# Patient Record
Sex: MALE | Race: WHITE
[De-identification: names, ages, dates, MRNs, and addresses within clinical notes are randomized per-mention and may not be internally consistent; named-entity substitution may affect disease eponyms.]

---

## 2018-06-21 ENCOUNTER — HOSPITAL ENCOUNTER (EMERGENCY)
Dept: HOSPITAL 8 - ED | Age: 53
Discharge: HOME | End: 2018-06-21
Payer: COMMERCIAL

## 2018-06-21 VITALS — HEIGHT: 70 IN | BODY MASS INDEX: 31.47 KG/M2 | WEIGHT: 219.8 LBS

## 2018-06-21 VITALS — DIASTOLIC BLOOD PRESSURE: 97 MMHG | SYSTOLIC BLOOD PRESSURE: 160 MMHG
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
## 2018-06-21 DIAGNOSIS — S52.352A: Primary | ICD-10-CM
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
## 2018-06-21 PROCEDURE — 29125 APPL SHORT ARM SPLINT STATIC: CPT

## 2018-06-21 PROCEDURE — 99284 EMERGENCY DEPT VISIT MOD MDM: CPT

## 2018-09-29 ENCOUNTER — HOSPITAL ENCOUNTER (EMERGENCY)
Dept: HOSPITAL 8 - ED | Age: 53
Discharge: HOME | End: 2018-09-29
Payer: MEDICAID

## 2018-09-29 VITALS — HEIGHT: 70 IN | WEIGHT: 225.31 LBS | BODY MASS INDEX: 32.26 KG/M2

## 2018-09-29 DIAGNOSIS — F17.200: ICD-10-CM

## 2018-09-29 DIAGNOSIS — S82.874A: Primary | ICD-10-CM
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
## 2018-09-29 PROCEDURE — 99284 EMERGENCY DEPT VISIT MOD MDM: CPT

## 2018-09-29 PROCEDURE — 29515 APPLICATION SHORT LEG SPLINT: CPT

## 2018-10-31 ENCOUNTER — OFFICE VISIT (OUTPATIENT)
Dept: MEDICAL GROUP | Facility: MEDICAL CENTER | Age: 53
End: 2018-10-31
Attending: NURSE PRACTITIONER
Payer: MEDICAID

## 2018-10-31 VITALS
TEMPERATURE: 97.9 F | OXYGEN SATURATION: 96 % | DIASTOLIC BLOOD PRESSURE: 80 MMHG | WEIGHT: 225 LBS | HEART RATE: 100 BPM | SYSTOLIC BLOOD PRESSURE: 120 MMHG | HEIGHT: 71 IN | RESPIRATION RATE: 20 BRPM | BODY MASS INDEX: 31.5 KG/M2

## 2018-10-31 DIAGNOSIS — Z12.11 SCREEN FOR COLON CANCER: ICD-10-CM

## 2018-10-31 DIAGNOSIS — E66.9 OBESITY (BMI 30-39.9): ICD-10-CM

## 2018-10-31 DIAGNOSIS — I10 ESSENTIAL HYPERTENSION: ICD-10-CM

## 2018-10-31 DIAGNOSIS — Z13.21 ENCOUNTER FOR VITAMIN DEFICIENCY SCREENING: ICD-10-CM

## 2018-10-31 DIAGNOSIS — Z13.220 SCREENING FOR CHOLESTEROL LEVEL: ICD-10-CM

## 2018-10-31 DIAGNOSIS — Z13.1 SCREENING FOR DIABETES MELLITUS: ICD-10-CM

## 2018-10-31 DIAGNOSIS — Z13.29 SCREENING FOR THYROID DISORDER: ICD-10-CM

## 2018-10-31 DIAGNOSIS — S92.901D CLOSED FRACTURE OF RIGHT FOOT WITH ROUTINE HEALING: ICD-10-CM

## 2018-10-31 DIAGNOSIS — Z12.5 SCREENING FOR PROSTATE CANCER: ICD-10-CM

## 2018-10-31 DIAGNOSIS — Z13.0 SCREENING FOR IRON DEFICIENCY ANEMIA: ICD-10-CM

## 2018-10-31 DIAGNOSIS — L30.8 OTHER ECZEMA: ICD-10-CM

## 2018-10-31 PROBLEM — L30.9 ECZEMA: Status: ACTIVE | Noted: 2018-10-31

## 2018-10-31 PROCEDURE — 99203 OFFICE O/P NEW LOW 30 MIN: CPT | Performed by: NURSE PRACTITIONER

## 2018-10-31 RX ORDER — TRIAMCINOLONE ACETONIDE 1 MG/G
CREAM TOPICAL
Qty: 1 TUBE | Refills: 2 | Status: SHIPPED | OUTPATIENT
Start: 2018-10-31 | End: 2021-11-04

## 2018-10-31 RX ORDER — HYDROCHLOROTHIAZIDE 12.5 MG/1
12.5 TABLET ORAL DAILY
Qty: 30 TAB | Refills: 2 | Status: SHIPPED | OUTPATIENT
Start: 2018-10-31 | End: 2018-12-12

## 2018-10-31 RX ORDER — NAPROXEN 500 MG/1
TABLET ORAL
COMMUNITY
Start: 2018-10-01 | End: 2018-10-31

## 2018-10-31 ASSESSMENT — PATIENT HEALTH QUESTIONNAIRE - PHQ9: CLINICAL INTERPRETATION OF PHQ2 SCORE: 0

## 2018-10-31 NOTE — PROGRESS NOTES
"Chief Complaint:   Chief Complaint   Patient presents with   • New Patient   • Hypertension   • Other     eczema        HPI:  Tyree is here today to establish as a new patient.     Nev  Report:   No Entries    His PMH includes  Alcohol Abuse/Withdrawal/DT\"s  Alcohol INduced Pancreatitis  Alcohol Hepatitis  Thrombocytopenia  Macrocytic Anemia  Eczema  HTN ?    Review of Records:  2/21/15--> 3/5/39789 Hospital Admit for Alcohol W/D w Delerium--> DT's  Tx w CIWA protocol, Librium RX, Omeprazole.    Closed fracture of right foot with routine healing  REports seeing DR Douglas for right foot fx  Is wearing boot and has f/u regularly.    Essential hypertension  Hx of HTN and states when at [facility] has been up \"a little\"  138/91 and today 130/80.     Eczema  Reports Eczema for years  Typically on arms and sometimes legs  States \"steroid Cream\" always works after a few days.  Asking for this medication.        Patient Active Problem List    Diagnosis Date Noted   • Obesity (BMI 30-39.9) 10/31/2018   • Closed fracture of right foot with routine healing 10/31/2018   • Essential hypertension 10/31/2018   • Eczema 10/31/2018   • Hyponatremia 02/28/2015   • Hypokalemia 02/28/2015   • Anemia 02/28/2015   • Thrombocytopenia (HCC) 02/28/2015   • Alcoholic hepatitis 02/28/2015   • Metabolic acidosis 02/28/2015   • ARF (acute renal failure) (HCC) 02/28/2015   • Alcohol-induced pancreatitis 02/28/2015   • Alcohol withdrawal delirium ([facility]) 02/27/2015       Allergies:Patient has no known allergies.    Current Outpatient Prescriptions   Medication Sig Dispense Refill   • hydroCHLOROthiazide (HYDRODIURIL) 12.5 MG tablet Take 1 Tab by mouth every day. 30 Tab 2   • triamcinolone acetonide (KENALOG) 0.1 % Cream Apply 1/4 inch ribbon to affected eczema rash areas twice a day as needed 1 Tube 2     No current facility-administered medications for this visit.        Social History   Substance Use Topics   • Smoking status: Current Every Day " "Smoker     Packs/day: 0.25     Years: 25.00     Types: Cigarettes   • Smokeless tobacco: Never Used   • Alcohol use Yes      Comment: one pint daily        Past Medical History:   Diagnosis Date   • ASTHMA    • Hypertension        Family History   Problem Relation Age of Onset   • Heart Disease Father    • Stroke Father        ROS:  Review of Systems   See HPI Above    Exam:  Blood pressure 120/80, pulse 100, temperature 36.6 °C (97.9 °F), temperature source Temporal, resp. rate 20, height 1.803 m (5' 11\"), weight 102.1 kg (225 lb), SpO2 96 %. Body mass index is 31.38 kg/m².    General:  Well nourished, over-weight, well developed male in NAD  HENT:Head is grossly normal. PERRL.  Neck: Supple. Trachea is midline.  Pulmonary: Clear to ausculation .  Normal effort. No rales, ronchi, or wheezing.   Cardiovascular: Regular rate and rhythm.  Abdomen-Abdomen is soft, No tenderness.  Upper extremities- Strong = . Good ROM. Scant scattered rash to each arm  Lower extremities- neg for edema, redness, tenderness. Right foot and ankle in walking boot. Walks slowly w steady gait and good balance.  Neuro- A & O x 4. Speech clear and appropriate and slow.    Current medications, allergies, and problem list reviewed with patient and updated in [facility] today.    Assessment/Plan:  1. Obesity (BMI 30-39.9)  Patient identified as having weight management issue.  Appropriate orders and counseling given.   2. Screen for colon cancer  OCCULT BLOOD FECES IMMUNOASSAY (FIT)   3. Closed fracture of right foot with routine healing  Continue wearing walking boot, F/u w [facility]  -DR Douglas as planned   4. Essential hypertension  hydroCHLOROthiazide (HYDRODIURIL) 12.5 MG tablet    COMP METABOLIC PANEL   5. Other eczema  triamcinolone acetonide (KENALOG) 0.1 % Cream   6. Screening for iron deficiency anemia  CBC WITH DIFFERENTIAL   7. Screening for thyroid disorder  TSH   8. Screening for diabetes mellitus  HEMOGLOBIN A1C   9. Encounter for vitamin " deficiency screening  VITAMIN B12    VITAMIN D,25 HYDROXY   10. Screening for cholesterol level  LIPID PROFILE   11. Screening for prostate cancer  PROSTATE SPECIFIC AG SCREENING       Return in about 4 weeks (around 11/28/2018) for lab results.

## 2018-10-31 NOTE — ASSESSMENT & PLAN NOTE
"Hx of HTN and states when at [facility] has been up \"a little\"  138/91 and today 130/80.   "

## 2018-10-31 NOTE — ASSESSMENT & PLAN NOTE
"Reports Eczema for years  Typically on arms and sometimes legs  States \"steroid Cream\" always works after a few days.  Asking for this medication.    "

## 2018-12-05 PROBLEM — E03.8 OTHER SPECIFIED HYPOTHYROIDISM: Status: ACTIVE | Noted: 2018-12-05

## 2018-12-05 PROBLEM — E55.9 VITAMIN D DEFICIENCY: Status: ACTIVE | Noted: 2018-12-05

## 2018-12-12 ENCOUNTER — OFFICE VISIT (OUTPATIENT)
Dept: MEDICAL GROUP | Facility: MEDICAL CENTER | Age: 53
End: 2018-12-12
Attending: NURSE PRACTITIONER
Payer: MEDICAID

## 2018-12-12 ENCOUNTER — TELEPHONE (OUTPATIENT)
Dept: MEDICAL GROUP | Facility: MEDICAL CENTER | Age: 53
End: 2018-12-12

## 2018-12-12 VITALS
TEMPERATURE: 97.9 F | WEIGHT: 228 LBS | DIASTOLIC BLOOD PRESSURE: 84 MMHG | HEART RATE: 95 BPM | BODY MASS INDEX: 31.92 KG/M2 | RESPIRATION RATE: 16 BRPM | OXYGEN SATURATION: 93 % | SYSTOLIC BLOOD PRESSURE: 134 MMHG | HEIGHT: 71 IN

## 2018-12-12 DIAGNOSIS — E55.9 VITAMIN D DEFICIENCY: ICD-10-CM

## 2018-12-12 DIAGNOSIS — N52.8 OTHER MALE ERECTILE DYSFUNCTION: ICD-10-CM

## 2018-12-12 DIAGNOSIS — R68.82 LIBIDO, DECREASED: ICD-10-CM

## 2018-12-12 DIAGNOSIS — I10 ESSENTIAL HYPERTENSION: ICD-10-CM

## 2018-12-12 DIAGNOSIS — E03.8 OTHER SPECIFIED HYPOTHYROIDISM: ICD-10-CM

## 2018-12-12 DIAGNOSIS — H93.13 RINGING IN EARS, BILATERAL: ICD-10-CM

## 2018-12-12 DIAGNOSIS — H61.23 BILATERAL IMPACTED CERUMEN: ICD-10-CM

## 2018-12-12 PROCEDURE — 99213 OFFICE O/P EST LOW 20 MIN: CPT | Performed by: NURSE PRACTITIONER

## 2018-12-12 PROCEDURE — 99214 OFFICE O/P EST MOD 30 MIN: CPT | Performed by: NURSE PRACTITIONER

## 2018-12-12 RX ORDER — ERGOCALCIFEROL 1.25 MG/1
50000 CAPSULE ORAL
Qty: 12 CAP | Refills: 2 | Status: SHIPPED | OUTPATIENT
Start: 2018-12-12 | End: 2021-11-04

## 2018-12-12 RX ORDER — MECLIZINE HCL 12.5 MG/1
12.5 TABLET ORAL 2 TIMES DAILY PRN
Qty: 60 TAB | Refills: 1 | Status: SHIPPED | OUTPATIENT
Start: 2018-12-12 | End: 2021-05-02

## 2018-12-12 RX ORDER — LEVOTHYROXINE SODIUM 0.03 MG/1
25 TABLET ORAL
Qty: 30 TAB | Refills: 2 | Status: SHIPPED | OUTPATIENT
Start: 2018-12-12 | End: 2021-11-04

## 2018-12-12 RX ORDER — HYDROCHLOROTHIAZIDE 25 MG/1
25 TABLET ORAL DAILY
Qty: 30 TAB | Refills: 2 | Status: SHIPPED | OUTPATIENT
Start: 2018-12-12 | End: 2021-11-04

## 2018-12-12 NOTE — ASSESSMENT & PLAN NOTE
Ringing in ear, more so in left when stops drinking alcohol.  Goes back and forth drinking as the ringing decrease.  Gets some nausea.     Both ears have packed w wax.  Recommend ear irrigation after softening.

## 2018-12-12 NOTE — PROGRESS NOTES
"Chief Complaint:   Chief Complaint   Patient presents with   • Results     labs       HPI:  Tyree is here today for a follow-up on Results. New concern r/t libido    Nev  Report:   No Entries     His PMH includes  Alcohol Abuse/Withdrawal/DT\"s  Alcohol INduced Pancreatitis  Alcohol Hepatitis  Thrombocytopenia  Macrocytic Anemia  Eczema  HTN   Vitamin D Deficiency  Hypothyroid( new 12/2018)  Reduced Libido, Erectile Dysfunction     Review of Records:    Labs normal except LDL slight elev= 110, Vit D= 13 (low), TSH= 8.19    10/31/18 Clinic New Patient appt, HTN, Eczema, Alcohol Abuse hx.  00> Fit test ordered, labs ordered, RX HctZ and Kenalog cream.    2/21/15--> 3/5/11115 Hospital Admit for Alcohol W/D w Delerium--> DT's  Tx w CIWA protocol, Librium RX, Omeprazole.    Other specified hypothyroidism  Reviewed labs and discussed new dx of Hypothyroid and discussed common symptoms.  He reports no history of hypothyroid.  We discussed the nature of hypothyroid and need to start him on a supplemental hormone.  Follow-up TSH blood test in 5 weeks.  He denies any symptoms of fatigue or dry skin.  He does have excess mid abdominal adipose.    Vitamin D deficiency  Vitamin D level reviewed.  Discussed why optimal level of Vitamin D is important to health  Recommended Supplemental Vitamin D    Essential hypertension  BPslightly elevated.  Continues to take HctZ.  States would like to increase to 25 mg/day  Denies neuro deficits.    Ringing in ears, bilateral  Ringing in ear, more so in left when stops drinking alcohol.  Goes back and forth drinking as the ringing decrease.  Gets some nausea.     Both ears have packed w wax.  Recommend ear irrigation after softening.    Libido, decreased  Libido decreased past few months  Mild erectile dysfunction  Wants some assistance w this issue.      Patient Active Problem List    Diagnosis Date Noted   • Ringing in ears, bilateral 12/12/2018   • Libido, decreased 12/12/2018   • " Vitamin D deficiency 12/05/2018   • Other specified hypothyroidism 12/05/2018   • Obesity (BMI 30-39.9) 10/31/2018   • Closed fracture of right foot with routine healing 10/31/2018   • Essential hypertension 10/31/2018   • Eczema 10/31/2018   • Hyponatremia 02/28/2015   • Hypokalemia 02/28/2015   • Anemia 02/28/2015   • Thrombocytopenia ([facility]) 02/28/2015   • Alcoholic hepatitis 02/28/2015   • Metabolic acidosis 02/28/2015   • ARF (acute renal failure) ([facility]) 02/28/2015   • Alcohol-induced pancreatitis 02/28/2015   • Alcohol withdrawal delirium ([facility]) 02/27/2015       Allergies:Patient has no known allergies.    Medicines as of today:  Current Outpatient Prescriptions   Medication Sig Dispense Refill   • levothyroxine (SYNTHROID) 25 MCG Tab Take 1 Tab by mouth Every morning on an empty stomach. 30 Tab 2   • vitamin D, Ergocalciferol, (DRISDOL) 80698 units Cap capsule Take 1 Cap by mouth every 7 days. 12 Cap 2   • meclizine (ANTIVERT) 12.5 MG Tab Take 1 Tab by mouth 2 times a day as needed for Nausea/Vomiting (if feeling ill). 60 Tab 1   • carbamide peroxide (DEBROX) 6.5 % Solution Place 5 Drops in ear 2 times a day. Both ears 1 Bottle 0   • hydroCHLOROthiazide (HYDRODIURIL) 25 MG Tab Take 1 Tab by mouth every day. 30 Tab 2   • triamcinolone acetonide (KENALOG) 0.1 % Cream Apply 1/4 inch ribbon to affected eczema rash areas twice a day as needed 1 Tube 2     No current facility-administered medications for this visit.        Social History   Substance Use Topics   • Smoking status: Current Every Day Smoker     Packs/day: 0.25     Years: 25.00     Types: Cigarettes   • Smokeless tobacco: Never Used   • Alcohol use Yes      Comment: one pint daily        Past Medical History:   Diagnosis Date   • ASTHMA    • Hypertension        Family History   Problem Relation Age of Onset   • Heart Disease Father    • Stroke Father        ROS:  Review of Systems   See HPI Above    Exam:  Blood pressure 134/84, pulse 95, temperature 36.6  "°C (97.9 °F), temperature source Temporal, resp. rate 16, height 1.803 m (5' 10.98\"), weight 103.4 kg (228 lb), SpO2 93 %. Body mass index is 31.81 kg/m².    General:  Well nourished, over-weight,well developed male in NAD  HENT:Head is grossly normal. PERRL. bilat wax impactions, dark brown wax to ear canals  Neck: Supple. Trachea is midline.  Pulmonary: Clear to ausculation .  Normal effort. No rales, ronchi, or wheezing.   Cardiovascular: Regular rate and rhythm.  Abdomen-Abdomen is soft, No tenderness.  Upper extremities- Strong = . Good ROM  Lower extremities- neg for edema, redness, tenderness.  Neuro- A & O x 4. Speech clear and appropriate.    Current medications, allergies, and problem list reviewed with patient and updated in [facility] today.    Assessment/Plan:  1. Other specified hypothyroidism  levothyroxine (SYNTHROID) 25 MCG Tab-START    TSH   2. Vitamin D deficiency  vitamin D, Ergocalciferol, (DRISDOL) 41488 units Cap capsule-START   3. Essential hypertension  hydroCHLOROthiazide (HYDRODIURIL) 25 MG Tab-increase In dose   4. Ringing in ears, bilateral  meclizine (ANTIVERT) 12.5 MG Tab prn-START    carbamide peroxide (DEBROX) 6.5 % Solution   5. Bilateral impacted cerumen  carbamide peroxide (DEBROX) 6.5 % Solution  F/u for Med Asst Visit for bilat ear irrigation/lavage I 1 week   6. Libido, decreased  REFERRAL TO UROLOGY    TESTOSTERONE F&T MALE ADULT   7. Other male erectile dysfunction  REFERRAL TO UROLOGY    TESTOSTERONE F&T MALE ADULT       Return in about 6 weeks (around 1/23/2019) for lab results.  "

## 2018-12-13 NOTE — TELEPHONE ENCOUNTER
1. Caller Name: Tyree Whiteside                                           Call Back Number: [phone] (home)         Patient approves a detailed voicemail message: yes    Spoke with pt to schedule the ear lavage. Pt was unable to get carbamide Peroxide drops as his ins will not cover them. Can we change the Rx or is there one pt can buy OTC to use. Please advise.

## 2018-12-13 NOTE — TELEPHONE ENCOUNTER
No other option to prescribe.  Let him know he should ask pharmacist for   Generic wax softening drops and buy those  To use.  Delbert

## 2019-01-22 ENCOUNTER — TELEPHONE (OUTPATIENT)
Dept: MEDICAL GROUP | Facility: MEDICAL CENTER | Age: 54
End: 2019-01-22

## 2019-01-22 NOTE — TELEPHONE ENCOUNTER
Spoke with patient about no show to appointment today 1/22/19.  Explained that this was his first no show and the no show policy.

## 2019-01-31 ENCOUNTER — TELEPHONE (OUTPATIENT)
Dept: MEDICAL GROUP | Facility: MEDICAL CENTER | Age: 54
End: 2019-01-31

## 2019-01-31 NOTE — TELEPHONE ENCOUNTER
Spoke with patient about no show to appointment today 1/31/19.  Explained that this was his 2nd no show and the no show policy.

## 2019-10-31 ENCOUNTER — IMMUNIZATION (OUTPATIENT)
Dept: SOCIAL WORK | Facility: CLINIC | Age: 54
End: 2019-10-31
Payer: COMMERCIAL

## 2019-10-31 DIAGNOSIS — Z23 NEED FOR VACCINATION: ICD-10-CM

## 2019-10-31 PROCEDURE — 90471 IMMUNIZATION ADMIN: CPT | Performed by: REGISTERED NURSE

## 2019-10-31 PROCEDURE — 90686 IIV4 VACC NO PRSV 0.5 ML IM: CPT | Performed by: REGISTERED NURSE

## 2020-04-23 ENCOUNTER — APPOINTMENT (OUTPATIENT)
Dept: RADIOLOGY | Facility: MEDICAL CENTER | Age: 55
End: 2020-04-23
Attending: EMERGENCY MEDICINE
Payer: COMMERCIAL

## 2020-04-23 ENCOUNTER — HOSPITAL ENCOUNTER (EMERGENCY)
Facility: MEDICAL CENTER | Age: 55
End: 2020-04-23
Attending: EMERGENCY MEDICINE
Payer: COMMERCIAL

## 2020-04-23 VITALS
RESPIRATION RATE: 17 BRPM | WEIGHT: 191.8 LBS | HEIGHT: 70 IN | BODY MASS INDEX: 27.46 KG/M2 | HEART RATE: 81 BPM | SYSTOLIC BLOOD PRESSURE: 145 MMHG | TEMPERATURE: 97.2 F | OXYGEN SATURATION: 96 % | DIASTOLIC BLOOD PRESSURE: 81 MMHG

## 2020-04-23 DIAGNOSIS — S69.92XA INJURY OF FINGER OF LEFT HAND, INITIAL ENCOUNTER: ICD-10-CM

## 2020-04-23 PROCEDURE — 73140 X-RAY EXAM OF FINGER(S): CPT | Mod: LT

## 2020-04-23 PROCEDURE — 700111 HCHG RX REV CODE 636 W/ 250 OVERRIDE (IP): Performed by: EMERGENCY MEDICINE

## 2020-04-23 PROCEDURE — 700101 HCHG RX REV CODE 250: Performed by: EMERGENCY MEDICINE

## 2020-04-23 PROCEDURE — 90715 TDAP VACCINE 7 YRS/> IM: CPT | Performed by: EMERGENCY MEDICINE

## 2020-04-23 PROCEDURE — 90471 IMMUNIZATION ADMIN: CPT

## 2020-04-23 PROCEDURE — 99284 EMERGENCY DEPT VISIT MOD MDM: CPT

## 2020-04-23 RX ORDER — BACITRACIN ZINC AND POLYMYXIN B SULFATE 500; 1000 [USP'U]/G; [USP'U]/G
OINTMENT TOPICAL ONCE
Status: COMPLETED | OUTPATIENT
Start: 2020-04-23 | End: 2020-04-23

## 2020-04-23 RX ADMIN — Medication: at 21:37

## 2020-04-23 RX ADMIN — CLOSTRIDIUM TETANI TOXOID ANTIGEN (FORMALDEHYDE INACTIVATED), CORYNEBACTERIUM DIPHTHERIAE TOXOID ANTIGEN (FORMALDEHYDE INACTIVATED), BORDETELLA PERTUSSIS TOXOID ANTIGEN (GLUTARALDEHYDE INACTIVATED), BORDETELLA PERTUSSIS FILAMENTOUS HEMAGGLUTININ ANTIGEN (FORMALDEHYDE INACTIVATED), BORDETELLA PERTUSSIS PERTACTIN ANTIGEN, AND BORDETELLA PERTUSSIS FIMBRIAE 2/3 ANTIGEN 0.5 ML: 5; 2; 2.5; 5; 3; 5 INJECTION, SUSPENSION INTRAMUSCULAR at 21:56

## 2020-04-24 NOTE — ED PROVIDER NOTES
ED Provider Note    Scribed for Fernandez Hill M.D. by Sp Thompson. 4/23/2020,  9:05 PM.    CHIEF COMPLAINT  Chief Complaint   Patient presents with   • Digit Pain     Left ring finger injury from , laceration at site, occured 4/23/20 1300       HPI  Tyree Whiteside is a 54 y.o. male who presents to the Emergency Department for an acute, mild laceration to the tip of his left 4th finger onset 8 hours ago. Patient states that at around 1 PM today he injured his left 4th finger on a . He eventually came to the ED at the behest of his wife.  He denies any significant pain.  Bleeding was controlled prior to arrival.  He is not diabetic or immunosuppressed.  He denies any recent respiratory illness or other systemic symptoms.  There are no known alleviating or exacerbating factors. Denies any associated numbness/tingling or fevers. His tetanus is not UTD.     REVIEW OF SYSTEMS  See HPI for further details. All other systems are negative.     PAST MEDICAL HISTORY   has a past medical history of ASTHMA and Hypertension.    SOCIAL HISTORY  Social History     Tobacco Use   • Smoking status: Current Every Day Smoker     Packs/day: 0.25     Years: 25.00     Pack years: 6.25     Types: Cigarettes   • Smokeless tobacco: Never Used   Substance and Sexual Activity   • Alcohol use: Yes     Comment: one pint daily    • Drug use: No   • Sexual activity: Yes     Social History     Substance and Sexual Activity   Drug Use No       SURGICAL HISTORY  patient denies any surgical history    CURRENT MEDICATIONS  Home Medications     Reviewed by Parish Clark R.N. (Registered Nurse) on 04/23/20 at 2046  Med List Status: Partial   Medication Last Dose Status   carbamide peroxide (DEBROX) 6.5 % Solution  Active   hydroCHLOROthiazide (HYDRODIURIL) 25 MG Tab  Active   levothyroxine (SYNTHROID) 25 MCG Tab  Active   meclizine (ANTIVERT) 12.5 MG Tab  Active   triamcinolone acetonide (KENALOG) 0.1 % Cream   "Active   vitamin D, Ergocalciferol, (DRISDOL) 05794 units Cap capsule  Active                ALLERGIES  No Known Allergies    PHYSICAL EXAM  VITAL SIGNS: /85   Pulse 87   Temp 36.1 °C (97 °F) (Temporal)   Resp 16   Ht 1.778 m (5' 10\")   Wt 87 kg (191 lb 12.8 oz)   SpO2 96%   BMI 27.52 kg/m²   Pulse ox interpretation: I interpret this pulse ox as normal.  Constitutional: Alert in no apparent distress.  HENT: No signs of trauma, Bilateral external ears normal, Nose normal.   Eyes: Pupils are equal and reactive, Conjunctiva normal, Non-icteric.   Neck: Normal range of motion, Supple, No stridor.    Cardiovascular: Normal peripheral perfusion  Thorax & Lungs: Unlabored respirations, equal chest expansion, no accessory muscle use  Abdomen: Non-distended  Skin: Left 4th digit: Lateral quarter of the affected nail is missing, there is soft tissue maceration of the underlying tissue and a small portion of the lateral nail fold, remaining nail is perfectly adhered to the nail bed, no obvious FB or active bleeding. Proximal nail bed appearing intact.   Back: Normal alignment and ROM  Extremities: No gross deformity  Musculoskeletal: Good range of motion in all major joints.   Neurologic: Alert, Normal motor function, No focal deficits noted.   Psychiatric: Affect normal, Judgment normal, Mood normal.    DIAGNOSTIC STUDIES / PROCEDURES    RADIOLOGY  DX-FINGER(S) 2+ LEFT   Final Result      No radiographic evidence of acute traumatic bony injury.        The radiologist's interpretation of all radiological studies have been reviewed by me.    COURSE & MEDICAL DECISION MAKING  Nursing notes, VS, PMSFHx reviewed in chart.     PPE Note: I verified that the patient was wearing a mask and I was wearing appropriate PPE every time I entered the room. The patient's mask was on the patient at all times during my encounter except for a brief view of the oropharynx.     Scribe remained outside the patient's room and did not " have any contact with the patient for the duration of patient encounter.     9:05 PM Patient seen and examined at bedside. X-ray ordered to rule out tuft fracture vs FB. Patient's tetanus was updated and he was treated with Polysporin topical ointment and an appropriate wound dressing.    10:15 PM Patient was reevaluated at bedside. Discussed radiology results with the patient and informed them they are reassuring.  He understands that this wound will heal slowly by secondary intention.  We discussed return precautions for signs of infection, and they were cleared for discharge at this time. Patient was understanding and agreeable to discharge.     The patient will return for new or worsening symptoms and is stable at the time of discharge.    The patient is referred to a primary physician for blood pressure management, diabetic screening, and for all other preventative health concerns.    DISPOSITION:  Patient will be discharged home in stable condition.    FOLLOW UP:  NAKUL Oropeza  [address]  [facility] [address]  [facility] [address]  [phone]    Schedule an appointment as soon as possible for a visit       [facility], Emergency Dept  [facility] [facility] [address]  [phone]    If symptoms worsen      OUTPATIENT MEDICATIONS:  Discharge Medication List as of 4/23/2020 10:12 PM          FINAL IMPRESSION  1. Injury of finger of left hand, initial encounter         Sp ALBARRAN (Scribe), am scribing for, and in the presence of, Fernandez Hill M.D..    Electronically signed by: Sp Thompson (Carmelibkirti), 4/23/2020    Fernandez ALBARRAN M.D. personally performed the services described in this documentation, as scribed by Sp Thompson in my presence, and it is both accurate and complete. C.    The note accurately reflects work and decisions made by me.  Fernandez Hill M.D.  4/23/2020  10:41 PM

## 2020-04-24 NOTE — ED NOTES
Discharge teaching and paperwork provided regarding wound care of his finger with extra supplies provided and all questions/concerns answered. VSS, assessment stable and PIV removed. Given information regarding abx ointment. Patient discharged to the care of self and ambulated out of the ED.

## 2020-04-24 NOTE — DISCHARGE INSTRUCTIONS
Keep your wound clean, washing with simple soap and water once daily.  Change the bandage once daily after washing, using antibiotic ointment, Vaseline gauze, and then dry gauze, the way we showed you here.  This will gradually heal over a few weeks.  Minimize the use of your left hand as much as you can.    Schedule a follow-up with your primary care doctor.  Return here for any signs of infection such as increasing redness, swelling, drainage, pain, or fevers and chills.  These kinds of injuries do not usually get infected, but you should watch for signs and symptoms.

## 2020-04-24 NOTE — ED TRIAGE NOTES
Chief Complaint   Patient presents with   • Digit Pain     Left ring finger injury from , laceration at site, occured 4/23/20 1300

## 2021-05-02 ENCOUNTER — OFFICE VISIT (OUTPATIENT)
Dept: URGENT CARE | Facility: CLINIC | Age: 56
End: 2021-05-02
Payer: MEDICAID

## 2021-05-02 VITALS
TEMPERATURE: 97.5 F | SYSTOLIC BLOOD PRESSURE: 128 MMHG | BODY MASS INDEX: 32.01 KG/M2 | WEIGHT: 223.6 LBS | OXYGEN SATURATION: 96 % | RESPIRATION RATE: 14 BRPM | HEART RATE: 93 BPM | DIASTOLIC BLOOD PRESSURE: 68 MMHG | HEIGHT: 70 IN

## 2021-05-02 DIAGNOSIS — T78.3XXA ANGIOEDEMA, INITIAL ENCOUNTER: Primary | ICD-10-CM

## 2021-05-02 DIAGNOSIS — L42 PITYRIASIS ROSEA: ICD-10-CM

## 2021-05-02 PROCEDURE — 99214 OFFICE O/P EST MOD 30 MIN: CPT | Performed by: PHYSICIAN ASSISTANT

## 2021-05-02 RX ORDER — PREDNISONE 20 MG/1
TABLET ORAL
Qty: 5 TABLET | Refills: 0 | Status: SHIPPED | OUTPATIENT
Start: 2021-05-02 | End: 2021-08-17

## 2021-05-02 RX ORDER — AMLODIPINE BESYLATE 10 MG/1
TABLET ORAL
COMMUNITY
Start: 2021-04-18 | End: 2022-06-14 | Stop reason: SDUPTHER

## 2021-05-02 RX ORDER — LISINOPRIL 10 MG/1
TABLET ORAL
COMMUNITY
Start: 2021-04-18 | End: 2021-11-04

## 2021-05-02 NOTE — PROGRESS NOTES
Subjective:   Tyree Whiteside is a 55 y.o. male who presents for Rash (x 3 weeks all over body.  No new detergents, soaps, detergents, etc. Hx of Excema.) and Facial Swelling (x 2 days on cheeks and lips. )        Rash  This is a new problem. Episode onset: 3 weeks  The rash is diffuse. The rash is characterized by redness. Treatments tried: Triamcinolone for eczema  The treatment provided no relief. His past medical history is significant for eczema.     The patient has also been experiencing facial swelling around lips and bilateral cheeks.  This has been present for 2 days.  He states he had a similar episode years ago that resolved spontaneously.  He did start a new blood pressure medication lisinopril 2 months ago.  No new supplements.  No new products or soaps.    He does have a follow-up scheduled with PCP on Wednesday with Sheeba Workman     Review of Systems   Skin: Positive for rash.       PMH:  has a past medical history of ASTHMA and Hypertension.  MEDS:   Current Outpatient Medications:   •  predniSONE (DELTASONE) 20 MG Tab, Take 20 mg PO daily for 5 days, Disp: 5 tablet, Rfl: 0  •  levothyroxine (SYNTHROID) 25 MCG Tab, Take 1 Tab by mouth Every morning on an empty stomach., Disp: 30 Tab, Rfl: 2  •  vitamin D, Ergocalciferol, (DRISDOL) 38235 units Cap capsule, Take 1 Cap by mouth every 7 days., Disp: 12 Cap, Rfl: 2  •  hydroCHLOROthiazide (HYDRODIURIL) 25 MG Tab, Take 1 Tab by mouth every day., Disp: 30 Tab, Rfl: 2  •  triamcinolone acetonide (KENALOG) 0.1 % Cream, Apply 1/4 inch ribbon to affected eczema rash areas twice a day as needed, Disp: 1 Tube, Rfl: 2  •  amLODIPine (NORVASC) 10 MG Tab, , Disp: , Rfl:   •  lisinopril (PRINIVIL) 10 MG Tab, , Disp: , Rfl:   ALLERGIES: No Known Allergies  SURGHX: History reviewed. No pertinent surgical history.  SOCHX:  reports that he has been smoking cigarettes. He has a 6.25 pack-year smoking history. He has never used smokeless tobacco. He reports current  "alcohol use. He reports that he does not use drugs.  Family History   Problem Relation Age of Onset   • Heart Disease Father    • Stroke Father         Objective:   /68   Pulse 93   Temp 36.4 °C (97.5 °F) (Temporal)   Resp 14   Ht 1.778 m (5' 10\")   Wt 101 kg (223 lb 9.6 oz)   SpO2 96%   BMI 32.08 kg/m²     Physical Exam  Vitals reviewed.   Constitutional:       General: He is not in acute distress.     Appearance: Normal appearance. He is well-developed. He is not ill-appearing.   HENT:      Head: Normocephalic and atraumatic.        Right Ear: External ear normal.      Left Ear: External ear normal.      Nose: Nose normal.      Mouth/Throat:      Mouth: Mucous membranes are moist.   Eyes:      Conjunctiva/sclera: Conjunctivae normal.      Pupils: Pupils are equal, round, and reactive to light.   Neck:      Trachea: No tracheal deviation.   Cardiovascular:      Rate and Rhythm: Normal rate and regular rhythm.   Pulmonary:      Effort: Pulmonary effort is normal. No respiratory distress.      Breath sounds: Normal breath sounds. No stridor. No wheezing.   Musculoskeletal:      Cervical back: Normal range of motion and neck supple.   Skin:     General: Skin is warm and dry.      Capillary Refill: Capillary refill takes less than 2 seconds.          Neurological:      General: No focal deficit present.      Mental Status: He is alert and oriented to person, place, and time.   Psychiatric:         Mood and Affect: Mood normal.         Behavior: Behavior normal.           Assessment/Plan:     1. Angioedema, initial encounter  predniSONE (DELTASONE) 20 MG Tab   2. Pityriasis rosea       Supportive care reviewed.  We discussed angioedema may be related to recently starting lisinopril.  He will discontinue medications until follow-up with PCP on Wednesday.  Continue to monitor home BP.  He will be treated with prednisone.  Red flags and strict emergency room precautions discussed.    Rash consistent with " pityriasis rosea.  DDx-nummular eczema, drug eruption.  The rash is nontender, not itchy.  He can monitor area, pityriasis rosea self-limited in nature.    Follow-up as scheduled with PCP on Wednesday. If symptoms worsen or persist patient can return to clinic for reevaluation. Side effects of medication discussed. Patient confirmed understanding of information.    Please note that this dictation was created using voice recognition software. I have made every reasonable attempt to correct obvious errors, but I expect that there are errors of grammar and possibly content that I did not discover before finalizing the note.

## 2021-08-17 ENCOUNTER — OFFICE VISIT (OUTPATIENT)
Dept: URGENT CARE | Facility: CLINIC | Age: 56
End: 2021-08-17
Payer: MEDICAID

## 2021-08-17 VITALS
DIASTOLIC BLOOD PRESSURE: 76 MMHG | HEART RATE: 113 BPM | TEMPERATURE: 97.9 F | SYSTOLIC BLOOD PRESSURE: 132 MMHG | RESPIRATION RATE: 16 BRPM | BODY MASS INDEX: 31.92 KG/M2 | OXYGEN SATURATION: 93 % | WEIGHT: 223 LBS | HEIGHT: 70 IN

## 2021-08-17 DIAGNOSIS — R21 RASH: ICD-10-CM

## 2021-08-17 PROCEDURE — 99214 OFFICE O/P EST MOD 30 MIN: CPT | Performed by: PHYSICIAN ASSISTANT

## 2021-08-17 RX ORDER — DOXYCYCLINE HYCLATE 100 MG
100 TABLET ORAL 2 TIMES DAILY
Qty: 14 TABLET | Refills: 0 | Status: SHIPPED | OUTPATIENT
Start: 2021-08-17 | End: 2021-11-04

## 2021-08-17 RX ORDER — TRIAMCINOLONE ACETONIDE 40 MG/ML
40 INJECTION, SUSPENSION INTRA-ARTICULAR; INTRAMUSCULAR ONCE
Status: COMPLETED | OUTPATIENT
Start: 2021-08-17 | End: 2021-08-17

## 2021-08-17 RX ORDER — TRIAMCINOLONE ACETONIDE 1 MG/G
1 OINTMENT TOPICAL 2 TIMES DAILY
Qty: 453.6 G | Refills: 1 | Status: SHIPPED | OUTPATIENT
Start: 2021-08-17 | End: 2021-11-04

## 2021-08-17 RX ADMIN — TRIAMCINOLONE ACETONIDE 40 MG: 40 INJECTION, SUSPENSION INTRA-ARTICULAR; INTRAMUSCULAR at 18:26

## 2021-08-19 ASSESSMENT — ENCOUNTER SYMPTOMS
GASTROINTESTINAL NEGATIVE: 1
COUGH: 0
RHINORRHEA: 0
ANOREXIA: 0
FEVER: 0
DIARRHEA: 0
FATIGUE: 0
CARDIOVASCULAR NEGATIVE: 1
SHORTNESS OF BREATH: 0
RESPIRATORY NEGATIVE: 1
SORE THROAT: 0

## 2021-08-19 NOTE — PROGRESS NOTES
Subjective     Tyree Whiteside is a 55 y.o. male who presents with Eczema (x 1 month all over body.  )            Diffuse, severe, worsening atopic dermatitis.  Extremely pruritic.  Several excoriated patches.  Using topical medication with only limited relief.    Rash  This is a new problem. The current episode started more than 1 month ago. The problem has been gradually worsening since onset. The rash is diffuse. The rash is characterized by swelling, redness, peeling, itchiness and dryness. He was exposed to nothing. Pertinent negatives include no anorexia, congestion, cough, diarrhea, facial edema, fatigue, fever, joint pain, rhinorrhea, shortness of breath or sore throat. Past treatments include topical steroids. The treatment provided mild relief. His past medical history is significant for eczema.       PMH:  has a past medical history of ASTHMA and Hypertension.  MEDS:   Current Outpatient Medications:   •  doxycycline (VIBRAMYCIN) 100 MG Tab, Take 1 Tablet by mouth 2 times a day., Disp: 14 Tablet, Rfl: 0  •  triamcinolone acetonide (KENALOG) 0.1 % Ointment, Apply 1 Application topically 2 times a day., Disp: 453.6 g, Rfl: 1  •  amLODIPine (NORVASC) 10 MG Tab, , Disp: , Rfl:   •  levothyroxine (SYNTHROID) 25 MCG Tab, Take 1 Tab by mouth Every morning on an empty stomach., Disp: 30 Tab, Rfl: 2  •  hydroCHLOROthiazide (HYDRODIURIL) 25 MG Tab, Take 1 Tab by mouth every day., Disp: 30 Tab, Rfl: 2  •  triamcinolone acetonide (KENALOG) 0.1 % Cream, Apply 1/4 inch ribbon to affected eczema rash areas twice a day as needed, Disp: 1 Tube, Rfl: 2  •  lisinopril (PRINIVIL) 10 MG Tab, , Disp: , Rfl:   •  vitamin D, Ergocalciferol, (DRISDOL) 46320 units Cap capsule, Take 1 Cap by mouth every 7 days. (Patient not taking: Reported on 8/17/2021), Disp: 12 Cap, Rfl: 2  ALLERGIES: No Known Allergies  SURGHX: History reviewed. No pertinent surgical history.  SOCHX:  reports that he has been smoking cigarettes. He has a  "6.25 pack-year smoking history. He has never used smokeless tobacco. He reports current alcohol use. He reports that he does not use drugs.  FH: family history includes Heart Disease in his father; Stroke in his father.    Review of Systems   Constitutional: Negative for fatigue and fever.   HENT: Negative for congestion, rhinorrhea and sore throat.    Respiratory: Negative.  Negative for cough and shortness of breath.    Cardiovascular: Negative.    Gastrointestinal: Negative.  Negative for anorexia and diarrhea.   Musculoskeletal: Negative for joint pain.   Skin: Positive for itching and rash.          Medications, Allergies, and current problem list reviewed today in Epic      Objective     /76   Pulse (!) 113   Temp 36.6 °C (97.9 °F) (Temporal)   Resp 16   Ht 1.778 m (5' 10\")   Wt 101 kg (223 lb)   SpO2 93%   BMI 32.00 kg/m²      Physical Exam  Vitals and nursing note reviewed.   Constitutional:       General: He is not in acute distress.     Appearance: He is well-developed. He is not diaphoretic.   HENT:      Head: Normocephalic and atraumatic.   Eyes:      Conjunctiva/sclera: Conjunctivae normal.   Cardiovascular:      Rate and Rhythm: Normal rate and regular rhythm.      Heart sounds: Normal heart sounds. No murmur heard.     Pulmonary:      Effort: Pulmonary effort is normal. No respiratory distress.      Breath sounds: Normal breath sounds. No wheezing.   Musculoskeletal:      Cervical back: Normal range of motion and neck supple.   Skin:     General: Skin is warm and dry.      Findings: Rash present.      Comments: Diffuse patches of erythematous inflamed atopic dermatitis.  Scaly and pruritic.  Several excoriated lesions.  Some secondary impetiginization.   Neurological:      Mental Status: He is alert and oriented to person, place, and time.   Psychiatric:         Behavior: Behavior normal.         Thought Content: Thought content normal.         Judgment: Judgment normal.             "       Assessment & Plan        1. Rash  triamcinolone acetonide (KENALOG-40) injection 40 mg    doxycycline (VIBRAMYCIN) 100 MG Tab    REFERRAL TO DERMATOLOGY    triamcinolone acetonide (KENALOG) 0.1 % Ointment     Diffuse, severe atopic dermatitis.  Lifetime problem but significantly worse recently secondary to poor personal care and life stresses.  Using topical with only limited relief.  Exam shows diffuse surface area coverage of inflamed pruritic atopic dermatitis.  Some secondary impetiginization noted.  Vital signs are normal.  Had long discussion with patient about treatment plan.  Kenalog injection in clinic, no adverse reaction  Cool shower or bleach bath  CeraVe moisturizer  Topical triamcinolone ointment  Doxycycline for secondary impetiginization and concern for infection due to excoriated regions.  Lifestyle changes, triggers and at home measures discussed, handout given  Urgent referral to dermatology    Return to clinic or go to ED if symptoms worsen or persist. Indications for ED discussed at length. Patient/Parent/Guardian voices understanding. Follow-up with your primary care provider in 3-5 days. Red flag symptoms discussed. All side effects of medication discussed including allergic response, GI upset, tendon injury, rash, sedation etc.    Please note that this dictation was created using voice recognition software. I have made every reasonable attempt to correct obvious errors, but I expect that there are errors of grammar and possibly content that I did not discover before finalizing the note.

## 2021-11-04 ENCOUNTER — OFFICE VISIT (OUTPATIENT)
Dept: URGENT CARE | Facility: CLINIC | Age: 56
End: 2021-11-04
Payer: MEDICAID

## 2021-11-04 VITALS
HEIGHT: 70 IN | TEMPERATURE: 97.8 F | WEIGHT: 210 LBS | DIASTOLIC BLOOD PRESSURE: 70 MMHG | RESPIRATION RATE: 20 BRPM | SYSTOLIC BLOOD PRESSURE: 122 MMHG | OXYGEN SATURATION: 97 % | BODY MASS INDEX: 30.06 KG/M2 | HEART RATE: 118 BPM

## 2021-11-04 DIAGNOSIS — L30.9 DERMATITIS: ICD-10-CM

## 2021-11-04 DIAGNOSIS — R21 RASH: ICD-10-CM

## 2021-11-04 PROCEDURE — 99214 OFFICE O/P EST MOD 30 MIN: CPT | Performed by: NURSE PRACTITIONER

## 2021-11-04 RX ORDER — PREDNISONE 20 MG/1
TABLET ORAL
Qty: 30 TABLET | Refills: 0 | Status: SHIPPED | OUTPATIENT
Start: 2021-11-04 | End: 2021-11-19

## 2021-11-04 ASSESSMENT — ENCOUNTER SYMPTOMS
EYE PAIN: 0
CHILLS: 0
COUGH: 0
SHORTNESS OF BREATH: 0
FEVER: 0
NAUSEA: 0
SORE THROAT: 0
DIZZINESS: 0
MYALGIAS: 0
DIARRHEA: 0
VOMITING: 0

## 2021-11-05 NOTE — PROGRESS NOTES
"Subjective:   Tyree Whiteside is a 56 y.o. male who presents for Eczema (severe eczema)      Rash  This is a chronic problem. The current episode started more than 1 year ago. The problem has been gradually worsening since onset. The rash is diffuse. The rash is characterized by redness, itchiness and peeling. He was exposed to nothing. Pertinent negatives include no congestion, cough, diarrhea, eye pain, fever, shortness of breath, sore throat or vomiting. Past treatments include moisturizer. The treatment provided no relief. His past medical history is significant for eczema.       Review of Systems   Constitutional: Negative for chills and fever.   HENT: Negative for congestion and sore throat.    Eyes: Negative for pain.   Respiratory: Negative for cough and shortness of breath.    Cardiovascular: Negative for chest pain.   Gastrointestinal: Negative for diarrhea, nausea and vomiting.   Genitourinary: Negative for hematuria.   Musculoskeletal: Negative for myalgias.   Skin: Positive for itching and rash.   Neurological: Negative for dizziness.       Medications:    • amLODIPine Tabs  • predniSONE Tabs    Allergies: Patient has no known allergies.    Problem List: Tyree Whiteside does not have any pertinent problems on file.    Surgical History:  No past surgical history on file.    Past Social Hx: Tyree Whiteside  reports that he has been smoking cigarettes. He has a 6.25 pack-year smoking history. He has never used smokeless tobacco. He reports current alcohol use. He reports that he does not use drugs.     Past Family Hx:  Tyree Whiteside family history includes Heart Disease in his father; Stroke in his father.     Problem list, medications, and allergies reviewed by myself today in Epic.     Objective:     /70 (BP Location: Left arm, Patient Position: Sitting, BP Cuff Size: Large adult long)   Pulse (!) 118   Temp 36.6 °C (97.8 °F) (Temporal)   Resp 20   Ht 1.778 m (5' 10\")   " Wt 95.3 kg (210 lb)   SpO2 97%   BMI 30.13 kg/m²     Physical Exam  Vitals and nursing note reviewed.   Constitutional:       General: He is not in acute distress.     Appearance: He is well-developed.   HENT:      Head: Normocephalic and atraumatic.      Right Ear: External ear normal.      Left Ear: External ear normal.      Nose: Nose normal.      Mouth/Throat:      Mouth: Mucous membranes are moist.   Eyes:      Conjunctiva/sclera: Conjunctivae normal.   Cardiovascular:      Rate and Rhythm: Normal rate.   Pulmonary:      Effort: Pulmonary effort is normal. No respiratory distress.      Breath sounds: Normal breath sounds.   Abdominal:      General: There is no distension.   Musculoskeletal:         General: Normal range of motion.   Skin:     General: Skin is warm and dry.      Findings: Rash present. Rash is macular and papular. Rash is not crusting, purpuric, pustular, scaling, urticarial or vesicular.          Neurological:      General: No focal deficit present.      Mental Status: He is alert and oriented to person, place, and time. Mental status is at baseline.      Gait: Gait (gait at baseline) normal.   Psychiatric:         Judgment: Judgment normal.         Assessment/Plan:     Diagnosis and associated orders:     1. Dermatitis  predniSONE (DELTASONE) 20 MG Tab    Referral to Dermatology   2. Rash        Comments/MDM:   No signs of secondary factious etiology, will treat with taper of steroids.  Will have patient follow-up with dermatology  • Differential diagnosis, natural history, supportive care, and indications for immediate follow-up discussed.          Please note that this dictation was created using voice recognition software. I have made a reasonable attempt to correct obvious errors, but I expect that there are errors of grammar and possibly content that I did not discover before finalizing the note.    This note was electronically signed by Ji RODAS.

## 2021-11-30 ENCOUNTER — APPOINTMENT (OUTPATIENT)
Dept: URGENT CARE | Facility: CLINIC | Age: 56
End: 2021-11-30
Payer: MEDICAID

## 2021-11-30 ENCOUNTER — OFFICE VISIT (OUTPATIENT)
Dept: URGENT CARE | Facility: CLINIC | Age: 56
End: 2021-11-30
Payer: MEDICAID

## 2021-11-30 VITALS
SYSTOLIC BLOOD PRESSURE: 160 MMHG | WEIGHT: 220 LBS | RESPIRATION RATE: 16 BRPM | HEIGHT: 70 IN | BODY MASS INDEX: 31.5 KG/M2 | HEART RATE: 62 BPM | DIASTOLIC BLOOD PRESSURE: 90 MMHG | TEMPERATURE: 97.5 F | OXYGEN SATURATION: 95 %

## 2021-11-30 DIAGNOSIS — I10 PRIMARY HYPERTENSION: ICD-10-CM

## 2021-11-30 DIAGNOSIS — L30.9 DERMATITIS: ICD-10-CM

## 2021-11-30 PROCEDURE — 99214 OFFICE O/P EST MOD 30 MIN: CPT | Performed by: STUDENT IN AN ORGANIZED HEALTH CARE EDUCATION/TRAINING PROGRAM

## 2021-11-30 RX ORDER — FLUOCINONIDE CREAM (EMULSIFIED BASE) 0.5 MG/G
1 CREAM TOPICAL 2 TIMES DAILY
Qty: 30 G | Refills: 0 | Status: SHIPPED | OUTPATIENT
Start: 2021-11-30 | End: 2021-12-14

## 2021-11-30 RX ORDER — CETIRIZINE HYDROCHLORIDE 10 MG/1
10 TABLET ORAL DAILY
Qty: 90 TABLET | Refills: 3 | Status: SHIPPED
Start: 2021-11-30 | End: 2022-06-14

## 2021-11-30 RX ORDER — TRIAMCINOLONE ACETONIDE 1 MG/G
1 CREAM TOPICAL 2 TIMES DAILY
Qty: 454 G | Refills: 0 | Status: SHIPPED | OUTPATIENT
Start: 2021-11-30 | End: 2022-03-19 | Stop reason: SDUPTHER

## 2021-11-30 NOTE — PROGRESS NOTES
Subjective:   CHIEF COMPLAINT  Chief Complaint   Patient presents with   • Eczema     bilateral arms, neck        HPI  Tyree Whiteside is a 56 y.o. male with a history of severe eczema presents with chief complaint of an acute flare.  Symptoms are described as a pruritic rash diffusely throughout his body but most notably on the flexor surfaces of bilateral upper and lower extremities.  He has been seen in urgent care for this multiple times but most recently earlier this month.  At that time he was started on prednisone and a referral was sent to establish with dermatology.  Patient says he is scheduled for dermatology appointment in February.  The oral steroids helped but symptoms have been gradually returning.      REVIEW OF SYSTEMS  General: no fever or chills  GI: no nausea or vomiting  See HPI for further details.    PAST MEDICAL HISTORY  Patient Active Problem List    Diagnosis Date Noted   • Ringing in ears, bilateral 12/12/2018   • Libido, decreased 12/12/2018   • Vitamin D deficiency 12/05/2018   • Other specified hypothyroidism 12/05/2018   • Obesity (BMI 30-39.9) 10/31/2018   • Closed fracture of right foot with routine healing 10/31/2018   • Essential hypertension 10/31/2018   • Eczema 10/31/2018   • Hyponatremia 02/28/2015   • Hypokalemia 02/28/2015   • Anemia 02/28/2015   • Thrombocytopenia (HCC) 02/28/2015   • Alcoholic hepatitis 02/28/2015   • Metabolic acidosis 02/28/2015   • ARF (acute renal failure) (HCC) 02/28/2015   • Alcohol-induced pancreatitis 02/28/2015   • Alcohol withdrawal delirium ([facility]) 02/27/2015       SURGICAL HISTORY  patient denies any surgical history    ALLERGIES  No Known Allergies    CURRENT MEDICATIONS  Home Medications     Reviewed by Rohit Quintanilla'ella (Medical Assistant) on 11/30/21 at 1340  Med List Status: <None>   Medication Last Dose Status   amLODIPine (NORVASC) 10 MG Tab Taking Active                SOCIAL HISTORY  Social History     Tobacco Use   • Smoking  "status: Current Every Day Smoker     Packs/day: 0.25     Years: 25.00     Pack years: 6.25     Types: Cigarettes   • Smokeless tobacco: Never Used   Vaping Use   • Vaping Use: Never used   Substance and Sexual Activity   • Alcohol use: Yes     Comment: one pint daily    • Drug use: No   • Sexual activity: Yes       FAMILY HISTORY  Family History   Problem Relation Age of Onset   • Heart Disease Father    • Stroke Father           Objective:   PHYSICAL EXAM  VITAL SIGNS: /90   Pulse 62   Temp 36.4 °C (97.5 °F)   Resp 16   Ht 1.778 m (5' 10\")   Wt 99.8 kg (220 lb)   SpO2 95%   BMI 31.57 kg/m²     Gen: no acute distress, normal voice  Skin: Dry, scaly skin most notable on bilateral flexor surfaces of upper and lower extremities with lichenification of these regions.  Scattered linear abrasions of bilateral upper extremities with a very dry, scaly skin.  No excoriations.  No erythema or edema.  No evidence of superimposed subcutaneous infection.    Head: Atraumatic, normocephalic  Psych: normal affect, normal judgement, alert, awake  :       Assessment/Plan:     1. Dermatitis     2. Primary hypertension     1) patient with history of rather severe and uncontrolled atopic dermatitis.  Patient has had multiple rounds of oral prednisone so would like to avoid additional systemic steroids at this time.  Further I suspect the oral steroids are contributing to noncompliance with topical medications which is really what is needed for long-term prevention and maintenance.  -Ordered Rx for Lidex-E twice daily for 14 days to flexor surfaces only  -Ordered Rx for tub of Kenalog 0.1% cream.  Patient was instructed to mix with daily skin emollient and apply liberally to his body twice daily indefinitely.  -Ordered Rx for Zyrtec  -Avoid scented soaps, laundry detergent and dryer sheets  -Follow-up with dermatology for February appointment as scheduled.  Follow-up in urgent care as needed    2) BP elevated today at " 160/90.  Likely reactive secondary to above.  Follow-up with primary care for reevaluation continue management    Differential diagnosis, natural history, supportive care, and indications for immediate follow-up discussed. All questions answered. Patient agrees with the plan of care.    Follow-up as needed if symptoms worsen or fail to improve to PCP, Urgent care or Emergency Room.    Please note that this dictation was created using voice recognition software. I have made a reasonable attempt to correct obvious errors, but I expect that there are errors of grammar and possibly content that I did not discover before finalizing the note.

## 2022-02-11 ENCOUNTER — OFFICE VISIT (OUTPATIENT)
Dept: URGENT CARE | Facility: CLINIC | Age: 57
End: 2022-02-11
Payer: COMMERCIAL

## 2022-02-11 VITALS
SYSTOLIC BLOOD PRESSURE: 140 MMHG | HEIGHT: 70 IN | BODY MASS INDEX: 30.67 KG/M2 | RESPIRATION RATE: 16 BRPM | TEMPERATURE: 97.4 F | WEIGHT: 214.2 LBS | OXYGEN SATURATION: 97 % | HEART RATE: 105 BPM | DIASTOLIC BLOOD PRESSURE: 100 MMHG

## 2022-02-11 DIAGNOSIS — M79.89 LEFT LEG SWELLING: ICD-10-CM

## 2022-02-11 DIAGNOSIS — L30.9 DERMATITIS: ICD-10-CM

## 2022-02-11 DIAGNOSIS — L08.9 WOUND INFECTION: ICD-10-CM

## 2022-02-11 DIAGNOSIS — T14.8XXA WOUND INFECTION: ICD-10-CM

## 2022-02-11 PROCEDURE — 99214 OFFICE O/P EST MOD 30 MIN: CPT | Performed by: FAMILY MEDICINE

## 2022-02-11 RX ORDER — PREDNISONE 20 MG/1
40 TABLET ORAL DAILY
Qty: 10 TABLET | Refills: 0 | Status: SHIPPED | OUTPATIENT
Start: 2022-02-11 | End: 2022-02-16

## 2022-02-11 RX ORDER — AMOXICILLIN 875 MG/1
875 TABLET, COATED ORAL 2 TIMES DAILY
Qty: 10 TABLET | Refills: 0 | Status: SHIPPED | OUTPATIENT
Start: 2022-02-11 | End: 2022-02-16

## 2022-02-11 RX ORDER — SULFAMETHOXAZOLE AND TRIMETHOPRIM 800; 160 MG/1; MG/1
1 TABLET ORAL EVERY 12 HOURS
Qty: 10 TABLET | Refills: 0 | Status: SHIPPED | OUTPATIENT
Start: 2022-02-11 | End: 2022-02-16

## 2022-02-13 ENCOUNTER — HOSPITAL ENCOUNTER (OUTPATIENT)
Dept: RADIOLOGY | Facility: MEDICAL CENTER | Age: 57
End: 2022-02-13
Attending: FAMILY MEDICINE

## 2022-02-18 ASSESSMENT — ENCOUNTER SYMPTOMS
FOCAL WEAKNESS: 0
SENSORY CHANGE: 0

## 2022-02-18 NOTE — PROGRESS NOTES
"Subjective     Tyree Whiteside is a 56 y.o. male who presents with Other (Flare up on (L) ankle, calf is swelling, drainage/ puss, x2 weeks )            PMH recurrent left leg cellulitis.  2 weeks of worsening leg swelling skin redness and drainage from open wound where he scratches.  No red streaking.  No fever.  No PMH DVT.  No chest pain or shortness of breath.  No other aggravating or alleviating factors.      Review of Systems   Musculoskeletal: Negative for joint pain.   Skin: Negative for itching and rash.   Neurological: Negative for sensory change and focal weakness.              Objective     /100 (BP Location: Left arm, Patient Position: Sitting, BP Cuff Size: Adult long)   Pulse (!) 105   Temp 36.3 °C (97.4 °F) (Temporal)   Resp 16   Ht 1.778 m (5' 10\")   Wt 97.2 kg (214 lb 3.2 oz)   SpO2 97%   BMI 30.73 kg/m²      Physical Exam  Constitutional:       Appearance: Normal appearance.   Cardiovascular:      Rate and Rhythm: Normal rate and regular rhythm.      Heart sounds: Normal heart sounds.      Comments: No jvd  Pulmonary:      Effort: Pulmonary effort is normal.      Breath sounds: Normal breath sounds.   Musculoskeletal:      Comments: LLE: markedly swollen and edematous.  Open wound anterior aspect with yellow crusting and scant purulent drainage.  No fluctuance.  No lymphangitis or proximal lymphadenopathy.   Skin:     General: Skin is warm and dry.      Findings: No rash.   Neurological:      Mental Status: He is alert.                             Assessment & Plan        1. Dermatitis    - predniSONE (DELTASONE) 20 MG Tab; Take 2 Tablets by mouth every day for 5 days.  Dispense: 10 Tablet; Refill: 0    2. Wound infection    - amoxicillin (AMOXIL) 875 MG tablet; Take 1 Tablet by mouth 2 times a day for 5 days.  Dispense: 10 Tablet; Refill: 0  - sulfamethoxazole-trimethoprim (BACTRIM DS) 800-160 MG tablet; Take 1 Tablet by mouth every 12 hours for 5 days.  Dispense: 10 Tablet; " Refill: 0    3. Left leg swelling    - US-EXTREMITY VENOUS LOWER UNILAT LEFT; Future     Differential diagnosis, natural history, supportive care, and indications for immediate follow-up discussed at length.     R/o dvt, f/u US

## 2022-02-19 ENCOUNTER — APPOINTMENT (OUTPATIENT)
Dept: RADIOLOGY | Facility: MEDICAL CENTER | Age: 57
End: 2022-02-19
Attending: FAMILY MEDICINE

## 2022-03-19 ENCOUNTER — OFFICE VISIT (OUTPATIENT)
Dept: URGENT CARE | Facility: CLINIC | Age: 57
End: 2022-03-19
Payer: COMMERCIAL

## 2022-03-19 VITALS
DIASTOLIC BLOOD PRESSURE: 90 MMHG | RESPIRATION RATE: 16 BRPM | HEIGHT: 70 IN | SYSTOLIC BLOOD PRESSURE: 126 MMHG | OXYGEN SATURATION: 98 % | TEMPERATURE: 97.2 F | HEART RATE: 98 BPM | WEIGHT: 200.2 LBS | BODY MASS INDEX: 28.66 KG/M2

## 2022-03-19 DIAGNOSIS — L30.9 DERMATITIS: ICD-10-CM

## 2022-03-19 DIAGNOSIS — L30.9 ECZEMA, UNSPECIFIED TYPE: ICD-10-CM

## 2022-03-19 PROCEDURE — 99213 OFFICE O/P EST LOW 20 MIN: CPT | Performed by: PHYSICIAN ASSISTANT

## 2022-03-19 RX ORDER — TRIAMCINOLONE ACETONIDE 1 MG/G
1 CREAM TOPICAL 2 TIMES DAILY
Qty: 454 G | Refills: 0 | Status: SHIPPED | OUTPATIENT
Start: 2022-03-19 | End: 2022-06-14 | Stop reason: SDUPTHER

## 2022-03-19 RX ORDER — TRIAMCINOLONE ACETONIDE 1 MG/G
1 CREAM TOPICAL 2 TIMES DAILY
Qty: 454 G | Refills: 0 | Status: SHIPPED
Start: 2022-03-19 | End: 2022-03-19

## 2022-03-19 RX ORDER — PREDNISONE 10 MG/1
40 TABLET ORAL DAILY
Qty: 20 TABLET | Refills: 0 | Status: SHIPPED | OUTPATIENT
Start: 2022-03-19 | End: 2022-03-24

## 2022-03-19 RX ORDER — PREDNISONE 10 MG/1
40 TABLET ORAL DAILY
Qty: 20 TABLET | Refills: 0 | Status: SHIPPED
Start: 2022-03-19 | End: 2022-03-19

## 2022-03-19 ASSESSMENT — ENCOUNTER SYMPTOMS
NAUSEA: 0
COUGH: 0
SHORTNESS OF BREATH: 0
SORE THROAT: 0
MYALGIAS: 0
VOMITING: 0
EYE PAIN: 0
FEVER: 0
HEADACHES: 0
CONSTIPATION: 0
ABDOMINAL PAIN: 0
CHILLS: 0
DIARRHEA: 0

## 2022-03-20 NOTE — PROGRESS NOTES
"Subjective:   Tyree Whiteside is a 56 y.o. male who presents for Eczema (All over the body ( flare up x 2 weeks ))      56-year-old male is requesting refill of prednisone and his triamcinolone cream due to his eczema that is been causing problems for the last 2 weeks.  He has not noticed any signs or symptoms that he is concerning for about related to infection.      Review of Systems   Constitutional: Negative for chills and fever.   HENT: Negative for congestion, ear pain and sore throat.    Eyes: Negative for pain.   Respiratory: Negative for cough and shortness of breath.    Cardiovascular: Negative for chest pain.   Gastrointestinal: Negative for abdominal pain, constipation, diarrhea, nausea and vomiting.   Genitourinary: Negative for dysuria.   Musculoskeletal: Negative for myalgias.   Skin: Positive for itching and rash.   Neurological: Negative for headaches.       Medications, Allergies, and current problem list reviewed today in Epic.     Objective:     /90   Pulse 98   Temp 36.2 °C (97.2 °F) (Temporal)   Resp 16   Ht 1.778 m (5' 10\")   Wt 90.8 kg (200 lb 3.2 oz)   SpO2 98%     Physical Exam  Vitals reviewed.   Constitutional:       Appearance: Normal appearance.   HENT:      Head: Normocephalic and atraumatic.      Right Ear: External ear normal.      Left Ear: External ear normal.      Nose: Nose normal.      Mouth/Throat:      Mouth: Mucous membranes are moist.   Eyes:      Conjunctiva/sclera: Conjunctivae normal.   Cardiovascular:      Rate and Rhythm: Normal rate.   Pulmonary:      Effort: Pulmonary effort is normal.   Skin:     General: Skin is warm and dry.      Capillary Refill: Capillary refill takes less than 2 seconds.      Findings: Rash (Dry eczematoid reaction over all visible extremities and skin not involving oropharynx) present.   Neurological:      Mental Status: He is alert and oriented to person, place, and time.         Assessment/Plan:     Diagnosis and associated " orders:     1. Dermatitis  triamcinolone acetonide (KENALOG) 0.1 % Cream   2. Eczema, unspecified type  predniSONE (DELTASONE) 10 MG Tab      Comments/MDM:     • Recommend humidifier, emollient, follow-up with primary.  No sign of secondary bacterial infection.         Differential diagnosis, natural history, supportive care, and indications for immediate follow-up discussed.    Advised the patient to follow-up with the primary care physician for recheck, reevaluation, and consideration of further management.    Please note that this dictation was created using voice recognition software. I have made a reasonable attempt to correct obvious errors, but I expect that there are errors of grammar and possibly content that I did not discover before finalizing the note.    This note was electronically signed by Frank Tilley PA-C

## 2022-04-30 ENCOUNTER — OFFICE VISIT (OUTPATIENT)
Dept: URGENT CARE | Facility: CLINIC | Age: 57
End: 2022-04-30
Payer: COMMERCIAL

## 2022-04-30 VITALS
SYSTOLIC BLOOD PRESSURE: 108 MMHG | WEIGHT: 206.2 LBS | OXYGEN SATURATION: 95 % | HEIGHT: 70 IN | BODY MASS INDEX: 29.52 KG/M2 | DIASTOLIC BLOOD PRESSURE: 72 MMHG | HEART RATE: 104 BPM | TEMPERATURE: 97.1 F | RESPIRATION RATE: 18 BRPM

## 2022-04-30 DIAGNOSIS — L30.9 DERMATITIS: ICD-10-CM

## 2022-04-30 DIAGNOSIS — L03.116 CELLULITIS OF BOTH LOWER EXTREMITIES: ICD-10-CM

## 2022-04-30 DIAGNOSIS — L03.115 CELLULITIS OF BOTH LOWER EXTREMITIES: ICD-10-CM

## 2022-04-30 PROCEDURE — 99214 OFFICE O/P EST MOD 30 MIN: CPT | Performed by: PHYSICIAN ASSISTANT

## 2022-04-30 RX ORDER — DOXYCYCLINE HYCLATE 100 MG
100 TABLET ORAL 2 TIMES DAILY
Qty: 14 TABLET | Refills: 0 | Status: SHIPPED
Start: 2022-04-30 | End: 2022-04-30

## 2022-04-30 RX ORDER — DOXYCYCLINE HYCLATE 100 MG
100 TABLET ORAL 2 TIMES DAILY
Qty: 14 TABLET | Refills: 0 | Status: SHIPPED | OUTPATIENT
Start: 2022-04-30 | End: 2022-05-07

## 2022-04-30 RX ORDER — TRIAMCINOLONE ACETONIDE 1 MG/G
CREAM TOPICAL
Qty: 80 G | Refills: 0 | Status: SHIPPED | OUTPATIENT
Start: 2022-04-30 | End: 2022-06-14

## 2022-04-30 RX ORDER — PREDNISONE 20 MG/1
40 TABLET ORAL DAILY
Qty: 10 TABLET | Refills: 0 | Status: SHIPPED
Start: 2022-04-30 | End: 2022-04-30

## 2022-04-30 RX ORDER — PREDNISONE 20 MG/1
40 TABLET ORAL DAILY
Qty: 10 TABLET | Refills: 0 | Status: SHIPPED | OUTPATIENT
Start: 2022-04-30 | End: 2022-05-05

## 2022-04-30 RX ORDER — TRIAMCINOLONE ACETONIDE 1 MG/G
CREAM TOPICAL
Qty: 80 G | Refills: 0 | Status: SHIPPED
Start: 2022-04-30 | End: 2022-04-30

## 2022-04-30 NOTE — PROGRESS NOTES
Subjective:   Tyree Whiteside is a 56 y.o. male who presents for Eczema (Pt has eczema on arms and leg x more the a month)      HPI  Patient is a 56-year-old male here in the clinic accompanied by his daughter with complaints of eczema to all 4 extremities.  He has a chronic history of eczema reoccurring.  He was last seen over a month ago and given triamcinolone cream, however he was not able to pick this up because of the price.  He states the prednisone helps.  Symptoms worsened over the last several weeks.  The rash is itchy and dry to his arms.  Now, he has very irritated and oozing superficial wounds from itching to his lower extremities.  There is tenderness, itching, redness, swelling.  Denies any fevers or chills.  He states he does not feel sick.  He is here requesting more medication.  Denies a history of known diabetes.  He has not seen his primary care physician in a long time.  He states he does not have a PCP.  He had a dermatology appointment established at one point, however his insurance changed and he did not go to the appointment.        Medications:    • amLODIPine Tabs  • cetirizine Tabs  • triamcinolone acetonide Crea    Allergies: Patient has no known allergies.    Problem List: Tyree Whiteside does not have any pertinent problems on file.    Surgical History:  No past surgical history on file.    Past Social Hx: Tyree Whiteside  reports that he has been smoking cigarettes. He has a 6.25 pack-year smoking history. He has never used smokeless tobacco. He reports current alcohol use. He reports that he does not use drugs.     Past Family Hx:  Tyree Whiteside family history includes Heart Disease in his father; Stroke in his father.     Problem list, medications, and allergies reviewed by myself today in Epic.     Objective:     /72 (BP Location: Left arm, Patient Position: Sitting, BP Cuff Size: Large adult)   Pulse (!) 104   Temp 36.2 °C (97.1 °F) (Temporal)   Resp  "18   Ht 1.778 m (5' 10\")   Wt 93.5 kg (206 lb 3.2 oz)   SpO2 95%   BMI 29.59 kg/m²     Physical Exam  Vitals reviewed.   Constitutional:       General: He is not in acute distress.     Appearance: Normal appearance. He is not ill-appearing or toxic-appearing.   Eyes:      Conjunctiva/sclera: Conjunctivae normal.      Pupils: Pupils are equal, round, and reactive to light.   Cardiovascular:      Rate and Rhythm: Normal rate.   Pulmonary:      Effort: Pulmonary effort is normal.   Musculoskeletal:      Cervical back: Neck supple.   Skin:     Comments: Dry eczematous rash to upper extremities.  No erythema, edema, excoriations.  Significant eczematous rash with severe excoriations to lower legs bilaterally.  There is significant erythema and edema throughout.  Mild oozing serosanguineous drainage.  Distal pulses 2+.  There is warmth.    Neurological:      General: No focal deficit present.      Mental Status: He is alert and oriented to person, place, and time.   Psychiatric:         Mood and Affect: Mood normal.         Behavior: Behavior normal.         Diagnosis and associated orders:     1. Dermatitis  - predniSONE (DELTASONE) 20 MG Tab; Take 2 Tablets by mouth every day for 5 days.  Dispense: 10 Tablet; Refill: 0  - triamcinolone acetonide (KENALOG) 0.1 % Cream; Apply to affected area(s) twice daily for no longer than 14 days  Dispense: 80 g; Refill: 0  - doxycycline (VIBRAMYCIN) 100 MG Tab; Take 1 Tablet by mouth 2 times a day for 7 days.  Dispense: 14 Tablet; Refill: 0  - Referral to Dermatology  - Referral to establish with [facility] PCP    2. Cellulitis of both lower extremities       Comments/MDM:     • Patient with moderate dermatitis that is dry to the upper extremities without signs of cellulitis  • Patient with severe dermatitis with excoriations and secondary cellulitis to lower extremities.  • The lower extremities were thoroughly irrigated with normal saline, dried, Xeroform applied to areas of " excoriation, Kerlix, Coban.  • Start course of prednisone.  Take in the morning.  Highly recommend the triamcinolone cream twice daily.  Start doxycycline for 7 days.  • Highly encouraged he use a hydration emollient cream such as Cetaphil especially after showers.  Start Zyrtec.  • Referral placed to dermatology urgently  • Referral placed to establish care with PCP urgently.       I personally reviewed prior external notes and test results pertinent to today's visit. Supportive care, natural history, differential diagnoses, and indications for immediate follow-up discussed. Patient and daughter expresses unders red flags discussed.  Nding and agrees to plan. Patient denies any other questions or concerns.     Follow-up with the primary care physician for recheck, reevaluation, and consideration of further management.      Please note that this dictation was created using voice recognition software. I have made a reasonable attempt to correct obvious errors, but I expect that there are errors of grammar and possibly content that I did not discover before finalizing the note.    This note was electronically signed by Ron Christensen PA-C

## 2022-05-31 ENCOUNTER — OFFICE VISIT (OUTPATIENT)
Dept: URGENT CARE | Facility: CLINIC | Age: 57
End: 2022-05-31
Payer: COMMERCIAL

## 2022-05-31 VITALS
TEMPERATURE: 97.8 F | HEART RATE: 112 BPM | WEIGHT: 206 LBS | HEIGHT: 70 IN | RESPIRATION RATE: 22 BRPM | DIASTOLIC BLOOD PRESSURE: 90 MMHG | OXYGEN SATURATION: 94 % | BODY MASS INDEX: 29.49 KG/M2 | SYSTOLIC BLOOD PRESSURE: 140 MMHG

## 2022-05-31 DIAGNOSIS — J40 BRONCHITIS: ICD-10-CM

## 2022-05-31 DIAGNOSIS — L30.9 ECZEMA, UNSPECIFIED TYPE: ICD-10-CM

## 2022-05-31 PROCEDURE — 99214 OFFICE O/P EST MOD 30 MIN: CPT | Performed by: STUDENT IN AN ORGANIZED HEALTH CARE EDUCATION/TRAINING PROGRAM

## 2022-05-31 RX ORDER — PREDNISONE 20 MG/1
TABLET ORAL
Qty: 14 TABLET | Refills: 0 | Status: SHIPPED | OUTPATIENT
Start: 2022-05-31 | End: 2022-06-14

## 2022-05-31 RX ORDER — INHALER, ASSIST DEVICES
1 SPACER (EA) MISCELLANEOUS ONCE
Qty: 1 EACH | Refills: 0 | Status: SHIPPED | OUTPATIENT
Start: 2022-05-31 | End: 2022-05-31

## 2022-05-31 RX ORDER — ALBUTEROL SULFATE 90 UG/1
2 AEROSOL, METERED RESPIRATORY (INHALATION) EVERY 6 HOURS PRN
Qty: 8.5 G | Refills: 0 | Status: SHIPPED | OUTPATIENT
Start: 2022-05-31 | End: 2022-06-14 | Stop reason: SDUPTHER

## 2022-05-31 NOTE — LETTER
May 31, 2022       Patient: Tyree Whiteside   YOB: 1965   Date of Visit: 5/31/2022         To Whom It May Concern:    Tyree Whiteside was seen in urgent care on 5/31/2022 for his doctor's appointment.    If you have any questions or concerns, please don't hesitate to call [phone]          Sincerely,          Nils Alvarez D.O.  Electronically Signed

## 2022-05-31 NOTE — PROGRESS NOTES
Subjective:   CHIEF COMPLAINT  Chief Complaint   Patient presents with   • Eczema     Started x1week ago, over the weekend it spread from just legs to all over body       HPI  Tyree Whiteside is a 56 y.o. male who presents with a chief complaint of an eczema flare.  He has been seen in urgent care for the similar issue multiple times over the last year and a half.  Most recently was 1 month ago and treated with topical steroid cream, antibiotics and p.o. steroids.  A referral was sent to establish with dermatology however patient can only be seen in [place] so never established.  Now he reports the symptoms returned last week and have worsened over the last few days.  Says he is having rather pruritic rash.  He is using topical skin emollient, topical steroid cream which has not helped.  Says he does not use the skin emollient daily.    REVIEW OF SYSTEMS  General: no fever or chills  GI: no nausea or vomiting  See HPI for further details.    PAST MEDICAL HISTORY  Patient Active Problem List    Diagnosis Date Noted   • Ringing in ears, bilateral 12/12/2018   • Libido, decreased 12/12/2018   • Vitamin D deficiency 12/05/2018   • Other specified hypothyroidism 12/05/2018   • Obesity (BMI 30-39.9) 10/31/2018   • Closed fracture of right foot with routine healing 10/31/2018   • Essential hypertension 10/31/2018   • Eczema 10/31/2018   • Hyponatremia 02/28/2015   • Hypokalemia 02/28/2015   • Anemia 02/28/2015   • Thrombocytopenia ([facility]) 02/28/2015   • Alcoholic hepatitis 02/28/2015   • Metabolic acidosis 02/28/2015   • ARF (acute renal failure) ([facility]) 02/28/2015   • Alcohol-induced pancreatitis 02/28/2015   • Alcohol withdrawal delirium ([facility]) 02/27/2015       SURGICAL HISTORY  patient denies any surgical history    ALLERGIES  No Known Allergies    CURRENT MEDICATIONS  Home Medications     Reviewed by Nils Alvarez D.O. (Physician) on 05/31/22 at 1236  Med List Status: <None>   Medication Last Dose Status  "  albuterol 108 (90 Base) MCG/ACT Aero Soln inhalation aerosol  Active   amLODIPine (NORVASC) 10 MG Tab Not Taking Active   cetirizine (ZYRTEC) 10 MG Tab Not Taking Active   predniSONE (DELTASONE) 20 MG Tab  Active   Spacer/Aero-Holding Chambers (AEROCHAMBER PLUS-FLOW SIGNAL) Misc  Active   triamcinolone acetonide (KENALOG) 0.1 % Cream Not Taking Active   triamcinolone acetonide (KENALOG) 0.1 % Cream Not Taking Active                SOCIAL HISTORY  Social History     Tobacco Use   • Smoking status: Current Every Day Smoker     Packs/day: 0.25     Years: 25.00     Pack years: 6.25     Types: Cigarettes   • Smokeless tobacco: Never Used   Vaping Use   • Vaping Use: Never used   Substance and Sexual Activity   • Alcohol use: Yes     Comment: one pint daily    • Drug use: No   • Sexual activity: Yes       FAMILY HISTORY  Family History   Problem Relation Age of Onset   • Heart Disease Father    • Stroke Father           Objective:   PHYSICAL EXAM  VITAL SIGNS: BP (!) 140/90   Pulse (!) 112   Temp 36.6 °C (97.8 °F) (Temporal)   Resp (!) 22   Ht 1.778 m (5' 10\")   Wt 93.4 kg (206 lb)   SpO2 94%   BMI 29.56 kg/m²     Gen: no acute distress, normal voice  Skin: Severely dry and scaly skin on bilateral upper extremities and forehead.  Scattered excoriations with minimal surrounding erythema and edema throughout anterior and posterior torso.  No draining purulent lesions.  No fluctuant lesions.  No subcutaneous edema/erythema.    Lungs: Rhonchi in all lung fields.  Scattered wheezing bilateral lower lobes with symmetric expansion.  No crackles.   CV: RRR w/o murmurs or clicks  Psych: normal affect, normal judgement, alert, awake    Assessment/Plan:     1. Eczema, unspecified type  predniSONE (DELTASONE) 20 MG Tab    Referral back to [facility]CP   2. Bronchitis  Referral back to [facility] PCP    Spacer/Aero-Holding Chambers (AEROCHAMBER PLUS-FLOW SIGNAL) Misc    albuterol 108 (90 Base) MCG/ACT Aero Soln inhalation aerosol "   1) chronic issue with an acute exacerbation.  No evidence of superimposed subcutaneous infection.  - Ordered Rx for prednisone 40 mg p.o. daily x10 days  - Continue topical triamcinolone cream mixed with a daily skin emollient; use triamcinolone cream twice daily.  Use skin emollient frequently and liberally throughout the day  - Ordered referral to follow-up with primary care for reevaluation continue management    2) incidental finding on examination.  Patient smokes approximately 0.5 packs/day.  - Encouraged tobacco cessation.  Recommended contacting [phone] quit now for review free resources  - Ordered Rx for albuterol with spacer  - Return to urgent care any new/worsening symptoms or further questions or concerns.  Patient understood everything discussed.  All questions were answered.    Differential diagnosis, natural history, supportive care, and indications for immediate follow-up discussed. All questions answered. Patient agrees with the plan of care.    Follow-up as needed if symptoms worsen or fail to improve to PCP, Urgent care or Emergency Room.    Please note that this dictation was created using voice recognition software. I have made a reasonable attempt to correct obvious errors, but I expect that there are errors of grammar and possibly content that I did not discover before finalizing the note.

## 2022-06-02 ENCOUNTER — TELEPHONE (OUTPATIENT)
Dept: SCHEDULING | Facility: IMAGING CENTER | Age: 57
End: 2022-06-02

## 2022-06-14 ENCOUNTER — OFFICE VISIT (OUTPATIENT)
Dept: MEDICAL GROUP | Facility: MEDICAL CENTER | Age: 57
End: 2022-06-14
Attending: FAMILY MEDICINE
Payer: COMMERCIAL

## 2022-06-14 VITALS
TEMPERATURE: 97.7 F | WEIGHT: 206.9 LBS | DIASTOLIC BLOOD PRESSURE: 102 MMHG | HEART RATE: 96 BPM | SYSTOLIC BLOOD PRESSURE: 148 MMHG | BODY MASS INDEX: 29.62 KG/M2 | HEIGHT: 70 IN | OXYGEN SATURATION: 98 % | RESPIRATION RATE: 16 BRPM

## 2022-06-14 DIAGNOSIS — F10.11 HISTORY OF ALCOHOL ABUSE: ICD-10-CM

## 2022-06-14 DIAGNOSIS — Z00.00 HEALTHCARE MAINTENANCE: ICD-10-CM

## 2022-06-14 DIAGNOSIS — I48.91 ATRIAL FIBRILLATION, UNSPECIFIED TYPE (HCC): ICD-10-CM

## 2022-06-14 DIAGNOSIS — L30.9 DERMATITIS: ICD-10-CM

## 2022-06-14 DIAGNOSIS — L30.8 OTHER ECZEMA: ICD-10-CM

## 2022-06-14 DIAGNOSIS — L01.00 IMPETIGO: ICD-10-CM

## 2022-06-14 DIAGNOSIS — I10 ESSENTIAL HYPERTENSION: ICD-10-CM

## 2022-06-14 DIAGNOSIS — J40 BRONCHITIS: ICD-10-CM

## 2022-06-14 PROBLEM — E66.3 OVERWEIGHT (BMI 25.0-29.9): Status: ACTIVE | Noted: 2018-10-31

## 2022-06-14 PROBLEM — S92.901D CLOSED FRACTURE OF RIGHT FOOT WITH ROUTINE HEALING: Status: RESOLVED | Noted: 2018-10-31 | Resolved: 2022-06-14

## 2022-06-14 PROCEDURE — 99204 OFFICE O/P NEW MOD 45 MIN: CPT | Mod: 25 | Performed by: FAMILY MEDICINE

## 2022-06-14 PROCEDURE — 93000 ELECTROCARDIOGRAM COMPLETE: CPT | Performed by: FAMILY MEDICINE

## 2022-06-14 PROCEDURE — 99214 OFFICE O/P EST MOD 30 MIN: CPT | Performed by: FAMILY MEDICINE

## 2022-06-14 RX ORDER — METOPROLOL SUCCINATE 25 MG/1
25 TABLET, EXTENDED RELEASE ORAL DAILY
Qty: 30 TABLET | Refills: 2 | Status: SHIPPED | OUTPATIENT
Start: 2022-06-14 | End: 2022-09-23

## 2022-06-14 RX ORDER — TRIAMCINOLONE ACETONIDE 1 MG/G
1 CREAM TOPICAL 2 TIMES DAILY
Qty: 454 G | Refills: 0 | Status: SHIPPED | OUTPATIENT
Start: 2022-06-14 | End: 2022-12-01 | Stop reason: SDUPTHER

## 2022-06-14 RX ORDER — ALBUTEROL SULFATE 90 UG/1
2 AEROSOL, METERED RESPIRATORY (INHALATION) EVERY 6 HOURS PRN
Qty: 18 G | Refills: 0 | Status: SHIPPED | OUTPATIENT
Start: 2022-06-14 | End: 2022-12-07

## 2022-06-14 RX ORDER — METOPROLOL SUCCINATE 25 MG/1
25 TABLET, EXTENDED RELEASE ORAL DAILY
Qty: 30 TABLET | Refills: 2 | Status: SHIPPED | OUTPATIENT
Start: 2022-06-14 | End: 2022-06-14

## 2022-06-14 RX ORDER — INHALER,ASSIST DEVICE,MED MASK
SPACER (EA) MISCELLANEOUS
COMMUNITY
Start: 2022-05-31 | End: 2022-10-18

## 2022-06-14 RX ORDER — AMLODIPINE BESYLATE 10 MG/1
10 TABLET ORAL DAILY
Qty: 90 TABLET | Refills: 0 | Status: ON HOLD | OUTPATIENT
Start: 2022-06-14 | End: 2022-11-26

## 2022-06-14 NOTE — PROGRESS NOTES
Subjective:     CC:    Chief Complaint   Patient presents with   • Hypertension   • Eczema       HISTORY OF THE PRESENT ILLNESS: Patient is a 56 y.o. male. This pleasant patient is here today to establish care and discuss the following issues.   His/her prior PCP was [facility] Work[facility]    Specialists -   - Derm - has been unable to find a specialist in [place] and unable to go to [facility]    Essential hypertension  Pt stopped taking his antihypertensives about 1 month ago - he was only taking amlodipine 1 month ago because he ran out. He also notes that he was on a bp med (maybe lisinopril) as of 6 months ago and it was removed.       Eczema  This is an ongoing issue for his whole life.   He used to use a skin cream that would take care of flare ups here and there, but the last few years the problem has spread to his whole body.   He has had to go to urgent care for oral steroids and topical steroids on a monthly basis - he would get oral steroids x 5 days and topical kenalog, which would resolve the flare for a couple of months, but recently it has been lasting not as long.   Home routine - he has an emollient, which he uses on his feet and calves, but not on his arms. On his arms he uses an oatmeal based moisturizer. He does this maybe a couple times a day. It doesn't itch currently, but it can. He uses Caress soap.   The dryness/rash is on the arms and legs, chest, back.           Healthcare Maintenance  Colon cancer screening - never   Lung Cancer screening - never    Allergies: Patient has no known allergies.    Current Outpatient Medications Ordered in Epic   Medication Sig Dispense Refill   • amLODIPine (NORVASC) 10 MG Tab Take 1 Tablet by mouth every day. 90 Tablet 0   • Misc. Devices Misc Blood pressure cuff and machine 1 Each 0   • albuterol 108 (90 Base) MCG/ACT Aero Soln inhalation aerosol Inhale 2 Puffs every 6 hours as needed for Shortness of Breath (cough). 18 g 0   • mupirocin (BACTROBAN) 2 % Ointment Apply 1  "Application topically 2 times a day for 5 days. Apply to the crusty areas on your calves for 5 days  Indications: Impetigo 30 g 0   • triamcinolone acetonide (KENALOG) 0.1 % Cream Apply 1 Application topically 2 times a day. Mix with lotion and apply to entire body excluding face and genitalia. 454 g 0   • metoprolol SR (TOPROL XL) 25 MG TABLET SR 24 HR Take 1 Tablet by mouth every day. Indications: Atrial Fibrillation 30 Tablet 2   • Spacer/Aero-Holding Chambers (OPTICHAMBER MARK-MD MASK) Misc 1 EACH ONE TIME FOR 1 DOSE. (Patient not taking: Reported on 6/14/2022)       No current [facility]-ordered facility-administered medications on file.       Past Medical History:   Diagnosis Date   • ASTHMA    • Hypertension        No past surgical history on file.    Social History     Tobacco Use   • Smoking status: Current Every Day Smoker     Packs/day: 0.33     Years: 25.00     Pack years: 8.25     Types: Cigarettes   • Smokeless tobacco: Never Used   Vaping Use   • Vaping Use: Never used   Substance Use Topics   • Alcohol use: Yes     Alcohol/week: 8.4 oz     Types: 14 Cans of beer per week     Comment: one pint daily    • Drug use: Not Currently     Types: Marijuana, Methamphetamines       Social History     Social History Narrative   • Not on file       Family History   Problem Relation Age of Onset   • Heart Disease Father    • Stroke Father    • Hypertension Brother    • Cancer Brother         has 12 sibs, one brother had an unknown type of ca           Objective:     Exam: BP (!) 148/102 (BP Location: Left arm, Patient Position: Sitting, BP Cuff Size: Adult)   Pulse 96   Temp 36.5 °C (97.7 °F) (Temporal)   Resp 16   Ht 1.778 m (5' 10\")   Wt 93.8 kg (206 lb 14.4 oz)   SpO2 98%  Body mass index is 29.69 kg/m².    General: Normal appearing. No distress.  Neck: Supple. Thyroid is not enlarged.  Pulmonary: Clear to ausculation.  Normal effort. Bilateral scattered ronchi and wheezes  Cardiovascular: irregularly " irregular rhythm.   Abdomen: Soft, nontender, nondistended. Normal bowel sounds.  Skin: On the chest and abdomen, diffuse macular papular, erythematous rash with excoriations and scabs. On b/l arms and even scalp, diffuse dryness/xerosis. B/l LE, especially on the posterior calves, excoriations, skin thickening with crust   Psych: Normal mood and affect. Alert and oriented x3. Judgment and insight is normal.      Labs:   EKG done today - normal rate, rhythm is atrial fibrillation.     Assessment & Plan:   56 y.o. male with the following -    1. Essential hypertension  - amLODIPine (NORVASC) 10 MG Tab; Take 1 Tablet by mouth every day.  Dispense: 90 Tablet; Refill: 0  - Misc. Devices Misc; Blood pressure cuff and machine  Dispense: 1 Each; Refill: 0  Restarting patient's amlodipine at 10 mg.  We will see if we can get patient a blood pressure cuff that he can monitor at home.  Otherwise we will see him back in 2 weeks to check blood pressure at that time     2. Bronchitis  - albuterol 108 (90 Base) MCG/ACT Aero Soln inhalation aerosol; Inhale 2 Puffs every 6 hours as needed for Shortness of Breath (cough).  Dispense: 18 g; Refill: 0  - DX-CHEST-2 VIEWS; Future  patient has diffuse rhonchi bilaterally with wheezing.  We do not have appropriate time to discuss this, I will send him home with an albuterol inhaler, he will keep track of how many times he uses it.  May need PFTs, this may be a chronic issue, likely related to smoking history, may need to be on a maintenance inhaler.    3. Other eczema  - triamcinolone acetonide (KENALOG) 0.1 % Cream; Apply 1 Application topically 2 times a day. Mix with lotion and apply to entire body excluding face and genitalia.  Dispense: 454 g; Refill: 0  Dermatitis that is responsive to steroids.   I'm not sure eczema is an appropriate diagnosis as his rash is so diffuse and not typical. I advised him that we should perform a skin biopsy, which we will complete at the next visit. He  has some overlying impetigo on the calves, which we will treat with mupirocin.   Otherwise, I have instructed the patient to apply triamcinolone mixed with lotion all over the body, over which he will apply an emollient at least twice a day.  He has a dermatology referral to [facility], and I did encourage him that might be worthwhile to take a few days to go down and see them at least once and ask for follow-up to be done virtually.    4. Impetigo  - mupirocin (BACTROBAN) 2 % Ointment; Apply 1 Application topically 2 times a day for 5 days. Apply to the crusty areas on your calves for 5 days  Indications: Impetigo  Dispense: 30 g; Refill: 0    5. Atrial fibrillation, unspecified type (HCC)  - REFERRAL TO CARDIOLOGY  - EC-ECHOCARDIOGRAM COMPLETE W/O CONT; Future  - metoprolol SR (TOPROL XL) 25 MG TABLET SR 24 HR; Take 1 Tablet by mouth every day. Indications: Atrial Fibrillation  Dispense: 30 Tablet; Refill: 2  Newly diagnosed atrial fibrillation today.  This was found incidentally during physical exam and confirmed by EKG.  Echo, Toprol 25 mg, referral to cardiology are ordered.    6. History of alcohol abuse  Patient reported 2 beers daily, we will discuss further at next visit.  May need right upper quadrant ultrasound.  He has had history of admissions for withdrawal    7. Healthcare maintenance  - CBC WITHOUT DIFFERENTIAL; Future  - Comp Metabolic Panel; Future  - Prothrombin Time; Future  - TSH WITH REFLEX TO FT4; Future  - Lipid Profile; Future  - HEMOGLOBIN A1C; Future      Return in about 2 weeks (around 6/28/2022) for skin biopsy.     My total time spent caring for the patient on the day of the encounter was 40 minutes.   This does not include time spent on separately billable procedures/tests.      Please note that this dictation was created using voice recognition software. I have made every reasonable attempt to correct obvious errors, but I expect that there are errors of grammar and possibly content  that I did not discover before finalizing the note.

## 2022-06-14 NOTE — ASSESSMENT & PLAN NOTE
This is an ongoing issue for his whole life.   He used to use a skin cream that would take care of flare ups here and there, but the last few years the problem has spread to his whole body.   He has had to go to urgent care for oral steroids and topical steroids on a monthly basis - he would get oral steroids x 5 days and topical kenalog, which would resolve the flare for a couple of months, but recently it has been lasting not as long.   Home routine - he has an emollient, which he uses on his feet and calves, but not on his arms. On his arms he uses an oatmeal based moisturizer. He does this maybe a couple times a day. It doesn't itch currently, but it can. He uses Caress soap.   The dryness/rash is on the arms and legs, chest, back.

## 2022-06-14 NOTE — ASSESSMENT & PLAN NOTE
Pt stopped taking his antihypertensives about 1 month ago - he was only taking amlodipine 1 month ago because he ran out. He also notes that he was on a bp med (maybe lisinopril) as of 6 months ago and it was removed.

## 2022-06-19 ENCOUNTER — APPOINTMENT (OUTPATIENT)
Dept: RADIOLOGY | Facility: MEDICAL CENTER | Age: 57
End: 2022-06-19
Attending: EMERGENCY MEDICINE
Payer: COMMERCIAL

## 2022-06-19 ENCOUNTER — HOSPITAL ENCOUNTER (EMERGENCY)
Facility: MEDICAL CENTER | Age: 57
End: 2022-06-19
Attending: EMERGENCY MEDICINE
Payer: COMMERCIAL

## 2022-06-19 VITALS
TEMPERATURE: 97 F | SYSTOLIC BLOOD PRESSURE: 142 MMHG | DIASTOLIC BLOOD PRESSURE: 77 MMHG | RESPIRATION RATE: 16 BRPM | WEIGHT: 202.38 LBS | HEIGHT: 70 IN | OXYGEN SATURATION: 92 % | BODY MASS INDEX: 28.97 KG/M2 | HEART RATE: 101 BPM

## 2022-06-19 DIAGNOSIS — E87.1 HYPONATREMIA: ICD-10-CM

## 2022-06-19 DIAGNOSIS — S22.41XA CLOSED FRACTURE OF MULTIPLE RIBS OF RIGHT SIDE, INITIAL ENCOUNTER: ICD-10-CM

## 2022-06-19 LAB
ALBUMIN SERPL BCP-MCNC: 4 G/DL (ref 3.2–4.9)
ALBUMIN/GLOB SERPL: 1.1 G/DL
ALP SERPL-CCNC: 106 U/L (ref 30–99)
ALT SERPL-CCNC: 19 U/L (ref 2–50)
ANION GAP SERPL CALC-SCNC: 12 MMOL/L (ref 7–16)
AST SERPL-CCNC: 20 U/L (ref 12–45)
BASOPHILS # BLD AUTO: 1 % (ref 0–1.8)
BASOPHILS # BLD: 0.1 K/UL (ref 0–0.12)
BILIRUB SERPL-MCNC: 0.9 MG/DL (ref 0.1–1.5)
BUN SERPL-MCNC: 10 MG/DL (ref 8–22)
CALCIUM SERPL-MCNC: 9.1 MG/DL (ref 8.5–10.5)
CHLORIDE SERPL-SCNC: 97 MMOL/L (ref 96–112)
CO2 SERPL-SCNC: 20 MMOL/L (ref 20–33)
CREAT SERPL-MCNC: 0.67 MG/DL (ref 0.5–1.4)
D DIMER PPP IA.FEU-MCNC: 2.64 UG/ML (FEU) (ref 0–0.5)
EKG IMPRESSION: NORMAL
EOSINOPHIL # BLD AUTO: 0.99 K/UL (ref 0–0.51)
EOSINOPHIL NFR BLD: 10.4 % (ref 0–6.9)
ERYTHROCYTE [DISTWIDTH] IN BLOOD BY AUTOMATED COUNT: 51 FL (ref 35.9–50)
GFR SERPLBLD CREATININE-BSD FMLA CKD-EPI: 110 ML/MIN/1.73 M 2
GLOBULIN SER CALC-MCNC: 3.7 G/DL (ref 1.9–3.5)
GLUCOSE SERPL-MCNC: 89 MG/DL (ref 65–99)
HCT VFR BLD AUTO: 42.4 % (ref 42–52)
HGB BLD-MCNC: 14.1 G/DL (ref 14–18)
IMM GRANULOCYTES # BLD AUTO: 0.04 K/UL (ref 0–0.11)
IMM GRANULOCYTES NFR BLD AUTO: 0.4 % (ref 0–0.9)
LYMPHOCYTES # BLD AUTO: 1.2 K/UL (ref 1–4.8)
LYMPHOCYTES NFR BLD: 12.6 % (ref 22–41)
MCH RBC QN AUTO: 32.8 PG (ref 27–33)
MCHC RBC AUTO-ENTMCNC: 33.3 G/DL (ref 33.7–35.3)
MCV RBC AUTO: 98.6 FL (ref 81.4–97.8)
MONOCYTES # BLD AUTO: 1.33 K/UL (ref 0–0.85)
MONOCYTES NFR BLD AUTO: 13.9 % (ref 0–13.4)
NEUTROPHILS # BLD AUTO: 5.88 K/UL (ref 1.82–7.42)
NEUTROPHILS NFR BLD: 61.7 % (ref 44–72)
NRBC # BLD AUTO: 0 K/UL
NRBC BLD-RTO: 0 /100 WBC
PLATELET # BLD AUTO: 276 K/UL (ref 164–446)
PMV BLD AUTO: 9.4 FL (ref 9–12.9)
POTASSIUM SERPL-SCNC: 4.5 MMOL/L (ref 3.6–5.5)
PROT SERPL-MCNC: 7.7 G/DL (ref 6–8.2)
RBC # BLD AUTO: 4.3 M/UL (ref 4.7–6.1)
SODIUM SERPL-SCNC: 129 MMOL/L (ref 135–145)
TROPONIN T SERPL-MCNC: 13 NG/L (ref 6–19)
WBC # BLD AUTO: 9.5 K/UL (ref 4.8–10.8)

## 2022-06-19 PROCEDURE — 99285 EMERGENCY DEPT VISIT HI MDM: CPT

## 2022-06-19 PROCEDURE — 85025 COMPLETE CBC W/AUTO DIFF WBC: CPT

## 2022-06-19 PROCEDURE — 36415 COLL VENOUS BLD VENIPUNCTURE: CPT

## 2022-06-19 PROCEDURE — 80053 COMPREHEN METABOLIC PANEL: CPT

## 2022-06-19 PROCEDURE — 96375 TX/PRO/DX INJ NEW DRUG ADDON: CPT | Mod: XU

## 2022-06-19 PROCEDURE — 71045 X-RAY EXAM CHEST 1 VIEW: CPT

## 2022-06-19 PROCEDURE — 71275 CT ANGIOGRAPHY CHEST: CPT

## 2022-06-19 PROCEDURE — 700117 HCHG RX CONTRAST REV CODE 255: Performed by: EMERGENCY MEDICINE

## 2022-06-19 PROCEDURE — 85379 FIBRIN DEGRADATION QUANT: CPT

## 2022-06-19 PROCEDURE — 84484 ASSAY OF TROPONIN QUANT: CPT

## 2022-06-19 PROCEDURE — 93005 ELECTROCARDIOGRAM TRACING: CPT

## 2022-06-19 PROCEDURE — 700111 HCHG RX REV CODE 636 W/ 250 OVERRIDE (IP): Performed by: EMERGENCY MEDICINE

## 2022-06-19 PROCEDURE — 93005 ELECTROCARDIOGRAM TRACING: CPT | Performed by: EMERGENCY MEDICINE

## 2022-06-19 PROCEDURE — 96374 THER/PROPH/DIAG INJ IV PUSH: CPT | Mod: XU

## 2022-06-19 RX ORDER — HYDROCODONE BITARTRATE AND ACETAMINOPHEN 5; 325 MG/1; MG/1
1-2 TABLET ORAL EVERY 6 HOURS PRN
Qty: 20 TABLET | Refills: 0 | Status: SHIPPED | OUTPATIENT
Start: 2022-06-19 | End: 2022-06-24

## 2022-06-19 RX ORDER — ONDANSETRON 2 MG/ML
4 INJECTION INTRAMUSCULAR; INTRAVENOUS ONCE
Status: COMPLETED | OUTPATIENT
Start: 2022-06-19 | End: 2022-06-19

## 2022-06-19 RX ORDER — MORPHINE SULFATE 4 MG/ML
4 INJECTION INTRAVENOUS ONCE
Status: COMPLETED | OUTPATIENT
Start: 2022-06-19 | End: 2022-06-19

## 2022-06-19 RX ADMIN — ONDANSETRON HYDROCHLORIDE 4 MG: 2 SOLUTION INTRAMUSCULAR; INTRAVENOUS at 19:32

## 2022-06-19 RX ADMIN — MORPHINE SULFATE 4 MG: 4 INJECTION INTRAVENOUS at 19:32

## 2022-06-19 RX ADMIN — IOHEXOL 75 ML: 350 INJECTION, SOLUTION INTRAVENOUS at 22:30

## 2022-06-19 ASSESSMENT — PAIN DESCRIPTION - PAIN TYPE: TYPE: ACUTE PAIN

## 2022-06-19 NOTE — ED TRIAGE NOTES
"Chief Complaint   Patient presents with   • Chest Pain     Right sided, started Friday. Reports that pain started lower on his ribs and is moving toward his axilla.      Pt reports reproducible right chest wall pain. Worse with palpation. Denies N/V/D.   Pt back for EKG.     BP (!) 150/97   Pulse (!) 103   Temp 35.8 °C (96.5 °F) (Temporal)   Resp 18   Ht 1.778 m (5' 10\")   Wt 91.8 kg (202 lb 6.1 oz)   SpO2 96%   Pt informed of wait times. Educated on triage process.  Asked to return to triage RN for any new or worsening of symptoms. Thanked for patience.        "

## 2022-06-20 NOTE — ED NOTES
"Pt discharged to lobby with steady gait, AOx4. IV discontinued and gauze placed, pt in possession of belongings. Pt provided discharge education and information pertaining to medications and follow up appointments. Pt received copy of discharge instructions and verbalized understanding. BP (!) 142/77   Pulse (!) 101   Temp 36.1 °C (97 °F) (Temporal)   Resp 16   Ht 1.778 m (5' 10\")   Wt 91.8 kg (202 lb 6.1 oz)   SpO2 92%   BMI 29.04 kg/m²   "
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
## 2022-06-20 NOTE — ED NOTES
Pt IV tape was coming off. IV still intact and tagaderm replaced. Pt resting comfortably NAD with equal chest rise/fall.

## 2022-06-20 NOTE — ED NOTES
Report from CALLUM Brown. Pt resting in [facility]. Call light in reach.  Xray at bedside

## 2022-06-24 ENCOUNTER — HOSPITAL ENCOUNTER (OUTPATIENT)
Dept: LAB | Facility: MEDICAL CENTER | Age: 57
End: 2022-06-24
Attending: FAMILY MEDICINE
Payer: COMMERCIAL

## 2022-06-24 DIAGNOSIS — Z00.00 HEALTHCARE MAINTENANCE: ICD-10-CM

## 2022-06-24 LAB
ALBUMIN SERPL BCP-MCNC: 3.7 G/DL (ref 3.2–4.9)
ALBUMIN/GLOB SERPL: 1.2 G/DL
ALP SERPL-CCNC: 82 U/L (ref 30–99)
ALT SERPL-CCNC: 12 U/L (ref 2–50)
ANION GAP SERPL CALC-SCNC: 13 MMOL/L (ref 7–16)
AST SERPL-CCNC: 18 U/L (ref 12–45)
BILIRUB SERPL-MCNC: 0.4 MG/DL (ref 0.1–1.5)
BUN SERPL-MCNC: 4 MG/DL (ref 8–22)
CALCIUM SERPL-MCNC: 8.4 MG/DL (ref 8.5–10.5)
CHLORIDE SERPL-SCNC: 100 MMOL/L (ref 96–112)
CHOLEST SERPL-MCNC: 130 MG/DL (ref 100–199)
CO2 SERPL-SCNC: 19 MMOL/L (ref 20–33)
CREAT SERPL-MCNC: 0.72 MG/DL (ref 0.5–1.4)
ERYTHROCYTE [DISTWIDTH] IN BLOOD BY AUTOMATED COUNT: 47.2 FL (ref 35.9–50)
EST. AVERAGE GLUCOSE BLD GHB EST-MCNC: 103 MG/DL
GFR SERPLBLD CREATININE-BSD FMLA CKD-EPI: 107 ML/MIN/1.73 M 2
GLOBULIN SER CALC-MCNC: 3.2 G/DL (ref 1.9–3.5)
GLUCOSE SERPL-MCNC: 81 MG/DL (ref 65–99)
HBA1C MFR BLD: 5.2 % (ref 4–5.6)
HCT VFR BLD AUTO: 41.8 % (ref 42–52)
HDLC SERPL-MCNC: 36 MG/DL
HGB BLD-MCNC: 14.3 G/DL (ref 14–18)
INR PPP: 1.14 (ref 0.87–1.13)
LDLC SERPL CALC-MCNC: 67 MG/DL
MCH RBC QN AUTO: 32.6 PG (ref 27–33)
MCHC RBC AUTO-ENTMCNC: 34.2 G/DL (ref 33.7–35.3)
MCV RBC AUTO: 95.2 FL (ref 81.4–97.8)
PLATELET # BLD AUTO: 313 K/UL (ref 164–446)
PMV BLD AUTO: 9.5 FL (ref 9–12.9)
POTASSIUM SERPL-SCNC: 4 MMOL/L (ref 3.6–5.5)
PROT SERPL-MCNC: 6.9 G/DL (ref 6–8.2)
PROTHROMBIN TIME: 14.3 SEC (ref 12–14.6)
RBC # BLD AUTO: 4.39 M/UL (ref 4.7–6.1)
SODIUM SERPL-SCNC: 132 MMOL/L (ref 135–145)
T4 FREE SERPL-MCNC: 0.93 NG/DL (ref 0.93–1.7)
TRIGL SERPL-MCNC: 135 MG/DL (ref 0–149)
TSH SERPL DL<=0.005 MIU/L-ACNC: 13.1 UIU/ML (ref 0.38–5.33)
WBC # BLD AUTO: 5.1 K/UL (ref 4.8–10.8)

## 2022-06-24 PROCEDURE — 36415 COLL VENOUS BLD VENIPUNCTURE: CPT

## 2022-06-24 PROCEDURE — 85027 COMPLETE CBC AUTOMATED: CPT

## 2022-06-24 PROCEDURE — 84443 ASSAY THYROID STIM HORMONE: CPT

## 2022-06-24 PROCEDURE — 84439 ASSAY OF FREE THYROXINE: CPT

## 2022-06-24 PROCEDURE — 80061 LIPID PANEL: CPT

## 2022-06-24 PROCEDURE — 80053 COMPREHEN METABOLIC PANEL: CPT

## 2022-06-24 PROCEDURE — 85610 PROTHROMBIN TIME: CPT

## 2022-06-24 PROCEDURE — 83036 HEMOGLOBIN GLYCOSYLATED A1C: CPT

## 2022-06-28 ENCOUNTER — OFFICE VISIT (OUTPATIENT)
Dept: MEDICAL GROUP | Facility: MEDICAL CENTER | Age: 57
End: 2022-06-28
Attending: FAMILY MEDICINE
Payer: COMMERCIAL

## 2022-06-28 ENCOUNTER — HOSPITAL ENCOUNTER (OUTPATIENT)
Facility: MEDICAL CENTER | Age: 57
End: 2022-06-28
Attending: FAMILY MEDICINE
Payer: COMMERCIAL

## 2022-06-28 VITALS
DIASTOLIC BLOOD PRESSURE: 68 MMHG | RESPIRATION RATE: 16 BRPM | SYSTOLIC BLOOD PRESSURE: 106 MMHG | TEMPERATURE: 96.4 F | BODY MASS INDEX: 27.97 KG/M2 | HEART RATE: 66 BPM | OXYGEN SATURATION: 97 % | HEIGHT: 70 IN | WEIGHT: 195.4 LBS

## 2022-06-28 DIAGNOSIS — F10.11 HISTORY OF ALCOHOL ABUSE: ICD-10-CM

## 2022-06-28 DIAGNOSIS — R06.02 SOB (SHORTNESS OF BREATH): ICD-10-CM

## 2022-06-28 DIAGNOSIS — Z12.11 SCREENING FOR COLON CANCER: ICD-10-CM

## 2022-06-28 DIAGNOSIS — E03.9 HYPOTHYROIDISM, UNSPECIFIED TYPE: ICD-10-CM

## 2022-06-28 DIAGNOSIS — L30.8 OTHER ECZEMA: ICD-10-CM

## 2022-06-28 DIAGNOSIS — I10 ESSENTIAL HYPERTENSION: ICD-10-CM

## 2022-06-28 DIAGNOSIS — I48.91 ATRIAL FIBRILLATION, UNSPECIFIED TYPE (HCC): ICD-10-CM

## 2022-06-28 PROBLEM — F17.200 TOBACCO DEPENDENCE: Status: ACTIVE | Noted: 2022-06-28

## 2022-06-28 LAB — PATHOLOGY CONSULT NOTE: NORMAL

## 2022-06-28 PROCEDURE — 700101 HCHG RX REV CODE 250: Performed by: FAMILY MEDICINE

## 2022-06-28 PROCEDURE — 99214 OFFICE O/P EST MOD 30 MIN: CPT | Performed by: FAMILY MEDICINE

## 2022-06-28 PROCEDURE — 88305 TISSUE EXAM BY PATHOLOGIST: CPT

## 2022-06-28 PROCEDURE — 99213 OFFICE O/P EST LOW 20 MIN: CPT | Performed by: FAMILY MEDICINE

## 2022-06-28 RX ORDER — LEVOTHYROXINE SODIUM 0.05 MG/1
50 TABLET ORAL
Qty: 30 TABLET | Refills: 2 | Status: ON HOLD | OUTPATIENT
Start: 2022-06-28 | End: 2022-11-28

## 2022-06-28 RX ORDER — LIDOCAINE HYDROCHLORIDE 20 MG/ML
2 INJECTION, SOLUTION EPIDURAL; INFILTRATION; INTRACAUDAL; PERINEURAL ONCE
Status: COMPLETED | OUTPATIENT
Start: 2022-06-28 | End: 2022-06-28

## 2022-06-28 RX ORDER — BUDESONIDE AND FORMOTEROL FUMARATE DIHYDRATE 80; 4.5 UG/1; UG/1
2 AEROSOL RESPIRATORY (INHALATION) 2 TIMES DAILY
Qty: 10.2 G | Refills: 3 | Status: SHIPPED | OUTPATIENT
Start: 2022-06-28 | End: 2023-01-16 | Stop reason: SDUPTHER

## 2022-06-28 RX ADMIN — LIDOCAINE HYDROCHLORIDE 2 ML: 20 INJECTION, SOLUTION EPIDURAL; INFILTRATION; INTRACAUDAL at 17:35

## 2022-06-28 ASSESSMENT — PATIENT HEALTH QUESTIONNAIRE - PHQ9: CLINICAL INTERPRETATION OF PHQ2 SCORE: 0

## 2022-06-28 ASSESSMENT — FIBROSIS 4 INDEX: FIB4 SCORE: 0.91

## 2022-06-28 NOTE — ASSESSMENT & PLAN NOTE
Pt gets sob intermittently  Requires it once a day for SOB, coughing, and wheezing.  Albuterol helps resolve this.   He is a current smoker, about 5-6 cigs per day. He is interested in quitting

## 2022-06-28 NOTE — ASSESSMENT & PLAN NOTE
Pt has only been using triamcinolone once a day.  He has noticed improvement.  He has not yet made an appointment with dermatology  He also used mupirocin + triamcinolone on legs with good benefit  He is interested in getting a skin biopsy.

## 2022-06-28 NOTE — PROGRESS NOTES
"Subjective:     CC:   Chief Complaint   Patient presents with   • Follow-Up     Skin         HPI:     Eczema  Pt has only been using triamcinolone once a day.  He has noticed improvement.  He has not yet made an appointment with dermatology  He also used mupirocin + triamcinolone on legs with good benefit  He is interested in getting a skin biopsy.    Essential hypertension  Pt has started on amlodipine 10mg. BP today is 106/68.  He gets double vision with reading some times.   At home 130s/90    SOB (shortness of breath)  Pt gets sob intermittently  Requires it once a day for SOB, coughing, and wheezing.  Albuterol helps resolve this.   He is a current smoker, about 5-6 cigs per day. He is interested in quitting    Other specified hypothyroidism  Recent labs showed elevated TSH 13 and borderline low ft4.   Daughter notes that he \"hallucinates\" and sometimes gets a little confused.       Past Medical History:   Diagnosis Date   • ASTHMA    • Hypertension        Social History     Tobacco Use   • Smoking status: Current Every Day Smoker     Packs/day: 0.33     Years: 25.00     Pack years: 8.25     Types: Cigarettes   • Smokeless tobacco: Never Used   Vaping Use   • Vaping Use: Never used   Substance Use Topics   • Alcohol use: Yes     Alcohol/week: 8.4 oz     Types: 14 Cans of beer per week     Comment: one pint daily    • Drug use: Not Currently     Types: Marijuana, Methamphetamines       Current Outpatient Medications Ordered in Epic   Medication Sig Dispense Refill   • budesonide-formoterol (SYMBICORT) 80-4.5 MCG/ACT Aerosol Inhale 2 Puffs 2 times a day. 10.2 g 3   • levothyroxine (SYNTHROID) 50 MCG Tab Take 1 Tablet by mouth every morning on an empty stomach. 30 Tablet 2   • amLODIPine (NORVASC) 10 MG Tab Take 1 Tablet by mouth every day. 90 Tablet 0   • albuterol 108 (90 Base) MCG/ACT Aero Soln inhalation aerosol Inhale 2 Puffs every 6 hours as needed for Shortness of Breath (cough). 18 g 0   • triamcinolone " "acetonide (KENALOG) 0.1 % Cream Apply 1 Application topically 2 times a day. Mix with lotion and apply to entire body excluding face and genitalia. 454 g 0   • metoprolol SR (TOPROL XL) 25 MG TABLET SR 24 HR Take 1 Tablet by mouth every day. Indications: Atrial Fibrillation 30 Tablet 2   • Spacer/Aero-Holding Chambers (OPTICHAMBER MARK-MD MASK) Misc 1 EACH ONE TIME FOR 1 DOSE. (Patient not taking: Reported on 6/14/2022)     • Misc. Devices Misc Blood pressure cuff and machine 1 Each 0     No current [facility]-ordered facility-administered medications on file.       Allergies:  Patient has no known allergies.        Objective:       Exam:  /68 (BP Location: Left arm, Patient Position: Sitting, BP Cuff Size: Adult)   Pulse 66   Temp (!) 35.8 °C (96.4 °F) (Temporal)   Resp 16   Ht 1.778 m (5' 10\")   Wt 88.6 kg (195 lb 6.4 oz)   SpO2 97%   BMI 28.04 kg/m²  Body mass index is 28.04 kg/m².    General: Normal appearing. No distress.  Head: normocephalic  Eyes:  Eyes conjunctiva clear lids without ptosis, pupils equal and reactive to light accommodation  ENT: Ears normal shape and contour, canals are clear bilaterally, tympanic membranes are benign, oropharynx is without erythema, edema or exudates.   Neck: Supple. Thyroid is not enlarged.  Pulmonary: Clear to ausculation.  Normal effort. No rales, ronchi, or wheezing.  Cardiovascular: Regular rate and rhythm without murmur. Radial pulses are intact and equal bilaterally.  Abdomen: Soft, nontender, nondistended. Normal bowel sounds.  Neurologic: no facial droop, gait normal  Lymph: No cervical or supraclavicular lymph nodes are palpable  Skin: thickened skin with hyperpigmentation, improved since last visit. Impetigo of the lower extremities is improved. Still with excoriations.   Musculoskeletal: Normal gait. No extremity cyanosis, clubbing, or edema.  Psych: Normal mood and affect. Alert and oriented x3. Judgment and insight is normal.      Labs:   Reviewed " labs 6/2/422  a1c normal at 5.2  INR mildly elevated  LDL is normal  TSH elevated, ft4 borderline       Assessment & Plan:     56 y.o. male with the following -     1. Other eczema  - Pathology Specimen; Future  Punch biopsy done today. See procedure note. Continue with triamcinolone treatment.    2. Essential hypertension  Continue with amlodipine. Pt to write down BP measurements for review at next visit. Will see if he is dipping too low, although it doesn't sound like he is running low at home. Will also see if his HR is running low after starting on metop for afib    3. SOB (shortness of breath)  - PULMONARY FUNCTION TESTS -Test requested: Complete Pulmonary Function Test; Future  - budesonide-formoterol (SYMBICORT) 80-4.5 MCG/ACT Aerosol; Inhale 2 Puffs 2 times a day.  Dispense: 10.2 g; Refill: 3  He is requiring albuterol daily. Start on lower dose symbicort for maintenance. PFTs ordered    4. Hypothyroidism, unspecified type  - levothyroxine (SYNTHROID) 50 MCG Tab; Take 1 Tablet by mouth every morning on an empty stomach.  Dispense: 30 Tablet; Refill: 2  Start on synthroid. Recheck tsh in 6 weeks. Will see if this helps with confusion/hallucinations - may also consider sending in other labs for this, including b12, folate, syphylis screening at next lab draw    5. Screening for colon cancer  - OCCULT BLOOD FECES IMMUNOASSAY (FIT); Future    6. Atrial fibrillation, unspecified type (HCC)  Given phone number for cardiology for eval for afib    7. History of alcohol abuse  Did not get a chance to discuss this today. INR was elevated, will order RUQ us.       Return in about 2 weeks (around 7/12/2022).    Please note that this dictation was created using voice recognition software. I have made every reasonable attempt to correct obvious errors, but I expect that there are errors of grammar and possibly content that I did not discover before finalizing the note.

## 2022-06-28 NOTE — PATIENT INSTRUCTIONS
[facility]  [facility]  [address], [address]  [place] [address]  Phone: [phone]

## 2022-06-28 NOTE — ASSESSMENT & PLAN NOTE
Pt has started on amlodipine 10mg. BP today is 106/68.  He gets double vision with reading some times.   At home 130s/90

## 2022-06-28 NOTE — ASSESSMENT & PLAN NOTE
"Recent labs showed elevated TSH 13 and borderline low ft4.   Daughter notes that he \"hallucinates\" and sometimes gets a little confused.   "

## 2022-06-29 ENCOUNTER — TELEPHONE (OUTPATIENT)
Dept: MEDICAL GROUP | Facility: MEDICAL CENTER | Age: 57
End: 2022-06-29
Payer: COMMERCIAL

## 2022-06-29 NOTE — TELEPHONE ENCOUNTER
DOCUMENTATION OF PAR STATUS:    1. Name of Medication & Dose: Symbicort 80-4.5mcg     2. Name of Prescription Coverage Company & phone #: [employer]    3. Date Prior Auth Submitted: 6/29/2022    4. What information was given to obtain insurance decision? ICD 10 code    5. Prior Auth Status? Pending    6. Patient Notified: N\A

## 2022-07-01 ENCOUNTER — TELEPHONE (OUTPATIENT)
Dept: MEDICAL GROUP | Facility: MEDICAL CENTER | Age: 57
End: 2022-07-01
Payer: COMMERCIAL

## 2022-07-01 NOTE — TELEPHONE ENCOUNTER
VOICEMAIL  1. Caller Name: Prince             Call Back Number: [phone] (home)       2. Message: Patient's daughter called to say that medications were not sent over to pharmacy. I called and LVM letting [facility] Asked that he c/b if he had any issues picking those up.     3. Patient approves office to leave a detailed voicemail/MyChart message: yes

## 2022-07-11 ENCOUNTER — NON-PROVIDER VISIT (OUTPATIENT)
Dept: OCCUPATIONAL MEDICINE | Facility: CLINIC | Age: 57
End: 2022-07-11

## 2022-07-11 DIAGNOSIS — Z02.1 PRE-EMPLOYMENT DRUG SCREENING: ICD-10-CM

## 2022-07-11 LAB
AMP AMPHETAMINE: NORMAL
COC COCAINE: NORMAL
INT CON NEG: NORMAL
INT CON POS: NORMAL
MET METHAMPHETAMINES: NORMAL
OPI OPIATES: NORMAL
PCP PHENCYCLIDINE: NORMAL
POC DRUG COMMENT 753798-OCCUPATIONAL HEALTH: NORMAL
THC: NORMAL

## 2022-07-11 PROCEDURE — 80305 DRUG TEST PRSMV DIR OPT OBS: CPT | Performed by: NURSE PRACTITIONER

## 2022-07-20 ENCOUNTER — TELEPHONE (OUTPATIENT)
Dept: MEDICAL GROUP | Facility: MEDICAL CENTER | Age: 57
End: 2022-07-20

## 2022-07-20 ENCOUNTER — OFFICE VISIT (OUTPATIENT)
Dept: MEDICAL GROUP | Facility: MEDICAL CENTER | Age: 57
End: 2022-07-20
Attending: FAMILY MEDICINE
Payer: COMMERCIAL

## 2022-07-20 VITALS
BODY MASS INDEX: 28.92 KG/M2 | TEMPERATURE: 97.9 F | HEART RATE: 89 BPM | RESPIRATION RATE: 16 BRPM | DIASTOLIC BLOOD PRESSURE: 84 MMHG | WEIGHT: 202 LBS | OXYGEN SATURATION: 97 % | SYSTOLIC BLOOD PRESSURE: 124 MMHG | HEIGHT: 70 IN

## 2022-07-20 DIAGNOSIS — I10 ESSENTIAL HYPERTENSION: ICD-10-CM

## 2022-07-20 DIAGNOSIS — R06.02 SOB (SHORTNESS OF BREATH): ICD-10-CM

## 2022-07-20 DIAGNOSIS — F10.11 HISTORY OF ALCOHOL ABUSE: ICD-10-CM

## 2022-07-20 DIAGNOSIS — N63.0 BREAST LUMP: ICD-10-CM

## 2022-07-20 DIAGNOSIS — E03.8 OTHER SPECIFIED HYPOTHYROIDISM: ICD-10-CM

## 2022-07-20 DIAGNOSIS — F17.200 TOBACCO DEPENDENCE: ICD-10-CM

## 2022-07-20 PROCEDURE — 99406 BEHAV CHNG SMOKING 3-10 MIN: CPT | Performed by: FAMILY MEDICINE

## 2022-07-20 PROCEDURE — 99213 OFFICE O/P EST LOW 20 MIN: CPT | Performed by: FAMILY MEDICINE

## 2022-07-20 PROCEDURE — 99214 OFFICE O/P EST MOD 30 MIN: CPT | Mod: 25 | Performed by: FAMILY MEDICINE

## 2022-07-20 RX ORDER — VARENICLINE TARTRATE 1 MG/1
TABLET, FILM COATED ORAL
Qty: 60 TABLET | Refills: 2 | Status: SHIPPED | OUTPATIENT
Start: 2022-07-20 | End: 2022-10-18

## 2022-07-20 RX ORDER — VARENICLINE TARTRATE 0.5 MG/1
TABLET, FILM COATED ORAL
Qty: 11 TABLET | Refills: 0 | Status: SHIPPED | OUTPATIENT
Start: 2022-07-20 | End: 2022-10-18

## 2022-07-20 ASSESSMENT — FIBROSIS 4 INDEX: FIB4 SCORE: 0.91

## 2022-07-20 NOTE — PROGRESS NOTES
Subjective:     CC:   Chief Complaint   Patient presents with   • Follow-Up         HPI:     Essential hypertension  Pt brings bp log from home - bps are 120s-130s/70s-80s. One elevated diastolic pressure at 90    SOB (shortness of breath)  Pt was started on symbicort 80-4.5 last visit. He notes that he feels like he doesn't need it all the time. He used it daily for about 1 week and he felt like it helped significantly and stopped taking it.   Albuterol use - hasn't been using at all.   Unfortunately PFTs tried to be ordered, but [facility] not accept [facility]   Triggers: panic attacks.   He is still smoking     Other specified hypothyroidism  Pt has been taking only every other day.   Has not improved memory or improved hallucinations      History of alcohol abuse  Pt presents with daughter, who reports that he drinks significant alcohol, more than 2 beers per night, which is what he originally told me.  Today he reports drinking up to 6 beers per day, at least 3 nights per week. The other nights he will drink about 3 per night.   Daughter also reports vodka involved.     Breast lump  Pt reports today a lump of the left breast behind the nipple, first noticed 2 months ago. He notes since then it has been growing and tender to touch.       Past Medical History:   Diagnosis Date   • ASTHMA    • Hypertension        Social History     Tobacco Use   • Smoking status: Current Every Day Smoker     Packs/day: 0.33     Years: 25.00     Pack years: 8.25     Types: Cigarettes   • Smokeless tobacco: Never Used   Vaping Use   • Vaping Use: Never used   Substance Use Topics   • Alcohol use: Yes     Alcohol/week: 8.4 oz     Types: 14 Cans of beer per week     Comment: one pint daily    • Drug use: Not Currently     Types: Marijuana, Methamphetamines       Current Outpatient Medications Ordered in Epic   Medication Sig Dispense Refill   • varenicline (CHANTIX) 0.5 MG tablet Days 1 to 3 - 1 tab once daily. Days 4 to 7 -  1 tab  "twice daily. 11 Tablet 0   • varenicline (CHANTIX) 1 MG tablet Ramp up with 0.5mg tabs for the first week. Thereafter take 1 tab (1mg) twice a day. 60 Tablet 2   • budesonide-formoterol (SYMBICORT) 80-4.5 MCG/ACT Aerosol Inhale 2 Puffs 2 times a day. 10.2 g 3   • levothyroxine (SYNTHROID) 50 MCG Tab Take 1 Tablet by mouth every morning on an empty stomach. 30 Tablet 2   • amLODIPine (NORVASC) 10 MG Tab Take 1 Tablet by mouth every day. 90 Tablet 0   • Misc. Devices Misc Blood pressure cuff and machine 1 Each 0   • albuterol 108 (90 Base) MCG/ACT Aero Soln inhalation aerosol Inhale 2 Puffs every 6 hours as needed for Shortness of Breath (cough). 18 g 0   • triamcinolone acetonide (KENALOG) 0.1 % Cream Apply 1 Application topically 2 times a day. Mix with lotion and apply to entire body excluding face and genitalia. 454 g 0   • metoprolol SR (TOPROL XL) 25 MG TABLET SR 24 HR Take 1 Tablet by mouth every day. Indications: Atrial Fibrillation 30 Tablet 2   • Spacer/Aero-Holding Chambers (OPTICHAMBER MARK-MD MASK) Misc 1 EACH ONE TIME FOR 1 DOSE. (Patient not taking: No sig reported)       No current [facility]-ordered facility-administered medications on file.       Allergies:  Patient has no known allergies.        Objective:       Exam:  /84 (BP Location: Left arm, Patient Position: Sitting, BP Cuff Size: Adult)   Pulse 89   Temp 36.6 °C (97.9 °F) (Temporal)   Resp 16   Ht 1.778 m (5' 10\")   Wt 91.6 kg (202 lb)   SpO2 97%   BMI 28.98 kg/m²  Body mass index is 28.98 kg/m².    General: Normal appearing. No distress.  Chest wall: Nodule palpated behind the nipple, solid, mobile, mildly tender to touch  Skin: Patient continues to have excoriations on the arms, although improved from previous  Musculoskeletal: Normal gait. No extremity cyanosis, clubbing, or edema.  Psych: Normal mood and affect. Alert and oriented x3. Judgment and insight is normal.      Labs:   No new    Assessment & Plan:     56 y.o. male with " the following -     1. Essential hypertension  Blood pressures have been good at home and will continue on current dose of medications of amlodipine 10 mg.    2. SOB (shortness of breath)  Unfortunately [facility] does not accept related insurance, staff was able to find out [facility] that will take his insurance for PFTs. We will figure out how to get him scheduled there.  Patient is also interested in stopping smoking, starting Chantix as below    3. Other specified hypothyroidism  - TSH WITH REFLEX TO FT4; Future  Patient to obtain TSH for next visit.  He states that he has not been taking it daily as he does forget.  Recommend that he take it first thing in the morning when he wakes up, which he will try.    4. History of alcohol abuse  - US-RUQ; Future  Patient strongly counseled against alcohol use especially with atrial fibrillation.  Likely causing his hallucinations.  Counseled on detox that he should be observed at Detox center, or done in the hospital.  I did recommend that there are medications available to help curb cravings.  He seems precontemplative about alcohol cessation.    5. Breast lump  - MA-DIAGNOSTIC MAMMO LEFT W/TOMOSYNTHESIS W/CAD; Future  Concerning left breast lump.  Ordering a diagnostic mammogram    6. Tobacco dependence  - varenicline (CHANTIX) 0.5 MG tablet; Days 1 to 3 - 1 tab once daily. Days 4 to 7 -  1 tab twice daily.  Dispense: 11 Tablet; Refill: 0  - varenicline (CHANTIX) 1 MG tablet; Ramp up with 0.5mg tabs for the first week. Thereafter take 1 tab (1mg) twice a day.  Dispense: 60 Tablet; Refill: 2  5 minutes were spent counseling patient on smoking cessation.  He is interested in trying Chantix.  Patient was given potential side effects. He can also try nicorette gum as needed.      Return in about 6 weeks (around 8/31/2022) for f/u issues .    Please note that this dictation was created using voice recognition software. I have made every  reasonable attempt to correct obvious errors, but I expect that there are errors of grammar and possibly content that I did not discover before finalizing the note.

## 2022-07-20 NOTE — ASSESSMENT & PLAN NOTE
Pt reports today a lump of the left breast behind the nipple, first noticed 2 months ago. He notes since then it has been growing and tender to touch.

## 2022-07-20 NOTE — TELEPHONE ENCOUNTER
DOCUMENTATION OF PAR STATUS:    1. Name of Medication & Dose: varenicline (CHANTIX) 0.5 MG tablet       2. Name of Prescription Coverage Company & phone #: [employer]    3. Date Prior Auth Submitted: 7/20/22    4. What information was given to obtain insurance decision? Chart notes, icd-10 code,meds check.    5. Prior Auth Status? Pending    6. Patient Notified: N\A

## 2022-07-20 NOTE — TELEPHONE ENCOUNTER
Patient reported not being able to schedule a PFT with [facility]. He was told that they no longer accepted his insurance.     I called [facility] to confirm, was told that their [place] medicaid panel is now closed. I called [facility], they do not accept [place] as well.     [facility] does accept [place] medicaid. I faxed over a PFT order and the woman from scheduling was going to try and run order to see if it would go through.     [facility] scheduling number: [phone]  Fax: [phone]

## 2022-07-20 NOTE — ASSESSMENT & PLAN NOTE
Pt was started on symbicort 80-4.5 last visit. He notes that he feels like he doesn't need it all the time. He used it daily for about 1 week and he felt like it helped significantly and stopped taking it.   Albuterol use - hasn't been using at all.   Unfortunately PFTs tried to be ordered, but [facility] PFT does not accept [place]   Triggers: panic attacks.   He is still smoking

## 2022-07-20 NOTE — TELEPHONE ENCOUNTER
DOCUMENTATION OF PAR STATUS:    1. Name of Medication & Dose: Varenicline Tartrate 1MG tablet    2. Name of Prescription Coverage Company & phone #: [employer]    3. Date Prior Auth Submitted: 7/20/22    4. What information was given to obtain insurance decision? Chart notes,icd-10 code, meds check.      5. Prior Auth Status? Pending    6. Patient Notified: N\A

## 2022-07-20 NOTE — ASSESSMENT & PLAN NOTE
Pt presents with daughter, who reports that he drinks significant alcohol, more than 2 beers per night, which is what he originally told me.  Today he reports drinking up to 6 beers per day, at least 3 nights per week. The other nights he will drink about 3 per night.   Daughter also reports vodka involved.    na

## 2022-07-26 DIAGNOSIS — N63.0 BREAST LUMP: ICD-10-CM

## 2022-08-11 ENCOUNTER — HOSPITAL ENCOUNTER (OUTPATIENT)
Dept: RADIOLOGY | Facility: MEDICAL CENTER | Age: 57
End: 2022-08-11
Attending: FAMILY MEDICINE
Payer: COMMERCIAL

## 2022-08-11 ENCOUNTER — TELEPHONE (OUTPATIENT)
Dept: MEDICAL GROUP | Facility: MEDICAL CENTER | Age: 57
End: 2022-08-11
Payer: COMMERCIAL

## 2022-08-11 DIAGNOSIS — N63.0 BREAST LUMP: ICD-10-CM

## 2022-08-11 DIAGNOSIS — N62 GYNECOMASTIA: ICD-10-CM

## 2022-08-11 DIAGNOSIS — F10.11 HISTORY OF ALCOHOL ABUSE: ICD-10-CM

## 2022-08-11 PROCEDURE — G0279 TOMOSYNTHESIS, MAMMO: HCPCS

## 2022-08-11 PROCEDURE — 76642 ULTRASOUND BREAST LIMITED: CPT | Mod: RT

## 2022-08-11 NOTE — TELEPHONE ENCOUNTER
Caller Name: [place]  Call Back Number: n/a    How would the patient prefer to be contacted with a response: Phone call OK to leave a detailed message    Amirah Williamson called to request pt demographics. I sent demographics plus insurance information, then gave them pt's phone number so they could get him scheduled for PFT

## 2022-08-11 NOTE — TELEPHONE ENCOUNTER
DOCUMENTATION OF PAR STATUS:    1. Name of Medication & Dose: Varenicline     2. Name of Prescription Coverage Company & phone #: [employer]    3. Date Prior Auth Submitted: 8/11/22    4. What information was given to obtain insurance decision? ICD-10    5. Prior Auth Status? Pending    6. Patient Notified: N\A

## 2022-08-28 ENCOUNTER — HOSPITAL ENCOUNTER (OUTPATIENT)
Dept: RADIOLOGY | Facility: MEDICAL CENTER | Age: 57
End: 2022-08-28
Attending: FAMILY MEDICINE
Payer: COMMERCIAL

## 2022-08-28 DIAGNOSIS — F10.11 HISTORY OF ALCOHOL ABUSE: ICD-10-CM

## 2022-08-28 PROCEDURE — 76705 ECHO EXAM OF ABDOMEN: CPT

## 2022-09-07 DIAGNOSIS — K76.9 LIVER DISEASE: ICD-10-CM

## 2022-09-08 ENCOUNTER — TELEPHONE (OUTPATIENT)
Dept: HEALTH INFORMATION MANAGEMENT | Facility: OTHER | Age: 57
End: 2022-09-08
Payer: COMMERCIAL

## 2022-09-08 NOTE — TELEPHONE ENCOUNTER
----- Message from Deb Clayton M.D. sent at 9/7/2022  3:23 PM PDT -----  Please call patient and let him know that his ultrasound of the liver showed the following:  He has gallstones in the gallbladder  There is a mass noted in the liver.  He was recommended to obtain further imaging to determine what this mass is.  I am unable to tell if this is concerning or not at this time.  I have ordered a follow-up MRI, please give him instructions on how to schedule.  Please let me know if he is claustrophobic.

## 2022-09-08 NOTE — TELEPHONE ENCOUNTER
"Phone Number Called: [phone] (home)     Call outcome: Spoke to patient regarding message below.    Message: Called to inform patient of results and recommendations and provided contact information for scheduling MRI.  Patient verbalized understanding and states he is \"a little bit\" claustrophobic but feels he will be okay completing the MRI.  "

## 2022-09-27 ENCOUNTER — HOSPITAL ENCOUNTER (OUTPATIENT)
Dept: CARDIOLOGY | Facility: MEDICAL CENTER | Age: 57
End: 2022-09-27
Attending: FAMILY MEDICINE
Payer: COMMERCIAL

## 2022-10-09 ENCOUNTER — HOSPITAL ENCOUNTER (EMERGENCY)
Facility: MEDICAL CENTER | Age: 57
End: 2022-10-09
Attending: EMERGENCY MEDICINE
Payer: COMMERCIAL

## 2022-10-09 VITALS
SYSTOLIC BLOOD PRESSURE: 130 MMHG | RESPIRATION RATE: 18 BRPM | WEIGHT: 195.77 LBS | TEMPERATURE: 97.8 F | HEART RATE: 84 BPM | HEIGHT: 70 IN | OXYGEN SATURATION: 96 % | DIASTOLIC BLOOD PRESSURE: 96 MMHG | BODY MASS INDEX: 28.03 KG/M2

## 2022-10-09 DIAGNOSIS — Q80.0 ICHTHYOSIS VULGARIS: ICD-10-CM

## 2022-10-09 PROCEDURE — 99281 EMR DPT VST MAYX REQ PHY/QHP: CPT

## 2022-10-09 ASSESSMENT — FIBROSIS 4 INDEX: FIB4 SCORE: 0.93

## 2022-10-09 NOTE — ED PROVIDER NOTES
"ED Provider Note    Scribed for Cristi Groves M.D. by Laura Zapata. 10/9/2022  4:56 PM    Primary care provider: Deb Clayton M.D.  Means of arrival: Walk-in  History obtained from: Patient   History limited by: None     CHIEF COMPLAINT  Chief Complaint   Patient presents with    Rash     Pt. Has dx of eczema, reports eczema is generalized and has been exacerbated \"for years\", but is unable to see a dermatologist.         HPI  Tyree Whiteside is a 57 y.o. male who presents to the Emergency Department for evaluation of generalized body rash onset years ago. Patient states he has a significant history of eczema, which has been worsening for years. He states that he has tried steroids and shots for relief, but it has been about 3 months since he has tried anything. Patient reports having a primary care physician who has referred him to dermatology before, but he has been unable to see one. He notes that the dermatologists in [place] do not accept his insurance, so he would have to go to [place] to see one. He reports a family history of this. He adds that he has never tried anti-fungal medications, but has tried triamcinolone with no relief. He also reports getting blood work done last month, with normal results.     REVIEW OF SYSTEMS  Pertinent positives include: generalized body rash. Pertinent negatives include: fevers. See history of present illness.     PAST MEDICAL HISTORY   has a past medical history of A-fib (HCC), ASTHMA, Eczema, and Hypertension.    SURGICAL HISTORY  patient denies any surgical history    SOCIAL HISTORY  Social History     Tobacco Use    Smoking status: Every Day     Packs/day: 0.33     Years: 25.00     Pack years: 8.25     Types: Cigarettes    Smokeless tobacco: Never   Vaping Use    Vaping Use: Never used   Substance Use Topics    Alcohol use: Yes     Comment: 4 drinks daily    Drug use: Not Currently     Types: Marijuana, Methamphetamines      Social History     Substance and " "Sexual Activity   Drug Use Not Currently    Types: Marijuana, Methamphetamines       FAMILY HISTORY  Family History   Problem Relation Age of Onset    Heart Disease Father     Stroke Father     Hypertension Brother     Cancer Brother         has 12 sibs, one brother had an unknown type of ca       CURRENT MEDICATIONS  Home Medications       Reviewed by Yuli Swan R.N. (Registered Nurse) on 10/09/22 at 1536  Med List Status: Not Addressed     Medication Last Dose Status   albuterol 108 (90 Base) MCG/ACT Aero Soln inhalation aerosol  Active   amLODIPine (NORVASC) 10 MG Tab  Active   budesonide-formoterol (SYMBICORT) 80-4.5 MCG/ACT Aerosol  Active   levothyroxine (SYNTHROID) 50 MCG Tab  Active   metoprolol SR (TOPROL XL) 25 MG TABLET SR 24 HR  Active   Misc. Devices Misc  Active   nicotine polacrilex (NICORETTE) 2 MG Gum  Active   Spacer/Aero-Holding Chambers (OPTICHAMBER MARK-MD MASK) Misc  Active   triamcinolone acetonide (KENALOG) 0.1 % Cream  Active   varenicline (CHANTIX) 0.5 MG tablet  Active   varenicline (CHANTIX) 1 MG tablet  Active                    ALLERGIES  No Known Allergies    PHYSICAL EXAM  VITAL SIGNS: BP (!) 130/96   Pulse 84   Temp 36.6 °C (97.8 °F) (Temporal)   Resp 18   Ht 1.778 m (5' 10\")   Wt 88.8 kg (195 lb 12.3 oz)   SpO2 96%   BMI 28.09 kg/m²     Constitutional: Alert in no apparent distress.  HENT: No signs of trauma, Bilateral external ears normal, Nose normal. Uvula midline.   Eyes: Pupils are equal and reactive, Conjunctiva normal, Non-icteric.   Neck: Normal range of motion, No tenderness, Supple, No stridor.   Lymphatic: No lymphadenopathy noted.   Cardiovascular: Regular rate and rhythm, no murmurs.   Thorax & Lungs: Normal breath sounds, No respiratory distress, No wheezing, No chest tenderness.   Abdomen:  Soft, No tenderness, No peritoneal signs, No masses, No pulsatile masses.   Skin: Diffuse dry skin throughout bilateral upper and lower extremities, chest, back, " and head. Wounds in various states of healing on bilateral surfaces.     Back: No bony tenderness, No CVA tenderness.   Extremities: Intact distal pulses, No edema, No tenderness, No cyanosis.  Musculoskeletal: Good range of motion in all major joints. No tenderness to palpation or major deformities noted.   Neurologic: Alert , Normal motor function, Normal sensory function, No focal deficits noted.   Psychiatric: Affect normal, Judgment normal, Mood normal.     DIAGNOSTIC STUDIES / PROCEDURES    None.     COURSE & MEDICAL DECISION MAKING  Nursing notes, VS, PMSFHx reviewed in chart.    57 y.o. male p/w chief complaint of generalized body rash.    4:56 PM Patient seen and examined at bedside.      I verified that the patient was wearing a mask and I was wearing appropriate PPE every time I entered the room. The patient's mask was on the patient at all times during my encounter except for a brief view of the oropharynx.     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 I discussed the patient's diagnostic study results which show ichthyosis vulgaris. I discussed plan for discharge and follow up as outlined below. The patient is stable for discharge at this time and will return for any new or worsening symptoms. Patient verbalizes understanding and support with my plan for discharge.       DISPOSITION:  Patient will be discharged home in stable condition.    FOLLOW UP:  Deb Clayton M.D.  [address] [place] [address]  [id number]  [phone]    In 3 days      [facility], Emergency Dept  [address]  [place] [id number] [phone]    If symptoms worsen      OUTPATIENT MEDICATIONS:  Discharge Medication List as of 10/9/2022  5:32 PM          FINAL IMPRESSION  1. Ichthyosis vulgaris          Laura ALBARRAN (Scribe), am scribing for, and in the presence of, Cristi Groves M.D..    Electronically signed by: Laura Zapata (Scribe), 10/9/2022    Crisit ALBARRAN M.D. personally performed the services described in this documentation, as scribed by Laura Zapata in my presence, and it is both accurate and complete. E.     The note accurately reflects work and decisions made by me.  Cristi Groves M.D.  10/9/2022  11:49 PM

## 2022-10-10 ENCOUNTER — HOSPITAL ENCOUNTER (OUTPATIENT)
Dept: CARDIOLOGY | Facility: MEDICAL CENTER | Age: 57
End: 2022-10-10
Attending: FAMILY MEDICINE
Payer: COMMERCIAL

## 2022-10-10 DIAGNOSIS — I48.91 ATRIAL FIBRILLATION, UNSPECIFIED TYPE (HCC): ICD-10-CM

## 2022-10-10 LAB
LV EJECT FRACT  99904: 55
LV EJECT FRACT MOD 2C 99903: 62.35
LV EJECT FRACT MOD 4C 99902: 52.84
LV EJECT FRACT MOD BP 99901: 58.99

## 2022-10-10 PROCEDURE — 93306 TTE W/DOPPLER COMPLETE: CPT | Mod: 26 | Performed by: INTERNAL MEDICINE

## 2022-10-10 PROCEDURE — 93306 TTE W/DOPPLER COMPLETE: CPT

## 2022-10-18 ENCOUNTER — OFFICE VISIT (OUTPATIENT)
Dept: MEDICAL GROUP | Facility: MEDICAL CENTER | Age: 57
End: 2022-10-18
Attending: FAMILY MEDICINE
Payer: COMMERCIAL

## 2022-10-18 ENCOUNTER — APPOINTMENT (OUTPATIENT)
Dept: RADIOLOGY | Facility: MEDICAL CENTER | Age: 57
DRG: 870 | End: 2022-10-18
Attending: HOSPITALIST
Payer: COMMERCIAL

## 2022-10-18 ENCOUNTER — HOSPITAL ENCOUNTER (INPATIENT)
Facility: MEDICAL CENTER | Age: 57
LOS: 42 days | DRG: 870 | End: 2022-11-29
Attending: EMERGENCY MEDICINE | Admitting: HOSPITALIST
Payer: COMMERCIAL

## 2022-10-18 ENCOUNTER — APPOINTMENT (OUTPATIENT)
Dept: RADIOLOGY | Facility: MEDICAL CENTER | Age: 57
DRG: 870 | End: 2022-10-18
Attending: EMERGENCY MEDICINE
Payer: COMMERCIAL

## 2022-10-18 ENCOUNTER — HOSPITAL ENCOUNTER (EMERGENCY)
Facility: MEDICAL CENTER | Age: 57
End: 2022-10-18
Attending: EMERGENCY MEDICINE
Payer: COMMERCIAL

## 2022-10-18 VITALS
SYSTOLIC BLOOD PRESSURE: 159 MMHG | DIASTOLIC BLOOD PRESSURE: 91 MMHG | RESPIRATION RATE: 20 BRPM | BODY MASS INDEX: 27.2 KG/M2 | HEIGHT: 70 IN | OXYGEN SATURATION: 99 % | TEMPERATURE: 98.2 F | WEIGHT: 190 LBS | HEART RATE: 102 BPM

## 2022-10-18 VITALS
HEART RATE: 108 BPM | TEMPERATURE: 97.3 F | OXYGEN SATURATION: 99 % | HEIGHT: 70 IN | RESPIRATION RATE: 20 BRPM | WEIGHT: 190.3 LBS | SYSTOLIC BLOOD PRESSURE: 142 MMHG | BODY MASS INDEX: 27.24 KG/M2 | DIASTOLIC BLOOD PRESSURE: 80 MMHG

## 2022-10-18 DIAGNOSIS — E03.8 OTHER SPECIFIED HYPOTHYROIDISM: ICD-10-CM

## 2022-10-18 DIAGNOSIS — R06.02 SOB (SHORTNESS OF BREATH): ICD-10-CM

## 2022-10-18 DIAGNOSIS — F10.11 HISTORY OF ALCOHOL ABUSE: ICD-10-CM

## 2022-10-18 DIAGNOSIS — J96.02 ACUTE RESPIRATORY FAILURE WITH HYPOXIA AND HYPERCAPNIA (HCC): ICD-10-CM

## 2022-10-18 DIAGNOSIS — R65.21: ICD-10-CM

## 2022-10-18 DIAGNOSIS — Z93.1 PEG (PERCUTANEOUS ENDOSCOPIC GASTROSTOMY) STATUS (HCC): ICD-10-CM

## 2022-10-18 DIAGNOSIS — I50.31 ACUTE DIASTOLIC CONGESTIVE HEART FAILURE (HCC): ICD-10-CM

## 2022-10-18 DIAGNOSIS — Z92.89 HOSPITALIZATION WITHIN LAST 30 DAYS: ICD-10-CM

## 2022-10-18 DIAGNOSIS — F10.139 ALCOHOL ABUSE WITH WITHDRAWAL (HCC): ICD-10-CM

## 2022-10-18 DIAGNOSIS — Z74.09 IMPAIRED MOBILITY AND ACTIVITIES OF DAILY LIVING: ICD-10-CM

## 2022-10-18 DIAGNOSIS — E55.9 VITAMIN D DEFICIENCY: ICD-10-CM

## 2022-10-18 DIAGNOSIS — F10.931 ALCOHOL WITHDRAWAL DELIRIUM (HCC): ICD-10-CM

## 2022-10-18 DIAGNOSIS — F10.931 DELIRIUM TREMENS (HCC): ICD-10-CM

## 2022-10-18 DIAGNOSIS — F10.231 ALCOHOL DEPENDENCE WITH WITHDRAWAL DELIRIUM (HCC): ICD-10-CM

## 2022-10-18 DIAGNOSIS — E83.42 HYPOMAGNESEMIA: ICD-10-CM

## 2022-10-18 DIAGNOSIS — K59.09 OTHER CONSTIPATION: ICD-10-CM

## 2022-10-18 DIAGNOSIS — E51.2 WERNICKE ENCEPHALOPATHY SYNDROME: ICD-10-CM

## 2022-10-18 DIAGNOSIS — E66.3 OVERWEIGHT (BMI 25.0-29.9): ICD-10-CM

## 2022-10-18 DIAGNOSIS — Z99.11 ON MECHANICALLY ASSISTED VENTILATION (HCC): ICD-10-CM

## 2022-10-18 DIAGNOSIS — H93.13 RINGING IN EARS, BILATERAL: ICD-10-CM

## 2022-10-18 DIAGNOSIS — N17.9 AKI (ACUTE KIDNEY INJURY) (HCC): ICD-10-CM

## 2022-10-18 DIAGNOSIS — L30.9 ECZEMA, UNSPECIFIED TYPE: ICD-10-CM

## 2022-10-18 DIAGNOSIS — R16.0 LIVER MASS: ICD-10-CM

## 2022-10-18 DIAGNOSIS — A41.52: ICD-10-CM

## 2022-10-18 DIAGNOSIS — R21 RASH: ICD-10-CM

## 2022-10-18 DIAGNOSIS — F17.200 TOBACCO DEPENDENCE: ICD-10-CM

## 2022-10-18 DIAGNOSIS — J86.9 EMPYEMA (HCC): ICD-10-CM

## 2022-10-18 DIAGNOSIS — D72.19 PERIPHERAL EOSINOPHILIA: ICD-10-CM

## 2022-10-18 DIAGNOSIS — I10 ESSENTIAL HYPERTENSION: ICD-10-CM

## 2022-10-18 DIAGNOSIS — R45.1 AGITATION: ICD-10-CM

## 2022-10-18 DIAGNOSIS — J90 PLEURAL EFFUSION: ICD-10-CM

## 2022-10-18 DIAGNOSIS — J96.01 ACUTE RESPIRATORY FAILURE WITH HYPOXIA (HCC): ICD-10-CM

## 2022-10-18 DIAGNOSIS — F10.10 ALCOHOL ABUSE: ICD-10-CM

## 2022-10-18 DIAGNOSIS — F10.931 ALCOHOL WITHDRAWAL SYNDROME, WITH DELIRIUM (HCC): ICD-10-CM

## 2022-10-18 DIAGNOSIS — Q80.9 ICHTHYOSIS: ICD-10-CM

## 2022-10-18 DIAGNOSIS — E87.1 HYPONATREMIA: ICD-10-CM

## 2022-10-18 DIAGNOSIS — F10.939 ALCOHOL WITHDRAWAL SYNDROME WITH COMPLICATION (HCC): ICD-10-CM

## 2022-10-18 DIAGNOSIS — B86 SCABIES: ICD-10-CM

## 2022-10-18 DIAGNOSIS — J96.01 ACUTE RESPIRATORY FAILURE WITH HYPOXIA AND HYPERCAPNIA (HCC): ICD-10-CM

## 2022-10-18 DIAGNOSIS — R13.10 DYSPHAGIA, UNSPECIFIED TYPE: ICD-10-CM

## 2022-10-18 DIAGNOSIS — G93.41: ICD-10-CM

## 2022-10-18 DIAGNOSIS — Z78.9 IMPAIRED MOBILITY AND ACTIVITIES OF DAILY LIVING: ICD-10-CM

## 2022-10-18 DIAGNOSIS — D75.838 REACTIVE THROMBOCYTOSIS: ICD-10-CM

## 2022-10-18 DIAGNOSIS — J69.0 ASPIRATION PNEUMONIA OF BOTH LUNGS, UNSPECIFIED ASPIRATION PNEUMONIA TYPE, UNSPECIFIED PART OF LUNG (HCC): ICD-10-CM

## 2022-10-18 DIAGNOSIS — I48.19 PERSISTENT ATRIAL FIBRILLATION (HCC): ICD-10-CM

## 2022-10-18 PROBLEM — F10.239 ALCOHOL DEPENDENCE WITH WITHDRAWAL (HCC): Status: ACTIVE | Noted: 2022-10-18

## 2022-10-18 LAB
ALBUMIN SERPL BCP-MCNC: 3.2 G/DL (ref 3.2–4.9)
ALBUMIN/GLOB SERPL: 0.8 G/DL
ALP SERPL-CCNC: 121 U/L (ref 30–99)
ALT SERPL-CCNC: 19 U/L (ref 2–50)
ANION GAP SERPL CALC-SCNC: 16 MMOL/L (ref 7–16)
AST SERPL-CCNC: 28 U/L (ref 12–45)
BASE EXCESS BLDA CALC-SCNC: -5 MMOL/L (ref -4–3)
BASOPHILS # BLD AUTO: 2.1 % (ref 0–1.8)
BASOPHILS # BLD: 0.1 K/UL (ref 0–0.12)
BILIRUB SERPL-MCNC: 0.7 MG/DL (ref 0.1–1.5)
BODY TEMPERATURE: ABNORMAL DEGREES
BREATHS SETTING VENT: 18
BUN SERPL-MCNC: 8 MG/DL (ref 8–22)
CALCIUM SERPL-MCNC: 8.4 MG/DL (ref 8.4–10.2)
CHLORIDE SERPL-SCNC: 101 MMOL/L (ref 96–112)
CO2 BLDA-SCNC: 22 MMOL/L (ref 20–33)
CO2 SERPL-SCNC: 16 MMOL/L (ref 20–33)
CREAT SERPL-MCNC: 0.72 MG/DL (ref 0.5–1.4)
DELSYS IDSYS: ABNORMAL
EOSINOPHIL # BLD AUTO: 0.04 K/UL (ref 0–0.51)
EOSINOPHIL NFR BLD: 0.8 % (ref 0–6.9)
ERYTHROCYTE [DISTWIDTH] IN BLOOD BY AUTOMATED COUNT: 52.5 FL (ref 35.9–50)
ETHANOL BLD-MCNC: <10.1 MG/DL
GFR SERPLBLD CREATININE-BSD FMLA CKD-EPI: 107 ML/MIN/1.73 M 2
GLOBULIN SER CALC-MCNC: 4.2 G/DL (ref 1.9–3.5)
GLUCOSE SERPL-MCNC: 126 MG/DL (ref 65–99)
HCO3 BLDA-SCNC: 20.4 MMOL/L (ref 17–25)
HCT VFR BLD AUTO: 40.9 % (ref 42–52)
HGB BLD-MCNC: 13.9 G/DL (ref 14–18)
HOROWITZ INDEX BLDA+IHG-RTO: 307 MM[HG]
IMM GRANULOCYTES # BLD AUTO: 0.02 K/UL (ref 0–0.11)
IMM GRANULOCYTES NFR BLD AUTO: 0.4 % (ref 0–0.9)
LYMPHOCYTES # BLD AUTO: 0.34 K/UL (ref 1–4.8)
LYMPHOCYTES NFR BLD: 7.1 % (ref 22–41)
MCH RBC QN AUTO: 33.7 PG (ref 27–33)
MCHC RBC AUTO-ENTMCNC: 34 G/DL (ref 33.7–35.3)
MCV RBC AUTO: 99.3 FL (ref 81.4–97.8)
MODE IMODE: ABNORMAL
MONOCYTES # BLD AUTO: 0.18 K/UL (ref 0–0.85)
MONOCYTES NFR BLD AUTO: 3.8 % (ref 0–13.4)
NEUTROPHILS # BLD AUTO: 4.12 K/UL (ref 1.82–7.42)
NEUTROPHILS NFR BLD: 85.8 % (ref 44–72)
NRBC # BLD AUTO: 0 K/UL
NRBC BLD-RTO: 0 /100 WBC
NT-PROBNP SERPL IA-MCNC: 2214 PG/ML (ref 0–125)
O2/TOTAL GAS SETTING VFR VENT: 30 %
PCO2 BLDA: 37.4 MMHG (ref 26–37)
PEEP END EXPIRATORY PRESSURE IPEEP: 8 CMH20
PH BLDA: 7.34 [PH] (ref 7.4–7.5)
PH TEMP ADJ BLDA: 7.37 [PH] (ref 7.4–7.5)
PLATELET # BLD AUTO: 390 K/UL (ref 164–446)
PMV BLD AUTO: 10 FL (ref 9–12.9)
PO2 BLDA: 92 MMHG (ref 64–87)
PO2 TEMP ADJ BLDA: 85 MMHG (ref 64–87)
POTASSIUM SERPL-SCNC: 4.3 MMOL/L (ref 3.6–5.5)
PROT SERPL-MCNC: 7.4 G/DL (ref 6–8.2)
RBC # BLD AUTO: 4.12 M/UL (ref 4.7–6.1)
SAO2 % BLDA: 97 % (ref 93–99)
SODIUM SERPL-SCNC: 133 MMOL/L (ref 135–145)
SPECIMEN DRAWN FROM PATIENT: ABNORMAL
TIDAL VOLUME IVT: 500 ML
TROPONIN T SERPL-MCNC: 8 NG/L (ref 6–19)
WBC # BLD AUTO: 4.8 K/UL (ref 4.8–10.8)

## 2022-10-18 PROCEDURE — 80053 COMPREHEN METABOLIC PANEL: CPT

## 2022-10-18 PROCEDURE — 84478 ASSAY OF TRIGLYCERIDES: CPT

## 2022-10-18 PROCEDURE — 770022 HCHG ROOM/CARE - ICU (200)

## 2022-10-18 PROCEDURE — 94002 VENT MGMT INPAT INIT DAY: CPT

## 2022-10-18 PROCEDURE — 82077 ASSAY SPEC XCP UR&BREATH IA: CPT

## 2022-10-18 PROCEDURE — 31500 INSERT EMERGENCY AIRWAY: CPT

## 2022-10-18 PROCEDURE — 0BH17EZ INSERTION OF ENDOTRACHEAL AIRWAY INTO TRACHEA, VIA NATURAL OR ARTIFICIAL OPENING: ICD-10-PCS | Performed by: EMERGENCY MEDICINE

## 2022-10-18 PROCEDURE — 36600 WITHDRAWAL OF ARTERIAL BLOOD: CPT

## 2022-10-18 PROCEDURE — 700101 HCHG RX REV CODE 250: Performed by: HOSPITALIST

## 2022-10-18 PROCEDURE — HZ2ZZZZ DETOXIFICATION SERVICES FOR SUBSTANCE ABUSE TREATMENT: ICD-10-PCS | Performed by: HOSPITALIST

## 2022-10-18 PROCEDURE — 51702 INSERT TEMP BLADDER CATH: CPT

## 2022-10-18 PROCEDURE — 5A1945Z RESPIRATORY VENTILATION, 24-96 CONSECUTIVE HOURS: ICD-10-PCS | Performed by: EMERGENCY MEDICINE

## 2022-10-18 PROCEDURE — 700111 HCHG RX REV CODE 636 W/ 250 OVERRIDE (IP): Performed by: EMERGENCY MEDICINE

## 2022-10-18 PROCEDURE — 71045 X-RAY EXAM CHEST 1 VIEW: CPT

## 2022-10-18 PROCEDURE — 36415 COLL VENOUS BLD VENIPUNCTURE: CPT

## 2022-10-18 PROCEDURE — 700102 HCHG RX REV CODE 250 W/ 637 OVERRIDE(OP): Performed by: INTERNAL MEDICINE

## 2022-10-18 PROCEDURE — 93005 ELECTROCARDIOGRAM TRACING: CPT | Performed by: EMERGENCY MEDICINE

## 2022-10-18 PROCEDURE — A9270 NON-COVERED ITEM OR SERVICE: HCPCS | Performed by: EMERGENCY MEDICINE

## 2022-10-18 PROCEDURE — 700111 HCHG RX REV CODE 636 W/ 250 OVERRIDE (IP)

## 2022-10-18 PROCEDURE — 96376 TX/PRO/DX INJ SAME DRUG ADON: CPT

## 2022-10-18 PROCEDURE — 84484 ASSAY OF TROPONIN QUANT: CPT

## 2022-10-18 PROCEDURE — 85025 COMPLETE CBC W/AUTO DIFF WBC: CPT

## 2022-10-18 PROCEDURE — 700105 HCHG RX REV CODE 258: Performed by: HOSPITALIST

## 2022-10-18 PROCEDURE — 99284 EMERGENCY DEPT VISIT MOD MDM: CPT

## 2022-10-18 PROCEDURE — 700101 HCHG RX REV CODE 250: Performed by: EMERGENCY MEDICINE

## 2022-10-18 PROCEDURE — 700102 HCHG RX REV CODE 250 W/ 637 OVERRIDE(OP): Performed by: EMERGENCY MEDICINE

## 2022-10-18 PROCEDURE — 70450 CT HEAD/BRAIN W/O DYE: CPT

## 2022-10-18 PROCEDURE — 83880 ASSAY OF NATRIURETIC PEPTIDE: CPT

## 2022-10-18 PROCEDURE — 96375 TX/PRO/DX INJ NEW DRUG ADDON: CPT

## 2022-10-18 PROCEDURE — 96365 THER/PROPH/DIAG IV INF INIT: CPT

## 2022-10-18 PROCEDURE — C9803 HOPD COVID-19 SPEC COLLECT: HCPCS | Performed by: EMERGENCY MEDICINE

## 2022-10-18 PROCEDURE — 99223 1ST HOSP IP/OBS HIGH 75: CPT | Performed by: HOSPITALIST

## 2022-10-18 PROCEDURE — 82803 BLOOD GASES ANY COMBINATION: CPT

## 2022-10-18 PROCEDURE — 99214 OFFICE O/P EST MOD 30 MIN: CPT | Performed by: FAMILY MEDICINE

## 2022-10-18 PROCEDURE — A9270 NON-COVERED ITEM OR SERVICE: HCPCS | Performed by: INTERNAL MEDICINE

## 2022-10-18 PROCEDURE — 304538 HCHG NG TUBE

## 2022-10-18 PROCEDURE — 303105 HCHG CATHETER EXTRA

## 2022-10-18 PROCEDURE — 99291 CRITICAL CARE FIRST HOUR: CPT

## 2022-10-18 RX ORDER — AMLODIPINE BESYLATE 5 MG/1
10 TABLET ORAL DAILY
Status: DISCONTINUED | OUTPATIENT
Start: 2022-10-19 | End: 2022-10-26

## 2022-10-18 RX ORDER — LORAZEPAM 2 MG/ML
2 INJECTION INTRAMUSCULAR
Status: DISCONTINUED | OUTPATIENT
Start: 2022-10-18 | End: 2022-10-19

## 2022-10-18 RX ORDER — PHENOBARBITAL SODIUM 130 MG/ML
260 INJECTION, SOLUTION INTRAMUSCULAR; INTRAVENOUS ONCE
Status: COMPLETED | OUTPATIENT
Start: 2022-10-18 | End: 2022-10-18

## 2022-10-18 RX ORDER — DIAZEPAM 5 MG/1
5 TABLET ORAL ONCE
Status: COMPLETED | OUTPATIENT
Start: 2022-10-18 | End: 2022-10-18

## 2022-10-18 RX ORDER — BUDESONIDE AND FORMOTEROL FUMARATE DIHYDRATE 80; 4.5 UG/1; UG/1
2 AEROSOL RESPIRATORY (INHALATION) 2 TIMES DAILY
Status: DISCONTINUED | OUTPATIENT
Start: 2022-10-18 | End: 2022-10-18

## 2022-10-18 RX ORDER — OXYCODONE HYDROCHLORIDE 5 MG/1
5 TABLET ORAL
Status: DISCONTINUED | OUTPATIENT
Start: 2022-10-18 | End: 2022-10-22

## 2022-10-18 RX ORDER — LORAZEPAM 0.5 MG/1
0.5 TABLET ORAL EVERY 4 HOURS PRN
Status: DISCONTINUED | OUTPATIENT
Start: 2022-10-18 | End: 2022-10-18

## 2022-10-18 RX ORDER — PROPOFOL 10 MG/ML
80 INJECTION, EMULSION INTRAVENOUS ONCE
Status: COMPLETED | OUTPATIENT
Start: 2022-10-18 | End: 2022-10-18

## 2022-10-18 RX ORDER — LORAZEPAM 1 MG/1
4 TABLET ORAL
Status: DISCONTINUED | OUTPATIENT
Start: 2022-10-18 | End: 2022-10-19

## 2022-10-18 RX ORDER — PHENOBARBITAL SODIUM 130 MG/ML
130 INJECTION, SOLUTION INTRAMUSCULAR; INTRAVENOUS ONCE
Status: COMPLETED | OUTPATIENT
Start: 2022-10-18 | End: 2022-10-18

## 2022-10-18 RX ORDER — BISACODYL 10 MG
10 SUPPOSITORY, RECTAL RECTAL
Status: DISCONTINUED | OUTPATIENT
Start: 2022-10-18 | End: 2022-10-29

## 2022-10-18 RX ORDER — AMOXICILLIN 250 MG
2 CAPSULE ORAL 2 TIMES DAILY
Status: DISCONTINUED | OUTPATIENT
Start: 2022-10-18 | End: 2022-10-18

## 2022-10-18 RX ORDER — LORAZEPAM 0.5 MG/1
0.5 TABLET ORAL EVERY 4 HOURS PRN
Status: DISCONTINUED | OUTPATIENT
Start: 2022-10-18 | End: 2022-10-19

## 2022-10-18 RX ORDER — LORAZEPAM 1 MG/1
1 TABLET ORAL EVERY 4 HOURS PRN
Status: DISCONTINUED | OUTPATIENT
Start: 2022-10-18 | End: 2022-10-18

## 2022-10-18 RX ORDER — LORAZEPAM 1 MG/1
4 TABLET ORAL
Status: DISCONTINUED | OUTPATIENT
Start: 2022-10-18 | End: 2022-10-18

## 2022-10-18 RX ORDER — POLYETHYLENE GLYCOL 3350 17 G/17G
1 POWDER, FOR SOLUTION ORAL
Status: DISCONTINUED | OUTPATIENT
Start: 2022-10-18 | End: 2022-10-29

## 2022-10-18 RX ORDER — OXYCODONE HYDROCHLORIDE 5 MG/1
2.5 TABLET ORAL
Status: DISCONTINUED | OUTPATIENT
Start: 2022-10-18 | End: 2022-10-22

## 2022-10-18 RX ORDER — ALBUTEROL SULFATE 90 UG/1
2 AEROSOL, METERED RESPIRATORY (INHALATION) EVERY 6 HOURS PRN
Status: DISCONTINUED | OUTPATIENT
Start: 2022-10-18 | End: 2022-11-09

## 2022-10-18 RX ORDER — FOLIC ACID 1 MG/1
1 TABLET ORAL DAILY
Status: DISCONTINUED | OUTPATIENT
Start: 2022-10-19 | End: 2022-10-28

## 2022-10-18 RX ORDER — LORAZEPAM 2 MG/ML
2 INJECTION INTRAMUSCULAR
Status: DISCONTINUED | OUTPATIENT
Start: 2022-10-18 | End: 2022-10-18

## 2022-10-18 RX ORDER — POLYETHYLENE GLYCOL 3350 17 G/17G
1 POWDER, FOR SOLUTION ORAL
Status: DISCONTINUED | OUTPATIENT
Start: 2022-10-18 | End: 2022-10-18

## 2022-10-18 RX ORDER — SUCCINYLCHOLINE CHLORIDE 20 MG/ML
150 INJECTION INTRAMUSCULAR; INTRAVENOUS ONCE
Status: COMPLETED | OUTPATIENT
Start: 2022-10-18 | End: 2022-10-18

## 2022-10-18 RX ORDER — NYSTATIN 100000 U/G
CREAM TOPICAL 2 TIMES DAILY
Status: COMPLETED | OUTPATIENT
Start: 2022-10-18 | End: 2022-10-20

## 2022-10-18 RX ORDER — LORAZEPAM 1 MG/1
1 TABLET ORAL EVERY 4 HOURS PRN
Status: DISCONTINUED | OUTPATIENT
Start: 2022-10-18 | End: 2022-10-19

## 2022-10-18 RX ORDER — BISACODYL 10 MG
10 SUPPOSITORY, RECTAL RECTAL
Status: DISCONTINUED | OUTPATIENT
Start: 2022-10-18 | End: 2022-10-18

## 2022-10-18 RX ORDER — ACETAMINOPHEN 325 MG/1
650 TABLET ORAL EVERY 6 HOURS PRN
Status: DISCONTINUED | OUTPATIENT
Start: 2022-10-18 | End: 2022-10-18

## 2022-10-18 RX ORDER — ENOXAPARIN SODIUM 100 MG/ML
40 INJECTION SUBCUTANEOUS DAILY
Status: DISCONTINUED | OUTPATIENT
Start: 2022-10-19 | End: 2022-10-28

## 2022-10-18 RX ORDER — LORAZEPAM 1 MG/1
3 TABLET ORAL
Status: DISCONTINUED | OUTPATIENT
Start: 2022-10-18 | End: 2022-10-19

## 2022-10-18 RX ORDER — GAUZE BANDAGE 2" X 2"
100 BANDAGE TOPICAL DAILY
Status: DISCONTINUED | OUTPATIENT
Start: 2022-10-19 | End: 2022-10-26

## 2022-10-18 RX ORDER — M-VIT,TX,IRON,MINS/CALC/FOLIC 27MG-0.4MG
1 TABLET ORAL DAILY
Status: DISCONTINUED | OUTPATIENT
Start: 2022-10-19 | End: 2022-10-18

## 2022-10-18 RX ORDER — DEXMEDETOMIDINE HYDROCHLORIDE 4 UG/ML
.1-1.5 INJECTION, SOLUTION INTRAVENOUS CONTINUOUS
Status: DISCONTINUED | OUTPATIENT
Start: 2022-10-18 | End: 2022-10-18

## 2022-10-18 RX ORDER — M-VIT,TX,IRON,MINS/CALC/FOLIC 27MG-0.4MG
1 TABLET ORAL DAILY
Status: DISCONTINUED | OUTPATIENT
Start: 2022-10-19 | End: 2022-10-28

## 2022-10-18 RX ORDER — FOLIC ACID 1 MG/1
1 TABLET ORAL DAILY
Status: DISCONTINUED | OUTPATIENT
Start: 2022-10-19 | End: 2022-10-18

## 2022-10-18 RX ORDER — HYDROMORPHONE HYDROCHLORIDE 1 MG/ML
0.25 INJECTION, SOLUTION INTRAMUSCULAR; INTRAVENOUS; SUBCUTANEOUS
Status: DISCONTINUED | OUTPATIENT
Start: 2022-10-18 | End: 2022-10-22

## 2022-10-18 RX ORDER — GAUZE BANDAGE 2" X 2"
100 BANDAGE TOPICAL DAILY
Status: DISCONTINUED | OUTPATIENT
Start: 2022-10-19 | End: 2022-10-18

## 2022-10-18 RX ORDER — ACETAMINOPHEN 325 MG/1
650 TABLET ORAL EVERY 6 HOURS PRN
Status: DISCONTINUED | OUTPATIENT
Start: 2022-10-18 | End: 2022-11-06

## 2022-10-18 RX ORDER — PHENOBARBITAL SODIUM 130 MG/ML
200 INJECTION, SOLUTION INTRAMUSCULAR; INTRAVENOUS ONCE
Status: DISCONTINUED | OUTPATIENT
Start: 2022-10-18 | End: 2022-10-18

## 2022-10-18 RX ORDER — LEVOTHYROXINE SODIUM 0.05 MG/1
50 TABLET ORAL
Status: DISCONTINUED | OUTPATIENT
Start: 2022-10-19 | End: 2022-11-29 | Stop reason: HOSPADM

## 2022-10-18 RX ORDER — AMLODIPINE BESYLATE 5 MG/1
10 TABLET ORAL DAILY
Status: DISCONTINUED | OUTPATIENT
Start: 2022-10-19 | End: 2022-10-18

## 2022-10-18 RX ORDER — AMOXICILLIN 250 MG
2 CAPSULE ORAL 2 TIMES DAILY
Status: DISCONTINUED | OUTPATIENT
Start: 2022-10-19 | End: 2022-10-29

## 2022-10-18 RX ORDER — PREDNISONE 20 MG/1
60 TABLET ORAL ONCE
Status: COMPLETED | OUTPATIENT
Start: 2022-10-18 | End: 2022-10-18

## 2022-10-18 RX ORDER — METOPROLOL SUCCINATE 25 MG/1
25 TABLET, EXTENDED RELEASE ORAL DAILY
Status: DISCONTINUED | OUTPATIENT
Start: 2022-10-19 | End: 2022-10-18

## 2022-10-18 RX ORDER — LORAZEPAM 1 MG/1
2 TABLET ORAL
Status: DISCONTINUED | OUTPATIENT
Start: 2022-10-18 | End: 2022-10-18

## 2022-10-18 RX ORDER — LORAZEPAM 1 MG/1
2 TABLET ORAL
Status: DISCONTINUED | OUTPATIENT
Start: 2022-10-18 | End: 2022-10-19

## 2022-10-18 RX ORDER — LEVOTHYROXINE SODIUM 0.05 MG/1
50 TABLET ORAL
Status: DISCONTINUED | OUTPATIENT
Start: 2022-10-19 | End: 2022-10-18

## 2022-10-18 RX ORDER — SODIUM CHLORIDE, SODIUM LACTATE, POTASSIUM CHLORIDE, CALCIUM CHLORIDE 600; 310; 30; 20 MG/100ML; MG/100ML; MG/100ML; MG/100ML
INJECTION, SOLUTION INTRAVENOUS CONTINUOUS
Status: DISCONTINUED | OUTPATIENT
Start: 2022-10-18 | End: 2022-10-19

## 2022-10-18 RX ORDER — LORAZEPAM 1 MG/1
3 TABLET ORAL
Status: DISCONTINUED | OUTPATIENT
Start: 2022-10-18 | End: 2022-10-18

## 2022-10-18 RX ORDER — PREDNISONE 10 MG/1
TABLET ORAL
Qty: 42 TABLET | Refills: 0 | Status: ON HOLD | OUTPATIENT
Start: 2022-10-18 | End: 2022-11-26

## 2022-10-18 RX ADMIN — PROPOFOL 50 MCG/KG/MIN: 10 INJECTION, EMULSION INTRAVENOUS at 23:47

## 2022-10-18 RX ADMIN — DIAZEPAM 5 MG: 5 TABLET ORAL at 11:25

## 2022-10-18 RX ADMIN — PHENOBARBITAL SODIUM 130 MG: 130 INJECTION INTRAMUSCULAR at 21:58

## 2022-10-18 RX ADMIN — NYSTATIN: 100000 CREAM TOPICAL at 23:47

## 2022-10-18 RX ADMIN — LORAZEPAM 2 MG: 2 INJECTION INTRAMUSCULAR; INTRAVENOUS at 18:20

## 2022-10-18 RX ADMIN — LORAZEPAM 2 MG: 2 INJECTION INTRAMUSCULAR; INTRAVENOUS at 19:01

## 2022-10-18 RX ADMIN — PHENOBARBITAL SODIUM 260 MG: 130 INJECTION INTRAMUSCULAR at 19:43

## 2022-10-18 RX ADMIN — LORAZEPAM 2 MG: 2 INJECTION INTRAMUSCULAR; INTRAVENOUS at 17:55

## 2022-10-18 RX ADMIN — LORAZEPAM 2 MG: 2 INJECTION INTRAMUSCULAR; INTRAVENOUS at 19:21

## 2022-10-18 RX ADMIN — LORAZEPAM 2 MG: 2 INJECTION INTRAMUSCULAR; INTRAVENOUS at 18:38

## 2022-10-18 RX ADMIN — SODIUM CHLORIDE, POTASSIUM CHLORIDE, SODIUM LACTATE AND CALCIUM CHLORIDE: 600; 310; 30; 20 INJECTION, SOLUTION INTRAVENOUS at 22:50

## 2022-10-18 RX ADMIN — THIAMINE HYDROCHLORIDE: 100 INJECTION, SOLUTION INTRAMUSCULAR; INTRAVENOUS at 17:59

## 2022-10-18 RX ADMIN — PREDNISONE 60 MG: 20 TABLET ORAL at 11:24

## 2022-10-18 RX ADMIN — PHENOBARBITAL SODIUM 260 MG: 130 INJECTION INTRAMUSCULAR at 20:16

## 2022-10-18 RX ADMIN — PROPOFOL 80 MG: 10 INJECTION, EMULSION INTRAVENOUS at 21:11

## 2022-10-18 RX ADMIN — PROPOFOL 40 MCG/KG/MIN: 10 INJECTION, EMULSION INTRAVENOUS at 21:15

## 2022-10-18 RX ADMIN — SUCCINYLCHOLINE CHLORIDE 150 MG: 20 INJECTION, SOLUTION INTRAMUSCULAR; INTRAVENOUS; PARENTERAL at 21:12

## 2022-10-18 ASSESSMENT — LIFESTYLE VARIABLES
HEADACHE, FULLNESS IN HEAD: VERY MILD
TOTAL SCORE: 4
ANXIETY: *
TOTAL SCORE: 4
HAVE YOU EVER FELT YOU SHOULD CUT DOWN ON YOUR DRINKING: YES
HEADACHE, FULLNESS IN HEAD: VERY MILD
TREMOR: SEVERE TREMOR, EVEN WITH ARMS NOT EXTENDED
ORIENTATION AND CLOUDING OF SENSORIUM: ORIENTED AND CAN DO SERIAL ADDITIONS
TOTAL SCORE: 4
VISUAL DISTURBANCES: MODERATELY SEVERE HALLUCINATIONS
DO YOU DRINK ALCOHOL: YES
ON A TYPICAL DAY WHEN YOU DRINK ALCOHOL HOW MANY DRINKS DO YOU HAVE: 6
AGITATION: MODERATELY FIDGETY AND RESTLESS
NAUSEA AND VOMITING: MILD NAUSEA WITH NO VOMITING
ANXIETY: MODERATELY ANXIOUS OR GUARDED, SO ANXIETY IS INFERRED
HAVE PEOPLE ANNOYED YOU BY CRITICIZING YOUR DRINKING: YES
DOES PATIENT WANT TO STOP DRINKING: YES
TOTAL SCORE: 25
NAUSEA AND VOMITING: MILD NAUSEA WITH NO VOMITING
PAROXYSMAL SWEATS: NO SWEAT VISIBLE
AVERAGE NUMBER OF DAYS PER WEEK YOU HAVE A DRINK CONTAINING ALCOHOL: 7
TOTAL SCORE: MODERATE ITCHING, PINS AND NEEDLES SENSATION, BURNING OR NUMBNESS
AUDITORY DISTURBANCES: NOT PRESENT
CONSUMPTION TOTAL: POSITIVE
HEADACHE, FULLNESS IN HEAD: NOT PRESENT
AUDITORY DISTURBANCES: NOT PRESENT
ORIENTATION AND CLOUDING OF SENSORIUM: ORIENTED AND CAN DO SERIAL ADDITIONS
VISUAL DISTURBANCES: MODERATELY SEVERE HALLUCINATIONS
TOTAL SCORE: MODERATE ITCHING, PINS AND NEEDLES SENSATION, BURNING OR NUMBNESS
TOTAL SCORE: 26
ANXIETY: MODERATELY ANXIOUS OR GUARDED, SO ANXIETY IS INFERRED
AUDITORY DISTURBANCES: NOT PRESENT
TOTAL SCORE: 23
TREMOR: SEVERE TREMOR, EVEN WITH ARMS NOT EXTENDED
AGITATION: *
EVER HAD A DRINK FIRST THING IN THE MORNING TO STEADY YOUR NERVES TO GET RID OF A HANGOVER: YES
PAROXYSMAL SWEATS: NO SWEAT VISIBLE
PAROXYSMAL SWEATS: NO SWEAT VISIBLE
VISUAL DISTURBANCES: MODERATELY SEVERE HALLUCINATIONS
TOTAL SCORE: MODERATE ITCHING, PINS AND NEEDLES SENSATION, BURNING OR NUMBNESS
EVER FELT BAD OR GUILTY ABOUT YOUR DRINKING: YES
DO YOU DRINK ALCOHOL: YES
AGITATION: *
DOES PATIENT WANT TO TALK TO SOMEONE ABOUT QUITTING: YES
TREMOR: SEVERE TREMOR, EVEN WITH ARMS NOT EXTENDED
ORIENTATION AND CLOUDING OF SENSORIUM: ORIENTED AND CAN DO SERIAL ADDITIONS
HOW MANY TIMES IN THE PAST YEAR HAVE YOU HAD 5 OR MORE DRINKS IN A DAY: 365
NAUSEA AND VOMITING: MILD NAUSEA WITH NO VOMITING

## 2022-10-18 ASSESSMENT — FIBROSIS 4 INDEX
FIB4 SCORE: 0.93

## 2022-10-18 NOTE — DISCHARGE PLANNING
Pt requesting alcohol detox. Contacted [facility], they have beds available. Pt was medicated with Valium 5mg at 1125. Family to transport. [facility] resources provided.

## 2022-10-18 NOTE — ASSESSMENT & PLAN NOTE
Pt presents today detoxing  He cut back over the last week and has been having symptoms on and off for the last week  Last drink was 4:30pm yesterday - 1/2 pint of vodka.  Typically was drinking a pint of vodka and a few beers  No chest pain  No fevers  Has never had seizures but has withdrawn in the hospital before

## 2022-10-18 NOTE — ED TRIAGE NOTES
"57 yr old male to triage  Chief Complaint   Patient presents with    Medical Clearance     Patient was seen at [facility] for detox from alcohol last drink today.  Patient did attempt to check in at [facility] and was turned away because patient told the staff he was short of breath and has not been taking his medication for his atrial fibrillation.  Patient speaks in full sentences.  No increase work of breathing.  Patient does have eczema and was prescribed prednisone for it.  Patient was given valium and prednisone at [facility].  I spoke with Sharad NOLEN at [facility] and patient needs to be medical cleared     Patient not sure why he needs to take the medication.      BP (!) 145/90   Pulse (!) 103   Temp 36.1 °C (97 °F) (Temporal)   Resp 20   Ht 1.778 m (5' 10\")   Wt 86.3 kg (190 lb 4.1 oz)   BMI 27.30 kg/m²     "

## 2022-10-18 NOTE — ED TRIAGE NOTES
.  Chief Complaint   Patient presents with    ETOH Withdrawal     Decreased the last 4 days. Last drink 1/2pint vodka yesterday evening    Sent by MD     Pt evaluated by pcp this morning sent for detox. Has hx a fib and not taking medications x 3 days. Pt does report taking blood thinner but none charted in mar.   Pt tremulous at triage. Tachycardic. Daughter with pt .

## 2022-10-18 NOTE — ED PROVIDER NOTES
ED Provider Note    CHIEF COMPLAINT  Chief Complaint   Patient presents with    ETOH Withdrawal     Decreased the last 4 days. Last drink 1/2pint vodka yesterday evening    Sent by MD PRASAD  Tyree Whiteside is a 57 y.o. male who presents to the emergency department seeking treatment for alcohol detox.  The patient saw his primary care provider who referred the patient to the emergency department to discuss alcohol treatment options.  Patient is feeling somewhat tremulous.  Last drink was yesterday.  He has not ever had any alcohol withdrawal seizures.  No vomiting.  Patient also has a separate complaint of diffuse rash consistent with his history of eczema.  Denies any fevers.    REVIEW OF SYSTEMS  See HPI for further details. All other systems are negative.     PAST MEDICAL HISTORY  Past Medical History:   Diagnosis Date    A-fib (HCC)     ASTHMA     Eczema     Hypertension        FAMILY HISTORY  Family History   Problem Relation Age of Onset    Heart Disease Father     Stroke Father     Hypertension Brother     Cancer Brother         has 12 sibs, one brother had an unknown type of ca       SOCIAL HISTORY  Social History     Socioeconomic History    Marital status:    Tobacco Use    Smoking status: Every Day     Packs/day: 0.33     Years: 25.00     Pack years: 8.25     Types: Cigarettes    Smokeless tobacco: Never   Vaping Use    Vaping Use: Never used   Substance and Sexual Activity    Alcohol use: Yes     Comment: 4 drinks daily    Drug use: Never     Comment: Hx of methamphetamin, marijuana    Sexual activity: Yes       SURGICAL HISTORY  History reviewed. No pertinent surgical history.    CURRENT MEDICATIONS  Home Medications       Reviewed by Malka Aden R.N. (Registered Nurse) on 10/18/22 at 0921  Med List Status: Not Addressed     Medication Last Dose Status   albuterol 108 (90 Base) MCG/ACT Aero Soln inhalation aerosol  Active   amLODIPine (NORVASC) 10 MG Tab  Active  "  budesonide-formoterol (SYMBICORT) 80-4.5 MCG/ACT Aerosol  Active   levothyroxine (SYNTHROID) 50 MCG Tab  Active   metoprolol SR (TOPROL XL) 25 MG TABLET SR 24 HR  Active   Misc. Devices Misc  Active   nicotine polacrilex (NICORETTE) 2 MG Gum  Active   Spacer/Aero-Holding Chambers (OPTICHAMBER MARK-MD MASK) Misc  Active   triamcinolone acetonide (KENALOG) 0.1 % Cream  Active   varenicline (CHANTIX) 0.5 MG tablet  Active   varenicline (CHANTIX) 1 MG tablet  Active                    ALLERGIES  No Known Allergies    PHYSICAL EXAM  VITAL SIGNS: BP (!) 151/85   Pulse 94   Temp 36.2 °C (97.1 °F) (Temporal)   Resp 16   Ht 1.778 m (5' 10\")   Wt 86.2 kg (190 lb)   SpO2 100%   BMI 27.26 kg/m²   Constitutional: Well developed, Well nourished, somewhat chronically ill-appearing.  Diffuse eczema noted.  Mild tremor.  Pleasant, interactive.  HENT: Normocephalic, Atraumatic, Bilateral external ears normal, Oropharynx moist, No oral exudates, Nose normal.   Eyes: PERRLA, EOMI, Conjunctiva normal, No discharge.   Neck: Normal range of motion, No tenderness, Supple, No stridor.   Lymphatic: No lymphadenopathy noted.   Cardiovascular: Normal heart rate, Normal rhythm, No murmurs, No rubs, No gallops.   Thorax & Lungs: Normal breath sounds, No respiratory distress, No wheezing, No chest tenderness.   Abdomen: Bowel sounds normal, Soft, No tenderness, No masses, No pulsatile masses.   Skin: Warm, Dry, moderate to severe diffuse eczematous changes without evidence of secondary bacterial infection.  Back: No tenderness, No CVA tenderness.   Extremities: Intact distal pulses, No edema, No tenderness, No cyanosis, No clubbing.   Musculoskeletal: Good range of motion in all major joints. No tenderness to palpation or major deformities noted.   Neurologic: Alert & oriented x 3, Normal motor function, Normal sensory function, No focal deficits noted.   Psychiatric: Somewhat depressed mood and flat of affect with occasional " brightening.  No suicidal or homicidal ideation.    EKG      RADIOLOGY/PROCEDURES      COURSE & MEDICAL DECISION MAKING  Pertinent Labs & Imaging studies reviewed. (See chart for details)    Patient presents today with a history of alcoholism requesting alcohol detox.  He has evidence of a mild alcohol withdrawal syndrome at this time.  To be treated with oral Ativan.  I will have the patient seen by her mental health team to help with detox options.  He does not meet admission criteria.  Incidentally the patient also has diffuse eczema.  Has a long history of similar.  No evidence of secondary bacterial infection and I feel would benefit from antibiotics but I will give the patient a dose of prednisone in the emergency department and prescribe him a 12-day tapering course of prednisone.    Patient will be seen by the alert team and peer recovery.  After their evaluation patient will be discharged as per plan devised with these resources.    The patient will return for new or worsening symptoms and is stable at the time of discharge.    The patient is referred to a primary physician for blood pressure management, diabetic screening, and for all other preventative health concerns.    DISPOSITION:  Patient will be discharged home in stable condition.    FOLLOW UP:  Deb Clayton M.D.  [address] [place] [id number] [phone]    Schedule an appointment as soon as possible for a visit       [facility], Emergency Dept  [address] [facility] [address] [phone]    If symptoms worsen      OUTPATIENT MEDICATIONS:  New Prescriptions    PREDNISONE (DELTASONE) 10 MG TAB    Day 1 take 6 tablets, Day 2 take 6 tablets, Day 3 take 5 tablets, Day 4 take 5 tablets, Day 5 take 4 tablets, Day 6 take 4 tablets, Day 7 take 3 tablets, Day 8 take 3 tablets, Day 9 take 2 tablets, Day 10 take 2 tablets, Day 11 take 1 tablet, Day 12 take 1 tablet         FINAL IMPRESSION  1. Alcohol withdrawal  syndrome with complication (HCC)    2. Alcohol abuse    3. Eczema, unspecified type               Electronically signed by: Ariel Story M.D., 10/18/2022 11:14 AM

## 2022-10-18 NOTE — PROGRESS NOTES
Subjective:     CC:   Chief Complaint   Patient presents with    Detox     Pt detoxing from alcohol x 4 days         HPI:     History of alcohol abuse  Pt presents today detoxing  He cut back over the last week and has been having symptoms on and off for the last week  Last drink was 4:30pm yesterday - 1/2 pint of vodka.  Typically was drinking a pint of vodka and a few beers  No chest pain  No fevers  Has never had seizures but has withdrawn in the hospital before    Liver mass  MRI has not been completed      Rash  Derm appt scheduled 11/4/22    Past Medical History:   Diagnosis Date    A-fib (HCC)     ASTHMA     Eczema     Hypertension        Social History     Tobacco Use    Smoking status: Every Day     Packs/day: 0.33     Years: 25.00     Pack years: 8.25     Types: Cigarettes    Smokeless tobacco: Never   Vaping Use    Vaping Use: Never used   Substance Use Topics    Alcohol use: Yes     Comment: 4 drinks daily    Drug use: Not Currently     Types: Marijuana, Methamphetamines       Current Outpatient Medications Ordered in Epic   Medication Sig Dispense Refill    metoprolol SR (TOPROL XL) 25 MG TABLET SR 24 HR TAKE 1 TABLET BY MOUTH EVERY DAY. INDICATIONS: ATRIAL FIBRILLATION 30 Tablet 5    varenicline (CHANTIX) 0.5 MG tablet Days 1 to 3 - 1 tab once daily. Days 4 to 7 -  1 tab twice daily. 11 Tablet 0    varenicline (CHANTIX) 1 MG tablet Ramp up with 0.5mg tabs for the first week. Thereafter take 1 tab (1mg) twice a day. 60 Tablet 2    nicotine polacrilex (NICORETTE) 2 MG Gum Take 1 Each by mouth as needed for Smoking Cessation. 220 Each 1    budesonide-formoterol (SYMBICORT) 80-4.5 MCG/ACT Aerosol Inhale 2 Puffs 2 times a day. 10.2 g 3    levothyroxine (SYNTHROID) 50 MCG Tab Take 1 Tablet by mouth every morning on an empty stomach. 30 Tablet 2    Spacer/Aero-Holding Chambers (OPTICHAMBER MARK-MD MASK) Misc 1 EACH ONE TIME FOR 1 DOSE. (Patient not taking: No sig reported)      amLODIPine (NORVASC) 10 MG  "Tab Take 1 Tablet by mouth every day. 90 Tablet 0    Misc. Devices Misc Blood pressure cuff and machine 1 Each 0    albuterol 108 (90 Base) MCG/ACT Aero Soln inhalation aerosol Inhale 2 Puffs every 6 hours as needed for Shortness of Breath (cough). 18 g 0    triamcinolone acetonide (KENALOG) 0.1 % Cream Apply 1 Application topically 2 times a day. Mix with lotion and apply to entire body excluding face and genitalia. 454 g 0     No current Epic-ordered facility-administered medications on file.       Allergies:  Patient has no known allergies.        Objective:       Exam:  BP (!) 142/80   Pulse (!) 108   Temp 36.3 °C (97.3 °F)   Resp 20   Ht 1.778 m (5' 10\")   Wt 86.3 kg (190 lb 4.8 oz)   SpO2 99%   BMI 27.31 kg/m²  Body mass index is 27.31 kg/m².    General: diffuse generalized rash. shaking  Cardiovascular: irregularly irregular rate and rhythm  Skin: crusting skin lesions over the hands, flaking over the scalp and face  Psych: anxious, poor judgment, active detox      Labs:   Abnormal TSH, INR  Pathology - consistent with eczema    Assessment & Plan:     57 y.o. male with the following -     1. History of alcohol abuse  Patient is actively detoxing today, and given his history of atrial fibrillation, rapid heart rate today, I did advise him to present to the emergency room for inpatient detox with cardiac monitoring.  He seems quite resistant to going, he stated that he might be able to get some paid work tomorrow. He also admits finding it hard to give up alcohol fully, although he states that he acknowledges that it is ruining his life.   After significant discussion he will present to the ER for active detox, eval of the heart for afib with [facility]. His daughter will take him today. F/u next week.     2. Liver mass  Results were given to patient but Mri was never completed  Will discuss next week. Daughter was notified  Also needs f/u US of axillae    3. Rash  Has dermatology f/u      No follow-ups on " file.    Please note that this dictation was created using voice recognition software. I have made every reasonable attempt to correct obvious errors, but I expect that there are errors of grammar and possibly content that I did not discover before finalizing the note.

## 2022-10-18 NOTE — ASSESSMENT & PLAN NOTE
Derm appt scheduled 11/4/22   [address]  Urgent Care Encounter       CHIEF COMPLAINT       Chief Complaint   Patient presents with    Cough     x 1 month productive green    Facial Pain     x 1 month    Otalgia       Nurses Notes reviewed and I agree except as noted in the HPI. HISTORY OF PRESENT ILLNESS   Shiloh Noriega is a 69 y.o. female who presents with complaints of cough, sinus congestion and facial pain for the past 1 month. She also reports bilateral intermittent otalgia. She has been more tired than usual.  She reports green nasal discharge. She has sore throat and PND as well as intermittent headaches. Denies fever, chills, nausea, vomiting diarrhea. She started using Flonase 4 days ago. The history is provided by the patient. REVIEW OF SYSTEMS     Review of Systems   Constitutional: Positive for fatigue. Negative for appetite change, chills and fever. HENT: Positive for congestion, ear pain, postnasal drip, sinus pressure, sinus pain and sore throat. Respiratory: Positive for cough. Negative for shortness of breath. Cardiovascular: Negative for chest pain. Gastrointestinal: Negative for diarrhea, nausea and vomiting. Musculoskeletal: Negative for myalgias. Skin: Negative for rash. Neurological: Positive for headaches. PAST MEDICAL HISTORY         Diagnosis Date    Cancer ([facility] [address]     [address], [place]    Hyperlipidemia     Hypertension        SURGICALHISTORY     Patient  has a past surgical history that includes Hysterectomy and Chest Wall Resection (Right, 2/10/2020).     CURRENT MEDICATIONS       Discharge Medication List as of 9/7/2021  9:24 AM      CONTINUE these medications which have NOT CHANGED    Details   sertraline (ZOLOFT) 50 MG tablet Take 50 mg by mouth daily Historical Med      Potassium 99 MG TABS Take 1 tablet by mouth dailyHistorical Med      Calcium Carbonate-Vitamin D (CALCIUM 600+D HIGH POTENCY PO) Take 1 tablet by mouth dailyHistorical Med acetaminophen (APAP EXTRA STRENGTH) 500 MG tablet Take 2 tablets by mouth every 6 hours as needed for Pain, Disp-120 tablet, R-3      ferrous sulfate 325 (65 FE) MG tablet Take 325 mg by mouth daily (with breakfast). Multiple Vitamin (MULTI-VITAMIN PO) Take  by mouth. simvastatin (ZOCOR) 20 MG tablet Take 20 mg by mouth nightly Stopped last week because of muscle weakness      lisinopril (PRINIVIL;ZESTRIL) 10 MG tablet Take 10 mg by mouth daily. etodolac (LODINE) 400 MG tablet Take 1 tablet by mouth 2 times daily, Disp-60 tablet, R-3Print             ALLERGIES     Patient is is allergic to lipitor, oxycodone-acetaminophen, and tylox [oxycodone-acetaminophen]. Patients   There is no immunization history on file for this patient. FAMILY HISTORY     Patient's family history includes Heart Disease in her brother and mother. SOCIAL HISTORY     Patient  reports that she has never smoked. She has never used smokeless tobacco. She reports current alcohol use of about 1.0 standard drinks of alcohol per week. She reports that she does not use drugs. PHYSICAL EXAM     ED TRIAGE VITALS  BP: (!) 188/83, Temp: 96.4 °F (35.8 °C), Pulse: 84, Resp: 16, SpO2: 96 %,Estimated body mass index is 31.55 kg/m² as calculated from the following:    Height as of this encounter: 5' 1\" (1.549 m). Weight as of this encounter: 167 lb (75.8 kg). ,No LMP recorded. Patient has had a hysterectomy. Physical Exam  Vitals and nursing note reviewed. Constitutional:       General: She is not in acute distress. Appearance: She is well-developed. She is not ill-appearing. HENT:      Head: Normocephalic and atraumatic. Right Ear: Tympanic membrane and ear canal normal.      Left Ear: Tympanic membrane and ear canal normal.      Nose: Congestion present. Right Sinus: Maxillary sinus tenderness and frontal sinus tenderness present.       Left Sinus: Maxillary sinus tenderness and frontal sinus tenderness present. Mouth/Throat:      Lips: Pink. Mouth: Mucous membranes are moist.      Pharynx: Oropharynx is clear. Uvula midline. Posterior oropharyngeal erythema (Cobblestoning) present. No pharyngeal swelling, oropharyngeal exudate or uvula swelling. Eyes:      General: Lids are normal. No scleral icterus. Conjunctiva/sclera:      Right eye: Right conjunctiva is not injected. Left eye: Left conjunctiva is not injected. Pupils: Pupils are equal.   Cardiovascular:      Rate and Rhythm: Normal rate and regular rhythm. Heart sounds: Normal heart sounds, S1 normal and S2 normal.   Pulmonary:      Effort: Pulmonary effort is normal. No respiratory distress. Breath sounds: Normal breath sounds. Musculoskeletal:      Comments: Normal active ROM x 4 extremities  Gait steady   Lymphadenopathy:      Comments: No head or neck adenopathy   Skin:     General: Skin is warm and dry. Findings: No rash (to exposed areas of skin). Nails: There is no clubbing. Neurological:      General: No focal deficit present. Mental Status: She is alert and oriented to person, place, and time. Sensory: No sensory deficit. Comments: Answers questions readily and appropriately   Psychiatric:         Mood and Affect: Mood normal.         Speech: Speech normal.         Behavior: Behavior normal. Behavior is cooperative. DIAGNOSTIC RESULTS     Labs:No results found for this visit on 09/07/21. IMAGING:    No orders to display         EKG:      URGENT CARE COURSE:     Vitals:    09/07/21 0843   BP: (!) 188/83   Pulse: 84   Resp: 16   Temp: 96.4 °F (35.8 °C)   TempSrc: Temporal   SpO2: 96%   Weight: 167 lb (75.8 kg)   Height: 5' 1\" (1.549 m)       Medications - No data to display         PROCEDURES:  None    FINAL IMPRESSION      1. Acute bacterial sinusitis          DISPOSITION/ PLAN     Patient presents with acute bacterial sinusitis.   She will be treated with Augmentin and prednisone. She can use over-the-counter medication as desired for symptom management. Continue Flonase as desired. Follow-up with family doctor in 1 week if symptoms have not improved. Further instructions were outlined verbally and in the patient's discharge instructions. All the patient's questions were answered. The patient/parent agreed with the plan and was discharged from the [facility] in good condition.       PATIENT REFERRED TO:  MD Ladi Schmid [address] / [place] [facility] [id number]      DISCHARGE MEDICATIONS:  Discharge Medication List as of 9/7/2021  9:24 AM      START taking these medications    Details   amoxicillin-clavulanate (AUGMENTIN) 875-125 MG per tablet Take 1 tablet by mouth 2 times daily for 7 days, Disp-14 tablet, R-0Normal      predniSONE (DELTASONE) 20 MG tablet Take 2 tablets by mouth daily for 5 days, Disp-10 tablet, R-0Normal             Discharge Medication List as of 9/7/2021  9:24 AM          Discharge Medication List as of 9/7/2021  9:24 AM          VASU Issa CNP    (Please note that portions of this note were completed with a voice recognition program. Efforts were made to edit the dictations but occasionally words are mis-transcribed.)         VASU Issa CNP  09/08/21 2228       VASU Issa CNP  09/08/21 2230

## 2022-10-19 PROBLEM — Z99.11 ON MECHANICALLY ASSISTED VENTILATION (HCC): Status: ACTIVE | Noted: 2022-10-19

## 2022-10-19 PROBLEM — B86 SCABIES: Status: ACTIVE | Noted: 2022-10-19

## 2022-10-19 LAB
ALBUMIN SERPL BCP-MCNC: 2.7 G/DL (ref 3.2–4.9)
ALBUMIN/GLOB SERPL: 0.8 G/DL
ALP SERPL-CCNC: 95 U/L (ref 30–99)
ALT SERPL-CCNC: 16 U/L (ref 2–50)
ANION GAP SERPL CALC-SCNC: 12 MMOL/L (ref 7–16)
AST SERPL-CCNC: 19 U/L (ref 12–45)
BASE EXCESS BLDA CALC-SCNC: -1 MMOL/L (ref -4–3)
BILIRUB SERPL-MCNC: 0.5 MG/DL (ref 0.1–1.5)
BODY TEMPERATURE: ABNORMAL DEGREES
BUN SERPL-MCNC: 8 MG/DL (ref 8–22)
CALCIUM SERPL-MCNC: 8.1 MG/DL (ref 8.4–10.2)
CHLORIDE SERPL-SCNC: 108 MMOL/L (ref 96–112)
CO2 BLDA-SCNC: 24 MMOL/L (ref 20–33)
CO2 SERPL-SCNC: 19 MMOL/L (ref 20–33)
CORTIS SERPL-MCNC: 1.4 UG/DL (ref 0–23)
CREAT SERPL-MCNC: 0.65 MG/DL (ref 0.5–1.4)
EKG IMPRESSION: NORMAL
ERYTHROCYTE [DISTWIDTH] IN BLOOD BY AUTOMATED COUNT: 54.8 FL (ref 35.9–50)
GFR SERPLBLD CREATININE-BSD FMLA CKD-EPI: 111 ML/MIN/1.73 M 2
GLOBULIN SER CALC-MCNC: 3.4 G/DL (ref 1.9–3.5)
GLUCOSE SERPL-MCNC: 128 MG/DL (ref 65–99)
HCO3 BLDA-SCNC: 23 MMOL/L (ref 17–25)
HCT VFR BLD AUTO: 37.5 % (ref 42–52)
HGB BLD-MCNC: 12.5 G/DL (ref 14–18)
LACTATE BLD-SCNC: 0.9 MMOL/L (ref 0.5–2)
MAGNESIUM SERPL-MCNC: 1.6 MG/DL (ref 1.5–2.5)
MCH RBC QN AUTO: 34.2 PG (ref 27–33)
MCHC RBC AUTO-ENTMCNC: 33.3 G/DL (ref 33.7–35.3)
MCV RBC AUTO: 102.5 FL (ref 81.4–97.8)
PCO2 BLDA: 36 MMHG (ref 26–37)
PH BLDA: 7.41 [PH] (ref 7.4–7.5)
PHOSPHATE SERPL-MCNC: 4.8 MG/DL (ref 2.5–4.5)
PLATELET # BLD AUTO: 320 K/UL (ref 164–446)
PMV BLD AUTO: 10.2 FL (ref 9–12.9)
PO2 BLDA: 61 MMHG (ref 64–87)
POTASSIUM SERPL-SCNC: 3.6 MMOL/L (ref 3.6–5.5)
PROT SERPL-MCNC: 6.1 G/DL (ref 6–8.2)
RBC # BLD AUTO: 3.66 M/UL (ref 4.7–6.1)
SAO2 % BLDA: 92 % (ref 93–99)
SODIUM SERPL-SCNC: 139 MMOL/L (ref 135–145)
SPECIMEN DRAWN FROM PATIENT: ABNORMAL
TRIGL SERPL-MCNC: 98 MG/DL (ref 0–149)
WBC # BLD AUTO: 3.9 K/UL (ref 4.8–10.8)

## 2022-10-19 PROCEDURE — A9270 NON-COVERED ITEM OR SERVICE: HCPCS | Performed by: HOSPITALIST

## 2022-10-19 PROCEDURE — 770022 HCHG ROOM/CARE - ICU (200)

## 2022-10-19 PROCEDURE — 94003 VENT MGMT INPAT SUBQ DAY: CPT

## 2022-10-19 PROCEDURE — 85027 COMPLETE CBC AUTOMATED: CPT

## 2022-10-19 PROCEDURE — 83605 ASSAY OF LACTIC ACID: CPT

## 2022-10-19 PROCEDURE — 700102 HCHG RX REV CODE 250 W/ 637 OVERRIDE(OP): Performed by: INTERNAL MEDICINE

## 2022-10-19 PROCEDURE — 700101 HCHG RX REV CODE 250: Performed by: INTERNAL MEDICINE

## 2022-10-19 PROCEDURE — 700102 HCHG RX REV CODE 250 W/ 637 OVERRIDE(OP): Performed by: HOSPITALIST

## 2022-10-19 PROCEDURE — 83735 ASSAY OF MAGNESIUM: CPT

## 2022-10-19 PROCEDURE — 84100 ASSAY OF PHOSPHORUS: CPT

## 2022-10-19 PROCEDURE — 700111 HCHG RX REV CODE 636 W/ 250 OVERRIDE (IP): Performed by: INTERNAL MEDICINE

## 2022-10-19 PROCEDURE — 700111 HCHG RX REV CODE 636 W/ 250 OVERRIDE (IP): Performed by: HOSPITALIST

## 2022-10-19 PROCEDURE — 36600 WITHDRAWAL OF ARTERIAL BLOOD: CPT

## 2022-10-19 PROCEDURE — 82803 BLOOD GASES ANY COMBINATION: CPT

## 2022-10-19 PROCEDURE — 82533 TOTAL CORTISOL: CPT

## 2022-10-19 PROCEDURE — 99291 CRITICAL CARE FIRST HOUR: CPT | Performed by: INTERNAL MEDICINE

## 2022-10-19 PROCEDURE — 94760 N-INVAS EAR/PLS OXIMETRY 1: CPT

## 2022-10-19 PROCEDURE — A9270 NON-COVERED ITEM OR SERVICE: HCPCS | Performed by: INTERNAL MEDICINE

## 2022-10-19 PROCEDURE — 80053 COMPREHEN METABOLIC PANEL: CPT

## 2022-10-19 RX ORDER — MAGNESIUM SULFATE HEPTAHYDRATE 40 MG/ML
2 INJECTION, SOLUTION INTRAVENOUS ONCE
Status: COMPLETED | OUTPATIENT
Start: 2022-10-19 | End: 2022-10-19

## 2022-10-19 RX ORDER — PERMETHRIN 50 MG/G
CREAM TOPICAL ONCE
Status: COMPLETED | OUTPATIENT
Start: 2022-10-19 | End: 2022-10-19

## 2022-10-19 RX ORDER — MIDAZOLAM HYDROCHLORIDE 1 MG/ML
5 INJECTION INTRAMUSCULAR; INTRAVENOUS
Status: DISCONTINUED | OUTPATIENT
Start: 2022-10-19 | End: 2022-10-26

## 2022-10-19 RX ORDER — CHLORDIAZEPOXIDE HYDROCHLORIDE 25 MG/1
25 CAPSULE, GELATIN COATED ORAL EVERY 8 HOURS
Status: DISCONTINUED | OUTPATIENT
Start: 2022-10-19 | End: 2022-10-24

## 2022-10-19 RX ORDER — MIDAZOLAM HYDROCHLORIDE 1 MG/ML
4 INJECTION INTRAMUSCULAR; INTRAVENOUS
Status: DISCONTINUED | OUTPATIENT
Start: 2022-10-19 | End: 2022-10-26

## 2022-10-19 RX ORDER — MIDAZOLAM HYDROCHLORIDE 1 MG/ML
2 INJECTION INTRAMUSCULAR; INTRAVENOUS
Status: DISCONTINUED | OUTPATIENT
Start: 2022-10-19 | End: 2022-10-26

## 2022-10-19 RX ORDER — MIDAZOLAM HYDROCHLORIDE 1 MG/ML
1 INJECTION INTRAMUSCULAR; INTRAVENOUS
Status: DISCONTINUED | OUTPATIENT
Start: 2022-10-19 | End: 2022-10-26

## 2022-10-19 RX ORDER — SODIUM CHLORIDE AND POTASSIUM CHLORIDE 150; 900 MG/100ML; MG/100ML
INJECTION, SOLUTION INTRAVENOUS CONTINUOUS
Status: DISCONTINUED | OUTPATIENT
Start: 2022-10-19 | End: 2022-10-24

## 2022-10-19 RX ADMIN — CHLORDIAZEPOXIDE HYDROCHLORIDE 25 MG: 25 CAPSULE ORAL at 14:14

## 2022-10-19 RX ADMIN — FOLIC ACID 1 MG: 1 TABLET ORAL at 05:42

## 2022-10-19 RX ADMIN — SENNOSIDES AND DOCUSATE SODIUM 2 TABLET: 50; 8.6 TABLET ORAL at 05:42

## 2022-10-19 RX ADMIN — PROPOFOL 40 MCG/KG/MIN: 10 INJECTION, EMULSION INTRAVENOUS at 04:42

## 2022-10-19 RX ADMIN — CHLORDIAZEPOXIDE HYDROCHLORIDE 25 MG: 25 CAPSULE ORAL at 21:45

## 2022-10-19 RX ADMIN — PERMETHRIN CREAM 5% W/W: 50 CREAM TOPICAL at 11:50

## 2022-10-19 RX ADMIN — MAGNESIUM SULFATE 2 G: 2 INJECTION INTRAVENOUS at 12:49

## 2022-10-19 RX ADMIN — SENNOSIDES AND DOCUSATE SODIUM 2 TABLET: 50; 8.6 TABLET ORAL at 17:38

## 2022-10-19 RX ADMIN — PROPOFOL 50 MCG/KG/MIN: 10 INJECTION, EMULSION INTRAVENOUS at 21:45

## 2022-10-19 RX ADMIN — PROPOFOL 50 MCG/KG/MIN: 10 INJECTION, EMULSION INTRAVENOUS at 00:35

## 2022-10-19 RX ADMIN — PROPOFOL 50 MCG/KG/MIN: 10 INJECTION, EMULSION INTRAVENOUS at 17:44

## 2022-10-19 RX ADMIN — NYSTATIN: 100000 CREAM TOPICAL at 17:41

## 2022-10-19 RX ADMIN — ENOXAPARIN SODIUM 40 MG: 100 INJECTION SUBCUTANEOUS at 17:38

## 2022-10-19 RX ADMIN — POTASSIUM CHLORIDE AND SODIUM CHLORIDE: 900; 150 INJECTION, SOLUTION INTRAVENOUS at 10:39

## 2022-10-19 RX ADMIN — LEVOTHYROXINE SODIUM 50 MCG: 50 TABLET ORAL at 05:42

## 2022-10-19 RX ADMIN — THIAMINE HCL TAB 100 MG 100 MG: 100 TAB at 05:42

## 2022-10-19 RX ADMIN — MULTIPLE VITAMINS W/ MINERALS TAB 1 TABLET: TAB at 05:42

## 2022-10-19 RX ADMIN — NYSTATIN: 100000 CREAM TOPICAL at 05:47

## 2022-10-19 RX ADMIN — PROPOFOL 50 MCG/KG/MIN: 10 INJECTION, EMULSION INTRAVENOUS at 14:14

## 2022-10-19 RX ADMIN — PROPOFOL 50 MCG/KG/MIN: 10 INJECTION, EMULSION INTRAVENOUS at 10:38

## 2022-10-19 RX ADMIN — POTASSIUM CHLORIDE AND SODIUM CHLORIDE: 900; 150 INJECTION, SOLUTION INTRAVENOUS at 22:57

## 2022-10-19 ASSESSMENT — PAIN DESCRIPTION - PAIN TYPE
TYPE: ACUTE PAIN

## 2022-10-19 ASSESSMENT — COGNITIVE AND FUNCTIONAL STATUS - GENERAL
CLIMB 3 TO 5 STEPS WITH RAILING: TOTAL
DRESSING REGULAR LOWER BODY CLOTHING: TOTAL
SUGGESTED CMS G CODE MODIFIER MOBILITY: CN
TURNING FROM BACK TO SIDE WHILE IN FLAT BAD: UNABLE
MOBILITY SCORE: 6
SUGGESTED CMS G CODE MODIFIER DAILY ACTIVITY: CN
PERSONAL GROOMING: TOTAL
TOILETING: TOTAL
WALKING IN HOSPITAL ROOM: TOTAL
STANDING UP FROM CHAIR USING ARMS: TOTAL
DAILY ACTIVITIY SCORE: 6
MOVING TO AND FROM BED TO CHAIR: UNABLE
DRESSING REGULAR UPPER BODY CLOTHING: TOTAL
HELP NEEDED FOR BATHING: TOTAL
MOVING FROM LYING ON BACK TO SITTING ON SIDE OF FLAT BED: UNABLE
EATING MEALS: TOTAL

## 2022-10-19 ASSESSMENT — LIFESTYLE VARIABLES
EVER HAD A DRINK FIRST THING IN THE MORNING TO STEADY YOUR NERVES TO GET RID OF A HANGOVER: YES
TOTAL SCORE: 4
HAVE PEOPLE ANNOYED YOU BY CRITICIZING YOUR DRINKING: YES
AVERAGE NUMBER OF DAYS PER WEEK YOU HAVE A DRINK CONTAINING ALCOHOL: 7
HAVE YOU EVER FELT YOU SHOULD CUT DOWN ON YOUR DRINKING: YES
EVER FELT BAD OR GUILTY ABOUT YOUR DRINKING: YES
HOW MANY TIMES IN THE PAST YEAR HAVE YOU HAD 5 OR MORE DRINKS IN A DAY: 365
ON A TYPICAL DAY WHEN YOU DRINK ALCOHOL HOW MANY DRINKS DO YOU HAVE: 6
TOTAL SCORE: 4
CONSUMPTION TOTAL: POSITIVE
ALCOHOL_USE: YES
DOES PATIENT WANT TO TALK TO SOMEONE ABOUT QUITTING: YES
DOES PATIENT WANT TO STOP DRINKING: CANNOT ASSESS
TOTAL SCORE: 4

## 2022-10-19 ASSESSMENT — FIBROSIS 4 INDEX: FIB4 SCORE: 0.92

## 2022-10-19 NOTE — H&P
Hospital Medicine History & Physical Note    Date of Service  10/18/2022    Primary Care Physician  Deb Clayton M.D.    Consultants  None     Code Status  Full Code    Chief Complaint  Chief Complaint   Patient presents with    Medical Clearance     Patient was seen at [facility] for detox from alcohol last drink today.  Patient did attempt to check in at [facility] and was turned away because patient told the staff he was short of breath and has not been taking his medication for his atrial fibrillation.  Patient speaks in full sentences.  No increase work of breathing.  Patient does have eczema and was prescribed prednisone for it.  Patient was given valium and prednisone at [facility].  I spoke with Sharad NOLEN at [facility] and patient needs to be medical cleared     History of Presenting Illness  Tyree Whiteside is a 57 y.o. male with a past medical history of atrial fibrillation, hypertension, alcohol dependence ho presented 10/18/2022 with generalized weakness tremors nausea.  Most of the history was obtained from emergency department physician, patient chart and patient daughter, Laura, present at bedside in the emergency room.  Patient reports that he went to [facility] for help with alcohol detoxification however was told to go to the emergency room.  Apparently he drinks heavily including a pint of vodka and 4-5 beers a day.  Patient has been having hallucinations, confusion generalized weakness nausea vomiting and headache for the past 1 day.    I discussed the plan of care with emergency department physician, the patient and patient daughter Laura, present at bedside in the emergency room    Review of Systems  Review of Systems   Unable to perform ROS: Mental status change     Past Medical History   has a past medical history of A-fib (HCC), ASTHMA, Eczema, and Hypertension.    Surgical History  Dental extraction    Family History  family history includes Cancer in his brother; Heart Disease in  his father; Hypertension in his brother; Stroke in his father.      Social History   reports that he has been smoking cigarettes. He has a 8.25 pack-year smoking history. He has never used smokeless tobacco. He reports current alcohol use. He reports that he does not use drugs.    Allergies  No Known Allergies    Medications  Prior to Admission Medications   Prescriptions Last Dose Informant Patient Reported? Taking?   Misc. Devices Misc   No No   Sig: Blood pressure cuff and machine   Spacer/Aero-Holding Chambers (OPTICHAMBER MARK-MD MASK) Misc   Yes No   Si EACH ONE TIME FOR 1 DOSE.   Patient not taking: No sig reported   albuterol 108 (90 Base) MCG/ACT Aero Soln inhalation aerosol a month ago  No No   Sig: Inhale 2 Puffs every 6 hours as needed for Shortness of Breath (cough).   amLODIPine (NORVASC) 10 MG Tab a month ago  No No   Sig: Take 1 Tablet by mouth every day.   budesonide-formoterol (SYMBICORT) 80-4.5 MCG/ACT Aerosol unknown  No No   Sig: Inhale 2 Puffs 2 times a day.   levothyroxine (SYNTHROID) 50 MCG Tab over 2 months ago  No No   Sig: Take 1 Tablet by mouth every morning on an empty stomach.   metoprolol SR (TOPROL XL) 25 MG TABLET SR 24 HR 3 days ago  No No   Sig: TAKE 1 TABLET BY MOUTH EVERY DAY. INDICATIONS: ATRIAL FIBRILLATION   nicotine polacrilex (NICORETTE) 2 MG Gum has not started it yet  No No   Sig: Take 1 Each by mouth as needed for Smoking Cessation.   predniSONE (DELTASONE) 10 MG Tab   No No   Sig: Day 1 take 6 tablets, Day 2 take 6 tablets, Day 3 take 5 tablets, Day 4 take 5 tablets, Day 5 take 4 tablets, Day 6 take 4 tablets, Day 7 take 3 tablets, Day 8 take 3 tablets, Day 9 take 2 tablets, Day 10 take 2 tablets, Day 11 take 1 tablet, Day 12 take 1 tablet   triamcinolone acetonide (KENALOG) 0.1 % Cream as needed  No No   Sig: Apply 1 Application topically 2 times a day. Mix with lotion and apply to entire body excluding face and genitalia.      Facility-Administered Medications:  None     Physical Exam  Temp:  [36.1 °C (97 °F)-37 °C (98.6 °F)] 37 °C (98.6 °F)  Pulse:  [] 116  Resp:  [16-39] 19  BP: (114-159)/(77-95) 114/78  SpO2:  [91 %-100 %] 97 %  Blood Pressure: (!) 149/94   Temperature: 37 °C (98.6 °F)   Pulse: 85   Respiration: (!) 35   Pulse Oximetry: 95 %     Physical Exam  Constitutional:       General: He is not in acute distress.     Appearance: He is ill-appearing and diaphoretic.   HENT:      Head: Atraumatic.      Right Ear: External ear normal.      Left Ear: External ear normal.      Nose: No congestion or rhinorrhea.      Mouth/Throat:      Mouth: Mucous membranes are moist.   Eyes:      General: No scleral icterus.        Right eye: No discharge.         Left eye: No discharge.      Pupils: Pupils are equal, round, and reactive to light.   Cardiovascular:      Rate and Rhythm: Regular rhythm. Tachycardia present.   Pulmonary:      Effort: Pulmonary effort is normal.   Abdominal:      General: There is no distension.   Musculoskeletal:      Cervical back: Neck supple. No rigidity. No muscular tenderness.      Right lower leg: No edema.      Left lower leg: No edema.   Skin:     Coloration: Skin is not jaundiced or pale.      Findings: Rash present.   Neurological:      Mental Status: He is disoriented.      Coordination: Coordination normal.      Comments: Agitated.  Experiencing visual and auditory hallucination   Psychiatric:      Comments: Agitated.  Experiencing visual and auditory hallucinations       Laboratory:  Recent Labs     10/18/22  1745   WBC 4.8   RBC 4.12*   HEMOGLOBIN 13.9*   HEMATOCRIT 40.9*   MCV 99.3*   MCH 33.7*   MCHC 34.0   RDW 52.5*   PLATELETCT 390   MPV 10.0     Recent Labs     10/18/22  1745   SODIUM 133*   POTASSIUM 4.3   CHLORIDE 101   CO2 16*   GLUCOSE 126*   BUN 8   CREATININE 0.72   CALCIUM 8.4     Recent Labs     10/18/22  1745   ALTSGPT 19   ASTSGOT 28   ALKPHOSPHAT 121*   TBILIRUBIN 0.7   GLUCOSE 126*         Recent Labs      10/18/22  1745   NTPROBNP 2214*         Recent Labs     10/18/22  1745   TROPONINT 8     Imaging:  DX-CHEST-FOR LINE PLACEMENT Perform procedure in: Patient's Room   Final Result      1.  Endotracheal tube overlies the mid trachea.      2.  NG tube courses beneath the diaphragms.      DX-CHEST-PORTABLE (1 VIEW)   Final Result      No radiographic evidence of acute cardiopulmonary process.      CT-HEAD W/O    (Results Pending)     Assessment/Plan:  Justification for Admission Status  I anticipate this patient will require at least two midnights for appropriate medical management, necessitating inpatient admission because patient has alcohol withdrawal will require close monitoring in the intensive care unit    * Alcohol dependence with withdrawal complicated by delirium (HCC)- (present on admission)  Assessment & Plan  With tremors, nausea vomiting and hallucinations  Will start CIWA protocol for now, will likely need stronger sedation [we will consider Precedex ]  Continuous cardiac monitoring  Monitor electrolytes and replace accordingly, particularly magnesium, potassium and phosphorus  Fall, seizure, aspiration precautions.  Thiamine folic acid and multivitamin.  Counseling when mentally clear    Alcohol withdrawal delirium (HCC)- (present on admission)  Assessment & Plan  Check CT head to r/o bleeding / infarction   With tremors, nausea vomiting and hallucinations  Will start CIWA protocol for now, will likely need stronger sedation [we will consider Precedex ]  Continuous cardiac monitoring  Monitor electrolytes and replace accordingly, particularly magnesium, potassium and phosphorus  Fall, seizure, aspiration precautions.  Thiamine folic acid and multivitamin.  Counseling when mentally clear     Atrial fibrillation (HCC)- (present on admission)  Assessment & Plan  Resume home metoprolol with hold parameters.  Not currently on anticoagulation    Essential hypertension- (present on admission)  Assessment &  Plan  Resume home amlodipine and metoprolol with hold parameters    VTE prophylaxis: SCDs/TEDs and enoxaparin ppx

## 2022-10-19 NOTE — ASSESSMENT & PLAN NOTE
- Not on anticoagulation prior to admission - appears he was just diagnosed by PCP in June of this year and referred to Cardiology, but I don't see where he was seen by them  - [id number] score of 1-2 - new echo with EF of 40% but prior to that was 50%, and may have some effect of sepsis on EF   - ASA for now after discussion with pt's daughter  - Continue Metoprolol for rate control  - no telemetry needed further. Stable.

## 2022-10-19 NOTE — PROGRESS NOTES
Pulmonary Progress Note    Date of admission  10/18/2022    Chief Complaint  57 y.o. male admitted 10/18/2022 with etoh withdrawal    Hospital Course  56 yo male admitted on 10/19/2022 with ETOh withdrawal  PMHx: lives in a motel, drinks a pint of vodka a day (last drinl 10/7), prior hx of meth use, afib not on anticoagulation, liver mass on Us on August 2022 (2.  Solid hypoechoic mass within the right lobe of the liver measuring 1.7 x 1.3 x 1.0 cm in size. Follow-up pre and postcontrast multiphasic hepatic CT or MRI is recommended for further evaluation)  Received 10 mg ativan and 650 mg of phenobarb went into resp distress requiring intubation      Interval Problem Update  Reviewed last 24 hour events:  As above    Review of Systems  Review of Systems   Unable to perform ROS: Acuity of condition      Vital Signs for last 24 hours   Temp:  [35.9 °C (96.7 °F)-37.2 °C (99 °F)] 35.9 °C (96.7 °F)  Pulse:  [] 75  Resp:  [17-36] 18  BP: ()/(57-95) 101/68  SpO2:  [94 %-100 %] 97 %          Respiratory Information for the last 24 hours  Vent Mode: APVCMV  Rate (breaths/min): 18  Vt Target (mL): 500  PEEP/CPAP: 8  MAP: 11  Control VTE (exp VT): 500    Physical Exam   Physical Exam  Constitutional:       Comments: Intubated and sedated   HENT:      Head: Normocephalic and atraumatic.      Mouth/Throat:      Mouth: Mucous membranes are dry.   Eyes:      Extraocular Movements: Extraocular movements intact.      Pupils: Pupils are equal, round, and reactive to light.   Cardiovascular:      Rate and Rhythm: Normal rate.   Pulmonary:      Breath sounds: Normal breath sounds.   Abdominal:      General: Abdomen is flat. Bowel sounds are normal.      Palpations: Abdomen is soft.   Skin:     Comments: diffuse rash all over body, back hands and feet with wheals, vesicles and keratosis   Neurological:      Comments: Unable to assess as intubated and sedated   Psychiatric:      Comments: Intubated and sedated         Medications  Current Facility-Administered Medications   Medication Dose Route Frequency Provider Last Rate Last Admin    MD Alert...ICU Electrolyte Replacement per Pharmacy   Other PHARMACY TO DOSE Norm Choudhary M.D.        0.9 % NaCl with KCl 20 mEq infusion   Intravenous Continuous Norm Choudhary M.D. 75 mL/hr at 10/19/22 1039 New Bag at 10/19/22 1039    chlordiazePOXIDE (Librium) capsule 25 mg  25 mg Enteral Tube Q8HRS Norm Choudhary M.D.   25 mg at 10/19/22 1414    midazolam (Versed) injection 1 mg  1 mg Intravenous Q HOUR PRN Norm Choudhary M.D.        Or    midazolam (Versed) injection 2 mg  2 mg Intravenous Q HOUR PRN Norm Choudhary M.D.        Or    midazolam (Versed) injection 4 mg  4 mg Intravenous Q HOUR PRN Norm Choudhary M.D.        Or    midazolam (Versed) injection 5 mg  5 mg Intravenous Q HOUR PRN Norm Choudhary M.D.        albuterol inhaler 2 Puff  2 Puff Inhalation Q6HRS PRN Eva Rodriguez M.D.        enoxaparin (Lovenox) inj 40 mg  40 mg Subcutaneous DAILY AT 1800 Eva Rodriguez M.D.        Pharmacy Consult Request ...Pain Management Review 1 Each  1 Each Other PHARMACY TO DOSE Eva Rodriguez M.D.        oxyCODONE immediate-release (ROXICODONE) tablet 2.5 mg  2.5 mg Oral Q3HRS PRN Eva Rodriguez M.D.        Or    oxyCODONE immediate-release (ROXICODONE) tablet 5 mg  5 mg Oral Q3HRS PRN Eva Rodriguez M.D.        Or    HYDROmorphone (Dilaudid) injection 0.25 mg  0.25 mg Intravenous Q3HRS PRN Eva Rodriguez M.D.        amLODIPine (NORVASC) tablet 10 mg  10 mg Enteral Tube DAILY Asequinton Rodriguez M.D.        folic acid (FOLVITE) tablet 1 mg  1 mg Enteral Tube DAILY Eva Rodriguez M.D.   1 mg at 10/19/22 0542    levothyroxine (SYNTHROID) tablet 50 mcg  50 mcg Enteral Tube AM ES vEa Rodriguez M.D.   50 mcg at 10/19/22 0542    metoprolol tartrate (LOPRESSOR) tablet 12.5 mg  12.5 mg Enteral Tube TWICE DAILY Eva Rodriguez M.D.         senna-docusate (PERICOLACE or SENOKOT S) 8.6-50 MG per tablet 2 Tablet  2 Tablet Enteral Tube BID Eva Rodriguez M.D.   2 Tablet at 10/19/22 0542    And    polyethylene glycol/lytes (MIRALAX) PACKET 1 Packet  1 Packet Enteral Tube QDAY PRN Eva Rodriguez M.D.        And    magnesium hydroxide (MILK OF MAGNESIA) suspension 30 mL  30 mL Enteral Tube QDAY PRN Eva Rodriguez M.D.        And    bisacodyl (DULCOLAX) suppository 10 mg  10 mg Rectal QDAY PRN Asequinton Rodriguez M.D.        therapeutic multivitamin-minerals (THERAGRAN-M) tablet 1 Tablet  1 Tablet Enteral Tube DAILY Eva Rodriguez M.D.   1 Tablet at 10/19/22 0542    thiamine (Vitamin B-1) tablet 100 mg  100 mg Enteral Tube DAILY Eva Rodriguez M.D.   100 mg at 10/19/22 0542    acetaminophen (Tylenol) tablet 650 mg  650 mg Enteral Tube Q6HRS PRN Eva Rodriguez M.D.        propofol (DIPRIVAN) injection  0-80 mcg/kg/min Intravenous Continuous GRACY MustafaO. 25.9 mL/hr at 10/19/22 1414 50 mcg/kg/min at 10/19/22 1414    nystatin (MYCOSTATIN) cream   Topical BID GRACY MustafaO.   Given at 10/19/22 0547       Fluids    Intake/Output Summary (Last 24 hours) at 10/19/2022 1726  Last data filed at 10/19/2022 1600  Gross per 24 hour   Intake 1720.02 ml   Output 565 ml   Net 1155.02 ml       Laboratory  Recent Labs     10/18/22  2322 10/19/22  1002   ISTATAPH 7.345* 7.413   ISTATAPCO2 37.4* 36.0   ISTATAPO2 92* 61*   ISTATATCO2 22 24   [id number] 97 92*   ISTATARTHCO3 20.4 23.0   ISTATARTBE -5* -1   ISTATTEMP 96.0 F see below   ISTATFIO2 30  --    ISTATSPEC Arterial Arterial   ISTATAPHTC 7.366*  --    [id number] 85  --          Recent Labs     10/18/22  1745 10/19/22  0400   SODIUM 133* 139   POTASSIUM 4.3 3.6   CHLORIDE 101 108   CO2 16* 19*   BUN 8 8   CREATININE 0.72 0.65   MAGNESIUM  --  1.6   PHOSPHORUS  --  4.8*   CALCIUM 8.4 8.1*     Recent Labs     10/18/22  1745 10/19/22  0400   ALTSGPT 19 16   ASTSGOT 28 19   ALKPHOSPHAT 121* 95  "  TBILIRUBIN 0.7 0.5   GLUCOSE 126* 128*     Recent Labs     10/18/22  1745 10/19/22  0400   WBC 4.8 3.9*   NEUTSPOLYS 85.80*  --    LYMPHOCYTES 7.10*  --    MONOCYTES 3.80  --    EOSINOPHILS 0.80  --    BASOPHILS 2.10*  --    ASTSGOT 28 19   ALTSGPT 19 16   ALKPHOSPHAT 121* 95   TBILIRUBIN 0.7 0.5     Recent Labs     10/18/22  1745 10/19/22  0400   RBC 4.12* 3.66*   HEMOGLOBIN 13.9* 12.5*   HEMATOCRIT 40.9* 37.5*   PLATELETCT 390 320       Imaging  X-Ray:  CXR enlarged pulmonary vasculature    Assessment/Plan  * Alcohol dependence with withdrawal complicated by delirium (HCC)- (present on admission)  Assessment & Plan  Intubated for respiratory depression  On propofol as mechanically ventilated and started on librium  Prn versed  Replacing and monitoring electrolytes    Scabies  Assessment & Plan  Rash very suspicious of scabies  On rx    On mechanically assisted ventilation (HCC)  Assessment & Plan  For etoh withdrawal and delirium with resp depression  Maintain for another 48 hrs on ventilation as we get better control of his withdrawals  Driving pressure 12  Fio2 30%   SBT in am    Liver mass- (present on admission)  Assessment & Plan  Seen on US 8/2022 \"right lobe of the liver measuring 1.7 x 1.3 x 1.0 cm in size\"  Will need CT versus MRI post extubation    Atrial fibrillation (HCC)- (present on admission)  Assessment & Plan  On metoprolol    Libido, decreased  Assessment & Plan  Seen on US 8/2022 \"right lobe of the liver measuring 1.7 x 1.3 x 1.0 cm in size\"  Will need CT versus MRI post extubation    Essential hypertension- (present on admission)  Assessment & Plan  On metoprolol and amlodipine       VTE:  Lovenox  Ulcer: H2 Antagonist  Lines: Salmon Catheter  Ongoing indication addressed    I have performed a physical exam and reviewed and updated ROS and Plan today (10/19/2022). In review of yesterday's note (10/18/2022), there are no changes except as documented above.     Discussed patient condition and " risk of morbidity and/or mortality with Family, Pharmacy, and Charge nurse / hot rounds  The patient remains critically ill.  Critical care time = 38 minutes in directly providing and coordinating critical care and extensive data review.  No time overlap and excludes procedures.

## 2022-10-19 NOTE — ED NOTES
Pt intubated by Dr Groves.  2111: 80mg prop given  2112: 150mg succ given.  2114: tube inserted by MD 7.5 ET tube.  Confirming with xray placement

## 2022-10-19 NOTE — WOUND TEAM
Wound consult placed on 10/18/22. No comments in wound consult request, no images in Epic, no LDAs open; Per 2 RN skin note no possible skin injury. Wound consult completed. NO advanced wound care needs indicated.

## 2022-10-19 NOTE — CARE PLAN
Problem: Ventilation  Goal: Ability to achieve and maintain unassisted ventilation or tolerate decreased levels of ventilator support  Description: Target End Date:  4 days     Document on Vent flowsheet    1.  Support and monitor invasive and noninvasive mechanical ventilation  2.  Monitor ventilator weaning response  3.  Perform ventilator associated pneumonia prevention interventions  4.  Manage ventilation therapy by monitoring diagnostic test results  Outcome: Progressing      Ventilator Daily Summary    Vent Day #2    Ventilator settings changed this shift: APV 18, 500, +8, 30%    Weaning trials: None    Respiratory Procedures: None    Plan: Continue current ventilator settings and wean mechanical ventilation as tolerated per physician orders.

## 2022-10-19 NOTE — ASSESSMENT & PLAN NOTE
Severe withdrawal with respiratory depression requiring intubation on admit  Out of withdrawal window now

## 2022-10-19 NOTE — ED NOTES
Med Rec completed per patient's family member  New RX for prednisone taper 10/18/22 has not been started
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
## 2022-10-19 NOTE — PROGRESS NOTES
12-hour chart check complete.    Monitor Summary  Rhythm: A-fib  Rate: 's  Ectopy: oPVC  Measurements: -/0.12/-

## 2022-10-19 NOTE — ED NOTES
Pt very combative iv removed by pt. Pt put on bilateral soft restraints new PIV placed. Pt became hypoxic. Intubated initiated by MD.

## 2022-10-19 NOTE — CARE PLAN
The patient is Watcher - Medium risk of patient condition declining or worsening    Shift Goals  Clinical Goals: Manage withdrawal symptoms  Patient Goals: ANETTE  Family Goals: No family present    Progress made toward(s) clinical / shift goals:    Problem: Risk for Aspiration  Goal: Patient's risk for aspiration will be absent or decrease  Outcome: Progressing  Note: HOB >30  Frequent suctioning and oral care    Problem: Safety - Medical Restraint  Goal: Remains free of injury from restraints (Restraint for Interference with Medical Device)  Outcome: Progressing          Patient is not progressing towards the following goals:      Problem: Optimal Care for Alcohol Withdrawal  Goal: Optimal Care for the alcohol withdrawal patient  Outcome: Not Progressing  Note: Pt required intubation d/t severe withdrawing symptoms.

## 2022-10-19 NOTE — PROGRESS NOTES
4 Eyes Skin Assessment Completed by CALLUM Garces and CALLUM Hung.    Head WDL  Ears WDL  Nose WDL  Mouth WDL  Neck Scab  Breast/Chest Rash and Scab  Shoulder Blades WDL  Spine WDL  (R) Arm/Elbow/Hand Redness, Scab, and Rash  (L) Arm/Elbow/Hand Redness, Scab, and Rash  Abdomen Rash  Groin WDL  Scrotum/Coccyx/Buttocks WDL  (R) Leg Redness, Rash, and Scab  (L) Leg Redness, Rash, and Scab  (R) Heel/Foot/Toe Redness, Discoloration, and Rash  (L) Heel/Foot/Toe Redness, Discoloration, Scab, and Rash          Devices In Places ECG, Blood Pressure Cuff, Pulse Ox, Salmon, and ET Tube      Interventions In Place Q2 Turns    Possible Skin Injury No    Pictures Uploaded Into Epic N/A  Wound Consult Placed Yes  RN Wound Prevention Protocol Ordered No

## 2022-10-19 NOTE — RESPIRATORY CARE
Adult Ventilation Update    Total Vent Days: 2    Patient Lines/Drains/Airways Status       Active Airway       Name Placement date Placement time Site Days    Airway ETT 7.5 10/18/22  1915  --  less than 1                  Plateau Pressure: 17 (10/19/22 0952)  Static Compliance (ml / cm H2O): 47 (10/19/22 0952)    Patient failed trials because of Safety screen spontaneous breathing trial (SBT): Failed SAT - Do NOT begin SBT (10/19/22 0800)  Barriers to SBT Spontaneous breathing trial (SBT) outcome:  (Did not attempt) (10/19/22 0800)  Length of Weaning Trial    AM ABG ordered per Dr Diaz and acknowledged by Dr Diaz.  No changes ordered at this time   Latest Reference Range & Units Most Recent   Ph 7.400 - 7.500  7.413  10/19/22 10:02   Pco2 26.0 - 37.0 mmHg 36.0  10/19/22 10:02   Po2 64 - 87 mmHg 61 (L)  10/19/22 10:02   Hco3 17.0 - 25.0 mmol/L 23.0  10/19/22 10:02   BE -4 - 3 mmol/L -1  10/19/22 10:02   Tco2 20 - 33 mmol/L 24  10/19/22 10:02   S02 93 - 99 % 92 (L)  10/19/22 10:02   Ph Temp Remigio 7.400 - 7.500  7.366 (L)  10/18/22 23:22   Po2 Temp Cor 64 - 87 mmHg 85  10/18/22 23:22   (L): Data is abnormally low     Cough: Productive (10/19/22 0800)  Sputum Amount: Moderate (10/19/22 0800)  Sputum Color: Tan (10/19/22 0800)  Sputum Consistency: Thick (10/19/22 0800)    Mobility  Activity Performed: Unable to mobilize (10/19/22 0800)  Assistance: Assistance of Two or More (10/19/22 0800)  Pt Calls for Assistance: No (10/19/22 0800)    Events/Summary/Plan: Continuing on ASV vt 500 rr18 FI02 30% PEEP 8.

## 2022-10-19 NOTE — ASSESSMENT & PLAN NOTE
With tremors, nausea vomiting and hallucinations  Alcohol withdrawal symptoms resolved, patient did require ICU stay.

## 2022-10-20 PROBLEM — R68.82 LIBIDO, DECREASED: Status: RESOLVED | Noted: 2018-12-12 | Resolved: 2022-10-20

## 2022-10-20 LAB
ALBUMIN SERPL BCP-MCNC: 2.7 G/DL (ref 3.2–4.9)
ALBUMIN/GLOB SERPL: 0.7 G/DL
ALP SERPL-CCNC: 94 U/L (ref 30–99)
ALT SERPL-CCNC: 13 U/L (ref 2–50)
ANION GAP SERPL CALC-SCNC: 12 MMOL/L (ref 7–16)
AST SERPL-CCNC: 23 U/L (ref 12–45)
BASE EXCESS BLDA CALC-SCNC: -3 MMOL/L (ref -4–3)
BASOPHILS # BLD AUTO: 1.6 % (ref 0–1.8)
BASOPHILS # BLD: 0.12 K/UL (ref 0–0.12)
BILIRUB SERPL-MCNC: 0.4 MG/DL (ref 0.1–1.5)
BODY TEMPERATURE: ABNORMAL DEGREES
BREATHS SETTING VENT: 18
BUN SERPL-MCNC: 9 MG/DL (ref 8–22)
CA-I SERPL-SCNC: 1.13 MMOL/L (ref 1.1–1.3)
CALCIUM SERPL-MCNC: 8 MG/DL (ref 8.4–10.2)
CHLORIDE SERPL-SCNC: 108 MMOL/L (ref 96–112)
CO2 BLDA-SCNC: 23 MMOL/L (ref 20–33)
CO2 SERPL-SCNC: 19 MMOL/L (ref 20–33)
CREAT SERPL-MCNC: 0.95 MG/DL (ref 0.5–1.4)
DELSYS IDSYS: ABNORMAL
EOSINOPHIL # BLD AUTO: 0.47 K/UL (ref 0–0.51)
EOSINOPHIL NFR BLD: 6.4 % (ref 0–6.9)
ERYTHROCYTE [DISTWIDTH] IN BLOOD BY AUTOMATED COUNT: 56.8 FL (ref 35.9–50)
GFR SERPLBLD CREATININE-BSD FMLA CKD-EPI: 94 ML/MIN/1.73 M 2
GLOBULIN SER CALC-MCNC: 3.7 G/DL (ref 1.9–3.5)
GLUCOSE BLD STRIP.AUTO-MCNC: 102 MG/DL (ref 65–99)
GLUCOSE SERPL-MCNC: 79 MG/DL (ref 65–99)
HCO3 BLDA-SCNC: 21.6 MMOL/L (ref 17–25)
HCT VFR BLD AUTO: 41.7 % (ref 42–52)
HGB BLD-MCNC: 13.9 G/DL (ref 14–18)
HOROWITZ INDEX BLDA+IHG-RTO: 270 MM[HG]
IMM GRANULOCYTES # BLD AUTO: 0.05 K/UL (ref 0–0.11)
IMM GRANULOCYTES NFR BLD AUTO: 0.7 % (ref 0–0.9)
LYMPHOCYTES # BLD AUTO: 0.68 K/UL (ref 1–4.8)
LYMPHOCYTES NFR BLD: 9.2 % (ref 22–41)
MAGNESIUM SERPL-MCNC: 2.1 MG/DL (ref 1.5–2.5)
MCH RBC QN AUTO: 34.5 PG (ref 27–33)
MCHC RBC AUTO-ENTMCNC: 33.3 G/DL (ref 33.7–35.3)
MCV RBC AUTO: 103.5 FL (ref 81.4–97.8)
MODE IMODE: ABNORMAL
MONOCYTES # BLD AUTO: 1.12 K/UL (ref 0–0.85)
MONOCYTES NFR BLD AUTO: 15.2 % (ref 0–13.4)
NEUTROPHILS # BLD AUTO: 4.95 K/UL (ref 1.82–7.42)
NEUTROPHILS NFR BLD: 66.9 % (ref 44–72)
NRBC # BLD AUTO: 0 K/UL
NRBC BLD-RTO: 0 /100 WBC
O2/TOTAL GAS SETTING VFR VENT: 30 %
PCO2 BLDA: 35.8 MMHG (ref 26–37)
PEEP END EXPIRATORY PRESSURE IPEEP: 8 CMH20
PH BLDA: 7.39 [PH] (ref 7.4–7.5)
PH TEMP ADJ BLDA: 7.39 [PH] (ref 7.4–7.5)
PLATELET # BLD AUTO: 325 K/UL (ref 164–446)
PMV BLD AUTO: 10.1 FL (ref 9–12.9)
PO2 BLDA: 81 MMHG (ref 64–87)
PO2 TEMP ADJ BLDA: 79 MMHG (ref 64–87)
POTASSIUM SERPL-SCNC: 4.2 MMOL/L (ref 3.6–5.5)
PROT SERPL-MCNC: 6.4 G/DL (ref 6–8.2)
RBC # BLD AUTO: 4.03 M/UL (ref 4.7–6.1)
SAO2 % BLDA: 96 % (ref 93–99)
SODIUM SERPL-SCNC: 139 MMOL/L (ref 135–145)
SPECIMEN DRAWN FROM PATIENT: ABNORMAL
TIDAL VOLUME IVT: 500 ML
TRIGL SERPL-MCNC: 105 MG/DL (ref 0–149)
WBC # BLD AUTO: 7.4 K/UL (ref 4.8–10.8)

## 2022-10-20 PROCEDURE — 770022 HCHG ROOM/CARE - ICU (200)

## 2022-10-20 PROCEDURE — 700111 HCHG RX REV CODE 636 W/ 250 OVERRIDE (IP): Performed by: HOSPITALIST

## 2022-10-20 PROCEDURE — 84478 ASSAY OF TRIGLYCERIDES: CPT

## 2022-10-20 PROCEDURE — 36600 WITHDRAWAL OF ARTERIAL BLOOD: CPT

## 2022-10-20 PROCEDURE — 99291 CRITICAL CARE FIRST HOUR: CPT | Performed by: INTERNAL MEDICINE

## 2022-10-20 PROCEDURE — 94760 N-INVAS EAR/PLS OXIMETRY 1: CPT

## 2022-10-20 PROCEDURE — 85025 COMPLETE CBC W/AUTO DIFF WBC: CPT

## 2022-10-20 PROCEDURE — A9270 NON-COVERED ITEM OR SERVICE: HCPCS | Performed by: HOSPITALIST

## 2022-10-20 PROCEDURE — 83735 ASSAY OF MAGNESIUM: CPT

## 2022-10-20 PROCEDURE — 700101 HCHG RX REV CODE 250: Performed by: INTERNAL MEDICINE

## 2022-10-20 PROCEDURE — 82803 BLOOD GASES ANY COMBINATION: CPT

## 2022-10-20 PROCEDURE — 94003 VENT MGMT INPAT SUBQ DAY: CPT

## 2022-10-20 PROCEDURE — 82962 GLUCOSE BLOOD TEST: CPT

## 2022-10-20 PROCEDURE — A9270 NON-COVERED ITEM OR SERVICE: HCPCS | Performed by: INTERNAL MEDICINE

## 2022-10-20 PROCEDURE — 700102 HCHG RX REV CODE 250 W/ 637 OVERRIDE(OP): Performed by: INTERNAL MEDICINE

## 2022-10-20 PROCEDURE — 80053 COMPREHEN METABOLIC PANEL: CPT

## 2022-10-20 PROCEDURE — 700102 HCHG RX REV CODE 250 W/ 637 OVERRIDE(OP): Performed by: HOSPITALIST

## 2022-10-20 PROCEDURE — 82330 ASSAY OF CALCIUM: CPT

## 2022-10-20 PROCEDURE — 700111 HCHG RX REV CODE 636 W/ 250 OVERRIDE (IP): Performed by: INTERNAL MEDICINE

## 2022-10-20 RX ORDER — DEXMEDETOMIDINE HYDROCHLORIDE 4 UG/ML
.1-1.5 INJECTION, SOLUTION INTRAVENOUS CONTINUOUS
Status: DISCONTINUED | OUTPATIENT
Start: 2022-10-20 | End: 2022-10-25

## 2022-10-20 RX ADMIN — PROPOFOL 50 MCG/KG/MIN: 10 INJECTION, EMULSION INTRAVENOUS at 05:23

## 2022-10-20 RX ADMIN — THIAMINE HCL TAB 100 MG 100 MG: 100 TAB at 05:21

## 2022-10-20 RX ADMIN — METOPROLOL TARTRATE 12.5 MG: 25 TABLET, FILM COATED ORAL at 06:00

## 2022-10-20 RX ADMIN — DEXMEDETOMIDINE HYDROCHLORIDE 1.5 MCG/KG/HR: 4 INJECTION, SOLUTION INTRAVENOUS at 14:02

## 2022-10-20 RX ADMIN — DEXMEDETOMIDINE HYDROCHLORIDE 0.2 MCG/KG/HR: 4 INJECTION, SOLUTION INTRAVENOUS at 09:50

## 2022-10-20 RX ADMIN — CHLORDIAZEPOXIDE HYDROCHLORIDE 25 MG: 25 CAPSULE ORAL at 14:07

## 2022-10-20 RX ADMIN — NYSTATIN: 100000 CREAM TOPICAL at 05:22

## 2022-10-20 RX ADMIN — SENNOSIDES AND DOCUSATE SODIUM 2 TABLET: 50; 8.6 TABLET ORAL at 16:24

## 2022-10-20 RX ADMIN — PROPOFOL 50 MCG/KG/MIN: 10 INJECTION, EMULSION INTRAVENOUS at 01:44

## 2022-10-20 RX ADMIN — AMLODIPINE BESYLATE 10 MG: 5 TABLET ORAL at 05:21

## 2022-10-20 RX ADMIN — PROPOFOL 30 MCG/KG/MIN: 10 INJECTION, EMULSION INTRAVENOUS at 22:01

## 2022-10-20 RX ADMIN — FOLIC ACID 1 MG: 1 TABLET ORAL at 05:21

## 2022-10-20 RX ADMIN — DEXMEDETOMIDINE HYDROCHLORIDE 0.6 MCG/KG/HR: 4 INJECTION, SOLUTION INTRAVENOUS at 21:58

## 2022-10-20 RX ADMIN — LEVOTHYROXINE SODIUM 50 MCG: 50 TABLET ORAL at 05:21

## 2022-10-20 RX ADMIN — ENOXAPARIN SODIUM 40 MG: 100 INJECTION SUBCUTANEOUS at 16:24

## 2022-10-20 RX ADMIN — MULTIPLE VITAMINS W/ MINERALS TAB 1 TABLET: TAB at 05:21

## 2022-10-20 RX ADMIN — SENNOSIDES AND DOCUSATE SODIUM 2 TABLET: 50; 8.6 TABLET ORAL at 05:21

## 2022-10-20 RX ADMIN — CHLORDIAZEPOXIDE HYDROCHLORIDE 25 MG: 25 CAPSULE ORAL at 22:19

## 2022-10-20 RX ADMIN — CHLORDIAZEPOXIDE HYDROCHLORIDE 25 MG: 25 CAPSULE ORAL at 05:21

## 2022-10-20 RX ADMIN — NYSTATIN: 100000 CREAM TOPICAL at 16:24

## 2022-10-20 RX ADMIN — POTASSIUM CHLORIDE AND SODIUM CHLORIDE: 900; 150 INJECTION, SOLUTION INTRAVENOUS at 20:00

## 2022-10-20 RX ADMIN — PROPOFOL 50 MCG/KG/MIN: 10 INJECTION, EMULSION INTRAVENOUS at 09:05

## 2022-10-20 RX ADMIN — POTASSIUM CHLORIDE AND SODIUM CHLORIDE: 900; 150 INJECTION, SOLUTION INTRAVENOUS at 11:16

## 2022-10-20 RX ADMIN — PROPOFOL 30 MCG/KG/MIN: 10 INJECTION, EMULSION INTRAVENOUS at 17:00

## 2022-10-20 RX ADMIN — METOPROLOL TARTRATE 12.5 MG: 25 TABLET, FILM COATED ORAL at 16:24

## 2022-10-20 ASSESSMENT — LIFESTYLE VARIABLES
ORIENTATION AND CLOUDING OF SENSORIUM: DISORIENTED FOR PLACE AND / OR PERSON
TREMOR: SEVERE TREMOR, EVEN WITH ARMS NOT EXTENDED
VISUAL DISTURBANCES: NOT PRESENT
NAUSEA AND VOMITING: NO NAUSEA AND NO VOMITING
AGITATION: NORMAL ACTIVITY
TREMOR: MODERATE TREMOR WITH ARMS EXTENDED
TOTAL SCORE: 11
NAUSEA AND VOMITING: NO NAUSEA AND NO VOMITING
TOTAL SCORE: 11
ANXIETY: NO ANXIETY (AT EASE)
AUDITORY DISTURBANCES: NOT PRESENT
HEADACHE, FULLNESS IN HEAD: NOT PRESENT
ANXIETY: NO ANXIETY (AT EASE)
VISUAL DISTURBANCES: NOT PRESENT
HEADACHE, FULLNESS IN HEAD: NOT PRESENT
PAROXYSMAL SWEATS: NO SWEAT VISIBLE
PAROXYSMAL SWEATS: NO SWEAT VISIBLE
ORIENTATION AND CLOUDING OF SENSORIUM: DISORIENTED FOR PLACE AND / OR PERSON
AUDITORY DISTURBANCES: NOT PRESENT
AGITATION: *

## 2022-10-20 ASSESSMENT — PAIN DESCRIPTION - PAIN TYPE
TYPE: ACUTE PAIN

## 2022-10-20 ASSESSMENT — FIBROSIS 4 INDEX: FIB4 SCORE: 1.1

## 2022-10-20 NOTE — ASSESSMENT & PLAN NOTE
At initial encounter during this admission he was intubated for respiratory depression but was successfully extubated on 10/21.  Replacing and monitoring electrolytes  withdrawal is finally getting under control  Patient was reintubated on 10/23/2022 for respiratory acidosis and altered mental status, new pneumonia  Librium 25mg/day x 2 and stop  seroquel at night

## 2022-10-20 NOTE — PROGRESS NOTES
Pulmonary Progress Note    Date of admission  10/18/2022    Chief Complaint  57 y.o. male admitted 10/18/2022 with etoh withdrawal    Hospital Course  56 yo male admitted on 10/19/2022 with ETOh withdrawal  PMHx: lives in a motel, drinks a pint of vodka a day (last drinl 10/7), prior hx of meth use, afib not on anticoagulation, liver mass on Us on August 2022 (2.  Solid hypoechoic mass within the right lobe of the liver measuring 1.7 x 1.3 x 1.0 cm in size. Follow-up pre and postcontrast multiphasic hepatic CT or MRI is recommended for further evaluation)  Received 10 mg ativan and 650 mg of phenobarb went into resp distress requiring intubation    10/19: started librium 25 mg tid; Rx of scabies      Interval Problem Update  Reviewed last 24 hour events:  Chart review from the past 24 hours includes imaging, laboratory studies, vital signs and notes available.  Pertinent data for today    Cardiac:  no issues, BP ok  Pulm:  Vent Mode: APVCMV  Rate (breaths/min):  [18] 18  PEEP/CPAP:  [8] 8  PIP:  [23-24] 23  MAP:  [11] 11  Driving pressure 10  Starting SBP after transitioning to precedex  Neuro:  sedated with precedex and has librium  Heme:  stable  I/O:  1532/1447  ID: scabies being treated  GI/endo:  FS ok  Labs/Imaging:  reviewed  Lines:  liam   Mobility:  bed rest as sedated and intubated      Review of Systems  Review of Systems   Unable to perform ROS: Acuity of condition      Vital Signs for last 24 hours   Temp:  [35.4 °C (95.7 °F)-35.9 °C (96.6 °F)] 35.4 °C (95.7 °F)  Pulse:  [60-86] 68  Resp:  [17-27] 27  BP: ()/(62-94) 122/79  SpO2:  [95 %-99 %] 97 %          Respiratory Information for the last 24 hours  Vent Mode: APVCMV  Rate (breaths/min): 18  Vt Target (mL): 500  PEEP/CPAP: 8  MAP: 11  Control VTE (exp VT): 505    Physical Exam   Physical Exam  Constitutional:       Comments: Intubated and sedated   HENT:      Head: Normocephalic and atraumatic.      Mouth/Throat:      Mouth: Mucous membranes  are dry.   Eyes:      Extraocular Movements: Extraocular movements intact.      Pupils: Pupils are equal, round, and reactive to light.   Cardiovascular:      Rate and Rhythm: Normal rate. Rhythm irregular.   Pulmonary:      Breath sounds: Normal breath sounds.   Abdominal:      General: Abdomen is flat. Bowel sounds are normal.      Palpations: Abdomen is soft.   Skin:     Comments: diffuse rash all over body, back hands and feet with wheals, vesicles and keratosis   Neurological:      Comments: Unable to assess as intubated and sedated   Psychiatric:      Comments: Intubated and sedated        Medications  Current Facility-Administered Medications   Medication Dose Route Frequency Provider Last Rate Last Admin    dexmedetomidine (PRECEDEX) 400 mcg/100mL NS premix infusion  0.1-1.5 mcg/kg/hr Intravenous Continuous Norm Choudhary M.D. 12.4 mL/hr at 10/20/22 1017 0.6 mcg/kg/hr at 10/20/22 1017    MD Alert...ICU Electrolyte Replacement per Pharmacy   Other PHARMACY TO DOSE Norm Choudhary M.D.        0.9 % NaCl with KCl 20 mEq infusion   Intravenous Continuous Norm Choudhary M.D. 75 mL/hr at 10/20/22 1116 New Bag at 10/20/22 1116    chlordiazePOXIDE (Librium) capsule 25 mg  25 mg Enteral Tube Q8HRS Norm Choudhary M.D.   25 mg at 10/20/22 0521    midazolam (Versed) injection 1 mg  1 mg Intravenous Q HOUR PRN Norm Choudhary M.D.        Or    midazolam (Versed) injection 2 mg  2 mg Intravenous Q HOUR PRN Norm Choudhary M.D.        Or    midazolam (Versed) injection 4 mg  4 mg Intravenous Q HOUR PRN Norm Choudhary M.D.        Or    midazolam (Versed) injection 5 mg  5 mg Intravenous Q HOUR PRN Norm Choudhary M.D.        albuterol inhaler 2 Puff  2 Puff Inhalation Q6HRS PRN Eva Rodriguez M.D.        enoxaparin (Lovenox) inj 40 mg  40 mg Subcutaneous DAILY AT 1800 Eva Rodriguez M.D.   40 mg at 10/19/22 1738    Pharmacy Consult Request ...Pain Management Review 1  Each  1 Each Other PHARMACY TO DOSE Asem MILI Rodriguez M.D.        oxyCODONE immediate-release (ROXICODONE) tablet 2.5 mg  2.5 mg Oral Q3HRS PRN Eva Rodriguez M.D.        Or    oxyCODONE immediate-release (ROXICODONE) tablet 5 mg  5 mg Oral Q3HRS PRN Eva Rodriguez M.D.        Or    HYDROmorphone (Dilaudid) injection 0.25 mg  0.25 mg Intravenous Q3HRS PRN Asequinton Rodriguez M.D.        amLODIPine (NORVASC) tablet 10 mg  10 mg Enteral Tube DAILY Eva Rodriguez M.D.   10 mg at 10/20/22 0521    folic acid (FOLVITE) tablet 1 mg  1 mg Enteral Tube DAILY Eva Rodriguez M.D.   1 mg at 10/20/22 0521    levothyroxine (SYNTHROID) tablet 50 mcg  50 mcg Enteral Tube AM ES Asequinton Rodriguez M.D.   50 mcg at 10/20/22 0521    metoprolol tartrate (LOPRESSOR) tablet 12.5 mg  12.5 mg Enteral Tube TWICE DAILY Eva Rodriguez M.D.   12.5 mg at 10/20/22 0600    senna-docusate (PERICOLACE or SENOKOT S) 8.6-50 MG per tablet 2 Tablet  2 Tablet Enteral Tube BID Asequinton Rodriguez M.D.   2 Tablet at 10/20/22 0521    And    polyethylene glycol/lytes (MIRALAX) PACKET 1 Packet  1 Packet Enteral Tube QDAY PRN Asequinton Rodriguez M.D.        And    magnesium hydroxide (MILK OF MAGNESIA) suspension 30 mL  30 mL Enteral Tube QDAY PRN Eva Rodriguez M.D.        And    bisacodyl (DULCOLAX) suppository 10 mg  10 mg Rectal QDAY PRN Asequinton Rodriguez M.D.        therapeutic multivitamin-minerals (THERAGRAN-M) tablet 1 Tablet  1 Tablet Enteral Tube DAILY Asequinton Rodriguez M.D.   1 Tablet at 10/20/22 0521    thiamine (Vitamin B-1) tablet 100 mg  100 mg Enteral Tube DAILY Asequinton Rodriguez M.D.   100 mg at 10/20/22 0521    acetaminophen (Tylenol) tablet 650 mg  650 mg Enteral Tube Q6HRS PRN Eva Rodriguez M.D.        propofol (DIPRIVAN) injection  0-80 mcg/kg/min Intravenous Continuous Nils Seymour D.O.   Paused at 10/20/22 1015    nystatin (MYCOSTATIN) cream   Topical BID GRACY MustafaO.   Given at 10/20/22 0522       Fluids    Intake/Output  Summary (Last 24 hours) at 10/20/2022 1141  Last data filed at 10/20/2022 1000  Gross per 24 hour   Intake 2311.05 ml   Output 785 ml   Net 1526.05 ml       Laboratory  Recent Labs     10/18/22  2322 10/19/22  1002 10/20/22  0433   ISTATAPH 7.345* 7.413 7.388*   ISTATAPCO2 37.4* 36.0 35.8   ISTATAPO2 92* 61* 81   ISTATATCO2 22 24 23   [id number] 97 92* 96   ISTATARTHCO3 20.4 23.0 21.6   ISTATARTBE -5* -1 -3   ISTATTEMP 96.0 F see below 97.9 F   ISTATFIO2 30  --  30   ISTATSPEC Arterial Arterial Arterial   ISTATAPHTC 7.366*  --  7.394*   [id number] 85  --  79         Recent Labs     10/18/22  1745 10/19/22  0400 10/20/22  0245   SODIUM 133* 139 139   POTASSIUM 4.3 3.6 4.2   CHLORIDE 101 108 108   CO2 16* 19* 19*   BUN 8 8 9   CREATININE 0.72 0.65 0.95   MAGNESIUM  --  1.6 2.1   PHOSPHORUS  --  4.8*  --    CALCIUM 8.4 8.1* 8.0*     Recent Labs     10/18/22  1745 10/19/22  0400 10/20/22  0245   ALTSGPT 19 16 13   ASTSGOT 28 19 23   ALKPHOSPHAT 121* 95 94   TBILIRUBIN 0.7 0.5 0.4   GLUCOSE 126* 128* 79     Recent Labs     10/18/22  1745 10/19/22  0400 10/20/22  0245   WBC 4.8 3.9* 7.4   NEUTSPOLYS 85.80*  --  66.90   LYMPHOCYTES 7.10*  --  9.20*   MONOCYTES 3.80  --  15.20*   EOSINOPHILS 0.80  --  6.40   BASOPHILS 2.10*  --  1.60   ASTSGOT 28 19 23   ALTSGPT 19 16 13   ALKPHOSPHAT 121* 95 94   TBILIRUBIN 0.7 0.5 0.4     Recent Labs     10/18/22  1745 10/19/22  0400 10/20/22  0245   RBC 4.12* 3.66* 4.03*   HEMOGLOBIN 13.9* 12.5* 13.9*   HEMATOCRIT 40.9* 37.5* 41.7*   PLATELETCT 390 320 325       Imaging  X-Ray:  CXR enlarged pulmonary vasculature    Assessment/Plan  * Alcohol dependence with withdrawal complicated by delirium (HCC)- (present on admission)  Assessment & Plan  Intubated for respiratory depression  On propofol as mechanically ventilated and started on librium  Transitioning to precedex so we can assess extubation  Prn versed  Replacing and monitoring electrolytes    Scabies- (present on  "admission)  Assessment & Plan  Rash very suspicious of scabies  On rx    On mechanically assisted ventilation (HCC)  Assessment & Plan  For etoh withdrawal and delirium with resp depression  Improved with no need for prn so librium may be working  Driving pressure 10  Fio2 30%   SBT currently and doing well    Liver mass- (present on admission)  Assessment & Plan  Seen on US 8/2022 \"right lobe of the liver measuring 1.7 x 1.3 x 1.0 cm in size\"  Will need CT versus MRI post extubation    Atrial fibrillation (HCC)- (present on admission)  Assessment & Plan  On metoprolol    Essential hypertension- (present on admission)  Assessment & Plan  On metoprolol and amlodipine       VTE:  Lovenox  Ulcer: H2 Antagonist  Lines: Salmon Catheter  Ongoing indication addressed    I have performed a physical exam and reviewed and updated ROS and Plan today (10/20/2022). In review of yesterday's note (10/19/2022), there are no changes except as documented above.     Discussed patient condition and risk of morbidity and/or mortality with Family, Pharmacy, and Charge nurse / hot rounds  The patient remains critically ill.  Critical care time = 32 minutes in directly providing and coordinating critical care and extensive data review.  No time overlap and excludes procedures.  "

## 2022-10-20 NOTE — ASSESSMENT & PLAN NOTE
-Patient initial intubation for etoh withdrawal and delirium with resp depression, patient was extubated on 10/21/2022.  -He was reintubated on 10/23/2022 due to respiratory acidosis and altered mental status found to have HAP.  -patient self extubated on the late night of 10/25 and re intubated shortly after  -Continued with lung protective strategies on mechanical ventilation.  -Continues with versed and fentanyl for sedation  -ABCDEF bundle  -failed SBT today due to abundant secretions and tachypnea, will attempt a thoracentesis today to maximize his chances of extubation.

## 2022-10-20 NOTE — PROGRESS NOTES
12-hour chart check complete.    Monitor Summary  Rhythm: Afib  Rate: 70-90s  Ectopy: Occassional  Measurements: -/.10/-

## 2022-10-20 NOTE — CARE PLAN
Problem: Safety - Medical Restraint  Goal: Remains free of injury from restraints (Restraint for Interference with Medical Device)  Outcome: Not Progressing     Problem: Optimal Care for Alcohol Withdrawal  Goal: Optimal Care for the alcohol withdrawal patient  Outcome: Not Progressing     The patient is Watcher - Medium risk of patient condition declining or worsening    Shift Goals  Clinical Goals: improved UO  Patient Goals: ANETTE  Family Goals: No family present    Progress made toward(s) clinical / shift goals:  Salmon  thus far on shift, MD aware. IVF rate increased. Failed SAT due to RR 38-42. Back to APV CMV. Dex & prop. Daughter Laura at bedside. Q2 turns. Contact precautions for scabies. NGT to left nare. NPO. Unable to follow commands during SAT.    Patient is not progressing towards the following goals:      Problem: Safety - Medical Restraint  Goal: Remains free of injury from restraints (Restraint for Interference with Medical Device)  Outcome: Not Progressing     Problem: Optimal Care for Alcohol Withdrawal  Goal: Optimal Care for the alcohol withdrawal patient  Outcome: Not Progressing

## 2022-10-20 NOTE — ASSESSMENT & PLAN NOTE
"Seen on US 8/2022 \"right lobe of the liver measuring 1.7 x 1.3 x 1.0 cm in size\"  Will need CT versus MRI post extubation and follow up with GI as outpatient.   "

## 2022-10-20 NOTE — ASSESSMENT & PLAN NOTE
Holding metoprolol due to hypotension  Currently self rate controlled.  Patient is not on chronic anticoagulation.  He was Given Amiodarone 150 mg x1 10/23 a.m.

## 2022-10-20 NOTE — CARE PLAN
Problem: Ventilation  Goal: Ability to achieve and maintain unassisted ventilation or tolerate decreased levels of ventilator support  Description: Target End Date:  4 days     Document on Vent flowsheet    1.  Support and monitor invasive and noninvasive mechanical ventilation  2.  Monitor ventilator weaning response  3.  Perform ventilator associated pneumonia prevention interventions  4.  Manage ventilation therapy by monitoring diagnostic test results  Outcome: Not Met      Ventilator Daily Summary     Vent Day #3     Ventilator settings changed this shift: APV 18, 500, +8, 30%     Weaning trials: None     Respiratory Procedures: None     Plan: Continue current ventilator settings and wean mechanical ventilation as tolerated per physician orders.

## 2022-10-20 NOTE — CARE PLAN
The patient is Watcher - Medium risk of patient condition declining or worsening    Shift Goals  Clinical Goals: Manage withdrawal symptoms  Patient Goals: ANETTE  Family Goals: No family present    Progress made toward(s) clinical / shift goals:      Problem: Optimal Care for Alcohol Withdrawal  Goal: Optimal Care for the alcohol withdrawal patient  Outcome: Progressing     Problem: Seizure Precautions  Goal: Implementation of seizure precautions  Outcome: Progressing     Problem: Risk for Aspiration  Goal: Patient's risk for aspiration will be absent or decrease  Outcome: Progressing     Problem: Skin Integrity  Goal: Skin integrity is maintained or improved  Outcome: Progressing       Patient is not progressing towards the following goals:      Problem: Knowledge Deficit - Standard  Goal: Patient and family/care givers will demonstrate understanding of plan of care, disease process/condition, diagnostic tests and medications  Outcome: Not Progressing     Problem: Lifestyle Changes  Goal: Patient's ability to identify lifestyle changes and available resources to help reduce recurrence of condition will improve  Outcome: Not Progressing     Problem: Psychosocial  Goal: Patient's level of anxiety will decrease  Outcome: Not Progressing

## 2022-10-20 NOTE — DIETARY
"Nutrition services: Day 2 of admit.  Tyree Whiteside is a 57 y.o. male with admitting DX of Alcohol withdrawal delirium.    Consult received for poor PO intake/wt loss unsure amount PTA per admit screen.    Assessment:  Height: 177.8 cm (5' 10\")  Weight: 85 kg (187 lb 6.3 oz) - via bed scale  Body mass index is 26.89 kg/m²., BMI classification: Overweight  Diet/Intake: NPO    Evaluation:   Hx lives in hotel, drinks 1 pint vodka + 4-5 beers/day (last drink 10/17/22 per ERP note), hx meth use, afib, liver mass (08/2022). Dx list: EtOH dependence w/ withdrawal c/b delirium, a-fib, HTN, scabies.  Labs: calcium 8.0 (corrected for alb = 9.04, WNL), alb 2.7  MAR: 0.9% NaCl w/ KCl, precedex, folvite, synthroid, ICU electrolyte replacement, propofol (paused), senna, MVI-M, thiamine, librium  Dependent pitting 1+ edema to BLEs; generalized trace/dependent edema  +intubated  Wt per chart hx shows 13.6% wt loss in 11 months, 5.7% in past 3 months; wt loss not significant  220 lb 11/30/21  200.2 lb 3/19/22  202 lb 7/20/22  190.3 lb 10/18/22  Nutrition-focused physical exam limited given pt intubated/sedated. At bedside, observed flat temporals; no other overt signs of fat/muscle wasting noted.    Malnutrition Risk: ASPEN criteria not met at this time; evaluation limited given pt intubated/sedated.    Recommendations/Plan:  PO diet as medically feasible.  Monitor weight.    RD following.      .  "

## 2022-10-20 NOTE — ASSESSMENT & PLAN NOTE
"Seen on US 8/2022 \"right lobe of the liver measuring 1.7 x 1.3 x 1.0 cm in size\"  Will need CT versus MRI post extubation  "

## 2022-10-21 ENCOUNTER — APPOINTMENT (OUTPATIENT)
Dept: RADIOLOGY | Facility: MEDICAL CENTER | Age: 57
DRG: 870 | End: 2022-10-21
Attending: INTERNAL MEDICINE
Payer: COMMERCIAL

## 2022-10-21 LAB
ANION GAP SERPL CALC-SCNC: 8 MMOL/L (ref 7–16)
BASE EXCESS BLDA CALC-SCNC: -6 MMOL/L (ref -4–3)
BODY TEMPERATURE: ABNORMAL DEGREES
BUN SERPL-MCNC: 9 MG/DL (ref 8–22)
CALCIUM SERPL-MCNC: 7.7 MG/DL (ref 8.4–10.2)
CHLORIDE SERPL-SCNC: 118 MMOL/L (ref 96–112)
CO2 BLDA-SCNC: 20 MMOL/L (ref 20–33)
CO2 SERPL-SCNC: 18 MMOL/L (ref 20–33)
CREAT SERPL-MCNC: 0.67 MG/DL (ref 0.5–1.4)
GFR SERPLBLD CREATININE-BSD FMLA CKD-EPI: 110 ML/MIN/1.73 M 2
GLUCOSE SERPL-MCNC: 82 MG/DL (ref 65–99)
HCO3 BLDA-SCNC: 18.9 MMOL/L (ref 17–25)
HOROWITZ INDEX BLDA+IHG-RTO: 250 MM[HG]
LACTATE BLD-SCNC: 0.7 MMOL/L (ref 0.5–2)
MAGNESIUM SERPL-MCNC: 1.6 MG/DL (ref 1.5–2.5)
O2/TOTAL GAS SETTING VFR VENT: 30 %
PCO2 BLDA: 35.4 MMHG (ref 26–37)
PH BLDA: 7.33 [PH] (ref 7.4–7.5)
PH TEMP ADJ BLDA: 7.34 [PH] (ref 7.4–7.5)
PHOSPHATE SERPL-MCNC: 3.4 MG/DL (ref 2.5–4.5)
PO2 BLDA: 75 MMHG (ref 64–87)
PO2 TEMP ADJ BLDA: 73 MMHG (ref 64–87)
POTASSIUM SERPL-SCNC: 4.7 MMOL/L (ref 3.6–5.5)
SAO2 % BLDA: 94 % (ref 93–99)
SODIUM SERPL-SCNC: 144 MMOL/L (ref 135–145)
SPECIMEN DRAWN FROM PATIENT: ABNORMAL

## 2022-10-21 PROCEDURE — 700102 HCHG RX REV CODE 250 W/ 637 OVERRIDE(OP): Performed by: INTERNAL MEDICINE

## 2022-10-21 PROCEDURE — 700111 HCHG RX REV CODE 636 W/ 250 OVERRIDE (IP): Performed by: INTERNAL MEDICINE

## 2022-10-21 PROCEDURE — 700102 HCHG RX REV CODE 250 W/ 637 OVERRIDE(OP): Performed by: HOSPITALIST

## 2022-10-21 PROCEDURE — 700111 HCHG RX REV CODE 636 W/ 250 OVERRIDE (IP): Performed by: HOSPITALIST

## 2022-10-21 PROCEDURE — 83735 ASSAY OF MAGNESIUM: CPT

## 2022-10-21 PROCEDURE — 83605 ASSAY OF LACTIC ACID: CPT

## 2022-10-21 PROCEDURE — 99291 CRITICAL CARE FIRST HOUR: CPT | Performed by: INTERNAL MEDICINE

## 2022-10-21 PROCEDURE — 71045 X-RAY EXAM CHEST 1 VIEW: CPT

## 2022-10-21 PROCEDURE — A9270 NON-COVERED ITEM OR SERVICE: HCPCS | Performed by: INTERNAL MEDICINE

## 2022-10-21 PROCEDURE — 84100 ASSAY OF PHOSPHORUS: CPT

## 2022-10-21 PROCEDURE — 700101 HCHG RX REV CODE 250: Performed by: INTERNAL MEDICINE

## 2022-10-21 PROCEDURE — 94003 VENT MGMT INPAT SUBQ DAY: CPT

## 2022-10-21 PROCEDURE — 770022 HCHG ROOM/CARE - ICU (200)

## 2022-10-21 PROCEDURE — 80048 BASIC METABOLIC PNL TOTAL CA: CPT

## 2022-10-21 PROCEDURE — 82803 BLOOD GASES ANY COMBINATION: CPT

## 2022-10-21 PROCEDURE — 36600 WITHDRAWAL OF ARTERIAL BLOOD: CPT

## 2022-10-21 PROCEDURE — 94760 N-INVAS EAR/PLS OXIMETRY 1: CPT

## 2022-10-21 PROCEDURE — A9270 NON-COVERED ITEM OR SERVICE: HCPCS | Performed by: HOSPITALIST

## 2022-10-21 RX ORDER — HYDRALAZINE HYDROCHLORIDE 20 MG/ML
10 INJECTION INTRAMUSCULAR; INTRAVENOUS EVERY 6 HOURS PRN
Status: DISCONTINUED | OUTPATIENT
Start: 2022-10-21 | End: 2022-10-26

## 2022-10-21 RX ORDER — MAGNESIUM SULFATE HEPTAHYDRATE 40 MG/ML
2 INJECTION, SOLUTION INTRAVENOUS ONCE
Status: COMPLETED | OUTPATIENT
Start: 2022-10-21 | End: 2022-10-21

## 2022-10-21 RX ADMIN — DEXMEDETOMIDINE HYDROCHLORIDE 0.8 MCG/KG/HR: 4 INJECTION, SOLUTION INTRAVENOUS at 05:14

## 2022-10-21 RX ADMIN — LEVOTHYROXINE SODIUM 50 MCG: 50 TABLET ORAL at 05:14

## 2022-10-21 RX ADMIN — CHLORDIAZEPOXIDE HYDROCHLORIDE 25 MG: 25 CAPSULE ORAL at 05:15

## 2022-10-21 RX ADMIN — ACETAMINOPHEN 650 MG: 325 TABLET, FILM COATED ORAL at 17:18

## 2022-10-21 RX ADMIN — FOLIC ACID 1 MG: 1 TABLET ORAL at 05:15

## 2022-10-21 RX ADMIN — POTASSIUM CHLORIDE AND SODIUM CHLORIDE: 900; 150 INJECTION, SOLUTION INTRAVENOUS at 13:02

## 2022-10-21 RX ADMIN — DEXMEDETOMIDINE HYDROCHLORIDE 1.5 MCG/KG/HR: 4 INJECTION, SOLUTION INTRAVENOUS at 10:23

## 2022-10-21 RX ADMIN — METOPROLOL TARTRATE 12.5 MG: 25 TABLET, FILM COATED ORAL at 17:19

## 2022-10-21 RX ADMIN — SENNOSIDES AND DOCUSATE SODIUM 2 TABLET: 50; 8.6 TABLET ORAL at 05:14

## 2022-10-21 RX ADMIN — POTASSIUM CHLORIDE AND SODIUM CHLORIDE 1000 ML: 900; 150 INJECTION, SOLUTION INTRAVENOUS at 23:54

## 2022-10-21 RX ADMIN — THIAMINE HCL TAB 100 MG 100 MG: 100 TAB at 05:15

## 2022-10-21 RX ADMIN — CHLORDIAZEPOXIDE HYDROCHLORIDE 25 MG: 25 CAPSULE ORAL at 13:54

## 2022-10-21 RX ADMIN — POTASSIUM CHLORIDE AND SODIUM CHLORIDE: 900; 150 INJECTION, SOLUTION INTRAVENOUS at 03:10

## 2022-10-21 RX ADMIN — MULTIPLE VITAMINS W/ MINERALS TAB 1 TABLET: TAB at 05:15

## 2022-10-21 RX ADMIN — METOPROLOL TARTRATE 12.5 MG: 25 TABLET, FILM COATED ORAL at 05:15

## 2022-10-21 RX ADMIN — PROPOFOL 20 MCG/KG/MIN: 10 INJECTION, EMULSION INTRAVENOUS at 03:48

## 2022-10-21 RX ADMIN — SENNOSIDES AND DOCUSATE SODIUM 2 TABLET: 50; 8.6 TABLET ORAL at 17:18

## 2022-10-21 RX ADMIN — DEXMEDETOMIDINE HYDROCHLORIDE 1.2 MCG/KG/HR: 4 INJECTION, SOLUTION INTRAVENOUS at 13:54

## 2022-10-21 RX ADMIN — ENOXAPARIN SODIUM 40 MG: 100 INJECTION SUBCUTANEOUS at 17:17

## 2022-10-21 RX ADMIN — AMLODIPINE BESYLATE 10 MG: 5 TABLET ORAL at 06:00

## 2022-10-21 RX ADMIN — DEXMEDETOMIDINE HYDROCHLORIDE 0.8 MCG/KG/HR: 4 INJECTION, SOLUTION INTRAVENOUS at 19:17

## 2022-10-21 RX ADMIN — MAGNESIUM SULFATE 2 G: 2 INJECTION INTRAVENOUS at 13:02

## 2022-10-21 ASSESSMENT — LIFESTYLE VARIABLES
NAUSEA AND VOMITING: NO NAUSEA AND NO VOMITING
TOTAL SCORE: 11
PAROXYSMAL SWEATS: NO SWEAT VISIBLE
AGITATION: *
ORIENTATION AND CLOUDING OF SENSORIUM: DISORIENTED FOR PLACE AND / OR PERSON
ANXIETY: NO ANXIETY (AT EASE)
VISUAL DISTURBANCES: NOT PRESENT
TREMOR: MODERATE TREMOR WITH ARMS EXTENDED
HEADACHE, FULLNESS IN HEAD: NOT PRESENT
AUDITORY DISTURBANCES: NOT PRESENT

## 2022-10-21 ASSESSMENT — PAIN DESCRIPTION - PAIN TYPE
TYPE: ACUTE PAIN

## 2022-10-21 ASSESSMENT — FIBROSIS 4 INDEX: FIB4 SCORE: 1.1

## 2022-10-21 NOTE — PROGRESS NOTES
12-hour chart check complete.    Monitor Summary  Rhythm: A-fib   Rate: 64-86  Ectopy: N/a  Measurements: -/0.08/-

## 2022-10-21 NOTE — PROGRESS NOTES
12-hour chart check complete.    Monitor Summary  Rhythm: a fib  Rate: 70-74  Ectopy: F PVC  Measurements: -/.10/-

## 2022-10-21 NOTE — PROGRESS NOTES
Pulmonary Progress Note    Date of admission  10/18/2022    Chief Complaint  57 y.o. male admitted 10/18/2022 with etoh withdrawal    Hospital Course  58 yo male admitted on 10/19/2022 with ETOh withdrawal  PMHx: lives in a motel, drinks a pint of vodka a day (last drinl 10/7), prior hx of meth use, afib not on anticoagulation, liver mass on Us on August 2022 (2.  Solid hypoechoic mass within the right lobe of the liver measuring 1.7 x 1.3 x 1.0 cm in size. Follow-up pre and postcontrast multiphasic hepatic CT or MRI is recommended for further evaluation)  Received 10 mg ativan and 650 mg of phenobarb went into resp distress requiring intubation    10/19: started librium 25 mg tid; Rx of scabies      Interval Problem Update  Reviewed last 24 hour events:  Chart review from the past 24 hours includes imaging, laboratory studies, vital signs and notes available.  Pertinent data for today    Cardiac:  no issues, BP ok  Pulm:  Vent Mode: APVCMV  Rate (breaths/min):  [18] 18  PEEP/CPAP:  [8] 8  PIP:  [17-25] 17  MAP:  [11-12] 11  Driving pressure 12  Trial of SBP again this am yesterday stopped due to RR > 40  Neuro:  sedated with precedex and has librium  Heme:  stable  I/O:   1936/612  ID: scabies being treated  GI/endo:  FS ok  Labs/Imaging:  reviewed  Lines:  wheeler   Mobility:  bed rest as sedated and intubated      Review of Systems  Review of Systems   Unable to perform ROS: Acuity of condition      Vital Signs for last 24 hours   Temp:  [37.5 °C (99.5 °F)-38 °C (100.4 °F)] 37.5 °C (99.5 °F)  Pulse:  [54-87] (P) 83  Resp:  [17-48] (P) 38  BP: (102-157)/() (P) 145/89  SpO2:  [93 %-100 %] 98 %          Respiratory Information for the last 24 hours  Vent Mode: APVCMV  Rate (breaths/min): 18  Vt Target (mL): 500  PEEP/CPAP: 8  MAP: 11  Control VTE (exp VT): 508    Physical Exam   Physical Exam  Constitutional:       Comments: Intubated and sedated   HENT:      Head: Normocephalic and atraumatic.       Mouth/Throat:      Mouth: Mucous membranes are dry.   Eyes:      Extraocular Movements: Extraocular movements intact.      Pupils: Pupils are equal, round, and reactive to light.   Cardiovascular:      Rate and Rhythm: Normal rate. Rhythm irregular.   Pulmonary:      Breath sounds: Normal breath sounds.   Abdominal:      General: Abdomen is flat. Bowel sounds are normal.      Palpations: Abdomen is soft.   Skin:     Comments: diffuse rash all over body, back hands and feet with wheals, vesicles and keratosis   Neurological:      Comments: Unable to assess as intubated and sedated   Psychiatric:      Comments: Intubated and sedated        Medications  Current Facility-Administered Medications   Medication Dose Route Frequency Provider Last Rate Last Admin    hydrALAZINE (APRESOLINE) injection 10 mg  10 mg Intravenous Q6HRS PRN Nils Seymour D.O.        magnesium sulfate IVPB premix 2 g  2 g Intravenous Once Norm Choudhary M.D. 25 mL/hr at 10/21/22 1302 2 g at 10/21/22 1302    dexmedetomidine (PRECEDEX) 400 mcg/100mL NS premix infusion  0.1-1.5 mcg/kg/hr Intravenous Continuous Norm Choudhary M.D. 22.7 mL/hr at 10/21/22 1255 1.1 mcg/kg/hr at 10/21/22 1255    MD Alert...ICU Electrolyte Replacement per Pharmacy   Other PHARMACY TO DOSE Norm Choudhary M.D.        0.9 % NaCl with KCl 20 mEq infusion   Intravenous Continuous Norm Choudhary M.D. 75 mL/hr at 10/21/22 1302 New Bag at 10/21/22 1302    chlordiazePOXIDE (Librium) capsule 25 mg  25 mg Enteral Tube Q8HRS Norm Choudhary M.D.   25 mg at 10/21/22 0515    midazolam (Versed) injection 1 mg  1 mg Intravenous Q HOUR PRN Norm Choudhary M.D.        Or    midazolam (Versed) injection 2 mg  2 mg Intravenous Q HOUR PRN Norm Choudhary M.D.        Or    midazolam (Versed) injection 4 mg  4 mg Intravenous Q HOUR PRN Norm Choudhary M.D.        Or    midazolam (Versed) injection 5 mg  5 mg Intravenous Q HOUR PRN Norm  MAN Choudhary        albuterol inhaler 2 Puff  2 Puff Inhalation Q6HRS PRN Eva Rodriguez M.D.        enoxaparin (Lovenox) inj 40 mg  40 mg Subcutaneous DAILY AT 1800 Asem MILI Rodriguez M.D.   40 mg at 10/20/22 1624    Pharmacy Consult Request ...Pain Management Review 1 Each  1 Each Other PHARMACY TO DOSE Asem MILI Rodriguez M.D.        oxyCODONE immediate-release (ROXICODONE) tablet 2.5 mg  2.5 mg Oral Q3HRS PRN Amiem MILI Rodriguez M.D.        Or    oxyCODONE immediate-release (ROXICODONE) tablet 5 mg  5 mg Oral Q3HRS PRN Eva Rodriguez M.D.        Or    HYDROmorphone (Dilaudid) injection 0.25 mg  0.25 mg Intravenous Q3HRS PRN Asequinton Rodriguez M.D.        amLODIPine (NORVASC) tablet 10 mg  10 mg Enteral Tube DAILY Asem MILI Rodriguez M.D.   10 mg at 10/21/22 0600    folic acid (FOLVITE) tablet 1 mg  1 mg Enteral Tube DAILY Asem MILI Rodriguez M.D.   1 mg at 10/21/22 0515    levothyroxine (SYNTHROID) tablet 50 mcg  50 mcg Enteral Tube AM ES Asequinton Rodriguez M.D.   50 mcg at 10/21/22 0514    metoprolol tartrate (LOPRESSOR) tablet 12.5 mg  12.5 mg Enteral Tube TWICE DAILY Asem MILI Rodriguez M.D.   12.5 mg at 10/21/22 0515    senna-docusate (PERICOLACE or SENOKOT S) 8.6-50 MG per tablet 2 Tablet  2 Tablet Enteral Tube BID Asem MILI Rodriguez M.D.   2 Tablet at 10/21/22 0514    And    polyethylene glycol/lytes (MIRALAX) PACKET 1 Packet  1 Packet Enteral Tube QDAY PRN Asem MILI Rodriguez M.D.        And    magnesium hydroxide (MILK OF MAGNESIA) suspension 30 mL  30 mL Enteral Tube QDAY PRN Asem MILI Rodriguez M.D.        And    bisacodyl (DULCOLAX) suppository 10 mg  10 mg Rectal QDAY PRN Eva Rodriguez M.D.        therapeutic multivitamin-minerals (THERAGRAN-M) tablet 1 Tablet  1 Tablet Enteral Tube DAILY Eva Rodriguez M.D.   1 Tablet at 10/21/22 0515    thiamine (Vitamin B-1) tablet 100 mg  100 mg Enteral Tube DAILY Eva Rodriguez M.D.   100 mg at 10/21/22 0515    acetaminophen (Tylenol) tablet 650 mg  650 mg Enteral Tube  Q6HRS PRN Eva Rodriguez M.D.           Fluids    Intake/Output Summary (Last 24 hours) at 10/21/2022 1305  Last data filed at 10/21/2022 1243  Gross per 24 hour   Intake 3722.88 ml   Output 833 ml   Net 2889.88 ml       Laboratory  Recent Labs     10/18/22  2322 10/19/22  1002 10/20/22  0433 10/21/22  0421   ISTATAPH 7.345* 7.413 7.388* 7.335*   ISTATAPCO2 37.4* 36.0 35.8 35.4   ISTATAPO2 92* 61* 81 75   ISTATATCO2 22 24 23 20   [id number] 97 92* 96 94   ISTATARTHCO3 20.4 23.0 21.6 18.9   ISTATARTBE -5* -1 -3 -6*   ISTATTEMP 96.0 F see below 97.9 F 97.7 F   ISTATFIO2 30  --  30 30   ISTATSPEC Arterial Arterial Arterial Arterial   ISTATAPHTC 7.366*  --  7.394* 7.342*   [id number] 85  --  79 73         Recent Labs     10/19/22  0400 10/20/22  0245 10/21/22  0738   SODIUM 139 139 144   POTASSIUM 3.6 4.2 4.7   CHLORIDE 108 108 118*   CO2 19* 19* 18*   BUN 8 9 9   CREATININE 0.65 0.95 0.67   MAGNESIUM 1.6 2.1 1.6   PHOSPHORUS 4.8*  --  3.4   CALCIUM 8.1* 8.0* 7.7*     Recent Labs     10/18/22  1745 10/19/22  0400 10/20/22  0245 10/21/22  0738   ALTSGPT 19 16 13  --    ASTSGOT 28 19 23  --    ALKPHOSPHAT 121* 95 94  --    TBILIRUBIN 0.7 0.5 0.4  --    GLUCOSE 126* 128* 79 82     Recent Labs     10/18/22  1745 10/19/22  0400 10/20/22  0245   WBC 4.8 3.9* 7.4   NEUTSPOLYS 85.80*  --  66.90   LYMPHOCYTES 7.10*  --  9.20*   MONOCYTES 3.80  --  15.20*   EOSINOPHILS 0.80  --  6.40   BASOPHILS 2.10*  --  1.60   ASTSGOT 28 19 23   ALTSGPT 19 16 13   ALKPHOSPHAT 121* 95 94   TBILIRUBIN 0.7 0.5 0.4     Recent Labs     10/18/22  1745 10/19/22  0400 10/20/22  0245   RBC 4.12* 3.66* 4.03*   HEMOGLOBIN 13.9* 12.5* 13.9*   HEMATOCRIT 40.9* 37.5* 41.7*   PLATELETCT 390 320 325       Imaging  No new imaging    Assessment/Plan  * Alcohol dependence with withdrawal complicated by delirium (HCC)- (present on admission)  Assessment & Plan  Intubated for respiratory depression  On librium and precedex  Propofol restarted overnight so  "stopped for SBT  Transitioning to precedex so we can assess extubation   Prn versed  Replacing and monitoring electrolytes    Scabies- (present on admission)  Assessment & Plan  Rash very suspicious of scabies  On rx    On mechanically assisted ventilation (HCC)  Assessment & Plan  For etoh withdrawal and delirium with resp depression  Improved with no need for prn so librium may be working  Driving pressure 10  Fio2 30%   SBT currently and doing well    Liver mass- (present on admission)  Assessment & Plan  Seen on US 8/2022 \"right lobe of the liver measuring 1.7 x 1.3 x 1.0 cm in size\"  Will need CT versus MRI post extubation    Atrial fibrillation (HCC)- (present on admission)  Assessment & Plan  On metoprolol    Essential hypertension- (present on admission)  Assessment & Plan  On metoprolol and amlodipine       VTE:  Lovenox  Ulcer: H2 Antagonist  Lines: Salmon Catheter  Ongoing indication addressed    I have performed a physical exam and reviewed and updated ROS and Plan today (10/21/2022). In review of yesterday's note (10/20/2022), there are no changes except as documented above.     Discussed patient condition and risk of morbidity and/or mortality with Family, Pharmacy, and Charge nurse / hot rounds  The patient remains critically ill.  Critical care time = 33 minutes in directly providing and coordinating critical care and extensive data review.  No time overlap and excludes procedures.  "

## 2022-10-21 NOTE — PROGRESS NOTES
Pt extubated at 1145 per MD to 15L Oxymask. Pt intermittently following commands. RR in the 50s, MD aware.

## 2022-10-21 NOTE — CARE PLAN
The patient is Stable - Low risk of patient condition declining or worsening    Shift Goals  Clinical Goals: Follow commands  Patient Goals: ANETTE  Family Goals: No family present    Progress made toward(s) clinical / shift goals:    Problem: Knowledge Deficit - Standard  Goal: Patient and family/care givers will demonstrate understanding of plan of care, disease process/condition, diagnostic tests and medications  Outcome: Not Progressing     Problem: Safety - Medical Restraint  Goal: Remains free of injury from restraints (Restraint for Interference with Medical Device)  Outcome: Progressing     Problem: Seizure Precautions  Goal: Implementation of seizure precautions  Outcome: Progressing     Problem: Risk for Aspiration  Goal: Patient's risk for aspiration will be absent or decrease  Outcome: Progressing     Problem: Fall Risk  Goal: Patient will remain free from falls  Outcome: Progressing       Patient is not progressing towards the following goals:      Problem: Knowledge Deficit - Standard  Goal: Patient and family/care givers will demonstrate understanding of plan of care, disease process/condition, diagnostic tests and medications  Outcome: Not Progressing

## 2022-10-21 NOTE — PROGRESS NOTES
12-hour chart check complete.    Monitor Summary  Rhythm: A-fib, controlled rates  Rate: 60s-80s, occasionally dipping to 47  Ectopy: Rare PVCs  Measurements: -/0.08/-

## 2022-10-21 NOTE — CARE PLAN
Problem: Ventilation  Goal: Ability to achieve and maintain unassisted ventilation or tolerate decreased levels of ventilator support  Description: Target End Date:  4 days     Document on Vent flowsheet    1.  Support and monitor invasive and noninvasive mechanical ventilation  2.  Monitor ventilator weaning response  3.  Perform ventilator associated pneumonia prevention interventions  4.  Manage ventilation therapy by monitoring diagnostic test results  Outcome: Progressing      Ventilator Daily Summary     Vent Day #4     Ventilator settings changed this shift: APV 18, 500, +8, 30%     Weaning trials: None     Respiratory Procedures: None     Plan: Continue current ventilator settings and wean mechanical ventilation as tolerated per physician orders.

## 2022-10-21 NOTE — PROGRESS NOTES
Pt becoming increasingly hypertensive as sedation is titrated down. Pt resting comfortably. Dr. Seymour on unit at this time and informed of situation. Dr. Seymour states he will order a PRN anti-hypertensive. Will administer when order is available.    Edited to add: PRN order for hydralazine placed by Dr. Seymour for SBP > 180. Pt does not meet administration parameters at this time.

## 2022-10-22 ENCOUNTER — APPOINTMENT (OUTPATIENT)
Dept: RADIOLOGY | Facility: MEDICAL CENTER | Age: 57
DRG: 870 | End: 2022-10-22
Attending: INTERNAL MEDICINE
Payer: COMMERCIAL

## 2022-10-22 LAB
ANION GAP SERPL CALC-SCNC: 8 MMOL/L (ref 7–16)
B PARAP IS1001 DNA NPH QL NAA+NON-PROBE: NOT DETECTED
B PERT.PT PRMT NPH QL NAA+NON-PROBE: NOT DETECTED
BASOPHILS # BLD AUTO: 0 % (ref 0–1.8)
BASOPHILS # BLD: 0 K/UL (ref 0–0.12)
BUN SERPL-MCNC: 8 MG/DL (ref 8–22)
C PNEUM DNA NPH QL NAA+NON-PROBE: NOT DETECTED
CA-I SERPL-SCNC: 1.14 MMOL/L (ref 1.1–1.3)
CALCIUM SERPL-MCNC: 8.1 MG/DL (ref 8.4–10.2)
CHLORIDE SERPL-SCNC: 116 MMOL/L (ref 96–112)
CO2 SERPL-SCNC: 18 MMOL/L (ref 20–33)
CREAT SERPL-MCNC: 0.59 MG/DL (ref 0.5–1.4)
EOSINOPHIL # BLD AUTO: 0 K/UL (ref 0–0.51)
EOSINOPHIL NFR BLD: 0 % (ref 0–6.9)
ERYTHROCYTE [DISTWIDTH] IN BLOOD BY AUTOMATED COUNT: 56.2 FL (ref 35.9–50)
FLUAV RNA NPH QL NAA+NON-PROBE: NOT DETECTED
FLUBV RNA NPH QL NAA+NON-PROBE: NOT DETECTED
GFR SERPLBLD CREATININE-BSD FMLA CKD-EPI: 114 ML/MIN/1.73 M 2
GLUCOSE SERPL-MCNC: 81 MG/DL (ref 65–99)
HADV DNA NPH QL NAA+NON-PROBE: NOT DETECTED
HCOV 229E RNA NPH QL NAA+NON-PROBE: NOT DETECTED
HCOV HKU1 RNA NPH QL NAA+NON-PROBE: NOT DETECTED
HCOV NL63 RNA NPH QL NAA+NON-PROBE: NOT DETECTED
HCOV OC43 RNA NPH QL NAA+NON-PROBE: NOT DETECTED
HCT VFR BLD AUTO: 46.8 % (ref 42–52)
HGB BLD-MCNC: 15.3 G/DL (ref 14–18)
HMPV RNA NPH QL NAA+NON-PROBE: NOT DETECTED
HPIV1 RNA NPH QL NAA+NON-PROBE: NOT DETECTED
HPIV2 RNA NPH QL NAA+NON-PROBE: NOT DETECTED
HPIV3 RNA NPH QL NAA+NON-PROBE: NOT DETECTED
HPIV4 RNA NPH QL NAA+NON-PROBE: NOT DETECTED
LYMPHOCYTES # BLD AUTO: 0.48 K/UL (ref 1–4.8)
LYMPHOCYTES NFR BLD: 2 % (ref 22–41)
M PNEUMO DNA NPH QL NAA+NON-PROBE: NOT DETECTED
MAGNESIUM SERPL-MCNC: 1.8 MG/DL (ref 1.5–2.5)
MANUAL DIFF BLD: NORMAL
MCH RBC QN AUTO: 33.9 PG (ref 27–33)
MCHC RBC AUTO-ENTMCNC: 32.7 G/DL (ref 33.7–35.3)
MCV RBC AUTO: 103.8 FL (ref 81.4–97.8)
MONOCYTES # BLD AUTO: 3.35 K/UL (ref 0–0.85)
MONOCYTES NFR BLD AUTO: 14 % (ref 0–13.4)
NEUTROPHILS # BLD AUTO: 20.08 K/UL (ref 1.82–7.42)
NEUTROPHILS NFR BLD: 74 % (ref 44–72)
NEUTS BAND NFR BLD MANUAL: 10 % (ref 0–10)
NRBC # BLD AUTO: 0 K/UL
NRBC BLD-RTO: 0 /100 WBC
PHOSPHATE SERPL-MCNC: 3.6 MG/DL (ref 2.5–4.5)
PLATELET # BLD AUTO: 370 K/UL (ref 164–446)
PLATELET BLD QL SMEAR: NORMAL
PMV BLD AUTO: 10.1 FL (ref 9–12.9)
POTASSIUM SERPL-SCNC: 4.7 MMOL/L (ref 3.6–5.5)
PROCALCITONIN SERPL-MCNC: 0.44 NG/ML
RBC # BLD AUTO: 4.51 M/UL (ref 4.7–6.1)
RBC BLD AUTO: NORMAL
RSV RNA NPH QL NAA+NON-PROBE: NOT DETECTED
RV+EV RNA NPH QL NAA+NON-PROBE: NOT DETECTED
SARS-COV-2 RNA NPH QL NAA+NON-PROBE: NOTDETECTED
SODIUM SERPL-SCNC: 142 MMOL/L (ref 135–145)
T PALLIDUM AB SER QL IA: NORMAL
WBC # BLD AUTO: 23.9 K/UL (ref 4.8–10.8)

## 2022-10-22 PROCEDURE — 87186 SC STD MICRODIL/AGAR DIL: CPT | Mod: 91

## 2022-10-22 PROCEDURE — 87077 CULTURE AEROBIC IDENTIFY: CPT | Mod: 91

## 2022-10-22 PROCEDURE — 87486 CHLMYD PNEUM DNA AMP PROBE: CPT

## 2022-10-22 PROCEDURE — 700102 HCHG RX REV CODE 250 W/ 637 OVERRIDE(OP): Performed by: INTERNAL MEDICINE

## 2022-10-22 PROCEDURE — 87581 M.PNEUMON DNA AMP PROBE: CPT

## 2022-10-22 PROCEDURE — 700101 HCHG RX REV CODE 250: Performed by: INTERNAL MEDICINE

## 2022-10-22 PROCEDURE — 87147 CULTURE TYPE IMMUNOLOGIC: CPT

## 2022-10-22 PROCEDURE — 94760 N-INVAS EAR/PLS OXIMETRY 1: CPT

## 2022-10-22 PROCEDURE — 99291 CRITICAL CARE FIRST HOUR: CPT | Performed by: INTERNAL MEDICINE

## 2022-10-22 PROCEDURE — 85025 COMPLETE CBC W/AUTO DIFF WBC: CPT

## 2022-10-22 PROCEDURE — 74018 RADEX ABDOMEN 1 VIEW: CPT

## 2022-10-22 PROCEDURE — 87205 SMEAR GRAM STAIN: CPT

## 2022-10-22 PROCEDURE — 84145 PROCALCITONIN (PCT): CPT

## 2022-10-22 PROCEDURE — 700102 HCHG RX REV CODE 250 W/ 637 OVERRIDE(OP): Performed by: HOSPITALIST

## 2022-10-22 PROCEDURE — 700111 HCHG RX REV CODE 636 W/ 250 OVERRIDE (IP): Performed by: INTERNAL MEDICINE

## 2022-10-22 PROCEDURE — 87798 DETECT AGENT NOS DNA AMP: CPT

## 2022-10-22 PROCEDURE — A9270 NON-COVERED ITEM OR SERVICE: HCPCS | Performed by: HOSPITALIST

## 2022-10-22 PROCEDURE — 80048 BASIC METABOLIC PNL TOTAL CA: CPT

## 2022-10-22 PROCEDURE — 82330 ASSAY OF CALCIUM: CPT

## 2022-10-22 PROCEDURE — A9270 NON-COVERED ITEM OR SERVICE: HCPCS | Performed by: INTERNAL MEDICINE

## 2022-10-22 PROCEDURE — 87633 RESP VIRUS 12-25 TARGETS: CPT

## 2022-10-22 PROCEDURE — 700105 HCHG RX REV CODE 258: Performed by: INTERNAL MEDICINE

## 2022-10-22 PROCEDURE — 770022 HCHG ROOM/CARE - ICU (200)

## 2022-10-22 PROCEDURE — 700111 HCHG RX REV CODE 636 W/ 250 OVERRIDE (IP): Performed by: HOSPITALIST

## 2022-10-22 PROCEDURE — 83735 ASSAY OF MAGNESIUM: CPT

## 2022-10-22 PROCEDURE — 87070 CULTURE OTHR SPECIMN AEROBIC: CPT

## 2022-10-22 PROCEDURE — 87040 BLOOD CULTURE FOR BACTERIA: CPT

## 2022-10-22 PROCEDURE — 86780 TREPONEMA PALLIDUM: CPT

## 2022-10-22 PROCEDURE — 85007 BL SMEAR W/DIFF WBC COUNT: CPT

## 2022-10-22 PROCEDURE — 84100 ASSAY OF PHOSPHORUS: CPT

## 2022-10-22 PROCEDURE — 36415 COLL VENOUS BLD VENIPUNCTURE: CPT

## 2022-10-22 RX ORDER — GLYCOPYRROLATE 0.2 MG/ML
0.2 INJECTION INTRAMUSCULAR; INTRAVENOUS ONCE
Status: COMPLETED | OUTPATIENT
Start: 2022-10-22 | End: 2022-10-22

## 2022-10-22 RX ORDER — OXYCODONE HYDROCHLORIDE 5 MG/1
5 TABLET ORAL
Status: DISCONTINUED | OUTPATIENT
Start: 2022-10-22 | End: 2022-10-23

## 2022-10-22 RX ORDER — HYDROMORPHONE HYDROCHLORIDE 1 MG/ML
0.25 INJECTION, SOLUTION INTRAMUSCULAR; INTRAVENOUS; SUBCUTANEOUS
Status: DISCONTINUED | OUTPATIENT
Start: 2022-10-22 | End: 2022-10-23

## 2022-10-22 RX ORDER — MAGNESIUM SULFATE HEPTAHYDRATE 40 MG/ML
2 INJECTION, SOLUTION INTRAVENOUS ONCE
Status: COMPLETED | OUTPATIENT
Start: 2022-10-22 | End: 2022-10-22

## 2022-10-22 RX ORDER — GLYCOPYRROLATE 0.2 MG/ML
0.1 INJECTION INTRAMUSCULAR; INTRAVENOUS ONCE
Status: DISCONTINUED | OUTPATIENT
Start: 2022-10-22 | End: 2022-10-22

## 2022-10-22 RX ORDER — GLYCOPYRROLATE 0.2 MG/ML
0.1 INJECTION INTRAMUSCULAR; INTRAVENOUS EVERY 6 HOURS PRN
Status: DISCONTINUED | OUTPATIENT
Start: 2022-10-22 | End: 2022-10-24

## 2022-10-22 RX ORDER — CARBOXYMETHYLCELLULOSE SODIUM 5 MG/ML
2 SOLUTION/ DROPS OPHTHALMIC PRN
Status: DISCONTINUED | OUTPATIENT
Start: 2022-10-22 | End: 2022-11-06

## 2022-10-22 RX ORDER — SCOLOPAMINE TRANSDERMAL SYSTEM 1 MG/1
1 PATCH, EXTENDED RELEASE TRANSDERMAL
Status: DISCONTINUED | OUTPATIENT
Start: 2022-10-22 | End: 2022-10-24

## 2022-10-22 RX ORDER — QUETIAPINE FUMARATE 25 MG/1
50 TABLET, FILM COATED ORAL 2 TIMES DAILY
Status: DISCONTINUED | OUTPATIENT
Start: 2022-10-22 | End: 2022-10-24

## 2022-10-22 RX ORDER — QUETIAPINE FUMARATE 25 MG/1
50 TABLET, FILM COATED ORAL 2 TIMES DAILY
Status: DISCONTINUED | OUTPATIENT
Start: 2022-10-22 | End: 2022-10-22

## 2022-10-22 RX ORDER — OXYCODONE HYDROCHLORIDE 5 MG/1
2.5 TABLET ORAL
Status: DISCONTINUED | OUTPATIENT
Start: 2022-10-22 | End: 2022-10-23

## 2022-10-22 RX ADMIN — MIDAZOLAM 2 MG: 1 INJECTION, SOLUTION INTRAMUSCULAR; INTRAVENOUS at 04:36

## 2022-10-22 RX ADMIN — DEXMEDETOMIDINE HYDROCHLORIDE 1.4 MCG/KG/HR: 4 INJECTION, SOLUTION INTRAVENOUS at 04:34

## 2022-10-22 RX ADMIN — CARBOXYMETHYLCELLULOSE SODIUM 2 DROP: 5 SOLUTION/ DROPS OPHTHALMIC at 17:29

## 2022-10-22 RX ADMIN — METOPROLOL TARTRATE 12.5 MG: 25 TABLET, FILM COATED ORAL at 17:20

## 2022-10-22 RX ADMIN — GLYCOPYRROLATE 0.1 MG: 0.2 INJECTION INTRAMUSCULAR; INTRAVENOUS at 17:27

## 2022-10-22 RX ADMIN — CARBOXYMETHYLCELLULOSE SODIUM 2 DROP: 5 SOLUTION/ DROPS OPHTHALMIC at 10:14

## 2022-10-22 RX ADMIN — POLYETHYLENE GLYCOL 3350 1 PACKET: 17 POWDER, FOR SOLUTION ORAL at 17:21

## 2022-10-22 RX ADMIN — SENNOSIDES AND DOCUSATE SODIUM 2 TABLET: 50; 8.6 TABLET ORAL at 17:20

## 2022-10-22 RX ADMIN — OXYCODONE HYDROCHLORIDE 5 MG: 5 TABLET ORAL at 23:18

## 2022-10-22 RX ADMIN — DEXMEDETOMIDINE HYDROCHLORIDE 1 MCG/KG/HR: 4 INJECTION, SOLUTION INTRAVENOUS at 00:26

## 2022-10-22 RX ADMIN — GLYCOPYRROLATE 0.2 MG: 0.2 INJECTION INTRAMUSCULAR; INTRAVENOUS at 11:05

## 2022-10-22 RX ADMIN — DEXMEDETOMIDINE HYDROCHLORIDE 1.2 MCG/KG/HR: 4 INJECTION, SOLUTION INTRAVENOUS at 15:19

## 2022-10-22 RX ADMIN — QUETIAPINE FUMARATE 50 MG: 25 TABLET, FILM COATED ORAL at 20:37

## 2022-10-22 RX ADMIN — DEXMEDETOMIDINE HYDROCHLORIDE 0.9 MCG/KG/HR: 4 INJECTION, SOLUTION INTRAVENOUS at 23:18

## 2022-10-22 RX ADMIN — DEXMEDETOMIDINE HYDROCHLORIDE 1.4 MCG/KG/HR: 4 INJECTION, SOLUTION INTRAVENOUS at 07:48

## 2022-10-22 RX ADMIN — AMPICILLIN SODIUM AND SULBACTAM SODIUM 3 G: 2; 1 INJECTION, POWDER, FOR SOLUTION INTRAMUSCULAR; INTRAVENOUS at 17:20

## 2022-10-22 RX ADMIN — DEXMEDETOMIDINE HYDROCHLORIDE 1 MCG/KG/HR: 4 INJECTION, SOLUTION INTRAVENOUS at 19:04

## 2022-10-22 RX ADMIN — ENOXAPARIN SODIUM 40 MG: 100 INJECTION SUBCUTANEOUS at 17:21

## 2022-10-22 RX ADMIN — MAGNESIUM SULFATE 2 G: 2 INJECTION INTRAVENOUS at 06:36

## 2022-10-22 RX ADMIN — CHLORDIAZEPOXIDE HYDROCHLORIDE 25 MG: 25 CAPSULE ORAL at 21:58

## 2022-10-22 RX ADMIN — POTASSIUM CHLORIDE AND SODIUM CHLORIDE: 900; 150 INJECTION, SOLUTION INTRAVENOUS at 13:12

## 2022-10-22 RX ADMIN — CHLORDIAZEPOXIDE HYDROCHLORIDE 25 MG: 25 CAPSULE ORAL at 12:36

## 2022-10-22 RX ADMIN — AMPICILLIN SODIUM AND SULBACTAM SODIUM 3 G: 2; 1 INJECTION, POWDER, FOR SOLUTION INTRAMUSCULAR; INTRAVENOUS at 23:18

## 2022-10-22 RX ADMIN — DEXMEDETOMIDINE HYDROCHLORIDE 1 MCG/KG/HR: 4 INJECTION, SOLUTION INTRAVENOUS at 11:53

## 2022-10-22 RX ADMIN — QUETIAPINE FUMARATE 50 MG: 25 TABLET, FILM COATED ORAL at 10:14

## 2022-10-22 RX ADMIN — ACETAMINOPHEN 650 MG: 325 TABLET, FILM COATED ORAL at 21:58

## 2022-10-22 RX ADMIN — SCOPOLAMINE 1 PATCH: 1.5 PATCH, EXTENDED RELEASE TRANSDERMAL at 17:20

## 2022-10-22 ASSESSMENT — PAIN DESCRIPTION - PAIN TYPE
TYPE: ACUTE PAIN

## 2022-10-22 ASSESSMENT — FIBROSIS 4 INDEX: FIB4 SCORE: 1.1

## 2022-10-22 NOTE — PROGRESS NOTES
"ICU Progress Note    Date of admission  10/18/2022    Chief Complaint  57 y.o. male admitted 10/18/2022 with etoh withdrawal    Hospital Course  From previous consult notes: \"56 yo male admitted on 10/19/2022 with ETOh withdrawal  PMHx: lives in a motel, drinks a pint of vodka a day (last drinl 10/7), prior hx of meth use, afib not on anticoagulation, liver mass on Us on August 2022 (2.  Solid hypoechoic mass within the right lobe of the liver measuring 1.7 x 1.3 x 1.0 cm in size. Follow-up pre and postcontrast multiphasic hepatic CT or MRI is recommended for further evaluation)  Received 10 mg ativan and 650 mg of phenobarb went into resp distress requiring intubation    10/19: started librium 25 mg tid; Rx of scabies\"  10/21: extubated at noon. On precedex drip.       Interval Problem Update  Reviewed last 24 hour events:  Chart review from the past 24 hours includes imaging, laboratory studies, vital signs and notes available.  Pertinent data for today    24h events: Fevering. Altered. Gurgling upper airway. 2 mg versed o/n. Pulling at lines and tubes.  A. fib rate controlled.  Tubes, Lines, Drains: OGT, 2 PIVs, wheeler   Drips: Precedex   Nutrition: IV fluids  CXR: Personally reviewed.  Freshly place OG tube in good position on KUB.  Tmax: 100.9F  ProCal: pending  Antibiotics: Unasyn (started 10/22)  Steroids: none  Cultures: ordered 10/22  I/O: +6.6  PPX: Enoxaparin  BM regimen: MiraLAX, senna-docusate, milk magnesia, bisacodyl      Review of Systems  Review of Systems   Unable to perform ROS: Acuity of condition      Vital Signs for last 24 hours   Pulse:  [68-91] 73  Resp:  [18-49] 40  BP: (104-145)/(66-97) 136/73  SpO2:  [94 %-100 %] 100 %         Physical Exam   Physical Exam  Vitals and nursing note reviewed. Exam conducted with a chaperone present.   Constitutional:       Appearance: He is ill-appearing. He is not diaphoretic.   HENT:      Head: Normocephalic.      Mouth/Throat:      Mouth: Mucous membranes " are dry.   Eyes:      Extraocular Movements: Extraocular movements intact.      Pupils: Pupils are equal, round, and reactive to light.   Cardiovascular:      Rate and Rhythm: Normal rate. Rhythm irregular.   Pulmonary:      Effort: Pulmonary effort is normal.      Breath sounds: Rhonchi present.   Abdominal:      General: Abdomen is flat. Bowel sounds are normal.      Palpations: Abdomen is soft.   Skin:     Coloration: Skin is not jaundiced.   Neurological:      Mental Status: He is alert. He is disoriented.      Motor: Motor function is intact.   Psychiatric:         Attention and Perception: He is inattentive.         Mood and Affect: Affect is labile.         Speech: He is noncommunicative.         Behavior: Behavior is uncooperative.       Medications  Current Facility-Administered Medications   Medication Dose Route Frequency Provider Last Rate Last Admin    hydrALAZINE (APRESOLINE) injection 10 mg  10 mg Intravenous Q6HRS PRN Nils Seymour D.O.        dexmedetomidine (PRECEDEX) 400 mcg/100mL NS premix infusion  0.1-1.5 mcg/kg/hr Intravenous Continuous Norm Choudhary M.D. 28.8 mL/hr at 10/22/22 0748 1.4 mcg/kg/hr at 10/22/22 0748    MD Alert...ICU Electrolyte Replacement per Pharmacy   Other PHARMACY TO DOSE Norm Choudhary M.D.        0.9 % NaCl with KCl 20 mEq infusion   Intravenous Continuous Norm Choudhary M.D. 75 mL/hr at 10/21/22 2354 1,000 mL at 10/21/22 2354    chlordiazePOXIDE (Librium) capsule 25 mg  25 mg Enteral Tube Q8HRS Norm Choudhary M.D.   25 mg at 10/21/22 1354    midazolam (Versed) injection 1 mg  1 mg Intravenous Q HOUR PRN Norm Choudhary M.D.        Or    midazolam (Versed) injection 2 mg  2 mg Intravenous Q HOUR PRN Norm Choudhary M.D.   2 mg at 10/22/22 0436    Or    midazolam (Versed) injection 4 mg  4 mg Intravenous Q HOUR PRN Norm Choudhary M.D.        Or    midazolam (Versed) injection 5 mg  5 mg Intravenous Q HOUR PRN Norm  MAN Choudhary        albuterol inhaler 2 Puff  2 Puff Inhalation Q6HRS PRN Eva Rodriguez M.D.        enoxaparin (Lovenox) inj 40 mg  40 mg Subcutaneous DAILY AT 1800 Asem MILI Rodriguez M.D.   40 mg at 10/21/22 1717    Pharmacy Consult Request ...Pain Management Review 1 Each  1 Each Other PHARMACY TO DOSE Asem MILI Rodriguez M.D.        oxyCODONE immediate-release (ROXICODONE) tablet 2.5 mg  2.5 mg Oral Q3HRS PRN Eva Rodriguez M.D.        Or    oxyCODONE immediate-release (ROXICODONE) tablet 5 mg  5 mg Oral Q3HRS PRN Eva Rodriguez M.D.        Or    HYDROmorphone (Dilaudid) injection 0.25 mg  0.25 mg Intravenous Q3HRS PRN Asequinton Rodriguez M.D.        amLODIPine (NORVASC) tablet 10 mg  10 mg Enteral Tube DAILY Asem MILI Rodriguez M.D.   10 mg at 10/21/22 0600    folic acid (FOLVITE) tablet 1 mg  1 mg Enteral Tube DAILY Asem MILI Rodriguez M.D.   1 mg at 10/21/22 0515    levothyroxine (SYNTHROID) tablet 50 mcg  50 mcg Enteral Tube AM ES Asequinton Rodriguez M.D.   50 mcg at 10/21/22 0514    metoprolol tartrate (LOPRESSOR) tablet 12.5 mg  12.5 mg Enteral Tube TWICE DAILY Asem MILI Rodriguez M.D.   12.5 mg at 10/21/22 1719    senna-docusate (PERICOLACE or SENOKOT S) 8.6-50 MG per tablet 2 Tablet  2 Tablet Enteral Tube BID Asem MILI Rodriguez M.D.   2 Tablet at 10/21/22 1718    And    polyethylene glycol/lytes (MIRALAX) PACKET 1 Packet  1 Packet Enteral Tube QDAY PRN Asem MILI Rodriguez M.D.        And    magnesium hydroxide (MILK OF MAGNESIA) suspension 30 mL  30 mL Enteral Tube QDAY PRN Asem MILI Rodriguez M.D.        And    bisacodyl (DULCOLAX) suppository 10 mg  10 mg Rectal QDAY PRN Eva Rodriguez M.D.        therapeutic multivitamin-minerals (THERAGRAN-M) tablet 1 Tablet  1 Tablet Enteral Tube DAILY Eva Rodriguez M.D.   1 Tablet at 10/21/22 0515    thiamine (Vitamin B-1) tablet 100 mg  100 mg Enteral Tube DAILY Eva Rodriguez M.D.   100 mg at 10/21/22 0515    acetaminophen (Tylenol) tablet 650 mg  650 mg Enteral Tube  Q6HRS PRN Eva Rodriguez M.D.   650 mg at 10/21/22 1718       Fluids    Intake/Output Summary (Last 24 hours) at 10/22/2022 0939  Last data filed at 10/22/2022 0800  Gross per 24 hour   Intake 2573.51 ml   Output 1685 ml   Net 888.51 ml         Laboratory  Recent Labs     10/19/22  1002 10/20/22  0433 10/21/22  0421   ISTATAPH 7.413 7.388* 7.335*   ISTATAPCO2 36.0 35.8 35.4   ISTATAPO2 61* 81 75   ISTATATCO2 24 23 20   [id number] 92* 96 94   ISTATARTHCO3 23.0 21.6 18.9   ISTATARTBE -1 -3 -6*   ISTATTEMP see below 97.9 F 97.7 F   ISTATFIO2  --  30 30   ISTATSPEC Arterial Arterial Arterial   ISTATAPHTC  --  7.394* 7.342*   [id number]  --  79 73           Recent Labs     10/20/22  0245 10/21/22  0738 10/22/22  0430   SODIUM 139 144 142   POTASSIUM 4.2 4.7 4.7   CHLORIDE 108 118* 116*   CO2 19* 18* 18*   BUN 9 9 8   CREATININE 0.95 0.67 0.59   MAGNESIUM 2.1 1.6 1.8   PHOSPHORUS  --  3.4 3.6   CALCIUM 8.0* 7.7* 8.1*       Recent Labs     10/20/22  0245 10/21/22  0738 10/22/22  0430   ALTSGPT 13  --   --    ASTSGOT 23  --   --    ALKPHOSPHAT 94  --   --    TBILIRUBIN 0.4  --   --    GLUCOSE 79 82 81       Recent Labs     10/20/22  0245   WBC 7.4   NEUTSPOLYS 66.90   LYMPHOCYTES 9.20*   MONOCYTES 15.20*   EOSINOPHILS 6.40   BASOPHILS 1.60   ASTSGOT 23   ALTSGPT 13   ALKPHOSPHAT 94   TBILIRUBIN 0.4       Recent Labs     10/20/22  0245   RBC 4.03*   HEMOGLOBIN 13.9*   HEMATOCRIT 41.7*   PLATELETCT 325           Assessment/Plan  #Alcohol dependence with withdrawal complicated by delirium (HCC)- (present on admission)  Assessment & Plan  Was intubated for respiratory depression - extubated on 10/21   On librium and precedex  Prn versed per CIWA scale  Replacing and monitoring electrolytes    #Delerium - likely 2/2 metabolic encephalopathy 2/2 alcoholism and possible infection  CIWAs as above   Restraints necessary as he is pulling at lines/tubes  Seroquel 50 mg bid started through OGT today  Infectious workup   RPR ordered  "    #Fever  #Acute respiratory failure with hypoxia  Most likely source: aspiration PNA  Cultures ordered  Procal ordered  Unasyn started  Assistance with upper airway secretions; monitor for possible re-intubation   Glycopyrrolate x1 for upper airway secretions   May need trial of diuresis     #Scabies- (present on admission)  Assessment & Plan  Rash very suspicious of scabies  On rx    #Liver mass- (present on admission)  Assessment & Plan  Seen on US 8/2022 \"right lobe of the liver measuring 1.7 x 1.3 x 1.0 cm in size\"  Will need CT versus MRI post extubation    #Atrial fibrillation (HCC)- (present on admission)  Assessment & Plan  On metoprolol    #Essential hypertension- (present on admission)  Assessment & Plan  On metoprolol and amlodipine       I have performed a physical exam and reviewed and updated ROS and Plan today (10/22/2022). In review of yesterday's note (10/21/2022), there are no changes except as documented above.     Discussed patient condition and risk of morbidity and/or mortality with Family, Pharmacy, and Charge nurse / hot rounds  The patient remains critically ill.  Critical care time = 45 minutes in directly providing and coordinating critical care and extensive data review.  No time overlap and excludes procedures.  "

## 2022-10-22 NOTE — CARE PLAN
The patient is Watcher - Medium risk of patient condition declining or worsening    Shift Goals  Clinical Goals: monitor oxygenation and work of breathing  Patient Goals: ANETTE  Family Goals: ANETTE    Progress made toward(s) clinical / shift goals:    Problem: Seizure Precautions  Goal: Implementation of seizure precautions  Outcome: Progressing     Problem: Fall Risk  Goal: Patient will remain free from falls  Outcome: Progressing     Problem: Pain - Standard  Goal: Alleviation of pain or a reduction in pain to the patient’s comfort goal  Outcome: Progressing       Patient is not progressing towards the following goals:    Problem: Knowledge Deficit - Standard  Goal: Patient and family/care givers will demonstrate understanding of plan of care, disease process/condition, diagnostic tests and medications  Outcome: Not Progressing       Problem: Knowledge Deficit - Standard  Goal: Patient and family/care givers will demonstrate understanding of plan of care, disease process/condition, diagnostic tests and medications  Outcome: Not Progressing

## 2022-10-22 NOTE — PROGRESS NOTES
12-hour chart check complete.    Monitor Summary  Rhythm: A-fib   Rate: 68-86  Ectopy: N/a  Measurements: -/0.08/-

## 2022-10-22 NOTE — CARE PLAN
The patient is Unstable - High likelihood or risk of patient condition declining or worsening    Shift Goals  Clinical Goals: Wean sedation and extubate  Patient Goals: ANETTE  Family Goals: ANETTE    Progress made toward(s) clinical / shift goals:    Problem: Optimal Care for Alcohol Withdrawal  Goal: Optimal Care for the alcohol withdrawal patient  Outcome: Progressing  Note: Pt extubated and off propofol, tolerating titrations of precedex throughout shift

## 2022-10-22 NOTE — PROGRESS NOTES
12-hour chart check complete.    Monitor Summary  Rhythm: a fib  Rate: 69-88  Ectopy: R PVC  Measurements: -/.08/-

## 2022-10-22 NOTE — DIETARY
Nutrition Services: Update   Day 4 of admit.  Tyree Whiteside is a 57 y.o. male with admitting DX of Alcohol withdrawal delirium.    Pt is currently NPO x 3 days. Extubated 10/21 at noon to 15L oxymask, on room air as of this morning. Remains on precedex; versed given earlier this a.m. Labs reviewed: calcium 8.1, not adjusted for albumin. 1+ edema to BLEs; generalized trace edema.    Recommendations/Plan:  PO diet as medically feasible to be per MD/SLP.   Monitor weight.    RD following

## 2022-10-22 NOTE — CARE PLAN
Problem: Optimal Care for Alcohol Withdrawal  Goal: Optimal Care for the alcohol withdrawal patient  Outcome: Not Progressing     Problem: Psychosocial  Goal: Patient's level of anxiety will decrease  Outcome: Not Progressing     Problem: Skin Integrity  Goal: Skin integrity is maintained or improved  Outcome: Progressing     The patient is Watcher - Medium risk of patient condition declining or worsening    Shift Goals  Clinical Goals: monitor oxygen, respiratory effort  Patient Goals: ANETTE  Family Goals: ANETTE    Progress made toward(s) clinical / shift goals:  patient on 1.5L NC, rhonchi thoughout, oral suctioning frequently. Patient agitated/restless throughout shift. Unable to follow commands at this time. Restraints reordered due to patient puling out IVs & NGT. IV placed via ultrasound and NGT replaced to R nare, verified with xray. Salmon with adequate output, Q2 turns. Skin flakey/red but improved. Poor circulation/cap refill pulses 1+. Daughter Laura at bedside, brother and sister visit.    Patient is not progressing towards the following goals:      Problem: Optimal Care for Alcohol Withdrawal  Goal: Optimal Care for the alcohol withdrawal patient  Outcome: Not Progressing     Problem: Psychosocial  Goal: Patient's level of anxiety will decrease  Outcome: Not Progressing

## 2022-10-23 ENCOUNTER — APPOINTMENT (OUTPATIENT)
Dept: RADIOLOGY | Facility: MEDICAL CENTER | Age: 57
DRG: 870 | End: 2022-10-23
Attending: INTERNAL MEDICINE
Payer: COMMERCIAL

## 2022-10-23 LAB
ANION GAP SERPL CALC-SCNC: 8 MMOL/L (ref 7–16)
BASE EXCESS BLDA CALC-SCNC: -5 MMOL/L (ref -4–3)
BASE EXCESS BLDA CALC-SCNC: -7 MMOL/L (ref -4–3)
BASOPHILS # BLD AUTO: 0.8 % (ref 0–1.8)
BASOPHILS # BLD: 0.19 K/UL (ref 0–0.12)
BODY TEMPERATURE: ABNORMAL DEGREES
BODY TEMPERATURE: ABNORMAL DEGREES
BUN SERPL-MCNC: 11 MG/DL (ref 8–22)
CALCIUM SERPL-MCNC: 7.8 MG/DL (ref 8.4–10.2)
CHLORIDE SERPL-SCNC: 115 MMOL/L (ref 96–112)
CO2 BLDA-SCNC: 22 MMOL/L (ref 20–33)
CO2 BLDA-SCNC: 24 MMOL/L (ref 20–33)
CO2 SERPL-SCNC: 20 MMOL/L (ref 20–33)
CREAT SERPL-MCNC: 0.71 MG/DL (ref 0.5–1.4)
EOSINOPHIL # BLD AUTO: 0.17 K/UL (ref 0–0.51)
EOSINOPHIL NFR BLD: 0.7 % (ref 0–6.9)
ERYTHROCYTE [DISTWIDTH] IN BLOOD BY AUTOMATED COUNT: 57.9 FL (ref 35.9–50)
GFR SERPLBLD CREATININE-BSD FMLA CKD-EPI: 108 ML/MIN/1.73 M 2
GLUCOSE BLD STRIP.AUTO-MCNC: 88 MG/DL (ref 65–99)
GLUCOSE SERPL-MCNC: 70 MG/DL (ref 65–99)
GRAM STN SPEC: NORMAL
HCO3 BLDA-SCNC: 20.5 MMOL/L (ref 17–25)
HCO3 BLDA-SCNC: 22.4 MMOL/L (ref 17–25)
HCT VFR BLD AUTO: 48.9 % (ref 42–52)
HGB BLD-MCNC: 15.7 G/DL (ref 14–18)
HOROWITZ INDEX BLDA+IHG-RTO: 134 MM[HG]
IMM GRANULOCYTES # BLD AUTO: 0.38 K/UL (ref 0–0.11)
IMM GRANULOCYTES NFR BLD AUTO: 1.7 % (ref 0–0.9)
LACTATE BLD-SCNC: 1 MMOL/L (ref 0.5–2)
LACTATE BLD-SCNC: 1 MMOL/L (ref 0.5–2)
LYMPHOCYTES # BLD AUTO: 1.32 K/UL (ref 1–4.8)
LYMPHOCYTES NFR BLD: 5.8 % (ref 22–41)
MAGNESIUM SERPL-MCNC: 1.9 MG/DL (ref 1.5–2.5)
MCH RBC QN AUTO: 34.1 PG (ref 27–33)
MCHC RBC AUTO-ENTMCNC: 32.1 G/DL (ref 33.7–35.3)
MCV RBC AUTO: 106.1 FL (ref 81.4–97.8)
MONOCYTES # BLD AUTO: 3.93 K/UL (ref 0–0.85)
MONOCYTES NFR BLD AUTO: 17.1 % (ref 0–13.4)
NEUTROPHILS # BLD AUTO: 16.93 K/UL (ref 1.82–7.42)
NEUTROPHILS NFR BLD: 73.9 % (ref 44–72)
NRBC # BLD AUTO: 0 K/UL
NRBC BLD-RTO: 0 /100 WBC
O2/TOTAL GAS SETTING VFR VENT: 70 %
PCO2 BLDA: 39.2 MMHG (ref 26–37)
PCO2 BLDA: 59.3 MMHG (ref 26–37)
PH BLDA: 7.18 [PH] (ref 7.4–7.5)
PH BLDA: 7.33 [PH] (ref 7.4–7.5)
PH TEMP ADJ BLDA: 7.19 [PH] (ref 7.4–7.5)
PH TEMP ADJ BLDA: 7.33 [PH] (ref 7.4–7.5)
PHOSPHATE SERPL-MCNC: 4.7 MG/DL (ref 2.5–4.5)
PLATELET # BLD AUTO: 337 K/UL (ref 164–446)
PMV BLD AUTO: 10.5 FL (ref 9–12.9)
PO2 BLDA: 115 MMHG (ref 64–87)
PO2 BLDA: 94 MMHG (ref 64–87)
PO2 TEMP ADJ BLDA: 110 MMHG (ref 64–87)
PO2 TEMP ADJ BLDA: 94 MMHG (ref 64–87)
POTASSIUM SERPL-SCNC: 5.1 MMOL/L (ref 3.6–5.5)
RBC # BLD AUTO: 4.61 M/UL (ref 4.7–6.1)
SAO2 % BLDA: 97 % (ref 93–99)
SAO2 % BLDA: 97 % (ref 93–99)
SIGNIFICANT IND 70042: NORMAL
SITE SITE: NORMAL
SODIUM SERPL-SCNC: 143 MMOL/L (ref 135–145)
SOURCE SOURCE: NORMAL
SPECIMEN DRAWN FROM PATIENT: ABNORMAL
SPECIMEN DRAWN FROM PATIENT: ABNORMAL
TRIGL SERPL-MCNC: 73 MG/DL (ref 0–149)
WBC # BLD AUTO: 22.9 K/UL (ref 4.8–10.8)

## 2022-10-23 PROCEDURE — 71045 X-RAY EXAM CHEST 1 VIEW: CPT

## 2022-10-23 PROCEDURE — A9270 NON-COVERED ITEM OR SERVICE: HCPCS | Performed by: HOSPITALIST

## 2022-10-23 PROCEDURE — 99291 CRITICAL CARE FIRST HOUR: CPT | Mod: 25 | Performed by: INTERNAL MEDICINE

## 2022-10-23 PROCEDURE — 94003 VENT MGMT INPAT SUBQ DAY: CPT

## 2022-10-23 PROCEDURE — 770022 HCHG ROOM/CARE - ICU (200)

## 2022-10-23 PROCEDURE — 700101 HCHG RX REV CODE 250: Performed by: INTERNAL MEDICINE

## 2022-10-23 PROCEDURE — 36556 INSERT NON-TUNNEL CV CATH: CPT | Mod: RT | Performed by: INTERNAL MEDICINE

## 2022-10-23 PROCEDURE — 84100 ASSAY OF PHOSPHORUS: CPT

## 2022-10-23 PROCEDURE — 31500 INSERT EMERGENCY AIRWAY: CPT

## 2022-10-23 PROCEDURE — A9270 NON-COVERED ITEM OR SERVICE: HCPCS | Performed by: INTERNAL MEDICINE

## 2022-10-23 PROCEDURE — 84478 ASSAY OF TRIGLYCERIDES: CPT

## 2022-10-23 PROCEDURE — 82803 BLOOD GASES ANY COMBINATION: CPT

## 2022-10-23 PROCEDURE — 83735 ASSAY OF MAGNESIUM: CPT

## 2022-10-23 PROCEDURE — 700102 HCHG RX REV CODE 250 W/ 637 OVERRIDE(OP): Performed by: INTERNAL MEDICINE

## 2022-10-23 PROCEDURE — 700105 HCHG RX REV CODE 258: Performed by: INTERNAL MEDICINE

## 2022-10-23 PROCEDURE — 5A1945Z RESPIRATORY VENTILATION, 24-96 CONSECUTIVE HOURS: ICD-10-PCS | Performed by: INTERNAL MEDICINE

## 2022-10-23 PROCEDURE — 82962 GLUCOSE BLOOD TEST: CPT

## 2022-10-23 PROCEDURE — 80048 BASIC METABOLIC PNL TOTAL CA: CPT

## 2022-10-23 PROCEDURE — 36556 INSERT NON-TUNNEL CV CATH: CPT

## 2022-10-23 PROCEDURE — 99152 MOD SED SAME PHYS/QHP 5/>YRS: CPT

## 2022-10-23 PROCEDURE — 700111 HCHG RX REV CODE 636 W/ 250 OVERRIDE (IP)

## 2022-10-23 PROCEDURE — 700102 HCHG RX REV CODE 250 W/ 637 OVERRIDE(OP): Performed by: HOSPITALIST

## 2022-10-23 PROCEDURE — 02HV33Z INSERTION OF INFUSION DEVICE INTO SUPERIOR VENA CAVA, PERCUTANEOUS APPROACH: ICD-10-PCS | Performed by: INTERNAL MEDICINE

## 2022-10-23 PROCEDURE — 85025 COMPLETE CBC W/AUTO DIFF WBC: CPT

## 2022-10-23 PROCEDURE — 0BH17EZ INSERTION OF ENDOTRACHEAL AIRWAY INTO TRACHEA, VIA NATURAL OR ARTIFICIAL OPENING: ICD-10-PCS | Performed by: INTERNAL MEDICINE

## 2022-10-23 PROCEDURE — 700111 HCHG RX REV CODE 636 W/ 250 OVERRIDE (IP): Performed by: HOSPITALIST

## 2022-10-23 PROCEDURE — 700111 HCHG RX REV CODE 636 W/ 250 OVERRIDE (IP): Performed by: INTERNAL MEDICINE

## 2022-10-23 PROCEDURE — 94760 N-INVAS EAR/PLS OXIMETRY 1: CPT

## 2022-10-23 PROCEDURE — 31500 INSERT EMERGENCY AIRWAY: CPT | Performed by: INTERNAL MEDICINE

## 2022-10-23 PROCEDURE — 94799 UNLISTED PULMONARY SVC/PX: CPT

## 2022-10-23 PROCEDURE — 83605 ASSAY OF LACTIC ACID: CPT

## 2022-10-23 PROCEDURE — 94002 VENT MGMT INPAT INIT DAY: CPT

## 2022-10-23 RX ORDER — MAGNESIUM SULFATE HEPTAHYDRATE 40 MG/ML
2 INJECTION, SOLUTION INTRAVENOUS ONCE
Status: COMPLETED | OUTPATIENT
Start: 2022-10-23 | End: 2022-10-23

## 2022-10-23 RX ORDER — ROCURONIUM BROMIDE 10 MG/ML
100 INJECTION, SOLUTION INTRAVENOUS ONCE
Status: COMPLETED | OUTPATIENT
Start: 2022-10-23 | End: 2022-10-23

## 2022-10-23 RX ORDER — ETOMIDATE 2 MG/ML
20 INJECTION INTRAVENOUS ONCE
Status: COMPLETED | OUTPATIENT
Start: 2022-10-23 | End: 2022-10-23

## 2022-10-23 RX ORDER — OXYCODONE HYDROCHLORIDE 5 MG/1
2.5 TABLET ORAL
Status: DISCONTINUED | OUTPATIENT
Start: 2022-10-23 | End: 2022-10-24

## 2022-10-23 RX ORDER — OXYCODONE HYDROCHLORIDE 5 MG/1
5 TABLET ORAL
Status: DISCONTINUED | OUTPATIENT
Start: 2022-10-23 | End: 2022-10-24

## 2022-10-23 RX ORDER — SODIUM CHLORIDE, SODIUM LACTATE, POTASSIUM CHLORIDE, AND CALCIUM CHLORIDE .6; .31; .03; .02 G/100ML; G/100ML; G/100ML; G/100ML
500 INJECTION, SOLUTION INTRAVENOUS ONCE
Status: COMPLETED | OUTPATIENT
Start: 2022-10-23 | End: 2022-10-23

## 2022-10-23 RX ORDER — NOREPINEPHRINE BITARTRATE 0.03 MG/ML
0-30 INJECTION, SOLUTION INTRAVENOUS CONTINUOUS
Status: DISCONTINUED | OUTPATIENT
Start: 2022-10-23 | End: 2022-10-24

## 2022-10-23 RX ORDER — PHENYLEPHRINE HCL IN 0.9% NACL 0.5 MG/5ML
200 SYRINGE (ML) INTRAVENOUS
Status: ACTIVE | OUTPATIENT
Start: 2022-10-23 | End: 2022-10-23

## 2022-10-23 RX ORDER — HYDROMORPHONE HYDROCHLORIDE 1 MG/ML
0.25 INJECTION, SOLUTION INTRAMUSCULAR; INTRAVENOUS; SUBCUTANEOUS
Status: DISCONTINUED | OUTPATIENT
Start: 2022-10-23 | End: 2022-10-24

## 2022-10-23 RX ORDER — LACTULOSE 20 G/30ML
30 SOLUTION ORAL 3 TIMES DAILY
Status: DISCONTINUED | OUTPATIENT
Start: 2022-10-23 | End: 2022-11-02

## 2022-10-23 RX ADMIN — LEVOTHYROXINE SODIUM 50 MCG: 50 TABLET ORAL at 05:49

## 2022-10-23 RX ADMIN — POTASSIUM CHLORIDE AND SODIUM CHLORIDE: 900; 150 INJECTION, SOLUTION INTRAVENOUS at 00:20

## 2022-10-23 RX ADMIN — SENNOSIDES AND DOCUSATE SODIUM 2 TABLET: 50; 8.6 TABLET ORAL at 17:23

## 2022-10-23 RX ADMIN — DEXMEDETOMIDINE HYDROCHLORIDE 1.5 MCG/KG/HR: 4 INJECTION, SOLUTION INTRAVENOUS at 23:53

## 2022-10-23 RX ADMIN — DEXMEDETOMIDINE HYDROCHLORIDE 1.5 MCG/KG/HR: 4 INJECTION, SOLUTION INTRAVENOUS at 20:32

## 2022-10-23 RX ADMIN — CHLORDIAZEPOXIDE HYDROCHLORIDE 25 MG: 25 CAPSULE ORAL at 07:21

## 2022-10-23 RX ADMIN — AMIODARONE HYDROCHLORIDE 150 MG: 1.5 INJECTION, SOLUTION INTRAVENOUS at 08:16

## 2022-10-23 RX ADMIN — Medication 200 MCG: at 09:02

## 2022-10-23 RX ADMIN — THIAMINE HCL TAB 100 MG 100 MG: 100 TAB at 05:49

## 2022-10-23 RX ADMIN — CHLORDIAZEPOXIDE HYDROCHLORIDE 25 MG: 25 CAPSULE ORAL at 14:16

## 2022-10-23 RX ADMIN — ETOMIDATE 20 MG: 2 INJECTION INTRAVENOUS at 08:49

## 2022-10-23 RX ADMIN — ENOXAPARIN SODIUM 40 MG: 100 INJECTION SUBCUTANEOUS at 17:23

## 2022-10-23 RX ADMIN — MAGNESIUM SULFATE 2 G: 2 INJECTION INTRAVENOUS at 07:21

## 2022-10-23 RX ADMIN — HYDROMORPHONE HYDROCHLORIDE 0.25 MG: 1 INJECTION, SOLUTION INTRAMUSCULAR; INTRAVENOUS; SUBCUTANEOUS at 03:35

## 2022-10-23 RX ADMIN — POTASSIUM CHLORIDE AND SODIUM CHLORIDE: 900; 150 INJECTION, SOLUTION INTRAVENOUS at 12:47

## 2022-10-23 RX ADMIN — QUETIAPINE FUMARATE 50 MG: 25 TABLET, FILM COATED ORAL at 17:22

## 2022-10-23 RX ADMIN — AMPICILLIN SODIUM AND SULBACTAM SODIUM 3 G: 2; 1 INJECTION, POWDER, FOR SOLUTION INTRAMUSCULAR; INTRAVENOUS at 12:10

## 2022-10-23 RX ADMIN — AMPICILLIN SODIUM AND SULBACTAM SODIUM 3 G: 2; 1 INJECTION, POWDER, FOR SOLUTION INTRAMUSCULAR; INTRAVENOUS at 17:22

## 2022-10-23 RX ADMIN — FOLIC ACID 1 MG: 1 TABLET ORAL at 05:49

## 2022-10-23 RX ADMIN — SODIUM CHLORIDE, POTASSIUM CHLORIDE, SODIUM LACTATE AND CALCIUM CHLORIDE 500 ML: 600; 310; 30; 20 INJECTION, SOLUTION INTRAVENOUS at 08:16

## 2022-10-23 RX ADMIN — PROPOFOL 5 MCG/KG/MIN: 10 INJECTION, EMULSION INTRAVENOUS at 17:14

## 2022-10-23 RX ADMIN — DEXMEDETOMIDINE HYDROCHLORIDE 1.5 MCG/KG/HR: 4 INJECTION, SOLUTION INTRAVENOUS at 14:20

## 2022-10-23 RX ADMIN — Medication 100 MCG/HR: at 09:36

## 2022-10-23 RX ADMIN — DEXMEDETOMIDINE HYDROCHLORIDE 0.5 MCG/KG/HR: 4 INJECTION, SOLUTION INTRAVENOUS at 09:26

## 2022-10-23 RX ADMIN — AMPICILLIN SODIUM AND SULBACTAM SODIUM 3 G: 2; 1 INJECTION, POWDER, FOR SOLUTION INTRAMUSCULAR; INTRAVENOUS at 23:54

## 2022-10-23 RX ADMIN — DEXMEDETOMIDINE HYDROCHLORIDE 1.5 MCG/KG/HR: 4 INJECTION, SOLUTION INTRAVENOUS at 17:37

## 2022-10-23 RX ADMIN — CHLORDIAZEPOXIDE HYDROCHLORIDE 25 MG: 25 CAPSULE ORAL at 22:18

## 2022-10-23 RX ADMIN — QUETIAPINE FUMARATE 50 MG: 25 TABLET, FILM COATED ORAL at 07:21

## 2022-10-23 RX ADMIN — Medication 100 MCG/HR: at 10:00

## 2022-10-23 RX ADMIN — MIDAZOLAM 2 MG: 1 INJECTION, SOLUTION INTRAMUSCULAR; INTRAVENOUS at 13:27

## 2022-10-23 RX ADMIN — LACTULOSE 30 ML: 20 SOLUTION ORAL at 17:23

## 2022-10-23 RX ADMIN — ROCURONIUM BROMIDE 100 MG: 10 INJECTION INTRAVENOUS at 08:50

## 2022-10-23 RX ADMIN — Medication 150 MCG/HR: at 19:17

## 2022-10-23 RX ADMIN — OXYCODONE HYDROCHLORIDE 5 MG: 5 TABLET ORAL at 02:27

## 2022-10-23 RX ADMIN — MULTIPLE VITAMINS W/ MINERALS TAB 1 TABLET: TAB at 05:49

## 2022-10-23 RX ADMIN — SENNOSIDES AND DOCUSATE SODIUM 2 TABLET: 50; 8.6 TABLET ORAL at 05:49

## 2022-10-23 RX ADMIN — NOREPINEPHRINE BITARTRATE 10 MCG/MIN: 1 INJECTION, SOLUTION, CONCENTRATE INTRAVENOUS at 09:00

## 2022-10-23 RX ADMIN — AMPICILLIN SODIUM AND SULBACTAM SODIUM 3 G: 2; 1 INJECTION, POWDER, FOR SOLUTION INTRAMUSCULAR; INTRAVENOUS at 05:48

## 2022-10-23 RX ADMIN — MIDAZOLAM 2 MG: 1 INJECTION, SOLUTION INTRAMUSCULAR; INTRAVENOUS at 07:21

## 2022-10-23 ASSESSMENT — PAIN DESCRIPTION - PAIN TYPE
TYPE: ACUTE PAIN

## 2022-10-23 NOTE — RESPIRATORY CARE
Adult Ventilation Update    Total Vent Days: 1    Patient Lines/Drains/Airways Status       Active Airway       None                    Events/Summary/Plan: pt intubated and placed on full ventilation support    Respiratory Therapy   Respiratory Therapy      Section completed by:  Pierre Small

## 2022-10-23 NOTE — PROGRESS NOTES
Late entry:   0645- Received report from Adia NOLEN. Patient is agitated and not oriented. HR is afib RVR BP is hypotensive. See flowsheet. Verified gtts, see MAR. Unable to update patient on plan of care due to orientation status. All safety precautions in place including bilateral restraints, bed alarm activated, bed rails up x3.     0828- Texted MD Luciano with ABG results, see results. Plan is to intubate.     0848- Time out  0849- 20 mg Etomidate   0850- 100 mg Roccuronium  Difficult intubation, multiple attempts.  Vital signs stable throughout procedure.    0900- Patient intubated   0902- 200 mg Dagoberto given  0903- Listened for bilateral lung sounds, chest Xray complete, confirmed ET placement.    1030- Time out for central line placement  1040- Central line placed, increased sedation and vasopressor therapy. See flowsheet for vitals.

## 2022-10-23 NOTE — PROCEDURES
Central Line Insertion    Date/Time: 10/23/2022 12:33 PM  Performed by: Greyson Luciano D.O.  Authorized by: Greyson Luciano D.O.     Consent:     Consent obtained:  Emergent situation  Pre-procedure details:     Hand hygiene: Hand hygiene performed prior to insertion      Sterile barrier technique: All elements of maximal sterile technique followed      Skin preparation:  2% chlorhexidine    Skin preparation agent: Skin preparation agent completely dried prior to procedure    Sedation:     Sedation type:  Deep (On vent)  Anesthesia:     Anesthesia method:  Local infiltration    Local anesthetic:  Lidocaine 1% w/o epi  Procedure details:     Location:  R internal jugular    Patient position:  Flat    Procedural supplies:  Triple lumen    Ultrasound guidance: yes      Sterile ultrasound techniques: Sterile gel and sterile probe covers were used      Number of attempts:  1    Successful placement: yes    Post-procedure details:     Post-procedure:  Dressing applied and line sutured    Guidewire: guidewire removal confirmed      Assessment:  Blood return through all ports    Patient tolerance of procedure:  Tolerated well, no immediate complications  Comments:      ICU time: 20 minutes    Greyson Luciano D.O.

## 2022-10-23 NOTE — CARE PLAN
The patient is Unstable - High likelihood or risk of patient condition declining or worsening    Shift Goals  Clinical Goals: Correction of ABGs, possible intubation  Patient Goals: ANETTE  Family Goals: ANETTE    Progress made toward(s) clinical / shift goals:    Problem: Mechanical Ventilation  Goal: Safe management of artificial airway and ventilation  10/23/2022 1525 by Abeba Lu R.N.  Outcome: Progressing  Note: Patient intubated at 0900, tolerating the ventilator, ABGs improved       Patient is not progressing towards the following goals:  Problem: Respiratory  Goal: Patient will achieve/maintain optimum respiratory ventilation and gas exchange  Outcome: Not Progressing  Note: Patient not protecting airway, tachypnic for several days, heavy secretions requiring multiple suctioning attempts, ABG is acidotic

## 2022-10-23 NOTE — PROCEDURES
Intubation    Date/Time: 10/23/2022 9:22 AM  Performed by: Greyson Luciano D.O.  Authorized by: Greyson Luciano D.O.     Consent:     Consent obtained:  Emergent situation  Pre-procedure details:     Patient status:  Altered mental status    Mallampati score:  IV    Pretreatment meds: Etomidate.    Paralytics:  Rocuronium  Procedure details:     Preoxygenation:  Bag valve mask    CPR in progress: no      Intubation method:  Oral    Oral intubation technique:  Direct    Laryngoscope type:  Direct laryngoscopy    Laryngoscope blade:  Mac 3    Cormack-Lehane Classification:  Grade 3    Tube size (mm):  7.5    Tube type:  Cuffed    Number of attempts:  2    Ventilation between attempts: yes      Cricoid pressure: yes      Tube visualized through cords: no    Placement assessment:     ETT to lip:  24    Tube secured with:  ETT mcdonald    Breath sounds:  Equal    Placement verification: chest rise, condensation, CXR verification, equal breath sounds, esophageal detector, ETCO2 detector and tube exhalation      CXR findings:  ETT in proper place  Post-procedure details:     Patient tolerance of procedure:  Tolerated well, no immediate complications  Comments:      Difficult airway. Anterior. Unable to intubate initially with glidescope and 8.0.     Total critical care time: 25 min    Greyson Luciano D.O.

## 2022-10-23 NOTE — CARE PLAN
The patient is Watcher - Medium risk of patient condition declining or worsening    Shift Goals  Clinical Goals: Pt will have less agitation/anxiety throughout shift  Patient Goals: ANETTE  Family Goals: ANETTE    Progress made toward(s) clinical / shift goals:    Problem: Psychosocial  Goal: Patient's level of anxiety will decrease  Outcome: Progressing       Patient is not progressing towards the following goals:      Problem: Risk for Aspiration  Goal: Patient's risk for aspiration will be absent or decrease  Outcome: Not Progressing

## 2022-10-23 NOTE — PROGRESS NOTES
12-hour chart check complete.    Monitor Summary  Rhythm: AFIB  Rate:   Ectopy: PVC  Measurements: -/0.08/-

## 2022-10-23 NOTE — PROGRESS NOTES
"ICU Progress Note    Date of admission  10/18/2022    Chief Complaint  57 y.o. male admitted 10/18/2022 with etoh withdrawal    Hospital Course  From previous consult notes: \"58 yo male admitted on 10/19/2022 with ETOh withdrawal  PMHx: lives in a motel, drinks a pint of vodka a day (last drinl 10/7), prior hx of meth use, afib not on anticoagulation, liver mass on Us on August 2022 (2.  Solid hypoechoic mass within the right lobe of the liver measuring 1.7 x 1.3 x 1.0 cm in size. Follow-up pre and postcontrast multiphasic hepatic CT or MRI is recommended for further evaluation)  Received 10 mg ativan and 650 mg of phenobarb went into resp distress requiring intubation    10/19: started librium 25 mg tid; Rx of scabies\"  10/21: extubated at noon. On precedex drip.   10/22: Fevering. Altered. Gurgling upper airway. Started on Abx.     Interval Problem Update  Reviewed last 24 hour events:  Chart review from the past 24 hours includes imaging, laboratory studies, vital signs and notes available.  Pertinent data for today    24h events: Intubated this morning for respiratory acidosis and AMS   Tubes, Lines, Drains: OGT, 2 PIVs, wheeler   Drips: Precedex, Fentanyl gtt   Nutrition: IV fluids  CXR: Personally reviewed ETT in good position; small R effusion.   Tmax: 100.9F on monitored   ProCal: 0.44  Antibiotics: Unasyn (started 10/22)  Steroids: none  Cultures: ordered 10/22  I/O: +7.7  PPX: Enoxaparin  BM regimen: MiraLAX, senna-docusate, milk magnesia, bisacodyl      Review of Systems  Review of Systems   Unable to perform ROS: Acuity of condition      Vital Signs for last 24 hours   Temp:  [37.7 °C (99.9 °F)-37.9 °C (100.2 °F)] 37.8 °C (100 °F)  Pulse:  [] 104  Resp:  [16-46] 27  BP: ()/() 114/73  SpO2:  [89 %-97 %] 95 %         Physical Exam   Physical Exam  Vitals and nursing note reviewed. Exam conducted with a chaperone present.   Constitutional:       General: He is in acute distress.      " Appearance: He is ill-appearing. He is not diaphoretic.   HENT:      Head: Normocephalic.      Mouth/Throat:      Mouth: Mucous membranes are dry.   Eyes:      Extraocular Movements: Extraocular movements intact.      Pupils: Pupils are equal, round, and reactive to light.   Cardiovascular:      Rate and Rhythm: Tachycardia present. Rhythm irregular.   Pulmonary:      Effort: Respiratory distress present.      Breath sounds: Rhonchi present.   Abdominal:      General: Abdomen is flat. Bowel sounds are normal.      Palpations: Abdomen is soft.   Skin:     Coloration: Skin is not jaundiced.   Neurological:      Mental Status: He is alert. He is disoriented.      Motor: Motor function is intact.   Psychiatric:         Attention and Perception: He is inattentive.         Mood and Affect: Affect is labile.         Speech: He is noncommunicative.         Behavior: Behavior is uncooperative.       Medications  Current Facility-Administered Medications   Medication Dose Route Frequency Provider Last Rate Last Admin    magnesium sulfate IVPB premix 2 g  2 g Intravenous Once Greyson Luciano D.O. 25 mL/hr at 10/23/22 0721 2 g at 10/23/22 0721    oxyCODONE immediate-release (ROXICODONE) tablet 2.5 mg  2.5 mg Enteral Tube Q3HRS PRN Greyson Luciano D.O.        Or    oxyCODONE immediate-release (ROXICODONE) tablet 5 mg  5 mg Enteral Tube Q3HRS PRN Greyson Luciano D.O.        Or    HYDROmorphone (Dilaudid) injection 0.25 mg  0.25 mg Intravenous Q3HRS PRN Greyson Luciano D.O.        norepinephrine (Levophed) 8 mg in 250 mL NS infusion (premix)  0-30 mcg/min Intravenous Continuous Greyson Luciano D.O. 18.8 mL/hr at 10/23/22 0900 10 mcg/min at 10/23/22 0900    phenylephrine (CASSI-SYNEPHRINE) 100 mcg/mL inj (IV Push Syringe) 200 mcg  200 mcg Intravenous Q15 MIN PRN Greyson Luciano D.O.        Pharmacy Consult: Enteral tube insertion - review meds/change route/product selection   Other PHARMACY TO DOSE  Greyson Luciano D.O.        QUEtiapine (Seroquel) tablet 50 mg  50 mg Enteral Tube BID GRACY KolbONeetu   50 mg at 10/23/22 0721    ampicillin/sulbactam (UNASYN) 3 g in  mL IVPB  3 g Intravenous Q6HRS Greyson Luciano D.O.   Stopped at 10/23/22 0618    carboxymethylcellulose (REFRESH TEARS) 0.5 % ophthalmic drops 2 Drop  2 Drop Both Eyes PRN GRACY KolbONeetu   2 Drop at 10/22/22 1729    glycopyrrolate (ROBINUL) injection 0.1 mg  0.1 mg Intravenous Q6HRS PRN Greyson Luciano D.O.   0.1 mg at 10/22/22 1727    scopolamine (TRANSDERM-SCOP) patch 1 Patch  1 Patch Transdermal Q72HRS GRACY KolbONeetu   1 Patch at 10/22/22 1720    hydrALAZINE (APRESOLINE) injection 10 mg  10 mg Intravenous Q6HRS PRN Nils Seymour D.O.        dexmedetomidine (PRECEDEX) 400 mcg/100mL NS premix infusion  0.1-1.5 mcg/kg/hr Intravenous Continuous Norm Choudhary M.D.   Stopped at 10/23/22 0600    MD Alert...ICU Electrolyte Replacement per Pharmacy   Other PHARMACY TO DOSE Norm Choudhary M.D.        0.9 % NaCl with KCl 20 mEq infusion   Intravenous Continuous Norm Choudhary M.D. 75 mL/hr at 10/23/22 0600 Rate Verify at 10/23/22 0600    chlordiazePOXIDE (Librium) capsule 25 mg  25 mg Enteral Tube Q8HRS Norm Choudhary M.D.   25 mg at 10/23/22 0721    midazolam (Versed) injection 1 mg  1 mg Intravenous Q HOUR PRN Norm Choudhary M.D.        Or    midazolam (Versed) injection 2 mg  2 mg Intravenous Q HOUR PRN Norm Choudhary M.D.   2 mg at 10/23/22 0721    Or    midazolam (Versed) injection 4 mg  4 mg Intravenous Q HOUR PRN Norm Choudhary M.D.        Or    midazolam (Versed) injection 5 mg  5 mg Intravenous Q HOUR PRN Norm Choudhary M.D.        albuterol inhaler 2 Puff  2 Puff Inhalation Q6HRS PRN Eva Rodriguez M.D.        enoxaparin (Lovenox) inj 40 mg  40 mg Subcutaneous DAILY AT 1800 Eva Rodriguez M.D.   40 mg at 10/22/22 1721    Pharmacy  Consult Request ...Pain Management Review 1 Each  1 Each Other PHARMACY TO DOSE Eva Rodriguez M.D.        amLODIPine (NORVASC) tablet 10 mg  10 mg Enteral Tube DAILY Eva Rodriguez M.D.   10 mg at 10/21/22 0600    folic acid (FOLVITE) tablet 1 mg  1 mg Enteral Tube DAILY Eva Rodriguez M.D.   1 mg at 10/23/22 0549    levothyroxine (SYNTHROID) tablet 50 mcg  50 mcg Enteral Tube AM ES Asequinton Rodriguez M.D.   50 mcg at 10/23/22 0549    metoprolol tartrate (LOPRESSOR) tablet 12.5 mg  12.5 mg Enteral Tube TWICE DAILY Eva Rodriguez M.D.   12.5 mg at 10/22/22 1720    senna-docusate (PERICOLACE or SENOKOT S) 8.6-50 MG per tablet 2 Tablet  2 Tablet Enteral Tube BID Eva Rodriguez M.D.   2 Tablet at 10/23/22 0549    And    polyethylene glycol/lytes (MIRALAX) PACKET 1 Packet  1 Packet Enteral Tube QDAY PRN Eva Rodriguez M.D.   1 Packet at 10/22/22 1721    And    magnesium hydroxide (MILK OF MAGNESIA) suspension 30 mL  30 mL Enteral Tube QDAY PRN Eva Rodriguez M.D.        And    bisacodyl (DULCOLAX) suppository 10 mg  10 mg Rectal QDAY PRN Eva Rodriguez M.D.        therapeutic multivitamin-minerals (THERAGRAN-M) tablet 1 Tablet  1 Tablet Enteral Tube DAILY Eva Rodriguez M.D.   1 Tablet at 10/23/22 0549    thiamine (Vitamin B-1) tablet 100 mg  100 mg Enteral Tube DAILY Eva Rodriguez M.D.   100 mg at 10/23/22 0549    acetaminophen (Tylenol) tablet 650 mg  650 mg Enteral Tube Q6HRS PRN Eva Rodriguez M.D.   650 mg at 10/22/22 2158       Fluids    Intake/Output Summary (Last 24 hours) at 10/23/2022 0913  Last data filed at 10/23/2022 0600  Gross per 24 hour   Intake 2124.01 ml   Output 1435 ml   Net 689.01 ml         Laboratory  Recent Labs     10/21/22  0421 10/23/22  0825   ISTATAPH 7.335* 7.185*   ISTATAPCO2 35.4 59.3*   ISTATAPO2 75 115*   ISTATATCO2 20 24   [id number] 94 97   ISTATARTHCO3 18.9 22.4   ISTATARTBE -6* -7*   ISTATTEMP 97.7 F 97.4 F   ISTATFIO2 30  --    ISTATSPEC Arterial Arterial  "  ISTATAPHTC 7.342* 7.194*   [id number] 73 110*           Recent Labs     10/21/22  0738 10/22/22  0430 10/23/22  0515   SODIUM 144 142 143   POTASSIUM 4.7 4.7 5.1   CHLORIDE 118* 116* 115*   CO2 18* 18* 20   BUN 9 8 11   CREATININE 0.67 0.59 0.71   MAGNESIUM 1.6 1.8 1.9   PHOSPHORUS 3.4 3.6 4.7*   CALCIUM 7.7* 8.1* 7.8*       Recent Labs     10/21/22  0738 10/22/22  0430 10/23/22  0515   GLUCOSE 82 81 70       Recent Labs     10/22/22  1130 10/23/22  0515   WBC 23.9* 22.9*   NEUTSPOLYS 74.00* 73.90*   LYMPHOCYTES 2.00* 5.80*   MONOCYTES 14.00* 17.10*   EOSINOPHILS 0.00 0.70   BASOPHILS 0.00 0.80       Recent Labs     10/22/22  1130 10/23/22  0515   RBC 4.51* 4.61*   HEMOGLOBIN 15.3 15.7   HEMATOCRIT 46.8 48.9   PLATELETCT 370 337           Assessment/Plan  #Alcohol dependence with withdrawal complicated by delirium (HCC)- (present on admission)  Assessment & Plan  Was intubated for respiratory depression - extubated on 10/21   On librium and precedex  Prn versed per CIWA scale  Replacing and monitoring electrolytes    #Acute respiratory failure with hypercapnea and hypoxia 2/2 PNA  Was intubated for respiratory depression - extubated on 10/21   Reintubated on 10/23   Precedex and Fentanyl for sedation     #Delerium - likely 2/2 metabolic encephalopathy 2/2 alcoholism and possible infection  CIWAs as above   Restraints necessary as he is pulling at lines/tubes  Seroquel 50 mg bid started through OGT on 10/22  Infectious workup - cultures NGTD   RPR ordered - negative     #Fever  #Acute respiratory failure with hypoxia  Most likely source: aspiration PNA  Cultures ordered - NGTD  Procal ordered - 0.44   Unasyn started on 10/22     #Scabies- (present on admission)  Assessment & Plan  Rash very suspicious of scabies  On rx    #Liver mass- (present on admission)  Assessment & Plan  Seen on US 8/2022 \"right lobe of the liver measuring 1.7 x 1.3 x 1.0 cm in size\"  Will need CT versus MRI post extubation    #Atrial " fibrillation (HCC)- (present on admission)  Assessment & Plan  On metoprolol  Given Amiodarone 150 mg x1 10/23 a.m.  Suspect RVR was in part due to hypovolemia     #Essential hypertension- (present on admission)  Assessment & Plan  On metoprolol and amlodipine       I have performed a physical exam and reviewed and updated ROS and Plan today (10/23/2022). In review of yesterday's note (10/22/2022), there are no changes except as documented above.     Discussed patient condition and risk of morbidity and/or mortality with Family, Pharmacy, and Charge nurse / hot rounds  The patient remains critically ill.  Critical care time = 40 minutes in directly providing and coordinating critical care and extensive data review.  No time overlap and excludes procedures.

## 2022-10-24 ENCOUNTER — APPOINTMENT (OUTPATIENT)
Dept: RADIOLOGY | Facility: MEDICAL CENTER | Age: 57
DRG: 870 | End: 2022-10-24
Attending: INTERNAL MEDICINE
Payer: COMMERCIAL

## 2022-10-24 PROBLEM — J90 PLEURAL EFFUSION: Status: ACTIVE | Noted: 2022-10-24

## 2022-10-24 PROBLEM — J96.01 ACUTE RESPIRATORY FAILURE WITH HYPOXIA (HCC): Status: ACTIVE | Noted: 2022-10-24

## 2022-10-24 LAB
ANION GAP SERPL CALC-SCNC: 8 MMOL/L (ref 7–16)
APPEARANCE FLD: NORMAL
APPEARANCE FLD: NORMAL
BASE EXCESS BLDA CALC-SCNC: -4 MMOL/L (ref -4–3)
BASOPHILS # BLD AUTO: 1.1 % (ref 0–1.8)
BASOPHILS # BLD: 0.19 K/UL (ref 0–0.12)
BODY FLD TYPE: NORMAL
BODY FLD TYPE: NORMAL
BODY TEMPERATURE: ABNORMAL DEGREES
BUN SERPL-MCNC: 13 MG/DL (ref 8–22)
CALCIUM SERPL-MCNC: 8 MG/DL (ref 8.4–10.2)
CHLORIDE SERPL-SCNC: 117 MMOL/L (ref 96–112)
CO2 BLDA-SCNC: 24 MMOL/L (ref 20–33)
CO2 SERPL-SCNC: 21 MMOL/L (ref 20–33)
COLOR FLD: NORMAL
COLOR FLD: NORMAL
CREAT SERPL-MCNC: 0.74 MG/DL (ref 0.5–1.4)
EOSINOPHIL # BLD AUTO: 0.86 K/UL (ref 0–0.51)
EOSINOPHIL NFR BLD: 5.1 % (ref 0–6.9)
ERYTHROCYTE [DISTWIDTH] IN BLOOD BY AUTOMATED COUNT: 58.4 FL (ref 35.9–50)
GFR SERPLBLD CREATININE-BSD FMLA CKD-EPI: 106 ML/MIN/1.73 M 2
GLUCOSE BLD STRIP.AUTO-MCNC: 77 MG/DL (ref 65–99)
GLUCOSE BLD STRIP.AUTO-MCNC: 81 MG/DL (ref 65–99)
GLUCOSE BLD STRIP.AUTO-MCNC: 82 MG/DL (ref 65–99)
GLUCOSE SERPL-MCNC: 92 MG/DL (ref 65–99)
HCO3 BLDA-SCNC: 22.5 MMOL/L (ref 17–25)
HCT VFR BLD AUTO: 42.3 % (ref 42–52)
HGB BLD-MCNC: 13.6 G/DL (ref 14–18)
HOROWITZ INDEX BLDA+IHG-RTO: 190 MM[HG]
IMM GRANULOCYTES # BLD AUTO: 0.19 K/UL (ref 0–0.11)
IMM GRANULOCYTES NFR BLD AUTO: 1.1 % (ref 0–0.9)
LACTATE BLD-SCNC: 0.8 MMOL/L (ref 0.5–2)
LYMPHOCYTES # BLD AUTO: 1.09 K/UL (ref 1–4.8)
LYMPHOCYTES NFR BLD: 6.4 % (ref 22–41)
LYMPHOCYTES NFR FLD: 5 %
LYMPHOCYTES NFR FLD: 6 %
MAGNESIUM SERPL-MCNC: 2 MG/DL (ref 1.5–2.5)
MCH RBC QN AUTO: 33.5 PG (ref 27–33)
MCHC RBC AUTO-ENTMCNC: 32.2 G/DL (ref 33.7–35.3)
MCV RBC AUTO: 104.2 FL (ref 81.4–97.8)
MESOTHL CELL NFR FLD: 4 %
MESOTHL CELL NFR FLD: 5 %
MONOCYTES # BLD AUTO: 2.27 K/UL (ref 0–0.85)
MONOCYTES NFR BLD AUTO: 13.4 % (ref 0–13.4)
MONONUC CELLS NFR FLD: 9 %
MONONUC CELLS NFR FLD: 9 %
NEUTROPHILS # BLD AUTO: 12.32 K/UL (ref 1.82–7.42)
NEUTROPHILS NFR BLD: 72.9 % (ref 44–72)
NEUTROPHILS NFR FLD: 80 %
NEUTROPHILS NFR FLD: 82 %
NRBC # BLD AUTO: 0 K/UL
NRBC BLD-RTO: 0 /100 WBC
O2/TOTAL GAS SETTING VFR VENT: 40 %
PCO2 BLDA: 45.6 MMHG (ref 26–37)
PH BLDA: 7.3 [PH] (ref 7.4–7.5)
PH TEMP ADJ BLDA: 7.31 [PH] (ref 7.4–7.5)
PHOSPHATE SERPL-MCNC: 3.9 MG/DL (ref 2.5–4.5)
PLATELET # BLD AUTO: 381 K/UL (ref 164–446)
PMV BLD AUTO: 10.1 FL (ref 9–12.9)
PO2 BLDA: 76 MMHG (ref 64–87)
PO2 TEMP ADJ BLDA: 74 MMHG (ref 64–87)
POTASSIUM SERPL-SCNC: 4.7 MMOL/L (ref 3.6–5.5)
RBC # BLD AUTO: 4.06 M/UL (ref 4.7–6.1)
SAO2 % BLDA: 94 % (ref 93–99)
SODIUM SERPL-SCNC: 146 MMOL/L (ref 135–145)
SPECIMEN DRAWN FROM PATIENT: ABNORMAL
WBC # BLD AUTO: 16.9 K/UL (ref 4.8–10.8)

## 2022-10-24 PROCEDURE — 0BJ08ZZ INSPECTION OF TRACHEOBRONCHIAL TREE, VIA NATURAL OR ARTIFICIAL OPENING ENDOSCOPIC: ICD-10-PCS | Performed by: INTERNAL MEDICINE

## 2022-10-24 PROCEDURE — 700111 HCHG RX REV CODE 636 W/ 250 OVERRIDE (IP): Performed by: HOSPITALIST

## 2022-10-24 PROCEDURE — A9270 NON-COVERED ITEM OR SERVICE: HCPCS | Performed by: HOSPITALIST

## 2022-10-24 PROCEDURE — 700102 HCHG RX REV CODE 250 W/ 637 OVERRIDE(OP): Performed by: INTERNAL MEDICINE

## 2022-10-24 PROCEDURE — 94799 UNLISTED PULMONARY SVC/PX: CPT

## 2022-10-24 PROCEDURE — 700111 HCHG RX REV CODE 636 W/ 250 OVERRIDE (IP): Performed by: INTERNAL MEDICINE

## 2022-10-24 PROCEDURE — 700105 HCHG RX REV CODE 258: Performed by: INTERNAL MEDICINE

## 2022-10-24 PROCEDURE — 0B9D8ZX DRAINAGE OF RIGHT MIDDLE LUNG LOBE, VIA NATURAL OR ARTIFICIAL OPENING ENDOSCOPIC, DIAGNOSTIC: ICD-10-PCS | Performed by: INTERNAL MEDICINE

## 2022-10-24 PROCEDURE — 700101 HCHG RX REV CODE 250: Performed by: INTERNAL MEDICINE

## 2022-10-24 PROCEDURE — 770022 HCHG ROOM/CARE - ICU (200)

## 2022-10-24 PROCEDURE — 80048 BASIC METABOLIC PNL TOTAL CA: CPT

## 2022-10-24 PROCEDURE — 94003 VENT MGMT INPAT SUBQ DAY: CPT

## 2022-10-24 PROCEDURE — 82803 BLOOD GASES ANY COMBINATION: CPT

## 2022-10-24 PROCEDURE — 88305 TISSUE EXAM BY PATHOLOGIST: CPT | Mod: 59

## 2022-10-24 PROCEDURE — 94760 N-INVAS EAR/PLS OXIMETRY 1: CPT

## 2022-10-24 PROCEDURE — 36600 WITHDRAWAL OF ARTERIAL BLOOD: CPT

## 2022-10-24 PROCEDURE — 31645 BRNCHSC W/THER ASPIR 1ST: CPT | Performed by: INTERNAL MEDICINE

## 2022-10-24 PROCEDURE — 0BD68ZX EXTRACTION OF RIGHT LOWER LOBE BRONCHUS, VIA NATURAL OR ARTIFICIAL OPENING ENDOSCOPIC, DIAGNOSTIC: ICD-10-PCS | Performed by: INTERNAL MEDICINE

## 2022-10-24 PROCEDURE — 87186 SC STD MICRODIL/AGAR DIL: CPT

## 2022-10-24 PROCEDURE — 85025 COMPLETE CBC W/AUTO DIFF WBC: CPT

## 2022-10-24 PROCEDURE — 700111 HCHG RX REV CODE 636 W/ 250 OVERRIDE (IP)

## 2022-10-24 PROCEDURE — 89240 UNLISTED MISC PATH TEST: CPT | Mod: 91

## 2022-10-24 PROCEDURE — 83605 ASSAY OF LACTIC ACID: CPT

## 2022-10-24 PROCEDURE — 0B9J8ZX DRAINAGE OF LEFT LOWER LUNG LOBE, VIA NATURAL OR ARTIFICIAL OPENING ENDOSCOPIC, DIAGNOSTIC: ICD-10-PCS | Performed by: INTERNAL MEDICINE

## 2022-10-24 PROCEDURE — A9270 NON-COVERED ITEM OR SERVICE: HCPCS | Performed by: INTERNAL MEDICINE

## 2022-10-24 PROCEDURE — 87102 FUNGUS ISOLATION CULTURE: CPT

## 2022-10-24 PROCEDURE — 302978 HCHG BRONCHOSCOPY-DIAGNOSTIC

## 2022-10-24 PROCEDURE — 0B9G8ZX DRAINAGE OF LEFT UPPER LUNG LOBE, VIA NATURAL OR ARTIFICIAL OPENING ENDOSCOPIC, DIAGNOSTIC: ICD-10-PCS | Performed by: INTERNAL MEDICINE

## 2022-10-24 PROCEDURE — 87116 MYCOBACTERIA CULTURE: CPT

## 2022-10-24 PROCEDURE — 87070 CULTURE OTHR SPECIMN AEROBIC: CPT | Mod: 91

## 2022-10-24 PROCEDURE — 71045 X-RAY EXAM CHEST 1 VIEW: CPT

## 2022-10-24 PROCEDURE — 83735 ASSAY OF MAGNESIUM: CPT

## 2022-10-24 PROCEDURE — 36592 COLLECT BLOOD FROM PICC: CPT

## 2022-10-24 PROCEDURE — 99291 CRITICAL CARE FIRST HOUR: CPT | Mod: 25 | Performed by: INTERNAL MEDICINE

## 2022-10-24 PROCEDURE — 0BDB8ZX EXTRACTION OF LEFT LOWER LOBE BRONCHUS, VIA NATURAL OR ARTIFICIAL OPENING ENDOSCOPIC, DIAGNOSTIC: ICD-10-PCS | Performed by: INTERNAL MEDICINE

## 2022-10-24 PROCEDURE — 82962 GLUCOSE BLOOD TEST: CPT | Mod: 91

## 2022-10-24 PROCEDURE — 87206 SMEAR FLUORESCENT/ACID STAI: CPT

## 2022-10-24 PROCEDURE — 0B9H8ZX DRAINAGE OF LUNG LINGULA, VIA NATURAL OR ARTIFICIAL OPENING ENDOSCOPIC, DIAGNOSTIC: ICD-10-PCS | Performed by: INTERNAL MEDICINE

## 2022-10-24 PROCEDURE — 87015 SPECIMEN INFECT AGNT CONCNTJ: CPT

## 2022-10-24 PROCEDURE — 87077 CULTURE AEROBIC IDENTIFY: CPT | Mod: 91

## 2022-10-24 PROCEDURE — 0B9C8ZX DRAINAGE OF RIGHT UPPER LUNG LOBE, VIA NATURAL OR ARTIFICIAL OPENING ENDOSCOPIC, DIAGNOSTIC: ICD-10-PCS | Performed by: INTERNAL MEDICINE

## 2022-10-24 PROCEDURE — 88112 CYTOPATH CELL ENHANCE TECH: CPT | Mod: 91

## 2022-10-24 PROCEDURE — 87205 SMEAR GRAM STAIN: CPT | Mod: 91

## 2022-10-24 PROCEDURE — 0B9F8ZX DRAINAGE OF RIGHT LOWER LUNG LOBE, VIA NATURAL OR ARTIFICIAL OPENING ENDOSCOPIC, DIAGNOSTIC: ICD-10-PCS | Performed by: INTERNAL MEDICINE

## 2022-10-24 PROCEDURE — 700102 HCHG RX REV CODE 250 W/ 637 OVERRIDE(OP): Performed by: HOSPITALIST

## 2022-10-24 PROCEDURE — 84100 ASSAY OF PHOSPHORUS: CPT

## 2022-10-24 RX ORDER — PROPOFOL 10 MG/ML
200 INJECTION, EMULSION INTRAVENOUS ONCE
Status: DISPENSED | OUTPATIENT
Start: 2022-10-24 | End: 2022-10-25

## 2022-10-24 RX ORDER — PHENYLEPHRINE HCL IN 0.9% NACL 0.5 MG/5ML
200 SYRINGE (ML) INTRAVENOUS ONCE
Status: COMPLETED | OUTPATIENT
Start: 2022-10-24 | End: 2022-10-24

## 2022-10-24 RX ORDER — PHENYLEPHRINE HCL IN 0.9% NACL 0.5 MG/5ML
SYRINGE (ML) INTRAVENOUS
Status: COMPLETED
Start: 2022-10-24 | End: 2022-10-24

## 2022-10-24 RX ORDER — CHLORDIAZEPOXIDE HYDROCHLORIDE 25 MG/1
25 CAPSULE, GELATIN COATED ORAL 2 TIMES DAILY
Status: DISCONTINUED | OUTPATIENT
Start: 2022-10-24 | End: 2022-10-26

## 2022-10-24 RX ORDER — SCOLOPAMINE TRANSDERMAL SYSTEM 1 MG/1
1 PATCH, EXTENDED RELEASE TRANSDERMAL
Status: CANCELLED | OUTPATIENT
Start: 2022-10-25

## 2022-10-24 RX ORDER — FUROSEMIDE 10 MG/ML
40 INJECTION INTRAMUSCULAR; INTRAVENOUS ONCE
Status: COMPLETED | OUTPATIENT
Start: 2022-10-24 | End: 2022-10-24

## 2022-10-24 RX ORDER — FUROSEMIDE 10 MG/ML
40 INJECTION INTRAMUSCULAR; INTRAVENOUS
Status: DISCONTINUED | OUTPATIENT
Start: 2022-10-25 | End: 2022-10-26

## 2022-10-24 RX ORDER — QUETIAPINE FUMARATE 25 MG/1
50 TABLET, FILM COATED ORAL NIGHTLY
Status: DISCONTINUED | OUTPATIENT
Start: 2022-10-24 | End: 2022-10-28

## 2022-10-24 RX ORDER — GLYCOPYRROLATE 0.2 MG/ML
0.1 INJECTION INTRAMUSCULAR; INTRAVENOUS EVERY 6 HOURS PRN
Status: CANCELLED | OUTPATIENT
Start: 2022-10-24

## 2022-10-24 RX ORDER — SODIUM CHLORIDE 9 MG/ML
500 INJECTION, SOLUTION INTRAVENOUS ONCE
Status: COMPLETED | OUTPATIENT
Start: 2022-10-24 | End: 2022-10-24

## 2022-10-24 RX ORDER — FAMOTIDINE 20 MG/1
20 TABLET, FILM COATED ORAL 2 TIMES DAILY
Status: DISCONTINUED | OUTPATIENT
Start: 2022-10-24 | End: 2022-10-27

## 2022-10-24 RX ADMIN — DEXMEDETOMIDINE HYDROCHLORIDE 1.5 MCG/KG/HR: 4 INJECTION, SOLUTION INTRAVENOUS at 09:50

## 2022-10-24 RX ADMIN — FENTANYL CITRATE 50 MCG: 50 INJECTION, SOLUTION INTRAMUSCULAR; INTRAVENOUS at 18:24

## 2022-10-24 RX ADMIN — CHLORDIAZEPOXIDE HYDROCHLORIDE 25 MG: 25 CAPSULE ORAL at 17:59

## 2022-10-24 RX ADMIN — AMPICILLIN SODIUM AND SULBACTAM SODIUM 3 G: 2; 1 INJECTION, POWDER, FOR SOLUTION INTRAMUSCULAR; INTRAVENOUS at 17:59

## 2022-10-24 RX ADMIN — QUETIAPINE FUMARATE 50 MG: 25 TABLET, FILM COATED ORAL at 05:34

## 2022-10-24 RX ADMIN — DEXMEDETOMIDINE HYDROCHLORIDE 1.5 MCG/KG/HR: 4 INJECTION, SOLUTION INTRAVENOUS at 13:35

## 2022-10-24 RX ADMIN — CHLORDIAZEPOXIDE HYDROCHLORIDE 25 MG: 25 CAPSULE ORAL at 05:20

## 2022-10-24 RX ADMIN — SENNOSIDES AND DOCUSATE SODIUM 2 TABLET: 50; 8.6 TABLET ORAL at 05:20

## 2022-10-24 RX ADMIN — THIAMINE HCL TAB 100 MG 100 MG: 100 TAB at 05:20

## 2022-10-24 RX ADMIN — Medication 200 MCG: at 11:08

## 2022-10-24 RX ADMIN — ACETAMINOPHEN 650 MG: 325 TABLET, FILM COATED ORAL at 17:59

## 2022-10-24 RX ADMIN — FOLIC ACID 1 MG: 1 TABLET ORAL at 05:20

## 2022-10-24 RX ADMIN — SODIUM CHLORIDE 500 ML: 9 INJECTION, SOLUTION INTRAVENOUS at 11:30

## 2022-10-24 RX ADMIN — FAMOTIDINE 20 MG: 20 TABLET, FILM COATED ORAL at 11:42

## 2022-10-24 RX ADMIN — DEXMEDETOMIDINE HYDROCHLORIDE 1.3 MCG/KG/HR: 4 INJECTION, SOLUTION INTRAVENOUS at 05:58

## 2022-10-24 RX ADMIN — ENOXAPARIN SODIUM 40 MG: 100 INJECTION SUBCUTANEOUS at 17:59

## 2022-10-24 RX ADMIN — LEVOTHYROXINE SODIUM 50 MCG: 50 TABLET ORAL at 05:20

## 2022-10-24 RX ADMIN — LACTULOSE 30 ML: 20 SOLUTION ORAL at 11:41

## 2022-10-24 RX ADMIN — MIDAZOLAM 1 MG: 1 INJECTION, SOLUTION INTRAMUSCULAR; INTRAVENOUS at 08:09

## 2022-10-24 RX ADMIN — MIDAZOLAM HYDROCHLORIDE 4 MG: 1 INJECTION, SOLUTION INTRAMUSCULAR; INTRAVENOUS at 11:05

## 2022-10-24 RX ADMIN — AMPICILLIN SODIUM AND SULBACTAM SODIUM 3 G: 2; 1 INJECTION, POWDER, FOR SOLUTION INTRAMUSCULAR; INTRAVENOUS at 05:20

## 2022-10-24 RX ADMIN — FENTANYL CITRATE 100 MCG: 50 INJECTION, SOLUTION INTRAMUSCULAR; INTRAVENOUS at 15:45

## 2022-10-24 RX ADMIN — LACTULOSE 30 ML: 20 SOLUTION ORAL at 05:20

## 2022-10-24 RX ADMIN — SENNOSIDES AND DOCUSATE SODIUM 2 TABLET: 50; 8.6 TABLET ORAL at 17:59

## 2022-10-24 RX ADMIN — DEXMEDETOMIDINE HYDROCHLORIDE 1.5 MCG/KG/HR: 4 INJECTION, SOLUTION INTRAVENOUS at 19:30

## 2022-10-24 RX ADMIN — FAMOTIDINE 20 MG: 20 TABLET, FILM COATED ORAL at 18:02

## 2022-10-24 RX ADMIN — DEXMEDETOMIDINE HYDROCHLORIDE 1.5 MCG/KG/HR: 4 INJECTION, SOLUTION INTRAVENOUS at 22:42

## 2022-10-24 RX ADMIN — Medication 125 MCG/HR: at 11:42

## 2022-10-24 RX ADMIN — POTASSIUM CHLORIDE AND SODIUM CHLORIDE: 900; 150 INJECTION, SOLUTION INTRAVENOUS at 01:47

## 2022-10-24 RX ADMIN — POTASSIUM CHLORIDE AND SODIUM CHLORIDE: 900; 150 INJECTION, SOLUTION INTRAVENOUS at 14:11

## 2022-10-24 RX ADMIN — LACTULOSE 30 ML: 20 SOLUTION ORAL at 17:58

## 2022-10-24 RX ADMIN — PIPERACILLIN AND TAZOBACTAM 4.5 G: 4; .5 INJECTION, POWDER, FOR SOLUTION INTRAVENOUS at 21:42

## 2022-10-24 RX ADMIN — QUETIAPINE FUMARATE 50 MG: 25 TABLET, FILM COATED ORAL at 21:42

## 2022-10-24 RX ADMIN — MIDAZOLAM 2 MG: 1 INJECTION, SOLUTION INTRAMUSCULAR; INTRAVENOUS at 23:07

## 2022-10-24 RX ADMIN — MAGNESIUM HYDROXIDE 30 ML: 400 SUSPENSION ORAL at 17:58

## 2022-10-24 RX ADMIN — FENTANYL CITRATE 50 MCG: 50 INJECTION, SOLUTION INTRAMUSCULAR; INTRAVENOUS at 22:38

## 2022-10-24 RX ADMIN — AMPICILLIN SODIUM AND SULBACTAM SODIUM 3 G: 2; 1 INJECTION, POWDER, FOR SOLUTION INTRAMUSCULAR; INTRAVENOUS at 11:41

## 2022-10-24 RX ADMIN — FUROSEMIDE 40 MG: 10 INJECTION, SOLUTION INTRAMUSCULAR; INTRAVENOUS at 19:32

## 2022-10-24 RX ADMIN — DEXMEDETOMIDINE HYDROCHLORIDE 1.4 MCG/KG/HR: 4 INJECTION, SOLUTION INTRAVENOUS at 02:47

## 2022-10-24 RX ADMIN — MULTIPLE VITAMINS W/ MINERALS TAB 1 TABLET: TAB at 05:20

## 2022-10-24 RX ADMIN — DEXMEDETOMIDINE HYDROCHLORIDE 1.5 MCG/KG/HR: 4 INJECTION, SOLUTION INTRAVENOUS at 16:28

## 2022-10-24 RX ADMIN — PIPERACILLIN AND TAZOBACTAM 4.5 G: 4; .5 INJECTION, POWDER, FOR SOLUTION INTRAVENOUS at 19:29

## 2022-10-24 ASSESSMENT — PAIN DESCRIPTION - PAIN TYPE
TYPE: ACUTE PAIN

## 2022-10-24 ASSESSMENT — FIBROSIS 4 INDEX: FIB4 SCORE: 1.06

## 2022-10-24 NOTE — PROGRESS NOTES
12-hour chart check complete.    Monitor Summary  Rhythm: Afib  Rate: 71-93  Ectopy: none  Measurements: -/0.10/-

## 2022-10-24 NOTE — RESPIRATORY CARE
Adult Ventilation Update    Total Vent Days: 2    Patient Lines/Drains/Airways Status       Active Airway       Name Placement date Placement time Site Days    Airway ETT 7.5 10/23/22  0912  --  1                  In the last 24 hours, the patient tolerated SBT for 1 hour on 8/8 @ 40% FI02 this afternoon-SBT delayed as patient was bronched:  BAL this AM @  10:40 by Dr Abrams.       Plateau Pressure: 24 (10/24/22 1126)  Static Compliance (ml / cm H2O): 136 (10/24/22 0625)    Patient failed trials because of Safety screen spontaneous breathing trial (SBT):  (postponed due to bronchoscopy per Dr Abrams) (10/24/22 1126)        Cough: Productive (10/24/22 1500)  Sputum Amount: Scant (10/24/22 1500)  Sputum Color: Clear (10/24/22 1500)  Sputum Consistency: Thin (10/24/22 1500)    Mobility  Activity Performed: Unable to mobilize (10/24/22 1400)  Time Activity Tolerated: 0 min (10/22/22 1800)  Assistance: Assistance of Two or More (10/22/22 2000)  Pt Calls for Assistance: (P) No (10/24/22 0800)  Staff Present for Mobilization: RN;CNA (10/23/22 0800)  Gait: Unable to Ambulate (10/24/22 1400)  Mobilization Comments: repositioned (10/22/22 1800)    Events/Summary/Plan: Vent check, spont mode x 1 hour (beginning approx 30 min ago) (10/24/22 1500).  Returning to rest mode of APV/CMV

## 2022-10-24 NOTE — CARE PLAN
The patient is Watcher - Medium risk of patient condition declining or worsening    Shift Goals  Clinical Goals: wean off vasopressors and sedation  Patient Goals: ANETTE  Family Goals: ANETTE    Progress made toward(s) clinical / shift goals:    Problem: Pain - Standard  Goal: Alleviation of pain or a reduction in pain to the patient’s comfort goal  Outcome: Progressing  Note: Pt with a CPOT score of <1     Problem: Mechanical Ventilation  Goal: Safe management of artificial airway and ventilation  Outcome: Progressing  Note: Pt has been synchronous with ventilator     Problem: Urinary - Renal Perfusion  Goal: Ability to achieve and maintain adequate renal perfusion and functioning will improve  Outcome: Progressing  Note: Pt having adequate output of >120ml every 2 hours       Patient is not progressing towards the following goals:

## 2022-10-24 NOTE — PROGRESS NOTES
"Pulmonary/Critical Care Progress Note    Date of admission  10/18/2022    Chief Complaint  57 y.o. male admitted 10/18/2022 with ETOH and required intubation.   Re intubated on 10/23/22    Hospital Course  From previous consult notes from Dr Garcia: \"56 yo male admitted on 10/19/2022 with ETOh withdrawal  PMHx: lives in a motel, drinks a pint of vodka a day (last drinl 10/7), prior hx of meth use, afib not on anticoagulation, liver mass on Us on August 2022 (2.  Solid hypoechoic mass within the right lobe of the liver measuring 1.7 x 1.3 x 1.0 cm in size. Follow-up pre and postcontrast multiphasic hepatic CT or MRI is recommended for further evaluation)  Received 10 mg ativan and 650 mg of phenobarb went into resp distress requiring intubation     10/19: started librium 25 mg tid; Rx of scabies\"  10/21: extubated at noon. On precedex drip.   10/22: Fevering. Altered. Gurgling upper airway. Started on Abx.   10/23: he was re intubated due to airway protection concerns, respiratory acidosis and AMS.      Interval Problem Update  Reviewed last 24 hour events:    10/24: I am taking over the service today, no major events overnight.  Chart review from the past 24 hours includes imaging, laboratory studies, vital signs and notes available.  Pertinent data for today     *Pulmonary: Aspiration versus hospital-acquired pneumonia.  Was intubated on 10/23/2022 for respiratory acidosis and AMS.   Cxr today showed a moderate pleural effusion on the RIGHT  *Cardiovascular: cardiomegaly on cxr, Hemodynamically stable without the need for him vasopressors, at times can see a narrow pulse pressure.  A. fib seems to be under rate control  *Nephrology: good urine output, no changes in renal function  *GI: Patient still has not had a bowel movement, has tried Relistor one-time, lactulose, and distended bowel regimen.  *Neuro: patient under effect of sedation, stop scopolamine and robinol, decrease librium from 25mg tid to BID, " decrease seroquel from 50mg bid to nightly. Will stop all narcotics and rely on fentanyl ggt alone.   *:I/Os: he is +10.9 L since admission.  Okay at about 1 L per 24 hours, will start diuresis today  *ID: Cultures are negative so far. WBC coming down 22.9--> 16.9. Negative respiratory viral panel on 10/22/22, PCT at 0.44        Tubes, Lines, Drains: OGT, 2 PIVs, wheeler, CVC on right IJ  Drips: Precedex, Fentanyl gtt, off propofol, intermitent doses of versed are significantly less  Nutrition: IV fluids @75 cc/hr, started on peptide 1.5 at 10, increase to 20 ml/hr today and follow recs from nutritionist  CXR: moderate R effusion. Will exam with US at bedside today.  Tmax: 100.9F on monitored   ProCal: 0.44 10/22/22  Antibiotics: Unasyn (started 10/22)  Steroids: none  Cultures: ordered 10/22 NGTD  BM regimen: MiraLAX, senna-docusate, milk magnesia, bisacodyl    Review of Systems  Review of Systems   Unable to perform ROS: Intubated      Vital Signs for last 24 hours   Pulse:  [] 82  Resp:  [17-29] 18  BP: ()/(50-99) 94/64  SpO2:  [90 %-100 %] 97 %    Hemodynamic parameters for last 24 hours       Respiratory Information for the last 24 hours  Vent Mode: APVCMV  Rate (breaths/min): 18  Vt Target (mL): 450  PEEP/CPAP: 8  MAP: 11  Control VTE (exp VT): 450    Physical Exam   Physical Exam  Vitals and nursing note reviewed.   Constitutional:       General: He is not in acute distress.     Appearance: Normal appearance. He is not toxic-appearing.   HENT:      Head: Normocephalic and atraumatic.      Nose: Nose normal. No congestion.      Mouth/Throat:      Mouth: Mucous membranes are moist.      Comments: ETT in place  Eyes:      Extraocular Movements: Extraocular movements intact.   Cardiovascular:      Rate and Rhythm: Regular rhythm. Tachycardia present.      Pulses: Normal pulses.      Heart sounds: No murmur heard.    No friction rub. No gallop.   Pulmonary:      Effort: Pulmonary effort is normal. No  respiratory distress.      Breath sounds: Rhonchi present. No wheezing.   Abdominal:      General: Bowel sounds are normal. There is no distension.      Tenderness: There is no abdominal tenderness. There is no guarding.   Musculoskeletal:         General: Normal range of motion.      Cervical back: No rigidity.      Right lower leg: Edema present.      Left lower leg: Edema present.   Skin:     General: Skin is warm.      Capillary Refill: Capillary refill takes less than 2 seconds.      Comments: Desquammative skin in all limbs improving       Medications  Current Facility-Administered Medications   Medication Dose Route Frequency Provider Last Rate Last Admin    famotidine (PEPCID) tablet 20 mg  20 mg Enteral Tube BID Chris Abrams M.D.        oxyCODONE immediate-release (ROXICODONE) tablet 2.5 mg  2.5 mg Enteral Tube Q3HRS PRN Greyson Luciano D.O.        Or    oxyCODONE immediate-release (ROXICODONE) tablet 5 mg  5 mg Enteral Tube Q3HRS PRN Greyson Luciano D.O.        Or    HYDROmorphone (Dilaudid) injection 0.25 mg  0.25 mg Intravenous Q3HRS PRN Greyson Luciano D.O.        norepinephrine (Levophed) 8 mg in 250 mL NS infusion (premix)  0-30 mcg/min Intravenous Continuous Greyson Luciano D.O.   Stopped at 10/24/22 0045    Respiratory Therapy Consult   Nebulization Continuous RT Greyson Luciano D.O.        lidocaine (XYLOCAINE) 1 % injection 2 mL  2 mL Tracheal Tube Q30 MIN PRN Greyson Luciano D.O.        fentaNYL (SUBLIMAZE) injection 25 mcg  25 mcg Intravenous Q HOUR PRN Greyson Luciano D.O.        Or    fentaNYL (SUBLIMAZE) injection 50 mcg  50 mcg Intravenous Q HOUR PRN Greyson Luciano D.O.        Or    fentaNYL (SUBLIMAZE) injection 100 mcg  100 mcg Intravenous Q HOUR PRN Greyson Luciano D.O.        fentaNYL (SUBLIMAZE) 50 mcg/mL in 50mL (Continuous Infusion)   Intravenous Continuous Greyson Luciano D.O. 2.5 mL/hr at 10/24/22 0724 125 mcg/hr at 10/24/22  0724    lactulose 20 GM/30ML solution 30 mL  30 mL Enteral Tube TID GRACY KolbONeetu   30 mL at 10/24/22 0520    propofol (DIPRIVAN) injection  0-80 mcg/kg/min Intravenous Continuous Greyson Luciano D.O.   Stopped at 10/24/22 0530    Pharmacy Consult: Enteral tube insertion - review meds/change route/product selection   Other PHARMACY TO DOSE Greyson Luciano D.O.        QUEtiapine (Seroquel) tablet 50 mg  50 mg Enteral Tube BID GRACY KolbONeetu   50 mg at 10/24/22 0534    ampicillin/sulbactam (UNASYN) 3 g in  mL IVPB  3 g Intravenous Q6HRS Greyson Luciano D.O.   Stopped at 10/24/22 0550    carboxymethylcellulose (REFRESH TEARS) 0.5 % ophthalmic drops 2 Drop  2 Drop Both Eyes PRN GRACY KolbONeetu   2 Drop at 10/22/22 1729    glycopyrrolate (ROBINUL) injection 0.1 mg  0.1 mg Intravenous Q6HRS PRN Greyson Luciano D.O.   0.1 mg at 10/22/22 1727    scopolamine (TRANSDERM-SCOP) patch 1 Patch  1 Patch Transdermal Q72HRS Greyson Luciano D.O.   1 Patch at 10/22/22 1720    hydrALAZINE (APRESOLINE) injection 10 mg  10 mg Intravenous Q6HRS PRN Nils Seymour D.O.        dexmedetomidine (PRECEDEX) 400 mcg/100mL NS premix infusion  0.1-1.5 mcg/kg/hr Intravenous Continuous Norm Choudhary M.D. 26.8 mL/hr at 10/24/22 0724 1.3 mcg/kg/hr at 10/24/22 0724    MD Alert...ICU Electrolyte Replacement per Pharmacy   Other PHARMACY TO DOSE Norm Choudhary M.D.        0.9 % NaCl with KCl 20 mEq infusion   Intravenous Continuous Norm Choudhary M.D. 75 mL/hr at 10/24/22 0147 New Bag at 10/24/22 0147    chlordiazePOXIDE (Librium) capsule 25 mg  25 mg Enteral Tube Q8HRS Norm Choudhary M.D.   25 mg at 10/24/22 0520    midazolam (Versed) injection 1 mg  1 mg Intravenous Q HOUR PRN Norm Choudhary M.D.   1 mg at 10/24/22 0809    Or    midazolam (Versed) injection 2 mg  2 mg Intravenous Q HOUR PREDER Choudhary M.D.   2 mg at 10/23/22 1327    Or     midazolam (Versed) injection 4 mg  4 mg Intravenous Q HOUR PRN Norm Choudhary M.D.        Or    midazolam (Versed) injection 5 mg  5 mg Intravenous Q HOUR PRN Norm Choudhary M.D.        albuterol inhaler 2 Puff  2 Puff Inhalation Q6HRS PRN Eva Rodriguez M.D.        enoxaparin (Lovenox) inj 40 mg  40 mg Subcutaneous DAILY AT 1800 Eva Rodriguez M.D.   40 mg at 10/23/22 1723    Pharmacy Consult Request ...Pain Management Review 1 Each  1 Each Other PHARMACY TO DOSE Eva Rodriguez M.D.        amLODIPine (NORVASC) tablet 10 mg  10 mg Enteral Tube DAILY Eva oRdriguez M.D.   10 mg at 10/21/22 0600    folic acid (FOLVITE) tablet 1 mg  1 mg Enteral Tube DAILY Eva Rodriguez M.D.   1 mg at 10/24/22 0520    levothyroxine (SYNTHROID) tablet 50 mcg  50 mcg Enteral Tube AM ES Eva Rodriguez M.D.   50 mcg at 10/24/22 0520    metoprolol tartrate (LOPRESSOR) tablet 12.5 mg  12.5 mg Enteral Tube TWICE DAILY Eva Rodriguez M.D.   12.5 mg at 10/22/22 1720    senna-docusate (PERICOLACE or SENOKOT S) 8.6-50 MG per tablet 2 Tablet  2 Tablet Enteral Tube BID Eva Rodriguez M.D.   2 Tablet at 10/24/22 0520    And    polyethylene glycol/lytes (MIRALAX) PACKET 1 Packet  1 Packet Enteral Tube QDAY PRN Eva Rodriguez M.D.   1 Packet at 10/22/22 1721    And    magnesium hydroxide (MILK OF MAGNESIA) suspension 30 mL  30 mL Enteral Tube QDAY PRN Eva Rodriguez M.D.        And    bisacodyl (DULCOLAX) suppository 10 mg  10 mg Rectal QDAY PRN Eva Rodriguez M.D.        therapeutic multivitamin-minerals (THERAGRAN-M) tablet 1 Tablet  1 Tablet Enteral Tube DAILY Eva Rodriguez M.D.   1 Tablet at 10/24/22 0520    thiamine (Vitamin B-1) tablet 100 mg  100 mg Enteral Tube DAILY Eva Rodriguez M.D.   100 mg at 10/24/22 0520    acetaminophen (Tylenol) tablet 650 mg  650 mg Enteral Tube Q6HRS PRN Eva Rodriguez M.D.   650 mg at 10/22/22 9523       Fluids    Intake/Output Summary (Last 24 hours) at 10/24/2022  0910  Last data filed at 10/24/2022 0800  Gross per 24 hour   Intake 4215.72 ml   Output 1415 ml   Net 2800.72 ml       Laboratory  Recent Labs     10/23/22  0825 10/23/22  0950 10/24/22  0450   ISTATAPH 7.185* 7.325* 7.302*   ISTATAPCO2 59.3* 39.2* 45.6*   ISTATAPO2 115* 94* 76   ISTATATCO2 24 22 24   XXVMUMB1GTR 97 97 94   ISTATARTHCO3 22.4 20.5 22.5   ISTATARTBE -7* -5* -4   ISTATTEMP 97.4 F 98.5 F 97.7 F   ISTATFIO2  --  70 40   ISTATSPEC Arterial Arterial Arterial   ISTATAPHTC 7.194* 7.326* 7.309*   TETIDUMT6DN 110* 94* 74         Recent Labs     10/22/22  0430 10/23/22  0515 10/24/22  0400   SODIUM 142 143 146*   POTASSIUM 4.7 5.1 4.7   CHLORIDE 116* 115* 117*   CO2 18* 20 21   BUN 8 11 13   CREATININE 0.59 0.71 0.74   MAGNESIUM 1.8 1.9 2.0   PHOSPHORUS 3.6 4.7* 3.9   CALCIUM 8.1* 7.8* 8.0*     Recent Labs     10/22/22  0430 10/23/22  0515 10/24/22  0400   GLUCOSE 81 70 92     Recent Labs     10/22/22  1130 10/23/22  0515 10/24/22  0400   WBC 23.9* 22.9* 16.9*   NEUTSPOLYS 74.00* 73.90* 72.90*   LYMPHOCYTES 2.00* 5.80* 6.40*   MONOCYTES 14.00* 17.10* 13.40   EOSINOPHILS 0.00 0.70 5.10   BASOPHILS 0.00 0.80 1.10     Recent Labs     10/22/22  1130 10/23/22  0515 10/24/22  0400   RBC 4.51* 4.61* 4.06*   HEMOGLOBIN 15.3 15.7 13.6*   HEMATOCRIT 46.8 48.9 42.3   PLATELETCT 370 337 381       Imaging  X-Ray:  My impression is: Cardiomegaly, moderate right-sided pleural effusion  Echo:   Reviewed    Echo from 10/10/2022  CONCLUSIONS  Normal left ventricular systolic function.  The right ventricle is mildly dilated.  Mild mitral regurgitation     Chest x-ray 10/20/2022  Cardiomegaly, moderate right-sided pleural effusion.     Chest x-ray 10/18/2022  Lungs: No consolidation detected.   Pleura:  No pleural space process is seen.   Heart and mediastinum: The cardiomediastinal contours are normal.   Age-indeterminate left ninth lateral rib fracture   IMPRESSION: No radiographic evidence of acute cardiopulmonary  process.      Assessment/Plan  * Alcohol dependence with withdrawal complicated by delirium (HCC)- (present on admission)  Assessment & Plan  At initial encounter during this admission he was intubated for respiratory depression but was successfully extubated on 10/21.  Was under treatment with librium and precedex and intermittently was treated with propofol while intubated  Prn versed  Replacing and monitoring electrolytes  And withdrawal is finally getting under control  Patient was reintubated on 10/23/2022 for respiratory acidosis and altered mental status  patient under effect of sedation, hold scopolamine and robinol, decrease librium from 25mg tid to BID, decrease seroquel from 50mg bid to nightly. Will stop all narcotics and rely on fentanyl ggt alone.    Acute respiratory failure with hypoxia (HCC)- (present on admission)  Assessment & Plan  Intubated for respiratory acidosis and altered mental status with need for intubation on 10/23/2022  Continue with mechanical ventilation with lung protective strategies.  He was suspected to have a had aspiration episode for what he was started empiric treatment with Unasyn.  Cultures were taken and so far no growth to date.  Procal ordered - 0.44   ABCDEF bundle    On mechanically assisted ventilation (HCC)  Assessment & Plan  Patient intubation as above, for etoh withdrawal and delirium with resp depression, Driving pressure 10, Fio2 30%   Patient was extubated on 10/21/2022.  He was reintubated on 10/23/2022 due to respiratory acidosis and altered mental status.  Continued with lung protective strategies on mechanical ventilation.  Continues with Precedex and fentanyl for sedation  Volume overload and we will start the process of the de-resuscitation      Atrial fibrillation (HCC)- (present on admission)  Assessment & Plan  On metoprolol  Currently rate controlled.  Patient is not on chronic anticoagulation.  He was Given Amiodarone 150 mg x1 10/23  "a.m.      Pleural effusion- (present on admission)  Assessment & Plan  On the right, moderate per CXR of 10/24  Will perform a bedside US eval   Consider thoracentesis today, pending windows, accesibility and consent    Scabies- (present on admission)  Assessment & Plan  Rash very suspicious of scabies  On rx    Alcohol withdrawal delirium (HCC)- (present on admission)  Assessment & Plan  CIWAs as above   Restraints necessary as he is pulling at lines/tubes  Seroquel 50 mg bid started through OGT on 10/22  Infectious workup - cultures NGTD   RPR ordered - negative    Liver mass- (present on admission)  Assessment & Plan  Seen on US 8/2022 \"right lobe of the liver measuring 1.7 x 1.3 x 1.0 cm in size\"  Will need CT versus MRI post extubation and follow up with GI as outpatient.     Essential hypertension- (present on admission)  Assessment & Plan  On metoprolol and amlodipine       VTE:  Lovenox  Ulcer: H2 Antagonist  Lines: Central Line  Ongoing indication addressed    I have performed a physical exam and reviewed and updated ROS and Plan today (10/24/2022). In review of yesterday's note (10/23/2022), there are no changes except as documented above.     Discussed patient condition and risk of morbidity and/or mortality with RN, RT, Pharmacy, and Charge nurse / hot rounds  The patient remains critically ill.  Critical care time = 50 minutes in directly providing and coordinating critical care and extensive data review.  No time overlap and excludes procedures.    Chris Abrams MD FACP  Pulmonary/Critical Care       "

## 2022-10-24 NOTE — ASSESSMENT & PLAN NOTE
CIWAs as above   Restraints necessary as he is pulling at lines/tubes  Seroquel 50 mg bid started through OGT on 10/22  Infectious workup - cultures NGTD   RPR ordered - negative

## 2022-10-24 NOTE — ASSESSMENT & PLAN NOTE
On the right, moderate-large per CXR of 10/30  bedside US showed a pleural effusion but difficult to access given position, will attempt a thoracentesis today to maximize his chances for extubation  S/p diagnostic thoracentesis 10/25/22: albumin gradient is less than 1.2 g/dl which indicate exudates, his was 0.9, also inflammative effusion with very high LDH 1020 (serum in the 240s range)  pleural fluid cultures so far NGTD

## 2022-10-24 NOTE — PROGRESS NOTES
MD to bedside for bronchoscopy:    1040 2mg versed given  1046 2 mg versed given  1056 geoff 200 mcg given  500 ml bolus given    1102 BP 95/62, MD bedside, pt VSS, samples obtained, procedure ended.

## 2022-10-24 NOTE — PROGRESS NOTES
12-hour chart check complete.    Monitor Summary  Rhythm: Afib  Rate:   Ectopy: O-R PVC  Measurements: -/0.08/-

## 2022-10-24 NOTE — DIETARY
"Nutrition Support Assessment:  Day 6 of admit.  Tyree Whiteside is a 57 y.o. male with admitting DX of Alcohol withdrawal delirium      Current problem list:  Alcohol dependence w/ withdrawal complicated by delirium  Acute respiratory failure w/ hypoxia  Atrial fibrillation  Pleural effusion  Scabies  Liver mass  Essential hypertension     Assessment:  Estimated Nutritional Needs based on:   Height: 177.8 cm (5' 10\")  Weight: 85.3 kg (188 lb 0.8 oz)  Body mass index is 26.98 kg/m²., BMI classification: overweight    Calculation/Equation:   MSJ: REE=1693 x 1.1=1862 kcals  PSU (EMV=11.4 L/min, Tmax=37.9 C): CUD=4845 kcals    - 3786-2352 kcals (22-25 kcals/kg)    - 111-128 g protein  (1.3-1.5 g/kg)     Evaluation:   Pt was re-intubated on 10/23 due to airway protection concerns, respiratory acidosis and AMS. He is currently NPO w/ consult for tube feed.   Pt has an OG tube w/ tip of tube in stomach.  GI: LBM was PTA.   Labs: 10/24/22: sodium=146, phos=3.9, mag=2  Meds: folic acid, lactulose, senna-docusate, MVI+min, thiamine, PRN Miralax given on 10/22, Precedex  Per RD note on 10/20/22, pt w/ wt loss of 13.6% x 11 months and 5.7% x 3 months. Wt loss is not significant.   Pt may have elevated nutrition needs due to possible poor PO PTA due to ETOH abuse.   Pt currently is receiving Impact Peptide 1.5 at a rate of 30 mL/hour. Goal rate of 55 mL/hour will provide 1980 kcals, 124 grams of protein, and 1016 mL of free water. This should meet pt's estimated nutrition needs.      Recommendations/Plan:  Advance Impact Peptide 1.5 per protocol to a goal rate of 55 mL/hour.  Additional fluids per MD  Monitor weights.     RD will follow.            "

## 2022-10-24 NOTE — PROCEDURES
Fiberoptic Bronchoscopy       Date/Time of Procedure: 10/24/22 11:08 AM     Indication(s):   Acute respiratory failure with need for mechanical ventilation and infiltrates in RLL and LLL    Consent: Informed, written consent was obtained prior to the procedure; risks, benefits, & alternatives were discussed with AYANA Whiteside.    Universal Protocol/Time Out: A Time Out with checklist was performed prior to the procedure to ensure correct identification of the patient and procedure.    Pre-Procedure Diagnosis: acute respiratory failure    Allergies:  Patient has no known allergies.      Sedation/Analgesia/Anesthesia:   Patient on current regimen of precedex and fentanyl.  For procedure versed was given 2mg at start of procedure and another 2 mg intraprocedure      Route of Entry:  [_] Nasal [_] Oral [X] ET tube [_] Trach [_] LMA      Findings: After the patient is adequately anesthetized (see procedure for anesthesia), the flexible bronchoscope was passed through the endotracheal tube visualizing the distal trachea:  Trachea: no abnormalities  Skye: sharp with thick copious secretions that were present in all lobes but predominantly on RLL and LLL. There was some degree of dynamic airway collapse on the LLL. No endobronchial lesions observed.  Right lung: RUL, RML, and RLL all level one subsegments visualized.  Left lung: NAHOMI, Lingula and LLL , All level one subsegments visualized.      Specimens: _  [x] Bronchoalveolar Lavage (BAL):  2 samples obtained, one from RLL (15cc clear white fluid) and another one from the LLL (25cc of clear white fluid)    Patient had an episode of hypotension post procedure that resolved with neosynephrine 2cc and bolus 500cc IVF provided.         Chris Abrams MD FACP  Pulmonary/Critical Care

## 2022-10-24 NOTE — ASSESSMENT & PLAN NOTE
-Intubated for respiratory acidosis and altered mental status with need for intubation on 10/23/2022  -Continue with mechanical ventilation with lung protective strategies.  -Cultures from sputum and BAL grew MSSA and pseudomonas, antibiotic changed to zosyn on the night of 10/24  -also complicated by right pleural effusion, s/p diagnostic thoracentesis.  -ABCDEF bundle

## 2022-10-25 ENCOUNTER — APPOINTMENT (OUTPATIENT)
Dept: RADIOLOGY | Facility: MEDICAL CENTER | Age: 57
DRG: 870 | End: 2022-10-25
Attending: INTERNAL MEDICINE
Payer: COMMERCIAL

## 2022-10-25 PROBLEM — F10.931 ALCOHOL WITHDRAWAL DELIRIUM (HCC): Status: RESOLVED | Noted: 2022-10-18 | Resolved: 2022-10-25

## 2022-10-25 PROBLEM — K59.09 OTHER CONSTIPATION: Status: ACTIVE | Noted: 2022-10-25

## 2022-10-25 PROBLEM — A41.52 SEPSIS DUE TO PSEUDOMONAS SPECIES (HCC): Status: ACTIVE | Noted: 2022-10-25

## 2022-10-25 LAB
ALBUMIN FLD-MCNC: 1 G/DL
ALBUMIN SERPL BCP-MCNC: 1.9 G/DL (ref 3.2–4.9)
AMYLASE FLD-CCNC: 23 U/L
ANION GAP SERPL CALC-SCNC: 8 MMOL/L (ref 7–16)
APPEARANCE FLD: NORMAL
BACTERIA SPEC RESP CULT: ABNORMAL
BASE EXCESS BLDA CALC-SCNC: -1 MMOL/L (ref -4–3)
BASOPHILS # BLD AUTO: 1.1 % (ref 0–1.8)
BASOPHILS # BLD: 0.18 K/UL (ref 0–0.12)
BODY FLD TYPE: NORMAL
BODY TEMPERATURE: ABNORMAL DEGREES
BUN SERPL-MCNC: 16 MG/DL (ref 8–22)
CALCIUM SERPL-MCNC: 8.6 MG/DL (ref 8.4–10.2)
CHLORIDE SERPL-SCNC: 114 MMOL/L (ref 96–112)
CO2 BLDA-SCNC: 26 MMOL/L (ref 20–33)
CO2 SERPL-SCNC: 24 MMOL/L (ref 20–33)
COLOR FLD: NORMAL
CREAT SERPL-MCNC: 1.04 MG/DL (ref 0.5–1.4)
EOSINOPHIL # BLD AUTO: 1.19 K/UL (ref 0–0.51)
EOSINOPHIL NFR BLD: 7.2 % (ref 0–6.9)
ERYTHROCYTE [DISTWIDTH] IN BLOOD BY AUTOMATED COUNT: 59.4 FL (ref 35.9–50)
FUNGUS SPEC FUNGUS STN: NORMAL
GFR SERPLBLD CREATININE-BSD FMLA CKD-EPI: 84 ML/MIN/1.73 M 2
GLUCOSE BLD STRIP.AUTO-MCNC: 143 MG/DL (ref 65–99)
GLUCOSE BLD STRIP.AUTO-MCNC: 157 MG/DL (ref 65–99)
GLUCOSE BLD STRIP.AUTO-MCNC: 60 MG/DL (ref 65–99)
GLUCOSE BLD STRIP.AUTO-MCNC: 78 MG/DL (ref 65–99)
GLUCOSE BLD STRIP.AUTO-MCNC: 92 MG/DL (ref 65–99)
GLUCOSE FLD-MCNC: <2 MG/DL
GLUCOSE SERPL-MCNC: 90 MG/DL (ref 65–99)
GRAM STN SPEC: ABNORMAL
GRAM STN SPEC: ABNORMAL
GRAM STN SPEC: NORMAL
GRAM STN SPEC: NORMAL
HCO3 BLDA-SCNC: 24.6 MMOL/L (ref 17–25)
HCT VFR BLD AUTO: 45.4 % (ref 42–52)
HGB BLD-MCNC: 14.7 G/DL (ref 14–18)
HOROWITZ INDEX BLDA+IHG-RTO: 150 MM[HG]
IMM GRANULOCYTES # BLD AUTO: 0.26 K/UL (ref 0–0.11)
IMM GRANULOCYTES NFR BLD AUTO: 1.6 % (ref 0–0.9)
LACTATE BLD-SCNC: 1.1 MMOL/L (ref 0.5–2)
LDH FLD L TO P-CCNC: 1020 U/L
LDH SERPL L TO P-CCNC: 241 U/L (ref 107–266)
LYMPHOCYTES # BLD AUTO: 1.06 K/UL (ref 1–4.8)
LYMPHOCYTES NFR BLD: 6.4 % (ref 22–41)
LYMPHOCYTES NFR FLD: 9 %
MCH RBC QN AUTO: 33.9 PG (ref 27–33)
MCHC RBC AUTO-ENTMCNC: 32.4 G/DL (ref 33.7–35.3)
MCV RBC AUTO: 104.8 FL (ref 81.4–97.8)
MONOCYTES # BLD AUTO: 1.98 K/UL (ref 0–0.85)
MONOCYTES NFR BLD AUTO: 11.9 % (ref 0–13.4)
MONONUC CELLS NFR FLD: 3 %
NEUTROPHILS # BLD AUTO: 11.94 K/UL (ref 1.82–7.42)
NEUTROPHILS NFR BLD: 71.8 % (ref 44–72)
NEUTROPHILS NFR FLD: 88 %
NRBC # BLD AUTO: 0 K/UL
NRBC BLD-RTO: 0 /100 WBC
O2/TOTAL GAS SETTING VFR VENT: 40 %
PCO2 BLDA: 44.3 MMHG (ref 26–37)
PH BLDA: 7.35 [PH] (ref 7.4–7.5)
PH FLD: 7.5 [PH]
PH TEMP ADJ BLDA: 7.34 [PH] (ref 7.4–7.5)
PLATELET # BLD AUTO: 362 K/UL (ref 164–446)
PMV BLD AUTO: 10.4 FL (ref 9–12.9)
PO2 BLDA: 60 MMHG (ref 64–87)
PO2 TEMP ADJ BLDA: 64 MMHG (ref 64–87)
POTASSIUM SERPL-SCNC: 4.1 MMOL/L (ref 3.6–5.5)
PROT FLD-MCNC: 3.1 G/DL
PROT SERPL-MCNC: 6.6 G/DL (ref 6–8.2)
RBC # BLD AUTO: 4.33 M/UL (ref 4.7–6.1)
RBC # FLD: 3000 CELLS/UL
RHODAMINE-AURAMINE STN SPEC: NORMAL
RHODAMINE-AURAMINE STN SPEC: NORMAL
SAO2 % BLDA: 89 % (ref 93–99)
SIGNIFICANT IND 70042: ABNORMAL
SIGNIFICANT IND 70042: ABNORMAL
SIGNIFICANT IND 70042: NORMAL
SITE SITE: ABNORMAL
SITE SITE: ABNORMAL
SITE SITE: NORMAL
SODIUM SERPL-SCNC: 146 MMOL/L (ref 135–145)
SOURCE SOURCE: ABNORMAL
SOURCE SOURCE: ABNORMAL
SOURCE SOURCE: NORMAL
SPECIMEN DRAWN FROM PATIENT: ABNORMAL
TRIGL SERPL-MCNC: 93 MG/DL (ref 0–149)
WBC # BLD AUTO: 16.6 K/UL (ref 4.8–10.8)
WBC # FLD: 3511 CELLS/UL

## 2022-10-25 PROCEDURE — 71045 X-RAY EXAM CHEST 1 VIEW: CPT

## 2022-10-25 PROCEDURE — 74018 RADEX ABDOMEN 1 VIEW: CPT

## 2022-10-25 PROCEDURE — A9270 NON-COVERED ITEM OR SERVICE: HCPCS | Performed by: INTERNAL MEDICINE

## 2022-10-25 PROCEDURE — 80048 BASIC METABOLIC PNL TOTAL CA: CPT

## 2022-10-25 PROCEDURE — 87205 SMEAR GRAM STAIN: CPT

## 2022-10-25 PROCEDURE — 84155 ASSAY OF PROTEIN SERUM: CPT

## 2022-10-25 PROCEDURE — 83986 ASSAY PH BODY FLUID NOS: CPT

## 2022-10-25 PROCEDURE — 700111 HCHG RX REV CODE 636 W/ 250 OVERRIDE (IP): Performed by: INTERNAL MEDICINE

## 2022-10-25 PROCEDURE — 82962 GLUCOSE BLOOD TEST: CPT

## 2022-10-25 PROCEDURE — 82945 GLUCOSE OTHER FLUID: CPT

## 2022-10-25 PROCEDURE — 36415 COLL VENOUS BLD VENIPUNCTURE: CPT

## 2022-10-25 PROCEDURE — 83605 ASSAY OF LACTIC ACID: CPT

## 2022-10-25 PROCEDURE — 94799 UNLISTED PULMONARY SVC/PX: CPT

## 2022-10-25 PROCEDURE — 770022 HCHG ROOM/CARE - ICU (200)

## 2022-10-25 PROCEDURE — 0W9B3ZZ DRAINAGE OF LEFT PLEURAL CAVITY, PERCUTANEOUS APPROACH: ICD-10-PCS | Performed by: INTERNAL MEDICINE

## 2022-10-25 PROCEDURE — 700101 HCHG RX REV CODE 250: Performed by: INTERNAL MEDICINE

## 2022-10-25 PROCEDURE — 88112 CYTOPATH CELL ENHANCE TECH: CPT

## 2022-10-25 PROCEDURE — 82042 OTHER SOURCE ALBUMIN QUAN EA: CPT

## 2022-10-25 PROCEDURE — 87015 SPECIMEN INFECT AGNT CONCNTJ: CPT

## 2022-10-25 PROCEDURE — 93005 ELECTROCARDIOGRAM TRACING: CPT | Performed by: INTERNAL MEDICINE

## 2022-10-25 PROCEDURE — 99291 CRITICAL CARE FIRST HOUR: CPT | Mod: 25 | Performed by: INTERNAL MEDICINE

## 2022-10-25 PROCEDURE — 700111 HCHG RX REV CODE 636 W/ 250 OVERRIDE (IP): Performed by: HOSPITALIST

## 2022-10-25 PROCEDURE — 87070 CULTURE OTHR SPECIMN AEROBIC: CPT

## 2022-10-25 PROCEDURE — 83615 LACTATE (LD) (LDH) ENZYME: CPT | Mod: 91

## 2022-10-25 PROCEDURE — 87206 SMEAR FLUORESCENT/ACID STAI: CPT

## 2022-10-25 PROCEDURE — 94760 N-INVAS EAR/PLS OXIMETRY 1: CPT

## 2022-10-25 PROCEDURE — 700105 HCHG RX REV CODE 258: Performed by: INTERNAL MEDICINE

## 2022-10-25 PROCEDURE — 700102 HCHG RX REV CODE 250 W/ 637 OVERRIDE(OP): Performed by: HOSPITALIST

## 2022-10-25 PROCEDURE — 82803 BLOOD GASES ANY COMBINATION: CPT

## 2022-10-25 PROCEDURE — 82150 ASSAY OF AMYLASE: CPT

## 2022-10-25 PROCEDURE — 94003 VENT MGMT INPAT SUBQ DAY: CPT

## 2022-10-25 PROCEDURE — 87116 MYCOBACTERIA CULTURE: CPT

## 2022-10-25 PROCEDURE — 32555 ASPIRATE PLEURA W/ IMAGING: CPT | Mod: RT | Performed by: INTERNAL MEDICINE

## 2022-10-25 PROCEDURE — 89051 BODY FLUID CELL COUNT: CPT

## 2022-10-25 PROCEDURE — A9270 NON-COVERED ITEM OR SERVICE: HCPCS | Performed by: HOSPITALIST

## 2022-10-25 PROCEDURE — 85025 COMPLETE CBC W/AUTO DIFF WBC: CPT

## 2022-10-25 PROCEDURE — 700102 HCHG RX REV CODE 250 W/ 637 OVERRIDE(OP): Performed by: INTERNAL MEDICINE

## 2022-10-25 PROCEDURE — 84478 ASSAY OF TRIGLYCERIDES: CPT

## 2022-10-25 PROCEDURE — 88305 TISSUE EXAM BY PATHOLOGIST: CPT

## 2022-10-25 PROCEDURE — 87102 FUNGUS ISOLATION CULTURE: CPT

## 2022-10-25 PROCEDURE — 36592 COLLECT BLOOD FROM PICC: CPT

## 2022-10-25 PROCEDURE — 82040 ASSAY OF SERUM ALBUMIN: CPT

## 2022-10-25 PROCEDURE — 32554 ASPIRATE PLEURA W/O IMAGING: CPT

## 2022-10-25 PROCEDURE — 84157 ASSAY OF PROTEIN OTHER: CPT

## 2022-10-25 RX ORDER — NOREPINEPHRINE BITARTRATE 0.03 MG/ML
0-30 INJECTION, SOLUTION INTRAVENOUS CONTINUOUS
Status: DISCONTINUED | OUTPATIENT
Start: 2022-10-25 | End: 2022-10-29

## 2022-10-25 RX ORDER — TOBRAMYCIN 3 MG/ML
1 SOLUTION/ DROPS OPHTHALMIC EVERY 4 HOURS
Status: DISPENSED | OUTPATIENT
Start: 2022-10-25 | End: 2022-11-01

## 2022-10-25 RX ORDER — DEXTROSE AND SODIUM CHLORIDE 5; .45 G/100ML; G/100ML
INJECTION, SOLUTION INTRAVENOUS ONCE
Status: COMPLETED | OUTPATIENT
Start: 2022-10-25 | End: 2022-10-26

## 2022-10-25 RX ORDER — DEXTROSE MONOHYDRATE 25 G/50ML
25 INJECTION, SOLUTION INTRAVENOUS ONCE
Status: COMPLETED | OUTPATIENT
Start: 2022-10-25 | End: 2022-10-25

## 2022-10-25 RX ORDER — DEXMEDETOMIDINE HYDROCHLORIDE 4 UG/ML
.1-1.5 INJECTION, SOLUTION INTRAVENOUS CONTINUOUS
Status: DISCONTINUED | OUTPATIENT
Start: 2022-10-25 | End: 2022-10-26

## 2022-10-25 RX ORDER — DEXTROSE AND SODIUM CHLORIDE 5; .45 G/100ML; G/100ML
INJECTION, SOLUTION INTRAVENOUS CONTINUOUS
Status: DISCONTINUED | OUTPATIENT
Start: 2022-10-25 | End: 2022-10-25

## 2022-10-25 RX ADMIN — PIPERACILLIN AND TAZOBACTAM 4.5 G: 4; .5 INJECTION, POWDER, FOR SOLUTION INTRAVENOUS at 05:11

## 2022-10-25 RX ADMIN — DEXMEDETOMIDINE HYDROCHLORIDE 1.5 MCG/KG/HR: 4 INJECTION, SOLUTION INTRAVENOUS at 08:22

## 2022-10-25 RX ADMIN — PIPERACILLIN AND TAZOBACTAM 4.5 G: 4; .5 INJECTION, POWDER, FOR SOLUTION INTRAVENOUS at 21:54

## 2022-10-25 RX ADMIN — QUETIAPINE FUMARATE 50 MG: 25 TABLET, FILM COATED ORAL at 21:53

## 2022-10-25 RX ADMIN — LEVOTHYROXINE SODIUM 50 MCG: 50 TABLET ORAL at 05:12

## 2022-10-25 RX ADMIN — NOREPINEPHRINE BITARTRATE 5 MCG/MIN: 1 INJECTION, SOLUTION, CONCENTRATE INTRAVENOUS at 07:11

## 2022-10-25 RX ADMIN — DEXMEDETOMIDINE HYDROCHLORIDE 1.5 MCG/KG/HR: 4 INJECTION, SOLUTION INTRAVENOUS at 02:02

## 2022-10-25 RX ADMIN — SENNOSIDES AND DOCUSATE SODIUM 2 TABLET: 50; 8.6 TABLET ORAL at 17:41

## 2022-10-25 RX ADMIN — LACTULOSE 30 ML: 20 SOLUTION ORAL at 17:41

## 2022-10-25 RX ADMIN — LACTULOSE 30 ML: 20 SOLUTION ORAL at 05:12

## 2022-10-25 RX ADMIN — SENNOSIDES AND DOCUSATE SODIUM 2 TABLET: 50; 8.6 TABLET ORAL at 05:12

## 2022-10-25 RX ADMIN — PIPERACILLIN AND TAZOBACTAM 4.5 G: 4; .5 INJECTION, POWDER, FOR SOLUTION INTRAVENOUS at 14:34

## 2022-10-25 RX ADMIN — CHLORDIAZEPOXIDE HYDROCHLORIDE 25 MG: 25 CAPSULE ORAL at 17:41

## 2022-10-25 RX ADMIN — TOBRAMYCIN OPHTHALMIC SOLUTION 1 DROP: 3 SOLUTION/ DROPS OPHTHALMIC at 12:08

## 2022-10-25 RX ADMIN — CHLORDIAZEPOXIDE HYDROCHLORIDE 25 MG: 25 CAPSULE ORAL at 05:12

## 2022-10-25 RX ADMIN — ACETAMINOPHEN 650 MG: 325 TABLET, FILM COATED ORAL at 05:12

## 2022-10-25 RX ADMIN — DEXMEDETOMIDINE HYDROCHLORIDE 1.5 MCG/KG/HR: 4 INJECTION, SOLUTION INTRAVENOUS at 17:57

## 2022-10-25 RX ADMIN — FAMOTIDINE 20 MG: 20 TABLET, FILM COATED ORAL at 05:12

## 2022-10-25 RX ADMIN — ACETAMINOPHEN 650 MG: 325 TABLET, FILM COATED ORAL at 17:50

## 2022-10-25 RX ADMIN — Medication 150 MCG/HR: at 06:25

## 2022-10-25 RX ADMIN — DEXMEDETOMIDINE HYDROCHLORIDE 1.5 MCG/KG/HR: 4 INJECTION, SOLUTION INTRAVENOUS at 21:09

## 2022-10-25 RX ADMIN — DEXMEDETOMIDINE HYDROCHLORIDE 1.5 MCG/KG/HR: 4 INJECTION, SOLUTION INTRAVENOUS at 11:36

## 2022-10-25 RX ADMIN — THIAMINE HCL TAB 100 MG 100 MG: 100 TAB at 05:12

## 2022-10-25 RX ADMIN — FAMOTIDINE 20 MG: 20 TABLET, FILM COATED ORAL at 17:41

## 2022-10-25 RX ADMIN — TOBRAMYCIN OPHTHALMIC SOLUTION 1 DROP: 3 SOLUTION/ DROPS OPHTHALMIC at 17:42

## 2022-10-25 RX ADMIN — TOBRAMYCIN OPHTHALMIC SOLUTION 1 DROP: 3 SOLUTION/ DROPS OPHTHALMIC at 21:53

## 2022-10-25 RX ADMIN — DEXTROSE MONOHYDRATE 25 G: 25 INJECTION, SOLUTION INTRAVENOUS at 18:14

## 2022-10-25 RX ADMIN — MIDAZOLAM 1 MG: 1 INJECTION, SOLUTION INTRAMUSCULAR; INTRAVENOUS at 08:27

## 2022-10-25 RX ADMIN — FOLIC ACID 1 MG: 1 TABLET ORAL at 05:12

## 2022-10-25 RX ADMIN — DEXMEDETOMIDINE HYDROCHLORIDE 1.5 MCG/KG/HR: 4 INJECTION, SOLUTION INTRAVENOUS at 14:49

## 2022-10-25 RX ADMIN — ACETAMINOPHEN 650 MG: 325 TABLET, FILM COATED ORAL at 12:08

## 2022-10-25 RX ADMIN — DEXTROSE AND SODIUM CHLORIDE: 5; 450 INJECTION, SOLUTION INTRAVENOUS at 18:14

## 2022-10-25 RX ADMIN — ACETAMINOPHEN 650 MG: 325 TABLET, FILM COATED ORAL at 23:50

## 2022-10-25 RX ADMIN — DEXMEDETOMIDINE HYDROCHLORIDE 1.5 MCG/KG/HR: 4 INJECTION, SOLUTION INTRAVENOUS at 23:59

## 2022-10-25 RX ADMIN — MIDAZOLAM 1 MG: 1 INJECTION, SOLUTION INTRAMUSCULAR; INTRAVENOUS at 08:24

## 2022-10-25 RX ADMIN — MULTIPLE VITAMINS W/ MINERALS TAB 1 TABLET: TAB at 05:12

## 2022-10-25 RX ADMIN — ENOXAPARIN SODIUM 40 MG: 100 INJECTION SUBCUTANEOUS at 17:41

## 2022-10-25 RX ADMIN — TOBRAMYCIN OPHTHALMIC SOLUTION 1 DROP: 3 SOLUTION/ DROPS OPHTHALMIC at 14:23

## 2022-10-25 RX ADMIN — DEXMEDETOMIDINE HYDROCHLORIDE 1.5 MCG/KG/HR: 4 INJECTION, SOLUTION INTRAVENOUS at 05:10

## 2022-10-25 RX ADMIN — LACTULOSE 30 ML: 20 SOLUTION ORAL at 12:08

## 2022-10-25 RX ADMIN — MIDAZOLAM 1 MG: 1 INJECTION, SOLUTION INTRAMUSCULAR; INTRAVENOUS at 18:30

## 2022-10-25 ASSESSMENT — PAIN DESCRIPTION - PAIN TYPE
TYPE: ACUTE PAIN

## 2022-10-25 NOTE — CARE PLAN
The patient is Watcher - Medium risk of patient condition declining or worsening    Shift Goals  Clinical Goals: temperature management, comfort, rest  Patient Goals: ANETTE  Family Goals: ANETTE    Progress made toward(s) clinical / shift goals:    Problem: Skin Integrity  Goal: Skin integrity is maintained or improved  Outcome: Progressing     Problem: Fall Risk  Goal: Patient will remain free from falls  Outcome: Progressing     Problem: Respiratory  Goal: Patient will achieve/maintain optimum respiratory ventilation and gas exchange  Outcome: Progressing       Patient is not progressing towards the following goals:      Problem: Knowledge Deficit - Standard  Goal: Patient and family/care givers will demonstrate understanding of plan of care, disease process/condition, diagnostic tests and medications  Outcome: Not Progressing

## 2022-10-25 NOTE — PROGRESS NOTES
0820- Charge RN spoke to Pt's daughter to obtain consent for thoracentesis, MD had also previously spoken to patient's daughter for consent. Assisted MD with procedure, time out called. Pt continuously being monitored, VSS.

## 2022-10-25 NOTE — PROGRESS NOTES
"Pulmonary/Critical Care Progress Note    Date of admission  10/18/2022    Chief Complaint  57 y.o. male admitted 10/18/2022 with ETOH and required intubation.   Re intubated on 10/23/22    Hospital Course  From previous consult notes from Dr Garcia: \"58 yo male admitted on 10/19/2022 with ETOh withdrawal  PMHx: lives in a motel, drinks a pint of vodka a day (last drinl 10/7), prior hx of meth use, afib not on anticoagulation, liver mass on Us on August 2022 (2.  Solid hypoechoic mass within the right lobe of the liver measuring 1.7 x 1.3 x 1.0 cm in size. Follow-up pre and postcontrast multiphasic hepatic CT or MRI is recommended for further evaluation)  Received 10 mg ativan and 650 mg of phenobarb went into resp distress requiring intubation     10/19: started librium 25 mg tid; Rx of scabies\"  10/21: extubated at noon. On precedex drip.   10/22: Fevering. Altered. Gurgling upper airway. Started on Abx.   10/23: he was re intubated due to airway protection concerns, respiratory acidosis and AMS.    10/24: I am taking over the service today, no major events overnight. Patient underwent bronchoscopy with sampling of RLL and LLL. Copious secretions were found in geetha and all subsegments but more prevalent in RLL and LLL. Adjusted antibiotics later on the day due to pseudomonas growing in the respiratory culture. (On zosyn).   Stop scopolamine and robinol, decrease librium from 25mg tid to BID, decrease seroquel from 50mg bid to nightly. Will stop all narcotics and rely on fentanyl ggt alone    Interval Problem Update  Reviewed last 24 hour events:      Chart review from the past 24 hours includes imaging, laboratory studies, vital signs and notes available.  Pertinent data for today     *Pulmonary: Aspiration versus hospital-acquired pneumonia.  Was intubated on 10/23/2022 for respiratory acidosis and AMS. S/p bronch on 10/24/22, BAL from RLL and LLL.   Cxr today showed a moderate pleural effusion on the RIGHT " but difficult to get access to the effusion. Today a diagnostic thoracentesis was performed with only 100ml drained of pleural fluid. It was a bit bloody the initial sample but later cleared up, could match the hematocrit sign in bedside US evaluation. MSSA and pseduomonas are growing from the sputum cx and also from bronch samples. Antitiobitcs changed to zosyn on 10/24.  *Cardiovascular: cardiomegaly on cxr, Hemodynamically borderline hypotensive, started on vasopressors very early today, at times can see a narrow pulse pressure.  A. fib seems to be under rate control. Get EKG today  *Nephrology: good urine output, no changes in renal function  *GI: Patient still has not had a bowel movement, has tried Relistor one-time, lactulose, and distended bowel regimen. Did not tolerate the tube feeds, on intermittent suction today. Will check a KUB today, consider enema  *Neuro: patient under effect of sedation by precedex and fentanyl, librium from 25mg BID another day and tomorrow daily, seroquel 50mg nightly.   *:I/Os: he is +11 L since admission.  Okay at about 1 L per 24 hours, will challenge him with diuresis today if bp allows it.  *ID: Cultures are growing MSSA and pseudomonas, sensitivities pending. WBC coming down 22.9--> 16.9-->16.6. Negative respiratory viral panel on 10/22/22, PCT at 0.44        Tubes, Lines, Drains: OGT, 2 PIVs, wheeler, CVC on right IJ  Drips: Precedex, Fentanyl gtt,   Nutrition: holding peptide 1.5 due to abdominal distention  CXR: moderate R effusion. US at bedside today showed the effusion but is difficult to obtain a proper position to perform a therapeutic thoracentesis, diagnostic was performed for sampling instead.  Tmax: 101.3 F on monitored   ProCal: 0.44 10/22/22  Antibiotics: Unasyn (started 10/22)--> zosyn 10/24  Steroids: none  Cultures: ordered 10/22 MSSA and pseudomonas  BM regimen: MiraLAX, senna-docusate, milk magnesia, bisacodyl    Review of Systems  Review of Systems    Unable to perform ROS: Intubated      Vital Signs for last 24 hours   Pulse:  [] 83  Resp:  [10-40] 10  BP: ()/(35-89) 93/74  SpO2:  [89 %-98 %] 96 %    Hemodynamic parameters for last 24 hours       Respiratory Information for the last 24 hours  Vent Mode: APVCMV  Rate (breaths/min): 18  Vt Target (mL): 460  PEEP/CPAP: 8  MAP: 11  Length of Weaning Trial (Hours): 1 hour  Control VTE (exp VT): 489    Physical Exam   Physical Exam  Vitals and nursing note reviewed.   Constitutional:       General: He is not in acute distress.     Appearance: Normal appearance. He is ill-appearing. He is not toxic-appearing.   HENT:      Head: Normocephalic and atraumatic.      Nose: Nose normal. No congestion.      Mouth/Throat:      Mouth: Mucous membranes are moist.      Comments: ETT in place  Eyes:      Extraocular Movements: Extraocular movements intact.   Cardiovascular:      Rate and Rhythm: Regular rhythm. Tachycardia present.      Pulses: Normal pulses.      Heart sounds: No murmur heard.    No friction rub. No gallop.   Pulmonary:      Effort: Pulmonary effort is normal. No respiratory distress.      Breath sounds: Rhonchi present. No wheezing.   Abdominal:      General: Bowel sounds are normal. There is distension.      Tenderness: There is no abdominal tenderness. There is no guarding.   Musculoskeletal:         General: Normal range of motion.      Cervical back: No rigidity.      Right lower leg: Edema present.      Left lower leg: Edema present.   Skin:     General: Skin is warm.      Capillary Refill: Capillary refill takes less than 2 seconds.      Comments: Desquammative skin in all limbs improving     RIGHT PLEURAL EFFUSION. Possible hematocrit sign present.  Hospital Encounter on 10/18/22     Document Information        Medications  Current Facility-Administered Medications   Medication Dose Route Frequency Provider Last Rate Last Admin    dexmedetomidine (PRECEDEX) 400 mcg/100mL NS premix infusion   0.1-1.5 mcg/kg/hr Intravenous Continuous Chris Abrams M.D. 32 mL/hr at 10/25/22 0910 1.5 mcg/kg/hr at 10/25/22 0910    propofol (DIPRIVAN) injection  0-80 mcg/kg/min Intravenous Continuous Chris Abrams M.D.   Dose not Required at 10/25/22 0530    norepinephrine (Levophed) 8 mg in 250 mL NS infusion (premix)  0-30 mcg/min Intravenous Continuous Chris Abrams M.D. 5.6 mL/hr at 10/25/22 0915 3 mcg/min at 10/25/22 0915    tobramycin (TOBREX) 0.3 % ophthalmic solution 1 Drop  1 Drop Right Eye Q4HRS Chris Abrams M.D.        famotidine (PEPCID) tablet 20 mg  20 mg Enteral Tube BID Chris Abrams M.D.   20 mg at 10/25/22 0512    QUEtiapine (Seroquel) tablet 50 mg  50 mg Enteral Tube Nightly Chris Abrams M.D.   50 mg at 10/24/22 2142    chlordiazePOXIDE (Librium) capsule 25 mg  25 mg Enteral Tube BID Chris Abrams M.D.   25 mg at 10/25/22 0512    propofol (DIPRIVAN) 20mL vial injection (RWN)  200 mg Intravenous Once Chris Abrams M.D.        furosemide (LASIX) injection 40 mg  40 mg Intravenous Q DAY Chris Abrams M.D.        piperacillin-tazobactam (Zosyn) 4.5 g in  mL IVPB  4.5 g Intravenous Q8HRS Chris Abrams M.D. 25 mL/hr at 10/25/22 0511 4.5 g at 10/25/22 0511    Respiratory Therapy Consult   Nebulization Continuous RT Greyson Luciano D.O.        lidocaine (XYLOCAINE) 1 % injection 2 mL  2 mL Tracheal Tube Q30 MIN PRN Greyson Luciano D.O.        fentaNYL (SUBLIMAZE) injection 25 mcg  25 mcg Intravenous Q HOUR PRN Greyson G Stahlmann, D.O.        Or    fentaNYL (SUBLIMAZE) injection 50 mcg  50 mcg Intravenous Q HOUR PRN Greyson Luciano D.O.   50 mcg at 10/24/22 2238    Or    fentaNYL (SUBLIMAZE) injection 100 mcg  100 mcg Intravenous Q HOUR PRN Greyson Luciano D.O.   100 mcg at 10/24/22 1545    fentaNYL (SUBLIMAZE) 50 mcg/mL in 50mL (Continuous Infusion)   Intravenous Continuous GRACY KolbONeetu 3 mL/hr at 10/25/22 0729 150  mcg/hr at 10/25/22 0735    lactulose 20 GM/30ML solution 30 mL  30 mL Enteral Tube TID Greyson Luciano D.O.   30 mL at 10/25/22 0512    Pharmacy Consult: Enteral tube insertion - review meds/change route/product selection   Other PHARMACY TO DOSE Greyson Luciano D.O.        carboxymethylcellulose (REFRESH TEARS) 0.5 % ophthalmic drops 2 Drop  2 Drop Both Eyes PRN Greyson Luciano D.O.   2 Drop at 10/22/22 1729    hydrALAZINE (APRESOLINE) injection 10 mg  10 mg Intravenous Q6HRS PRN Nils Seymour D.O.        MD Alert...ICU Electrolyte Replacement per Pharmacy   Other PHARMACY TO DOSE Norm Choudhary M.D.        midazolam (Versed) injection 1 mg  1 mg Intravenous Q HOUR PRN Norm Choudhary M.D.   1 mg at 10/25/22 0827    Or    midazolam (Versed) injection 2 mg  2 mg Intravenous Q HOUR PRN Norm Choudhary M.D.   2 mg at 10/24/22 2307    Or    midazolam (Versed) injection 4 mg  4 mg Intravenous Q HOUR PRN Norm Choudhary M.D.   4 mg at 10/24/22 1105    Or    midazolam (Versed) injection 5 mg  5 mg Intravenous Q HOUR PRN Norm Choudhary M.D.        albuterol inhaler 2 Puff  2 Puff Inhalation Q6HRS PRN Eva Rodriguez M.D.        enoxaparin (Lovenox) inj 40 mg  40 mg Subcutaneous DAILY AT 1800 Asequinton Rodriguez M.D.   40 mg at 10/24/22 1759    Pharmacy Consult Request ...Pain Management Review 1 Each  1 Each Other PHARMACY TO DOSE Eva Rodriguez M.D.        amLODIPine (NORVASC) tablet 10 mg  10 mg Enteral Tube DAILY Eva Rodriguez M.D.   10 mg at 10/21/22 0600    folic acid (FOLVITE) tablet 1 mg  1 mg Enteral Tube DAILY Eva Rodriguez M.D.   1 mg at 10/25/22 0512    levothyroxine (SYNTHROID) tablet 50 mcg  50 mcg Enteral Tube AM ES Eva Rodriguez M.D.   50 mcg at 10/25/22 0512    metoprolol tartrate (LOPRESSOR) tablet 12.5 mg  12.5 mg Enteral Tube TWICE DAILY Eva Rodriguez M.D.   12.5 mg at 10/22/22 1720    senna-docusate (PERICOLACE or SENOKOT S) 8.6-50 MG per  tablet 2 Tablet  2 Tablet Enteral Tube BID Eva Rodriguez M.D.   2 Tablet at 10/25/22 0512    And    polyethylene glycol/lytes (MIRALAX) PACKET 1 Packet  1 Packet Enteral Tube QDAY PRN Eva Rodriguez M.D.   1 Packet at 10/22/22 1721    And    magnesium hydroxide (MILK OF MAGNESIA) suspension 30 mL  30 mL Enteral Tube QDAY PRN Eva Rodriguez M.D.   30 mL at 10/24/22 1758    And    bisacodyl (DULCOLAX) suppository 10 mg  10 mg Rectal QDAY PRN Eva Rodriguez M.D.        therapeutic multivitamin-minerals (THERAGRAN-M) tablet 1 Tablet  1 Tablet Enteral Tube DAILY Eva Rodriguez M.D.   1 Tablet at 10/25/22 0512    thiamine (Vitamin B-1) tablet 100 mg  100 mg Enteral Tube DAILY Eva Rodriguez M.D.   100 mg at 10/25/22 0512    acetaminophen (Tylenol) tablet 650 mg  650 mg Enteral Tube Q6HRS PRN Eva Rodriguez M.D.   650 mg at 10/25/22 0512       Fluids    Intake/Output Summary (Last 24 hours) at 10/25/2022 1008  Last data filed at 10/25/2022 1000  Gross per 24 hour   Intake 2905.76 ml   Output 2860 ml   Net 45.76 ml       Laboratory  Recent Labs     10/23/22  0950 10/24/22  0450 10/25/22  0430   ISTATAPH 7.325* 7.302* 7.353*   ISTATAPCO2 39.2* 45.6* 44.3*   ISTATAPO2 94* 76 60*   ISTATATCO2 22 24 26   YPOTNSC7KSS 97 94 89*   ISTATARTHCO3 20.5 22.5 24.6   ISTATARTBE -5* -4 -1   ISTATTEMP 98.5 F 97.7 F 100.6 F   ISTATFIO2 70 40 40   ISTATSPEC Arterial Arterial Arterial   ISTATAPHTC 7.326* 7.309* 7.337*   GSEJSANB2PB 94* 74 64         Recent Labs     10/23/22  0515 10/24/22  0400 10/25/22  0410   SODIUM 143 146* 146*   POTASSIUM 5.1 4.7 4.1   CHLORIDE 115* 117* 114*   CO2 20 21 24   BUN 11 13 16   CREATININE 0.71 0.74 1.04   MAGNESIUM 1.9 2.0  --    PHOSPHORUS 4.7* 3.9  --    CALCIUM 7.8* 8.0* 8.6     Recent Labs     10/23/22  0515 10/24/22  0400 10/25/22  0410   GLUCOSE 70 92 90     Recent Labs     10/23/22  0515 10/24/22  0400 10/25/22  0410   WBC 22.9* 16.9* 16.6*   NEUTSPOLYS 73.90* 72.90* 71.80   LYMPHOCYTES  5.80* 6.40* 6.40*   MONOCYTES 17.10* 13.40 11.90   EOSINOPHILS 0.70 5.10 7.20*   BASOPHILS 0.80 1.10 1.10     Recent Labs     10/23/22  0515 10/24/22  0400 10/25/22  0410   RBC 4.61* 4.06* 4.33*   HEMOGLOBIN 15.7 13.6* 14.7   HEMATOCRIT 48.9 42.3 45.4   PLATELETCT 337 381 362       Imaging  X-Ray:  My impression is: Cardiomegaly, moderate right-sided pleural effusion  Echo:   Reviewed    Echo from 10/10/2022  CONCLUSIONS  Normal left ventricular systolic function.  The right ventricle is mildly dilated.  Mild mitral regurgitation     Chest x-ray 10/20/2022  Cardiomegaly, moderate right-sided pleural effusion.     Chest x-ray 10/18/2022  Lungs: No consolidation detected.   Pleura:  No pleural space process is seen.   Heart and mediastinum: The cardiomediastinal contours are normal.   Age-indeterminate left ninth lateral rib fracture   IMPRESSION: No radiographic evidence of acute cardiopulmonary process.      Assessment/Plan  * Alcohol dependence with withdrawal complicated by delirium (HCC)- (present on admission)  Assessment & Plan  At initial encounter during this admission he was intubated for respiratory depression but was successfully extubated on 10/21.  Replacing and monitoring electrolytes  withdrawal is finally getting under control  Patient was reintubated on 10/23/2022 for respiratory acidosis and altered mental status, new pneumonia  Librium 25mg bid  seroquel at night    Sepsis due to Pseudomonas species (HCC)- (present on admission)  Assessment & Plan  This is Severe Sepsis Present on admission  SIRS criteria identified on my evaluation include: Fever, with temperature greater than 101 deg F, Tachycardia, with heart rate greater than 90 BPM, Tachypnea, with respirations greater than 20 per minute and Leukocytosis, with WBC greater than 12,000  Clinical indicators of end organ dysfunction include Systolic blood pressure (SBP) <90 mmHg or mean arterial pressure <65 mmHg and Acute respiratory failure as  evidenced by a new need for invasive or non-invasive mechanical ventilation (Ventilator or BiPap)   Source is pneumonia by MSSA and pseudomonas  Sepsis protocol initiated  Crystalloid Fluid Administration: Patient has volume overload with >11 lt since admission, (NYHA class III or symptoms with minimal exertion, Or NYHA class IV or symptoms at rest or with activity), for this/these reason(s) it is unsafe for this patient to receive 30 mL/kg of fluid.  Partial Fluid Dose Given on 10/24, patient to be reassessed shortly after completion of partial fluid bolus to ensure adequacy of resuscitation  IV antibiotics as appropriate for source of sepsis  Reassessment: I have reassessed the patient's hemodynamic status  Will provide another partial bolus today and wean down the vasopressors    Acute respiratory failure with hypoxia (HCC)- (present on admission)  Assessment & Plan  -Intubated for respiratory acidosis and altered mental status with need for intubation on 10/23/2022  -Continue with mechanical ventilation with lung protective strategies.  -He was suspected to have a had aspiration episode for what he was started empiric treatment with Unasyn.  -Cultures from sputum growing MSSA and pseudomonas, bronch BALs also growing the same, antibiotic changed to zosyn on the night of 10/24  -also complicated by right pleural effusion,  -Procal ordered - 0.44   -ABCDEF bundle    On mechanically assisted ventilation (HCC)  Assessment & Plan  -Patient intubation as above, for etoh withdrawal and delirium with resp depression,  -Patient was extubated on 10/21/2022.  -He was reintubated on 10/23/2022 due to respiratory acidosis and altered mental status.  -Continued with lung protective strategies on mechanical ventilation.  -Continues with Precedex and fentanyl for sedation and pain  -Volume overload and we will start the process of the de-resuscitation if BP allows it  -ABCDEF bundle      Pleural effusion- (present on  "admission)  Assessment & Plan  On the right, moderate per CXR of 10/24  bedside US showed a pleural effusion but difficult to access given position  S/p diagnostic thoracentesis today  Awaiting results of pleural studies    Atrial fibrillation (HCC)- (present on admission)  Assessment & Plan  On metoprolol  Currently rate controlled.  Patient is not on chronic anticoagulation.  He was Given Amiodarone 150 mg x1 10/23 a.m.      Other constipation- (present on admission)  Assessment & Plan  Suspected due to narcotic use  We have stopped the narcotics and left only fentanyl due to intubation status  Bowel regimen  KUB today  Consider enema if KUB is ok.    Scabies- (present on admission)  Assessment & Plan  Rash very suspicious of scabies  On rx    Liver mass- (present on admission)  Assessment & Plan  Seen on US 8/2022 \"right lobe of the liver measuring 1.7 x 1.3 x 1.0 cm in size\"  Will need CT versus MRI post extubation and follow up with GI as outpatient.     Essential hypertension- (present on admission)  Assessment & Plan  On metoprolol and amlodipine       VTE:  Lovenox  Ulcer: H2 Antagonist  Lines: Central Line  Ongoing indication addressed    I have performed a physical exam and reviewed and updated ROS and Plan today (10/25/2022). In review of yesterday's note (10/24/2022), there are no changes except as documented above.     Discussed patient condition and risk of morbidity and/or mortality with RN, RT, Pharmacy, and Charge nurse / hot rounds  The patient remains critically ill.  Critical care time = 45 minutes in directly providing and coordinating critical care and extensive data review.  No time overlap and excludes procedures.    Chris Abrams MD FACP  Pulmonary/Critical Care       "

## 2022-10-25 NOTE — ASSESSMENT & PLAN NOTE
This is Severe Sepsis Present on admission  SIRS criteria identified on my evaluation include: Fever, with temperature greater than 101 deg F, Tachycardia, with heart rate greater than 90 BPM, Tachypnea, with respirations greater than 20 per minute and Leukocytosis, with WBC greater than 12,000  Clinical indicators of end organ dysfunction include Systolic blood pressure (SBP) <90 mmHg or mean arterial pressure <65 mmHg and Acute respiratory failure as evidenced by a new need for invasive or non-invasive mechanical ventilation (Ventilator or BiPap)   Source is pneumonia by MSSA and pseudomonas  Sepsis protocol initiated  Crystalloid Fluid Administration: Patient has volume overload with >11 lt since admission, (NYHA class III or symptoms with minimal exertion, Or NYHA class IV or symptoms at rest or with activity), for this/these reason(s) it is unsafe for this patient to receive 30 mL/kg of fluid.  Partial Fluid Dose Given on 10/24, patient to be reassessed shortly after completion of partial fluid bolus to ensure adequacy of resuscitation  IV antibiotics as appropriate for source of sepsis  Reassessment: I have reassessed the patient's hemodynamic status  Will provide another partial bolus today and wean down the vasopressors    .  Continue with antibiotics zosyn  To treat HAP MSSA and Pseudomonas  Bacteremia, positive blood cultures on 10/24, follow up cultures are negative.  we will extend the antibiotic therapy to complete 7 days  Completely off pressors on 10/29/22   Shock component has resolved 10/30/22

## 2022-10-25 NOTE — PROCEDURES
Thoracentesis    Date/Time: 10/25/2022 9:07 AM  Performed by: Chris Abrams M.D.  Authorized by: Chris Abrams M.D.     Consent:     Consent obtained:  Verbal and written    Consent given by:  Guardian    Risks, benefits, and alternatives were discussed: yes      Risks discussed:  Bleeding, infection, pain, incomplete drainage, pneumothorax and nerve damage    Alternatives discussed:  No treatment, delayed treatment and observation  Universal protocol:     Procedure explained and questions answered to patient or proxy's satisfaction: yes      Relevant documents present and verified: yes      Test results available: yes      Imaging studies available: yes      Required blood products, implants, devices, and special equipment available: yes      Site/side marked: yes      Immediately prior to procedure, a time out was called: yes      Patient identity confirmed:  Arm band and provided demographic data  Sedation:     Sedation type:  Anxiolysis (also patient under sedation due to mechanical ventilation)  Anesthesia:     Anesthesia method:  Local infiltration    Local anesthetic:  Lidocaine 1% w/o epi  Procedure details:     Preparation: Patient was prepped and draped in usual sterile fashion      Patient position:  L lateral decubitus    Location:  R midaxillary line    Intercostal space:  5th    Puncture method:  Over-the-needle catheter    Ultrasound guidance: yes      Indwelling catheter placed: no      Needle gauge:  14    Catheter size:  6 Fr    Number of attempts:  2    Drainage characteristics:  Bloody  Post-procedure details:     Chest x-ray performed: yes      Chest x-ray findings:  Pleural effusion unchanged    Procedure completion:  Tolerated well, no immediate complications

## 2022-10-25 NOTE — ASSESSMENT & PLAN NOTE
Suspected due to chronic narcotic use  We have stopped the narcotics and left only fentanyl due to intubation status  Bowel regimen  KUB 10/25 reviewed with possible ileus  Fecal like material no longer coming from NG tube,   Had a BM.

## 2022-10-25 NOTE — PROGRESS NOTES
12-hour chart check complete.    Monitor Summary  Rhythm: Afib  Rate: 73-87  Ectopy: Rpvc  Measurements: -/0.10/-

## 2022-10-25 NOTE — CARE PLAN
Problem: Knowledge Deficit - Standard  Goal: Patient and family/care givers will demonstrate understanding of plan of care, disease process/condition, diagnostic tests and medications  Outcome: Progressing     Problem: Lifestyle Changes  Goal: Patient's ability to identify lifestyle changes and available resources to help reduce recurrence of condition will improve  Outcome: Progressing   The patient is Watcher - Medium risk of patient condition declining or worsening    Shift Goals  Clinical Goals: wean off vasopressors and sedation  Patient Goals: ANETTE  Family Goals: ANETTE, family mot at bedside    Progress made toward(s) clinical / shift goals:      Patient is not progressing towards the following goals:

## 2022-10-25 NOTE — RESPIRATORY CARE
Adult Ventilation Update    Total Vent Days:   3  Patient Lines/Drains/Airways Status       Active Airway       Name Placement date Placement time Site Days    Airway ETT 7.5 10/23/22  0912  --  2                  Events/Summary/Plan: placed pt on SPONT at 1050. Placed Pt back on full support after 3 hours SBT

## 2022-10-25 NOTE — PROGRESS NOTES
12-hour chart check complete.    Monitor Summary  Rhythm: afib  Rate:   Ectopy: PVC's  Measurements: -/.10/-

## 2022-10-25 NOTE — CARE PLAN
The patient is Watcher - Medium risk of patient condition declining or worsening    Shift Goals  Clinical Goals: SBT, comfort, titrate off of Levo  Patient Goals: ANETTE  Family Goals: Comfort and get better    Progress made toward(s) clinical / shift goals:    Problem: Respiratory  Goal: Patient will achieve/maintain optimum respiratory ventilation and gas exchange  Outcome: Progressing  Flowsheets  Taken 10/25/2022 1529  Deep Breathe and Cough: Unable to Perform   Taken 10/25/2022 1415  O2 Delivery Device: Ventilator  Taken 10/25/2022 1400  Incentive Spirometer: Patient Unable to Perform  Taken 10/25/2022 1200  Suction Frequency: Suctioned Once or Twice Per Encounter  Note: Patient tolerated SBT from 1050 to 1405       Patient is not progressing towards the following goals:      Problem: Mechanical Ventilation  Goal: Patient will be able to express needs and understand communication  Outcome: Not Progressing  Note: Patient intermittently following commands but still requiring pharmacological interventions to achieve RASS goal. Plan to continue SAT and SBT to plan for extubation.

## 2022-10-26 ENCOUNTER — APPOINTMENT (OUTPATIENT)
Dept: RADIOLOGY | Facility: MEDICAL CENTER | Age: 57
DRG: 870 | End: 2022-10-26
Attending: HOSPITALIST
Payer: COMMERCIAL

## 2022-10-26 LAB
ANION GAP SERPL CALC-SCNC: 12 MMOL/L (ref 7–16)
APPEARANCE UR: ABNORMAL
BACTERIA #/AREA URNS HPF: ABNORMAL /HPF
BACTERIA SPEC RESP CULT: ABNORMAL
BASE EXCESS BLDA CALC-SCNC: -1 MMOL/L (ref -4–3)
BASE EXCESS BLDA CALC-SCNC: 0 MMOL/L (ref -4–3)
BASOPHILS # BLD AUTO: 1.1 % (ref 0–1.8)
BASOPHILS # BLD: 0.12 K/UL (ref 0–0.12)
BILIRUB UR QL STRIP.AUTO: NEGATIVE
BODY TEMPERATURE: ABNORMAL DEGREES
BODY TEMPERATURE: ABNORMAL DEGREES
BUN SERPL-MCNC: 19 MG/DL (ref 8–22)
CALCIUM SERPL-MCNC: 8 MG/DL (ref 8.4–10.2)
CHLORIDE SERPL-SCNC: 112 MMOL/L (ref 96–112)
CO2 BLDA-SCNC: 26 MMOL/L (ref 20–33)
CO2 BLDA-SCNC: 28 MMOL/L (ref 20–33)
CO2 SERPL-SCNC: 23 MMOL/L (ref 20–33)
COLOR UR: YELLOW
CORTIS SERPL-MCNC: 14.2 UG/DL (ref 0–23)
CREAT SERPL-MCNC: 1.35 MG/DL (ref 0.5–1.4)
CYTOLOGY REG CYTOL: NORMAL
EOSINOPHIL # BLD AUTO: 0.81 K/UL (ref 0–0.51)
EOSINOPHIL NFR BLD: 7.4 % (ref 0–6.9)
ERYTHROCYTE [DISTWIDTH] IN BLOOD BY AUTOMATED COUNT: 56.5 FL (ref 35.9–50)
GFR SERPLBLD CREATININE-BSD FMLA CKD-EPI: 62 ML/MIN/1.73 M 2
GLUCOSE BLD STRIP.AUTO-MCNC: 110 MG/DL (ref 65–99)
GLUCOSE BLD STRIP.AUTO-MCNC: 132 MG/DL (ref 65–99)
GLUCOSE BLD STRIP.AUTO-MCNC: 61 MG/DL (ref 65–99)
GLUCOSE BLD STRIP.AUTO-MCNC: 63 MG/DL (ref 65–99)
GLUCOSE BLD STRIP.AUTO-MCNC: 76 MG/DL (ref 65–99)
GLUCOSE BLD STRIP.AUTO-MCNC: 87 MG/DL (ref 65–99)
GLUCOSE SERPL-MCNC: 89 MG/DL (ref 65–99)
GLUCOSE UR STRIP.AUTO-MCNC: NEGATIVE MG/DL
GRAM STN SPEC: ABNORMAL
GRAM STN SPEC: ABNORMAL
GRAN CASTS #/AREA URNS LPF: ABNORMAL /LPF
HCO3 BLDA-SCNC: 24.9 MMOL/L (ref 17–25)
HCO3 BLDA-SCNC: 26.2 MMOL/L (ref 17–25)
HCT VFR BLD AUTO: 44.9 % (ref 42–52)
HGB BLD-MCNC: 14.6 G/DL (ref 14–18)
HOROWITZ INDEX BLDA+IHG-RTO: 104 MM[HG]
HOROWITZ INDEX BLDA+IHG-RTO: 188 MM[HG]
IMM GRANULOCYTES # BLD AUTO: 0.3 K/UL (ref 0–0.11)
IMM GRANULOCYTES NFR BLD AUTO: 2.8 % (ref 0–0.9)
KETONES UR STRIP.AUTO-MCNC: NEGATIVE MG/DL
LACTATE BLD-SCNC: 1.1 MMOL/L (ref 0.5–2)
LACTATE BLD-SCNC: 1.2 MMOL/L (ref 0.5–2)
LEUKOCYTE ESTERASE UR QL STRIP.AUTO: NEGATIVE
LYMPHOCYTES # BLD AUTO: 1.47 K/UL (ref 1–4.8)
LYMPHOCYTES NFR BLD: 13.5 % (ref 22–41)
MCH RBC QN AUTO: 33.4 PG (ref 27–33)
MCHC RBC AUTO-ENTMCNC: 32.5 G/DL (ref 33.7–35.3)
MCV RBC AUTO: 102.7 FL (ref 81.4–97.8)
MICRO URNS: ABNORMAL
MONOCYTES # BLD AUTO: 1.47 K/UL (ref 0–0.85)
MONOCYTES NFR BLD AUTO: 13.5 % (ref 0–13.4)
MUCOUS THREADS #/AREA URNS HPF: ABNORMAL /HPF
NEUTROPHILS # BLD AUTO: 6.73 K/UL (ref 1.82–7.42)
NEUTROPHILS NFR BLD: 61.7 % (ref 44–72)
NITRITE UR QL STRIP.AUTO: NEGATIVE
NRBC # BLD AUTO: 0.02 K/UL
NRBC BLD-RTO: 0.2 /100 WBC
O2/TOTAL GAS SETTING VFR VENT: 100 %
O2/TOTAL GAS SETTING VFR VENT: 40 %
PCO2 BLDA: 44.7 MMHG (ref 26–37)
PCO2 BLDA: 49.4 MMHG (ref 26–37)
PH BLDA: 7.33 [PH] (ref 7.4–7.5)
PH BLDA: 7.35 [PH] (ref 7.4–7.5)
PH TEMP ADJ BLDA: 7.3 [PH] (ref 7.4–7.5)
PH TEMP ADJ BLDA: 7.32 [PH] (ref 7.4–7.5)
PH UR STRIP.AUTO: 5.5 [PH] (ref 5–8)
PLATELET # BLD AUTO: 339 K/UL (ref 164–446)
PMV BLD AUTO: 10.3 FL (ref 9–12.9)
PO2 BLDA: 104 MMHG (ref 64–87)
PO2 BLDA: 75 MMHG (ref 64–87)
PO2 TEMP ADJ BLDA: 120 MMHG (ref 64–87)
PO2 TEMP ADJ BLDA: 88 MMHG (ref 64–87)
POTASSIUM SERPL-SCNC: 3.7 MMOL/L (ref 3.6–5.5)
PROCALCITONIN SERPL-MCNC: 1.36 NG/ML
PROT UR QL STRIP: 100 MG/DL
RBC # BLD AUTO: 4.37 M/UL (ref 4.7–6.1)
RBC # URNS HPF: ABNORMAL /HPF
RBC UR QL AUTO: ABNORMAL
RHODAMINE-AURAMINE STN SPEC: NORMAL
SAO2 % BLDA: 94 % (ref 93–99)
SAO2 % BLDA: 97 % (ref 93–99)
SIGNIFICANT IND 70042: ABNORMAL
SIGNIFICANT IND 70042: ABNORMAL
SIGNIFICANT IND 70042: NORMAL
SITE SITE: ABNORMAL
SITE SITE: ABNORMAL
SITE SITE: NORMAL
SODIUM SERPL-SCNC: 147 MMOL/L (ref 135–145)
SOURCE SOURCE: ABNORMAL
SOURCE SOURCE: ABNORMAL
SOURCE SOURCE: NORMAL
SP GR UR STRIP.AUTO: >=1.03
SPECIMEN DRAWN FROM PATIENT: ABNORMAL
SPECIMEN DRAWN FROM PATIENT: ABNORMAL
UNIDENT CRYS URNS QL MICRO: ABNORMAL /HPF
WBC # BLD AUTO: 10.9 K/UL (ref 4.8–10.8)
WBC #/AREA URNS HPF: ABNORMAL /HPF

## 2022-10-26 PROCEDURE — 85025 COMPLETE CBC W/AUTO DIFF WBC: CPT

## 2022-10-26 PROCEDURE — 94799 UNLISTED PULMONARY SVC/PX: CPT

## 2022-10-26 PROCEDURE — 82962 GLUCOSE BLOOD TEST: CPT

## 2022-10-26 PROCEDURE — 31500 INSERT EMERGENCY AIRWAY: CPT | Performed by: HOSPITALIST

## 2022-10-26 PROCEDURE — 94003 VENT MGMT INPAT SUBQ DAY: CPT

## 2022-10-26 PROCEDURE — 700111 HCHG RX REV CODE 636 W/ 250 OVERRIDE (IP): Performed by: INTERNAL MEDICINE

## 2022-10-26 PROCEDURE — 92950 HEART/LUNG RESUSCITATION CPR: CPT

## 2022-10-26 PROCEDURE — 80048 BASIC METABOLIC PNL TOTAL CA: CPT

## 2022-10-26 PROCEDURE — 700102 HCHG RX REV CODE 250 W/ 637 OVERRIDE(OP): Performed by: HOSPITALIST

## 2022-10-26 PROCEDURE — 81001 URINALYSIS AUTO W/SCOPE: CPT

## 2022-10-26 PROCEDURE — 82533 TOTAL CORTISOL: CPT

## 2022-10-26 PROCEDURE — 82803 BLOOD GASES ANY COMBINATION: CPT | Mod: 91

## 2022-10-26 PROCEDURE — 99291 CRITICAL CARE FIRST HOUR: CPT | Mod: 25 | Performed by: HOSPITALIST

## 2022-10-26 PROCEDURE — 31500 INSERT EMERGENCY AIRWAY: CPT

## 2022-10-26 PROCEDURE — 99291 CRITICAL CARE FIRST HOUR: CPT | Performed by: INTERNAL MEDICINE

## 2022-10-26 PROCEDURE — 94002 VENT MGMT INPAT INIT DAY: CPT

## 2022-10-26 PROCEDURE — 36415 COLL VENOUS BLD VENIPUNCTURE: CPT

## 2022-10-26 PROCEDURE — A9270 NON-COVERED ITEM OR SERVICE: HCPCS | Performed by: INTERNAL MEDICINE

## 2022-10-26 PROCEDURE — 93005 ELECTROCARDIOGRAM TRACING: CPT | Performed by: INTERNAL MEDICINE

## 2022-10-26 PROCEDURE — A9270 NON-COVERED ITEM OR SERVICE: HCPCS | Performed by: HOSPITALIST

## 2022-10-26 PROCEDURE — 700101 HCHG RX REV CODE 250

## 2022-10-26 PROCEDURE — 5A1955Z RESPIRATORY VENTILATION, GREATER THAN 96 CONSECUTIVE HOURS: ICD-10-PCS | Performed by: HOSPITALIST

## 2022-10-26 PROCEDURE — 700102 HCHG RX REV CODE 250 W/ 637 OVERRIDE(OP): Performed by: INTERNAL MEDICINE

## 2022-10-26 PROCEDURE — 700111 HCHG RX REV CODE 636 W/ 250 OVERRIDE (IP): Performed by: HOSPITALIST

## 2022-10-26 PROCEDURE — 0BH17EZ INSERTION OF ENDOTRACHEAL AIRWAY INTO TRACHEA, VIA NATURAL OR ARTIFICIAL OPENING: ICD-10-PCS | Performed by: HOSPITALIST

## 2022-10-26 PROCEDURE — 71045 X-RAY EXAM CHEST 1 VIEW: CPT

## 2022-10-26 PROCEDURE — 700101 HCHG RX REV CODE 250: Performed by: INTERNAL MEDICINE

## 2022-10-26 PROCEDURE — 83605 ASSAY OF LACTIC ACID: CPT | Mod: 91

## 2022-10-26 PROCEDURE — 36600 WITHDRAWAL OF ARTERIAL BLOOD: CPT

## 2022-10-26 PROCEDURE — 87040 BLOOD CULTURE FOR BACTERIA: CPT

## 2022-10-26 PROCEDURE — 770022 HCHG ROOM/CARE - ICU (200)

## 2022-10-26 PROCEDURE — 94760 N-INVAS EAR/PLS OXIMETRY 1: CPT

## 2022-10-26 PROCEDURE — 700105 HCHG RX REV CODE 258: Performed by: INTERNAL MEDICINE

## 2022-10-26 PROCEDURE — 84145 PROCALCITONIN (PCT): CPT

## 2022-10-26 PROCEDURE — 700101 HCHG RX REV CODE 250: Performed by: HOSPITALIST

## 2022-10-26 RX ORDER — PHENYLEPHRINE HCL IN 0.9% NACL 0.5 MG/5ML
300 SYRINGE (ML) INTRAVENOUS ONCE
Status: COMPLETED | OUTPATIENT
Start: 2022-10-26 | End: 2022-10-26

## 2022-10-26 RX ORDER — DEXTROSE AND SODIUM CHLORIDE 5; .45 G/100ML; G/100ML
INJECTION, SOLUTION INTRAVENOUS CONTINUOUS
Status: ACTIVE | OUTPATIENT
Start: 2022-10-26 | End: 2022-10-28

## 2022-10-26 RX ORDER — MIDAZOLAM HYDROCHLORIDE 1 MG/ML
5 INJECTION INTRAMUSCULAR; INTRAVENOUS
Status: DISCONTINUED | OUTPATIENT
Start: 2022-10-26 | End: 2022-10-27

## 2022-10-26 RX ORDER — DEXTROSE MONOHYDRATE 25 G/50ML
25 INJECTION, SOLUTION INTRAVENOUS ONCE
Status: COMPLETED | OUTPATIENT
Start: 2022-10-26 | End: 2022-10-26

## 2022-10-26 RX ORDER — MIDAZOLAM HYDROCHLORIDE 1 MG/ML
1 INJECTION INTRAMUSCULAR; INTRAVENOUS
Status: DISCONTINUED | OUTPATIENT
Start: 2022-10-26 | End: 2022-10-27

## 2022-10-26 RX ORDER — GAUZE BANDAGE 2" X 2"
200 BANDAGE TOPICAL 3 TIMES DAILY
Status: DISCONTINUED | OUTPATIENT
Start: 2022-10-26 | End: 2022-10-28

## 2022-10-26 RX ORDER — MIDAZOLAM HYDROCHLORIDE 1 MG/ML
1 INJECTION INTRAMUSCULAR; INTRAVENOUS
Status: DISCONTINUED | OUTPATIENT
Start: 2022-10-26 | End: 2022-10-26

## 2022-10-26 RX ORDER — ROCURONIUM BROMIDE 10 MG/ML
100 INJECTION, SOLUTION INTRAVENOUS ONCE
Status: COMPLETED | OUTPATIENT
Start: 2022-10-26 | End: 2022-10-26

## 2022-10-26 RX ORDER — DEXMEDETOMIDINE HYDROCHLORIDE 4 UG/ML
0-1.5 INJECTION, SOLUTION INTRAVENOUS CONTINUOUS
Status: DISCONTINUED | OUTPATIENT
Start: 2022-10-26 | End: 2022-10-31

## 2022-10-26 RX ORDER — PHENYLEPHRINE HCL IN 0.9% NACL 0.5 MG/5ML
200 SYRINGE (ML) INTRAVENOUS ONCE
Status: ACTIVE | OUTPATIENT
Start: 2022-10-26 | End: 2022-10-27

## 2022-10-26 RX ORDER — CHLORDIAZEPOXIDE HYDROCHLORIDE 25 MG/1
25 CAPSULE, GELATIN COATED ORAL DAILY
Status: DISCONTINUED | OUTPATIENT
Start: 2022-10-27 | End: 2022-10-26

## 2022-10-26 RX ORDER — MIDAZOLAM HYDROCHLORIDE 1 MG/ML
2 INJECTION INTRAMUSCULAR; INTRAVENOUS
Status: DISCONTINUED | OUTPATIENT
Start: 2022-10-26 | End: 2022-10-27

## 2022-10-26 RX ORDER — DEXTROSE MONOHYDRATE 25 G/50ML
INJECTION, SOLUTION INTRAVENOUS
Status: COMPLETED
Start: 2022-10-26 | End: 2022-10-26

## 2022-10-26 RX ORDER — ETOMIDATE 2 MG/ML
20 INJECTION INTRAVENOUS ONCE
Status: COMPLETED | OUTPATIENT
Start: 2022-10-26 | End: 2022-10-26

## 2022-10-26 RX ORDER — MIDAZOLAM HYDROCHLORIDE 1 MG/ML
4 INJECTION INTRAMUSCULAR; INTRAVENOUS
Status: DISCONTINUED | OUTPATIENT
Start: 2022-10-26 | End: 2022-10-27

## 2022-10-26 RX ADMIN — FOLIC ACID 1 MG: 1 TABLET ORAL at 05:49

## 2022-10-26 RX ADMIN — TOBRAMYCIN OPHTHALMIC SOLUTION 1 DROP: 3 SOLUTION/ DROPS OPHTHALMIC at 05:50

## 2022-10-26 RX ADMIN — Medication 300 MCG/HR: at 11:34

## 2022-10-26 RX ADMIN — VASOPRESSIN 0.03 UNITS/MIN: 20 INJECTION INTRAVENOUS at 10:22

## 2022-10-26 RX ADMIN — QUETIAPINE FUMARATE 50 MG: 25 TABLET, FILM COATED ORAL at 20:15

## 2022-10-26 RX ADMIN — ENOXAPARIN SODIUM 40 MG: 100 INJECTION SUBCUTANEOUS at 17:38

## 2022-10-26 RX ADMIN — PIPERACILLIN AND TAZOBACTAM 4.5 G: 4; .5 INJECTION, POWDER, FOR SOLUTION INTRAVENOUS at 05:51

## 2022-10-26 RX ADMIN — DEXMEDETOMIDINE HYDROCHLORIDE 1.5 MCG/KG/HR: 4 INJECTION, SOLUTION INTRAVENOUS at 10:00

## 2022-10-26 RX ADMIN — Medication 300 MCG/HR: at 19:32

## 2022-10-26 RX ADMIN — LACTULOSE 30 ML: 20 SOLUTION ORAL at 05:48

## 2022-10-26 RX ADMIN — THIAMINE HCL TAB 100 MG 200 MG: 100 TAB at 16:42

## 2022-10-26 RX ADMIN — THIAMINE HCL TAB 100 MG 100 MG: 100 TAB at 05:50

## 2022-10-26 RX ADMIN — THIAMINE HCL TAB 100 MG 200 MG: 100 TAB at 20:15

## 2022-10-26 RX ADMIN — POTASSIUM BICARBONATE 25 MEQ: 978 TABLET, EFFERVESCENT ORAL at 10:22

## 2022-10-26 RX ADMIN — DEXMEDETOMIDINE HYDROCHLORIDE 1.5 MCG/KG/HR: 4 INJECTION, SOLUTION INTRAVENOUS at 18:38

## 2022-10-26 RX ADMIN — PIPERACILLIN AND TAZOBACTAM 4.5 G: 4; .5 INJECTION, POWDER, FOR SOLUTION INTRAVENOUS at 21:33

## 2022-10-26 RX ADMIN — ETOMIDATE 20 MG: 2 INJECTION INTRAVENOUS at 03:00

## 2022-10-26 RX ADMIN — LEVOTHYROXINE SODIUM 50 MCG: 50 TABLET ORAL at 05:50

## 2022-10-26 RX ADMIN — Medication 150 MCG/HR: at 03:43

## 2022-10-26 RX ADMIN — MIDAZOLAM 1 MG: 1 INJECTION, SOLUTION INTRAMUSCULAR; INTRAVENOUS at 06:04

## 2022-10-26 RX ADMIN — SENNOSIDES AND DOCUSATE SODIUM 2 TABLET: 50; 8.6 TABLET ORAL at 17:38

## 2022-10-26 RX ADMIN — DEXMEDETOMIDINE HYDROCHLORIDE 1.5 MCG/KG/HR: 4 INJECTION, SOLUTION INTRAVENOUS at 21:34

## 2022-10-26 RX ADMIN — DEXMEDETOMIDINE HYDROCHLORIDE 1.5 MCG/KG/HR: 4 INJECTION, SOLUTION INTRAVENOUS at 03:36

## 2022-10-26 RX ADMIN — CARBOXYMETHYLCELLULOSE SODIUM 2 DROP: 5 SOLUTION/ DROPS OPHTHALMIC at 22:57

## 2022-10-26 RX ADMIN — NOREPINEPHRINE BITARTRATE 10 MCG/MIN: 1 INJECTION, SOLUTION, CONCENTRATE INTRAVENOUS at 03:47

## 2022-10-26 RX ADMIN — ROCURONIUM BROMIDE 100 MG: 10 INJECTION, SOLUTION INTRAVENOUS at 03:00

## 2022-10-26 RX ADMIN — Medication 150 MCG/HR: at 00:30

## 2022-10-26 RX ADMIN — PIPERACILLIN AND TAZOBACTAM 4.5 G: 4; .5 INJECTION, POWDER, FOR SOLUTION INTRAVENOUS at 13:27

## 2022-10-26 RX ADMIN — CHLORDIAZEPOXIDE HYDROCHLORIDE 25 MG: 25 CAPSULE ORAL at 05:49

## 2022-10-26 RX ADMIN — FAMOTIDINE 20 MG: 20 TABLET, FILM COATED ORAL at 05:49

## 2022-10-26 RX ADMIN — TOBRAMYCIN OPHTHALMIC SOLUTION 1 DROP: 3 SOLUTION/ DROPS OPHTHALMIC at 22:57

## 2022-10-26 RX ADMIN — DEXTROSE MONOHYDRATE 25 G: 25 INJECTION, SOLUTION INTRAVENOUS at 15:01

## 2022-10-26 RX ADMIN — MULTIPLE VITAMINS W/ MINERALS TAB 1 TABLET: TAB at 05:50

## 2022-10-26 RX ADMIN — BISACODYL 10 MG: 10 SUPPOSITORY RECTAL at 16:43

## 2022-10-26 RX ADMIN — FUROSEMIDE 40 MG: 10 INJECTION, SOLUTION INTRAMUSCULAR; INTRAVENOUS at 05:57

## 2022-10-26 RX ADMIN — Medication 300 MCG: at 03:30

## 2022-10-26 RX ADMIN — SENNOSIDES AND DOCUSATE SODIUM 2 TABLET: 50; 8.6 TABLET ORAL at 05:50

## 2022-10-26 RX ADMIN — DEXTROSE AND SODIUM CHLORIDE: 5; 450 INJECTION, SOLUTION INTRAVENOUS at 17:45

## 2022-10-26 RX ADMIN — LACTULOSE 30 ML: 20 SOLUTION ORAL at 11:54

## 2022-10-26 RX ADMIN — LACTULOSE 30 ML: 20 SOLUTION ORAL at 17:38

## 2022-10-26 RX ADMIN — DEXMEDETOMIDINE HYDROCHLORIDE 1.5 MCG/KG/HR: 4 INJECTION, SOLUTION INTRAVENOUS at 15:45

## 2022-10-26 RX ADMIN — NOREPINEPHRINE BITARTRATE 13 MCG/MIN: 1 INJECTION, SOLUTION, CONCENTRATE INTRAVENOUS at 10:21

## 2022-10-26 RX ADMIN — DEXMEDETOMIDINE HYDROCHLORIDE 1.5 MCG/KG/HR: 4 INJECTION, SOLUTION INTRAVENOUS at 12:46

## 2022-10-26 RX ADMIN — DEXMEDETOMIDINE HYDROCHLORIDE 1.5 MCG/KG/HR: 4 INJECTION, SOLUTION INTRAVENOUS at 06:08

## 2022-10-26 RX ADMIN — NOREPINEPHRINE BITARTRATE 8 MCG/MIN: 1 INJECTION, SOLUTION, CONCENTRATE INTRAVENOUS at 23:05

## 2022-10-26 RX ADMIN — TOBRAMYCIN OPHTHALMIC SOLUTION 1 DROP: 3 SOLUTION/ DROPS OPHTHALMIC at 13:42

## 2022-10-26 RX ADMIN — FAMOTIDINE 20 MG: 20 TABLET, FILM COATED ORAL at 17:38

## 2022-10-26 RX ADMIN — VASOPRESSIN 0.03 UNITS/MIN: 20 INJECTION INTRAVENOUS at 19:31

## 2022-10-26 RX ADMIN — ACETAMINOPHEN 650 MG: 325 TABLET, FILM COATED ORAL at 08:20

## 2022-10-26 RX ADMIN — TOBRAMYCIN OPHTHALMIC SOLUTION 1 DROP: 3 SOLUTION/ DROPS OPHTHALMIC at 01:52

## 2022-10-26 RX ADMIN — ACETAMINOPHEN 650 MG: 325 TABLET, FILM COATED ORAL at 22:57

## 2022-10-26 RX ADMIN — TOBRAMYCIN OPHTHALMIC SOLUTION 1 DROP: 3 SOLUTION/ DROPS OPHTHALMIC at 10:23

## 2022-10-26 RX ADMIN — TOBRAMYCIN OPHTHALMIC SOLUTION 1 DROP: 3 SOLUTION/ DROPS OPHTHALMIC at 17:44

## 2022-10-26 RX ADMIN — ACETAMINOPHEN 650 MG: 325 TABLET, FILM COATED ORAL at 16:43

## 2022-10-26 RX ADMIN — MIDAZOLAM 1 MG: 1 INJECTION, SOLUTION INTRAMUSCULAR; INTRAVENOUS at 11:11

## 2022-10-26 ASSESSMENT — PAIN DESCRIPTION - PAIN TYPE
TYPE: ACUTE PAIN

## 2022-10-26 NOTE — PROGRESS NOTES
12-hour chart check complete.    Monitor Summary  Rhythm: a-fib  Rate:   Ectopy: r) PVC  Measurements: -/.08/-

## 2022-10-26 NOTE — DISCHARGE PLANNING
Care Transition Team Assessment    RNCM spoke with patients florinda Sanchez ([phone]) and was able to complete assessment with the information she provided. Laura is willing to make medical decisions on her fathers behalf. Patient was living in a long term motel but due to his currently hospitalization, his room is no longer available. Laura stated that upon discharge from the hospital or discharge from a SNF, the patient is able to come live with her. Laura stated her father does not have any income. The patient does have a long standing history of ETOH use/abuse. RNCM will continue to follow for any additional discharge planning needs.     Information Source  Orientation Level: Unable to assess  Information Given By:  (MR; patients daughter Laura)  Informant's Name: Laura (if alter and oriented patient is able to make own decisions; at this time patients NOK-daughter Laura, is the primary decision maker.)  Who is responsible for making decisions for patient? :  (if alter and oriented patient is able to make own decisions; at this time patients NOK-florinda Sanchez, is the primary decision maker.)    Readmission Evaluation  Is this a readmission?: No    Elopement Risk  Legal Hold: No  Ambulatory or Self Mobile in Wheelchair: No-Not an Elopement Risk  Elopement Risk: Not at Risk for Elopement    Interdisciplinary Discharge Planning  Lives with - Patient's Self Care Capacity: Alone and Able to Care For Self  Patient or legal guardian wants to designate a caregiver: No  Support Systems: Family Member(s)  Housing / Facility: Motel  Durable Medical Equipment: Not Applicable    Discharge Preparedness  What is your plan after discharge?: Uncertain - pending medical team collaboration, Skilled nursing facility, Home health care  What are your discharge supports?: Child  Prior Functional Level: Ambulatory, Independent with Activities of Daily Living    Functional Assesment  Prior Functional Level: Ambulatory,  Independent with Activities of Daily Living    Finances  Financial Barriers to Discharge: No  Prescription Coverage: Yes    Vision / Hearing Impairment  Right Eye Vision:  (ANETTE)         Advance Directive  Advance Directive?: None (No ACP documents on file)    Domestic Abuse  Have you ever been the victim of abuse or violence?: Not Sure  Physical Abuse or Sexual Abuse: Unable to Assess due to Patient Condition  Verbal Abuse or Emotional Abuse: Unable to Assess due to Patient Condition  Possible Abuse/Neglect Reported to:: Not Applicable    Psychological Assessment  History of Substance Abuse: Alcohol  History of Psychiatric Problems: No  Newly Diagnosed Illness: Yes    Discharge Risks or Barriers  Discharge risks or barriers?: Substance abuse, Post-acute placement / services, Lives alone, no community support, Non-adherence to medication or treatment  Patient risk factors: Homeless, Lack of outside supports, Lives alone and no community support, Noncompliance, Substance abuse, Vulnerable adult    Anticipated Discharge Information  Discharge Disposition: D/T to SNF with Medicare cert in anticipation of skilled care (03)

## 2022-10-26 NOTE — DIETARY
"Nutrition Services Brief Update:    Problem: Nutritional:  Goal: Nutrition support tolerated and meeting greater than 85% of estimated needs  Outcome: Not progressing    TF started afternoon of 10/23, running <48 hrs per flowsheets before stopped a.m. of 10/25 per Pulmonologist due to abdominal distension. Pt has not had a BM this admit despite bowel regimen; unknown last BM PTA. Abdomen \"taut; rounded\" per flowsheets. TF briefly at goal x ~4 hrs 10/25 a.m. per flowsheets; otherwise, pt has had overall inadequate nutrition provision this admit d/t NPO/no nutrition source x 5 days from admit and TF at goal <24 hrs.  Per update notes, pt abruptly self-extubated overnight, not stable on room air and required reintubation w/ pressor support.  MAR: Levo @ 13 mcg/min, vaso @ 0.03 mcg/min, neosynephrine (not started), senna, milk of mag, miralax    If constipation not resolved and unable to safely restart TF within next 48-72 hrs, TPN may be appropriate to meet pt's nutrition needs.    RD continues to follow.  "

## 2022-10-26 NOTE — CARE PLAN
The patient is Watcher - Medium risk of patient condition declining or worsening    Shift Goals  Clinical Goals: RASS -1 to +1, wean off Levophed  Patient Goals: ANETTE  Family Goals: ANETTE    Progress made toward(s) clinical / shift goals:    Problem: Fall Risk  Goal: Patient will remain free from falls  Outcome: Progressing       Patient is not progressing towards the following goals:      Problem: Mechanical Ventilation  Goal: Successful weaning off mechanical ventilator, spontaneously maintains adequate gas exchange  Outcome: Not Progressing

## 2022-10-26 NOTE — PROGRESS NOTES
Hypoglycemia Intervention    Hypoglycemia protocol intervention:  Blood glucose 60 at 1804.  Intervention: 25 g IV dextrose per MAR   Repeat blood glucose 143 at 1830.  Intervention: Patient NPO, recheck blood glucose in 1 hour   Additional interventions needed: Pt received 500 ml D5, 1/2 NS  Dr. Abrams notified of the above at 1805.

## 2022-10-26 NOTE — PROGRESS NOTES
At approximately 0200, this RN was at the patient bedside when the patient sat up abruptly and self-extubated.  This RN suctioned patient and remained at bedside to monitor O2 saturation while charge RN Kayleen contacted RT and noc hospitalist.  RT and noc hospitalist arrived shortly to assess patient.  Emergent reintubation was initiated at 0255.

## 2022-10-26 NOTE — PROGRESS NOTES
"OVERNIGHT HOSPITALIST:    OVERNIGHT HOSPITALIST:     2:16 am : Came to see the patient by the bedside emergently after Nursing Staff reported that patient self-extubated. RT also by the bedside. He is Febrile to 103.1. Initially, fair work of breathing and saturating well on 15 L Oxy masc. Got progressively worse tachypnea up to 40/ min and wheezing and decided to emergently re-intubate him (See note for more detail).      During intubation became more hypotensive requiring two rounds of Phenylephrine, anterior airway and required 2 attempts.         Brief History: From Dr. Abrams: \"57 y.o. male admitted 10/18/2022 with ETOH and required intubation. Re intubated on 10/23/22\" s/p Bronchoscopy on 10/24 and Thoracentesis yesterday.     Vitals: Temp:103.1       HR:98      RR:40      BP:84/60  Gen:Lethargic, minimally responding to commands  Lungs: acute respiratory distress, using accessory muscles, Course breathing, diminished breathing sounds on the right side, en expiratory audible wheezing, tachypnea   Heart:Tachycardic, no murmurs heard   Abd:Soft, not distended    Ext:Minimal edema     A/P:  #1:Acute respiratory Failure requiring emergency re-intubation after self-extubating overnight.   #2:Septic Shock secondary to MSSA/ Pseudomonas Pneumonia by BAL, possible component of aspiration on Zosyn, now on   #3.Pulmonary Edema  #4.Right Moderate and small left pleural effusion: S/p thoracentesis yesterday, likely contributing to worsening respiratory status.          -Emergency re-intubation (see separate procedure Note)  -Sepsis workup re-ordered including Blood Cultures as patient is febrile  -Needed 2 rounds of Phenylephrine during intubation, but no cardiac arrest.   -Adjust Norepinephrine per protocol        Patient is Critically ill.  The patient continues to have Acute respiratory failure and septic shock  The vital organ affected is Respiratory  If untreated here is a high chance of deterioration into Cardiac " Arrest and eventually death.  The Critical care that I am proving today is listed on plan section above  The Critical that has been undertaken is medically complex.  There has been no overlap in critical care time.  Critical Care Time not including any procedure: 41 minutes.                           [History reviewed] : History reviewed. [Medications and Allergies reviewed] : Medications and allergies reviewed.

## 2022-10-26 NOTE — PROCEDURES
Intubation    Date/Time: 10/26/2022 3:29 AM  Performed by: Nelida Fortune M.D.  Authorized by: Nelida Fortune M.D.     Consent:     Consent obtained:  Emergent situation  Pre-procedure details:     Patient status:  Unresponsive    Mallampati score:  IV    Pretreatment medications:  None (Etomidate)    Paralytics:  Rocuronium  Procedure details:     Preoxygenation:  Nonrebreather mask    CPR in progress: no      Intubation method:  Oral    Oral intubation technique:  Video-assisted    Laryngoscope type:  GlideScope    Cormack-Lehane Classification:  Grade 3    Tube size (mm):  7.5    Tube type:  Cuffed    Number of attempts:  2    Ventilation between attempts: yes      Cricoid pressure: yes    Placement assessment:     ETT to teeth:  25 cm    Breath sounds:  Reduced on right    Placement verification: chest rise, CXR verification, direct visualization, ETCO2 detector and tube exhalation      CXR findings:  ETT in proper place  Post-procedure details:     Patient tolerance of procedure:  Tolerated well, no immediate complications  Comments:      Needed 2 rounds of Phelynephrine during intubation for Hypotension. ERPDr. Castañeda by the bedside

## 2022-10-26 NOTE — PROGRESS NOTES
12-hour chart check complete.    Monitor Summary  Rhythm: A fib  Rate:   Ectopy: R PVC  Measurements: -/0.10/-

## 2022-10-26 NOTE — PROGRESS NOTES
Hypoglycemia Intervention    Hypoglycemia protocol intervention:  Blood glucose 63 at 1458.  Intervention: 25 g IV dextrose per MAR   Repeat blood glucose 110 at 1515.  Intervention: Patient NPO, recheck blood glucose in 1 hour   Additional interventions needed: N/A  Dr. Abrams notified of the above at 1459.

## 2022-10-26 NOTE — PROGRESS NOTES
"Pulmonary/Critical Care Progress Note    Date of admission  10/18/2022    Chief Complaint  57 y.o. male admitted 10/18/2022 with ETOH and required intubation.   Re intubated on 10/23/22    Hospital Course  From previous consult notes from Dr Garcia: \"56 yo male admitted on 10/19/2022 with ETOh withdrawal  PMHx: lives in a motel, drinks a pint of vodka a day (last drinl 10/7), prior hx of meth use, afib not on anticoagulation, liver mass on Us on August 2022 (2.  Solid hypoechoic mass within the right lobe of the liver measuring 1.7 x 1.3 x 1.0 cm in size. Follow-up pre and postcontrast multiphasic hepatic CT or MRI is recommended for further evaluation)  Received 10 mg ativan and 650 mg of phenobarb went into resp distress requiring intubation     10/19: started librium 25 mg tid; Rx of scabies\"  10/21: extubated at noon. On precedex drip.   10/22: Fevering. Altered. Gurgling upper airway. Started on Abx.   10/23: he was re intubated due to airway protection concerns, respiratory acidosis and AMS.    10/24: I am taking over the service today, no major events overnight. Patient underwent bronchoscopy with sampling of RLL and LLL. Copious secretions were found in geetha and all subsegments but more prevalent in RLL and LLL. Adjusted antibiotics later on the day due to pseudomonas growing in the respiratory culture. (On zosyn).   Stop scopolamine and robinol, decrease librium from 25mg tid to BID, decrease seroquel from 50mg bid to nightly. Will stop all narcotics and rely on fentanyl ggt alone    10/25: Antonio underwent diagnostic thoracentesis with 100 cc of pleural fluid sent to the lab, albumin gradient was less than 1.2 (0.9) which correlates with an exudate in the setting of use of recent diuretics.  Noted also that was a very high LDH at 1020 which correlates with an inflammatory process as well.  Based on this, he met lights criteria but not with protein.  Culture still pending.    Interval Problem " Update  Reviewed last 24 hour events:    Chart review from the past 24 hours includes imaging, laboratory studies, vital signs and notes available.  Pertinent data for today    10/26: Later during the night yesterday he developed hypoglycemia and was treated with D50.  Overnight patient apparently woke up and extubated himself, he was trialed on high flow oxygen for 20 to 30 minutes but he started becoming tachypneic and getting tired for what he was reintubated around 3 AM.  Reintubation.  Was complicated by bradycardia and hypotension that required vasopressors.     *Pulmonary: Aspiration versus hospital-acquired pneumonia.  Was intubated on 10/23/2022 for respiratory acidosis and AMS. S/p bronch on 10/24/22, BAL from RLL and LLL.   Cxr today showed a moderate pleural effusion on the RIGHT but difficult to get access to the effusion.  Status postthoracentesis diagnostic on 10/25.  MSSA and pseduomonas are growing from the sputum cx and also from bronch samples susceptible to Zosyn, Antitiobitcs changed to zosyn on 10/24.   Patient self extubated in the night from 10/25-10/26 and was reintubated shortly after.  Unclear if he aspirated but given his medical history we will extend the antibiotic therapy for 7 days.  *Cardiovascular: cardiomegaly on cxr, Hemodynamically hypotensive since reintubation, he continues to be on Levophed , will consider to add vasopressin if continues to increase requirements.  A. fib seems to be under rate control. Get EKG today  *Nephrology: good urine output, no changes in renal function  *GI: Patient still has not had a bowel movement, has tried Relistor one-time, lactulose, and distended bowel regimen. Did not tolerate the tube feeds, is on intermittent suction today.  KUB was review.  We will try a suppository today  *Neuro: patient under effect of sedation by precedex and fentanyl, librium from 25mg daily for 2 days and then stop, seroquel 50mg nightly.   *:I/Os: he is +9 L since  admission.  Okay at about 1 L per 24 hours, will challenge him with diuresis today if bp allows it.  *ID: Cultures are growing MSSA and pseudomonas, Zosyn, . WBC coming down 22.9--> 16.9-->16.6. Negative respiratory viral panel on 10/22/22, PCT at 0.44        Tubes, Lines, Drains: OGT, 2 PIVs, wheeler, CVC on right IJ, ET tube  Drips: Precedex, Fentanyl gtt, Versed as needed  Nutrition: holding peptide 1.5 due to abdominal distention, will continue to try today  CXR: moderate R effusion. US at bedside today showed the effusion but is difficult to obtain a proper position to perform a therapeutic thoracentesis,  Tmax: 103.1 F on monitored, continue with Tylenol  Antibiotics: Unasyn (started 10/22)--> zosyn 10/24  Steroids: none  Cultures: ordered 10/22 MSSA and pseudomonas  BM regimen: MiraLAX, senna-docusate, milk magnesia, bisacodyl, suppositories    Review of Systems  Review of Systems   Unable to perform ROS: Intubated      Vital Signs for last 24 hours   Temp:  [38.8 °C (101.8 °F)-39.7 °C (103.5 °F)] 39.6 °C (103.3 °F)  Pulse:  [] 94  Resp:  [5-60] 21  BP: ()/(50-91) 119/91  SpO2:  [74 %-100 %] 97 %    Hemodynamic parameters for last 24 hours       Respiratory Information for the last 24 hours  Vent Mode: APVCMV  Rate (breaths/min): 22  Vt Target (mL): 460  PEEP/CPAP: 8  P Support: 10  MAP: 11  Length of Weaning Trial (Hours): 3 hours  Control VTE (exp VT): 471    Physical Exam   Physical Exam  Vitals and nursing note reviewed.   Constitutional:       General: He is not in acute distress.     Appearance: He is ill-appearing. He is not toxic-appearing.   HENT:      Head: Normocephalic and atraumatic.      Comments: Bilateral occular hyperemia, pupils equal and reactive     Nose: Nose normal. No congestion.      Mouth/Throat:      Mouth: Mucous membranes are moist.      Comments: ETT in place  Eyes:      Extraocular Movements: Extraocular movements intact.   Cardiovascular:      Rate and Rhythm: Regular  rhythm. Tachycardia present.      Pulses: Normal pulses.      Heart sounds: No murmur heard.    No friction rub. No gallop.   Pulmonary:      Effort: Pulmonary effort is normal. No respiratory distress.      Breath sounds: No wheezing or rhonchi.   Abdominal:      General: Bowel sounds are normal. There is distension.      Tenderness: There is no abdominal tenderness. There is no guarding.   Musculoskeletal:         General: Normal range of motion.      Cervical back: No rigidity.      Right lower leg: Edema present.      Left lower leg: Edema present.   Skin:     General: Skin is warm.      Capillary Refill: Capillary refill takes less than 2 seconds.      Comments: Desquammative skin in all limbs improving           Medications  Current Facility-Administered Medications   Medication Dose Route Frequency Provider Last Rate Last Admin    phenylephrine (CASSI-SYNEPHRINE) 100 mcg/mL inj (IV Push Syringe) 200 mcg  200 mcg Intravenous Once Nelida Fortune M.D.        fentaNYL (SUBLIMAZE) injection 100 mcg  100 mcg Intravenous Q15 MIN PRN Nelida Fortune M.D.        And    fentaNYL (SUBLIMAZE) injection 200 mcg  200 mcg Intravenous Q15 MIN PRN Nelida Fortune M.D.        And    fentaNYL (SUBLIMAZE) 50 mcg/mL in 50mL (Continuous Infusion)   Intravenous Continuous Nelida Fortune M.D. 6 mL/hr at 10/26/22 1116 300 mcg/hr at 10/26/22 1116    And    dexmedetomidine (PRECEDEX) 400 mcg/100mL NS premix infusion  0-1.5 mcg/kg/hr Intravenous Continuous Nelida Fortune M.D. 32 mL/hr at 10/26/22 1000 1.5 mcg/kg/hr at 10/26/22 1000    midazolam (Versed) injection 1 mg  1 mg Intravenous Q HOUR PRN Nelida Fortune M.D.   1 mg at 10/26/22 1111    Or    midazolam (Versed) injection 2 mg  2 mg Intravenous Q HOUR PRN Nelida Fortune M.D.        Or    midazolam (Versed) injection 4 mg  4 mg Intravenous Q HOUR PRN Nelida Fortune M.D.        Or    midazolam (Versed) injection 5 mg   5 mg Intravenous Q HOUR PRN Nelida Fortune M.D.        [START ON 10/27/2022] chlordiazePOXIDE (Librium) capsule 25 mg  25 mg Enteral Tube DAILY Chris Abrams M.D.        thiamine (Vitamin B-1) tablet 200 mg  200 mg Enteral Tube TID Chris Abrams M.D.        vasopressin (Vasostrict) 20 Units in  mL Infusion  0.03 Units/min Intravenous Continuous Chris Abrams M.D. 9 mL/hr at 10/26/22 1022 0.03 Units/min at 10/26/22 1022    norepinephrine (Levophed) 8 mg in 250 mL NS infusion (premix)  0-30 mcg/min Intravenous Continuous Chris Abrams M.D. 24.4 mL/hr at 10/26/22 1021 13 mcg/min at 10/26/22 1021    tobramycin (TOBREX) 0.3 % ophthalmic solution 1 Drop  1 Drop Right Eye Q4HRS Chris Abrams M.D.   1 Drop at 10/26/22 1023    famotidine (PEPCID) tablet 20 mg  20 mg Enteral Tube BID Chris Abrams M.D.   20 mg at 10/26/22 0549    QUEtiapine (Seroquel) tablet 50 mg  50 mg Enteral Tube Nightly Chris Abrams M.D.   50 mg at 10/25/22 2153    piperacillin-tazobactam (Zosyn) 4.5 g in  mL IVPB  4.5 g Intravenous Q8HRS Chris Abrams M.D.   Stopped at 10/26/22 0951    Respiratory Therapy Consult   Nebulization Continuous RT Greyson Luciano D.O.        lidocaine (XYLOCAINE) 1 % injection 2 mL  2 mL Tracheal Tube Q30 MIN PRN Greyson Luciano D.O.        lactulose 20 GM/30ML solution 30 mL  30 mL Enteral Tube TID Greyson Luciano D.O.   30 mL at 10/26/22 0548    Pharmacy Consult: Enteral tube insertion - review meds/change route/product selection   Other PHARMACY TO DOSE Greyson Luciano D.O.        carboxymethylcellulose (REFRESH TEARS) 0.5 % ophthalmic drops 2 Drop  2 Drop Both Eyes PRN Greyson Luciano D.O.   2 Drop at 10/22/22 1729    MD Alert...ICU Electrolyte Replacement per Pharmacy   Other PHARMACY TO DOSE Norm Choudhary M.D.        albuterol inhaler 2 Puff  2 Puff Inhalation Q6HRS PRN Eva Rodriguez M.D.        enoxaparin  (Lovenox) inj 40 mg  40 mg Subcutaneous DAILY AT 1800 Eva Rodriguez M.D.   40 mg at 10/25/22 1741    Pharmacy Consult Request ...Pain Management Review 1 Each  1 Each Other PHARMACY TO DOSE Eva Rodriguez M.D.        folic acid (FOLVITE) tablet 1 mg  1 mg Enteral Tube DAILY Eva Rodriguez M.D.   1 mg at 10/26/22 0549    levothyroxine (SYNTHROID) tablet 50 mcg  50 mcg Enteral Tube AM ES Eva Rodriguez M.D.   50 mcg at 10/26/22 0550    senna-docusate (PERICOLACE or SENOKOT S) 8.6-50 MG per tablet 2 Tablet  2 Tablet Enteral Tube BID Eva Rodriguez M.D.   2 Tablet at 10/26/22 0550    And    polyethylene glycol/lytes (MIRALAX) PACKET 1 Packet  1 Packet Enteral Tube QDAY PRN Eva Rodriguez M.D.   1 Packet at 10/22/22 1721    And    magnesium hydroxide (MILK OF MAGNESIA) suspension 30 mL  30 mL Enteral Tube QDAY PRN Eva Rodriguez M.D.   30 mL at 10/24/22 1758    And    bisacodyl (DULCOLAX) suppository 10 mg  10 mg Rectal QDAY PRN Eva Rodriguez M.D.        therapeutic multivitamin-minerals (THERAGRAN-M) tablet 1 Tablet  1 Tablet Enteral Tube DAILY Eva Rodriguez M.D.   1 Tablet at 10/26/22 0550    acetaminophen (Tylenol) tablet 650 mg  650 mg Enteral Tube Q6HRS PRN Eva Rodriguez M.D.   650 mg at 10/26/22 0820       Fluids    Intake/Output Summary (Last 24 hours) at 10/26/2022 1128  Last data filed at 10/26/2022 1122  Gross per 24 hour   Intake 2367.3 ml   Output 3375 ml   Net -1007.7 ml       Laboratory  Recent Labs     10/25/22  0430 10/26/22  0223 10/26/22  0451   ISTATAPH 7.353* 7.333* 7.354*   ISTATAPCO2 44.3* 49.4* 44.7*   ISTATAPO2 60* 104* 75   ISTATATCO2 26 28 26   DOBHYPS0RTU 89* 97 94   ISTATARTHCO3 24.6 26.2* 24.9   ISTATARTBE -1 0 -1   ISTATTEMP 100.6 F 103.1 F 102.9 F   ISTATFIO2 40 100 40   ISTATSPEC Arterial Arterial Arterial   ISTATAPHTC 7.337* 7.297* 7.319*   [id number] 64 120* 88*         Recent Labs     10/24/22  0400 10/25/22  0410 10/26/22  0324   SODIUM 146* 146* 147*    POTASSIUM 4.7 4.1 3.7   CHLORIDE 117* 114* 112   CO2 21 24 23   BUN 13 16 19   CREATININE 0.74 1.04 1.35   MAGNESIUM 2.0  --   --    PHOSPHORUS 3.9  --   --    CALCIUM 8.0* 8.6 8.0*     Recent Labs     10/24/22  0400 10/25/22  0410 10/26/22  0324   GLUCOSE 92 90 89     Recent Labs     10/24/22  0400 10/25/22  0410 10/26/22  0324   WBC 16.9* 16.6* 10.9*   NEUTSPOLYS 72.90* 71.80 61.70   LYMPHOCYTES 6.40* 6.40* 13.50*   MONOCYTES 13.40 11.90 13.50*   EOSINOPHILS 5.10 7.20* 7.40*   BASOPHILS 1.10 1.10 1.10     Recent Labs     10/24/22  0400 10/25/22  0410 10/26/22  0324   RBC 4.06* 4.33* 4.37*   HEMOGLOBIN 13.6* 14.7 14.6   HEMATOCRIT 42.3 45.4 44.9   PLATELETCT 381 362 339       Imaging  X-Ray:  My impression is: Cardiomegaly, moderate right-sided pleural effusion  Echo:   Reviewed    Echo from 10/10/2022  CONCLUSIONS  Normal left ventricular systolic function.  The right ventricle is mildly dilated.  Mild mitral regurgitation     Chest x-ray 10/20/2022  Cardiomegaly, moderate right-sided pleural effusion.     Chest x-ray 10/18/2022  Lungs: No consolidation detected.   Pleura:  No pleural space process is seen.   Heart and mediastinum: The cardiomediastinal contours are normal.   Age-indeterminate left ninth lateral rib fracture   IMPRESSION: No radiographic evidence of acute cardiopulmonary process.      Assessment/Plan  * Alcohol dependence with withdrawal complicated by delirium (HCC)- (present on admission)  Assessment & Plan  At initial encounter during this admission he was intubated for respiratory depression but was successfully extubated on 10/21.  Replacing and monitoring electrolytes  withdrawal is finally getting under control  Patient was reintubated on 10/23/2022 for respiratory acidosis and altered mental status, new pneumonia  Librium 25mg/day x 2 and stop  seroquel at night    Sepsis due to Pseudomonas species (HCC)- (present on admission)  Assessment & Plan  This is Severe Sepsis Present on  "admission  SIRS criteria identified on my evaluation include: Fever, with temperature greater than 101 deg F, Tachycardia, with heart rate greater than 90 BPM, Tachypnea, with respirations greater than 20 per minute and Leukocytosis, with WBC greater than 12,000  Clinical indicators of end organ dysfunction include Systolic blood pressure (SBP) <90 mmHg or mean arterial pressure <65 mmHg and Acute respiratory failure as evidenced by a new need for invasive or non-invasive mechanical ventilation (Ventilator or BiPap)   Source is pneumonia by MSSA and pseudomonas  Sepsis protocol initiated  Crystalloid Fluid Administration: Patient has volume overload with >11 lt since admission, (NYHA class III or symptoms with minimal exertion, Or NYHA class IV or symptoms at rest or with activity), for this/these reason(s) it is unsafe for this patient to receive 30 mL/kg of fluid.  Partial Fluid Dose Given on 10/24, patient to be reassessed shortly after completion of partial fluid bolus to ensure adequacy of resuscitation  IV antibiotics as appropriate for source of sepsis  Reassessment: I have reassessed the patient's hemodynamic status  Will provide another partial bolus today and wean down the vasopressors    I examined the patient 10/26/2022 11:21 AM  Vital Signs:BP (!) 119/91   Pulse 94   Temp (!) 39.6 °C (103.3 °F) (Bladder)   Resp (!) 21   Ht 1.778 m (5' 10\")   Wt 85.3 kg (188 lb 0.8 oz)   SpO2 97%   BMI 26.98 kg/m²   Cardiac examination significant for Tachycardia  Pulmonary examination significant for Clear lung fileds  Capillary refill is brisk  Peripheral Pulse is 2+   Skin is dusky     Patient is on levophed to keep MAP>65, will add vasopresin if he requires 14 or more of levo  Continue with antibiotics zosyn  To treat HAP MSSA and Pseudomonas  Check cortisol level      Acute respiratory failure with hypoxia (HCC)- (present on admission)  Assessment & Plan  -Intubated for respiratory acidosis and altered mental " status with need for intubation on 10/23/2022  -found to have a HAP due to MSSA and pseudomonas, sensitive to zosyn  -Continue with mechanical ventilation with lung protective strategies.  -He was suspected to have a had aspiration episode for what he was started empiric treatment with Unasyn. Cultures from sputum growing MSSA and pseudomonas, bronch BALs also growing the same, antibiotic changed to zosyn on the night of 10/24  -also complicated by right pleural effusion, s/p diagnostic thoracentesis.  -Procal ordered - 0.44   -ABCDEF bundle    On mechanically assisted ventilation (HCC)  Assessment & Plan  -Patient intubation as above, for etoh withdrawal and delirium with resp depression,  -Patient was extubated on 10/21/2022.  -He was reintubated on 10/23/2022 due to respiratory acidosis and altered mental status.  -patient self extubated on the late night of 10/25 and re intubated shortly after  -Continued with lung protective strategies on mechanical ventilation.  -Continues with Precedex and fentanyl for sedation and pain with add on of versed pushes, if this is not enough will order a ggt  -Volume overload and we will start the process of the de-resuscitation if BP allows it  -ABCDEF bundle      Pleural effusion- (present on admission)  Assessment & Plan  On the right, moderate per CXR of 10/24  bedside US showed a pleural effusion but difficult to access given position  S/p diagnostic thoracentesis 10/25/22: albumin gradient is less than 1.2 g/dl which indicate exudates, his was 0.9, also inflammative effusion with very high LDH 1020 (serum in the 240s range)  Awaiting results of pleural cultures  Once patient improves or if effusion gets larger, will perform a therapeutic thoracentesis  Will consider when hemodynamic more stable    Atrial fibrillation (HCC)- (present on admission)  Assessment & Plan  Holding metoprolol due to hypotension  currently rate controlled.  Patient is not on chronic  "anticoagulation.  He was Given Amiodarone 150 mg x1 10/23 a.m.      Other constipation- (present on admission)  Assessment & Plan  Suspected due to chronic narcotic use  We have stopped the narcotics and left only fentanyl due to intubation status  Bowel regimen  KUB 10/25 reviewed  Enema today.    Scabies- (present on admission)  Assessment & Plan  Rash very suspicious of scabies  S/p treatment  On contact precautions. Consider second treatment    Liver mass- (present on admission)  Assessment & Plan  Seen on US 8/2022 \"right lobe of the liver measuring 1.7 x 1.3 x 1.0 cm in size\"  Will need CT versus MRI post extubation and follow up with GI as outpatient.     Essential hypertension- (present on admission)  Assessment & Plan  On metoprolol and amlodipine, on hold due to hypotension and vasopressors use       VTE:  Lovenox  Ulcer: H2 Antagonist  Lines: Central Line  Ongoing indication addressed    I have performed a physical exam and reviewed and updated ROS and Plan today (10/26/2022). In review of yesterday's note (10/25/2022), there are no changes except as documented above.     Discussed patient condition and risk of morbidity and/or mortality with RN, RT, Pharmacy, and Charge nurse / hot rounds  The patient remains critically ill.  Critical care time = 48 minutes in directly providing and coordinating critical care and extensive data review.  No time overlap and excludes procedures.    Chris Abrams MD FACP  Pulmonary/Critical Care       "

## 2022-10-27 ENCOUNTER — APPOINTMENT (OUTPATIENT)
Dept: RADIOLOGY | Facility: MEDICAL CENTER | Age: 57
DRG: 870 | End: 2022-10-27
Attending: HOSPITALIST
Payer: COMMERCIAL

## 2022-10-27 ENCOUNTER — APPOINTMENT (OUTPATIENT)
Dept: RADIOLOGY | Facility: MEDICAL CENTER | Age: 57
DRG: 870 | End: 2022-10-27
Attending: INTERNAL MEDICINE
Payer: COMMERCIAL

## 2022-10-27 PROBLEM — F10.239 ALCOHOL DEPENDENCE WITH WITHDRAWAL (HCC): Status: RESOLVED | Noted: 2022-10-18 | Resolved: 2022-10-27

## 2022-10-27 LAB
ALBUMIN SERPL BCP-MCNC: 1.6 G/DL (ref 3.2–4.9)
ANION GAP SERPL CALC-SCNC: 12 MMOL/L (ref 7–16)
BACTERIA BLD CULT: NORMAL
BACTERIA BLD CULT: NORMAL
BASOPHILS # BLD AUTO: 1 % (ref 0–1.8)
BASOPHILS # BLD: 0.08 K/UL (ref 0–0.12)
BUN SERPL-MCNC: 31 MG/DL (ref 8–22)
CA-I SERPL-SCNC: 0.96 MMOL/L (ref 1.1–1.3)
CALCIUM SERPL-MCNC: 7.4 MG/DL (ref 8.4–10.2)
CHLORIDE SERPL-SCNC: 115 MMOL/L (ref 96–112)
CO2 SERPL-SCNC: 20 MMOL/L (ref 20–33)
CREAT SERPL-MCNC: 1.89 MG/DL (ref 0.5–1.4)
EKG IMPRESSION: NORMAL
EOSINOPHIL # BLD AUTO: 0.25 K/UL (ref 0–0.51)
EOSINOPHIL NFR BLD: 3 % (ref 0–6.9)
ERYTHROCYTE [DISTWIDTH] IN BLOOD BY AUTOMATED COUNT: 55.9 FL (ref 35.9–50)
GFR SERPLBLD CREATININE-BSD FMLA CKD-EPI: 41 ML/MIN/1.73 M 2
GLUCOSE BLD STRIP.AUTO-MCNC: 107 MG/DL (ref 65–99)
GLUCOSE BLD STRIP.AUTO-MCNC: 126 MG/DL (ref 65–99)
GLUCOSE SERPL-MCNC: 135 MG/DL (ref 65–99)
HCT VFR BLD AUTO: 43.8 % (ref 42–52)
HGB BLD-MCNC: 14.4 G/DL (ref 14–18)
LACTATE SERPL-SCNC: 2.2 MMOL/L (ref 0.5–2)
LYMPHOCYTES # BLD AUTO: 1.23 K/UL (ref 1–4.8)
LYMPHOCYTES NFR BLD: 15 % (ref 22–41)
MAGNESIUM SERPL-MCNC: 1.9 MG/DL (ref 1.5–2.5)
MANUAL DIFF BLD: ABNORMAL
MCH RBC QN AUTO: 33.2 PG (ref 27–33)
MCHC RBC AUTO-ENTMCNC: 32.9 G/DL (ref 33.7–35.3)
MCV RBC AUTO: 100.9 FL (ref 81.4–97.8)
MONOCYTES # BLD AUTO: 1.07 K/UL (ref 0–0.85)
MONOCYTES NFR BLD AUTO: 13 % (ref 0–13.4)
NEUTROPHILS # BLD AUTO: 5.58 K/UL (ref 1.82–7.42)
NEUTROPHILS NFR BLD: 45 % (ref 44–72)
NEUTS BAND NFR BLD MANUAL: 23 % (ref 0–10)
NRBC # BLD AUTO: 0 K/UL
NRBC BLD-RTO: 0 /100 WBC
PHOSPHATE SERPL-MCNC: 5.8 MG/DL (ref 2.5–4.5)
PLATELET # BLD AUTO: 281 K/UL (ref 164–446)
PLATELET BLD QL SMEAR: NORMAL
PMV BLD AUTO: 10.7 FL (ref 9–12.9)
POTASSIUM SERPL-SCNC: 4.3 MMOL/L (ref 3.6–5.5)
PROCALCITONIN SERPL-MCNC: 7.27 NG/ML
RBC # BLD AUTO: 4.34 M/UL (ref 4.7–6.1)
RBC BLD AUTO: NORMAL
SIGNIFICANT IND 70042: NORMAL
SIGNIFICANT IND 70042: NORMAL
SITE SITE: NORMAL
SITE SITE: NORMAL
SODIUM SERPL-SCNC: 147 MMOL/L (ref 135–145)
SOURCE SOURCE: NORMAL
SOURCE SOURCE: NORMAL
WBC # BLD AUTO: 8.2 K/UL (ref 4.8–10.8)

## 2022-10-27 PROCEDURE — 700105 HCHG RX REV CODE 258: Performed by: INTERNAL MEDICINE

## 2022-10-27 PROCEDURE — 84100 ASSAY OF PHOSPHORUS: CPT

## 2022-10-27 PROCEDURE — 94003 VENT MGMT INPAT SUBQ DAY: CPT

## 2022-10-27 PROCEDURE — 700111 HCHG RX REV CODE 636 W/ 250 OVERRIDE (IP): Performed by: INTERNAL MEDICINE

## 2022-10-27 PROCEDURE — 700117 HCHG RX CONTRAST REV CODE 255: Performed by: INTERNAL MEDICINE

## 2022-10-27 PROCEDURE — 700111 HCHG RX REV CODE 636 W/ 250 OVERRIDE (IP): Performed by: HOSPITALIST

## 2022-10-27 PROCEDURE — 700101 HCHG RX REV CODE 250: Performed by: HOSPITALIST

## 2022-10-27 PROCEDURE — 83605 ASSAY OF LACTIC ACID: CPT

## 2022-10-27 PROCEDURE — 83735 ASSAY OF MAGNESIUM: CPT

## 2022-10-27 PROCEDURE — 93010 ELECTROCARDIOGRAM REPORT: CPT | Performed by: INTERNAL MEDICINE

## 2022-10-27 PROCEDURE — A9270 NON-COVERED ITEM OR SERVICE: HCPCS | Performed by: INTERNAL MEDICINE

## 2022-10-27 PROCEDURE — 82330 ASSAY OF CALCIUM: CPT

## 2022-10-27 PROCEDURE — 82040 ASSAY OF SERUM ALBUMIN: CPT

## 2022-10-27 PROCEDURE — 700102 HCHG RX REV CODE 250 W/ 637 OVERRIDE(OP): Performed by: INTERNAL MEDICINE

## 2022-10-27 PROCEDURE — 82962 GLUCOSE BLOOD TEST: CPT | Mod: 91

## 2022-10-27 PROCEDURE — 700101 HCHG RX REV CODE 250: Performed by: INTERNAL MEDICINE

## 2022-10-27 PROCEDURE — A9270 NON-COVERED ITEM OR SERVICE: HCPCS | Performed by: HOSPITALIST

## 2022-10-27 PROCEDURE — 74018 RADEX ABDOMEN 1 VIEW: CPT

## 2022-10-27 PROCEDURE — 71045 X-RAY EXAM CHEST 1 VIEW: CPT

## 2022-10-27 PROCEDURE — 85025 COMPLETE CBC W/AUTO DIFF WBC: CPT

## 2022-10-27 PROCEDURE — 80048 BASIC METABOLIC PNL TOTAL CA: CPT

## 2022-10-27 PROCEDURE — 770022 HCHG ROOM/CARE - ICU (200)

## 2022-10-27 PROCEDURE — 85007 BL SMEAR W/DIFF WBC COUNT: CPT

## 2022-10-27 PROCEDURE — 99291 CRITICAL CARE FIRST HOUR: CPT | Performed by: INTERNAL MEDICINE

## 2022-10-27 PROCEDURE — 74176 CT ABD & PELVIS W/O CONTRAST: CPT

## 2022-10-27 PROCEDURE — 94799 UNLISTED PULMONARY SVC/PX: CPT

## 2022-10-27 PROCEDURE — 700102 HCHG RX REV CODE 250 W/ 637 OVERRIDE(OP): Performed by: HOSPITALIST

## 2022-10-27 PROCEDURE — 84145 PROCALCITONIN (PCT): CPT

## 2022-10-27 PROCEDURE — 94760 N-INVAS EAR/PLS OXIMETRY 1: CPT

## 2022-10-27 RX ORDER — SODIUM CHLORIDE, SODIUM LACTATE, POTASSIUM CHLORIDE, CALCIUM CHLORIDE 600; 310; 30; 20 MG/100ML; MG/100ML; MG/100ML; MG/100ML
INJECTION, SOLUTION INTRAVENOUS ONCE
Status: COMPLETED | OUTPATIENT
Start: 2022-10-27 | End: 2022-10-27

## 2022-10-27 RX ORDER — ENEMA 19; 7 G/133ML; G/133ML
1 ENEMA RECTAL ONCE
Status: COMPLETED | OUTPATIENT
Start: 2022-10-27 | End: 2022-10-27

## 2022-10-27 RX ADMIN — TOBRAMYCIN OPHTHALMIC SOLUTION 1 DROP: 3 SOLUTION/ DROPS OPHTHALMIC at 10:24

## 2022-10-27 RX ADMIN — Medication 300 MCG/HR: at 16:08

## 2022-10-27 RX ADMIN — PIPERACILLIN AND TAZOBACTAM 4.5 G: 4; .5 INJECTION, POWDER, FOR SOLUTION INTRAVENOUS at 14:28

## 2022-10-27 RX ADMIN — TOBRAMYCIN OPHTHALMIC SOLUTION 1 DROP: 3 SOLUTION/ DROPS OPHTHALMIC at 03:01

## 2022-10-27 RX ADMIN — PIPERACILLIN AND TAZOBACTAM 4.5 G: 4; .5 INJECTION, POWDER, FOR SOLUTION INTRAVENOUS at 21:59

## 2022-10-27 RX ADMIN — DEXMEDETOMIDINE HYDROCHLORIDE 1.5 MCG/KG/HR: 4 INJECTION, SOLUTION INTRAVENOUS at 03:48

## 2022-10-27 RX ADMIN — Medication 300 MCG/HR: at 11:13

## 2022-10-27 RX ADMIN — Medication 300 MCG/HR: at 03:45

## 2022-10-27 RX ADMIN — IOHEXOL 20 ML: 240 INJECTION, SOLUTION INTRATHECAL; INTRAVASCULAR; INTRAVENOUS; ORAL at 11:06

## 2022-10-27 RX ADMIN — CARBOXYMETHYLCELLULOSE SODIUM 2 DROP: 5 SOLUTION/ DROPS OPHTHALMIC at 19:40

## 2022-10-27 RX ADMIN — VASOPRESSIN 0.03 UNITS/MIN: 20 INJECTION INTRAVENOUS at 16:44

## 2022-10-27 RX ADMIN — FOLIC ACID 1 MG: 1 TABLET ORAL at 05:37

## 2022-10-27 RX ADMIN — DEXMEDETOMIDINE HYDROCHLORIDE 1.1 MCG/KG/HR: 4 INJECTION, SOLUTION INTRAVENOUS at 19:31

## 2022-10-27 RX ADMIN — SENNOSIDES AND DOCUSATE SODIUM 2 TABLET: 50; 8.6 TABLET ORAL at 05:37

## 2022-10-27 RX ADMIN — Medication 200 MCG/HR: at 19:36

## 2022-10-27 RX ADMIN — MIDAZOLAM 2 MG/HR: 5 INJECTION INTRAMUSCULAR; INTRAVENOUS at 08:30

## 2022-10-27 RX ADMIN — TOBRAMYCIN OPHTHALMIC SOLUTION 1 DROP: 3 SOLUTION/ DROPS OPHTHALMIC at 22:00

## 2022-10-27 RX ADMIN — DEXMEDETOMIDINE HYDROCHLORIDE 1.5 MCG/KG/HR: 4 INJECTION, SOLUTION INTRAVENOUS at 10:19

## 2022-10-27 RX ADMIN — SODIUM CHLORIDE, POTASSIUM CHLORIDE, SODIUM LACTATE AND CALCIUM CHLORIDE: 600; 310; 30; 20 INJECTION, SOLUTION INTRAVENOUS at 10:26

## 2022-10-27 RX ADMIN — PIPERACILLIN AND TAZOBACTAM 4.5 G: 4; .5 INJECTION, POWDER, FOR SOLUTION INTRAVENOUS at 05:33

## 2022-10-27 RX ADMIN — TOBRAMYCIN OPHTHALMIC SOLUTION 1 DROP: 3 SOLUTION/ DROPS OPHTHALMIC at 17:54

## 2022-10-27 RX ADMIN — FAMOTIDINE 20 MG: 20 TABLET, FILM COATED ORAL at 05:36

## 2022-10-27 RX ADMIN — VASOPRESSIN 0.03 UNITS/MIN: 20 INJECTION INTRAVENOUS at 05:35

## 2022-10-27 RX ADMIN — MULTIPLE VITAMINS W/ MINERALS TAB 1 TABLET: TAB at 05:36

## 2022-10-27 RX ADMIN — DEXMEDETOMIDINE HYDROCHLORIDE 1.5 MCG/KG/HR: 4 INJECTION, SOLUTION INTRAVENOUS at 06:59

## 2022-10-27 RX ADMIN — TOBRAMYCIN OPHTHALMIC SOLUTION 1 DROP: 3 SOLUTION/ DROPS OPHTHALMIC at 05:37

## 2022-10-27 RX ADMIN — MIDAZOLAM 2 MG: 1 INJECTION, SOLUTION INTRAMUSCULAR; INTRAVENOUS at 08:09

## 2022-10-27 RX ADMIN — SODIUM PHOSPHATE 133 ML: 7; 19 ENEMA RECTAL at 15:44

## 2022-10-27 RX ADMIN — TOBRAMYCIN OPHTHALMIC SOLUTION 1 DROP: 3 SOLUTION/ DROPS OPHTHALMIC at 14:29

## 2022-10-27 RX ADMIN — LEVOTHYROXINE SODIUM 50 MCG: 50 TABLET ORAL at 05:37

## 2022-10-27 RX ADMIN — DEXMEDETOMIDINE HYDROCHLORIDE 1.5 MCG/KG/HR: 4 INJECTION, SOLUTION INTRAVENOUS at 16:41

## 2022-10-27 RX ADMIN — FAMOTIDINE 20 MG: 10 INJECTION, SOLUTION INTRAVENOUS at 17:53

## 2022-10-27 RX ADMIN — ENOXAPARIN SODIUM 40 MG: 100 INJECTION SUBCUTANEOUS at 17:53

## 2022-10-27 RX ADMIN — DEXMEDETOMIDINE HYDROCHLORIDE 1.5 MCG/KG/HR: 4 INJECTION, SOLUTION INTRAVENOUS at 13:31

## 2022-10-27 RX ADMIN — LACTULOSE 30 ML: 20 SOLUTION ORAL at 05:36

## 2022-10-27 RX ADMIN — DEXMEDETOMIDINE HYDROCHLORIDE 1.5 MCG/KG/HR: 4 INJECTION, SOLUTION INTRAVENOUS at 00:45

## 2022-10-27 ASSESSMENT — PAIN DESCRIPTION - PAIN TYPE
TYPE: ACUTE PAIN

## 2022-10-27 ASSESSMENT — PATIENT HEALTH QUESTIONNAIRE - PHQ9
1. LITTLE INTEREST OR PLEASURE IN DOING THINGS: NOT AT ALL
2. FEELING DOWN, DEPRESSED, IRRITABLE, OR HOPELESS: NOT AT ALL
SUM OF ALL RESPONSES TO PHQ9 QUESTIONS 1 AND 2: 0

## 2022-10-27 NOTE — PROGRESS NOTES
MD notified of overnight changes in pt status. Pt had increased output from NG tube, abd xray completed, reviewed by overnight physician. Pt's abd is distended and bowel sounds are now absent. Output from NG tube is stool-like.     Procalcitonin has increased overnight to 7.27, lactic is 2.2, urine output has slowly trended down with the most recent being 25cc/hr.

## 2022-10-27 NOTE — PROGRESS NOTES
Overnight events, patient with absent bowel sounds and an increase in NG output with fecal matter,   Dr. Lipscomb notified and orders for KUB/cXray and lab work, follow-up orders to continue to monitor at this time.

## 2022-10-27 NOTE — FLOWSHEET NOTE
10/26/22 1824   Events/Summary/Plan   Skin Inspection Respiratory Device Intact   Ventiliation   $ Ventilation - Subsequent Yes   Ventilator Management Group   Intensivist Group Yes   General Vent Information   Ventilator Number [id number]   Vent Mode APVCMV   Vent Alarms   Ventilation Alarms Reviewed / Activated Yes   Upper Pressure Limit Alarm 40   Lower Pressure Limit 10   Low VE Alarm 4   High Respiratory Rate Alarm 40   Low Respiratory Rate Alarm 10   Low VT Alarm 200   Apnea Parameters Checked / Activated Yes   Vent Settings   FiO2% 40 %   Rate (breaths/min) 22   Vent Temp HME Yes   Vt Target (mL) 460   PIP 24   TI (Seconds) 1.1   PEEP/CPAP 8   P Support 10   Pramp 100   Trigger Type Flow Trigger   Sensitivity Setting 5   Vent Readings   PIP 24    Minute Volume 10.2   Control VTE (exp VT) 465   Spontaneous VTE (exp VT) 507   f Total (Breaths/Min) 22   I:E Ratio 1.5   MAP 11   PEEP/CPAP MONITORED 8   Static Compliance (ml / cm H2O) 63   R exp 0.52   Plateau Pressure 17   Spontaneous Breathing Trial (SBT)   Safety screen spontaneous breathing trial (SBT) No Order   VAP   Oral Care Oral Suctioning

## 2022-10-27 NOTE — FLOWSHEET NOTE
10/26/22 1822   Vital Signs   Pulse 97   Respiration (!) 22   Pulse Oximetry 97 %   $ Pulse Oximetry (Spot Check) Yes   O2 Alarms Set & Reviewed Yes   CO2 Monitoring   EtCO2 Calibration Yes   ETCO2 (mmHg) 41   $ ETCO2 Monitoring Yes   Airway ETT 7.5   Placement Date/Time: 10/23/22 0912   Airway Type: ETT  Airway Size: 7.5  Inserted In: Unit  Inserted by: MD   Site Assessment Clean;Dry;Intact   Airway Tube Secured Commercial securing device   Secured At  (cm) 25   Tube Repositioned Right   Cuffless No   Oxygen   FiO2% 40 %   O2 Delivery Device Ventilator   Resuscitation Device at Bedside Self Inflating Bag

## 2022-10-27 NOTE — PROGRESS NOTES
"Pulmonary/Critical Care Progress Note    Date of admission  10/18/2022    Chief Complaint  57 y.o. male admitted 10/18/2022 with ETOH and required intubation.   Re intubated on 10/23/22    Hospital Course  From previous consult notes from Dr Garcia: \"58 yo male admitted on 10/19/2022 with ETOh withdrawal  PMHx: lives in a motel, drinks a pint of vodka a day (last drinl 10/7), prior hx of meth use, afib not on anticoagulation, liver mass on Us on August 2022 (2.  Solid hypoechoic mass within the right lobe of the liver measuring 1.7 x 1.3 x 1.0 cm in size. Follow-up pre and postcontrast multiphasic hepatic CT or MRI is recommended for further evaluation)  Received 10 mg ativan and 650 mg of phenobarb went into resp distress requiring intubation     10/19: started librium 25 mg tid; Rx of scabies\"  10/21: extubated at noon. On precedex drip.   10/22: Fevering. Altered. Gurgling upper airway. Started on Abx.   10/23: he was re intubated due to airway protection concerns, respiratory acidosis and AMS.    10/24: I am taking over the service today, no major events overnight. Patient underwent bronchoscopy with sampling of RLL and LLL. Copious secretions were found in geetha and all subsegments but more prevalent in RLL and LLL. Adjusted antibiotics later on the day due to pseudomonas growing in the respiratory culture. (On zosyn).   Stop scopolamine and robinol, decrease librium from 25mg tid to BID, decrease seroquel from 50mg bid to nightly. Will stop all narcotics and rely on fentanyl ggt alone    10/25: Antonio underwent diagnostic thoracentesis with 100 cc of pleural fluid sent to the lab, albumin gradient was less than 1.2 (0.9) which correlates with an exudate in the setting of use of recent diuretics.  Noted also that was a very high LDH at 1020 which correlates with an inflammatory process as well.  Based on this, he met lights criteria but not with protein.  Culture still pending.    10/26: Later during the night " yesterday he developed hypoglycemia and was treated with D50.  Overnight patient apparently woke up and extubated himself, he was trialed on high flow oxygen for 20 to 30 minutes but he started becoming tachypneic and getting tired for what he was reintubated around 3 AM.  Reintubation.  Was complicated by bradycardia and hypotension that required vasopressors.    Interval Problem Update  Reviewed last 24 hour events:    Chart review from the past 24 hours includes imaging, laboratory studies, vital signs and notes available.  Pertinent data for today    10/27  Issues overnight: patient with abdominal distention per staff report, fecal like material coming out from the NG tube with large amounts. Pressors are coming down.     *Pulmonary: Aspiration versus hospital-acquired pneumonia.  Was intubated on 10/23/2022 for respiratory acidosis and AMS. S/p bronch on 10/24/22, BAL from RLL and LLL.   Cxr today showed a moderate pleural effusion on the RIGHT but difficult to get access to the effusion.  Status post diagnostic thoracentesis on 10/25.  MSSA and pseudomonas are growing from the sputum cx and also from bronch samples susceptible to Zosyn, Antitiobitcs changed to zosyn on 10/24.   Patient self extubated in the night from 10/25-10/26 and was reintubated shortly after.  Unclear if he aspirated but given his medical history we will extend the antibiotic therapy for 7 days. Some pulm edema seen in last CXR.  *Cardiovascular: cardiomegaly on cxr, Hemodynamically hypotensive since reintubation, he continues to be on Levophed and vasopressin, although we are finally weaning it down, will consider to add vasopressin if continues to increase requirements.  A. fib seems to be under rate control.  *Nephrology: decrease urine output, renal function has decreased today, abdominal pressures were below 20, resuscitate with ringer bolus today and IVF  *GI: Patient still has not had a bowel movement, has tried Relistor one-time,  lactulose, and distended bowel regimen. Did not tolerate the tube feeds, is on intermittent suction and now with fecal like material coming out.  KUB was review with suspected ileus. CT abdomen, pelvis today.  Lactic acid 2.2, will obtain abdominal pressures today  *Neuro: patient under effect of sedation by precedex, versed and fentanyl  *:I/Os: he is +8 L since admission.   *ID: Cultures are growing MSSA and pseudomonas, Zosyn, . WBC coming down 22.9--> 16.9-->16.6-->8.  Negative respiratory viral panel on 10/22/22, PCT at 0.44-->7.27 (10/27). still with mild fevers despite treatment with antibiotics and tylenol. Check CT chest/abdomen to look for any other source. Will consider increase antibiotics coverage today        Tubes, Lines, Drains: OGT, 2 PIVs, wheeler, CVC on right IJ, ET tube  Drips: Precedex, Fentanyl gtt, Versed as needed  Nutrition: holding peptide 1.5 due to abdominal distention and high output bilious with fecal like material from NG, we are getting to the point of discussion about possible tpn  CXR: moderate R effusion. US at bedside showed the effusion but is difficult to obtain a proper position to perform a therapeutic thoracentesis, will evaluate daily  Tmax: 103.1 F on monitored, continue with Tylenol  Antibiotics: Unasyn (started 10/22)--> zosyn 10/24  Steroids: none  Cultures: ordered 10/22 MSSA and pseudomonas  BM regimen: MiraLAX, senna-docusate, milk magnesia, bisacodyl, suppositories. Consider gastrograffin enema pending results of CT    Review of Systems  Review of Systems   Unable to perform ROS: Intubated      Vital Signs for last 24 hours   Temp:  [37.4 °C (99.3 °F)] 37.4 °C (99.3 °F)  Pulse:  [] 80  Resp:  [0-38] 21  BP: ()/(42-82) 85/68  SpO2:  [95 %-100 %] 97 %    Hemodynamic parameters for last 24 hours  CVP:  [64 MM HG] 64 MM HG    Respiratory Information for the last 24 hours  Vent Mode: APVCMV  Rate (breaths/min): 22  Vt Target (mL): 460  PEEP/CPAP: 8  P  Support: 10  MAP: 12  Control VTE (exp VT): 475    Physical Exam   Physical Exam  Vitals and nursing note reviewed.   Constitutional:       General: He is not in acute distress.     Appearance: He is ill-appearing. He is not toxic-appearing.   HENT:      Head: Normocephalic and atraumatic.      Comments: Bilateral occular hyperemia, pupils equal and reactive     Nose: Nose normal. No congestion.      Mouth/Throat:      Mouth: Mucous membranes are moist.      Comments: ETT in place  Eyes:      Pupils: Pupils are equal, round, and reactive to light.   Cardiovascular:      Rate and Rhythm: Regular rhythm. Tachycardia present.      Pulses: Normal pulses.      Heart sounds: No murmur heard.    No friction rub. No gallop.   Pulmonary:      Effort: Pulmonary effort is normal. No respiratory distress.      Breath sounds: No wheezing or rhonchi.   Abdominal:      General: Bowel sounds are normal. There is distension.      Palpations: There is no mass.      Tenderness: There is no abdominal tenderness. There is no guarding.      Comments: There is fecal like material coming out of the NG tube   Musculoskeletal:         General: Normal range of motion.      Cervical back: No rigidity.      Right lower leg: Edema present.      Left lower leg: Edema present.   Skin:     General: Skin is warm.      Capillary Refill: Capillary refill takes less than 2 seconds.      Comments: Desquammative skin in all limbs improving   Neurological:      Comments: Under sedation           Medications  Current Facility-Administered Medications   Medication Dose Route Frequency Provider Last Rate Last Admin    famotidine (PEPCID) injection 20 mg  20 mg Intravenous BID Chris Abrams M.D.   20 mg at 10/27/22 1753    fentaNYL (SUBLIMAZE) injection 100 mcg  100 mcg Intravenous Q15 MIN PRN Chris Abrams M.D.        And    fentaNYL (SUBLIMAZE) injection 200 mcg  200 mcg Intravenous Q15 MIN PRN Chris Abrams M.D.        And    fentaNYL  (SUBLIMAZE) 50 mcg/mL in 50mL (Continuous Infusion)   Intravenous Continuous Chris Abrams M.D. 4 mL/hr at 10/27/22 1609 200 mcg/hr at 10/27/22 1609    And    dexmedetomidine (PRECEDEX) 400 mcg/100mL NS premix infusion  0-1.5 mcg/kg/hr Intravenous Continuous Chris Abrams M.D. 23.5 mL/hr at 10/27/22 1826 1.1 mcg/kg/hr at 10/27/22 1826    thiamine (Vitamin B-1) tablet 200 mg  200 mg Enteral Tube TID Chris Abrams M.D.   200 mg at 10/26/22 2015    vasopressin (Vasostrict) 20 Units in  mL Infusion  0.03 Units/min Intravenous Continuous Chris Abrams M.D. 9 mL/hr at 10/27/22 1644 0.03 Units/min at 10/27/22 1644    midazolam (Versed) premix 125 mg/125 mL infusion  0-10 mg/hr Intravenous Continuous Chris Abrams M.D. 4 mL/hr at 10/27/22 1854 4 mg/hr at 10/27/22 1854    D5 1/2 NS infusion   Intravenous Continuous Chris Abrams M.D. 50 mL/hr at 10/26/22 1745 New Bag at 10/26/22 1745    norepinephrine (Levophed) 8 mg in 250 mL NS infusion (premix)  0-30 mcg/min Intravenous Continuous Chris Abrams M.D. 3.8 mL/hr at 10/27/22 1732 2 mcg/min at 10/27/22 1732    tobramycin (TOBREX) 0.3 % ophthalmic solution 1 Drop  1 Drop Right Eye Q4HRS Chris Abrams M.D.   1 Drop at 10/27/22 1754    QUEtiapine (Seroquel) tablet 50 mg  50 mg Enteral Tube Nightly Chris Abrams M.D.   50 mg at 10/26/22 2015    piperacillin-tazobactam (Zosyn) 4.5 g in  mL IVPB  4.5 g Intravenous Q8HRS Chris Abrams M.D.   Stopped at 10/27/22 1828    Respiratory Therapy Consult   Nebulization Continuous RT Greyson Luciano D.O.        lidocaine (XYLOCAINE) 1 % injection 2 mL  2 mL Tracheal Tube Q30 MIN PRN Greyson Luciano D.O.        lactulose 20 GM/30ML solution 30 mL  30 mL Enteral Tube TID Greyson Luciano D.O.   30 mL at 10/27/22 0536    Pharmacy Consult: Enteral tube insertion - review meds/change route/product selection   Other PHARMACY TO DOSE Greyson Luciano D.O.         carboxymethylcellulose (REFRESH TEARS) 0.5 % ophthalmic drops 2 Drop  2 Drop Both Eyes PRN Greyson Luciano D.O.   2 Drop at 10/26/22 2257    MD Alert...ICU Electrolyte Replacement per Pharmacy   Other PHARMACY TO DOSE Norm Choudhary M.D.        albuterol inhaler 2 Puff  2 Puff Inhalation Q6HRS PRN Eva Rodriguez M.D.        enoxaparin (Lovenox) inj 40 mg  40 mg Subcutaneous DAILY AT 1800 Asequinton Rodriguez M.D.   40 mg at 10/27/22 1753    Pharmacy Consult Request ...Pain Management Review 1 Each  1 Each Other PHARMACY TO DOSE Eva Rodriguez M.D.        folic acid (FOLVITE) tablet 1 mg  1 mg Enteral Tube DAILY Asequinton Rodriguez M.D.   1 mg at 10/27/22 0537    levothyroxine (SYNTHROID) tablet 50 mcg  50 mcg Enteral Tube AM ES Eva Rodriguez M.D.   50 mcg at 10/27/22 0537    senna-docusate (PERICOLACE or SENOKOT S) 8.6-50 MG per tablet 2 Tablet  2 Tablet Enteral Tube BID Eva Rodriguez M.D.   2 Tablet at 10/27/22 0537    And    polyethylene glycol/lytes (MIRALAX) PACKET 1 Packet  1 Packet Enteral Tube QDAY PRN Eva Rodriguez M.D.   1 Packet at 10/22/22 1721    And    magnesium hydroxide (MILK OF MAGNESIA) suspension 30 mL  30 mL Enteral Tube QDAY PRN Eva Rodriguez M.D.   30 mL at 10/24/22 1758    And    bisacodyl (DULCOLAX) suppository 10 mg  10 mg Rectal QDAY PRN Eva Rodriguez M.D.   10 mg at 10/26/22 1643    therapeutic multivitamin-minerals (THERAGRAN-M) tablet 1 Tablet  1 Tablet Enteral Tube DAILY Asequinton Rodriguez M.D.   1 Tablet at 10/27/22 0536    acetaminophen (Tylenol) tablet 650 mg  650 mg Enteral Tube Q6HRS PRN Eva Rodriguez M.D.   650 mg at 10/26/22 2257       Fluids    Intake/Output Summary (Last 24 hours) at 10/27/2022 1914  Last data filed at 10/27/2022 1800  Gross per 24 hour   Intake 4753.79 ml   Output 6365 ml   Net -1611.21 ml       Laboratory  Recent Labs     10/25/22  0430 10/26/22  0223 10/26/22  0451   ISTATAPH 7.353* 7.333* 7.354*   ISTATAPCO2 44.3* 49.4* 44.7*    ISTATAPO2 60* 104* 75   ISTATATCO2 26 28 26   [id number] 89* 97 94   ISTATARTHCO3 24.6 26.2* 24.9   ISTATARTBE -1 0 -1   ISTATTEMP 100.6 F 103.1 F 102.9 F   ISTATFIO2 40 100 40   ISTATSPEC Arterial Arterial Arterial   ISTATAPHTC 7.337* 7.297* 7.319*   [id number] 64 120* 88*         Recent Labs     10/25/22  0410 10/26/22  0324 10/27/22  0245 10/27/22  1030   SODIUM 146* 147* 147*  --    POTASSIUM 4.1 3.7 4.3  --    CHLORIDE 114* 112 115*  --    CO2 24 23 20  --    BUN 16 19 31*  --    CREATININE 1.04 1.35 1.89*  --    MAGNESIUM  --   --   --  1.9   PHOSPHORUS  --   --   --  5.8*   CALCIUM 8.6 8.0* 7.4*  --      Recent Labs     10/25/22  0410 10/26/22  0324 10/27/22  0245   GLUCOSE 90 89 135*     Recent Labs     10/25/22  0410 10/26/22  0324 10/27/22  0245   WBC 16.6* 10.9* 8.2   NEUTSPOLYS 71.80 61.70 45.00   LYMPHOCYTES 6.40* 13.50* 15.00*   MONOCYTES 11.90 13.50* 13.00   EOSINOPHILS 7.20* 7.40* 3.00   BASOPHILS 1.10 1.10 1.00     Recent Labs     10/25/22  0410 10/26/22  0324 10/27/22  0245   RBC 4.33* 4.37* 4.34*   HEMOGLOBIN 14.7 14.6 14.4   HEMATOCRIT 45.4 44.9 43.8   PLATELETCT 362 339 281       Imaging  X-Ray:  My impression is: Cardiomegaly, moderate right-sided pleural effusion  Echo:   Reviewed    Echo from 10/10/2022  CONCLUSIONS  Normal left ventricular systolic function.  The right ventricle is mildly dilated.  Mild mitral regurgitation     Chest x-ray 10/20/2022  Cardiomegaly, moderate right-sided pleural effusion.     Chest x-ray 10/18/2022  Lungs: No consolidation detected.   Pleura:  No pleural space process is seen.   Heart and mediastinum: The cardiomediastinal contours are normal.   Age-indeterminate left ninth lateral rib fracture   IMPRESSION: No radiographic evidence of acute cardiopulmonary process.      Assessment/Plan  Sepsis due to Pseudomonas species (HCC)- (present on admission)  Assessment & Plan  This is Severe Sepsis Present on admission  SIRS criteria identified on my evaluation  "include: Fever, with temperature greater than 101 deg F, Tachycardia, with heart rate greater than 90 BPM, Tachypnea, with respirations greater than 20 per minute and Leukocytosis, with WBC greater than 12,000  Clinical indicators of end organ dysfunction include Systolic blood pressure (SBP) <90 mmHg or mean arterial pressure <65 mmHg and Acute respiratory failure as evidenced by a new need for invasive or non-invasive mechanical ventilation (Ventilator or BiPap)   Source is pneumonia by MSSA and pseudomonas  Sepsis protocol initiated  Crystalloid Fluid Administration: Patient has volume overload with >11 lt since admission, (NYHA class III or symptoms with minimal exertion, Or NYHA class IV or symptoms at rest or with activity), for this/these reason(s) it is unsafe for this patient to receive 30 mL/kg of fluid.  Partial Fluid Dose Given on 10/24, patient to be reassessed shortly after completion of partial fluid bolus to ensure adequacy of resuscitation  IV antibiotics as appropriate for source of sepsis  Reassessment: I have reassessed the patient's hemodynamic status  Will provide another partial bolus today and wean down the vasopressors    I examined the patient 10/26/2022 11:21 AM  Vital Signs:BP (!) 98/69   Pulse 98   Temp (!) 39.6 °C (103.3 °F) (Bladder)   Resp 17   Ht 1.778 m (5' 10\")   Wt 85.3 kg (188 lb 0.8 oz)   SpO2 97%   BMI 26.98 kg/m²   Cardiac examination significant for Tachycardia  Pulmonary examination significant for Clear lung fileds  Capillary refill is brisk  Peripheral Pulse is 2+   Skin is dusky     Patient is on levophed to keep MAP>65, also on vasopresin but finally weaning down levophed  Continue with antibiotics zosyn  To treat HAP MSSA and Pseudomonas  Will obtain a CT chest/abdomen/pelvis to find another source, he still is on pressors, new ESAU and elevated PCT and large fecal like material output from NG tube  Consider ID consultation      Acute respiratory failure with " hypoxia (HCC)- (present on admission)  Assessment & Plan  -Intubated for respiratory acidosis and altered mental status with need for intubation on 10/23/2022  -Continue with mechanical ventilation with lung protective strategies.  -He was suspected to have a had aspiration episode for what he was started empiric treatment with Unasyn. Cultures from sputum growing MSSA and pseudomonas, bronch BALs also growing the same, antibiotic changed to zosyn on the night of 10/24  -also complicated by right pleural effusion, s/p diagnostic thoracentesis.  -Procal ordered - 0.44 -->7 (10/27) check CT chest   -ABCDEF bundle    On mechanically assisted ventilation (HCC)  Assessment & Plan  -Patient intubation as above, for etoh withdrawal and delirium with resp depression,  -Patient was extubated on 10/21/2022.  -He was reintubated on 10/23/2022 due to respiratory acidosis and altered mental status.  -patient self extubated on the late night of 10/25 and re intubated shortly after  -Continued with lung protective strategies on mechanical ventilation.  -Continues with Precedex and fentanyl for sedation and pain with add on of versed pushes, if this is not enough will order a ggt  -ABCDEF bundle      Pleural effusion- (present on admission)  Assessment & Plan  On the right, moderate per CXR of 10/24  bedside US showed a pleural effusion but difficult to access given position  S/p diagnostic thoracentesis 10/25/22: albumin gradient is less than 1.2 g/dl which indicate exudates, his was 0.9, also inflammative effusion with very high LDH 1020 (serum in the 240s range)  Awaiting results of pleural cultures  Once patient improves or if effusion gets larger, will perform a therapeutic thoracentesis  Will obtain a ct chest today for further evaluation    Atrial fibrillation (HCC)- (present on admission)  Assessment & Plan  Holding metoprolol due to hypotension  currently rate controlled.  Patient is not on chronic anticoagulation.  He was  "Given Amiodarone 150 mg x1 10/23 a.m.      Other constipation- (present on admission)  Assessment & Plan  Suspected due to chronic narcotic use  We have stopped the narcotics and left only fentanyl due to intubation status  Bowel regimen  KUB 10/25 reviewed with possible ileus  Fecal like material coming from NG tube,   Will obtain a new CT abdomen  Consider gastrograffin enema pending results of CT vs surgical consultation    Scabies- (present on admission)  Assessment & Plan  Rash very suspicious of scabies  S/p treatment  On contact precautions. Consider second treatment    ESAU (acute kidney injury) (HCC)- (present on admission)  Assessment & Plan  Patient with  New ESAU epidose on 10/27/22 with increase in Cr and decrease urine output although still present  Will ressuscitate with bolus of IVF  Avoid nephrotoxic agents  Follow up I/Os,  Consider early consultation with nephrology    Liver mass- (present on admission)  Assessment & Plan  Seen on US 8/2022 \"right lobe of the liver measuring 1.7 x 1.3 x 1.0 cm in size\"  Will need CT versus MRI post extubation and follow up with GI as outpatient.     Essential hypertension- (present on admission)  Assessment & Plan  On metoprolol and amlodipine, on hold due to hypotension and vasopressors use       VTE:  Lovenox  Ulcer: H2 Antagonist  Lines: Central Line  Ongoing indication addressed    I have performed a physical exam and reviewed and updated ROS and Plan today (10/27/2022). In review of yesterday's note (10/26/2022), there are no changes except as documented above.     Discussed patient condition and risk of morbidity and/or mortality with RN, RT, Pharmacy, and Charge nurse / hot rounds  The patient remains critically ill.  Critical care time = 47 minutes in directly providing and coordinating critical care and extensive data review.  No time overlap and excludes procedures.    Chris Abrams MD FACP  Pulmonary/Critical Care       "

## 2022-10-27 NOTE — CARE PLAN
The patient is Watcher - Medium risk of patient condition declining or worsening    Shift Goals  Clinical Goals: Maintain RASS -1, Hemodynamic stability  Patient Goals: ANETTE  Family Goals: Get better    Progress made toward(s) clinical / shift goals:    Problem: Hemodynamics  Goal: Patient's hemodynamics, fluid balance and neurologic status will be stable or improve  Outcome: Progressing  Note: Patient titrated down on the levophed, pt is maintaining MAP >65       Patient is not progressing towards the following goals:    Problem: Mechanical Ventilation  Goal: Successful weaning off mechanical ventilator, spontaneously maintains adequate gas exchange  10/26/2022 1707 by Abeba Lu, R.N.  Outcome: Not Progressing  Note: Patient still on the ventilator at 460 TV, PEEP 8, 40% FiO2

## 2022-10-27 NOTE — ASSESSMENT & PLAN NOTE
Patient with  New ESAU epidose on 10/27/22 with increase in Cr and decrease urine output although still present  Will ressuscitate with bolus of IVF  Avoid nephrotoxic agents  Follow up I/Os,  Improving today

## 2022-10-27 NOTE — PROGRESS NOTES
12-hour chart check complete.    Monitor Summary  Rhythm: AFib  Rate:   Ectopy: R PVC  Measurements: -/0.10/-

## 2022-10-27 NOTE — PROGRESS NOTES
12 hour chart check complete.     Telemetry summary  Rhythm: afib  Rate:   Ectopy: occ pvc  --/0.10/0.38

## 2022-10-27 NOTE — CARE PLAN
The patient is Watcher - Medium risk of patient condition declining or worsening    Shift Goals  Clinical Goals: VSS, manage airway  Patient Goals: ANETTE  Family Goals: ANETTE    Progress made toward(s) clinical / shift goals: Patient remains intubated, no change    Patient is not progressing towards the following goals: Patient remains on pressor support for BP management, no BM, and increase in NG output.       Problem: Hemodynamics  Goal: Patient's hemodynamics, fluid balance and neurologic status will be stable or improve  Outcome: Not Progressing     Problem: Respiratory  Goal: Patient will achieve/maintain optimum respiratory ventilation and gas exchange  Outcome: Progressing    Problem: Risk for Aspiration  Goal: Patient's risk for aspiration will be absent or decrease  Outcome: Progressing

## 2022-10-28 ENCOUNTER — APPOINTMENT (OUTPATIENT)
Dept: CARDIOLOGY | Facility: MEDICAL CENTER | Age: 57
DRG: 870 | End: 2022-10-28
Attending: INTERNAL MEDICINE
Payer: COMMERCIAL

## 2022-10-28 PROBLEM — I10 ESSENTIAL HYPERTENSION: Status: RESOLVED | Noted: 2018-10-31 | Resolved: 2022-10-28

## 2022-10-28 LAB
ALBUMIN SERPL BCP-MCNC: 1.8 G/DL (ref 3.2–4.9)
ALBUMIN SERPL BCP-MCNC: 1.8 G/DL (ref 3.2–4.9)
ALBUMIN/GLOB SERPL: 0.4 G/DL
ALP SERPL-CCNC: 132 U/L (ref 30–99)
ALP SERPL-CCNC: 154 U/L (ref 30–99)
ALT SERPL-CCNC: 12 U/L (ref 2–50)
ALT SERPL-CCNC: 8 U/L (ref 2–50)
ANION GAP SERPL CALC-SCNC: 13 MMOL/L (ref 7–16)
ANION GAP SERPL CALC-SCNC: 9 MMOL/L (ref 7–16)
AST SERPL-CCNC: 21 U/L (ref 12–45)
AST SERPL-CCNC: 27 U/L (ref 12–45)
BACTERIA FLD AEROBE CULT: NORMAL
BILIRUB CONJ SERPL-MCNC: 0.4 MG/DL (ref 0.1–0.5)
BILIRUB INDIRECT SERPL-MCNC: 0.3 MG/DL (ref 0–1)
BILIRUB SERPL-MCNC: 0.7 MG/DL (ref 0.1–1.5)
BILIRUB SERPL-MCNC: 0.7 MG/DL (ref 0.1–1.5)
BUN SERPL-MCNC: 37 MG/DL (ref 8–22)
BUN SERPL-MCNC: 38 MG/DL (ref 8–22)
CA-I SERPL-SCNC: 0.97 MMOL/L (ref 1.1–1.3)
CALCIUM SERPL-MCNC: 7.4 MG/DL (ref 8.4–10.2)
CALCIUM SERPL-MCNC: 7.8 MG/DL (ref 8.4–10.2)
CHLORIDE SERPL-SCNC: 115 MMOL/L (ref 96–112)
CHLORIDE SERPL-SCNC: 115 MMOL/L (ref 96–112)
CHOLEST SERPL-MCNC: 68 MG/DL (ref 100–199)
CO2 SERPL-SCNC: 20 MMOL/L (ref 20–33)
CO2 SERPL-SCNC: 24 MMOL/L (ref 20–33)
CREAT SERPL-MCNC: 1.47 MG/DL (ref 0.5–1.4)
CREAT SERPL-MCNC: 1.58 MG/DL (ref 0.5–1.4)
ERYTHROCYTE [DISTWIDTH] IN BLOOD BY AUTOMATED COUNT: 56 FL (ref 35.9–50)
GFR SERPLBLD CREATININE-BSD FMLA CKD-EPI: 51 ML/MIN/1.73 M 2
GFR SERPLBLD CREATININE-BSD FMLA CKD-EPI: 56 ML/MIN/1.73 M 2
GLOBULIN SER CALC-MCNC: 4.3 G/DL (ref 1.9–3.5)
GLUCOSE BLD STRIP.AUTO-MCNC: 110 MG/DL (ref 65–99)
GLUCOSE BLD STRIP.AUTO-MCNC: 120 MG/DL (ref 65–99)
GLUCOSE BLD STRIP.AUTO-MCNC: 134 MG/DL (ref 65–99)
GLUCOSE SERPL-MCNC: 134 MG/DL (ref 65–99)
GLUCOSE SERPL-MCNC: 141 MG/DL (ref 65–99)
GRAM STN SPEC: NORMAL
HCT VFR BLD AUTO: 38.4 % (ref 42–52)
HGB BLD-MCNC: 12.5 G/DL (ref 14–18)
INR PPP: 1.33 (ref 0.87–1.13)
LV EJECT FRACT  99904: 40
LV EJECT FRACT MOD 4C 99902: 64.59
MAGNESIUM SERPL-MCNC: 2 MG/DL (ref 1.5–2.5)
MAGNESIUM SERPL-MCNC: 2 MG/DL (ref 1.5–2.5)
MCH RBC QN AUTO: 33.4 PG (ref 27–33)
MCHC RBC AUTO-ENTMCNC: 32.6 G/DL (ref 33.7–35.3)
MCV RBC AUTO: 102.7 FL (ref 81.4–97.8)
PHOSPHATE SERPL-MCNC: 3.5 MG/DL (ref 2.5–4.5)
PHOSPHATE SERPL-MCNC: 4.6 MG/DL (ref 2.5–4.5)
PLATELET # BLD AUTO: 210 K/UL (ref 164–446)
PMV BLD AUTO: 11.3 FL (ref 9–12.9)
POTASSIUM SERPL-SCNC: 3.9 MMOL/L (ref 3.6–5.5)
POTASSIUM SERPL-SCNC: 4.2 MMOL/L (ref 3.6–5.5)
POTASSIUM SERPL-SCNC: 4.3 MMOL/L (ref 3.6–5.5)
PROT SERPL-MCNC: 6.1 G/DL (ref 6–8.2)
PROT SERPL-MCNC: 6.7 G/DL (ref 6–8.2)
PROTHROMBIN TIME: 15.9 SEC (ref 12–14.6)
RBC # BLD AUTO: 3.74 M/UL (ref 4.7–6.1)
SIGNIFICANT IND 70042: NORMAL
SITE SITE: NORMAL
SODIUM SERPL-SCNC: 148 MMOL/L (ref 135–145)
SODIUM SERPL-SCNC: 148 MMOL/L (ref 135–145)
SOURCE SOURCE: NORMAL
TRIGL SERPL-MCNC: 181 MG/DL (ref 0–149)
WBC # BLD AUTO: 6.6 K/UL (ref 4.8–10.8)

## 2022-10-28 PROCEDURE — 80053 COMPREHEN METABOLIC PANEL: CPT

## 2022-10-28 PROCEDURE — 93321 DOPPLER ECHO F-UP/LMTD STD: CPT | Mod: 26 | Performed by: INTERNAL MEDICINE

## 2022-10-28 PROCEDURE — 80076 HEPATIC FUNCTION PANEL: CPT

## 2022-10-28 PROCEDURE — 84100 ASSAY OF PHOSPHORUS: CPT

## 2022-10-28 PROCEDURE — 700117 HCHG RX CONTRAST REV CODE 255: Performed by: INTERNAL MEDICINE

## 2022-10-28 PROCEDURE — 700101 HCHG RX REV CODE 250: Performed by: INTERNAL MEDICINE

## 2022-10-28 PROCEDURE — A9270 NON-COVERED ITEM OR SERVICE: HCPCS | Performed by: INTERNAL MEDICINE

## 2022-10-28 PROCEDURE — 84132 ASSAY OF SERUM POTASSIUM: CPT

## 2022-10-28 PROCEDURE — 84478 ASSAY OF TRIGLYCERIDES: CPT

## 2022-10-28 PROCEDURE — 85027 COMPLETE CBC AUTOMATED: CPT

## 2022-10-28 PROCEDURE — 83735 ASSAY OF MAGNESIUM: CPT | Mod: 91

## 2022-10-28 PROCEDURE — 94760 N-INVAS EAR/PLS OXIMETRY 1: CPT

## 2022-10-28 PROCEDURE — 700102 HCHG RX REV CODE 250 W/ 637 OVERRIDE(OP): Performed by: INTERNAL MEDICINE

## 2022-10-28 PROCEDURE — 94799 UNLISTED PULMONARY SVC/PX: CPT

## 2022-10-28 PROCEDURE — 85610 PROTHROMBIN TIME: CPT

## 2022-10-28 PROCEDURE — 94003 VENT MGMT INPAT SUBQ DAY: CPT

## 2022-10-28 PROCEDURE — 700105 HCHG RX REV CODE 258: Performed by: INTERNAL MEDICINE

## 2022-10-28 PROCEDURE — 93308 TTE F-UP OR LMTD: CPT

## 2022-10-28 PROCEDURE — 82465 ASSAY BLD/SERUM CHOLESTEROL: CPT

## 2022-10-28 PROCEDURE — 99291 CRITICAL CARE FIRST HOUR: CPT | Performed by: INTERNAL MEDICINE

## 2022-10-28 PROCEDURE — 36415 COLL VENOUS BLD VENIPUNCTURE: CPT

## 2022-10-28 PROCEDURE — 700111 HCHG RX REV CODE 636 W/ 250 OVERRIDE (IP): Performed by: INTERNAL MEDICINE

## 2022-10-28 PROCEDURE — 93308 TTE F-UP OR LMTD: CPT | Mod: 26 | Performed by: INTERNAL MEDICINE

## 2022-10-28 PROCEDURE — 770022 HCHG ROOM/CARE - ICU (200)

## 2022-10-28 PROCEDURE — 80048 BASIC METABOLIC PNL TOTAL CA: CPT

## 2022-10-28 PROCEDURE — 93325 DOPPLER ECHO COLOR FLOW MAPG: CPT | Mod: 26 | Performed by: INTERNAL MEDICINE

## 2022-10-28 PROCEDURE — 82962 GLUCOSE BLOOD TEST: CPT | Mod: 91

## 2022-10-28 PROCEDURE — 82330 ASSAY OF CALCIUM: CPT

## 2022-10-28 RX ORDER — CALCIUM GLUCONATE 20 MG/ML
2 INJECTION, SOLUTION INTRAVENOUS ONCE
Status: COMPLETED | OUTPATIENT
Start: 2022-10-28 | End: 2022-10-28

## 2022-10-28 RX ORDER — SODIUM CHLORIDE, SODIUM LACTATE, POTASSIUM CHLORIDE, CALCIUM CHLORIDE 600; 310; 30; 20 MG/100ML; MG/100ML; MG/100ML; MG/100ML
INJECTION, SOLUTION INTRAVENOUS ONCE
Status: COMPLETED | OUTPATIENT
Start: 2022-10-28 | End: 2022-10-28

## 2022-10-28 RX ORDER — HEPARIN SODIUM 5000 [USP'U]/ML
5000 INJECTION, SOLUTION INTRAVENOUS; SUBCUTANEOUS EVERY 8 HOURS
Status: DISCONTINUED | OUTPATIENT
Start: 2022-10-28 | End: 2022-11-06

## 2022-10-28 RX ADMIN — NOREPINEPHRINE BITARTRATE 1 MCG/MIN: 1 INJECTION, SOLUTION, CONCENTRATE INTRAVENOUS at 07:33

## 2022-10-28 RX ADMIN — HEPARIN SODIUM 5000 UNITS: 5000 INJECTION, SOLUTION INTRAVENOUS; SUBCUTANEOUS at 13:56

## 2022-10-28 RX ADMIN — PIPERACILLIN AND TAZOBACTAM 4.5 G: 4; .5 INJECTION, POWDER, FOR SOLUTION INTRAVENOUS at 05:36

## 2022-10-28 RX ADMIN — MINERAL OIL, PETROLATUM 1 APPLICATION: 425; 573 OINTMENT OPHTHALMIC at 10:09

## 2022-10-28 RX ADMIN — MINERAL OIL, PETROLATUM 1 APPLICATION: 425; 573 OINTMENT OPHTHALMIC at 21:30

## 2022-10-28 RX ADMIN — NYSTATIN 500000 UNITS: 100000 SUSPENSION ORAL at 21:18

## 2022-10-28 RX ADMIN — Medication 125 MCG/HR: at 21:51

## 2022-10-28 RX ADMIN — TOBRAMYCIN OPHTHALMIC SOLUTION 1 DROP: 3 SOLUTION/ DROPS OPHTHALMIC at 01:54

## 2022-10-28 RX ADMIN — FAMOTIDINE 20 MG: 10 INJECTION, SOLUTION INTRAVENOUS at 05:33

## 2022-10-28 RX ADMIN — CALCIUM GLUCONATE: 98 INJECTION, SOLUTION INTRAVENOUS at 19:51

## 2022-10-28 RX ADMIN — HEPARIN SODIUM 5000 UNITS: 5000 INJECTION, SOLUTION INTRAVENOUS; SUBCUTANEOUS at 21:19

## 2022-10-28 RX ADMIN — DEXTROSE AND SODIUM CHLORIDE: 5; 450 INJECTION, SOLUTION INTRAVENOUS at 07:35

## 2022-10-28 RX ADMIN — PIPERACILLIN AND TAZOBACTAM 4.5 G: 4; .5 INJECTION, POWDER, FOR SOLUTION INTRAVENOUS at 21:18

## 2022-10-28 RX ADMIN — DEXMEDETOMIDINE HYDROCHLORIDE 0.6 MCG/KG/HR: 4 INJECTION, SOLUTION INTRAVENOUS at 01:23

## 2022-10-28 RX ADMIN — TOBRAMYCIN OPHTHALMIC SOLUTION 1 DROP: 3 SOLUTION/ DROPS OPHTHALMIC at 21:29

## 2022-10-28 RX ADMIN — VASOPRESSIN 0.03 UNITS/MIN: 20 INJECTION INTRAVENOUS at 04:08

## 2022-10-28 RX ADMIN — Medication 200 MCG/HR: at 07:35

## 2022-10-28 RX ADMIN — MIDAZOLAM 3 MG/HR: 5 INJECTION INTRAMUSCULAR; INTRAVENOUS at 14:17

## 2022-10-28 RX ADMIN — PIPERACILLIN AND TAZOBACTAM 4.5 G: 4; .5 INJECTION, POWDER, FOR SOLUTION INTRAVENOUS at 14:07

## 2022-10-28 RX ADMIN — CALCIUM GLUCONATE 2 G: 20 INJECTION, SOLUTION INTRAVENOUS at 07:16

## 2022-10-28 RX ADMIN — FENTANYL CITRATE 200 MCG: 50 INJECTION, SOLUTION INTRAMUSCULAR; INTRAVENOUS at 21:54

## 2022-10-28 RX ADMIN — HUMAN ALBUMIN MICROSPHERES AND PERFLUTREN 3 ML: 10; .22 INJECTION, SOLUTION INTRAVENOUS at 12:45

## 2022-10-28 RX ADMIN — VASOPRESSIN 0.03 UNITS/MIN: 20 INJECTION INTRAVENOUS at 14:16

## 2022-10-28 RX ADMIN — TOBRAMYCIN OPHTHALMIC SOLUTION 1 DROP: 3 SOLUTION/ DROPS OPHTHALMIC at 10:09

## 2022-10-28 RX ADMIN — NYSTATIN 500000 UNITS: 100000 SUSPENSION ORAL at 17:12

## 2022-10-28 RX ADMIN — TOBRAMYCIN OPHTHALMIC SOLUTION 1 DROP: 3 SOLUTION/ DROPS OPHTHALMIC at 17:12

## 2022-10-28 RX ADMIN — MINERAL OIL, PETROLATUM 1 APPLICATION: 425; 573 OINTMENT OPHTHALMIC at 14:06

## 2022-10-28 RX ADMIN — FAMOTIDINE 20 MG: 10 INJECTION, SOLUTION INTRAVENOUS at 17:12

## 2022-10-28 RX ADMIN — TOBRAMYCIN OPHTHALMIC SOLUTION 1 DROP: 3 SOLUTION/ DROPS OPHTHALMIC at 05:33

## 2022-10-28 RX ADMIN — NYSTATIN 500000 UNITS: 100000 SUSPENSION ORAL at 12:47

## 2022-10-28 RX ADMIN — SODIUM CHLORIDE, POTASSIUM CHLORIDE, SODIUM LACTATE AND CALCIUM CHLORIDE 1000 ML: 600; 310; 30; 20 INJECTION, SOLUTION INTRAVENOUS at 09:10

## 2022-10-28 RX ADMIN — TOBRAMYCIN OPHTHALMIC SOLUTION 1 DROP: 3 SOLUTION/ DROPS OPHTHALMIC at 14:06

## 2022-10-28 ASSESSMENT — PAIN DESCRIPTION - PAIN TYPE
TYPE: ACUTE PAIN

## 2022-10-28 NOTE — PROGRESS NOTES
12 hour chart check complete.     Telemetry summary  Rhythm: afib  Rate:   Ectopy: occ pvcs  --/0.10/0.36

## 2022-10-28 NOTE — CARE PLAN
The patient is Watcher - Medium risk of patient condition declining or worsening    Shift Goals  Clinical Goals: VSS, monitor GI status  Patient Goals: ANETTE  Family Goals: ANETTE    Progress made toward(s) clinical / shift goals:  Able to wean patient off of precedex gtt, sedated to rass goal. Respiratory status protected on ventilator.     Patient is not progressing towards the following goals: No BM this shift, continued fecal matter output from NG tube    Problem: Knowledge Deficit - Standard  Goal: Patient and family/care givers will demonstrate understanding of plan of care, disease process/condition, diagnostic tests and medications  Outcome: Progressing     Problem: Risk for Aspiration  Goal: Patient's risk for aspiration will be absent or decrease  Outcome: Progressing     Problem: Pain - Standard  Goal: Alleviation of pain or a reduction in pain to the patient’s comfort goal  Outcome: Progressing     Problem: Mechanical Ventilation  Goal: Safe management of artificial airway and ventilation  Outcome: Progressing

## 2022-10-28 NOTE — DIETARY
"Nutrition Support Assessment:  Day 10 of admit.  Tyree Whiteside is a 57 y.o. male with admitting DX of Alcohol withdrawal delirium.     Current problem list:  Alcohol dependence with withdrawal complicated by delirium  ESAU  Essential hypertension   Atrial fibrillation   Liver mass   On mechanically assisted ventilation    Scabies   Acute respiratory failure with hypoxia  Pleural effusion  Sepsis due to Pseudomonas species   Other constipation    Assessment:  Estimated Nutritional Needs based on:   Height: 177.8 cm (5' 10\")  Weight: 85.3 kg (188 lb 0.8 oz)  Weight to Use in Calculations: 82.4 kg (181 lb 10.5 oz) (lowest wt this admit on 10/19; currently w/ BLE edema)  Body mass index is 26.98 kg/m²., BMI classification: Overweight    Calculation/Equation: MSJ x 1.2 = 1994 kcal/day  PSU (24-hour T-max 38.2, VE = 10.2L/min) = 2077 kcal/day   Kcal/day:  - 2177  (Calories / k - 26.4)  Grams protein/day: 92 - 107  (Grams Protein / k.1 - 1.3)     Evaluation:   Consult received from MD Abrams for nutrition recommendations for TPN start. Per MD notes, suspected ileus. NGT suction producing fecal-like material. BM 10/27 and 10/28, but pt's abdomen per flowsheets remains semi-firm, taut, rounded. TF not appropriate at this time; prolonged poor nutrition status this admit; TPN appropriate.  Labs: Na 148, Cl 115, POC glu x 24 hrs 110-126, glu 134, BUN 37, crea 1.47, GFR 56, calcium 7.8 (adjusted for alb 1.8 = 9.56, WNL),   MAR: D5 1/2NS, fentanyl, precedex, LR, lactulose (held), versed, levo @ 1 mcg/min, vaso @ 0.03 mcg/min  Dependent/generalized edema to BLEs; previously 1+ pitting to BLEs earlier this admit. I/Os +5.56L since admit.      Malnutrition Risk: ASPEN criteria not met     Recommendations/Plan:  TPN per PharmD. Consider above estimated nutrition needs.  Fluids per MD/PharmD.  Monitor weight trends.  Nutrition via GI route when safe/medically feasible.  RD to discontinue TF orders per " protocol.     RD continues to follow.

## 2022-10-28 NOTE — PROGRESS NOTES
"Pulmonary/Critical Care Progress Note    Date of admission  10/18/2022    Chief Complaint  57 y.o. male admitted 10/18/2022 with ETOH and required intubation.   Re intubated on 10/23/22    Hospital Course  From previous consult notes from Dr Garcia: \"58 yo male admitted on 10/19/2022 with ETOh withdrawal  PMHx: lives in a motel, drinks a pint of vodka a day (last drinl 10/7), prior hx of meth use, afib not on anticoagulation, liver mass on Us on August 2022 (2.  Solid hypoechoic mass within the right lobe of the liver measuring 1.7 x 1.3 x 1.0 cm in size. Follow-up pre and postcontrast multiphasic hepatic CT or MRI is recommended for further evaluation)  Received 10 mg ativan and 650 mg of phenobarb went into resp distress requiring intubation     10/19: started librium 25 mg tid; Rx of scabies\"  10/21: extubated at noon. On precedex drip.   10/22: Fevering. Altered. Gurgling upper airway. Started on Abx.   10/23: he was re intubated due to airway protection concerns, respiratory acidosis and AMS.    10/24: I am taking over the service today, no major events overnight. Patient underwent bronchoscopy with sampling of RLL and LLL. Copious secretions were found in geetha and all subsegments but more prevalent in RLL and LLL. Adjusted antibiotics later on the day due to pseudomonas growing in the respiratory culture. (On zosyn).   Stop scopolamine and robinol, decrease librium from 25mg tid to BID, decrease seroquel from 50mg bid to nightly. Will stop all narcotics and rely on fentanyl ggt alone    10/25: Antonio underwent diagnostic thoracentesis with 100 cc of pleural fluid sent to the lab, albumin gradient was less than 1.2 (0.9) which correlates with an exudate in the setting of use of recent diuretics.  Noted also that was a very high LDH at 1020 which correlates with an inflammatory process as well.  Based on this, he met lights criteria but not with protein.  Culture still pending.    10/26: Later during the night " yesterday he developed hypoglycemia and was treated with D50.  Overnight patient apparently woke up and extubated himself, he was trialed on high flow oxygen for 20 to 30 minutes but he started becoming tachypneic and getting tired for what he was reintubated around 3 AM.  Reintubation.  Was complicated by bradycardia and hypotension that required vasopressors.    10/27: Issues overnight: patient with abdominal distention per staff report, fecal like material coming out from the NG tube with large amounts. Pressors are coming down.    Interval Problem Update  Reviewed last 24 hour events:    Chart review from the past 24 hours includes imaging, laboratory studies, vital signs and notes available.  Pertinent data for today    10/28: patietn was found to have oral thrush, treated with nystatin, ressucitated with LR 1 lt again due to hypotension and high output from NG tube. Consulted pharmacy/nutritionist today for consideration of TPN, given that patient has not received any nutrition for days and NG tube with large output of stool and bile material about ~2 lt/day. CT abdomen, pelvis with no perforation but ileus, had a small BM on 10/28/22     *Pulmonary: Aspiration versus hospital-acquired pneumonia.  Was intubated on 10/23/2022 for respiratory acidosis and AMS. S/p bronch on 10/24/22, BAL from RLL and LLL.   Cxr today showed a moderate pleural effusion on the RIGHT but difficult to get access to the effusion.  Status post diagnostic thoracentesis on 10/25.  MSSA and pseudomonas grew from the sputum cx and also from bronch samples susceptible to Zosyn, Antitiobitcs changed to zosyn on 10/24.   Patient self extubated in the night from 10/25-10/26 and was reintubated shortly after.  Unclear if he aspirated but given his medical history we will extend the antibiotic therapy for 7 days.   Peak pressures 22, plateau 20, driving pressure 12. SBT today and will be aggressive with extubation as patient is  able.  *Cardiovascular: cardiomegaly on cxr, Hemodynamically hypotensive since reintubation, he continues to be on Levophed and vasopressin, although we are finally weaning it down, A. fib seems to be under rate control. Will obtain an echo to r.o new cardiac dysfuction given extended use of vasopressors.  *Nephrology: decrease urine output, renal function is recovering, abdominal pressures were below 20, resuscitate with ringer bolus today and IVF  *GI: Patient still has not had a bowel movement, has tried Relistor one-time, lactulose, and distended bowel regimen. Did not tolerate the tube feeds, is on intermittent suction and now with fecal like material coming out. CT abdomen, pelvis with no perforation but ileus, had a small BM on 10/28/22  Will place consult for TPN.  *Neuro: patient under effect of sedation by precedex, versed and fentanyl, but awakes and follows commands, withdraw with pain  *:I/Os: he is +8 L since admission.   *ID: Cultures grew MSSA and pseudomonas, Zosyn, . WBC coming down 22.9--> 16.9-->16.6-->8.  Fever is better controlled        Tubes, Lines, Drains: OGT, 2 PIVs, wheeler, CVC on right IJ, ET tube  Drips: Fentanyl gtt, Versed as needed  Nutrition: holding peptide 1.5 due to abdominal distention and high output bilious with fecal like material from NG, we are getting to the point of discussion about possible tpn  CXR: moderate R effusion. US at bedside showed the effusion but is difficult to obtain a proper position to perform a therapeutic thoracentesis, will evaluate daily  Antibiotics: Unasyn (started 10/22)--> zosyn 10/24  Steroids: none  Cultures: ordered 10/22 MSSA and pseudomonas  BM regimen: MiraLAX, senna-docusate, milk magnesia, bisacodyl, suppositories.      Review of Systems  Review of Systems   Unable to perform ROS: Intubated      Vital Signs for last 24 hours   Pulse:  [] 106  Resp:  [17-25] 19  BP: ()/(54-88) 109/88  SpO2:  [93 %-97 %] 97 %    Hemodynamic  parameters for last 24 hours  CVP:  [64 MM HG-65 MM HG] 64 MM HG    Respiratory Information for the last 24 hours  Vent Mode: APVCMV  Rate (breaths/min): 22  Vt Target (mL): 460  PEEP/CPAP: 8  P Support: 10  MAP: 12  Control VTE (exp VT): 476    Physical Exam   Physical Exam  Vitals and nursing note reviewed.   Constitutional:       General: He is not in acute distress.     Appearance: He is ill-appearing. He is not toxic-appearing.   HENT:      Head: Normocephalic and atraumatic.      Nose: Nose normal. No congestion.      Mouth/Throat:      Mouth: Mucous membranes are moist.      Comments: ETT in place  Eyes:      Pupils: Pupils are equal, round, and reactive to light.   Cardiovascular:      Rate and Rhythm: Regular rhythm. Tachycardia present.      Pulses: Normal pulses.      Heart sounds: No murmur heard.    No friction rub. No gallop.   Pulmonary:      Effort: Pulmonary effort is normal. No respiratory distress.      Breath sounds: No wheezing or rhonchi.   Abdominal:      General: Bowel sounds are normal. There is distension.      Palpations: There is no mass.      Tenderness: There is no abdominal tenderness. There is no guarding.      Comments: There is fecal like material coming out of the NG tube   Musculoskeletal:         General: Normal range of motion.      Cervical back: No rigidity.      Right lower leg: Edema present.      Left lower leg: Edema present.   Skin:     General: Skin is warm.      Capillary Refill: Capillary refill takes less than 2 seconds.           Medications  Current Facility-Administered Medications   Medication Dose Route Frequency Provider Last Rate Last Admin    artificial tears (EYE LUBRICANT) ophth ointment 1 Application  1 Application Both Eyes Q8HRS Chris Abrams M.D.   1 Application at 10/28/22 1406    MD Alert...TPN per Pharmacy   Other PHARMACY TO DOSE Chris Abrams M.D.        heparin injection 5,000 Units  5,000 Units Subcutaneous Q8HRS Chris Abrams  M.D.   5,000 Units at 10/28/22 1356    nystatin (MYCOSTATIN) 552722 UNIT/ML suspension 500,000 Units  5 mL Buccal 4X/DAY Chris Abrams M.D.   500,000 Units at 10/28/22 1712    TPN Adult Central Line   Intravenous TPN DAILY Chris Abrams M.D. 83 mL/hr at 10/28/22 1951 New Bag at 10/28/22 1951    famotidine (PEPCID) injection 20 mg  20 mg Intravenous BID Chris Abrams M.D.   20 mg at 10/28/22 1712    fentaNYL (SUBLIMAZE) injection 100 mcg  100 mcg Intravenous Q15 MIN PRN Chris Abrams M.D.        And    fentaNYL (SUBLIMAZE) injection 200 mcg  200 mcg Intravenous Q15 MIN PRN Chris Abrams M.D.        And    fentaNYL (SUBLIMAZE) 50 mcg/mL in 50mL (Continuous Infusion)   Intravenous Continuous hCris Abrams M.D. 3 mL/hr at 10/28/22 1044 150 mcg/hr at 10/28/22 1044    And    dexmedetomidine (PRECEDEX) 400 mcg/100mL NS premix infusion  0-1.5 mcg/kg/hr Intravenous Continuous Chris Abrams M.D.   Stopped at 10/28/22 0500    vasopressin (Vasostrict) 20 Units in  mL Infusion  0.03 Units/min Intravenous Continuous Chris Abrams M.D. 9 mL/hr at 10/28/22 1416 0.03 Units/min at 10/28/22 1416    midazolam (Versed) premix 125 mg/125 mL infusion  0-10 mg/hr Intravenous Continuous Chris Abrams M.D. 3 mL/hr at 10/28/22 1417 3 mg/hr at 10/28/22 1417    norepinephrine (Levophed) 8 mg in 250 mL NS infusion (premix)  0-30 mcg/min Intravenous Continuous Chris Abrams M.D. 1.9 mL/hr at 10/28/22 1100 1 mcg/min at 10/28/22 1100    tobramycin (TOBREX) 0.3 % ophthalmic solution 1 Drop  1 Drop Right Eye Q4HRS Chris Abrams M.D.   1 Drop at 10/28/22 1712    piperacillin-tazobactam (Zosyn) 4.5 g in  mL IVPB  4.5 g Intravenous Q8HRS Chris Abrams M.D.   Stopped at 10/28/22 1807    Respiratory Therapy Consult   Nebulization Continuous RT Greyson Luciano D.O.        lidocaine (XYLOCAINE) 1 % injection 2 mL  2 mL Tracheal Tube Q30 MIN PRN Greyson Luciano D.O.         lactulose 20 GM/30ML solution 30 mL  30 mL Enteral Tube TID Greyson Luciano D.O.   30 mL at 10/27/22 0536    Pharmacy Consult: Enteral tube insertion - review meds/change route/product selection   Other PHARMACY TO DOSE Greyson Luciano D.O.        carboxymethylcellulose (REFRESH TEARS) 0.5 % ophthalmic drops 2 Drop  2 Drop Both Eyes PRN Greyson Luciano D.O.   2 Drop at 10/27/22 1940    albuterol inhaler 2 Puff  2 Puff Inhalation Q6HRS PRN Eva Rodriguez M.D.        Pharmacy Consult Request ...Pain Management Review 1 Each  1 Each Other PHARMACY TO DOSE Eva Rodriguez M.D.        levothyroxine (SYNTHROID) tablet 50 mcg  50 mcg Enteral Tube AM ES Eva Rodriguez M.D.   50 mcg at 10/27/22 0537    senna-docusate (PERICOLACE or SENOKOT S) 8.6-50 MG per tablet 2 Tablet  2 Tablet Enteral Tube BID Eva Rodriguez M.D.   2 Tablet at 10/27/22 0537    And    polyethylene glycol/lytes (MIRALAX) PACKET 1 Packet  1 Packet Enteral Tube QDAY PRN Eva Rodriguez M.D.   1 Packet at 10/22/22 1721    And    magnesium hydroxide (MILK OF MAGNESIA) suspension 30 mL  30 mL Enteral Tube QDAY PRN Eva Rodriguez M.D.   30 mL at 10/24/22 1758    And    bisacodyl (DULCOLAX) suppository 10 mg  10 mg Rectal QDAY PRN Eva Rodriguez M.D.   10 mg at 10/26/22 1643    acetaminophen (Tylenol) tablet 650 mg  650 mg Enteral Tube Q6HRS PRN Asequinton Rodriguez M.D.   650 mg at 10/26/22 2257       Fluids    Intake/Output Summary (Last 24 hours) at 10/28/2022 1959  Last data filed at 10/28/2022 1800  Gross per 24 hour   Intake 1157.46 ml   Output 2350 ml   Net -1192.54 ml       Laboratory  Recent Labs     10/26/22  0223 10/26/22  0451   ISTATAPH 7.333* 7.354*   ISTATAPCO2 49.4* 44.7*   ISTATAPO2 104* 75   ISTATATCO2 28 26   [id number] 97 94   ISTATARTHCO3 26.2* 24.9   ISTATARTBE 0 -1   ISTATTEMP 103.1 F 102.9 F   ISTATFIO2 100 40   ISTATSPEC Arterial Arterial   ISTATAPHTC 7.297* 7.319*   [id number] 120* 88*         Recent Labs      10/27/22  0245 10/27/22  1030 10/28/22  0158 10/28/22  1148   SODIUM 147*  --  148* 148*   POTASSIUM 4.3  --  4.3  4.2 3.9   CHLORIDE 115*  --  115* 115*   CO2 20  --  20 24   BUN 31*  --  38* 37*   CREATININE 1.89*  --  1.58* 1.47*   MAGNESIUM  --  1.9 2.0 2.0   PHOSPHORUS  --  5.8* 4.6* 3.5   CALCIUM 7.4*  --  7.4* 7.8*     Recent Labs     10/27/22  0245 10/28/22  0158 10/28/22  1148   ALTSGPT  --  12 8   ASTSGOT  --  27 21   ALKPHOSPHAT  --  154* 132*   TBILIRUBIN  --  0.7 0.7   DBILIRUBIN  --  0.4  --    GLUCOSE 135* 141* 134*     Recent Labs     10/26/22  0324 10/27/22  0245 10/28/22  0158 10/28/22  1148   WBC 10.9* 8.2 6.6  --    NEUTSPOLYS 61.70 45.00  --   --    LYMPHOCYTES 13.50* 15.00*  --   --    MONOCYTES 13.50* 13.00  --   --    EOSINOPHILS 7.40* 3.00  --   --    BASOPHILS 1.10 1.00  --   --    ASTSGOT  --   --  27 21   ALTSGPT  --   --  12 8   ALKPHOSPHAT  --   --  154* 132*   TBILIRUBIN  --   --  0.7 0.7     Recent Labs     10/26/22  0324 10/27/22  0245 10/28/22  0158 10/28/22  0913   RBC 4.37* 4.34* 3.74*  --    HEMOGLOBIN 14.6 14.4 12.5*  --    HEMATOCRIT 44.9 43.8 38.4*  --    PLATELETCT 339 281 210  --    PROTHROMBTM  --   --   --  15.9*   INR  --   --   --  1.33*       Imaging  X-Ray:  My impression is: Cardiomegaly, moderate right-sided pleural effusion  Echo:   Reviewed    Echo 10/28/22  CONCLUSIONS  Compared to the images of the prior study 10/10/2022, there has been no   significant change. The right ventricular systolic pressure could not   be estimated on the prior exam.  Mildly reduced left ventricular systolic function.  The left ventricular ejection fraction is visually estimated to be 40%.  Global hypokinesis.  Diastolic function is difficult to assess with arrhythmia.  Reduced right ventricular systolic function.  Mild mitral regurgitation.  Moderate tricuspid regurgitation.  Estimated right ventricular systolic pressure is 55 mmHg.      Echo from 10/10/2022  CONCLUSIONS  Normal  left ventricular systolic function.  The right ventricle is mildly dilated.  Mild mitral regurgitation       Chest x-ray 10/20/2022  Cardiomegaly, moderate right-sided pleural effusion.     Chest x-ray 10/18/2022  Lungs: No consolidation detected.   Pleura:  No pleural space process is seen.   Heart and mediastinum: The cardiomediastinal contours are normal.   Age-indeterminate left ninth lateral rib fracture   IMPRESSION: No radiographic evidence of acute cardiopulmonary process.      Assessment/Plan  Sepsis due to Pseudomonas species (HCC)- (present on admission)  Assessment & Plan  This is Severe Sepsis Present on admission  SIRS criteria identified on my evaluation include: Fever, with temperature greater than 101 deg F, Tachycardia, with heart rate greater than 90 BPM, Tachypnea, with respirations greater than 20 per minute and Leukocytosis, with WBC greater than 12,000  Clinical indicators of end organ dysfunction include Systolic blood pressure (SBP) <90 mmHg or mean arterial pressure <65 mmHg and Acute respiratory failure as evidenced by a new need for invasive or non-invasive mechanical ventilation (Ventilator or BiPap)   Source is pneumonia by MSSA and pseudomonas  Sepsis protocol initiated  Crystalloid Fluid Administration: Patient has volume overload with >11 lt since admission, (NYHA class III or symptoms with minimal exertion, Or NYHA class IV or symptoms at rest or with activity), for this/these reason(s) it is unsafe for this patient to receive 30 mL/kg of fluid.  Partial Fluid Dose Given on 10/24, patient to be reassessed shortly after completion of partial fluid bolus to ensure adequacy of resuscitation  IV antibiotics as appropriate for source of sepsis  Reassessment: I have reassessed the patient's hemodynamic status  Will provide another partial bolus today and wean down the vasopressors    I examined the patient 10/26/2022 11:21 AM  Vital Signs:BP (!) 98/69   Pulse 98   Temp (!) 39.6 °C  "(103.3 °F) (Bladder)   Resp 17   Ht 1.778 m (5' 10\")   Wt 85.3 kg (188 lb 0.8 oz)   SpO2 97%   BMI 26.98 kg/m²   Cardiac examination significant for Tachycardia  Pulmonary examination significant for Clear lung fileds  Capillary refill is brisk  Peripheral Pulse is 2+   Skin is dusky     Patient is on levophed to keep MAP>65, also on vasopresin but finally weaning down levophed  Continue with antibiotics zosyn  To treat HAP MSSA and Pseudomonas  we will extend the antibiotic therapy to complete 7 days.         Acute respiratory failure with hypoxia (HCC)- (present on admission)  Assessment & Plan  -Intubated for respiratory acidosis and altered mental status with need for intubation on 10/23/2022  -Continue with mechanical ventilation with lung protective strategies.  -Cultures from sputum and BAL grew MSSA and pseudomonas, antibiotic changed to zosyn on the night of 10/24  -also complicated by right pleural effusion, s/p diagnostic thoracentesis.  -ABCDEF bundle    On mechanically assisted ventilation (HCC)  Assessment & Plan  -Patient intubation as above, for etoh withdrawal and delirium with resp depression,  -Patient was extubated on 10/21/2022.  -He was reintubated on 10/23/2022 due to respiratory acidosis and altered mental status.  -patient self extubated on the late night of 10/25 and re intubated shortly after  -Continued with lung protective strategies on mechanical ventilation.  -Continues with Precedex and fentanyl for sedation and pain with add on of versed pushes, if this is not enough will order a ggt  -ABCDEF bundle  Peak pressures 22, plateau 20, driving pressure 12. SBT today and will be aggressive with extubation as patient is able.    Pleural effusion- (present on admission)  Assessment & Plan  On the right, moderate per CXR of 10/24  bedside US showed a pleural effusion but difficult to access given position  S/p diagnostic thoracentesis 10/25/22: albumin gradient is less than 1.2 g/dl " "which indicate exudates, his was 0.9, also inflammative effusion with very high LDH 1020 (serum in the 240s range)  Awaiting results of pleural cultures NGTD  Once patient improves or if effusion gets larger, will perform a therapeutic thoracentesis      Atrial fibrillation (HCC)- (present on admission)  Assessment & Plan  Holding metoprolol due to hypotension  currently rate controlled.  Patient is not on chronic anticoagulation.  He was Given Amiodarone 150 mg x1 10/23 a.m.      Other constipation- (present on admission)  Assessment & Plan  Suspected due to chronic narcotic use  We have stopped the narcotics and left only fentanyl due to intubation status  Bowel regimen  KUB 10/25 reviewed with possible ileus  Fecal like material coming from NG tube,   ILS    Scabies- (present on admission)  Assessment & Plan  Rash very suspicious of scabies  S/p treatment  On contact precautions. Consider second treatment    ESAU (acute kidney injury) (HCC)- (present on admission)  Assessment & Plan  Patient with  New ESAU epidose on 10/27/22 with increase in Cr and decrease urine output although still present  Will ressuscitate with bolus of IVF  Avoid nephrotoxic agents  Follow up I/Os,  Consider early consultation with nephrology  Improving today    Liver mass- (present on admission)  Assessment & Plan  Seen on US 8/2022 \"right lobe of the liver measuring 1.7 x 1.3 x 1.0 cm in size\"  Will need CT versus MRI post extubation and follow up with GI as outpatient.        VTE:  Lovenox  Ulcer: H2 Antagonist  Lines: Central Line  Ongoing indication addressed    I have performed a physical exam and reviewed and updated ROS and Plan today (10/28/2022). In review of yesterday's note (10/27/2022), there are no changes except as documented above.     Discussed patient condition and risk of morbidity and/or mortality with RN, RT, Pharmacy, and Charge nurse / hot rounds  The patient remains critically ill.  Critical care time = 58 minutes in " directly providing and coordinating critical care and extensive data review.  No time overlap and excludes procedures.    Chris Abrams MD FACP  Pulmonary/Critical Care

## 2022-10-28 NOTE — FLOWSHEET NOTE
10/27/22 1824   Ventiliation   $ Ventilation - Subsequent Yes   Ventilator Management Group   Intensivist Group Yes   General Vent Information   Ventilator Number [id number]   Vent Mode APVCMV   Vent Alarms   Ventilation Alarms Reviewed / Activated Yes   Upper Pressure Limit Alarm 40   Lower Pressure Limit 10   Low VE Alarm 4   High Respiratory Rate Alarm 40   Low Respiratory Rate Alarm 10   Low VT Alarm 200   Apnea Parameters Checked / Activated Yes   Vent Settings   FiO2% 40 %   Rate (breaths/min) 22   Vent Temp HME Yes   Vt Target (mL) 460   PIP 23   TI (Seconds) 0.83   PEEP/CPAP 8   P Support 10   Pramp 200   Trigger Type Flow Trigger   Sensitivity Setting 5   Vent Readings   PIP 22    Minute Volume 10.3   Control VTE (exp VT) 475   Spontaneous VTE (exp VT) 507   f Total (Breaths/Min) 22   I:E Ratio 2.3   MAP 12   PEEP/CPAP MONITORED 8   Static Compliance (ml / cm H2O) 48   R exp 0.48   Plateau Pressure 17   VAP   Oral Care Oral Suctioning   Head of Bed Elevated Greater or equal to 30 degrees

## 2022-10-28 NOTE — CARE PLAN
The patient is Unstable - High likelihood or risk of patient condition declining or worsening    Shift Goals  Clinical Goals: VSS, monitor GI status  Patient Goals: ANETTE  Family Goals: ANETTE    Progress made toward(s) clinical / shift goals:  .    Patient is not progressing towards the following goals:      Problem: Knowledge Deficit - Standard  Goal: Patient and family/care givers will demonstrate understanding of plan of care, disease process/condition, diagnostic tests and medications  Outcome: Not Progressing     Problem: Lifestyle Changes  Goal: Patient's ability to identify lifestyle changes and available resources to help reduce recurrence of condition will improve  Outcome: Not Progressing

## 2022-10-28 NOTE — CARE PLAN
The patient is Unstable - High likelihood or risk of patient condition declining or worsening    Shift Goals  Clinical Goals: Have a bowel movement, titrate vasopressors down  Patient Goals: ANETTE  Family Goals: Get better    Progress made toward(s) clinical / shift goals:    Problem: Respiratory  Goal: Patient will achieve/maintain optimum respiratory ventilation and gas exchange  Outcome: Progressing  Note: Patient is on the ventilator 460 TV, PEEP 8, FiO2 40%, saturations are above 95%       Patient is not progressing towards the following goals:  Problem: Hemodynamics  Goal: Patient's hemodynamics, fluid balance and neurologic status will be stable or improve  Outcome: Not Progressing  Note: Patient titrated down to 3 mcg/ min of Levophed, Patient is on vasopressin, and levophed, pt is unable to maintain MAP of >65. Pt's urine output is decreased, pt has had 3050 cc out of NG tube with low intermittent suctioning

## 2022-10-28 NOTE — PROGRESS NOTES
Pharmacy TPN Day # 1       10/28/2022    Dosing Weight   85.3 kg TPN currently providing 50% of goal      TPN goal: 2131 kcal/day including 1 gm/kg/day Protein      TPN indication: ileus and prolonged NPO       Pertinent PMH: EtOH withdrawal, HAP w/ PSAR, ESAU, Afib, scabies (treated)  Temp (24hrs), Av.4 °C (99.3 °F), Min:37.4 °C (99.3 °F), Max:37.4 °C (99.3 °F)  .  Recent Labs     10/26/22  0324 10/27/22  0245 10/27/22  1030 10/28/22  0158   SODIUM 147* 147*  --  148*   POTASSIUM 3.7 4.3  --  4.3  4.2   CHLORIDE 112 115*  --  115*   CO2 23 20  --  20   BUN 19 31*  --  38*   CREATININE 1.35 1.89*  --  1.58*   GLUCOSE 89 135*  --  141*   CALCIUM 8.0* 7.4*  --  7.4*   ASTSGOT  --   --   --  27   ALTSGPT  --   --   --  12   ALBUMIN  --   --  1.6* 1.8*   TBILIRUBIN  --   --   --  0.7   PHOSPHORUS  --   --  5.8* 4.6*   MAGNESIUM  --   --  1.9 2.0     Accu-Checks  No results for input(s): POCGLUCOSE in the last 72 hours.    Vitals:    10/28/22 0800 10/28/22 0900 10/28/22 1000 10/28/22 1100   BP: (!) 88/57 (!) 87/64 (!) 86/60 (!) 78/54   Weight:       Height:           Intake/Output Summary (Last 24 hours) at 10/28/2022 1201  Last data filed at 10/28/2022 1057  Gross per 24 hour   Intake 2800.46 ml   Output 2565 ml   Net 235.46 ml       Orders Placed This Encounter   Procedures    Diet NPO Restrict to: With Tube Feed     Standing Status:   Standing     Number of Occurrences:   1     Order Specific Question:   Diet NPO Restrict to:     Answer:   With Tube Feed [4]    Diet: Diet Tube Feed; Formula: Impact Peptide 1.5 (Advance per protocol to goal rate. Advance 25ml/hr Q8 hours until goal is reached); Goal Rate (mL/Hour): 55; Duration: 24 HR; Tube Feed Time Unit: Hours/Day     Standing Status:   Standing     Number of Occurrences:   1     Order Specific Question:   Diet     Answer:   Diet Tube Feed [35]     Order Specific Question:   Formula:     Answer:   Impact Peptide 1.5     Comments:   Advance per protocol to goal  rate. Advance 25ml/hr Q8 hours until goal is reached     Order Specific Question:   Goal Rate (mL/Hour)     Answer:   55     Order Specific Question:   Route     Answer:   OG     Order Specific Question:   Duration     Answer:   24 HR     Order Specific Question:   Tube Feed Time Unit     Answer:   Hours/Day         TPN for past 72 hours (Show up to 3 orders; newest on the left.)       Start date and time    10/28/2022 2000      TPN Adult Central Line [[id number]]    Order Status  Active    Frequency  TPN DAILY       Base    Clinisol  42 g    dextrose 70%  185 g    fat emulsion (soy) 20%  27 g       Additives    potassium chloride  40 mEq    sodium acetate  100 mEq    magnesium sulfate  8 mEq    calcium GLUConate  4.65 mEq    M.T.E.-4 Tralement  1 mL    M.V.I. Adult  10 mL    thiamine  100 mg    folic acid  1 mg       QS Base    sterile water  1,226.53 mL       Energy Contribution    Proteins  168 kcal    Dextrose  629 kcal    Lipids  270 kcal    Total  1,067 kcal       Electrolyte Ion Calculated Amount    Sodium  100 mEq    Potassium  40 mEq    Calcium  4.65 mEq    Magnesium  8 mEq    Aluminum  70 mEq    Phosphate  --    Chloride  40 mEq    Acetate  135.56 mEq    Chloride: Acetate Ratio  0.295       Other    Total Amino Acid  42 g    Total Amino Acid/kg  0.49 g/kg    Glucose Infusion Rate  1.5 mg/kg/min    Volume  2,000 mL    Rate  83 mL/hr    Dosing Weight  85.3 kg    Infusion Site  [facility]              This formula provides:  % kcal as lipids = 25  Grams protein/kg = 1  Non-protein calories = 896  Kcals/kg = 25  Total daily calories = 1065    Comments:  Patient w/ ileus and multiple days NPO w/ poor baseline nutritional state. Chronic ETOHism. Patient went into EtOH withdrawal inpatient and eventually was intubated and developed a HAP w/ PSAR and MSSA. NGT no longer able to administer tube feeds/ suctioning fecal matter like material. Will start TPN at 50% goal w/ low sodium considering hypernatremic state  currently and no phos given hyperphosphatemia.       Bill RamiresD.

## 2022-10-28 NOTE — FLOWSHEET NOTE
10/27/22 1819   Vital Signs   Pulse 84   Respiration (!) 21   Pulse Oximetry 99 %   $ Pulse Oximetry (Spot Check) Yes   O2 Alarms Set & Reviewed Yes   CO2 Monitoring   EtCO2 Calibration Yes   ETCO2 (mmHg) 30   $ ETCO2 Monitoring Yes   Chest Exam   Work Of Breathing / Effort Vented   Breath Sounds   RUL Breath Sounds Clear;Diminished   RML Breath Sounds Diminished;Clear   RLL Breath Sounds Clear;Diminished   NAHOMI Breath Sounds Diminished;Clear   LLL Breath Sounds Clear;Diminished   Secretions   Cough Productive   How Sputum Obtained Oropharyngeal;Endotracheal   Sputum Amount Small   Sputum Color White   Sputum Consistency Thick   Airways   Airway LDA Airway   Airway ETT 7.5   Placement Date/Time: 10/23/22 0912   Airway Type: ETT  Airway Size: 7.5  Inserted In: Unit  Inserted by: MD   Site Assessment Clean;Dry;Intact   Airway Tube Secured Commercial securing device   Secured At  (cm) 24   Difficult Intubation? Yes   Tube Repositioned Right   Cuffless No   Cuff Pressure cmH2O (R.C.) 28   Oxygen   FiO2% 40 %   O2 Delivery Device Ventilator   Resuscitation Device at Bedside Self Inflating Bag

## 2022-10-29 LAB
ALBUMIN SERPL BCP-MCNC: 1.9 G/DL (ref 3.2–4.9)
ALBUMIN/GLOB SERPL: 0.4 G/DL
ALP SERPL-CCNC: 110 U/L (ref 30–99)
ALT SERPL-CCNC: 11 U/L (ref 2–50)
ANION GAP SERPL CALC-SCNC: 8 MMOL/L (ref 7–16)
AST SERPL-CCNC: 25 U/L (ref 12–45)
BILIRUB SERPL-MCNC: 0.6 MG/DL (ref 0.1–1.5)
BUN SERPL-MCNC: 32 MG/DL (ref 8–22)
C DIFF DNA SPEC QL NAA+PROBE: NEGATIVE
C DIFF TOX GENS STL QL NAA+PROBE: NEGATIVE
CA-I SERPL-SCNC: 1.1 MMOL/L (ref 1.1–1.3)
CALCIUM SERPL-MCNC: 7.6 MG/DL (ref 8.4–10.2)
CHLORIDE SERPL-SCNC: 115 MMOL/L (ref 96–112)
CO2 SERPL-SCNC: 25 MMOL/L (ref 20–33)
CREAT SERPL-MCNC: 0.97 MG/DL (ref 0.5–1.4)
GFR SERPLBLD CREATININE-BSD FMLA CKD-EPI: 92 ML/MIN/1.73 M 2
GLOBULIN SER CALC-MCNC: 4.4 G/DL (ref 1.9–3.5)
GLUCOSE BLD STRIP.AUTO-MCNC: 111 MG/DL (ref 65–99)
GLUCOSE BLD STRIP.AUTO-MCNC: 123 MG/DL (ref 65–99)
GLUCOSE BLD STRIP.AUTO-MCNC: 130 MG/DL (ref 65–99)
GLUCOSE BLD STRIP.AUTO-MCNC: 71 MG/DL (ref 65–99)
GLUCOSE SERPL-MCNC: 123 MG/DL (ref 65–99)
MAGNESIUM SERPL-MCNC: 2.1 MG/DL (ref 1.5–2.5)
MAGNESIUM SERPL-MCNC: 2.1 MG/DL (ref 1.5–2.5)
PHOSPHATE SERPL-MCNC: 1.9 MG/DL (ref 2.5–4.5)
PHOSPHATE SERPL-MCNC: 2.5 MG/DL (ref 2.5–4.5)
POTASSIUM SERPL-SCNC: 3.7 MMOL/L (ref 3.6–5.5)
POTASSIUM SERPL-SCNC: 3.7 MMOL/L (ref 3.6–5.5)
PROT SERPL-MCNC: 6.3 G/DL (ref 6–8.2)
SODIUM SERPL-SCNC: 148 MMOL/L (ref 135–145)

## 2022-10-29 PROCEDURE — 36592 COLLECT BLOOD FROM PICC: CPT

## 2022-10-29 PROCEDURE — 700101 HCHG RX REV CODE 250: Performed by: INTERNAL MEDICINE

## 2022-10-29 PROCEDURE — 700111 HCHG RX REV CODE 636 W/ 250 OVERRIDE (IP): Performed by: INTERNAL MEDICINE

## 2022-10-29 PROCEDURE — 770022 HCHG ROOM/CARE - ICU (200)

## 2022-10-29 PROCEDURE — 84100 ASSAY OF PHOSPHORUS: CPT | Mod: 91

## 2022-10-29 PROCEDURE — 80053 COMPREHEN METABOLIC PANEL: CPT

## 2022-10-29 PROCEDURE — 700105 HCHG RX REV CODE 258: Performed by: INTERNAL MEDICINE

## 2022-10-29 PROCEDURE — 94150 VITAL CAPACITY TEST: CPT

## 2022-10-29 PROCEDURE — 82962 GLUCOSE BLOOD TEST: CPT | Mod: 91

## 2022-10-29 PROCEDURE — 83735 ASSAY OF MAGNESIUM: CPT | Mod: 91

## 2022-10-29 PROCEDURE — 82330 ASSAY OF CALCIUM: CPT

## 2022-10-29 PROCEDURE — 84132 ASSAY OF SERUM POTASSIUM: CPT

## 2022-10-29 PROCEDURE — 99291 CRITICAL CARE FIRST HOUR: CPT | Performed by: INTERNAL MEDICINE

## 2022-10-29 PROCEDURE — 700102 HCHG RX REV CODE 250 W/ 637 OVERRIDE(OP): Performed by: HOSPITALIST

## 2022-10-29 PROCEDURE — 87493 C DIFF AMPLIFIED PROBE: CPT

## 2022-10-29 PROCEDURE — A9270 NON-COVERED ITEM OR SERVICE: HCPCS | Performed by: INTERNAL MEDICINE

## 2022-10-29 PROCEDURE — 94799 UNLISTED PULMONARY SVC/PX: CPT

## 2022-10-29 PROCEDURE — 700102 HCHG RX REV CODE 250 W/ 637 OVERRIDE(OP): Performed by: INTERNAL MEDICINE

## 2022-10-29 PROCEDURE — 94003 VENT MGMT INPAT SUBQ DAY: CPT

## 2022-10-29 PROCEDURE — A9270 NON-COVERED ITEM OR SERVICE: HCPCS | Performed by: HOSPITALIST

## 2022-10-29 RX ORDER — SODIUM CHLORIDE, SODIUM LACTATE, POTASSIUM CHLORIDE, AND CALCIUM CHLORIDE .6; .31; .03; .02 G/100ML; G/100ML; G/100ML; G/100ML
1000 INJECTION, SOLUTION INTRAVENOUS ONCE
Status: COMPLETED | OUTPATIENT
Start: 2022-10-29 | End: 2022-10-29

## 2022-10-29 RX ADMIN — FENTANYL CITRATE 100 MCG: 50 INJECTION, SOLUTION INTRAMUSCULAR; INTRAVENOUS at 01:43

## 2022-10-29 RX ADMIN — Medication 100 MCG/HR: at 18:53

## 2022-10-29 RX ADMIN — HEPARIN SODIUM 5000 UNITS: 5000 INJECTION, SOLUTION INTRAVENOUS; SUBCUTANEOUS at 22:51

## 2022-10-29 RX ADMIN — FAMOTIDINE 20 MG: 10 INJECTION, SOLUTION INTRAVENOUS at 05:13

## 2022-10-29 RX ADMIN — SODIUM CHLORIDE, POTASSIUM CHLORIDE, SODIUM LACTATE AND CALCIUM CHLORIDE 1000 ML: 600; 310; 30; 20 INJECTION, SOLUTION INTRAVENOUS at 09:34

## 2022-10-29 RX ADMIN — NYSTATIN 500000 UNITS: 100000 SUSPENSION ORAL at 22:53

## 2022-10-29 RX ADMIN — MINERAL OIL, PETROLATUM 1 APPLICATION: 425; 573 OINTMENT OPHTHALMIC at 05:14

## 2022-10-29 RX ADMIN — NYSTATIN 500000 UNITS: 100000 SUSPENSION ORAL at 17:09

## 2022-10-29 RX ADMIN — NYSTATIN 500000 UNITS: 100000 SUSPENSION ORAL at 08:37

## 2022-10-29 RX ADMIN — PIPERACILLIN AND TAZOBACTAM 4.5 G: 4; .5 INJECTION, POWDER, FOR SOLUTION INTRAVENOUS at 05:13

## 2022-10-29 RX ADMIN — MINERAL OIL, PETROLATUM 1 APPLICATION: 425; 573 OINTMENT OPHTHALMIC at 22:51

## 2022-10-29 RX ADMIN — FENTANYL CITRATE 100 MCG: 50 INJECTION, SOLUTION INTRAMUSCULAR; INTRAVENOUS at 05:51

## 2022-10-29 RX ADMIN — TOBRAMYCIN OPHTHALMIC SOLUTION 1 DROP: 3 SOLUTION/ DROPS OPHTHALMIC at 22:51

## 2022-10-29 RX ADMIN — PIPERACILLIN AND TAZOBACTAM 4.5 G: 4; .5 INJECTION, POWDER, FOR SOLUTION INTRAVENOUS at 13:54

## 2022-10-29 RX ADMIN — HEPARIN SODIUM 5000 UNITS: 5000 INJECTION, SOLUTION INTRAVENOUS; SUBCUTANEOUS at 13:51

## 2022-10-29 RX ADMIN — FAMOTIDINE 20 MG: 10 INJECTION, SOLUTION INTRAVENOUS at 17:08

## 2022-10-29 RX ADMIN — HEPARIN SODIUM 5000 UNITS: 5000 INJECTION, SOLUTION INTRAVENOUS; SUBCUTANEOUS at 05:13

## 2022-10-29 RX ADMIN — VASOPRESSIN 0.03 UNITS/MIN: 20 INJECTION INTRAVENOUS at 01:25

## 2022-10-29 RX ADMIN — TOBRAMYCIN OPHTHALMIC SOLUTION 1 DROP: 3 SOLUTION/ DROPS OPHTHALMIC at 10:23

## 2022-10-29 RX ADMIN — LEVOTHYROXINE SODIUM 50 MCG: 50 TABLET ORAL at 05:13

## 2022-10-29 RX ADMIN — TOBRAMYCIN OPHTHALMIC SOLUTION 1 DROP: 3 SOLUTION/ DROPS OPHTHALMIC at 17:09

## 2022-10-29 RX ADMIN — NYSTATIN 500000 UNITS: 100000 SUSPENSION ORAL at 13:51

## 2022-10-29 RX ADMIN — PIPERACILLIN AND TAZOBACTAM 4.5 G: 4; .5 INJECTION, POWDER, FOR SOLUTION INTRAVENOUS at 22:58

## 2022-10-29 RX ADMIN — TOBRAMYCIN OPHTHALMIC SOLUTION 1 DROP: 3 SOLUTION/ DROPS OPHTHALMIC at 05:14

## 2022-10-29 RX ADMIN — TOBRAMYCIN OPHTHALMIC SOLUTION 1 DROP: 3 SOLUTION/ DROPS OPHTHALMIC at 14:10

## 2022-10-29 RX ADMIN — TOBRAMYCIN OPHTHALMIC SOLUTION 1 DROP: 3 SOLUTION/ DROPS OPHTHALMIC at 02:00

## 2022-10-29 RX ADMIN — MINERAL OIL, PETROLATUM 1 APPLICATION: 425; 573 OINTMENT OPHTHALMIC at 14:10

## 2022-10-29 RX ADMIN — POTASSIUM PHOSPHATE, MONOBASIC AND POTASSIUM PHOSPHATE, DIBASIC 30 MMOL: 224; 236 INJECTION, SOLUTION, CONCENTRATE INTRAVENOUS at 10:27

## 2022-10-29 ASSESSMENT — PULMONARY FUNCTION TESTS: FVC: 0

## 2022-10-29 ASSESSMENT — PAIN DESCRIPTION - PAIN TYPE
TYPE: ACUTE PAIN

## 2022-10-29 NOTE — PROGRESS NOTES
No UOP  over night or this morning noted.  Bladder scan done with > 600 mls.  Wheeler discontinued and replaced.  Large amts of sediment noted on the wheeler cath obstructing urine out put.  Dr. Abrams updated.

## 2022-10-29 NOTE — PROGRESS NOTES
12-hour chart check complete.    Monitor Summary  Rhythm: Afib  Rate: 90s-110s  Ectopy: oPVCs  Measurements: -/.10/-

## 2022-10-29 NOTE — CARE PLAN
The patient is Unstable - High likelihood or risk of patient condition declining or worsening    Shift Goals  Clinical Goals: Maintain O2 sats>92%, maintain RASS of -2  Patient Goals: ANETTE  Family Goals: ANETTE    Progress made toward(s) clinical / shift goals:  .    Patient is not progressing towards the following goals:      Problem: Knowledge Deficit - Standard  Goal: Patient and family/care givers will demonstrate understanding of plan of care, disease process/condition, diagnostic tests and medications  Outcome: Not Progressing

## 2022-10-29 NOTE — CARE PLAN
Problem: Skin Integrity  Goal: Skin integrity is maintained or improved  Outcome: Progressing     Problem: Fall Risk  Goal: Patient will remain free from falls  Outcome: Progressing   The patient is Unstable - High likelihood or risk of patient condition declining or worsening    Shift Goals  Clinical Goals: Maintain O2 sats>92%, maintain RASS of -2  Patient Goals: ANETTE  Family Goals: ANETTE    Progress made toward(s) clinical / shift goals: Pt on the ventilator. RASS maintained at -2 with pain medication and sedative support    Patient is not progressing towards the following goals:

## 2022-10-29 NOTE — PROGRESS NOTES
"Pulmonary/Critical Care Progress Note    Date of admission  10/18/2022    Chief Complaint  57 y.o. male admitted 10/18/2022 with ETOH and required intubation.   Re intubated on 10/23/22    Hospital Course  From previous consult notes from Dr Garcia: \"58 yo male admitted on 10/19/2022 with ETOh withdrawal  PMHx: lives in a motel, drinks a pint of vodka a day (last drinl 10/7), prior hx of meth use, afib not on anticoagulation, liver mass on Us on August 2022 (2.  Solid hypoechoic mass within the right lobe of the liver measuring 1.7 x 1.3 x 1.0 cm in size. Follow-up pre and postcontrast multiphasic hepatic CT or MRI is recommended for further evaluation)  Received 10 mg ativan and 650 mg of phenobarb went into resp distress requiring intubation     10/19: started librium 25 mg tid; Rx of scabies\"  10/21: extubated at noon. On precedex drip.   10/22: Fevering. Altered. Gurgling upper airway. Started on Abx.   10/23: he was re intubated due to airway protection concerns, respiratory acidosis and AMS.    10/24: I am taking over the service today, no major events overnight. Patient underwent bronchoscopy with sampling of RLL and LLL. Copious secretions were found in geetha and all subsegments but more prevalent in RLL and LLL. Adjusted antibiotics later on the day due to pseudomonas growing in the respiratory culture. (On zosyn).   Stop scopolamine and robinol, decrease librium from 25mg tid to BID, decrease seroquel from 50mg bid to nightly. Will stop all narcotics and rely on fentanyl ggt alone    10/25: Antonio underwent diagnostic thoracentesis with 100 cc of pleural fluid sent to the lab, albumin gradient was less than 1.2 (0.9) which correlates with an exudate in the setting of use of recent diuretics.  Noted also that was a very high LDH at 1020 which correlates with an inflammatory process as well.  Based on this, he met lights criteria but not with protein.  Culture still pending.    10/26: Later during the night " yesterday he developed hypoglycemia and was treated with D50.  Overnight patient apparently woke up and extubated himself, he was trialed on high flow oxygen for 20 to 30 minutes but he started becoming tachypneic and getting tired for what he was reintubated around 3 AM.  Reintubation.  Was complicated by bradycardia and hypotension that required vasopressors.    10/27: Issues overnight: patient with abdominal distention per staff report, fecal like material coming out from the NG tube with large amounts. Pressors are coming down.    10/28: patimaryuri was found to have oral thrush, treated with nystatin, ressucitated with LR 1 lt again due to hypotension and high output from NG tube. Consulted pharmacy/nutritionist today for consideration of TPN, given that patient has not received any nutrition for days and NG tube with large output of stool and bile material about ~2 lt/day. CT abdomen, pelvis with no perforation but ileus, had a small BM on 10/28/22    Interval Problem Update  Reviewed last 24 hour events:    Chart review from the past 24 hours includes imaging, laboratory studies, vital signs and notes available.  Pertinent data for today    10/29:   he is doing well today, off pressors,  Noted that he had dense sediment in the wheelre and was not voiding, wheeler changed today with large urine output.   Following commands and did well on SBT but low NIF  Working on extubation  His NG output has decreased and he is making stool but no longer coming out from NG.   He is on TPN, will hold next bag with hopes that we can use the gut with tube feeds     *Pulmonary: Aspiration versus hospital-acquired pneumonia.  Was intubated on 10/23/2022 for respiratory acidosis and AMS. S/p bronch on 10/24/22, BAL from RLL and LLL.   Cxr today showed a moderate pleural effusion on the RIGHT but difficult to get access to the effusion.  Status post diagnostic thoracentesis on 10/25.  MSSA and pseudomonas grew from the sputum cx and also  from bronch samples susceptible to Zosyn, Antitiobitcs changed to zosyn on 10/24.   Patient self extubated in the night from 10/25-10/26 and was reintubated shortly after.  Unclear if he aspirated but given his medical history we will extend the antibiotic therapy for 7 days.   SBT today and will be aggressive with extubation as patient is able.  *Cardiovascular: cardiomegaly on cxr, echo EF 40%, RV dysfuction, finally off pressors, A. fib seems to be under rate control. Reviewed new echo.  *Nephrology: urine output much better with new wheeler, renal function is recovering, abdominal pressures were below 20,   *GI: Patient finally with Bms, can hold next bag of TPN. Hopes to either extubate and/or use tube feeds  *Neuro: patient under effect of sedation by precedex, versed and fentanyl, but awakes and follows commands, withdraw with pain  *ID: Cultures grew MSSA and pseudomonas, Zosyn, . WBC coming down 22.9--> 16.9-->16.6-->8.  Fever is gone.        Tubes, Lines, Drains: OGT, 2 PIVs, wheeler, CVC on right IJ, ET tube  Drips: Fentanyl gtt, Versed as needed  Nutrition: holding peptide 1.5 due to abdominal distention and high output bilious with fecal like material from NG, started tpn  CXR: moderate R effusion. US at bedside showed the effusion but is difficult to obtain a proper position to perform a therapeutic thoracentesis, will evaluate daily  Antibiotics: Unasyn (started 10/22)--> zosyn 10/24  Steroids: none  Cultures: ordered 10/22 MSSA and pseudomonas  BM regimen: MiraLAX, senna-docusate, milk magnesia, bisacodyl, suppositories.      Review of Systems  Review of Systems   Unable to perform ROS: Intubated      Vital Signs for last 24 hours   Temp:  [36.1 °C (97 °F)-37 °C (98.6 °F)] 36.5 °C (97.7 °F)  Pulse:  [] 107  Resp:  [0-37] 15  BP: ()/(61-95) 109/77  SpO2:  [83 %-100 %] 100 %    Hemodynamic parameters for last 24 hours  CVP:  [64 MM HG-65 MM HG] 65 MM HG    Respiratory Information for the last  24 hours  Vent Mode: APVCMV  Rate (breaths/min): 22  Vt Target (mL): 460  PEEP/CPAP: 8  P Support: 5  MAP: 24  Length of Weaning Trial (Hours): 1  Control VTE (exp VT): 638    Physical Exam   Physical Exam  Vitals and nursing note reviewed.   Constitutional:       General: He is not in acute distress.     Appearance: He is ill-appearing. He is not toxic-appearing.   HENT:      Head: Normocephalic and atraumatic.      Nose: Nose normal. No congestion.      Mouth/Throat:      Mouth: Mucous membranes are moist.      Comments: ETT in place  Eyes:      Pupils: Pupils are equal, round, and reactive to light.   Cardiovascular:      Rate and Rhythm: Regular rhythm. Tachycardia present.      Pulses: Normal pulses.      Heart sounds: No murmur heard.    No friction rub. No gallop.   Pulmonary:      Effort: Pulmonary effort is normal. No respiratory distress.      Breath sounds: No wheezing or rhonchi.   Abdominal:      General: Bowel sounds are normal. There is distension.      Palpations: There is no mass.      Tenderness: There is no abdominal tenderness. There is no guarding.   Musculoskeletal:         General: Normal range of motion.      Cervical back: No rigidity.      Right lower leg: Edema present.      Left lower leg: Edema present.   Skin:     General: Skin is warm.      Capillary Refill: Capillary refill takes less than 2 seconds.           Medications  Current Facility-Administered Medications   Medication Dose Route Frequency Provider Last Rate Last Admin    Pharmacy Consult Request  1 Each Other PHARMACY TO DOSE Chris Abrams M.D.        artificial tears (EYE LUBRICANT) ophth ointment 1 Application  1 Application Both Eyes Q8HRS Chris Abrams M.D.   1 Application at 10/29/22 1410    MD Alert...TPN per Pharmacy   Other PHARMACY TO DOSE Chris Abrams M.D.        heparin injection 5,000 Units  5,000 Units Subcutaneous Q8HRS Chris Abrams M.D.   5,000 Units at 10/29/22 1351    nystatin  (MYCOSTATIN) 384151 UNIT/ML suspension 500,000 Units  5 mL Buccal 4X/DAY Chris Abrams M.D.   500,000 Units at 10/29/22 1709    TPN Adult Central Line   Intravenous TPN DAILY Chris Abrams M.D. 83 mL/hr at 10/28/22 1951 New Bag at 10/28/22 1951    famotidine (PEPCID) injection 20 mg  20 mg Intravenous BID Chris Abrams M.D.   20 mg at 10/29/22 1708    fentaNYL (SUBLIMAZE) injection 100 mcg  100 mcg Intravenous Q15 MIN PRN Chris Abrams M.D.   100 mcg at 10/29/22 0551    And    fentaNYL (SUBLIMAZE) injection 200 mcg  200 mcg Intravenous Q15 MIN PRN Chris Abrams M.D.   200 mcg at 10/28/22 2154    And    fentaNYL (SUBLIMAZE) 50 mcg/mL in 50mL (Continuous Infusion)   Intravenous Continuous Chris Abrams M.D. 2 mL/hr at 10/29/22 1853 100 mcg/hr at 10/29/22 1853    And    dexmedetomidine (PRECEDEX) 400 mcg/100mL NS premix infusion  0-1.5 mcg/kg/hr Intravenous Continuous Chris Abrams M.D.   Stopped at 10/28/22 0500    midazolam (Versed) premix 125 mg/125 mL infusion  0-10 mg/hr Intravenous Continuous Chris Abrams M.D. 3 mL/hr at 10/29/22 0936 3 mg/hr at 10/29/22 0936    tobramycin (TOBREX) 0.3 % ophthalmic solution 1 Drop  1 Drop Right Eye Q4HRS Chris Abrams M.D.   1 Drop at 10/29/22 1709    piperacillin-tazobactam (Zosyn) 4.5 g in  mL IVPB  4.5 g Intravenous Q8HRS Chrsi Abrams M.D. 25 mL/hr at 10/29/22 1354 4.5 g at 10/29/22 1354    Respiratory Therapy Consult   Nebulization Continuous RT Greyson Luciano D.O.        lidocaine (XYLOCAINE) 1 % injection 2 mL  2 mL Tracheal Tube Q30 MIN PRN Greyson Luciano D.O.        lactulose 20 GM/30ML solution 30 mL  30 mL Enteral Tube TID Greyson Luciano D.O.   30 mL at 10/27/22 0536    Pharmacy Consult: Enteral tube insertion - review meds/change route/product selection   Other PHARMACY TO DOSE Greyson Luciano D.O.        carboxymethylcellulose (REFRESH TEARS) 0.5 % ophthalmic drops 2 Drop  2  Drop Both Eyes PRN Greyson Luciano D.O.   2 Drop at 10/27/22 1940    albuterol inhaler 2 Puff  2 Puff Inhalation Q6HRS PRN Eva Rodriguez M.D.        Pharmacy Consult Request ...Pain Management Review 1 Each  1 Each Other PHARMACY TO DOSE Eva Rodriguez M.D.        levothyroxine (SYNTHROID) tablet 50 mcg  50 mcg Enteral Tube AM ES Eva Rodriguez M.D.   50 mcg at 10/29/22 0513    acetaminophen (Tylenol) tablet 650 mg  650 mg Enteral Tube Q6HRS PRN Eva Rodriguez M.D.   650 mg at 10/26/22 2257       Fluids    Intake/Output Summary (Last 24 hours) at 10/29/2022 1854  Last data filed at 10/29/2022 1644  Gross per 24 hour   Intake 1305.05 ml   Output 1350 ml   Net -44.95 ml       Laboratory            Recent Labs     10/28/22  0158 10/28/22  1148 10/29/22  0355 10/29/22  0720   SODIUM 148* 148*  --  148*   POTASSIUM 4.3  4.2 3.9 3.7 3.7   CHLORIDE 115* 115*  --  115*   CO2 20 24  --  25   BUN 38* 37*  --  32*   CREATININE 1.58* 1.47*  --  0.97   MAGNESIUM 2.0 2.0 2.1 2.1   PHOSPHORUS 4.6* 3.5 2.5 1.9*   CALCIUM 7.4* 7.8*  --  7.6*     Recent Labs     10/28/22  0158 10/28/22  1148 10/29/22  0720   ALTSGPT 12 8 11   ASTSGOT 27 21 25   ALKPHOSPHAT 154* 132* 110*   TBILIRUBIN 0.7 0.7 0.6   DBILIRUBIN 0.4  --   --    GLUCOSE 141* 134* 123*     Recent Labs     10/27/22  0245 10/28/22  0158 10/28/22  1148 10/29/22  0720   WBC 8.2 6.6  --   --    NEUTSPOLYS 45.00  --   --   --    LYMPHOCYTES 15.00*  --   --   --    MONOCYTES 13.00  --   --   --    EOSINOPHILS 3.00  --   --   --    BASOPHILS 1.00  --   --   --    ASTSGOT  --  27 21 25   ALTSGPT  --  12 8 11   ALKPHOSPHAT  --  154* 132* 110*   TBILIRUBIN  --  0.7 0.7 0.6     Recent Labs     10/27/22  0245 10/28/22  0158 10/28/22  0913   RBC 4.34* 3.74*  --    HEMOGLOBIN 14.4 12.5*  --    HEMATOCRIT 43.8 38.4*  --    PLATELETCT 281 210  --    PROTHROMBTM  --   --  15.9*   INR  --   --  1.33*       Imaging  X-Ray:  My impression is: Cardiomegaly, moderate right-sided  pleural effusion  Echo:   Reviewed    Echo 10/28/22  CONCLUSIONS  Compared to the images of the prior study 10/10/2022, there has been no   significant change. The right ventricular systolic pressure could not   be estimated on the prior exam.  Mildly reduced left ventricular systolic function.  The left ventricular ejection fraction is visually estimated to be 40%.  Global hypokinesis.  Diastolic function is difficult to assess with arrhythmia.  Reduced right ventricular systolic function.  Mild mitral regurgitation.  Moderate tricuspid regurgitation.  Estimated right ventricular systolic pressure is 55 mmHg.      Echo from 10/10/2022  CONCLUSIONS  Normal left ventricular systolic function.  The right ventricle is mildly dilated.  Mild mitral regurgitation       Chest x-ray 10/20/2022  Cardiomegaly, moderate right-sided pleural effusion.     Chest x-ray 10/18/2022  Lungs: No consolidation detected.   Pleura:  No pleural space process is seen.   Heart and mediastinum: The cardiomediastinal contours are normal.   Age-indeterminate left ninth lateral rib fracture   IMPRESSION: No radiographic evidence of acute cardiopulmonary process.      Assessment/Plan  Sepsis due to Pseudomonas species (HCC)- (present on admission)  Assessment & Plan  This is Severe Sepsis Present on admission  SIRS criteria identified on my evaluation include: Fever, with temperature greater than 101 deg F, Tachycardia, with heart rate greater than 90 BPM, Tachypnea, with respirations greater than 20 per minute and Leukocytosis, with WBC greater than 12,000  Clinical indicators of end organ dysfunction include Systolic blood pressure (SBP) <90 mmHg or mean arterial pressure <65 mmHg and Acute respiratory failure as evidenced by a new need for invasive or non-invasive mechanical ventilation (Ventilator or BiPap)   Source is pneumonia by MSSA and pseudomonas  Sepsis protocol initiated  Crystalloid Fluid Administration: Patient has volume overload  "with >11 lt since admission, (NYHA class III or symptoms with minimal exertion, Or NYHA class IV or symptoms at rest or with activity), for this/these reason(s) it is unsafe for this patient to receive 30 mL/kg of fluid.  Partial Fluid Dose Given on 10/24, patient to be reassessed shortly after completion of partial fluid bolus to ensure adequacy of resuscitation  IV antibiotics as appropriate for source of sepsis  Reassessment: I have reassessed the patient's hemodynamic status  Will provide another partial bolus today and wean down the vasopressors    I examined the patient 10/26/2022 11:21 AM  Vital Signs:BP (!) 98/69   Pulse 98   Temp (!) 39.6 °C (103.3 °F) (Bladder)   Resp 17   Ht 1.778 m (5' 10\")   Wt 85.3 kg (188 lb 0.8 oz)   SpO2 97%   BMI 26.98 kg/m²   Cardiac examination significant for Tachycardia  Pulmonary examination significant for Clear lung fileds  Capillary refill is brisk  Peripheral Pulse is 2+   Skin is dusky     Patient is on levophed to keep MAP>65, also on vasopresin but finally weaning down levophed  Continue with antibiotics zosyn  To treat HAP MSSA and Pseudomonas  we will extend the antibiotic therapy to complete 7 days  Completely off pressors on 10/29/22         Acute respiratory failure with hypoxia (HCC)- (present on admission)  Assessment & Plan  -Intubated for respiratory acidosis and altered mental status with need for intubation on 10/23/2022  -Continue with mechanical ventilation with lung protective strategies.  -Cultures from sputum and BAL grew MSSA and pseudomonas, antibiotic changed to zosyn on the night of 10/24  -also complicated by right pleural effusion, s/p diagnostic thoracentesis.  -ABCDEF bundle    On mechanically assisted ventilation (HCC)  Assessment & Plan  -Patient intubation as above, for etoh withdrawal and delirium with resp depression,  -Patient was extubated on 10/21/2022.  -He was reintubated on 10/23/2022 due to respiratory acidosis and altered " "mental status.  -patient self extubated on the late night of 10/25 and re intubated shortly after  -Continued with lung protective strategies on mechanical ventilation.  -Continues with versed and fentanyl for sedation  -ABCDEF bundle  -SBT today and will be aggressive with extubation as patient is able.    Pleural effusion- (present on admission)  Assessment & Plan  On the right, moderate per CXR of 10/24  bedside US showed a pleural effusion but difficult to access given position  S/p diagnostic thoracentesis 10/25/22: albumin gradient is less than 1.2 g/dl which indicate exudates, his was 0.9, also inflammative effusion with very high LDH 1020 (serum in the 240s range)  Awaiting results of pleural cultures NGTD  Once patient improves or if effusion gets larger, will perform a therapeutic thoracentesis      Atrial fibrillation (HCC)- (present on admission)  Assessment & Plan  Holding metoprolol due to hypotension  currently rate controlled.  Patient is not on chronic anticoagulation.  He was Given Amiodarone 150 mg x1 10/23 a.m.      Other constipation- (present on admission)  Assessment & Plan  Suspected due to chronic narcotic use  We have stopped the narcotics and left only fentanyl due to intubation status  Bowel regimen  KUB 10/25 reviewed with possible ileus  Fecal like material no longer coming from NG tube,   Had a BM.    Scabies- (present on admission)  Assessment & Plan  Rash very suspicious of scabies  S/p treatment  On contact precautions. Consider second treatment    ESAU (acute kidney injury) (HCC)- (present on admission)  Assessment & Plan  Patient with  New ESAU epidose on 10/27/22 with increase in Cr and decrease urine output although still present  Will ressuscitate with bolus of IVF  Avoid nephrotoxic agents  Follow up I/Os,  Improving today    Liver mass- (present on admission)  Assessment & Plan  Seen on US 8/2022 \"right lobe of the liver measuring 1.7 x 1.3 x 1.0 cm in size\"  Will need CT " versus MRI post extubation and follow up with GI as outpatient.        VTE:  Lovenox  Ulcer: H2 Antagonist  Lines: Central Line  Ongoing indication addressed    I have performed a physical exam and reviewed and updated ROS and Plan today (10/29/2022). In review of yesterday's note (10/28/2022), there are no changes except as documented above.     Discussed patient condition and risk of morbidity and/or mortality with RN, RT, Pharmacy, and Charge nurse / hot rounds  The patient remains critically ill.  Critical care time = 59 minutes in directly providing and coordinating critical care and extensive data review.  No time overlap and excludes procedures.    Chris Abrams MD FACP  Pulmonary/Critical Care

## 2022-10-29 NOTE — CARE PLAN
Problem: Ventilation  Goal: Ability to achieve and maintain unassisted ventilation or tolerate decreased levels of ventilator support  Description: Target End Date:  4 days     Document on Vent flowsheet    1.  Support and monitor invasive and noninvasive mechanical ventilation  2.  Monitor ventilator weaning response  3.  Perform ventilator associated pneumonia prevention interventions  4.  Manage ventilation therapy by monitoring diagnostic test results  Outcome: Not Met        Ventilator Daily Summary    Vent Day # 6    Ventilator settings changed this shift: None APV 22, 460, +8, 40%    Weaning trials: none    Respiratory Procedures: none    Plan: Continue current ventilator settings and wean mechanical ventilation as tolerated per physician orders.

## 2022-10-30 ENCOUNTER — APPOINTMENT (OUTPATIENT)
Dept: RADIOLOGY | Facility: MEDICAL CENTER | Age: 57
DRG: 870 | End: 2022-10-30
Attending: INTERNAL MEDICINE
Payer: COMMERCIAL

## 2022-10-30 PROBLEM — B86 SCABIES: Status: RESOLVED | Noted: 2022-10-19 | Resolved: 2022-10-30

## 2022-10-30 PROBLEM — K59.09 OTHER CONSTIPATION: Status: RESOLVED | Noted: 2022-10-25 | Resolved: 2022-10-30

## 2022-10-30 LAB
GLUCOSE BLD STRIP.AUTO-MCNC: 67 MG/DL (ref 65–99)
GLUCOSE BLD STRIP.AUTO-MCNC: 87 MG/DL (ref 65–99)
GLUCOSE BLD STRIP.AUTO-MCNC: 91 MG/DL (ref 65–99)
GLUCOSE BLD STRIP.AUTO-MCNC: 97 MG/DL (ref 65–99)

## 2022-10-30 PROCEDURE — 87070 CULTURE OTHR SPECIMN AEROBIC: CPT

## 2022-10-30 PROCEDURE — 0W993ZZ DRAINAGE OF RIGHT PLEURAL CAVITY, PERCUTANEOUS APPROACH: ICD-10-PCS | Performed by: INTERNAL MEDICINE

## 2022-10-30 PROCEDURE — 94003 VENT MGMT INPAT SUBQ DAY: CPT

## 2022-10-30 PROCEDURE — 99291 CRITICAL CARE FIRST HOUR: CPT | Mod: 25 | Performed by: INTERNAL MEDICINE

## 2022-10-30 PROCEDURE — 82962 GLUCOSE BLOOD TEST: CPT

## 2022-10-30 PROCEDURE — 87205 SMEAR GRAM STAIN: CPT

## 2022-10-30 PROCEDURE — 71045 X-RAY EXAM CHEST 1 VIEW: CPT

## 2022-10-30 PROCEDURE — 94760 N-INVAS EAR/PLS OXIMETRY 1: CPT

## 2022-10-30 PROCEDURE — 32555 ASPIRATE PLEURA W/ IMAGING: CPT | Mod: RT | Performed by: INTERNAL MEDICINE

## 2022-10-30 PROCEDURE — 94799 UNLISTED PULMONARY SVC/PX: CPT

## 2022-10-30 PROCEDURE — 700105 HCHG RX REV CODE 258: Performed by: INTERNAL MEDICINE

## 2022-10-30 PROCEDURE — 700102 HCHG RX REV CODE 250 W/ 637 OVERRIDE(OP): Performed by: HOSPITALIST

## 2022-10-30 PROCEDURE — A9270 NON-COVERED ITEM OR SERVICE: HCPCS | Performed by: HOSPITALIST

## 2022-10-30 PROCEDURE — 770022 HCHG ROOM/CARE - ICU (200)

## 2022-10-30 PROCEDURE — 700102 HCHG RX REV CODE 250 W/ 637 OVERRIDE(OP): Performed by: INTERNAL MEDICINE

## 2022-10-30 PROCEDURE — A9270 NON-COVERED ITEM OR SERVICE: HCPCS | Performed by: INTERNAL MEDICINE

## 2022-10-30 PROCEDURE — 700111 HCHG RX REV CODE 636 W/ 250 OVERRIDE (IP): Performed by: INTERNAL MEDICINE

## 2022-10-30 PROCEDURE — 700101 HCHG RX REV CODE 250: Performed by: HOSPITALIST

## 2022-10-30 RX ORDER — DEXTROSE AND SODIUM CHLORIDE 5; .45 G/100ML; G/100ML
INJECTION, SOLUTION INTRAVENOUS CONTINUOUS
Status: DISCONTINUED | OUTPATIENT
Start: 2022-10-30 | End: 2022-11-02

## 2022-10-30 RX ORDER — DEXTROSE MONOHYDRATE 25 G/50ML
25 INJECTION, SOLUTION INTRAVENOUS
Status: DISCONTINUED | OUTPATIENT
Start: 2022-10-30 | End: 2022-11-02

## 2022-10-30 RX ADMIN — TOBRAMYCIN OPHTHALMIC SOLUTION 1 DROP: 3 SOLUTION/ DROPS OPHTHALMIC at 02:10

## 2022-10-30 RX ADMIN — FAMOTIDINE 20 MG: 10 INJECTION, SOLUTION INTRAVENOUS at 05:04

## 2022-10-30 RX ADMIN — NYSTATIN 500000 UNITS: 100000 SUSPENSION ORAL at 08:26

## 2022-10-30 RX ADMIN — TOBRAMYCIN OPHTHALMIC SOLUTION 1 DROP: 3 SOLUTION/ DROPS OPHTHALMIC at 18:00

## 2022-10-30 RX ADMIN — NYSTATIN 500000 UNITS: 100000 SUSPENSION ORAL at 18:10

## 2022-10-30 RX ADMIN — PIPERACILLIN AND TAZOBACTAM 4.5 G: 4; .5 INJECTION, POWDER, FOR SOLUTION INTRAVENOUS at 21:39

## 2022-10-30 RX ADMIN — HEPARIN SODIUM 5000 UNITS: 5000 INJECTION, SOLUTION INTRAVENOUS; SUBCUTANEOUS at 05:05

## 2022-10-30 RX ADMIN — MINERAL OIL, PETROLATUM 1 APPLICATION: 425; 573 OINTMENT OPHTHALMIC at 05:07

## 2022-10-30 RX ADMIN — MIDAZOLAM 2 MG/HR: 5 INJECTION INTRAMUSCULAR; INTRAVENOUS at 09:43

## 2022-10-30 RX ADMIN — PIPERACILLIN AND TAZOBACTAM 4.5 G: 4; .5 INJECTION, POWDER, FOR SOLUTION INTRAVENOUS at 13:24

## 2022-10-30 RX ADMIN — HEPARIN SODIUM 5000 UNITS: 5000 INJECTION, SOLUTION INTRAVENOUS; SUBCUTANEOUS at 21:26

## 2022-10-30 RX ADMIN — DEXTROSE AND SODIUM CHLORIDE: 5; 450 INJECTION, SOLUTION INTRAVENOUS at 21:17

## 2022-10-30 RX ADMIN — FENTANYL CITRATE 100 MCG: 50 INJECTION, SOLUTION INTRAMUSCULAR; INTRAVENOUS at 02:34

## 2022-10-30 RX ADMIN — TOBRAMYCIN OPHTHALMIC SOLUTION 1 DROP: 3 SOLUTION/ DROPS OPHTHALMIC at 09:44

## 2022-10-30 RX ADMIN — FENTANYL CITRATE 100 MCG: 50 INJECTION, SOLUTION INTRAMUSCULAR; INTRAVENOUS at 22:45

## 2022-10-30 RX ADMIN — DEXTROSE MONOHYDRATE 25 G: 25 INJECTION, SOLUTION INTRAVENOUS at 06:49

## 2022-10-30 RX ADMIN — DEXTROSE AND SODIUM CHLORIDE 1000 ML: 5; 450 INJECTION, SOLUTION INTRAVENOUS at 10:32

## 2022-10-30 RX ADMIN — TOBRAMYCIN OPHTHALMIC SOLUTION 1 DROP: 3 SOLUTION/ DROPS OPHTHALMIC at 05:07

## 2022-10-30 RX ADMIN — NYSTATIN 500000 UNITS: 100000 SUSPENSION ORAL at 21:27

## 2022-10-30 RX ADMIN — HEPARIN SODIUM 5000 UNITS: 5000 INJECTION, SOLUTION INTRAVENOUS; SUBCUTANEOUS at 13:24

## 2022-10-30 RX ADMIN — PIPERACILLIN AND TAZOBACTAM 4.5 G: 4; .5 INJECTION, POWDER, FOR SOLUTION INTRAVENOUS at 06:14

## 2022-10-30 RX ADMIN — MINERAL OIL, PETROLATUM 1 APPLICATION: 425; 573 OINTMENT OPHTHALMIC at 21:28

## 2022-10-30 RX ADMIN — MINERAL OIL, PETROLATUM 1 APPLICATION: 425; 573 OINTMENT OPHTHALMIC at 13:24

## 2022-10-30 RX ADMIN — NYSTATIN 500000 UNITS: 100000 SUSPENSION ORAL at 13:41

## 2022-10-30 RX ADMIN — TOBRAMYCIN OPHTHALMIC SOLUTION 1 DROP: 3 SOLUTION/ DROPS OPHTHALMIC at 13:23

## 2022-10-30 RX ADMIN — LEVOTHYROXINE SODIUM 50 MCG: 50 TABLET ORAL at 05:09

## 2022-10-30 RX ADMIN — FAMOTIDINE 20 MG: 10 INJECTION, SOLUTION INTRAVENOUS at 18:10

## 2022-10-30 RX ADMIN — TOBRAMYCIN OPHTHALMIC SOLUTION 1 DROP: 3 SOLUTION/ DROPS OPHTHALMIC at 21:28

## 2022-10-30 RX ADMIN — FENTANYL CITRATE 200 MCG: 50 INJECTION, SOLUTION INTRAMUSCULAR; INTRAVENOUS at 04:22

## 2022-10-30 RX ADMIN — Medication 100 MCG/HR: at 18:20

## 2022-10-30 ASSESSMENT — PAIN DESCRIPTION - PAIN TYPE
TYPE: ACUTE PAIN

## 2022-10-30 ASSESSMENT — FIBROSIS 4 INDEX: FIB4 SCORE: 2.010075630518424151

## 2022-10-30 NOTE — CARE PLAN
The patient is Watcher - Medium risk of patient condition declining or worsening    Shift Goals  Clinical Goals: Wean FiO2 as able  Patient Goals: ANETTE  Family Goals: ANETTE    Progress made toward(s) clinical / shift goals:  .    Patient is not progressing towards the following goals:      Problem: Knowledge Deficit - Standard  Goal: Patient and family/care givers will demonstrate understanding of plan of care, disease process/condition, diagnostic tests and medications  Outcome: Not Progressing

## 2022-10-30 NOTE — PROGRESS NOTES
Pt able to follow commands. Hemodynamically stable. Vasopressors off and discontinued. Pt on continued SBT and tolerating well.  Will place pt. On vent support for the night.  Minimal bilious/brown output per NGT. Hypoactive BS with minimal liquid stools. TPN to be discontinued tonight. LR bolus given as ordered.  Pt. Bladder scanned  and unable to flush wheeler.  Wheeler replace.  Kphos given as ordered. Daughter at bedside and updated.  Will continue to monitor.

## 2022-10-30 NOTE — CARE PLAN
Problem: Ventilation  Goal: Ability to achieve and maintain unassisted ventilation or tolerate decreased levels of ventilator support  Description: Target End Date:  4 days     Document on Vent flowsheet    1.  Support and monitor invasive and noninvasive mechanical ventilation  2.  Monitor ventilator weaning response  3.  Perform ventilator associated pneumonia prevention interventions  4.  Manage ventilation therapy by monitoring diagnostic test results  Outcome: Progressing      Ventilator Daily Summary       Ventilator settings changed this shift: None APV 22, 460, +8, 40%     Weaning trials: none     Respiratory Procedures: none     Plan: Continue current ventilator settings and wean mechanical ventilation as tolerated per physician orders.

## 2022-10-30 NOTE — PROGRESS NOTES
"Pulmonary/Critical Care Progress Note    Date of admission  10/18/2022    Chief Complaint  57 y.o. male admitted 10/18/2022 with ETOH and required intubation.   Re intubated on 10/23/22    Hospital Course  From previous consult notes from Dr Garcia: \"58 yo male admitted on 10/19/2022 with ETOh withdrawal  PMHx: lives in a motel, drinks a pint of vodka a day (last drinl 10/7), prior hx of meth use, afib not on anticoagulation, liver mass on Us on August 2022 (2.  Solid hypoechoic mass within the right lobe of the liver measuring 1.7 x 1.3 x 1.0 cm in size. Follow-up pre and postcontrast multiphasic hepatic CT or MRI is recommended for further evaluation)  Received 10 mg ativan and 650 mg of phenobarb went into resp distress requiring intubation     10/19: started librium 25 mg tid; Rx of scabies\"  10/21: extubated at noon. On precedex drip.   10/22: Fevering. Altered. Gurgling upper airway. Started on Abx.   10/23: he was re intubated due to airway protection concerns, respiratory acidosis and AMS.    10/24: I am taking over the service today, no major events overnight. Patient underwent bronchoscopy with sampling of RLL and LLL. Copious secretions were found in geetha and all subsegments but more prevalent in RLL and LLL. Adjusted antibiotics later on the day due to pseudomonas growing in the respiratory culture. (On zosyn).   Stop scopolamine and robinol, decrease librium from 25mg tid to BID, decrease seroquel from 50mg bid to nightly. Will stop all narcotics and rely on fentanyl ggt alone    10/25: Antonio underwent diagnostic thoracentesis with 100 cc of pleural fluid sent to the lab, albumin gradient was less than 1.2 (0.9) which correlates with an exudate in the setting of use of recent diuretics.  Noted also that was a very high LDH at 1020 which correlates with an inflammatory process as well.  Based on this, he met lights criteria but not with protein.  Culture still pending.    10/26: Later during the night " yesterday he developed hypoglycemia and was treated with D50.  Overnight patient apparently woke up and extubated himself, he was trialed on high flow oxygen for 20 to 30 minutes but he started becoming tachypneic and getting tired for what he was reintubated around 3 AM.  Reintubation.  Was complicated by bradycardia and hypotension that required vasopressors.    10/27: Issues overnight: patient with abdominal distention per staff report, fecal like material coming out from the NG tube with large amounts. Pressors are coming down.    10/28: patietn was found to have oral thrush, treated with nystatin, ressucitated with LR 1 lt again due to hypotension and high output from NG tube. Consulted pharmacy/nutritionist today for consideration of TPN, given that patient has not received any nutrition for days and NG tube with large output of stool and bile material about ~2 lt/day. CT abdomen, pelvis with no perforation but ileus, had a small BM on 10/28/22    10/29:   -he is doing well today, off pressors,  -Noted that he had dense sediment in the wheeler and was not voiding, wheeler changed today with large urine output.   -Following commands and did well on SBT but low NIF, Working on extubation  -His NG output has decreased and he is making stool but no longer coming out from NG.   -He is on TPN, will hold next bag with hopes that we can use the gut with tube feeds      Interval Problem Update  Reviewed last 24 hour events:  Chart review from the past 24 hours includes imaging, laboratory studies, vital signs and notes available.  Pertinent data for today    10/30:   -Patient failed SBT due to abundant secretions and tachypnea. NIF was borderline.  -He is producing stool, no more abnormal output from NG tube, will try enteric feedings (held TPN)  -H.H drop a bit, will watch it, transfuse if <7/21  -will attempt thoracentesis today       *Pulmonary: Aspiration versus hospital-acquired pneumonia.  Was intubated on 10/23/2022  for respiratory acidosis and AMS. S/p bronch on 10/24/22, BAL from RLL and LLL.   Cxr today showed a moderate pleural effusion on the RIGHT but difficult to get access to the effusion.  Status post diagnostic thoracentesis on 10/25.  MSSA and pseudomonas grew from the sputum cx and also from bronch samples susceptible to Zosyn, Antitiobitcs changed to zosyn on 10/24.   Patient self extubated in the night from 10/25-10/26 and was reintubated shortly after.  Unclear if he aspirated but given his medical history we will extend the antibiotic therapy for 7 days.   Failed SBT today, extubation when safe given failed prior self extubation. Will attempt thoracentesis today to maximize his chances for extubation.  *Cardiovascular: cardiomegaly on cxr, echo EF 40%, RV dysfuction, finally off pressors, A. fib seems to be under rate control. Reviewed new echo.  *Nephrology: urine output much better with new wheeler, renal function is recovering, abdominal pressures were below 20,   *GI: Patient finally with Bms, held next bag of TPN. Trial of enteric feedings today  *Neuro: follows commands, withdraw with pain  *ID: Cultures grew MSSA and pseudomonas, Zosyn, . WBC coming down 22.9--> 16.9-->16.6-->8.  Fever is gone.          Review of Systems  Review of Systems   Unable to perform ROS: Intubated      Vital Signs for last 24 hours   Temp:  [36.1 °C (97 °F)-36.5 °C (97.7 °F)] 36.5 °C (97.7 °F)  Pulse:  [] 96  Resp:  [0-65] 10  BP: ()/() 119/88  SpO2:  [95 %-100 %] 98 %    Hemodynamic parameters for last 24 hours       Respiratory Information for the last 24 hours  Vent Mode: APVCMV  Rate (breaths/min): 22  Vt Target (mL): 460  PEEP/CPAP: 8  P Support: 5  MAP: 11  Control VTE (exp VT): 484    Physical Exam   Physical Exam  Vitals and nursing note reviewed.   Constitutional:       General: He is not in acute distress.     Appearance: He is not ill-appearing or toxic-appearing.   HENT:      Head: Normocephalic and  atraumatic.      Nose: Nose normal. No congestion.      Mouth/Throat:      Mouth: Mucous membranes are moist.      Comments: ETT in place  Eyes:      Pupils: Pupils are equal, round, and reactive to light.   Cardiovascular:      Rate and Rhythm: Regular rhythm. Tachycardia present.      Pulses: Normal pulses.      Heart sounds: No murmur heard.    No friction rub. No gallop.   Pulmonary:      Effort: Pulmonary effort is normal. No respiratory distress.      Breath sounds: No wheezing or rhonchi.   Abdominal:      General: Bowel sounds are normal. There is distension.      Palpations: There is no mass.      Tenderness: There is no abdominal tenderness. There is no guarding.   Musculoskeletal:         General: Normal range of motion.      Cervical back: No rigidity.      Right lower leg: Edema present.      Left lower leg: Edema present.   Skin:     General: Skin is warm.      Capillary Refill: Capillary refill takes less than 2 seconds.           Medications  Current Facility-Administered Medications   Medication Dose Route Frequency Provider Last Rate Last Admin    dextrose 50% (D50W) injection 25 g  25 g Intravenous Q15 MIN PRN Nelida Fortune M.D.   25 g at 10/30/22 0649    D5 1/2 NS infusion   Intravenous Continuous Chris Abrams M.D. 100 mL/hr at 10/30/22 1032 1,000 mL at 10/30/22 1032    Pharmacy Consult Request  1 Each Other PHARMACY TO DOSE Chris Abrams M.D.        artificial tears (EYE LUBRICANT) ophth ointment 1 Application  1 Application Both Eyes Q8HRS Chris Abrams M.D.   1 Application at 10/30/22 1324    heparin injection 5,000 Units  5,000 Units Subcutaneous Q8HRS Chris Abrams M.D.   5,000 Units at 10/30/22 1324    nystatin (MYCOSTATIN) 907926 UNIT/ML suspension 500,000 Units  5 mL Buccal 4X/DAY Chris Abrams M.D.   500,000 Units at 10/30/22 0826    famotidine (PEPCID) injection 20 mg  20 mg Intravenous BID Chris Abrams M.D.   20 mg at 10/30/22 0504     fentaNYL (SUBLIMAZE) injection 100 mcg  100 mcg Intravenous Q15 MIN PRN Chris Abrams M.D.   100 mcg at 10/30/22 0234    And    fentaNYL (SUBLIMAZE) injection 200 mcg  200 mcg Intravenous Q15 MIN PRN Chris Abrams M.D.   200 mcg at 10/30/22 0422    And    fentaNYL (SUBLIMAZE) 50 mcg/mL in 50mL (Continuous Infusion)   Intravenous Continuous Chris Abrams M.D.   Stopped at 10/30/22 1323    And    dexmedetomidine (PRECEDEX) 400 mcg/100mL NS premix infusion  0-1.5 mcg/kg/hr Intravenous Continuous Chris Abrams M.D.   Stopped at 10/28/22 0500    midazolam (Versed) premix 125 mg/125 mL infusion  0-10 mg/hr Intravenous Continuous Chris Abrams M.D.   Stopped at 10/30/22 1322    tobramycin (TOBREX) 0.3 % ophthalmic solution 1 Drop  1 Drop Right Eye Q4HRS Chris Abrams M.D.   1 Drop at 10/30/22 1323    piperacillin-tazobactam (Zosyn) 4.5 g in  mL IVPB  4.5 g Intravenous Q8HRS Chris Abrams M.D. 25 mL/hr at 10/30/22 1324 4.5 g at 10/30/22 1324    Respiratory Therapy Consult   Nebulization Continuous RT Greyson Luciano D.O.        lidocaine (XYLOCAINE) 1 % injection 2 mL  2 mL Tracheal Tube Q30 MIN PRN Greyson Luciano D.O.        lactulose 20 GM/30ML solution 30 mL  30 mL Enteral Tube TID Greyson Luciano D.O.   30 mL at 10/27/22 0536    Pharmacy Consult: Enteral tube insertion - review meds/change route/product selection   Other PHARMACY TO DOSE Greyson Luciano D.O.        carboxymethylcellulose (REFRESH TEARS) 0.5 % ophthalmic drops 2 Drop  2 Drop Both Eyes PRN Greyson Lucaino D.O.   2 Drop at 10/27/22 1940    albuterol inhaler 2 Puff  2 Puff Inhalation Q6HRS PRN Eva Rodriguez M.D.        Pharmacy Consult Request ...Pain Management Review 1 Each  1 Each Other PHARMACY TO DOSE Eva Rodriguez M.D.        levothyroxine (SYNTHROID) tablet 50 mcg  50 mcg Enteral Tube AM ES Eva Rodriguez M.D.   50 mcg at 10/30/22 0509    acetaminophen (Tylenol) tablet  650 mg  650 mg Enteral Tube Q6HRS PRN Eva Rodriguez M.D.   650 mg at 10/26/22 2257       Fluids    Intake/Output Summary (Last 24 hours) at 10/30/2022 1335  Last data filed at 10/30/2022 1200  Gross per 24 hour   Intake 111.55 ml   Output 1380 ml   Net -1268.45 ml       Laboratory            Recent Labs     10/28/22  0158 10/28/22  1148 10/29/22  0355 10/29/22  0720   SODIUM 148* 148*  --  148*   POTASSIUM 4.3  4.2 3.9 3.7 3.7   CHLORIDE 115* 115*  --  115*   CO2 20 24  --  25   BUN 38* 37*  --  32*   CREATININE 1.58* 1.47*  --  0.97   MAGNESIUM 2.0 2.0 2.1 2.1   PHOSPHORUS 4.6* 3.5 2.5 1.9*   CALCIUM 7.4* 7.8*  --  7.6*     Recent Labs     10/28/22  0158 10/28/22  1148 10/29/22  0720   ALTSGPT 12 8 11   ASTSGOT 27 21 25   ALKPHOSPHAT 154* 132* 110*   TBILIRUBIN 0.7 0.7 0.6   DBILIRUBIN 0.4  --   --    GLUCOSE 141* 134* 123*     Recent Labs     10/28/22  0158 10/28/22  1148 10/29/22  0720   WBC 6.6  --   --    ASTSGOT 27 21 25   ALTSGPT 12 8 11   ALKPHOSPHAT 154* 132* 110*   TBILIRUBIN 0.7 0.7 0.6     Recent Labs     10/28/22  0158 10/28/22  0913   RBC 3.74*  --    HEMOGLOBIN 12.5*  --    HEMATOCRIT 38.4*  --    PLATELETCT 210  --    PROTHROMBTM  --  15.9*   INR  --  1.33*       Imaging  X-Ray:  My impression is: Cardiomegaly, moderate right-sided pleural effusion  Echo:   Reviewed    Echo 10/28/22  CONCLUSIONS  Compared to the images of the prior study 10/10/2022, there has been no   significant change. The right ventricular systolic pressure could not   be estimated on the prior exam.  Mildly reduced left ventricular systolic function.  The left ventricular ejection fraction is visually estimated to be 40%.  Global hypokinesis.  Diastolic function is difficult to assess with arrhythmia.  Reduced right ventricular systolic function.  Mild mitral regurgitation.  Moderate tricuspid regurgitation.  Estimated right ventricular systolic pressure is 55 mmHg.      Echo from 10/10/2022  CONCLUSIONS  Normal left  ventricular systolic function.  The right ventricle is mildly dilated.  Mild mitral regurgitation       Chest x-ray 10/20/2022  Cardiomegaly, moderate right-sided pleural effusion.     Chest x-ray 10/18/2022  Lungs: No consolidation detected.   Pleura:  No pleural space process is seen.   Heart and mediastinum: The cardiomediastinal contours are normal.   Age-indeterminate left ninth lateral rib fracture   IMPRESSION: No radiographic evidence of acute cardiopulmonary process.      Assessment/Plan  Sepsis due to Pseudomonas species (HCC)- (present on admission)  Assessment & Plan  This is Severe Sepsis Present on admission  SIRS criteria identified on my evaluation include: Fever, with temperature greater than 101 deg F, Tachycardia, with heart rate greater than 90 BPM, Tachypnea, with respirations greater than 20 per minute and Leukocytosis, with WBC greater than 12,000  Clinical indicators of end organ dysfunction include Systolic blood pressure (SBP) <90 mmHg or mean arterial pressure <65 mmHg and Acute respiratory failure as evidenced by a new need for invasive or non-invasive mechanical ventilation (Ventilator or BiPap)   Source is pneumonia by MSSA and pseudomonas  Sepsis protocol initiated  Crystalloid Fluid Administration: Patient has volume overload with >11 lt since admission, (NYHA class III or symptoms with minimal exertion, Or NYHA class IV or symptoms at rest or with activity), for this/these reason(s) it is unsafe for this patient to receive 30 mL/kg of fluid.  Partial Fluid Dose Given on 10/24, patient to be reassessed shortly after completion of partial fluid bolus to ensure adequacy of resuscitation  IV antibiotics as appropriate for source of sepsis  Reassessment: I have reassessed the patient's hemodynamic status  Will provide another partial bolus today and wean down the vasopressors    .  Continue with antibiotics zosyn  To treat HAP MSSA and Pseudomonas  Bacteremia, positive blood cultures on  10/24, follow up cultures are negative.  we will extend the antibiotic therapy to complete 7 days  Completely off pressors on 10/29/22   Shock component has resolved 10/30/22         Acute respiratory failure with hypoxia (HCC)- (present on admission)  Assessment & Plan  -Intubated for respiratory acidosis and altered mental status with need for intubation on 10/23/2022  -Continue with mechanical ventilation with lung protective strategies.  -Cultures from sputum and BAL grew MSSA and pseudomonas, antibiotic changed to zosyn on the night of 10/24  -also complicated by right pleural effusion, s/p diagnostic thoracentesis.  -ABCDEF bundle    On mechanically assisted ventilation (HCC)  Assessment & Plan  -Patient initial intubation for etoh withdrawal and delirium with resp depression, patient was extubated on 10/21/2022.  -He was reintubated on 10/23/2022 due to respiratory acidosis and altered mental status found to have HAP.  -patient self extubated on the late night of 10/25 and re intubated shortly after  -Continued with lung protective strategies on mechanical ventilation.  -Continues with versed and fentanyl for sedation  -ABCDEF bundle  -failed SBT today due to abundant secretions and tachypnea, will attempt a thoracentesis today to maximize his chances of extubation.    Pleural effusion- (present on admission)  Assessment & Plan  On the right, moderate-large per CXR of 10/30  bedside US showed a pleural effusion but difficult to access given position, will attempt a thoracentesis today to maximize his chances for extubation  S/p diagnostic thoracentesis 10/25/22: albumin gradient is less than 1.2 g/dl which indicate exudates, his was 0.9, also inflammative effusion with very high LDH 1020 (serum in the 240s range)  pleural fluid cultures so far NGTD      Atrial fibrillation (HCC)- (present on admission)  Assessment & Plan  Holding metoprolol due to hypotension  Currently self rate controlled.  Patient is not  "on chronic anticoagulation.  He was Given Amiodarone 150 mg x1 10/23 a.m.      Liver mass- (present on admission)  Assessment & Plan  Seen on US 8/2022 \"right lobe of the liver measuring 1.7 x 1.3 x 1.0 cm in size\"  Will need CT versus MRI post extubation and follow up with GI as outpatient.        VTE:  Lovenox  Ulcer: H2 Antagonist  Lines: Central Line  Ongoing indication addressed    I have performed a physical exam and reviewed and updated ROS and Plan today (10/30/2022). In review of yesterday's note (10/29/2022), there are no changes except as documented above.     Discussed patient condition and risk of morbidity and/or mortality with RN, RT, Pharmacy, and Charge nurse / hot rounds  The patient remains critically ill.  Critical care time = 57 minutes in directly providing and coordinating critical care and extensive data review.  No time overlap and excludes procedures.    Chris Abrams MD FACP  Pulmonary/Critical Care       "

## 2022-10-31 LAB
ALBUMIN SERPL BCP-MCNC: 1.9 G/DL (ref 3.2–4.9)
ALBUMIN/GLOB SERPL: 0.5 G/DL
ALP SERPL-CCNC: 103 U/L (ref 30–99)
ALT SERPL-CCNC: 13 U/L (ref 2–50)
ANION GAP SERPL CALC-SCNC: 7 MMOL/L (ref 7–16)
AST SERPL-CCNC: 28 U/L (ref 12–45)
BACTERIA BLD CULT: NORMAL
BASOPHILS # BLD AUTO: 0.2 % (ref 0–1.8)
BASOPHILS # BLD: 0.02 K/UL (ref 0–0.12)
BILIRUB SERPL-MCNC: 0.9 MG/DL (ref 0.1–1.5)
BUN SERPL-MCNC: 11 MG/DL (ref 8–22)
CALCIUM SERPL-MCNC: 7.6 MG/DL (ref 8.4–10.2)
CHLORIDE SERPL-SCNC: 114 MMOL/L (ref 96–112)
CO2 SERPL-SCNC: 29 MMOL/L (ref 20–33)
CREAT SERPL-MCNC: 0.56 MG/DL (ref 0.5–1.4)
CYTOLOGY REG CYTOL: NORMAL
EOSINOPHIL # BLD AUTO: 0.38 K/UL (ref 0–0.51)
EOSINOPHIL NFR BLD: 4.1 % (ref 0–6.9)
ERYTHROCYTE [DISTWIDTH] IN BLOOD BY AUTOMATED COUNT: 53.5 FL (ref 35.9–50)
GFR SERPLBLD CREATININE-BSD FMLA CKD-EPI: 116 ML/MIN/1.73 M 2
GLOBULIN SER CALC-MCNC: 3.9 G/DL (ref 1.9–3.5)
GLUCOSE BLD STRIP.AUTO-MCNC: 83 MG/DL (ref 65–99)
GLUCOSE BLD STRIP.AUTO-MCNC: 87 MG/DL (ref 65–99)
GLUCOSE SERPL-MCNC: 88 MG/DL (ref 65–99)
GRAM STN SPEC: NORMAL
GRAM STN SPEC: NORMAL
HCT VFR BLD AUTO: 33 % (ref 42–52)
HGB BLD-MCNC: 10.7 G/DL (ref 14–18)
IMM GRANULOCYTES # BLD AUTO: 0.1 K/UL (ref 0–0.11)
IMM GRANULOCYTES NFR BLD AUTO: 1.1 % (ref 0–0.9)
LYMPHOCYTES # BLD AUTO: 0.97 K/UL (ref 1–4.8)
LYMPHOCYTES NFR BLD: 10.5 % (ref 22–41)
MAGNESIUM SERPL-MCNC: 1.5 MG/DL (ref 1.5–2.5)
MAGNESIUM SERPL-MCNC: 1.6 MG/DL (ref 1.5–2.5)
MCH RBC QN AUTO: 33.2 PG (ref 27–33)
MCHC RBC AUTO-ENTMCNC: 32.4 G/DL (ref 33.7–35.3)
MCV RBC AUTO: 102.5 FL (ref 81.4–97.8)
MONOCYTES # BLD AUTO: 0.56 K/UL (ref 0–0.85)
MONOCYTES NFR BLD AUTO: 6.1 % (ref 0–13.4)
NEUTROPHILS # BLD AUTO: 7.17 K/UL (ref 1.82–7.42)
NEUTROPHILS NFR BLD: 78 % (ref 44–72)
NRBC # BLD AUTO: 0 K/UL
NRBC BLD-RTO: 0 /100 WBC
PHOSPHATE SERPL-MCNC: 1.5 MG/DL (ref 2.5–4.5)
PHOSPHATE SERPL-MCNC: 1.7 MG/DL (ref 2.5–4.5)
PLATELET # BLD AUTO: 238 K/UL (ref 164–446)
PMV BLD AUTO: 11.8 FL (ref 9–12.9)
POTASSIUM SERPL-SCNC: 2.8 MMOL/L (ref 3.6–5.5)
POTASSIUM SERPL-SCNC: 3 MMOL/L (ref 3.6–5.5)
PREALB SERPL-MCNC: 5.7 MG/DL (ref 18–38)
PROCALCITONIN SERPL-MCNC: 0.43 NG/ML
PROT SERPL-MCNC: 5.8 G/DL (ref 6–8.2)
RBC # BLD AUTO: 3.22 M/UL (ref 4.7–6.1)
SIGNIFICANT IND 70042: NORMAL
SITE SITE: NORMAL
SODIUM SERPL-SCNC: 150 MMOL/L (ref 135–145)
SOURCE SOURCE: NORMAL
TRIGL SERPL-MCNC: 126 MG/DL (ref 0–149)
WBC # BLD AUTO: 9.2 K/UL (ref 4.8–10.8)

## 2022-10-31 PROCEDURE — 700101 HCHG RX REV CODE 250: Performed by: STUDENT IN AN ORGANIZED HEALTH CARE EDUCATION/TRAINING PROGRAM

## 2022-10-31 PROCEDURE — A9270 NON-COVERED ITEM OR SERVICE: HCPCS | Performed by: HOSPITALIST

## 2022-10-31 PROCEDURE — A9270 NON-COVERED ITEM OR SERVICE: HCPCS | Performed by: INTERNAL MEDICINE

## 2022-10-31 PROCEDURE — 94799 UNLISTED PULMONARY SVC/PX: CPT

## 2022-10-31 PROCEDURE — 700102 HCHG RX REV CODE 250 W/ 637 OVERRIDE(OP): Performed by: INTERNAL MEDICINE

## 2022-10-31 PROCEDURE — 84132 ASSAY OF SERUM POTASSIUM: CPT

## 2022-10-31 PROCEDURE — 87205 SMEAR GRAM STAIN: CPT

## 2022-10-31 PROCEDURE — 700105 HCHG RX REV CODE 258: Performed by: STUDENT IN AN ORGANIZED HEALTH CARE EDUCATION/TRAINING PROGRAM

## 2022-10-31 PROCEDURE — 94003 VENT MGMT INPAT SUBQ DAY: CPT

## 2022-10-31 PROCEDURE — 700111 HCHG RX REV CODE 636 W/ 250 OVERRIDE (IP): Performed by: STUDENT IN AN ORGANIZED HEALTH CARE EDUCATION/TRAINING PROGRAM

## 2022-10-31 PROCEDURE — 84145 PROCALCITONIN (PCT): CPT

## 2022-10-31 PROCEDURE — 84134 ASSAY OF PREALBUMIN: CPT

## 2022-10-31 PROCEDURE — 770022 HCHG ROOM/CARE - ICU (200)

## 2022-10-31 PROCEDURE — 700102 HCHG RX REV CODE 250 W/ 637 OVERRIDE(OP): Performed by: HOSPITALIST

## 2022-10-31 PROCEDURE — 700111 HCHG RX REV CODE 636 W/ 250 OVERRIDE (IP): Performed by: INTERNAL MEDICINE

## 2022-10-31 PROCEDURE — 87077 CULTURE AEROBIC IDENTIFY: CPT

## 2022-10-31 PROCEDURE — 94760 N-INVAS EAR/PLS OXIMETRY 1: CPT

## 2022-10-31 PROCEDURE — 84478 ASSAY OF TRIGLYCERIDES: CPT

## 2022-10-31 PROCEDURE — 87070 CULTURE OTHR SPECIMN AEROBIC: CPT

## 2022-10-31 PROCEDURE — 87186 SC STD MICRODIL/AGAR DIL: CPT

## 2022-10-31 PROCEDURE — 36592 COLLECT BLOOD FROM PICC: CPT

## 2022-10-31 PROCEDURE — 82962 GLUCOSE BLOOD TEST: CPT

## 2022-10-31 PROCEDURE — 80053 COMPREHEN METABOLIC PANEL: CPT

## 2022-10-31 PROCEDURE — 83735 ASSAY OF MAGNESIUM: CPT

## 2022-10-31 PROCEDURE — 84100 ASSAY OF PHOSPHORUS: CPT

## 2022-10-31 PROCEDURE — 85025 COMPLETE CBC W/AUTO DIFF WBC: CPT

## 2022-10-31 PROCEDURE — 99291 CRITICAL CARE FIRST HOUR: CPT | Performed by: STUDENT IN AN ORGANIZED HEALTH CARE EDUCATION/TRAINING PROGRAM

## 2022-10-31 RX ORDER — POTASSIUM CHLORIDE 14.9 MG/ML
20 INJECTION INTRAVENOUS ONCE
Status: COMPLETED | OUTPATIENT
Start: 2022-10-31 | End: 2022-10-31

## 2022-10-31 RX ORDER — POTASSIUM CHLORIDE 29.8 MG/ML
40 INJECTION INTRAVENOUS ONCE
Status: COMPLETED | OUTPATIENT
Start: 2022-10-31 | End: 2022-10-31

## 2022-10-31 RX ORDER — MAGNESIUM SULFATE HEPTAHYDRATE 40 MG/ML
4 INJECTION, SOLUTION INTRAVENOUS ONCE
Status: COMPLETED | OUTPATIENT
Start: 2022-10-31 | End: 2022-11-01

## 2022-10-31 RX ADMIN — LACTULOSE 30 ML: 20 SOLUTION ORAL at 11:35

## 2022-10-31 RX ADMIN — NYSTATIN 500000 UNITS: 100000 SUSPENSION ORAL at 16:32

## 2022-10-31 RX ADMIN — TOBRAMYCIN OPHTHALMIC SOLUTION 1 DROP: 3 SOLUTION/ DROPS OPHTHALMIC at 05:09

## 2022-10-31 RX ADMIN — POTASSIUM PHOSPHATE, MONOBASIC AND POTASSIUM PHOSPHATE, DIBASIC 30 MMOL: 224; 236 INJECTION, SOLUTION, CONCENTRATE INTRAVENOUS at 21:10

## 2022-10-31 RX ADMIN — FAMOTIDINE 20 MG: 10 INJECTION, SOLUTION INTRAVENOUS at 05:09

## 2022-10-31 RX ADMIN — MINERAL OIL, PETROLATUM 1 APPLICATION: 425; 573 OINTMENT OPHTHALMIC at 21:16

## 2022-10-31 RX ADMIN — MINERAL OIL, PETROLATUM 1 APPLICATION: 425; 573 OINTMENT OPHTHALMIC at 05:09

## 2022-10-31 RX ADMIN — LEVOTHYROXINE SODIUM 50 MCG: 50 TABLET ORAL at 05:11

## 2022-10-31 RX ADMIN — Medication 100 MCG/HR: at 16:35

## 2022-10-31 RX ADMIN — POTASSIUM CHLORIDE 40 MEQ: 29.8 INJECTION, SOLUTION INTRAVENOUS at 09:03

## 2022-10-31 RX ADMIN — NYSTATIN 500000 UNITS: 100000 SUSPENSION ORAL at 07:49

## 2022-10-31 RX ADMIN — POTASSIUM CHLORIDE 20 MEQ: 14.9 INJECTION, SOLUTION INTRAVENOUS at 12:08

## 2022-10-31 RX ADMIN — LACTULOSE 30 ML: 20 SOLUTION ORAL at 05:11

## 2022-10-31 RX ADMIN — NYSTATIN 500000 UNITS: 100000 SUSPENSION ORAL at 21:13

## 2022-10-31 RX ADMIN — TOBRAMYCIN OPHTHALMIC SOLUTION 1 DROP: 3 SOLUTION/ DROPS OPHTHALMIC at 09:05

## 2022-10-31 RX ADMIN — FAMOTIDINE 20 MG: 10 INJECTION, SOLUTION INTRAVENOUS at 18:11

## 2022-10-31 RX ADMIN — NYSTATIN 500000 UNITS: 100000 SUSPENSION ORAL at 13:39

## 2022-10-31 RX ADMIN — MAGNESIUM SULFATE HEPTAHYDRATE 4 G: 40 INJECTION, SOLUTION INTRAVENOUS at 21:15

## 2022-10-31 RX ADMIN — TOBRAMYCIN OPHTHALMIC SOLUTION 1 DROP: 3 SOLUTION/ DROPS OPHTHALMIC at 13:39

## 2022-10-31 RX ADMIN — LACTULOSE 30 ML: 20 SOLUTION ORAL at 18:11

## 2022-10-31 RX ADMIN — TOBRAMYCIN OPHTHALMIC SOLUTION 1 DROP: 3 SOLUTION/ DROPS OPHTHALMIC at 02:10

## 2022-10-31 RX ADMIN — TOBRAMYCIN OPHTHALMIC SOLUTION 1 DROP: 3 SOLUTION/ DROPS OPHTHALMIC at 21:01

## 2022-10-31 RX ADMIN — MINERAL OIL, PETROLATUM 1 APPLICATION: 425; 573 OINTMENT OPHTHALMIC at 13:39

## 2022-10-31 ASSESSMENT — PAIN DESCRIPTION - PAIN TYPE
TYPE: ACUTE PAIN

## 2022-10-31 NOTE — CARE PLAN
The patient is Watcher - Medium risk of patient condition declining or worsening    Shift Goals  Clinical Goals: Wean FiO2 as able; comfort  Patient Goals: ANETTE  Family Goals: ANETTE    Progress made toward(s) clinical / shift goals:    Problem: Knowledge Deficit - Standard  Goal: Patient and family/care givers will demonstrate understanding of plan of care, disease process/condition, diagnostic tests and medications  Outcome: Progressing     Problem: Risk for Aspiration  Goal: Patient's risk for aspiration will be absent or decrease  Outcome: Progressing     Problem: Skin Integrity  Goal: Skin integrity is maintained or improved  Outcome: Progressing     Problem: Pain - Standard  Goal: Alleviation of pain or a reduction in pain to the patient’s comfort goal  Outcome: Progressing     Problem: Mechanical Ventilation  Goal: Safe management of artificial airway and ventilation  Outcome: Progressing  Goal: Successful weaning off mechanical ventilator, spontaneously maintains adequate gas exchange  Outcome: Progressing  Goal: Patient will be able to express needs and understand communication  Outcome: Progressing     Problem: Hemodynamics  Goal: Patient's hemodynamics, fluid balance and neurologic status will be stable or improve  Outcome: Progressing       Patient is not progressing towards the following goals:

## 2022-10-31 NOTE — PROGRESS NOTES
Critical Care Progress Note    Date of admission  10/18/2022    Chief Complaint  57 y.o. male with history of alcohol abuse, meth use, A. fib not on anticoagulation, liver mass noted on US 8/2022 admitted 10/18/2022 with alcohol withdrawal.  Patient was initially intubated for worsening respiratory failure from alcohol withdrawal -was extubated on 10/21 and reintubated on 10/23 for worsening respiratory failure and altered mental status.  He underwent bronchoscopy on 10/24 due to copious secretions -cultures with MSSA and Pseudomonas so he was started on Zosyn.  He had thoracentesis on 10/25 with removal of 400 cc of pleural fluid -pleural studies with LDH 1020, glucose < 2, cell count with 3511 WBC, 3000 RBCs -culture showed WBCs but no growth.   Patient self extubated on 10/26 overnight and was shortly reintubated after he failed HFNC.  His episode of respiratory failure was complicated by bradycardia and hypotension requiring vasopressors.  On 10/27, he was noted to have significant abdominal distention with dark feculent material coming out of the NG tube.  Fluid resuscitation was continued he continued to have high output from NG tube. CT A/P was obtained which showed dilated loops of bowel concerning for ileus rather than early/low-grade SBO, right larger than left pleural effusions.  Liver nodule seen on ultrasound in 8/2020 she was not visualized on the CT. he was gradually weaned off vasopressors.  He was noted to have done well on his SBT but had low NIF so he was kept intubated.    10/31: Very weak and tired this morning.  Review of imaging and pleural studies concerning for a right loculated parapneumonic effusion -discussed case with IR for chest tube placement.    Review of Systems  Review of Systems   Unable to perform ROS: Intubated      Vital Signs for last 24 hours   Pulse:  [] 110  Resp:  [0-31] 17  BP: (111-157)/() 111/73  SpO2:  [94 %-100 %] 99 %    Hemodynamic parameters for last 24  hours       Respiratory Information for the last 24 hours  Vent Mode: APVCMV  Rate (breaths/min): 22  Vt Target (mL): 460  PEEP/CPAP: 8  MAP: 12  Control VTE (exp VT): 470    Physical Exam   Physical Exam  Vitals and nursing note reviewed.   Constitutional:       Comments: Intubated, sedated   HENT:      Nose: No congestion.      Mouth/Throat:      Mouth: Mucous membranes are moist.   Eyes:      Comments: Injected conjunctiva   Cardiovascular:      Rate and Rhythm: Normal rate and regular rhythm.   Pulmonary:      Comments: Intubated.  Diminished right breath sounds  Abdominal:      General: There is distension.      Palpations: Abdomen is soft.   Musculoskeletal:         General: No deformity.   Skin:     General: Skin is warm.   Neurological:      Comments: Moving all extremities.  Very weak but able to open eyes and nod/shake head to commands       Medications  Current Facility-Administered Medications   Medication Dose Route Frequency Provider Last Rate Last Admin    MD Alert...ICU Electrolyte Replacement per Pharmacy   Other PHARMACY TO DOSE Nilda Franco M.D.        dextrose 50% (D50W) injection 25 g  25 g Intravenous Q15 MIN PRN Nelida Fortune M.D.   25 g at 10/30/22 0649    D5 1/2 NS infusion   Intravenous Continuous Chris Abrams M.D. 100 mL/hr at 10/31/22 0626 Rate Verify at 10/31/22 0626    Pharmacy Consult Request  1 Each Other PHARMACY TO DOSE Chris Abrams M.D.        artificial tears (EYE LUBRICANT) ophth ointment 1 Application  1 Application Both Eyes Q8HRS Chris Abrams M.D.   1 Application at 10/31/22 1339    heparin injection 5,000 Units  5,000 Units Subcutaneous Q8HRS Chris Abrams M.D.   5,000 Units at 10/30/22 2126    nystatin (MYCOSTATIN) 214805 UNIT/ML suspension 500,000 Units  5 mL Buccal 4X/DAY Chris Abrams M.D.   500,000 Units at 10/31/22 1339    famotidine (PEPCID) injection 20 mg  20 mg Intravenous BID Chris Abrams M.D.   20 mg at  10/31/22 0509    fentaNYL (SUBLIMAZE) injection 100 mcg  100 mcg Intravenous Q15 MIN PRN Chris Abrams M.D.   100 mcg at 10/30/22 2245    And    fentaNYL (SUBLIMAZE) injection 200 mcg  200 mcg Intravenous Q15 MIN PRN Chris Abrams M.D.   200 mcg at 10/30/22 0422    And    fentaNYL (SUBLIMAZE) 50 mcg/mL in 50mL (Continuous Infusion)   Intravenous Continuous Chris Abrams M.D.   Stopped at 10/31/22 0508    midazolam (Versed) premix 125 mg/125 mL infusion  0-10 mg/hr Intravenous Continuous Chris Abrams M.D. 2 mL/hr at 10/31/22 0626 2 mg/hr at 10/31/22 0626    tobramycin (TOBREX) 0.3 % ophthalmic solution 1 Drop  1 Drop Right Eye Q4HRS Chris Abrams M.D.   1 Drop at 10/31/22 1339    Respiratory Therapy Consult   Nebulization Continuous RT Greyson Luciano D.O.        lidocaine (XYLOCAINE) 1 % injection 2 mL  2 mL Tracheal Tube Q30 MIN PRN Greyson Luciano D.O.        lactulose 20 GM/30ML solution 30 mL  30 mL Enteral Tube TID Greyson Luciano D.O.   30 mL at 10/31/22 1135    Pharmacy Consult: Enteral tube insertion - review meds/change route/product selection   Other PHARMACY TO DOSE Greyson Luciano D.O.        carboxymethylcellulose (REFRESH TEARS) 0.5 % ophthalmic drops 2 Drop  2 Drop Both Eyes PRN Greyson Luciano D.O.   2 Drop at 10/27/22 1940    albuterol inhaler 2 Puff  2 Puff Inhalation Q6HRS PRN Eva Rodriguez M.D.        Pharmacy Consult Request ...Pain Management Review 1 Each  1 Each Other PHARMACY TO DOSE Eva Rodriguez M.D.        levothyroxine (SYNTHROID) tablet 50 mcg  50 mcg Enteral Tube AM ES Eva Rodriguez M.D.   50 mcg at 10/31/22 0511    acetaminophen (Tylenol) tablet 650 mg  650 mg Enteral Tube Q6HRS PRN Eva Rodriguez M.D.   650 mg at 10/26/22 2257       Fluids    Intake/Output Summary (Last 24 hours) at 10/31/2022 1443  Last data filed at 10/31/2022 1400  Gross per 24 hour   Intake 2475.82 ml   Output 1470 ml   Net 1005.82 ml        Laboratory          Recent Labs     10/29/22  0355 10/29/22  0720 10/31/22  0352 10/31/22  1040   SODIUM  --  148* 150*  --    POTASSIUM 3.7 3.7 2.8*  --    CHLORIDE  --  115* 114*  --    CO2  --  25 29  --    BUN  --  32* 11  --    CREATININE  --  0.97 0.56  --    MAGNESIUM 2.1 2.1  --  1.6   PHOSPHORUS 2.5 1.9*  --  1.5*   CALCIUM  --  7.6* 7.6*  --      Recent Labs     10/29/22  0720 10/31/22  0352 10/31/22  0850   ALTSGPT 11 13  --    ASTSGOT 25 28  --    ALKPHOSPHAT 110* 103*  --    TBILIRUBIN 0.6 0.9  --    PREALBUMIN  --   --  5.7*   GLUCOSE 123* 88  --      Recent Labs     10/29/22  0720 10/31/22  0352 10/31/22  1040   WBC  --   --  9.2   NEUTSPOLYS  --   --  78.00*   LYMPHOCYTES  --   --  10.50*   MONOCYTES  --   --  6.10   EOSINOPHILS  --   --  4.10   BASOPHILS  --   --  0.20   ASTSGOT 25 28  --    ALTSGPT 11 13  --    ALKPHOSPHAT 110* 103*  --    TBILIRUBIN 0.6 0.9  --      Recent Labs     10/31/22  1040   RBC 3.22*   HEMOGLOBIN 10.7*   HEMATOCRIT 33.0*   PLATELETCT 238       Imaging  Reviewed    Assessment/Plan     Septic shock-resolved, now off pressors  MSSA and Pseudomonas pneumonia  Right loculated pleural effusion  Pleural studies from 10/25 with glucose <2, LDH around 1000 and increased WBCs -pleural fluid cultures negative so far patient was already on antibiotics.  This is a complicated parapneumonic effusion based on studies and imaging.  -Continue Zosyn -started 10/24  -Now off all pressors -monitor hemodynamics  -IV fluids as needed  -Discussed case with IR who will place chest tube -n.p.o.  -Depending on output from chest to and follow-up imaging, will consider intrapleural lytic therapy  -Follow-up repeat pleural fluid culture from thoracentesis on 10/30 (only able to remove 150 cc at that time)    Acute hypoxic respiratory failure  Likely secondary to pneumonia and effusion as noted above.  Also with significant weakness/deconditioning of been limiting his ability to be weaned from  the vent.  -Lung protective ventilation  -Wean FiO2 as tolerated  -Daily SAT/SBT  -Replete electrolytes aggressively -had significantly low Phos on labs today  -ETT/oral care, pulmonary hygiene    Atrial fibrillation -currently rate controlled  -Holding home metoprolol due to hypotension-can restart once stable  -Not on chronic anticoagulation -will consider risk versus benefit after chest tube placement    Liver mass  Liver nodule seen on ultrasound in 8/2020 she was not visualized on the CT scan.  -Once stable, can get hepatic mass protocol CT or MRI -can be done as outpatient as well.      VTE:  Lovenox  Ulcer: PPI  Lines: Central Line  Ongoing indication addressed    I have performed a physical exam and reviewed and updated ROS and Plan today (10/31/2022). In review of yesterday's note (10/30/2022), there are no changes except as documented above.     Discussed patient condition and risk of morbidity and/or mortality with Family, RN, RT, Pharmacy, and Charge nurse / hot rounds  The patient remains critically ill.  Critical care time = 65 minutes in directly providing and coordinating critical care and extensive data review.  No time overlap and excludes procedures.        Nilda Franco MD  Pulmonary and Critical Care Medicine  [facility]

## 2022-10-31 NOTE — CARE PLAN
The patient is Watcher - Medium risk of patient condition declining or worsening    Shift Goals  Clinical Goals: Wean FiO2 as able; comfort  Patient Goals: ANETTE  Family Goals: ANETTE    Progress made toward(s) clinical / shift goals:    Problem: Psychosocial  Goal: Patient's level of anxiety will decrease  Outcome: Not Progressing     Problem: Knowledge Deficit - Standard  Goal: Patient and family/care givers will demonstrate understanding of plan of care, disease process/condition, diagnostic tests and medications  Outcome: Progressing     Problem: Risk for Aspiration  Goal: Patient's risk for aspiration will be absent or decrease  10/30/2022 2330 by Dina Madera R.N.  Outcome: Progressing  10/30/2022 2224 by Dina Madera R.N.  Outcome: Progressing     Problem: Skin Integrity  Goal: Skin integrity is maintained or improved  10/30/2022 2330 by Dina Madera R.N.  Outcome: Progressing  10/30/2022 2224 by Dina Madera R.N.  Outcome: Progressing     Problem: Fall Risk  Goal: Patient will remain free from falls  Outcome: Progressing     Problem: Pain - Standard  Goal: Alleviation of pain or a reduction in pain to the patient’s comfort goal  10/30/2022 2330 by Dina Madera R.N.  Outcome: Progressing  10/30/2022 2224 by Dina Madera R.N.  Outcome: Progressing     Problem: Mechanical Ventilation  Goal: Safe management of artificial airway and ventilation  10/30/2022 2330 by Dina Madera R.N.  Outcome: Progressing  10/30/2022 2224 by Dina Madera R.N.  Outcome: Progressing  Goal: Successful weaning off mechanical ventilator, spontaneously maintains adequate gas exchange  10/30/2022 2330 by Dina Madera R.N.  Outcome: Progressing  10/30/2022 2224 by Dina Madera R.N.  Outcome: Progressing  Goal: Patient will be able to express needs and understand communication  10/30/2022 2330 by Dina Madera R.N.  Outcome: Progressing  10/30/2022 2224 by Dina Madera R.N.  Outcome: Progressing     Problem: Hemodynamics  Goal: Patient's  hemodynamics, fluid balance and neurologic status will be stable or improve  Outcome: Progressing       Patient is not progressing towards the following goals:      Problem: Psychosocial  Goal: Patient's level of anxiety will decrease  Outcome: Not Progressing

## 2022-10-31 NOTE — PROGRESS NOTES
Pt follows commands. Hemodynamically stable. On CPAP this morning and unable to tolerate.  Copious amts of oral secretions and via ETT suction. IVF started at 100ml/hr.   TF started at 10ml/hr.  CXR done.  Pt no longer requires isolation per . Thoracentesis done this shift to [facility] with ~150ml output.  Sample sent to lab for culture.  Adequate UOP per wheeler.  Loose brown stools noted.  Daughter at bedside and updated through out shift. Will continue to monitor.

## 2022-10-31 NOTE — PROGRESS NOTES
12-hour chart check complete.    Monitor Summary  Rhythm: A-fib  Rate: 95  Ectopy: Occasional PVCs, PACs  Measurements: -/0.12/-

## 2022-10-31 NOTE — DIETARY
Nutrition support weekly update:  Day 13 of admit.  Tyree Whiteside is a 57 y.o. male with admitting DX of Alcohol withdrawal delirium.  Tube feeding initiated on 10/23. Pt was not able to tolerate TF and TPN was initiated on 10/28. Pt now with BMs and now output through NG tube. TPN held and new TF consult placed. Pt has an NG tube with tip of tube in stomach. Current tube feed order is for Impact Peptide 1.5 at 10 mL/hour.     Assessment:  Weight 10/30/22: 89 kg (bed scale).  Weight variable with range of 82.4 kg to 89 kg. Pt is currently + 5 liters of fluids and has 1+ pitting edema to BLEs. Current wt is highest weight during admit. Wt gain may be fluid related.   Re-estimate of nutritional needs as indicated: not indicated     Estimated needs calculated on 10/28/22:   Calculation/Equation: MSJ x 1.2 = 1994 kcal/day  PSU (24-hour T-max 38.2, VE = 10.2L/min) = 2077 kcal/day   Kcal/day: 1977 - 2177  (Calories / k - 26.4)  Grams protein/day: 92 - 107  (Grams Protein / k.1 - 1.3)    Evaluation:   GI: LBM on 10/31  Labs: 10/31/22: sodium=150 (on water flushes and IV fluids), potassium=2.8, Bun=11, phos=1.5 (trending down), mag=1.6. POC glucose: 67-97  Meds: Pepcid, lactulose  IV fluids : D5 1/2 NS @ 100 mL/hour   Pt noted to have low phos and low potassium. Phos, mag, and potassium are trending down. TPN ran at 50% x 1 day. Pt with poor intake for extended period of time. He is also receiving D5 IV fluids. He may be at risk for refeeding. Pt may benefit from thiamine supplementation. RD will order daily refeeding labs for close monitoring.   RD spoke w/ pt's nurse. Pt currently tolerating Impact Peptide 1.5 at 10 mL/hour. Per nurse, MD does not want to advance beyond trickle feeds yet. When MD is ready to advance, Impact Peptide at a goal rate of 55 mL/hour will provide 1980 kcals, 124 g of protein, and 1016 mL of free water. When MD is ready to advance TF, slow advancement by 15 mL/hour every day  to goal rate of 55 mL/hour is recommended.   Pt is receiving 250 mL water Q 6 hours through  providing 1000 mL of fluids per day.    Malnutrition risk: risk noted due to limited nutrition since 10/26.     Recommendations/Plan:  When approved by MD, advance Impact Peptide 1.5 slowly by 15 mL/hour Q 24 hours until goal rate of 55 mL/hour is achieved.   Additional fluids per MD  Follow phos, mag, and potassium daily x 5 days for refeeding.    Thiamine supplementation for possible refeeding per MD  Monitor weights.     RD will follow.

## 2022-10-31 NOTE — CARE PLAN
The patient is Watcher - Medium risk of patient condition declining or worsening    Shift Goals  Clinical Goals: Wean off vent.  Patient Goals: ANETTE  Family Goals: ANETTE    Progress made toward(s) clinical / shift goals:  .    Patient is not progressing towards the following goals:      Problem: Knowledge Deficit - Standard  Goal: Patient and family/care givers will demonstrate understanding of plan of care, disease process/condition, diagnostic tests and medications  Outcome: Not Progressing     Problem: Psychosocial  Goal: Patient's level of anxiety will decrease  Outcome: Not Progressing

## 2022-10-31 NOTE — PROCEDURES
Thoracentesis    Date/Time: 10/30/2022 5:00 PM  Performed by: Chris Abrams M.D.  Authorized by: Chris Abrams M.D.     Consent:     Consent obtained:  Verbal and written    Consent given by:  Guardian    Risks, benefits, and alternatives were discussed: yes      Risks discussed:  Bleeding, incomplete drainage, infection, nerve damage, pain and pneumothorax    Alternatives discussed:  No treatment  Universal protocol:     Procedure explained and questions answered to patient or proxy's satisfaction: yes      Relevant documents present and verified: yes      Test results available: yes      Imaging studies available: yes      Required blood products, implants, devices, and special equipment available: yes      Site/side marked: yes      Immediately prior to procedure, a time out was called: yes      Patient identity confirmed:  Verbally with patient  Sedation:     Sedation type: under sedation for intubation.  Anesthesia:     Anesthesia method:  Local infiltration    Local anesthetic:  Lidocaine 1% w/o epi  Procedure details:     Preparation: Patient was prepped and draped in usual sterile fashion      Patient position:  Sitting    Location:  R midaxillary line    Intercostal space:  5th    Puncture method:  Over-the-needle catheter    Ultrasound guidance: yes      Indwelling catheter placed: no      Needle gauge:  16    Catheter size:  6 Fr    Number of attempts:  1    Drainage characteristics:  Clear  Post-procedure details:     Chest x-ray performed: yes      Chest x-ray findings:  Pleural effusion unchanged    Procedure completion:  Tolerated    Patient with loculated effusion, only could reach 1 pocket drained ~150cc, performed under ultrasound.  Not safe to advance to other pockets, consider IR consultation for a chest tube.

## 2022-11-01 ENCOUNTER — APPOINTMENT (OUTPATIENT)
Dept: RADIOLOGY | Facility: MEDICAL CENTER | Age: 57
DRG: 870 | End: 2022-11-01
Attending: STUDENT IN AN ORGANIZED HEALTH CARE EDUCATION/TRAINING PROGRAM
Payer: COMMERCIAL

## 2022-11-01 LAB
ANION GAP SERPL CALC-SCNC: 7 MMOL/L (ref 7–16)
BASOPHILS # BLD AUTO: 0.4 % (ref 0–1.8)
BASOPHILS # BLD: 0.04 K/UL (ref 0–0.12)
BUN SERPL-MCNC: 6 MG/DL (ref 8–22)
CA-I SERPL-SCNC: 1.05 MMOL/L (ref 1.1–1.3)
CALCIUM SERPL-MCNC: 7.8 MG/DL (ref 8.4–10.2)
CHLORIDE SERPL-SCNC: 108 MMOL/L (ref 96–112)
CO2 SERPL-SCNC: 27 MMOL/L (ref 20–33)
CREAT SERPL-MCNC: 0.5 MG/DL (ref 0.5–1.4)
EOSINOPHIL # BLD AUTO: 0.54 K/UL (ref 0–0.51)
EOSINOPHIL NFR BLD: 4.7 % (ref 0–6.9)
ERYTHROCYTE [DISTWIDTH] IN BLOOD BY AUTOMATED COUNT: 52.8 FL (ref 35.9–50)
GFR SERPLBLD CREATININE-BSD FMLA CKD-EPI: 120 ML/MIN/1.73 M 2
GLUCOSE BLD STRIP.AUTO-MCNC: 87 MG/DL (ref 65–99)
GLUCOSE BLD STRIP.AUTO-MCNC: 87 MG/DL (ref 65–99)
GLUCOSE SERPL-MCNC: 92 MG/DL (ref 65–99)
HCT VFR BLD AUTO: 35.2 % (ref 42–52)
HGB BLD-MCNC: 11.4 G/DL (ref 14–18)
IMM GRANULOCYTES # BLD AUTO: 0.1 K/UL (ref 0–0.11)
IMM GRANULOCYTES NFR BLD AUTO: 0.9 % (ref 0–0.9)
LYMPHOCYTES # BLD AUTO: 0.99 K/UL (ref 1–4.8)
LYMPHOCYTES NFR BLD: 8.7 % (ref 22–41)
MAGNESIUM SERPL-MCNC: 2.1 MG/DL (ref 1.5–2.5)
MCH RBC QN AUTO: 33 PG (ref 27–33)
MCHC RBC AUTO-ENTMCNC: 32.4 G/DL (ref 33.7–35.3)
MCV RBC AUTO: 102 FL (ref 81.4–97.8)
MONOCYTES # BLD AUTO: 0.74 K/UL (ref 0–0.85)
MONOCYTES NFR BLD AUTO: 6.5 % (ref 0–13.4)
NEUTROPHILS # BLD AUTO: 8.99 K/UL (ref 1.82–7.42)
NEUTROPHILS NFR BLD: 78.8 % (ref 44–72)
NRBC # BLD AUTO: 0 K/UL
NRBC BLD-RTO: 0 /100 WBC
PHOSPHATE SERPL-MCNC: 3.4 MG/DL (ref 2.5–4.5)
PLATELET # BLD AUTO: 283 K/UL (ref 164–446)
PMV BLD AUTO: 11.3 FL (ref 9–12.9)
POTASSIUM SERPL-SCNC: 3.4 MMOL/L (ref 3.6–5.5)
RBC # BLD AUTO: 3.45 M/UL (ref 4.7–6.1)
SODIUM SERPL-SCNC: 142 MMOL/L (ref 135–145)
WBC # BLD AUTO: 11.4 K/UL (ref 4.8–10.8)

## 2022-11-01 PROCEDURE — 85025 COMPLETE CBC W/AUTO DIFF WBC: CPT

## 2022-11-01 PROCEDURE — 770022 HCHG ROOM/CARE - ICU (200)

## 2022-11-01 PROCEDURE — 700111 HCHG RX REV CODE 636 W/ 250 OVERRIDE (IP)

## 2022-11-01 PROCEDURE — 94799 UNLISTED PULMONARY SVC/PX: CPT

## 2022-11-01 PROCEDURE — A9270 NON-COVERED ITEM OR SERVICE: HCPCS | Performed by: HOSPITALIST

## 2022-11-01 PROCEDURE — 700105 HCHG RX REV CODE 258: Performed by: STUDENT IN AN ORGANIZED HEALTH CARE EDUCATION/TRAINING PROGRAM

## 2022-11-01 PROCEDURE — A9270 NON-COVERED ITEM OR SERVICE: HCPCS | Performed by: INTERNAL MEDICINE

## 2022-11-01 PROCEDURE — 700111 HCHG RX REV CODE 636 W/ 250 OVERRIDE (IP): Performed by: INTERNAL MEDICINE

## 2022-11-01 PROCEDURE — 71045 X-RAY EXAM CHEST 1 VIEW: CPT

## 2022-11-01 PROCEDURE — 32551 INSERTION OF CHEST TUBE: CPT | Mod: RT | Performed by: STUDENT IN AN ORGANIZED HEALTH CARE EDUCATION/TRAINING PROGRAM

## 2022-11-01 PROCEDURE — 700102 HCHG RX REV CODE 250 W/ 637 OVERRIDE(OP): Performed by: INTERNAL MEDICINE

## 2022-11-01 PROCEDURE — 80048 BASIC METABOLIC PNL TOTAL CA: CPT

## 2022-11-01 PROCEDURE — 700111 HCHG RX REV CODE 636 W/ 250 OVERRIDE (IP): Performed by: STUDENT IN AN ORGANIZED HEALTH CARE EDUCATION/TRAINING PROGRAM

## 2022-11-01 PROCEDURE — 0W9930Z DRAINAGE OF RIGHT PLEURAL CAVITY WITH DRAINAGE DEVICE, PERCUTANEOUS APPROACH: ICD-10-PCS | Performed by: STUDENT IN AN ORGANIZED HEALTH CARE EDUCATION/TRAINING PROGRAM

## 2022-11-01 PROCEDURE — 83735 ASSAY OF MAGNESIUM: CPT

## 2022-11-01 PROCEDURE — A9270 NON-COVERED ITEM OR SERVICE: HCPCS | Performed by: STUDENT IN AN ORGANIZED HEALTH CARE EDUCATION/TRAINING PROGRAM

## 2022-11-01 PROCEDURE — 700102 HCHG RX REV CODE 250 W/ 637 OVERRIDE(OP): Performed by: HOSPITALIST

## 2022-11-01 PROCEDURE — 700102 HCHG RX REV CODE 250 W/ 637 OVERRIDE(OP): Performed by: STUDENT IN AN ORGANIZED HEALTH CARE EDUCATION/TRAINING PROGRAM

## 2022-11-01 PROCEDURE — 84100 ASSAY OF PHOSPHORUS: CPT

## 2022-11-01 PROCEDURE — 99291 CRITICAL CARE FIRST HOUR: CPT | Mod: 25 | Performed by: STUDENT IN AN ORGANIZED HEALTH CARE EDUCATION/TRAINING PROGRAM

## 2022-11-01 PROCEDURE — 82962 GLUCOSE BLOOD TEST: CPT | Mod: 91

## 2022-11-01 PROCEDURE — 700105 HCHG RX REV CODE 258: Performed by: INTERNAL MEDICINE

## 2022-11-01 PROCEDURE — 94760 N-INVAS EAR/PLS OXIMETRY 1: CPT

## 2022-11-01 PROCEDURE — 94003 VENT MGMT INPAT SUBQ DAY: CPT

## 2022-11-01 PROCEDURE — 82330 ASSAY OF CALCIUM: CPT

## 2022-11-01 RX ORDER — MIDAZOLAM HYDROCHLORIDE 1 MG/ML
2 INJECTION INTRAMUSCULAR; INTRAVENOUS ONCE
Status: COMPLETED | OUTPATIENT
Start: 2022-11-01 | End: 2022-11-01

## 2022-11-01 RX ORDER — MIDAZOLAM HYDROCHLORIDE 1 MG/ML
INJECTION INTRAMUSCULAR; INTRAVENOUS
Status: COMPLETED
Start: 2022-11-01 | End: 2022-11-01

## 2022-11-01 RX ORDER — MIDAZOLAM HYDROCHLORIDE 1 MG/ML
2 INJECTION INTRAMUSCULAR; INTRAVENOUS ONCE
Status: ACTIVE | OUTPATIENT
Start: 2022-11-01 | End: 2022-11-02

## 2022-11-01 RX ORDER — CALCIUM GLUCONATE 20 MG/ML
1 INJECTION, SOLUTION INTRAVENOUS ONCE
Status: COMPLETED | OUTPATIENT
Start: 2022-11-01 | End: 2022-11-01

## 2022-11-01 RX ORDER — MIDAZOLAM HYDROCHLORIDE 1 MG/ML
2 INJECTION INTRAMUSCULAR; INTRAVENOUS
Status: DISCONTINUED | OUTPATIENT
Start: 2022-11-01 | End: 2022-11-01

## 2022-11-01 RX ADMIN — MIDAZOLAM 2 MG: 1 INJECTION, SOLUTION INTRAMUSCULAR; INTRAVENOUS at 12:14

## 2022-11-01 RX ADMIN — FENTANYL CITRATE 50 MCG: 50 INJECTION, SOLUTION INTRAMUSCULAR; INTRAVENOUS at 12:18

## 2022-11-01 RX ADMIN — TOBRAMYCIN OPHTHALMIC SOLUTION 1 DROP: 3 SOLUTION/ DROPS OPHTHALMIC at 05:06

## 2022-11-01 RX ADMIN — DEXTROSE AND SODIUM CHLORIDE: 5; 450 INJECTION, SOLUTION INTRAVENOUS at 01:41

## 2022-11-01 RX ADMIN — CALCIUM GLUCONATE 1 G: 20 INJECTION, SOLUTION INTRAVENOUS at 07:27

## 2022-11-01 RX ADMIN — NYSTATIN 500000 UNITS: 100000 SUSPENSION ORAL at 21:16

## 2022-11-01 RX ADMIN — NYSTATIN 500000 UNITS: 100000 SUSPENSION ORAL at 11:34

## 2022-11-01 RX ADMIN — FAMOTIDINE 20 MG: 10 INJECTION, SOLUTION INTRAVENOUS at 05:05

## 2022-11-01 RX ADMIN — MINERAL OIL, PETROLATUM 1 APPLICATION: 425; 573 OINTMENT OPHTHALMIC at 22:08

## 2022-11-01 RX ADMIN — FAMOTIDINE 20 MG: 10 INJECTION, SOLUTION INTRAVENOUS at 17:50

## 2022-11-01 RX ADMIN — LACTULOSE 30 ML: 20 SOLUTION ORAL at 17:50

## 2022-11-01 RX ADMIN — DEXTROSE AND SODIUM CHLORIDE 1000 ML: 5; 450 INJECTION, SOLUTION INTRAVENOUS at 23:42

## 2022-11-01 RX ADMIN — CARBOXYMETHYLCELLULOSE SODIUM 2 DROP: 5 SOLUTION/ DROPS OPHTHALMIC at 22:06

## 2022-11-01 RX ADMIN — MINERAL OIL, PETROLATUM 1 APPLICATION: 425; 573 OINTMENT OPHTHALMIC at 14:07

## 2022-11-01 RX ADMIN — LACTULOSE 30 ML: 20 SOLUTION ORAL at 05:04

## 2022-11-01 RX ADMIN — ACETAMINOPHEN 650 MG: 325 TABLET, FILM COATED ORAL at 22:48

## 2022-11-01 RX ADMIN — HEPARIN SODIUM 5000 UNITS: 5000 INJECTION, SOLUTION INTRAVENOUS; SUBCUTANEOUS at 22:06

## 2022-11-01 RX ADMIN — NYSTATIN 500000 UNITS: 100000 SUSPENSION ORAL at 17:50

## 2022-11-01 RX ADMIN — NYSTATIN 500000 UNITS: 100000 SUSPENSION ORAL at 07:28

## 2022-11-01 RX ADMIN — MIDAZOLAM 2 MG/HR: 5 INJECTION INTRAMUSCULAR; INTRAVENOUS at 09:29

## 2022-11-01 RX ADMIN — HEPARIN SODIUM 5000 UNITS: 5000 INJECTION, SOLUTION INTRAVENOUS; SUBCUTANEOUS at 14:06

## 2022-11-01 RX ADMIN — LACTULOSE 30 ML: 20 SOLUTION ORAL at 11:34

## 2022-11-01 RX ADMIN — MIDAZOLAM HYDROCHLORIDE 2 MG: 1 INJECTION INTRAMUSCULAR; INTRAVENOUS at 12:21

## 2022-11-01 RX ADMIN — POTASSIUM BICARBONATE 50 MEQ: 978 TABLET, EFFERVESCENT ORAL at 07:27

## 2022-11-01 RX ADMIN — Medication 150 MCG/HR: at 12:04

## 2022-11-01 RX ADMIN — TOBRAMYCIN OPHTHALMIC SOLUTION 1 DROP: 3 SOLUTION/ DROPS OPHTHALMIC at 01:41

## 2022-11-01 RX ADMIN — PIPERACILLIN AND TAZOBACTAM 4.5 G: 4; .5 INJECTION, POWDER, FOR SOLUTION INTRAVENOUS at 21:15

## 2022-11-01 RX ADMIN — DEXTROSE AND SODIUM CHLORIDE: 5; 450 INJECTION, SOLUTION INTRAVENOUS at 13:34

## 2022-11-01 RX ADMIN — MIDAZOLAM 2 MG: 1 INJECTION, SOLUTION INTRAMUSCULAR; INTRAVENOUS at 12:21

## 2022-11-01 RX ADMIN — CARBOXYMETHYLCELLULOSE SODIUM 2 DROP: 5 SOLUTION/ DROPS OPHTHALMIC at 14:06

## 2022-11-01 RX ADMIN — PIPERACILLIN AND TAZOBACTAM 4.5 G: 4; .5 INJECTION, POWDER, FOR SOLUTION INTRAVENOUS at 14:14

## 2022-11-01 RX ADMIN — CARBOXYMETHYLCELLULOSE SODIUM 2 DROP: 5 SOLUTION/ DROPS OPHTHALMIC at 05:05

## 2022-11-01 RX ADMIN — MINERAL OIL, PETROLATUM 1 APPLICATION: 425; 573 OINTMENT OPHTHALMIC at 05:05

## 2022-11-01 RX ADMIN — PIPERACILLIN AND TAZOBACTAM 4.5 G: 4; .5 INJECTION, POWDER, FOR SOLUTION INTRAVENOUS at 11:58

## 2022-11-01 RX ADMIN — LEVOTHYROXINE SODIUM 50 MCG: 50 TABLET ORAL at 05:04

## 2022-11-01 RX ADMIN — ACETAMINOPHEN 650 MG: 325 TABLET, FILM COATED ORAL at 14:05

## 2022-11-01 ASSESSMENT — PAIN DESCRIPTION - PAIN TYPE
TYPE: ACUTE PAIN

## 2022-11-01 ASSESSMENT — FIBROSIS 4 INDEX: FIB4 SCORE: 1.54

## 2022-11-01 NOTE — PROGRESS NOTES
Pt. Neurologically intact. Hemodynamically stable. Unable to wean off vent. Copious amount of oral/ETT secretions .  TF placed on hold for pending CT placement in IR.  Brown loose stools noted , rectal tube in place.  Adequate UOP per wheeler.  60mEq of KCL given as ordered for serum K of 2.8.

## 2022-11-01 NOTE — WOUND TEAM
Wound team note:   Pt seen for placement of BMS. MD order verified. Pt assessed, noted to be incontinent of watery loose brown stool. Sacrum/buttocks red and intact, scrotum with some denuded skin. Sphincter tone determined to be adequate. BMS placed by CALLUM Us without difficulty, 40 mL of tap water placed in retention balloon, irrigated with 120 mL warm tap water. Confirmed irrigant/stool output in tube- Yes. Nursing to irrigate q shift with 60 mL-120 mL warm tap water or until patent.

## 2022-11-01 NOTE — FLOWSHEET NOTE
10/31/22 1844   Events/Summary/Plan   Skin Inspection Respiratory Device Intact   Ventiliation   $ Ventilation - Subsequent Yes   Ventilator Management Group   Intensivist Group Yes   General Vent Information   Ventilator Number [id number]   Vent Mode APVCMV   Vent Alarms   Ventilation Alarms Reviewed / Activated Yes   Upper Pressure Limit Alarm 40   Lower Pressure Limit 4   Low VE Alarm 4   High Respiratory Rate Alarm 40   Low Respiratory Rate Alarm 8   Low VT Alarm 150   Apnea Parameters Checked / Activated Yes   EtCO2 High Alarm 60   EtCO2 Low Alarm 20   Vent Settings   FiO2% 30 %   Rate (breaths/min) 22   Vent Temp HME Yes   Vt Target (mL) 460   PIP 26   TI (Seconds) 0.88   PEEP/CPAP 8   P Support 5   Pramp 175   ETS(%) 25   Trigger Type Flow Trigger   Sensitivity Setting 5   Vent Readings   PIP 16    Minute Volume 11   Control VTE (exp VT) 465   Spontaneous VTE (exp VT) 470   f Total (Breaths/Min) 22   I:E Ratio 2   MAP 12   PEEP/CPAP MONITORED 8   Static Compliance (ml / cm H2O) 54   R exp 14   Plateau Pressure 15   Spontaneous Breathing Trial (SBT)   Safety screen spontaneous breathing trial (SBT) Failed SAT - Do NOT begin SBT   VAP   Oral Care Oral Suctioning   Head of Bed Elevated Greater than 30 degrees contraindicated

## 2022-11-01 NOTE — CARE PLAN
Problem: Skin Integrity  Goal: Skin integrity is maintained or improved  Outcome: Progressing     Problem: Mechanical Ventilation  Goal: Safe management of artificial airway and ventilation  Outcome: Progressing     The patient is Watcher - Medium risk of patient condition declining or worsening    Shift Goals  Clinical Goals: chest tube insertion  Patient Goals: ANETTE  Family Goals: comfort    Progress made toward(s) clinical / shift goals:  Chest tube placed bedside by MD. Patient tolerated, yellow output. BMS placed by wound RN, output recorded. Salmon draining adequate. Temp max 102, tylenol given. Q2 turns. Daughter Laura at bedside.     Patient is not progressing towards the following goals:

## 2022-11-01 NOTE — DISCHARGE PLANNING
Case Management Discharge Planning    Admission Date: 10/18/2022  GMLOS: 5  ALOS: 14    6-Clicks ADL Score: 6  6-Clicks Mobility Score: 6    Anticipated Discharge Dispo: Discharge Disposition: D/T to SNF with Medicare cert in anticipation of skilled care (03)      Action(s) Taken: RNCM participated in IDT rounds. No pending discharge needs at this time.     Escalations Completed: None    Medically Clear: No    Next Steps: RNCM will continue to follow for any discharge planning assistance.     Barriers to Discharge: Medical clearance    Is the patient up for discharge tomorrow: No

## 2022-11-01 NOTE — FLOWSHEET NOTE
10/31/22 1841   Vital Signs   Pulse 95   Respiration (!) 22   Pulse Oximetry 97 %   $ Pulse Oximetry (Spot Check) Yes   O2 Alarms Set & Reviewed Yes   CO2 Monitoring   EtCO2 Calibration Yes   ETCO2 (mmHg) 34   $ ETCO2 Monitoring Yes   Chest Exam   Work Of Breathing / Effort Vented   Breath Sounds   RUL Breath Sounds Clear   RML Breath Sounds Clear   RLL Breath Sounds Clear   NAHOMI Breath Sounds Clear   LLL Breath Sounds Clear   Secretions   Cough Moist   How Sputum Obtained Oropharyngeal   Sputum Amount Small   Sputum Color Clear   Airway ETT 7.5   Placement Date/Time: 10/23/22 0912   Airway Type: ETT  Airway Size: 7.5  Inserted In: Unit  Inserted by: MD   Site Assessment Clean;Dry;Intact   Airway Tube Secured Commercial securing device   Secured At  (cm) 24   Tube Repositioned Left   Cuffless No   Oxygen   FiO2% 30 %   O2 Delivery Device Ventilator   Resuscitation Device at Bedside Self Inflating Bag

## 2022-11-01 NOTE — CARE PLAN
The patient is Watcher - Medium risk of patient condition declining or worsening    Shift Goals  Clinical Goals: wean off vent, wean sedation, CT insertion  Patient Goals: ANETTE  Family Goals: comfort    Progress made toward(s) clinical / shift goals:    Problem: Knowledge Deficit - Standard  Goal: Patient and family/care givers will demonstrate understanding of plan of care, disease process/condition, diagnostic tests and medications  Outcome: Not Progressing     Problem: Psychosocial  Goal: Patient's level of anxiety will decrease  Outcome: Not Progressing       Patient is not progressing towards the following goals:      Problem: Knowledge Deficit - Standard  Goal: Patient and family/care givers will demonstrate understanding of plan of care, disease process/condition, diagnostic tests and medications  Outcome: Not Progressing     Problem: Psychosocial  Goal: Patient's level of anxiety will decrease  Outcome: Not Progressing

## 2022-11-02 ENCOUNTER — APPOINTMENT (OUTPATIENT)
Dept: RADIOLOGY | Facility: MEDICAL CENTER | Age: 57
DRG: 870 | End: 2022-11-02
Attending: STUDENT IN AN ORGANIZED HEALTH CARE EDUCATION/TRAINING PROGRAM
Payer: COMMERCIAL

## 2022-11-02 LAB
ALBUMIN SERPL BCP-MCNC: 1.9 G/DL (ref 3.2–4.9)
ALBUMIN/GLOB SERPL: 0.5 G/DL
ALP SERPL-CCNC: 138 U/L (ref 30–99)
ALT SERPL-CCNC: 20 U/L (ref 2–50)
ANION GAP SERPL CALC-SCNC: 8 MMOL/L (ref 7–16)
AST SERPL-CCNC: 37 U/L (ref 12–45)
BACTERIA SPEC RESP CULT: ABNORMAL
BACTERIA SPEC RESP CULT: ABNORMAL
BASOPHILS # BLD AUTO: 0.6 % (ref 0–1.8)
BASOPHILS # BLD: 0.05 K/UL (ref 0–0.12)
BILIRUB SERPL-MCNC: 1.4 MG/DL (ref 0.1–1.5)
BUN SERPL-MCNC: 7 MG/DL (ref 8–22)
CA-I SERPL-SCNC: 1.07 MMOL/L (ref 1.1–1.3)
CALCIUM SERPL-MCNC: 7.6 MG/DL (ref 8.4–10.2)
CHLORIDE SERPL-SCNC: 107 MMOL/L (ref 96–112)
CO2 SERPL-SCNC: 24 MMOL/L (ref 20–33)
CREAT SERPL-MCNC: 0.65 MG/DL (ref 0.5–1.4)
EOSINOPHIL # BLD AUTO: 0.8 K/UL (ref 0–0.51)
EOSINOPHIL NFR BLD: 9.5 % (ref 0–6.9)
ERYTHROCYTE [DISTWIDTH] IN BLOOD BY AUTOMATED COUNT: 51.7 FL (ref 35.9–50)
GFR SERPLBLD CREATININE-BSD FMLA CKD-EPI: 110 ML/MIN/1.73 M 2
GLOBULIN SER CALC-MCNC: 3.8 G/DL (ref 1.9–3.5)
GLUCOSE BLD STRIP.AUTO-MCNC: 122 MG/DL (ref 65–99)
GLUCOSE BLD STRIP.AUTO-MCNC: 87 MG/DL (ref 65–99)
GLUCOSE BLD STRIP.AUTO-MCNC: 90 MG/DL (ref 65–99)
GLUCOSE SERPL-MCNC: 87 MG/DL (ref 65–99)
GRAM STN SPEC: ABNORMAL
HCT VFR BLD AUTO: 31.6 % (ref 42–52)
HGB BLD-MCNC: 10.4 G/DL (ref 14–18)
IMM GRANULOCYTES # BLD AUTO: 0.14 K/UL (ref 0–0.11)
IMM GRANULOCYTES NFR BLD AUTO: 1.7 % (ref 0–0.9)
LYMPHOCYTES # BLD AUTO: 0.93 K/UL (ref 1–4.8)
LYMPHOCYTES NFR BLD: 11 % (ref 22–41)
MAGNESIUM SERPL-MCNC: 1.6 MG/DL (ref 1.5–2.5)
MCH RBC QN AUTO: 33.3 PG (ref 27–33)
MCHC RBC AUTO-ENTMCNC: 32.9 G/DL (ref 33.7–35.3)
MCV RBC AUTO: 101.3 FL (ref 81.4–97.8)
MONOCYTES # BLD AUTO: 0.82 K/UL (ref 0–0.85)
MONOCYTES NFR BLD AUTO: 9.7 % (ref 0–13.4)
NEUTROPHILS # BLD AUTO: 5.69 K/UL (ref 1.82–7.42)
NEUTROPHILS NFR BLD: 67.5 % (ref 44–72)
NRBC # BLD AUTO: 0 K/UL
NRBC BLD-RTO: 0 /100 WBC
PHOSPHATE SERPL-MCNC: 3.2 MG/DL (ref 2.5–4.5)
PLATELET # BLD AUTO: 334 K/UL (ref 164–446)
PMV BLD AUTO: 11.6 FL (ref 9–12.9)
POTASSIUM SERPL-SCNC: 3.2 MMOL/L (ref 3.6–5.5)
POTASSIUM SERPL-SCNC: 3.7 MMOL/L (ref 3.6–5.5)
PROT SERPL-MCNC: 5.7 G/DL (ref 6–8.2)
RBC # BLD AUTO: 3.12 M/UL (ref 4.7–6.1)
SIGNIFICANT IND 70042: ABNORMAL
SITE SITE: ABNORMAL
SODIUM SERPL-SCNC: 139 MMOL/L (ref 135–145)
SOURCE SOURCE: ABNORMAL
WBC # BLD AUTO: 8.4 K/UL (ref 4.8–10.8)

## 2022-11-02 PROCEDURE — 700111 HCHG RX REV CODE 636 W/ 250 OVERRIDE (IP): Performed by: INTERNAL MEDICINE

## 2022-11-02 PROCEDURE — 83735 ASSAY OF MAGNESIUM: CPT

## 2022-11-02 PROCEDURE — 94799 UNLISTED PULMONARY SVC/PX: CPT

## 2022-11-02 PROCEDURE — 94003 VENT MGMT INPAT SUBQ DAY: CPT

## 2022-11-02 PROCEDURE — 71045 X-RAY EXAM CHEST 1 VIEW: CPT

## 2022-11-02 PROCEDURE — 84100 ASSAY OF PHOSPHORUS: CPT

## 2022-11-02 PROCEDURE — 36592 COLLECT BLOOD FROM PICC: CPT

## 2022-11-02 PROCEDURE — 32561 LYSE CHEST FIBRIN INIT DAY: CPT | Mod: RT | Performed by: STUDENT IN AN ORGANIZED HEALTH CARE EDUCATION/TRAINING PROGRAM

## 2022-11-02 PROCEDURE — 82962 GLUCOSE BLOOD TEST: CPT

## 2022-11-02 PROCEDURE — 84132 ASSAY OF SERUM POTASSIUM: CPT

## 2022-11-02 PROCEDURE — 700102 HCHG RX REV CODE 250 W/ 637 OVERRIDE(OP): Performed by: INTERNAL MEDICINE

## 2022-11-02 PROCEDURE — A9270 NON-COVERED ITEM OR SERVICE: HCPCS | Performed by: HOSPITALIST

## 2022-11-02 PROCEDURE — 700102 HCHG RX REV CODE 250 W/ 637 OVERRIDE(OP): Performed by: HOSPITALIST

## 2022-11-02 PROCEDURE — 94760 N-INVAS EAR/PLS OXIMETRY 1: CPT

## 2022-11-02 PROCEDURE — 770022 HCHG ROOM/CARE - ICU (200)

## 2022-11-02 PROCEDURE — 85025 COMPLETE CBC W/AUTO DIFF WBC: CPT

## 2022-11-02 PROCEDURE — A9270 NON-COVERED ITEM OR SERVICE: HCPCS | Performed by: INTERNAL MEDICINE

## 2022-11-02 PROCEDURE — 94669 MECHANICAL CHEST WALL OSCILL: CPT

## 2022-11-02 PROCEDURE — 700105 HCHG RX REV CODE 258: Performed by: INTERNAL MEDICINE

## 2022-11-02 PROCEDURE — 700105 HCHG RX REV CODE 258: Performed by: STUDENT IN AN ORGANIZED HEALTH CARE EDUCATION/TRAINING PROGRAM

## 2022-11-02 PROCEDURE — 700101 HCHG RX REV CODE 250: Performed by: STUDENT IN AN ORGANIZED HEALTH CARE EDUCATION/TRAINING PROGRAM

## 2022-11-02 PROCEDURE — 700111 HCHG RX REV CODE 636 W/ 250 OVERRIDE (IP): Performed by: STUDENT IN AN ORGANIZED HEALTH CARE EDUCATION/TRAINING PROGRAM

## 2022-11-02 PROCEDURE — 82330 ASSAY OF CALCIUM: CPT

## 2022-11-02 PROCEDURE — 99291 CRITICAL CARE FIRST HOUR: CPT | Mod: 25 | Performed by: STUDENT IN AN ORGANIZED HEALTH CARE EDUCATION/TRAINING PROGRAM

## 2022-11-02 PROCEDURE — 80053 COMPREHEN METABOLIC PANEL: CPT

## 2022-11-02 RX ORDER — POTASSIUM CHLORIDE 29.8 MG/ML
40 INJECTION INTRAVENOUS ONCE
Status: COMPLETED | OUTPATIENT
Start: 2022-11-02 | End: 2022-11-02

## 2022-11-02 RX ORDER — CALCIUM GLUCONATE 20 MG/ML
1 INJECTION, SOLUTION INTRAVENOUS ONCE
Status: COMPLETED | OUTPATIENT
Start: 2022-11-02 | End: 2022-11-02

## 2022-11-02 RX ORDER — LACTULOSE 20 G/30ML
15 SOLUTION ORAL 2 TIMES DAILY PRN
Status: DISCONTINUED | OUTPATIENT
Start: 2022-11-02 | End: 2022-11-29 | Stop reason: HOSPADM

## 2022-11-02 RX ORDER — MAGNESIUM SULFATE HEPTAHYDRATE 40 MG/ML
2 INJECTION, SOLUTION INTRAVENOUS ONCE
Status: COMPLETED | OUTPATIENT
Start: 2022-11-02 | End: 2022-11-02

## 2022-11-02 RX ORDER — DEXMEDETOMIDINE HYDROCHLORIDE 4 UG/ML
.1-1.5 INJECTION, SOLUTION INTRAVENOUS CONTINUOUS
Status: DISCONTINUED | OUTPATIENT
Start: 2022-11-02 | End: 2022-11-04

## 2022-11-02 RX ORDER — GLYCOPYRROLATE 0.2 MG/ML
0.2 INJECTION INTRAMUSCULAR; INTRAVENOUS EVERY 4 HOURS PRN
Status: DISCONTINUED | OUTPATIENT
Start: 2022-11-02 | End: 2022-11-03

## 2022-11-02 RX ADMIN — ALTEPLASE 2 MG: 2.2 INJECTION, POWDER, LYOPHILIZED, FOR SOLUTION INTRAVENOUS at 15:29

## 2022-11-02 RX ADMIN — NYSTATIN 500000 UNITS: 100000 SUSPENSION ORAL at 17:34

## 2022-11-02 RX ADMIN — FENTANYL CITRATE 100 MCG: 50 INJECTION, SOLUTION INTRAMUSCULAR; INTRAVENOUS at 02:36

## 2022-11-02 RX ADMIN — GLYCOPYRROLATE 0.2 MG: 0.2 INJECTION INTRAMUSCULAR; INTRAVENOUS at 21:23

## 2022-11-02 RX ADMIN — PIPERACILLIN AND TAZOBACTAM 4.5 G: 4; .5 INJECTION, POWDER, FOR SOLUTION INTRAVENOUS at 21:12

## 2022-11-02 RX ADMIN — PIPERACILLIN AND TAZOBACTAM 4.5 G: 4; .5 INJECTION, POWDER, FOR SOLUTION INTRAVENOUS at 05:48

## 2022-11-02 RX ADMIN — FAMOTIDINE 20 MG: 10 INJECTION, SOLUTION INTRAVENOUS at 17:34

## 2022-11-02 RX ADMIN — ALTEPLASE 5 MG: KIT at 19:03

## 2022-11-02 RX ADMIN — CALCIUM GLUCONATE 1 G: 20 INJECTION, SOLUTION INTRAVENOUS at 10:15

## 2022-11-02 RX ADMIN — FENTANYL CITRATE 200 MCG: 50 INJECTION, SOLUTION INTRAMUSCULAR; INTRAVENOUS at 04:02

## 2022-11-02 RX ADMIN — FAMOTIDINE 20 MG: 10 INJECTION, SOLUTION INTRAVENOUS at 05:28

## 2022-11-02 RX ADMIN — Medication 100 MCG/HR: at 00:33

## 2022-11-02 RX ADMIN — MINERAL OIL, PETROLATUM 1 APPLICATION: 425; 573 OINTMENT OPHTHALMIC at 21:15

## 2022-11-02 RX ADMIN — GLYCOPYRROLATE 0.2 MG: 0.2 INJECTION INTRAMUSCULAR; INTRAVENOUS at 08:58

## 2022-11-02 RX ADMIN — DORNASE ALFA 5 MG: 1 SOLUTION RESPIRATORY (INHALATION) at 19:03

## 2022-11-02 RX ADMIN — LEVOTHYROXINE SODIUM 50 MCG: 50 TABLET ORAL at 05:28

## 2022-11-02 RX ADMIN — NYSTATIN 500000 UNITS: 100000 SUSPENSION ORAL at 09:05

## 2022-11-02 RX ADMIN — NYSTATIN 500000 UNITS: 100000 SUSPENSION ORAL at 21:23

## 2022-11-02 RX ADMIN — MAGNESIUM SULFATE 2 G: 2 INJECTION INTRAVENOUS at 09:28

## 2022-11-02 RX ADMIN — MINERAL OIL, PETROLATUM 1 APPLICATION: 425; 573 OINTMENT OPHTHALMIC at 14:45

## 2022-11-02 RX ADMIN — PIPERACILLIN AND TAZOBACTAM 4.5 G: 4; .5 INJECTION, POWDER, FOR SOLUTION INTRAVENOUS at 14:46

## 2022-11-02 RX ADMIN — HEPARIN SODIUM 5000 UNITS: 5000 INJECTION, SOLUTION INTRAVENOUS; SUBCUTANEOUS at 14:44

## 2022-11-02 RX ADMIN — HEPARIN SODIUM 5000 UNITS: 5000 INJECTION, SOLUTION INTRAVENOUS; SUBCUTANEOUS at 05:27

## 2022-11-02 RX ADMIN — HEPARIN SODIUM 5000 UNITS: 5000 INJECTION, SOLUTION INTRAVENOUS; SUBCUTANEOUS at 21:15

## 2022-11-02 RX ADMIN — ALTEPLASE 2 MG: 2.2 INJECTION, POWDER, LYOPHILIZED, FOR SOLUTION INTRAVENOUS at 10:14

## 2022-11-02 RX ADMIN — DEXTROSE MONOHYDRATE 25 G: 100 INJECTION, SOLUTION INTRAVENOUS at 20:12

## 2022-11-02 RX ADMIN — MINERAL OIL, PETROLATUM 1 APPLICATION: 425; 573 OINTMENT OPHTHALMIC at 05:27

## 2022-11-02 RX ADMIN — NYSTATIN 500000 UNITS: 100000 SUSPENSION ORAL at 14:51

## 2022-11-02 RX ADMIN — DEXMEDETOMIDINE HYDROCHLORIDE 0.2 MCG/KG/HR: 4 INJECTION, SOLUTION INTRAVENOUS at 11:29

## 2022-11-02 RX ADMIN — DEXMEDETOMIDINE HYDROCHLORIDE 0.2 MCG/KG/HR: 4 INJECTION, SOLUTION INTRAVENOUS at 20:02

## 2022-11-02 RX ADMIN — POTASSIUM CHLORIDE 40 MEQ: 29.8 INJECTION, SOLUTION INTRAVENOUS at 09:27

## 2022-11-02 ASSESSMENT — PAIN DESCRIPTION - PAIN TYPE
TYPE: ACUTE PAIN

## 2022-11-02 ASSESSMENT — FIBROSIS 4 INDEX: FIB4 SCORE: 1.39

## 2022-11-02 NOTE — FLOWSHEET NOTE
11/01/22 1826   Vital Signs   Pulse 82   Respiration (!) 21   Pulse Oximetry 95 %   $ Pulse Oximetry (Spot Check) Yes   O2 Alarms Set & Reviewed Yes   CO2 Monitoring   EtCO2 Calibration Yes   ETCO2 (mmHg) 38   $ ETCO2 Monitoring Yes   Chest Exam   Work Of Breathing / Effort Vented   Breath Sounds   RUL Breath Sounds Clear   RML Breath Sounds Clear   RLL Breath Sounds Clear   NAHOMI Breath Sounds Clear   LLL Breath Sounds Clear   Secretions   Cough Dry   Airway ETT 7.5   Placement Date/Time: 10/23/22 0912   Airway Type: ETT  Airway Size: 7.5  Inserted In: Unit  Inserted by: MD   Site Assessment Clean;Dry;Intact   Airway Tube Secured Commercial securing device   Secured At  (cm) 24   Tube Repositioned Left   Oxygen   FiO2% 30 %   O2 Delivery Device Ventilator   Resuscitation Device at Bedside Self Inflating Bag

## 2022-11-02 NOTE — PROGRESS NOTES
12-hour chart check complete.    Monitor Summary  Rhythm: a-fib; bundle branch block  Rate: 85  Ectopy: Occasional PVCs  Measurements: -/0.12/-

## 2022-11-02 NOTE — CARE PLAN
Problem: Knowledge Deficit - Standard  Goal: Patient and family/care givers will demonstrate understanding of plan of care, disease process/condition, diagnostic tests and medications  Outcome: Not Progressing     Problem: Lifestyle Changes  Goal: Patient's ability to identify lifestyle changes and available resources to help reduce recurrence of condition will improve  Outcome: Not Progressing     Problem: Risk for Aspiration  Goal: Patient's risk for aspiration will be absent or decrease  Outcome: Progressing     The patient is Watcher - Medium risk of patient condition declining or worsening    Shift Goals  Clinical Goals: maintain sats post extubation  Patient Goals: ANETTE  Family Goals: comfort    Progress made toward(s) clinical / shift goals:  Oatient on 4L oxymark, agitated/anxious vocalizing wanting to leave. MD notified, precedex started. Daughter Laura at bedside, supportive. BMS/wheeler in place- draining. NG with trickle feeds.     Patient is not progressing towards the following goals:      Problem: Knowledge Deficit - Standard  Goal: Patient and family/care givers will demonstrate understanding of plan of care, disease process/condition, diagnostic tests and medications  Outcome: Not Progressing     Problem: Lifestyle Changes  Goal: Patient's ability to identify lifestyle changes and available resources to help reduce recurrence of condition will improve  Outcome: Not Progressing

## 2022-11-02 NOTE — PROGRESS NOTES
Critical Care Progress Note    Date of admission  10/18/2022    Chief Complaint  57 y.o. male with history of alcohol abuse, meth use, A. fib not on anticoagulation, liver mass noted on US 8/2022 admitted 10/18/2022 with alcohol withdrawal.  Patient was initially intubated for worsening respiratory failure from alcohol withdrawal -was extubated on 10/21 and reintubated on 10/23 for worsening respiratory failure and altered mental status.  He underwent bronchoscopy on 10/24 due to copious secretions -cultures with MSSA and Pseudomonas so he was started on Zosyn.  He had thoracentesis on 10/25 with removal of 400 cc of pleural fluid -pleural studies with LDH 1020, glucose < 2, cell count with 3511 WBC, 3000 RBCs -culture showed WBCs but no growth.   Patient self extubated on 10/26 overnight and was shortly reintubated after he failed HFNC.  His episode of respiratory failure was complicated by bradycardia and hypotension requiring vasopressors.  On 10/27, he was noted to have significant abdominal distention with dark feculent material coming out of the NG tube.  Fluid resuscitation was continued he continued to have high output from NG tube. CT A/P was obtained which showed dilated loops of bowel concerning for ileus rather than early/low-grade SBO, right larger than left pleural effusions.  Liver nodule seen on ultrasound in 8/2020 she was not visualized on the CT. he was gradually weaned off vasopressors.  He was noted to have done well on his SBT but had low NIF so he was kept intubated.    10/31: Very weak and tired this morning.  Review of imaging and pleural studies concerning for a right loculated parapneumonic effusion -discussed case with IR for chest tube placement.    Chart review from the past 24 hours includes imaging, laboratory studies, vital signs and notes available.  Pertinent data for today's visit includes    Cardiac: HTN, tachy 110s  Pulm: 30%/400/8  Heme: stable  /renal: Cr stable  GI/endo:  TFs  ID:  Rcx MSSA, PSA 10/24 - zosyn (10/24-), WBC increasing 11/1 but pcal down to 0.43 from 7.27  I/O: +1.5L  Additional Labs/Imaging: hypernatremia 150 - start  q6h 10/31  Additional information: na        Review of Systems  Review of Systems   Unable to perform ROS: Intubated      Vital Signs for last 24 hours   Pulse:  [] 82  Resp:  [17-28] 21  BP: (109-155)/(70-93) 129/82  SpO2:  [95 %-98 %] 95 %    Hemodynamic parameters for last 24 hours       Respiratory Information for the last 24 hours  Vent Mode: APVCMV  Rate (breaths/min): 22  Vt Target (mL): 460  PEEP/CPAP: 8  P Support: 5  MAP: 13  Control VTE (exp VT): 460    Physical Exam   Physical Exam  Vitals and nursing note reviewed.   Constitutional:       Comments: Intubated, sedated   HENT:      Nose: No congestion.      Mouth/Throat:      Mouth: Mucous membranes are moist.   Eyes:      Comments: Injected conjunctiva   Cardiovascular:      Rate and Rhythm: Normal rate and regular rhythm.   Pulmonary:      Comments: Intubated.  Diminished right breath sounds  Abdominal:      General: There is distension.      Palpations: Abdomen is soft.   Musculoskeletal:         General: No deformity.   Skin:     General: Skin is warm.   Neurological:      Comments: Moving all extremities.  Very weak but able to open eyes and nod/shake head to commands       Medications  Current Facility-Administered Medications   Medication Dose Route Frequency Provider Last Rate Last Admin    piperacillin-tazobactam (Zosyn) 4.5 g in  mL IVPB  4.5 g Intravenous Q8HRS Nilda Franco M.D.   Stopped at 11/01/22 1817    midazolam (Versed) injection 2 mg  2 mg Intravenous Once Chris Abrams M.D.        MD Alert...ICU Electrolyte Replacement per Pharmacy   Other PHARMACY TO DOSE Nilda Franco M.D.        dextrose 50% (D50W) injection 25 g  25 g Intravenous Q15 MIN PRN Nelida Fortune M.D.   25 g at 10/30/22 0649    D5 1/2 NS infusion   Intravenous  Continuous Chris Abrams M.D. 100 mL/hr at 11/01/22 1848 Rate Verify at 11/01/22 1848    Pharmacy Consult Request  1 Each Other PHARMACY TO DOSE Chris Abrams M.D.        artificial tears (EYE LUBRICANT) ophth ointment 1 Application  1 Application Both Eyes Q8HRS Chris Abrams M.D.   1 Application at 11/01/22 1407    heparin injection 5,000 Units  5,000 Units Subcutaneous Q8HRS Chris Abrams M.D.   5,000 Units at 11/01/22 1406    nystatin (MYCOSTATIN) 434684 UNIT/ML suspension 500,000 Units  5 mL Buccal 4X/DAY Chris Abrams M.D.   500,000 Units at 11/01/22 1750    famotidine (PEPCID) injection 20 mg  20 mg Intravenous BID Chris Abrams M.D.   20 mg at 11/01/22 1750    fentaNYL (SUBLIMAZE) injection 100 mcg  100 mcg Intravenous Q15 MIN PRN Chris Abrams M.D.   50 mcg at 11/01/22 1218    And    fentaNYL (SUBLIMAZE) injection 200 mcg  200 mcg Intravenous Q15 MIN PRN Chris Abrams M.D.   200 mcg at 10/30/22 0422    And    fentaNYL (SUBLIMAZE) 50 mcg/mL in 50mL (Continuous Infusion)   Intravenous Continuous Chris Abrams M.D. 2 mL/hr at 11/01/22 1848 100 mcg/hr at 11/01/22 1848    midazolam (Versed) premix 125 mg/125 mL infusion  0-10 mg/hr Intravenous Continuous Chris Abrams M.D. 2 mL/hr at 11/01/22 1848 2 mg/hr at 11/01/22 1848    Respiratory Therapy Consult   Nebulization Continuous RT Greyson Luciano D.O.        lidocaine (XYLOCAINE) 1 % injection 2 mL  2 mL Tracheal Tube Q30 MIN PRN Greyson Luciano D.O.        lactulose 20 GM/30ML solution 30 mL  30 mL Enteral Tube TID Greyson Luciano D.O.   30 mL at 11/01/22 1750    Pharmacy Consult: Enteral tube insertion - review meds/change route/product selection   Other PHARMACY TO DOSE Greyson Luciano D.O.        carboxymethylcellulose (REFRESH TEARS) 0.5 % ophthalmic drops 2 Drop  2 Drop Both Eyes PRN Greyson Luciano D.O.   2 Drop at 11/01/22 1406    albuterol inhaler 2 Puff  2 Puff  Inhalation Q6HRS PRN Eva Rodriguez M.D.        Pharmacy Consult Request ...Pain Management Review 1 Each  1 Each Other PHARMACY TO DOSE Eva Rodriguez M.D.        levothyroxine (SYNTHROID) tablet 50 mcg  50 mcg Enteral Tube AM ES Eva Rodriguez M.D.   50 mcg at 11/01/22 0504    acetaminophen (Tylenol) tablet 650 mg  650 mg Enteral Tube Q6HRS PRN Eva Rodriguez M.D.   650 mg at 11/01/22 1405       Fluids    Intake/Output Summary (Last 24 hours) at 11/1/2022 2009  Last data filed at 11/1/2022 1938  Gross per 24 hour   Intake 5826.14 ml   Output 2900 ml   Net 2926.14 ml         Laboratory          Recent Labs     10/31/22  0352 10/31/22  1040 10/31/22  1945 11/01/22 0445   SODIUM 150*  --   --  142   POTASSIUM 2.8*  --  3.0* 3.4*   CHLORIDE 114*  --   --  108   CO2 29  --   --  27   BUN 11  --   --  6*   CREATININE 0.56  --   --  0.50   MAGNESIUM  --  1.6 1.5 2.1   PHOSPHORUS  --  1.5* 1.7* 3.4   CALCIUM 7.6*  --   --  7.8*       Recent Labs     10/31/22  0352 10/31/22  0850 11/01/22 0445   ALTSGPT 13  --   --    ASTSGOT 28  --   --    ALKPHOSPHAT 103*  --   --    TBILIRUBIN 0.9  --   --    PREALBUMIN  --  5.7*  --    GLUCOSE 88  --  92       Recent Labs     10/31/22  0352 10/31/22  1040 11/01/22  0445   WBC  --  9.2 11.4*   NEUTSPOLYS  --  78.00* 78.80*   LYMPHOCYTES  --  10.50* 8.70*   MONOCYTES  --  6.10 6.50   EOSINOPHILS  --  4.10 4.70   BASOPHILS  --  0.20 0.40   ASTSGOT 28  --   --    ALTSGPT 13  --   --    ALKPHOSPHAT 103*  --   --    TBILIRUBIN 0.9  --   --        Recent Labs     10/31/22  1040 11/01/22  0445   RBC 3.22* 3.45*   HEMOGLOBIN 10.7* 11.4*   HEMATOCRIT 33.0* 35.2*   PLATELETCT 238 283         Imaging  Reviewed    Assessment/Plan     Septic shock-resolved, now off pressors  MSSA and Pseudomonas pneumonia  Right loculated pleural effusion  Pleural studies from 10/25 with glucose <2, LDH around 1000 and increased WBCs -pleural fluid cultures negative so far patient was already on  antibiotics.  This is a complicated parapneumonic effusion based on studies and imaging.  -Continue Zosyn -started 10/24  -Now off all pressors -monitor hemodynamics  -IV fluids as needed  -Discussed case with IR who will place chest tube -n.p.o.  -Depending on output from chest to and follow-up imaging, will consider intrapleural lytic therapy  -Follow-up repeat pleural fluid culture from thoracentesis on 10/30 (only able to remove 150 cc at that time)    Acute hypoxic respiratory failure  Likely secondary to pneumonia and effusion as noted above.  Also with significant weakness/deconditioning of been limiting his ability to be weaned from the vent.  -Lung protective ventilation  -Wean FiO2 as tolerated  -Daily SAT/SBT  -Replete electrolytes aggressively -had significantly low Phos on labs today  -ETT/oral care, pulmonary hygiene    Atrial fibrillation -currently rate controlled  -Holding home metoprolol due to hypotension-can restart once stable  -Not on chronic anticoagulation -will consider risk versus benefit after chest tube placement    Liver mass  Liver nodule seen on ultrasound in 8/2020 she was not visualized on the CT scan.  -Once stable, can get hepatic mass protocol CT or MRI -can be done as outpatient as well.    Alcohol abuse  Alcohol withdrawal  Initially admitted and intubated for alcohol withdrawal-now resolved    Hyponatremia  Decreased from 150 to 142 after increasing FWF to 250ml q6h  - decrease FWF to 200mg q8h - don't want Na to drop too quickly  - monitor      VTE:  Lovenox  Ulcer: PPI  Lines: Central Line  Ongoing indication addressed    I have performed a physical exam and reviewed and updated ROS and Plan today (11/1/2022). In review of yesterday's note (10/31/2022), there are no changes except as documented above.     Discussed patient condition and risk of morbidity and/or mortality with Family, RN, RT, Pharmacy, and Charge nurse / hot rounds  The patient remains critically ill.   Critical care time = 60 minutes in directly providing and coordinating critical care and extensive data review.  No time overlap and excludes procedures.        Nilda Franco MD  Pulmonary and Critical Care Medicine  [facility]

## 2022-11-02 NOTE — DIETARY
Nutrition Services Brief Update:    Problem: Nutritional:  Goal: Nutrition support tolerated and meeting greater than 85% of estimated needs  Outcome: Not progressing    Pt extubated this a.m, on 5L O2 via oxymask. Remains on trickle feeds @ 10 ml/hr x ~3 days. Per flowsheets, abdomen distended, semi-firm. BMS in place, 400 cc out thus far today. Current TF order for trickle feeds to not advance toward final goal rate until approved by MD.   Labs: K+ 3.2; KCl given per MAR.    RD continues to follow.

## 2022-11-02 NOTE — RESPIRATORY CARE
Extubation    Cuff leak noted YES  Stridor present NO     FiO2%: 30 % (11/02/22 0649)  O2 (LPM): 2 (10/23/22 0800)     Patient toleration WELL  RCP Complete? NO  Events/Summary/Plan: Pt doing well on 5 L NC

## 2022-11-02 NOTE — CARE PLAN
The patient is Stable - Low risk of patient condition declining or worsening    Shift Goals  Clinical Goals: Move toward extubation in AM  Patient Goals: ANETTE  Family Goals: comfort    Progress made toward(s) clinical / shift goals:    Problem: Knowledge Deficit - Standard  Goal: Patient and family/care givers will demonstrate understanding of plan of care, disease process/condition, diagnostic tests and medications  Outcome: Progressing     Problem: Optimal Care for Alcohol Withdrawal  Goal: Optimal Care for the alcohol withdrawal patient  Outcome: Progressing     Problem: Risk for Aspiration  Goal: Patient's risk for aspiration will be absent or decrease  Outcome: Progressing     Problem: Skin Integrity  Goal: Skin integrity is maintained or improved  Outcome: Progressing     Problem: Fall Risk  Goal: Patient will remain free from falls  Outcome: Progressing     Problem: Respiratory  Goal: Patient will achieve/maintain optimum respiratory ventilation and gas exchange  Outcome: Progressing     Problem: Mechanical Ventilation  Goal: Safe management of artificial airway and ventilation  Outcome: Progressing  Goal: Successful weaning off mechanical ventilator, spontaneously maintains adequate gas exchange  Outcome: Progressing  Goal: Patient will be able to express needs and understand communication  Outcome: Progressing     Problem: Urinary - Renal Perfusion  Goal: Ability to achieve and maintain adequate renal perfusion and functioning will improve  Outcome: Progressing     Problem: Hemodynamics  Goal: Patient's hemodynamics, fluid balance and neurologic status will be stable or improve  Outcome: Progressing       Patient is not progressing towards the following goals:

## 2022-11-02 NOTE — FLOWSHEET NOTE
22 1829   Ventiliation   $ Ventilation - Subsequent Yes   Ventilator Management Group   Intensivist Group Yes   General Vent Information   Ventilator Number [id number]   Vent Mode APVCMV   Vent Alarms   Ventilation Alarms Reviewed / Activated Yes   Upper Pressure Limit Alarm 40   Lower Pressure Limit 4   Low VE Alarm 4   High Respiratory Rate Alarm 40   Low Respiratory Rate Alarm 8   Low VT Alarm 150   Apnea Parameters Checked / Activated Yes   EtCO2 High Alarm 60   EtCO2 Low Alarm 20   Vent Settings   FiO2% 30 %   Rate (breaths/min) 22   Vent Temp HME Yes   Vt Target (mL) 460   PIP 26   TI (Seconds) 0.91   PEEP/CPAP 8   P Support 5   Pramp 100   ETS(%) 25   Trigger Type Flow Trigger   Sensitivity Setting 5   Vent Readings   PIP 24    Minute Volume 11.7   Control VTE (exp VT) 460   Spontaneous VTE (exp VT) 470   f Total (Breaths/Min) 24   I:E Ratio 2   MAP 13   PEEP/CPAP MONITORED 8   Static Compliance (ml / cm H2O) 32   R exp 12   Plateau Pressure 17

## 2022-11-03 LAB
ALBUMIN SERPL BCP-MCNC: 1.9 G/DL (ref 3.2–4.9)
ALBUMIN/GLOB SERPL: 0.5 G/DL
ALP SERPL-CCNC: 114 U/L (ref 30–99)
ALT SERPL-CCNC: 14 U/L (ref 2–50)
ANION GAP SERPL CALC-SCNC: 10 MMOL/L (ref 7–16)
AST SERPL-CCNC: 15 U/L (ref 12–45)
BASOPHILS # BLD AUTO: 0.5 % (ref 0–1.8)
BASOPHILS # BLD: 0.05 K/UL (ref 0–0.12)
BILIRUB SERPL-MCNC: 0.6 MG/DL (ref 0.1–1.5)
BUN SERPL-MCNC: 6 MG/DL (ref 8–22)
CA-I SERPL-SCNC: 1.09 MMOL/L (ref 1.1–1.3)
CALCIUM SERPL-MCNC: 7.6 MG/DL (ref 8.4–10.2)
CHLORIDE SERPL-SCNC: 104 MMOL/L (ref 96–112)
CO2 SERPL-SCNC: 24 MMOL/L (ref 20–33)
CREAT SERPL-MCNC: 0.58 MG/DL (ref 0.5–1.4)
EKG IMPRESSION: NORMAL
EOSINOPHIL # BLD AUTO: 0.91 K/UL (ref 0–0.51)
EOSINOPHIL NFR BLD: 9.1 % (ref 0–6.9)
ERYTHROCYTE [DISTWIDTH] IN BLOOD BY AUTOMATED COUNT: 50.7 FL (ref 35.9–50)
GFR SERPLBLD CREATININE-BSD FMLA CKD-EPI: 114 ML/MIN/1.73 M 2
GLOBULIN SER CALC-MCNC: 4 G/DL (ref 1.9–3.5)
GLUCOSE BLD STRIP.AUTO-MCNC: 100 MG/DL (ref 65–99)
GLUCOSE BLD STRIP.AUTO-MCNC: 104 MG/DL (ref 65–99)
GLUCOSE BLD STRIP.AUTO-MCNC: 61 MG/DL (ref 65–99)
GLUCOSE BLD STRIP.AUTO-MCNC: 68 MG/DL (ref 65–99)
GLUCOSE BLD STRIP.AUTO-MCNC: 82 MG/DL (ref 65–99)
GLUCOSE SERPL-MCNC: 89 MG/DL (ref 65–99)
HCT VFR BLD AUTO: 33.1 % (ref 42–52)
HGB BLD-MCNC: 10.9 G/DL (ref 14–18)
IMM GRANULOCYTES # BLD AUTO: 0.11 K/UL (ref 0–0.11)
IMM GRANULOCYTES NFR BLD AUTO: 1.1 % (ref 0–0.9)
LYMPHOCYTES # BLD AUTO: 0.85 K/UL (ref 1–4.8)
LYMPHOCYTES NFR BLD: 8.5 % (ref 22–41)
MAGNESIUM SERPL-MCNC: 1.6 MG/DL (ref 1.5–2.5)
MCH RBC QN AUTO: 32.8 PG (ref 27–33)
MCHC RBC AUTO-ENTMCNC: 32.9 G/DL (ref 33.7–35.3)
MCV RBC AUTO: 99.7 FL (ref 81.4–97.8)
MONOCYTES # BLD AUTO: 1.03 K/UL (ref 0–0.85)
MONOCYTES NFR BLD AUTO: 10.3 % (ref 0–13.4)
NEUTROPHILS # BLD AUTO: 7.08 K/UL (ref 1.82–7.42)
NEUTROPHILS NFR BLD: 70.5 % (ref 44–72)
NRBC # BLD AUTO: 0 K/UL
NRBC BLD-RTO: 0 /100 WBC
PHOSPHATE SERPL-MCNC: 2.6 MG/DL (ref 2.5–4.5)
PLATELET # BLD AUTO: 439 K/UL (ref 164–446)
PMV BLD AUTO: 11.4 FL (ref 9–12.9)
POTASSIUM SERPL-SCNC: 3.2 MMOL/L (ref 3.6–5.5)
PROT SERPL-MCNC: 5.9 G/DL (ref 6–8.2)
RBC # BLD AUTO: 3.32 M/UL (ref 4.7–6.1)
SODIUM SERPL-SCNC: 138 MMOL/L (ref 135–145)
WBC # BLD AUTO: 10 K/UL (ref 4.8–10.8)

## 2022-11-03 PROCEDURE — 700102 HCHG RX REV CODE 250 W/ 637 OVERRIDE(OP): Performed by: INTERNAL MEDICINE

## 2022-11-03 PROCEDURE — 94668 MNPJ CHEST WALL SBSQ: CPT

## 2022-11-03 PROCEDURE — 700111 HCHG RX REV CODE 636 W/ 250 OVERRIDE (IP): Performed by: INTERNAL MEDICINE

## 2022-11-03 PROCEDURE — 700105 HCHG RX REV CODE 258: Performed by: STUDENT IN AN ORGANIZED HEALTH CARE EDUCATION/TRAINING PROGRAM

## 2022-11-03 PROCEDURE — 94669 MECHANICAL CHEST WALL OSCILL: CPT

## 2022-11-03 PROCEDURE — 700102 HCHG RX REV CODE 250 W/ 637 OVERRIDE(OP): Performed by: HOSPITALIST

## 2022-11-03 PROCEDURE — 83735 ASSAY OF MAGNESIUM: CPT

## 2022-11-03 PROCEDURE — 700111 HCHG RX REV CODE 636 W/ 250 OVERRIDE (IP): Performed by: STUDENT IN AN ORGANIZED HEALTH CARE EDUCATION/TRAINING PROGRAM

## 2022-11-03 PROCEDURE — 84100 ASSAY OF PHOSPHORUS: CPT

## 2022-11-03 PROCEDURE — 94760 N-INVAS EAR/PLS OXIMETRY 1: CPT

## 2022-11-03 PROCEDURE — 31720 CLEARANCE OF AIRWAYS: CPT

## 2022-11-03 PROCEDURE — 82962 GLUCOSE BLOOD TEST: CPT | Mod: 91

## 2022-11-03 PROCEDURE — 80053 COMPREHEN METABOLIC PANEL: CPT

## 2022-11-03 PROCEDURE — A9270 NON-COVERED ITEM OR SERVICE: HCPCS | Performed by: INTERNAL MEDICINE

## 2022-11-03 PROCEDURE — 94640 AIRWAY INHALATION TREATMENT: CPT

## 2022-11-03 PROCEDURE — A9270 NON-COVERED ITEM OR SERVICE: HCPCS | Performed by: HOSPITALIST

## 2022-11-03 PROCEDURE — 85025 COMPLETE CBC W/AUTO DIFF WBC: CPT

## 2022-11-03 PROCEDURE — 700102 HCHG RX REV CODE 250 W/ 637 OVERRIDE(OP): Performed by: STUDENT IN AN ORGANIZED HEALTH CARE EDUCATION/TRAINING PROGRAM

## 2022-11-03 PROCEDURE — 3E0L3GC INTRODUCTION OF OTHER THERAPEUTIC SUBSTANCE INTO PLEURAL CAVITY, PERCUTANEOUS APPROACH: ICD-10-PCS | Performed by: STUDENT IN AN ORGANIZED HEALTH CARE EDUCATION/TRAINING PROGRAM

## 2022-11-03 PROCEDURE — 82330 ASSAY OF CALCIUM: CPT

## 2022-11-03 PROCEDURE — 700101 HCHG RX REV CODE 250: Performed by: STUDENT IN AN ORGANIZED HEALTH CARE EDUCATION/TRAINING PROGRAM

## 2022-11-03 PROCEDURE — 32562 LYSE CHEST FIBRIN SUBQ DAY: CPT | Mod: RT | Performed by: STUDENT IN AN ORGANIZED HEALTH CARE EDUCATION/TRAINING PROGRAM

## 2022-11-03 PROCEDURE — 700105 HCHG RX REV CODE 258: Performed by: INTERNAL MEDICINE

## 2022-11-03 PROCEDURE — 770022 HCHG ROOM/CARE - ICU (200)

## 2022-11-03 PROCEDURE — 99291 CRITICAL CARE FIRST HOUR: CPT | Mod: 25 | Performed by: STUDENT IN AN ORGANIZED HEALTH CARE EDUCATION/TRAINING PROGRAM

## 2022-11-03 PROCEDURE — 36592 COLLECT BLOOD FROM PICC: CPT

## 2022-11-03 PROCEDURE — A9270 NON-COVERED ITEM OR SERVICE: HCPCS | Performed by: STUDENT IN AN ORGANIZED HEALTH CARE EDUCATION/TRAINING PROGRAM

## 2022-11-03 RX ORDER — CALCIUM GLUCONATE 20 MG/ML
1 INJECTION, SOLUTION INTRAVENOUS ONCE
Status: COMPLETED | OUTPATIENT
Start: 2022-11-03 | End: 2022-11-03

## 2022-11-03 RX ORDER — MAGNESIUM SULFATE HEPTAHYDRATE 40 MG/ML
2 INJECTION, SOLUTION INTRAVENOUS ONCE
Status: COMPLETED | OUTPATIENT
Start: 2022-11-03 | End: 2022-11-03

## 2022-11-03 RX ORDER — ACETYLCYSTEINE 200 MG/ML
3 SOLUTION ORAL; RESPIRATORY (INHALATION)
Status: DISCONTINUED | OUTPATIENT
Start: 2022-11-03 | End: 2022-11-08

## 2022-11-03 RX ORDER — SCOLOPAMINE TRANSDERMAL SYSTEM 1 MG/1
1 PATCH, EXTENDED RELEASE TRANSDERMAL
Status: DISCONTINUED | OUTPATIENT
Start: 2022-11-03 | End: 2022-11-07

## 2022-11-03 RX ORDER — DEXTROSE AND SODIUM CHLORIDE 5; .45 G/100ML; G/100ML
INJECTION, SOLUTION INTRAVENOUS ONCE
Status: COMPLETED | OUTPATIENT
Start: 2022-11-03 | End: 2022-11-03

## 2022-11-03 RX ORDER — POTASSIUM CHLORIDE 29.8 MG/ML
40 INJECTION INTRAVENOUS ONCE
Status: COMPLETED | OUTPATIENT
Start: 2022-11-03 | End: 2022-11-03

## 2022-11-03 RX ADMIN — PIPERACILLIN AND TAZOBACTAM 4.5 G: 4; .5 INJECTION, POWDER, FOR SOLUTION INTRAVENOUS at 13:17

## 2022-11-03 RX ADMIN — CALCIUM GLUCONATE 1 G: 20 INJECTION, SOLUTION INTRAVENOUS at 11:37

## 2022-11-03 RX ADMIN — NYSTATIN 500000 UNITS: 100000 SUSPENSION ORAL at 20:22

## 2022-11-03 RX ADMIN — ACETAMINOPHEN 650 MG: 325 TABLET, FILM COATED ORAL at 16:49

## 2022-11-03 RX ADMIN — MAGNESIUM SULFATE 2 G: 2 INJECTION INTRAVENOUS at 08:56

## 2022-11-03 RX ADMIN — NYSTATIN 500000 UNITS: 100000 SUSPENSION ORAL at 13:15

## 2022-11-03 RX ADMIN — HEPARIN SODIUM 5000 UNITS: 5000 INJECTION, SOLUTION INTRAVENOUS; SUBCUTANEOUS at 05:53

## 2022-11-03 RX ADMIN — PIPERACILLIN AND TAZOBACTAM 4.5 G: 4; .5 INJECTION, POWDER, FOR SOLUTION INTRAVENOUS at 05:55

## 2022-11-03 RX ADMIN — HEPARIN SODIUM 5000 UNITS: 5000 INJECTION, SOLUTION INTRAVENOUS; SUBCUTANEOUS at 13:15

## 2022-11-03 RX ADMIN — NYSTATIN 500000 UNITS: 100000 SUSPENSION ORAL at 08:33

## 2022-11-03 RX ADMIN — ALTEPLASE 5 MG: KIT at 08:47

## 2022-11-03 RX ADMIN — HEPARIN SODIUM 5000 UNITS: 5000 INJECTION, SOLUTION INTRAVENOUS; SUBCUTANEOUS at 22:08

## 2022-11-03 RX ADMIN — DEXTROSE AND SODIUM CHLORIDE: 5; 450 INJECTION, SOLUTION INTRAVENOUS at 16:50

## 2022-11-03 RX ADMIN — MINERAL OIL, PETROLATUM 1 APPLICATION: 425; 573 OINTMENT OPHTHALMIC at 06:10

## 2022-11-03 RX ADMIN — ACETYLCYSTEINE 3 ML: 200 INHALANT RESPIRATORY (INHALATION) at 21:29

## 2022-11-03 RX ADMIN — DEXTROSE MONOHYDRATE 25 G: 100 INJECTION, SOLUTION INTRAVENOUS at 00:10

## 2022-11-03 RX ADMIN — NYSTATIN 500000 UNITS: 100000 SUSPENSION ORAL at 16:49

## 2022-11-03 RX ADMIN — GLYCOPYRROLATE 0.2 MG: 0.2 INJECTION INTRAMUSCULAR; INTRAVENOUS at 08:33

## 2022-11-03 RX ADMIN — ALBUTEROL SULFATE 2.5 MG: 2.5 SOLUTION RESPIRATORY (INHALATION) at 21:29

## 2022-11-03 RX ADMIN — FAMOTIDINE 20 MG: 10 INJECTION, SOLUTION INTRAVENOUS at 16:49

## 2022-11-03 RX ADMIN — MINERAL OIL, PETROLATUM 1 APPLICATION: 425; 573 OINTMENT OPHTHALMIC at 13:18

## 2022-11-03 RX ADMIN — FAMOTIDINE 20 MG: 10 INJECTION, SOLUTION INTRAVENOUS at 05:54

## 2022-11-03 RX ADMIN — DEXMEDETOMIDINE HYDROCHLORIDE 1 MCG/KG/HR: 4 INJECTION, SOLUTION INTRAVENOUS at 19:50

## 2022-11-03 RX ADMIN — DORNASE ALFA 5 MG: 1 SOLUTION RESPIRATORY (INHALATION) at 17:10

## 2022-11-03 RX ADMIN — LACTULOSE 15 ML: 20 SOLUTION ORAL at 15:23

## 2022-11-03 RX ADMIN — ALTEPLASE 5 MG: KIT at 17:10

## 2022-11-03 RX ADMIN — SCOPOLAMINE 1 PATCH: 1.5 PATCH, EXTENDED RELEASE TRANSDERMAL at 10:09

## 2022-11-03 RX ADMIN — ACETAMINOPHEN 650 MG: 325 TABLET, FILM COATED ORAL at 05:54

## 2022-11-03 RX ADMIN — DEXTROSE MONOHYDRATE 25 G: 100 INJECTION, SOLUTION INTRAVENOUS at 15:19

## 2022-11-03 RX ADMIN — DORNASE ALFA 5 MG: 1 SOLUTION RESPIRATORY (INHALATION) at 08:47

## 2022-11-03 RX ADMIN — POTASSIUM CHLORIDE 40 MEQ: 29.8 INJECTION, SOLUTION INTRAVENOUS at 08:59

## 2022-11-03 RX ADMIN — PIPERACILLIN AND TAZOBACTAM 4.5 G: 4; .5 INJECTION, POWDER, FOR SOLUTION INTRAVENOUS at 20:23

## 2022-11-03 RX ADMIN — LEVOTHYROXINE SODIUM 50 MCG: 50 TABLET ORAL at 05:54

## 2022-11-03 ASSESSMENT — PAIN DESCRIPTION - PAIN TYPE
TYPE: ACUTE PAIN

## 2022-11-03 ASSESSMENT — FIBROSIS 4 INDEX: FIB4 SCORE: 0.51

## 2022-11-03 NOTE — PROGRESS NOTES
Critical Care Progress Note    Date of admission  10/18/2022    Chief Complaint  57 y.o. male with history of alcohol abuse, meth use, A. fib not on anticoagulation, liver mass noted on US 8/2022 admitted 10/18/2022 with alcohol withdrawal.  Patient was initially intubated for worsening respiratory failure from alcohol withdrawal -was extubated on 10/21 and reintubated on 10/23 for worsening respiratory failure and altered mental status.  He underwent bronchoscopy on 10/24 due to copious secretions -cultures with MSSA and Pseudomonas so he was started on Zosyn.  He had thoracentesis on 10/25 with removal of 400 cc of pleural fluid -pleural studies with LDH 1020, glucose < 2, cell count with 3511 WBC, 3000 RBCs -culture showed WBCs but no growth.   Patient self extubated on 10/26 overnight and was shortly reintubated after he failed HFNC.  His episode of respiratory failure was complicated by bradycardia and hypotension requiring vasopressors.  On 10/27, he was noted to have significant abdominal distention with dark feculent material coming out of the NG tube.  Fluid resuscitation was continued he continued to have high output from NG tube. CT A/P was obtained which showed dilated loops of bowel concerning for ileus rather than early/low-grade SBO, right larger than left pleural effusions.  Liver nodule seen on ultrasound in 8/2020 she was not visualized on the CT. he was gradually weaned off vasopressors.  He was noted to have done well on his SBT but had low NIF so he was kept intubated.    10/31: Very weak and tired this morning.  Review of imaging and pleural studies concerning for a right loculated parapneumonic effusion -discussed case with IR for chest tube placement.  11/1: R mid-axillary chest tube placed for loculated effusion. Zosyn restarted - fell off for about one day but decided to continue it since he has this loculated effusion  11/2: extubated this AM to oxymask. Still with significant thin clear  oral secretions so started on glycopyrrolate PRN to decrease secretions and risk of aspiration post extubation.     Review of Systems  Review of Systems   Unable to perform ROS: Acuity of condition      Vital Signs for last 24 hours   Temp:  [38.4 °C (101.1 °F)] 38.4 °C (101.1 °F)  Pulse:  [] 84  Resp:  [18-45] 37  BP: (100-149)/(69-97) 110/73  SpO2:  [74 %-100 %] 97 %    Hemodynamic parameters for last 24 hours       Respiratory Information for the last 24 hours  Vent Mode: APVCMV  Rate (breaths/min): 22  Vt Target (mL): 460  PEEP/CPAP: 8  P Support: 5  MAP: 14  Control VTE (exp VT): 431    Physical Exam   Physical Exam  Vitals and nursing note reviewed.   Constitutional:       General: He is not in acute distress.  HENT:      Nose: No congestion.      Mouth/Throat:      Mouth: Mucous membranes are moist.   Eyes:      General:         Right eye: Discharge present.         Left eye: Discharge present.  Cardiovascular:      Rate and Rhythm: Regular rhythm. Tachycardia present.   Pulmonary:      Effort: Pulmonary effort is normal. No respiratory distress.      Comments: Intubated.  Diminished right breath sounds  Abdominal:      Palpations: Abdomen is soft.   Musculoskeletal:         General: No deformity.   Skin:     General: Skin is warm.   Neurological:      General: No focal deficit present.      Mental Status: He is alert.      Comments: Moving all extremities.  Very weak but able to open eyes and nod/shake head to commands       Medications  Current Facility-Administered Medications   Medication Dose Route Frequency Provider Last Rate Last Admin    glycopyrrolate (ROBINUL) injection 0.2 mg  0.2 mg Intravenous Q4HRS PRN Nilda Franco M.D.   0.2 mg at 11/02/22 0858    lactulose 20 GM/30ML solution 15 mL  15 mL Enteral Tube BID PRN Nilda Franco M.D.        dexmedetomidine (PRECEDEX) 400 mcg/100mL NS premix infusion  0.1-1.5 mcg/kg/hr Intravenous Continuous Nilda Franco M.D. 4.7 mL/hr at  11/02/22 1129 0.2 mcg/kg/hr at 11/02/22 1129    dextrose 10 % BOLUS 25 g  25 g Intravenous Q15 MIN PRN Chris Abrams M.D.        piperacillin-tazobactam (Zosyn) 4.5 g in  mL IVPB  4.5 g Intravenous Q8HRS Nilda Franco M.D. 25 mL/hr at 11/02/22 1446 4.5 g at 11/02/22 1446    MD Alert...ICU Electrolyte Replacement per Pharmacy   Other PHARMACY TO DOSE Nilda Franco M.D.        Pharmacy Consult Request  1 Each Other PHARMACY TO DOSE Chris Abrams M.D.        artificial tears (EYE LUBRICANT) ophth ointment 1 Application  1 Application Both Eyes Q8HRS Chris Abrams M.D.   1 Application at 11/02/22 1445    heparin injection 5,000 Units  5,000 Units Subcutaneous Q8HRS Chris Abrams M.D.   5,000 Units at 11/02/22 1444    nystatin (MYCOSTATIN) 866501 UNIT/ML suspension 500,000 Units  5 mL Buccal 4X/DAY Chris Abrams M.D.   500,000 Units at 11/02/22 1451    famotidine (PEPCID) injection 20 mg  20 mg Intravenous BID Chris Abrams M.D.   20 mg at 11/02/22 0528    Respiratory Therapy Consult   Nebulization Continuous RT Greyson Luciano D.O.        lidocaine (XYLOCAINE) 1 % injection 2 mL  2 mL Tracheal Tube Q30 MIN PRN Greyson Luciano D.O.        Pharmacy Consult: Enteral tube insertion - review meds/change route/product selection   Other PHARMACY TO DOSE Greyson Luciano D.O.        carboxymethylcellulose (REFRESH TEARS) 0.5 % ophthalmic drops 2 Drop  2 Drop Both Eyes PRN Greyson Luciano D.O.   2 Drop at 11/01/22 2206    albuterol inhaler 2 Puff  2 Puff Inhalation Q6HRS PRN Eva Rodriguez M.D.        Pharmacy Consult Request ...Pain Management Review 1 Each  1 Each Other PHARMACY TO DOSE Eva Rodriguez M.D.        levothyroxine (SYNTHROID) tablet 50 mcg  50 mcg Enteral Tube AM ES Eva Rodriguez M.D.   50 mcg at 11/02/22 0528    acetaminophen (Tylenol) tablet 650 mg  650 mg Enteral Tube Q6HRS PRN Eva Rodriguez M.D.   650 mg at 11/01/22 1233        Fluids    Intake/Output Summary (Last 24 hours) at 11/2/2022 1704  Last data filed at 11/2/2022 1600  Gross per 24 hour   Intake 2448.26 ml   Output 2927 ml   Net -478.74 ml         Laboratory          Recent Labs     10/31/22  0352 10/31/22  1040 10/31/22  1945 11/01/22 0445 11/02/22 0254 11/02/22  1530   SODIUM 150*  --   --  142 139  --    POTASSIUM 2.8*  --  3.0* 3.4* 3.2* 3.7   CHLORIDE 114*  --   --  108 107  --    CO2 29  --   --  27 24  --    BUN 11  --   --  6* 7*  --    CREATININE 0.56  --   --  0.50 0.65  --    MAGNESIUM  --    < > 1.5 2.1 1.6  --    PHOSPHORUS  --    < > 1.7* 3.4 3.2  --    CALCIUM 7.6*  --   --  7.8* 7.6*  --     < > = values in this interval not displayed.       Recent Labs     10/31/22  0352 10/31/22  0850 11/01/22 0445 11/02/22 0254   ALTSGPT 13  --   --  20   ASTSGOT 28  --   --  37   ALKPHOSPHAT 103*  --   --  138*   TBILIRUBIN 0.9  --   --  1.4   PREALBUMIN  --  5.7*  --   --    GLUCOSE 88  --  92 87       Recent Labs     10/31/22  0352 10/31/22  1040 11/01/22  0445 11/02/22  0254   WBC  --  9.2 11.4* 8.4   NEUTSPOLYS  --  78.00* 78.80* 67.50   LYMPHOCYTES  --  10.50* 8.70* 11.00*   MONOCYTES  --  6.10 6.50 9.70   EOSINOPHILS  --  4.10 4.70 9.50*   BASOPHILS  --  0.20 0.40 0.60   ASTSGOT 28  --   --  37   ALTSGPT 13  --   --  20   ALKPHOSPHAT 103*  --   --  138*   TBILIRUBIN 0.9  --   --  1.4       Recent Labs     10/31/22  1040 11/01/22  0445 11/02/22  0254   RBC 3.22* 3.45* 3.12*   HEMOGLOBIN 10.7* 11.4* 10.4*   HEMATOCRIT 33.0* 35.2* 31.6*   PLATELETCT 238 283 334         Imaging  Reviewed    Assessment/Plan     Septic shock-resolved, now off pressors  MSSA and Pseudomonas pneumonia  Right loculated pleural effusion  Pleural studies from 10/25 with glucose <2, LDH around 1000 and increased WBCs -pleural fluid cultures negative so far patient was already on antibiotics.  This is a complicated parapneumonic effusion based on studies and imaging.  -Continue Zosyn  -started 10/24 and finished on 10/30 but re-started on 11/1 since he has this loculated effusion w/low glucose and high LDH and just had CT placed yesterday.   -Now off all pressors -monitor hemodynamics  -IV fluids as needed  -R mid-axillary chest tube placed on 11/1 w/total of 870cc output so far  -Plan for intrapleural lytics  -Follow-up repeat pleural fluid culture from thoracentesis on 10/30 (only able to remove 150 cc at that time)    Acute hypoxic respiratory failure  Likely secondary to pneumonia and effusion as noted above.  Also with significant weakness/deconditioning of been limiting his ability to be weaned from the vent.  -extubated 11/2   -Replete electrolytes appropriately  -pulmonary hygiene    Atrial fibrillation -currently rate controlled  -Holding home metoprolol due to hypotension-can restart once stable  -Not on chronic anticoagulation -will consider risk versus benefit after chest tube placement    Liver mass  Liver nodule seen on ultrasound in 8/2020 she was not visualized on the CT scan.  -Once stable, can get hepatic mass protocol CT or MRI -can be done as outpatient as well.    Alcohol abuse  Alcohol withdrawal  Initially admitted and intubated for alcohol withdrawal-now resolved    Hyponatremia  Decreased from 150 to 142 after increasing FWF to 250ml q6h  - decreased FWF to 200mg q8h 11/1 - don't want Na to drop too quickly  - monitor      VTE:  Lovenox  Ulcer: PPI  Lines: Central Line  Ongoing indication addressed    I have performed a physical exam and reviewed and updated ROS and Plan today (11/2/2022). In review of yesterday's note (11/1/2022), there are no changes except as documented above.     Discussed patient condition and risk of morbidity and/or mortality with Family, RN, RT, Pharmacy, and Charge nurse / hot rounds  The patient remains critically ill.  Critical care time = 60 minutes in directly providing and coordinating critical care and extensive data review.  No time  overlap and excludes procedures.        Nilda Franco MD  Pulmonary and Critical Care Medicine  [facility]

## 2022-11-03 NOTE — PROGRESS NOTES
Critical Care Progress Note    Date of admission  10/18/2022    Chief Complaint  57 y.o. male with history of alcohol abuse, meth use, A. fib not on anticoagulation, liver mass noted on US 8/2022 admitted 10/18/2022 with alcohol withdrawal.  Patient was initially intubated for worsening respiratory failure from alcohol withdrawal -was extubated on 10/21 and reintubated on 10/23 for worsening respiratory failure and altered mental status.  He underwent bronchoscopy on 10/24 due to copious secretions -cultures with MSSA and Pseudomonas so he was started on Zosyn.  He had thoracentesis on 10/25 with removal of 400 cc of pleural fluid -pleural studies with LDH 1020, glucose < 2, cell count with 3511 WBC, 3000 RBCs -culture showed WBCs but no growth.   Patient self extubated on 10/26 overnight and was shortly reintubated after he failed HFNC.  His episode of respiratory failure was complicated by bradycardia and hypotension requiring vasopressors.  On 10/27, he was noted to have significant abdominal distention with dark feculent material coming out of the NG tube.  Fluid resuscitation was continued he continued to have high output from NG tube. CT A/P was obtained which showed dilated loops of bowel concerning for ileus rather than early/low-grade SBO, right larger than left pleural effusions.  Liver nodule seen on ultrasound in 8/2020 she was not visualized on the CT. he was gradually weaned off vasopressors.  He was noted to have done well on his SBT but had low NIF so he was kept intubated.    10/31: Very weak and tired this morning.  Review of imaging and pleural studies concerning for a right loculated parapneumonic effusion -discussed case with IR for chest tube placement.  11/1: R mid-axillary chest tube placed for loculated effusion. Zosyn restarted - fell off for about one day but decided to continue it since he has this loculated effusion  11/2: extubated this AM to oxymask. Still with significant thin clear  oral secretions so started on glycopyrrolate PRN to decrease secretions and risk of aspiration post extubation.   11/3: appeared more tired/somnolent this AM - woke patient up and got him out of bed to improve his mental status. Daughter also working with him to help suction him and re-orient him.     Chart review from the past 24 hours includes imaging, laboratory studies, vital signs and notes available.  Pertinent data for today's visit includes    Cardiac: HTN to 140s, HR wnl  Pulm: extubated 11/2, on 4L O2  Heme: stable  /renal: Cr stable  GI/endo: TFs paused after copious oral secretions that smelled like vomit  ID:  Rcx 10/24 w/MSSA, PSA, repeat culture 10/31 w/pseudomonas - zosyn (10/24-), WBC increasing 11/1 but pcal down to 0.43 from 7.27  I/O: -1.6L  Additional Labs/Imaging:   -hypernatremia improving on FWF       Review of Systems  Review of Systems   Unable to perform ROS: Acuity of condition      Vital Signs for last 24 hours   Temp:  [37.3 °C (99.1 °F)-38.5 °C (101.3 °F)] 38.3 °C (100.9 °F)  Pulse:  [70-96] 96  Resp:  [12-42] 34  BP: (109-140)/(66-90) 127/84  SpO2:  [88 %-100 %] 94 %    Hemodynamic parameters for last 24 hours       Respiratory Information for the last 24 hours       Physical Exam   Physical Exam  Vitals and nursing note reviewed.   Constitutional:       General: He is not in acute distress.  HENT:      Nose: No congestion.      Mouth/Throat:      Mouth: Mucous membranes are moist.   Eyes:      General:         Right eye: Discharge present.         Left eye: Discharge present.  Cardiovascular:      Rate and Rhythm: Normal rate and regular rhythm.   Pulmonary:      Effort: Pulmonary effort is normal. No respiratory distress.      Comments: Diminished right breath sounds  Abdominal:      Palpations: Abdomen is soft.   Musculoskeletal:         General: No deformity.   Skin:     General: Skin is warm.   Neurological:      General: No focal deficit present.      Mental Status: He is  alert.      Comments: Moving all extremities.  Weak but more alert now - able to answer with short sentences/words, follows commands.        Medications  Current Facility-Administered Medications   Medication Dose Route Frequency Provider Last Rate Last Admin    alteplase (Activase) 5 mg in NS 30 mL INTRAPLEURAL syringe  5 mg Intrapleural TWICE DAILY Nilda Franco M.D.   5 mg at 11/03/22 0847    And    dornase alpha (PULMOZYME) 5 mg in NS 30 mL INTRAPLEURAL syringe  5 mg Intrapleural TWICE DAILY PARK Hinds.DNeetu   5 mg at 11/03/22 0847    scopolamine (TRANSDERM-SCOP) patch 1 Patch  1 Patch Transdermal Q72HRS Nilda Franco M.D.   1 Patch at 11/03/22 1009    lactulose 20 GM/30ML solution 15 mL  15 mL Enteral Tube BID PRN Nilda Franco M.D.        dexmedetomidine (PRECEDEX) 400 mcg/100mL NS premix infusion  0.1-1.5 mcg/kg/hr Intravenous Continuous Nilda Franco M.D.   Paused at 11/03/22 0845    dextrose 10 % BOLUS 25 g  25 g Intravenous Q15 MIN PRN Chris Abrams M.D.   25 g at 11/03/22 0010    piperacillin-tazobactam (Zosyn) 4.5 g in  mL IVPB  4.5 g Intravenous Q8HRS Nilda Franco M.D.   Stopped at 11/03/22 0955    MD Alert...ICU Electrolyte Replacement per Pharmacy   Other PHARMACY TO DOSE Nilda Franco M.D.        Pharmacy Consult Request  1 Each Other PHARMACY TO DOSE Chris Abrams M.D.        artificial tears (EYE LUBRICANT) ophth ointment 1 Application  1 Application Both Eyes Q8HRS Chris Abrams M.D.   1 Application at 11/03/22 0610    heparin injection 5,000 Units  5,000 Units Subcutaneous Q8HRS Chris Abrams M.D.   5,000 Units at 11/03/22 0553    nystatin (MYCOSTATIN) [id number] UNIT/ML suspension 500,000 Units  5 mL Buccal 4X/DAY Chris Abrams M.D.   500,000 Units at 11/03/22 0833    famotidine (PEPCID) injection 20 mg  20 mg Intravenous BID Chris Abrams M.D.   20 mg at 11/03/22 0554    Respiratory Therapy Consult   Nebulization  Continuous RT Greyson Luciano D.O.        lidocaine (XYLOCAINE) 1 % injection 2 mL  2 mL Tracheal Tube Q30 MIN PRN Greyson Luciano D.O.        Pharmacy Consult: Enteral tube insertion - review meds/change route/product selection   Other PHARMACY TO DOSE Greyson Luciano D.O.        carboxymethylcellulose (REFRESH TEARS) 0.5 % ophthalmic drops 2 Drop  2 Drop Both Eyes PRN Greyson Luciano D.O.   2 Drop at 11/01/22 2206    albuterol inhaler 2 Puff  2 Puff Inhalation Q6HRS PRN Eva Rodriguez M.D.        Pharmacy Consult Request ...Pain Management Review 1 Each  1 Each Other PHARMACY TO DOSE Eva Rodriguez M.D.        levothyroxine (SYNTHROID) tablet 50 mcg  50 mcg Enteral Tube AM ES Eva Rodriguez M.D.   50 mcg at 11/03/22 0554    acetaminophen (Tylenol) tablet 650 mg  650 mg Enteral Tube Q6HRS PRN Eva Rodriguez M.D.   650 mg at 11/03/22 0554       Fluids    Intake/Output Summary (Last 24 hours) at 11/3/2022 1239  Last data filed at 11/3/2022 1200  Gross per 24 hour   Intake 2220.35 ml   Output 3934 ml   Net -1713.65 ml       Laboratory          Recent Labs     11/01/22 0445 11/02/22 0254 11/02/22  1530 11/03/22 0215   SODIUM 142 139  --  138   POTASSIUM 3.4* 3.2* 3.7 3.2*   CHLORIDE 108 107  --  104   CO2 27 24  --  24   BUN 6* 7*  --  6*   CREATININE 0.50 0.65  --  0.58   MAGNESIUM 2.1 1.6  --  1.6   PHOSPHORUS 3.4 3.2  --  2.6   CALCIUM 7.8* 7.6*  --  7.6*     Recent Labs     11/01/22 0445 11/02/22  0254 11/03/22 0215   ALTSGPT  --  20 14   ASTSGOT  --  37 15   ALKPHOSPHAT  --  138* 114*   TBILIRUBIN  --  1.4 0.6   GLUCOSE 92 87 89     Recent Labs     11/01/22 0445 11/02/22  0254 11/03/22  0215   WBC 11.4* 8.4 10.0   NEUTSPOLYS 78.80* 67.50 70.50   LYMPHOCYTES 8.70* 11.00* 8.50*   MONOCYTES 6.50 9.70 10.30   EOSINOPHILS 4.70 9.50* 9.10*   BASOPHILS 0.40 0.60 0.50   ASTSGOT  --  37 15   ALTSGPT  --  20 14   ALKPHOSPHAT  --  138* 114*   TBILIRUBIN  --  1.4 0.6     Recent Labs      11/01/22  0445 11/02/22  0254 11/03/22  0215   RBC 3.45* 3.12* 3.32*   HEMOGLOBIN 11.4* 10.4* 10.9*   HEMATOCRIT 35.2* 31.6* 33.1*   PLATELETCT 283 334 439       Imaging  Reviewed    Assessment/Plan     Septic shock-resolved, now off pressors  MSSA and Pseudomonas pneumonia  Right loculated pleural effusion  Pleural studies from 10/25 with glucose <2, LDH around 1000 and increased WBCs -pleural fluid cultures negative so far patient was already on antibiotics.  This is a complicated parapneumonic effusion based on studies and imaging.  -Continue Zosyn -started 10/24 and finished on 10/30 but re-started on 11/1 since he has this loculated effusion w/low glucose and high LDH and just had CT placed yesterday.   -Now off all pressors -monitor hemodynamics  -IV fluids as needed  -R mid-axillary chest tube placed on 11/1 w/total of 870cc output so far  -Plan for intrapleural lytics x3 days (6 doses) 11/2 PM - 11/6AM  -Follow-up repeat pleural fluid culture from thoracentesis on 10/30 (only able to remove 150 cc at that time)    Acute hypoxic respiratory failure  Likely secondary to pneumonia and effusion as noted above.  Also with significant weakness/deconditioning of been limiting his ability to be weaned from the vent.  -extubated 11/2   -supplemental O2 as needed  -Replete electrolytes appropriately  -pulmonary hygiene, ISS  -early mobilization    Atrial fibrillation -currently rate controlled  -Holding home metoprolol due to hypotension-can restart once stable  -Not on chronic anticoagulation -will consider risk versus benefit after chest tube placement    Liver mass  Liver nodule seen on ultrasound in 8/2020 she was not visualized on the CT scan.  -Once stable, can get hepatic mass protocol CT or MRI -can be done as outpatient as well.    Alcohol abuse  Alcohol withdrawal  Initially admitted and intubated for alcohol withdrawal-now resolved    Hyponatremia  Decreased from 150 to 142 after increasing FWF to 250ml  q6h  - decreased FWF to 200mg q8h 11/1 - don't want Na to drop too quickly  - monitor      VTE:  Lovenox  Ulcer: PPI  Lines: Central Line  Ongoing indication addressed    I have performed a physical exam and reviewed and updated ROS and Plan today (11/3/2022). In review of yesterday's note (11/2/2022), there are no changes except as documented above.     Discussed patient condition and risk of morbidity and/or mortality with Family, RN, RT, Pharmacy, and Charge nurse / hot rounds  The patient remains critically ill.  Critical care time = 55 minutes in directly providing and coordinating critical care and extensive data review.  No time overlap and excludes procedures.        Nilda Franco MD  Pulmonary and Critical Care Medicine  [facility]

## 2022-11-03 NOTE — PROGRESS NOTES
This RN informed [facility] hospitalist Dr. Lipscomb that patient has had nearly 1500 mL in serosanguineous output from chest tube since unclamped at 2100.  VSS and patient asymptomatic.  No new orders received.

## 2022-11-03 NOTE — PROCEDURES
Procedure Date: 11/3/2022    Procedure: Intrapleual Fibrinolytics (46941)    Physician: Donna Franco MD    Anesthesia Type: N/A    Indication: complicated parapneumonic pleural effusion    Time Out: Patient was identified with two patient identifiers and site was confirmed.    Pre-Op Dx: complicated parapneumonic pleural effusion    Post-Op Dx: complicated parapneumonic pleural effusion    Medications:   Alteplase 5mg  Dornase Alpha 5mg     Description:    5mg of Alteplase and 5mg of Dornase were prepared by pharmacy in normal saline and maintained chilled until administration.  These solutions were mixed at bedside and the total fluid instillation was 60ml.  Patient was positioned on his back due to difficulty in turning him in his current respiratory state. Chest tube connections were prepped and cleaned. Alteplase/Dornase solution was drawn up in the appropriate syringe and medications were then injected into the chest tube, avoiding the introduction of air. Chest tube connection to pleurvac was then reconnected. Chest tube was left clamped/closed. Chest tube will be placed back on suction in 2 hours.    Complications: none    F/U: routine chest tube care      Nilda Franco MD  Pulmonary and Critical Care Medicine  [facility]

## 2022-11-03 NOTE — PROGRESS NOTES
Hypoglycemia Intervention    Hypoglycemia protocol intervention:  Blood glucose 69 at 2005.  Intervention: 25 g IV dextrose per MAR   Repeat blood glucose 122 at 2052.

## 2022-11-03 NOTE — FLOWSHEET NOTE
11/02/22 1900   Chest Physiotherapy Treatment   $ PEP/CPT Performed Cough-Assist, Vest, Mechanical Vibration       Deep breathe and cough performed, productive oral suction, yellow sputum, medium amount.

## 2022-11-03 NOTE — PROCEDURES
Procedure Date: 11/2/2022    Procedure: Intrapleual Fibrinolytics (53995)    Physician: Donna Franco MD    Anesthesia Type: N/A    Indication: complicated parapneumonic pleural effusion    Time Out: Patient was identified with two patient identifiers and site was confirmed.    Pre-Op Dx: complicated parapneumonic pleural effusion    Post-Op Dx: complicated parapneumonic pleural effusion    Medications:   Alteplase 5mg  Dornase Alpha 5mg     Description:    5mg of Alteplase and 5mg of Dornase were prepared by pharmacy in normal saline and maintained chilled until administration.  These solutions were mixed at bedside and the total fluid instillation was 60ml.  Patient was positioned on his back due to difficulty in turning him in his current respiratory state. Chest tube connections were prepped and cleaned. Alteplase/Dornase solution was drawn up in the appropriate syringe and medications were then injected into the chest tube, avoiding the introduction of air. Chest tube connection to pleurvac was then reconnected. Chest tube was left clamped/closed. Chest tube will be placed back on suction in 2 hours.    Complications: none    F/U: routine chest tube care      Nilda Franco MD  Pulmonary and Critical Care Medicine  [facility]

## 2022-11-03 NOTE — PROGRESS NOTES
Hypoglycemia Intervention    Hypoglycemia protocol intervention:  Blood glucose 68 at 0006.  Intervention: 25 g IV dextrose per MAR   Repeat blood glucose 100 at 0055.

## 2022-11-03 NOTE — PROGRESS NOTES
12-hour chart check complete.    Monitor Summary  Rhythm: a-fib  Rate: 70-90s  Ectopy: n/a  Measurements: -/0.09/-

## 2022-11-03 NOTE — PROGRESS NOTES
12-hour chart check complete.    Monitor Summary  Rhythm: a fib  Rate:   Ectopy: O PVC, R couplet  Measurements: -/.14/-

## 2022-11-03 NOTE — CARE PLAN
The patient is Watcher - Medium risk of patient condition declining or worsening    Shift Goals  Clinical Goals: Maintain oxygenation stability post extubation  Patient Goals: ANETTE  Family Goals: Patient comfort    Progress made toward(s) clinical / shift goals:      Problem: Mechanical Ventilation  Goal: Successful weaning off mechanical ventilator, spontaneously maintains adequate gas exchange  Outcome: Progressing  Note: Patient now on 4L oxymask, tolerating well.       Patient is not progressing towards the following goals:     Problem: Risk for Aspiration  Goal: Patient's risk for aspiration will be absent or decrease  Outcome: Progressing  Note: Patient requiring frequent suctioning to clear secretions.

## 2022-11-03 NOTE — PROCEDURES
Chest Tube Insertion    Date/Time: 11/1/2022 5:44 PM  Performed by: Nilda Franco M.D.  Authorized by: Nilda Franco M.D.     Consent:     Consent obtained:  Written    Consent given by: daughter.    Risks, benefits, and alternatives were discussed: yes      Risks discussed:  Bleeding, damage to surrounding structures, incomplete drainage, infection, nerve damage and pain  Universal protocol:     Procedure explained and questions answered to patient or proxy's satisfaction: yes      Relevant documents present and verified: yes      Test results available: yes      Imaging studies available: yes      Required blood products, implants, devices, and special equipment available: yes      Site/side marked: yes      Immediately prior to procedure, a time out was called: yes      Patient identity confirmed:  Arm band  Pre-procedure details:     Skin preparation:  Chlorhexidine  Sedation:     Sedation type:  Moderate sedation (versed and fentanyl)  Anesthesia:     Anesthesia method:  Local infiltration    Local anesthetic:  Lidocaine 1% w/o epi  Procedure details:     Placement location: R mid axillary.    Tube size (Fr):  12    Dissection instrument: sledinger technique.    Ultrasound guidance: yes      Tension pneumothorax: no      Tube connected to:  Suction    Drainage characteristics:  Yellow and clear    Suture material:  2-0 silk    Dressing:  4x4 sterile gauze and Xeroform gauze  Post-procedure details:     Post-insertion x-ray findings: tube in good position      Procedure completion:  Tolerated well, no immediate complications        Nilda Franco MD  Pulmonary and Critical Care Medicine  [facility]

## 2022-11-03 NOTE — PROGRESS NOTES
12-hour chart check complete.    Monitor Summary  Rhythm: a fib  Rate: 82-94  Ectopy: O PVC  Measurements: -/.12/-

## 2022-11-04 ENCOUNTER — APPOINTMENT (OUTPATIENT)
Dept: RADIOLOGY | Facility: MEDICAL CENTER | Age: 57
DRG: 870 | End: 2022-11-04
Attending: STUDENT IN AN ORGANIZED HEALTH CARE EDUCATION/TRAINING PROGRAM
Payer: COMMERCIAL

## 2022-11-04 LAB
ALBUMIN SERPL BCP-MCNC: 2 G/DL (ref 3.2–4.9)
ALBUMIN/GLOB SERPL: 0.5 G/DL
ALP SERPL-CCNC: 111 U/L (ref 30–99)
ALT SERPL-CCNC: 10 U/L (ref 2–50)
ANION GAP SERPL CALC-SCNC: 6 MMOL/L (ref 7–16)
ANION GAP SERPL CALC-SCNC: 7 MMOL/L (ref 7–16)
AST SERPL-CCNC: 12 U/L (ref 12–45)
BACTERIA FLD AEROBE CULT: NORMAL
BASOPHILS # BLD AUTO: 0.6 % (ref 0–1.8)
BASOPHILS # BLD: 0.06 K/UL (ref 0–0.12)
BILIRUB SERPL-MCNC: 0.6 MG/DL (ref 0.1–1.5)
BUN SERPL-MCNC: 6 MG/DL (ref 8–22)
BUN SERPL-MCNC: 7 MG/DL (ref 8–22)
CA-I SERPL-SCNC: 1.07 MMOL/L (ref 1.1–1.3)
CALCIUM SERPL-MCNC: 7.4 MG/DL (ref 8.4–10.2)
CALCIUM SERPL-MCNC: 7.6 MG/DL (ref 8.4–10.2)
CHLORIDE SERPL-SCNC: 103 MMOL/L (ref 96–112)
CHLORIDE SERPL-SCNC: 106 MMOL/L (ref 96–112)
CO2 SERPL-SCNC: 24 MMOL/L (ref 20–33)
CO2 SERPL-SCNC: 25 MMOL/L (ref 20–33)
CREAT SERPL-MCNC: 0.51 MG/DL (ref 0.5–1.4)
CREAT SERPL-MCNC: 0.54 MG/DL (ref 0.5–1.4)
EOSINOPHIL # BLD AUTO: 0.74 K/UL (ref 0–0.51)
EOSINOPHIL NFR BLD: 7.1 % (ref 0–6.9)
ERYTHROCYTE [DISTWIDTH] IN BLOOD BY AUTOMATED COUNT: 49.8 FL (ref 35.9–50)
GFR SERPLBLD CREATININE-BSD FMLA CKD-EPI: 117 ML/MIN/1.73 M 2
GFR SERPLBLD CREATININE-BSD FMLA CKD-EPI: 119 ML/MIN/1.73 M 2
GLOBULIN SER CALC-MCNC: 3.9 G/DL (ref 1.9–3.5)
GLUCOSE BLD STRIP.AUTO-MCNC: 74 MG/DL (ref 65–99)
GLUCOSE BLD STRIP.AUTO-MCNC: 75 MG/DL (ref 65–99)
GLUCOSE BLD STRIP.AUTO-MCNC: 79 MG/DL (ref 65–99)
GLUCOSE SERPL-MCNC: 103 MG/DL (ref 65–99)
GLUCOSE SERPL-MCNC: 85 MG/DL (ref 65–99)
GRAM STN SPEC: NORMAL
HCT VFR BLD AUTO: 33.9 % (ref 42–52)
HGB BLD-MCNC: 11.4 G/DL (ref 14–18)
IMM GRANULOCYTES # BLD AUTO: 0.06 K/UL (ref 0–0.11)
IMM GRANULOCYTES NFR BLD AUTO: 0.6 % (ref 0–0.9)
LYMPHOCYTES # BLD AUTO: 1 K/UL (ref 1–4.8)
LYMPHOCYTES NFR BLD: 9.5 % (ref 22–41)
MAGNESIUM SERPL-MCNC: 1.5 MG/DL (ref 1.5–2.5)
MCH RBC QN AUTO: 32.6 PG (ref 27–33)
MCHC RBC AUTO-ENTMCNC: 33.6 G/DL (ref 33.7–35.3)
MCV RBC AUTO: 96.9 FL (ref 81.4–97.8)
MONOCYTES # BLD AUTO: 1.41 K/UL (ref 0–0.85)
MONOCYTES NFR BLD AUTO: 13.5 % (ref 0–13.4)
NEUTROPHILS # BLD AUTO: 7.21 K/UL (ref 1.82–7.42)
NEUTROPHILS NFR BLD: 68.7 % (ref 44–72)
NRBC # BLD AUTO: 0 K/UL
NRBC BLD-RTO: 0 /100 WBC
PHOSPHATE SERPL-MCNC: 2.9 MG/DL (ref 2.5–4.5)
PLATELET # BLD AUTO: 516 K/UL (ref 164–446)
PMV BLD AUTO: 11.2 FL (ref 9–12.9)
POTASSIUM SERPL-SCNC: 3.4 MMOL/L (ref 3.6–5.5)
POTASSIUM SERPL-SCNC: 3.6 MMOL/L (ref 3.6–5.5)
POTASSIUM SERPL-SCNC: 3.8 MMOL/L (ref 3.6–5.5)
PROT SERPL-MCNC: 5.9 G/DL (ref 6–8.2)
RBC # BLD AUTO: 3.5 M/UL (ref 4.7–6.1)
SIGNIFICANT IND 70042: NORMAL
SITE SITE: NORMAL
SODIUM SERPL-SCNC: 134 MMOL/L (ref 135–145)
SODIUM SERPL-SCNC: 137 MMOL/L (ref 135–145)
SOURCE SOURCE: NORMAL
WBC # BLD AUTO: 10.5 K/UL (ref 4.8–10.8)

## 2022-11-04 PROCEDURE — 80048 BASIC METABOLIC PNL TOTAL CA: CPT

## 2022-11-04 PROCEDURE — 94640 AIRWAY INHALATION TREATMENT: CPT

## 2022-11-04 PROCEDURE — 84100 ASSAY OF PHOSPHORUS: CPT

## 2022-11-04 PROCEDURE — 92610 EVALUATE SWALLOWING FUNCTION: CPT

## 2022-11-04 PROCEDURE — 770022 HCHG ROOM/CARE - ICU (200)

## 2022-11-04 PROCEDURE — 32562 LYSE CHEST FIBRIN SUBQ DAY: CPT | Mod: RT | Performed by: STUDENT IN AN ORGANIZED HEALTH CARE EDUCATION/TRAINING PROGRAM

## 2022-11-04 PROCEDURE — 83735 ASSAY OF MAGNESIUM: CPT

## 2022-11-04 PROCEDURE — A9270 NON-COVERED ITEM OR SERVICE: HCPCS | Performed by: INTERNAL MEDICINE

## 2022-11-04 PROCEDURE — 700102 HCHG RX REV CODE 250 W/ 637 OVERRIDE(OP): Performed by: INTERNAL MEDICINE

## 2022-11-04 PROCEDURE — 94760 N-INVAS EAR/PLS OXIMETRY 1: CPT

## 2022-11-04 PROCEDURE — 3E0L3GC INTRODUCTION OF OTHER THERAPEUTIC SUBSTANCE INTO PLEURAL CAVITY, PERCUTANEOUS APPROACH: ICD-10-PCS | Performed by: STUDENT IN AN ORGANIZED HEALTH CARE EDUCATION/TRAINING PROGRAM

## 2022-11-04 PROCEDURE — 84132 ASSAY OF SERUM POTASSIUM: CPT

## 2022-11-04 PROCEDURE — 99291 CRITICAL CARE FIRST HOUR: CPT | Mod: 25 | Performed by: STUDENT IN AN ORGANIZED HEALTH CARE EDUCATION/TRAINING PROGRAM

## 2022-11-04 PROCEDURE — 700111 HCHG RX REV CODE 636 W/ 250 OVERRIDE (IP): Performed by: STUDENT IN AN ORGANIZED HEALTH CARE EDUCATION/TRAINING PROGRAM

## 2022-11-04 PROCEDURE — A9270 NON-COVERED ITEM OR SERVICE: HCPCS | Performed by: HOSPITALIST

## 2022-11-04 PROCEDURE — 94668 MNPJ CHEST WALL SBSQ: CPT

## 2022-11-04 PROCEDURE — 82330 ASSAY OF CALCIUM: CPT

## 2022-11-04 PROCEDURE — 71045 X-RAY EXAM CHEST 1 VIEW: CPT

## 2022-11-04 PROCEDURE — 700102 HCHG RX REV CODE 250 W/ 637 OVERRIDE(OP): Performed by: STUDENT IN AN ORGANIZED HEALTH CARE EDUCATION/TRAINING PROGRAM

## 2022-11-04 PROCEDURE — 700105 HCHG RX REV CODE 258: Performed by: STUDENT IN AN ORGANIZED HEALTH CARE EDUCATION/TRAINING PROGRAM

## 2022-11-04 PROCEDURE — 700111 HCHG RX REV CODE 636 W/ 250 OVERRIDE (IP): Performed by: INTERNAL MEDICINE

## 2022-11-04 PROCEDURE — 700102 HCHG RX REV CODE 250 W/ 637 OVERRIDE(OP): Performed by: HOSPITALIST

## 2022-11-04 PROCEDURE — 80053 COMPREHEN METABOLIC PANEL: CPT

## 2022-11-04 PROCEDURE — 36592 COLLECT BLOOD FROM PICC: CPT

## 2022-11-04 PROCEDURE — 85025 COMPLETE CBC W/AUTO DIFF WBC: CPT

## 2022-11-04 PROCEDURE — 700101 HCHG RX REV CODE 250: Performed by: STUDENT IN AN ORGANIZED HEALTH CARE EDUCATION/TRAINING PROGRAM

## 2022-11-04 PROCEDURE — 94669 MECHANICAL CHEST WALL OSCILL: CPT

## 2022-11-04 PROCEDURE — 82962 GLUCOSE BLOOD TEST: CPT

## 2022-11-04 PROCEDURE — A9270 NON-COVERED ITEM OR SERVICE: HCPCS | Performed by: STUDENT IN AN ORGANIZED HEALTH CARE EDUCATION/TRAINING PROGRAM

## 2022-11-04 RX ORDER — CALCIUM GLUCONATE 20 MG/ML
1 INJECTION, SOLUTION INTRAVENOUS ONCE
Status: COMPLETED | OUTPATIENT
Start: 2022-11-04 | End: 2022-11-04

## 2022-11-04 RX ORDER — DEXTROSE AND SODIUM CHLORIDE 5; .45 G/100ML; G/100ML
INJECTION, SOLUTION INTRAVENOUS CONTINUOUS
Status: DISCONTINUED | OUTPATIENT
Start: 2022-11-04 | End: 2022-11-05

## 2022-11-04 RX ORDER — MAGNESIUM SULFATE HEPTAHYDRATE 40 MG/ML
4 INJECTION, SOLUTION INTRAVENOUS ONCE
Status: COMPLETED | OUTPATIENT
Start: 2022-11-04 | End: 2022-11-04

## 2022-11-04 RX ORDER — POTASSIUM CHLORIDE 29.8 MG/ML
40 INJECTION INTRAVENOUS ONCE
Status: COMPLETED | OUTPATIENT
Start: 2022-11-04 | End: 2022-11-04

## 2022-11-04 RX ORDER — QUETIAPINE FUMARATE 25 MG/1
25 TABLET, FILM COATED ORAL NIGHTLY
Status: DISCONTINUED | OUTPATIENT
Start: 2022-11-04 | End: 2022-11-05

## 2022-11-04 RX ADMIN — PIPERACILLIN AND TAZOBACTAM 4.5 G: 4; .5 INJECTION, POWDER, FOR SOLUTION INTRAVENOUS at 20:10

## 2022-11-04 RX ADMIN — ACETYLCYSTEINE 3 ML: 200 INHALANT RESPIRATORY (INHALATION) at 19:42

## 2022-11-04 RX ADMIN — ALTEPLASE 5 MG: KIT at 18:48

## 2022-11-04 RX ADMIN — QUETIAPINE FUMARATE 25 MG: 25 TABLET, FILM COATED ORAL at 20:03

## 2022-11-04 RX ADMIN — ACETYLCYSTEINE 3 ML: 200 INHALANT RESPIRATORY (INHALATION) at 07:21

## 2022-11-04 RX ADMIN — NYSTATIN 500000 UNITS: 100000 SUSPENSION ORAL at 09:04

## 2022-11-04 RX ADMIN — ALBUTEROL SULFATE 2.5 MG: 2.5 SOLUTION RESPIRATORY (INHALATION) at 14:19

## 2022-11-04 RX ADMIN — MAGNESIUM SULFATE HEPTAHYDRATE 4 G: 40 INJECTION, SOLUTION INTRAVENOUS at 07:31

## 2022-11-04 RX ADMIN — HEPARIN SODIUM 5000 UNITS: 5000 INJECTION, SOLUTION INTRAVENOUS; SUBCUTANEOUS at 22:33

## 2022-11-04 RX ADMIN — POTASSIUM CHLORIDE 40 MEQ: 29.8 INJECTION, SOLUTION INTRAVENOUS at 07:31

## 2022-11-04 RX ADMIN — ALBUTEROL SULFATE 2.5 MG: 2.5 SOLUTION RESPIRATORY (INHALATION) at 07:21

## 2022-11-04 RX ADMIN — ALTEPLASE 5 MG: KIT at 10:00

## 2022-11-04 RX ADMIN — ACETYLCYSTEINE 3 ML: 200 INHALANT RESPIRATORY (INHALATION) at 14:19

## 2022-11-04 RX ADMIN — DEXMEDETOMIDINE HYDROCHLORIDE 0.4 MCG/KG/HR: 4 INJECTION, SOLUTION INTRAVENOUS at 02:39

## 2022-11-04 RX ADMIN — HEPARIN SODIUM 5000 UNITS: 5000 INJECTION, SOLUTION INTRAVENOUS; SUBCUTANEOUS at 13:18

## 2022-11-04 RX ADMIN — ACETAMINOPHEN 650 MG: 325 TABLET, FILM COATED ORAL at 05:36

## 2022-11-04 RX ADMIN — FAMOTIDINE 20 MG: 10 INJECTION, SOLUTION INTRAVENOUS at 05:37

## 2022-11-04 RX ADMIN — PIPERACILLIN AND TAZOBACTAM 4.5 G: 4; .5 INJECTION, POWDER, FOR SOLUTION INTRAVENOUS at 05:42

## 2022-11-04 RX ADMIN — LEVOTHYROXINE SODIUM 50 MCG: 50 TABLET ORAL at 05:38

## 2022-11-04 RX ADMIN — DORNASE ALFA 5 MG: 1 SOLUTION RESPIRATORY (INHALATION) at 18:49

## 2022-11-04 RX ADMIN — FAMOTIDINE 20 MG: 10 INJECTION, SOLUTION INTRAVENOUS at 18:42

## 2022-11-04 RX ADMIN — DEXTROSE AND SODIUM CHLORIDE: 5; 450 INJECTION, SOLUTION INTRAVENOUS at 16:58

## 2022-11-04 RX ADMIN — CALCIUM GLUCONATE 1 G: 20 INJECTION, SOLUTION INTRAVENOUS at 08:58

## 2022-11-04 RX ADMIN — HEPARIN SODIUM 5000 UNITS: 5000 INJECTION, SOLUTION INTRAVENOUS; SUBCUTANEOUS at 05:37

## 2022-11-04 RX ADMIN — PIPERACILLIN AND TAZOBACTAM 4.5 G: 4; .5 INJECTION, POWDER, FOR SOLUTION INTRAVENOUS at 13:18

## 2022-11-04 RX ADMIN — ACETAMINOPHEN 650 MG: 325 TABLET, FILM COATED ORAL at 17:48

## 2022-11-04 RX ADMIN — DORNASE ALFA 5 MG: 1 SOLUTION RESPIRATORY (INHALATION) at 10:00

## 2022-11-04 RX ADMIN — ALBUTEROL SULFATE 2.5 MG: 2.5 SOLUTION RESPIRATORY (INHALATION) at 19:42

## 2022-11-04 ASSESSMENT — PAIN DESCRIPTION - PAIN TYPE
TYPE: ACUTE PAIN

## 2022-11-04 ASSESSMENT — ENCOUNTER SYMPTOMS
BACK PAIN: 1
SHORTNESS OF BREATH: 1
COUGH: 1
VOMITING: 0
NAUSEA: 0

## 2022-11-04 ASSESSMENT — PATIENT HEALTH QUESTIONNAIRE - PHQ9
1. LITTLE INTEREST OR PLEASURE IN DOING THINGS: NOT AT ALL
SUM OF ALL RESPONSES TO PHQ9 QUESTIONS 1 AND 2: 0
2. FEELING DOWN, DEPRESSED, IRRITABLE, OR HOPELESS: NOT AT ALL

## 2022-11-04 NOTE — PROGRESS NOTES
Critical Care Progress Note    Date of admission  10/18/2022    Chief Complaint  57 y.o. male with history of alcohol abuse, meth use, A. fib not on anticoagulation, liver mass noted on US 8/2022 admitted 10/18/2022 with alcohol withdrawal.  Patient was initially intubated for worsening respiratory failure from alcohol withdrawal -was extubated on 10/21 and reintubated on 10/23 for worsening respiratory failure and altered mental status.  He underwent bronchoscopy on 10/24 due to copious secretions -cultures with MSSA and Pseudomonas so he was started on Zosyn.  He had thoracentesis on 10/25 with removal of 400 cc of pleural fluid -pleural studies with LDH 1020, glucose < 2, cell count with 3511 WBC, 3000 RBCs -culture showed WBCs but no growth.   Patient self extubated on 10/26 overnight and was shortly reintubated after he failed HFNC.  His episode of respiratory failure was complicated by bradycardia and hypotension requiring vasopressors.  On 10/27, he was noted to have significant abdominal distention with dark feculent material coming out of the NG tube.  Fluid resuscitation was continued he continued to have high output from NG tube. CT A/P was obtained which showed dilated loops of bowel concerning for ileus rather than early/low-grade SBO, right larger than left pleural effusions.  Liver nodule seen on ultrasound in 8/2020 she was not visualized on the CT. he was gradually weaned off vasopressors.  He was noted to have done well on his SBT but had low NIF so he was kept intubated.    10/31: Very weak and tired this morning.  Review of imaging and pleural studies concerning for a right loculated parapneumonic effusion -discussed case with IR for chest tube placement.  11/1: R mid-axillary chest tube placed for loculated effusion. Zosyn restarted - fell off for about one day but decided to continue it since he has this loculated effusion  11/2: extubated this AM to oxymask. Still with significant thin clear  oral secretions so started on glycopyrrolate PRN to decrease secretions and risk of aspiration post extubation.   11/3: appeared more tired/somnolent this AM - woke patient up and got him out of bed to improve his mental status. Daughter also working with him to help suction him and re-orient him.   11/4: CXR w/improved R effusion - 2 more doses of lytics remaining.     Chart review from the past 24 hours includes imaging, laboratory studies, vital signs and notes available.  Pertinent data for today's visit includes    Cardiac: wnl  Pulm: extubated 11/2, on 3L O2  Heme: stable  /renal: Cr stable  GI/endo: TFs paused after copious oral secretions that smelled like vomit - restart TFs now  ID:  Rcx 10/24 w/MSSA, PSA, repeat culture 10/31 w/pseudomonas - zosyn (10/24-), WBC increasing 11/1 but pcal down to 0.43 from 7.27  I/O: even. CT output 700ml  Additional Labs/Imaging:   - hypernatremia improving on FWF   - DC precedex  - CXR w/improved R effusion       Review of Systems  Review of Systems   Constitutional:  Positive for malaise/fatigue.   Respiratory:  Positive for cough and shortness of breath.    Cardiovascular:  Positive for chest pain.   Gastrointestinal:  Negative for nausea and vomiting.   Musculoskeletal:  Positive for back pain.      Vital Signs for last 24 hours   Pulse:  [] 87  Resp:  [10-56] 27  BP: ()/(55-96) 114/68  SpO2:  [89 %-100 %] 96 %    Hemodynamic parameters for last 24 hours       Respiratory Information for the last 24 hours       Physical Exam   Physical Exam  Vitals and nursing note reviewed.   Constitutional:       General: He is not in acute distress.  HENT:      Nose: No congestion.      Mouth/Throat:      Mouth: Mucous membranes are moist.   Eyes:      General:         Right eye: Discharge present.         Left eye: Discharge present.  Cardiovascular:      Rate and Rhythm: Normal rate and regular rhythm.   Pulmonary:      Effort: Pulmonary effort is normal. No  respiratory distress.      Comments: Diminished right breath sounds  Abdominal:      Palpations: Abdomen is soft.   Musculoskeletal:         General: No deformity.   Skin:     General: Skin is warm.   Neurological:      General: No focal deficit present.      Mental Status: He is alert.      Comments: Moving all extremities.  Weak but more alert now - able to answer with short sentences/words, follows commands.        Medications  Current Facility-Administered Medications   Medication Dose Route Frequency Provider Last Rate Last Admin    potassium chloride in water (KCL) ivpb (ICU ONLY) 40 mEq  40 mEq Intravenous Once Nilda Salvador, M.D. 50 mL/hr at 11/04/22 0731 40 mEq at 11/04/22 0731    magnesium sulfate IVPB premix 4 g  4 g Intravenous Once Nilda Salvador, M.D. 25 mL/hr at 11/04/22 0731 4 g at 11/04/22 0731    calcium GLUConate 1 g in NaCl IVPB premix  1 g Intravenous Once Nilda Salvador M.D.        scopolamine (TRANSDERM-SCOP) patch 1 Patch  1 Patch Transdermal Q72HRS Nilda Salvador, M.D.   1 Patch at 11/03/22 1009    alteplase (Activase) 5 mg in NS 30 mL INTRAPLEURAL syringe  5 mg Intrapleural TWICE DAILY Nilda Salvador, M.D.   5 mg at 11/03/22 1710    And    dornase alpha (PULMOZYME) 5 mg in NS 30 mL INTRAPLEURAL syringe  5 mg Intrapleural TWICE DAILY Nilda Salvador, M.D.   5 mg at 11/03/22 1710    acetylcysteine (MUCOMYST) 20 % solution 3 mL  3 mL Inhalation 4X/DAY (RT) Nilda Salvador, M.D.   3 mL at 11/04/22 0721    albuterol (PROVENTIL) 2.5mg/0.5ml nebulizer solution 2.5 mg  2.5 mg Nebulization 4X/DAY (RT) Nilda Salvador, M.D.   2.5 mg at 11/04/22 0721    lactulose 20 GM/30ML solution 15 mL  15 mL Enteral Tube BID PRN Nilda Salvador, M.D.   15 mL at 11/03/22 1523    dextrose 10 % BOLUS 25 g  25 g Intravenous Q15 MIN PRN Chris J Abrams, M.D.   25 g at 11/03/22 1519    piperacillin-tazobactam (Zosyn) 4.5 g in  mL IVPB  4.5 g Intravenous Q8HRS Nilda  MAN Franco 25 mL/hr at 11/04/22 0600 Rate Verify at 11/04/22 0600    MD Alert...ICU Electrolyte Replacement per Pharmacy   Other PHARMACY TO DOSE Nilda Franco M.D.        Pharmacy Consult Request  1 Each Other PHARMACY TO DOSE Chris Abrams M.D.        artificial tears (EYE LUBRICANT) ophth ointment 1 Application  1 Application Both Eyes Q8HRS Chris Abrams M.D.   1 Application at 11/03/22 1318    heparin injection 5,000 Units  5,000 Units Subcutaneous Q8HRS Chris Abrams M.D.   5,000 Units at 11/04/22 0537    nystatin (MYCOSTATIN) 808469 UNIT/ML suspension 500,000 Units  5 mL Buccal 4X/DAY Chris Abrams M.D.   500,000 Units at 11/03/22 2022    famotidine (PEPCID) injection 20 mg  20 mg Intravenous BID Chris Abrams M.D.   20 mg at 11/04/22 0537    Respiratory Therapy Consult   Nebulization Continuous RT Greyson Luciano D.O.        lidocaine (XYLOCAINE) 1 % injection 2 mL  2 mL Tracheal Tube Q30 MIN PRN Greyson Luciano D.O.        Pharmacy Consult: Enteral tube insertion - review meds/change route/product selection   Other PHARMACY TO DOSE Greyson Luciano D.O.        carboxymethylcellulose (REFRESH TEARS) 0.5 % ophthalmic drops 2 Drop  2 Drop Both Eyes PRN Greyson Luciano D.O.   2 Drop at 11/01/22 2206    albuterol inhaler 2 Puff  2 Puff Inhalation Q6HRS PRN Eva Rodriguez M.D.        Pharmacy Consult Request ...Pain Management Review 1 Each  1 Each Other PHARMACY TO DOSE Eva Rodriguez M.D.        levothyroxine (SYNTHROID) tablet 50 mcg  50 mcg Enteral Tube AM ES Eva Rodriguez M.D.   50 mcg at 11/04/22 0538    acetaminophen (Tylenol) tablet 650 mg  650 mg Enteral Tube Q6HRS PRN Eva Rodriguez M.D.   650 mg at 11/04/22 0536       Fluids    Intake/Output Summary (Last 24 hours) at 11/4/2022 0751  Last data filed at 11/4/2022 0600  Gross per 24 hour   Intake 2019.03 ml   Output 1709 ml   Net 310.03 ml         Laboratory          Recent Labs      11/02/22 0254 11/02/22  1530 11/03/22 0215 11/04/22  0320   SODIUM 139  --  138  --    POTASSIUM 3.2* 3.7 3.2* 3.4*   CHLORIDE 107  --  104  --    CO2 24  --  24  --    BUN 7*  --  6*  --    CREATININE 0.65  --  0.58  --    MAGNESIUM 1.6  --  1.6 1.5   PHOSPHORUS 3.2  --  2.6 2.9   CALCIUM 7.6*  --  7.6*  --        Recent Labs     11/02/22 0254 11/03/22 0215   ALTSGPT 20 14   ASTSGOT 37 15   ALKPHOSPHAT 138* 114*   TBILIRUBIN 1.4 0.6   GLUCOSE 87 89       Recent Labs     11/02/22 0254 11/03/22 0215 11/04/22 0320   WBC 8.4 10.0 10.5   NEUTSPOLYS 67.50 70.50 68.70   LYMPHOCYTES 11.00* 8.50* 9.50*   MONOCYTES 9.70 10.30 13.50*   EOSINOPHILS 9.50* 9.10* 7.10*   BASOPHILS 0.60 0.50 0.60   ASTSGOT 37 15  --    ALTSGPT 20 14  --    ALKPHOSPHAT 138* 114*  --    TBILIRUBIN 1.4 0.6  --        Recent Labs     11/02/22 0254 11/03/22 0215 11/04/22  0320   RBC 3.12* 3.32* 3.50*   HEMOGLOBIN 10.4* 10.9* 11.4*   HEMATOCRIT 31.6* 33.1* 33.9*   PLATELETCT 334 439 516*         Imaging  Reviewed    Assessment/Plan     Septic shock-resolved, now off pressors  MSSA and Pseudomonas pneumonia  Right loculated pleural effusion  Pleural studies from 10/25 with glucose <2, LDH around 1000 and increased WBCs -pleural fluid cultures negative so far patient was already on antibiotics.  This is a complicated parapneumonic effusion based on studies and imaging.  -Continue Zosyn -started 10/24 and finished on 10/30 but re-started on 11/1 since he has this loculated effusion w/low glucose and high LDH and just had CT placed yesterday.   -Now off all pressors -monitor hemodynamics  -IV fluids as needed  -R mid-axillary chest tube placed on 11/1 w/significant output since starting lytics and improved CXR  -Plan for intrapleural lytics x3 days (6 doses) 11/2 PM - 11/5AM  -Follow-up repeat pleural fluid culture from thoracentesis on 10/30 (only able to remove 150 cc at that time)    Acute hypoxic respiratory failure  Likely secondary to  pneumonia and effusion as noted above.  Also with significant weakness/deconditioning of been limiting his ability to be weaned from the vent.  -extubated 11/2   -supplemental O2 as needed  -Replete electrolytes appropriately  -pulmonary hygiene, ISS  -early mobilization    Atrial fibrillation -currently rate controlled  -Holding home metoprolol due to hypotension-can restart once stable  -Not on chronic anticoagulation -will consider risk versus benefit after chest tube placement    Liver mass  Liver nodule seen on ultrasound in 8/2020 she was not visualized on the CT scan.  -Once stable, can get hepatic mass protocol CT or MRI -can be done as outpatient as well.    Alcohol abuse  Alcohol withdrawal  Initially admitted and intubated for alcohol withdrawal-now resolved    Hyponatremia  Decreased from 150 to 142 after increasing FWF to 250ml q6h  - decreased FWF to 200mg q8h 11/1 - don't want Na to drop too quickly  - monitor    Hypoglycemia  Tube feeds were held for multiple days post extubation 2/2 c/f aspiration (suctioned some thick secretions that smelled like vomit and looked like tube feeds). Now improved so back on trickle with slow advance  - advance as tolerated   - monitor for aspiration  - D51/2NS if needed while re-starting TFs      VTE:  Lovenox  Ulcer: PPI  Lines: Central Line  Ongoing indication addressed    I have performed a physical exam and reviewed and updated ROS and Plan today (11/4/2022). In review of yesterday's note (11/3/2022), there are no changes except as documented above.     Discussed patient condition and risk of morbidity and/or mortality with Family, RN, RT, Pharmacy, and Charge nurse / hot rounds  The patient remains critically ill.  Critical care time = 50 minutes in directly providing and coordinating critical care and extensive data review.  No time overlap and excludes procedures.        Nilda Franco MD  Pulmonary and Critical Care Medicine  [facility]

## 2022-11-04 NOTE — PROCEDURES
Procedure Date: 11/3/2022    Procedure: Intrapleual Fibrinolytics (75511)    Physician: Donna Franco MD    Anesthesia Type: N/A    Indication: complicated parapneumonic pleural effusion    Time Out: Patient was identified with two patient identifiers and site was confirmed.    Pre-Op Dx: complicated parapneumonic pleural effusion    Post-Op Dx: complicated parapneumonic pleural effusion    Medications:   Alteplase 5mg  Dornase Alpha 5mg     Description:    5mg of Alteplase and 5mg of Dornase were prepared by pharmacy in normal saline and maintained chilled until administration.  These solutions were mixed at bedside and the total fluid instillation was 60ml.  Patient was positioned on his back due to difficulty in turning him. Chest tube connections were prepped and cleaned. Alteplase/Dornase solution was drawn up in the appropriate syringe and medications were then injected into the chest tube, avoiding the introduction of air. Chest tube connection to pleurvac was then reconnected. Chest tube was left clamped/closed. Chest tube will be placed back on suction in 2 hours.    Complications: none    F/U: routine chest tube care      Nilda Franco MD  Pulmonary and Critical Care Medicine  [facility]

## 2022-11-04 NOTE — DIETARY
Nutrition Services Brief Update:    Problem: Nutritional:  Goal: Nutrition support tolerated and meeting greater than 85% of estimated needs  Outcome: Progressing slower than expected    Pt did not pass swallow eval with SLP this a.m; SLP following for dysphagia management. FEES planned for 11/7. Per nursing, pt's TF advanced to 25 ml/hr this a.m. Low/slow advancement plan per current TF order remains appropriate given pt's extended period of poor nutritional intake. Per flowsheets, pt's abdomen soft/rounded. Having multiple watery green BMs per flowsheets, lactulose last given 11/3.    RD continues to follow.

## 2022-11-04 NOTE — THERAPY
Speech Language Pathology   Clinical Swallow Evaluation     Patient Name: Tyree Whiteside  AGE:  57 y.o., SEX:  male  Medical Record #: [id number]  Today's Date: 11/4/2022     Precautions: Fall Risk, Swallow Precautions ( See Comments), Nasogastric Tube      HPI: The pt is a 56 y/o M who was admitted 10/18/22.  The pt presented with generalized weakness, tremors and nausea. Pt reported that he went to [facility] for assistance with alcohol detox however was referred to the emergency room. Apparently the patient drinks a pint of vodka and 4-5 beers daily. Pt was experiencing hallucinations, confusion, generalized weakness, N/V and headache for 1 day prior to admission.      Hospital course significant for:  10/18-10/21  Intubation  10/26  Self extubation; reintubation  10/26-11/02  Intubation      PMHx:   Alcohol abuse, meth use, Afib, HTN      CXR 11/4 -  1.  Interval removal of the right sided chest tube.  2.  Stable bibasilar atelectasis.  3.  Interval decrease in size of a right pleural effusion.      Level of Consciousness: Alert, Awake  Affect/Behavior: Appropriate, Calm  Follows Directives: Yes  Orientation: Oriented x 4  Hearing: Functional hearing  Vision: Functional vision      Prior Living Situation & Level of Function:  Pt reported that he lives alone on the ground level and worked as a  prior to admission. He was IND with ADLS including driving. Pt denied any previous issues with swallowing. Pt's dtr corroborated this information.       Oral Mechanism Evaluation  Facial Symmetry: Equal  Facial Sensation: Equal  Labial Observations: Bilateral weakness  Lingual Observations: Midline  Dentition: Fair, Natural dentition, Some missing dentition  Comments: Generalized b/l labial and lingual weakness with associated reduced ROM/strength.       Voice  Quality: Hoarse, Wet  Resonance: Hyponasal  Intensity: Appropriate  Cough: Perceptually weak  Comments:      Current Method of  Nutrition   NGT/Nehemiah      Subjective  RN cleared the pt for speech tx, no acute changes reported. Pt was received awake and alert. He was pleasant and cooperative. B/l wrist restraints intact. Pt's daughter, Laura, was present during the evaluation.       Assessment  Positioning: Vega's (60-90 degrees)  Bolus Administration: SLP  Oxygen Requirements:  3 L Nasal Cannula  Factor(s) Affecting Performance: Impaired endurance    Swallowing Trials  Ice: Impaired  Thin Liquid (TN0): Impaired - spoon    Comments: NGT intact to R nare. Pt with a wet vocal quality upon arrival, cued cough and expectoration cleared with use of oral suctioning. Appropriate oral acceptance and sufficient containment. Presumed slowed oral manipulation and lingual motion.  Multiple swallows (x3-4) per bolus appreciated across all trials with subsequent coughing following small volume thin liquids. Pt with frequent wet vocal quality throughout, requiring verbal cues to cough and expectorate secretions/phlegm. Limited PO trials attempted given concern for patient safety.       Clinical Impressions  The pt presents with clinical signs of dysphagia c/b reduced secretion management, multiple swallows per bolus (x3-5) and coughing following limited PO trials. Findings appear likely acute suspect d/t post-extubation dysphagia atop deconditioned stated. Aspiration risk from oral intake is elevated with indication to continue NPO status at this time. An instrumental swallow study is indicated to fully assess swallow physiology given pt's increased risk of silent aspiration from prolonged intubation period. Swallow prognosis is good with ongoing medical management and swallow rehabilitation.        Recommendations  1.  NPO with enteral nutrition/hydration via NGT   - Single ice chips OK to mitigate muscle disuse atrophy  2.  Instrumentation: FEES - to be completed 11/7  3.  Swallowing Instructions & Precautions:   Supervision: 1:1 feeding with  "constant supervision  Positioning: Fully upright and midline during oral intake  Medication: Non Oral   Oral Care: Q4h  4.  SLP to follow for dysphagia management.    Results and recs d/w pt, daughter and RN. Thank you.      Plan  Recommend Speech Therapy 5 times per week until therapy goals are met for the following treatments:  Dysphagia Training and Patient / Family / Caregiver Education.  Discharge Recommendations: Recommend post-acute placement for additional speech therapy services prior to discharge home    Objective     11/04/22 1059   Charge Group   SLP Oral Pharyngeal Evaluation Oral Pharyngeal Evaluation   Total Treatment Time   SLP Time Spent Yes   SLP Oral Pharyngeal Evaluation (Mins) 28   Vitals   O2 (LPM) 3   O2 Delivery Device Silicone Nasal Cannula   Pain 0 - 10 Group   Therapist Pain Assessment Post Activity Pain Same as Prior to Activity   Prior Living Situation   Prior Services Home-Independent   Housing / Facility Motel   Lives with - Patient's Self Care Capacity Alone and Able to Care For Self   Prior Level Of Function   Communication Within Functional Limits   Swallow Within Functional Limits   Dentition Intact   Dentures None   Hearing Within Functional Limits for Evaluation   Hearing Aid None   Vision Within Functional Limits for Evaluation   Patient's Primary Language English   Occupation (Pre-Hospital Vocational)   (Pt reported that he works as a )   Patient / Family Goals   Patient / Family Goal #1 \"I could eat half of that ice\"   Short Term Goals   Short Term Goal # 1 Pt will participate in an instrumental swallow study to fully assess swallow function.   Short Term Goal # 2 Pt will consume pre-feeding trials with SLP to determine readiness to begin an oral diet.     "

## 2022-11-04 NOTE — PROGRESS NOTES
12 hour chart check complete.     Telemetry summary  Rhythm: afib  Rate: 78-98  Ectopy: occ pvc  -/0.12/-

## 2022-11-04 NOTE — CARE PLAN
The patient is Watcher - Medium risk of patient condition declining or worsening    Shift Goals  Clinical Goals: VSS, mantain airway clearance  Patient Goals: ANETTE  Family Goals: patient stable    Progress made toward(s) clinical / shift goals:  Patients skin maintains integrity, q2 turning    Patient is not progressing towards the following goals: patient anxious, precedex gtt restarted at start of shift    Problem: Fall Risk  Goal: Patient will remain free from falls  Outcome: Progressing     Problem: Skin Integrity  Goal: Skin integrity is maintained or improved  Outcome: Progressing     Problem: Psychosocial  Goal: Patient's level of anxiety will decrease  Outcome: Not Met

## 2022-11-05 ENCOUNTER — APPOINTMENT (OUTPATIENT)
Dept: RADIOLOGY | Facility: MEDICAL CENTER | Age: 57
DRG: 870 | End: 2022-11-05
Attending: STUDENT IN AN ORGANIZED HEALTH CARE EDUCATION/TRAINING PROGRAM
Payer: COMMERCIAL

## 2022-11-05 LAB
ANION GAP SERPL CALC-SCNC: 7 MMOL/L (ref 7–16)
BASOPHILS # BLD AUTO: 0.6 % (ref 0–1.8)
BASOPHILS # BLD: 0.07 K/UL (ref 0–0.12)
BUN SERPL-MCNC: 7 MG/DL (ref 8–22)
CALCIUM SERPL-MCNC: 7.7 MG/DL (ref 8.4–10.2)
CHLORIDE SERPL-SCNC: 100 MMOL/L (ref 96–112)
CO2 SERPL-SCNC: 24 MMOL/L (ref 20–33)
CREAT SERPL-MCNC: 0.52 MG/DL (ref 0.5–1.4)
EOSINOPHIL # BLD AUTO: 0.91 K/UL (ref 0–0.51)
EOSINOPHIL NFR BLD: 7.9 % (ref 0–6.9)
ERYTHROCYTE [DISTWIDTH] IN BLOOD BY AUTOMATED COUNT: 49 FL (ref 35.9–50)
GFR SERPLBLD CREATININE-BSD FMLA CKD-EPI: 118 ML/MIN/1.73 M 2
GLUCOSE BLD STRIP.AUTO-MCNC: 72 MG/DL (ref 65–99)
GLUCOSE BLD STRIP.AUTO-MCNC: 80 MG/DL (ref 65–99)
GLUCOSE BLD STRIP.AUTO-MCNC: 82 MG/DL (ref 65–99)
GLUCOSE SERPL-MCNC: 91 MG/DL (ref 65–99)
HCT VFR BLD AUTO: 35.8 % (ref 42–52)
HGB BLD-MCNC: 12.3 G/DL (ref 14–18)
IMM GRANULOCYTES # BLD AUTO: 0.09 K/UL (ref 0–0.11)
IMM GRANULOCYTES NFR BLD AUTO: 0.8 % (ref 0–0.9)
LYMPHOCYTES # BLD AUTO: 1.12 K/UL (ref 1–4.8)
LYMPHOCYTES NFR BLD: 9.7 % (ref 22–41)
MAGNESIUM SERPL-MCNC: 1.7 MG/DL (ref 1.5–2.5)
MCH RBC QN AUTO: 33 PG (ref 27–33)
MCHC RBC AUTO-ENTMCNC: 34.4 G/DL (ref 33.7–35.3)
MCV RBC AUTO: 96 FL (ref 81.4–97.8)
MONOCYTES # BLD AUTO: 1.54 K/UL (ref 0–0.85)
MONOCYTES NFR BLD AUTO: 13.4 % (ref 0–13.4)
NEUTROPHILS # BLD AUTO: 7.76 K/UL (ref 1.82–7.42)
NEUTROPHILS NFR BLD: 67.6 % (ref 44–72)
NRBC # BLD AUTO: 0 K/UL
NRBC BLD-RTO: 0 /100 WBC
PHOSPHATE SERPL-MCNC: 3.6 MG/DL (ref 2.5–4.5)
PLATELET # BLD AUTO: 609 K/UL (ref 164–446)
PMV BLD AUTO: 10.9 FL (ref 9–12.9)
POTASSIUM SERPL-SCNC: 3.7 MMOL/L (ref 3.6–5.5)
RBC # BLD AUTO: 3.73 M/UL (ref 4.7–6.1)
SODIUM SERPL-SCNC: 131 MMOL/L (ref 135–145)
WBC # BLD AUTO: 11.5 K/UL (ref 4.8–10.8)

## 2022-11-05 PROCEDURE — 700102 HCHG RX REV CODE 250 W/ 637 OVERRIDE(OP): Performed by: HOSPITALIST

## 2022-11-05 PROCEDURE — 83735 ASSAY OF MAGNESIUM: CPT

## 2022-11-05 PROCEDURE — 94668 MNPJ CHEST WALL SBSQ: CPT

## 2022-11-05 PROCEDURE — 74018 RADEX ABDOMEN 1 VIEW: CPT

## 2022-11-05 PROCEDURE — 94640 AIRWAY INHALATION TREATMENT: CPT

## 2022-11-05 PROCEDURE — 84100 ASSAY OF PHOSPHORUS: CPT

## 2022-11-05 PROCEDURE — 700111 HCHG RX REV CODE 636 W/ 250 OVERRIDE (IP): Performed by: INTERNAL MEDICINE

## 2022-11-05 PROCEDURE — 80048 BASIC METABOLIC PNL TOTAL CA: CPT

## 2022-11-05 PROCEDURE — A9270 NON-COVERED ITEM OR SERVICE: HCPCS | Performed by: HOSPITALIST

## 2022-11-05 PROCEDURE — 700102 HCHG RX REV CODE 250 W/ 637 OVERRIDE(OP): Performed by: STUDENT IN AN ORGANIZED HEALTH CARE EDUCATION/TRAINING PROGRAM

## 2022-11-05 PROCEDURE — 700102 HCHG RX REV CODE 250 W/ 637 OVERRIDE(OP): Performed by: INTERNAL MEDICINE

## 2022-11-05 PROCEDURE — 99291 CRITICAL CARE FIRST HOUR: CPT | Performed by: STUDENT IN AN ORGANIZED HEALTH CARE EDUCATION/TRAINING PROGRAM

## 2022-11-05 PROCEDURE — A9270 NON-COVERED ITEM OR SERVICE: HCPCS | Performed by: STUDENT IN AN ORGANIZED HEALTH CARE EDUCATION/TRAINING PROGRAM

## 2022-11-05 PROCEDURE — 700111 HCHG RX REV CODE 636 W/ 250 OVERRIDE (IP): Performed by: STUDENT IN AN ORGANIZED HEALTH CARE EDUCATION/TRAINING PROGRAM

## 2022-11-05 PROCEDURE — 94669 MECHANICAL CHEST WALL OSCILL: CPT

## 2022-11-05 PROCEDURE — 770022 HCHG ROOM/CARE - ICU (200)

## 2022-11-05 PROCEDURE — 85025 COMPLETE CBC W/AUTO DIFF WBC: CPT

## 2022-11-05 PROCEDURE — 94760 N-INVAS EAR/PLS OXIMETRY 1: CPT

## 2022-11-05 PROCEDURE — 700105 HCHG RX REV CODE 258: Performed by: STUDENT IN AN ORGANIZED HEALTH CARE EDUCATION/TRAINING PROGRAM

## 2022-11-05 PROCEDURE — A9270 NON-COVERED ITEM OR SERVICE: HCPCS | Performed by: INTERNAL MEDICINE

## 2022-11-05 PROCEDURE — 700101 HCHG RX REV CODE 250: Performed by: STUDENT IN AN ORGANIZED HEALTH CARE EDUCATION/TRAINING PROGRAM

## 2022-11-05 PROCEDURE — 82962 GLUCOSE BLOOD TEST: CPT | Mod: 91

## 2022-11-05 RX ORDER — HYDROCODONE BITARTRATE AND ACETAMINOPHEN 5; 325 MG/1; MG/1
1 TABLET ORAL EVERY 6 HOURS PRN
Status: DISCONTINUED | OUTPATIENT
Start: 2022-11-05 | End: 2022-11-08

## 2022-11-05 RX ORDER — QUETIAPINE FUMARATE 25 MG/1
25 TABLET, FILM COATED ORAL NIGHTLY
Status: DISCONTINUED | OUTPATIENT
Start: 2022-11-05 | End: 2022-11-08

## 2022-11-05 RX ORDER — MORPHINE SULFATE 4 MG/ML
2 INJECTION INTRAVENOUS EVERY 4 HOURS PRN
Status: DISCONTINUED | OUTPATIENT
Start: 2022-11-05 | End: 2022-11-08

## 2022-11-05 RX ORDER — MAGNESIUM SULFATE HEPTAHYDRATE 40 MG/ML
4 INJECTION, SOLUTION INTRAVENOUS ONCE
Status: COMPLETED | OUTPATIENT
Start: 2022-11-05 | End: 2022-11-05

## 2022-11-05 RX ORDER — POTASSIUM CHLORIDE 14.9 MG/ML
20 INJECTION INTRAVENOUS ONCE
Status: COMPLETED | OUTPATIENT
Start: 2022-11-05 | End: 2022-11-05

## 2022-11-05 RX ORDER — DEXTROSE AND SODIUM CHLORIDE 5; .9 G/100ML; G/100ML
INJECTION, SOLUTION INTRAVENOUS CONTINUOUS
Status: DISCONTINUED | OUTPATIENT
Start: 2022-11-05 | End: 2022-11-08

## 2022-11-05 RX ADMIN — QUETIAPINE FUMARATE 25 MG: 25 TABLET, FILM COATED ORAL at 20:03

## 2022-11-05 RX ADMIN — ALBUTEROL SULFATE 2.5 MG: 2.5 SOLUTION RESPIRATORY (INHALATION) at 07:38

## 2022-11-05 RX ADMIN — ACETYLCYSTEINE 3 ML: 200 INHALANT RESPIRATORY (INHALATION) at 07:38

## 2022-11-05 RX ADMIN — DORNASE ALFA 5 MG: 1 SOLUTION RESPIRATORY (INHALATION) at 10:30

## 2022-11-05 RX ADMIN — ALTEPLASE 5 MG: KIT at 10:30

## 2022-11-05 RX ADMIN — FAMOTIDINE 20 MG: 10 INJECTION, SOLUTION INTRAVENOUS at 05:07

## 2022-11-05 RX ADMIN — POTASSIUM CHLORIDE 20 MEQ: 14.9 INJECTION, SOLUTION INTRAVENOUS at 10:29

## 2022-11-05 RX ADMIN — ACETYLCYSTEINE 3 ML: 200 INHALANT RESPIRATORY (INHALATION) at 10:46

## 2022-11-05 RX ADMIN — ALBUTEROL SULFATE 2.5 MG: 2.5 SOLUTION RESPIRATORY (INHALATION) at 19:23

## 2022-11-05 RX ADMIN — MORPHINE SULFATE 2 MG: 4 INJECTION INTRAVENOUS at 16:22

## 2022-11-05 RX ADMIN — HEPARIN SODIUM 5000 UNITS: 5000 INJECTION, SOLUTION INTRAVENOUS; SUBCUTANEOUS at 13:18

## 2022-11-05 RX ADMIN — HEPARIN SODIUM 5000 UNITS: 5000 INJECTION, SOLUTION INTRAVENOUS; SUBCUTANEOUS at 22:22

## 2022-11-05 RX ADMIN — PIPERACILLIN AND TAZOBACTAM 4.5 G: 4; .5 INJECTION, POWDER, FOR SOLUTION INTRAVENOUS at 20:11

## 2022-11-05 RX ADMIN — DEXTROSE AND SODIUM CHLORIDE: 5; 900 INJECTION, SOLUTION INTRAVENOUS at 10:33

## 2022-11-05 RX ADMIN — HYDROCODONE BITARTRATE AND ACETAMINOPHEN 1 TABLET: 5; 325 TABLET ORAL at 16:21

## 2022-11-05 RX ADMIN — ALBUTEROL SULFATE 2.5 MG: 2.5 SOLUTION RESPIRATORY (INHALATION) at 10:46

## 2022-11-05 RX ADMIN — PIPERACILLIN AND TAZOBACTAM 4.5 G: 4; .5 INJECTION, POWDER, FOR SOLUTION INTRAVENOUS at 13:18

## 2022-11-05 RX ADMIN — ALBUTEROL SULFATE 2.5 MG: 2.5 SOLUTION RESPIRATORY (INHALATION) at 14:32

## 2022-11-05 RX ADMIN — MAGNESIUM SULFATE HEPTAHYDRATE 4 G: 40 INJECTION, SOLUTION INTRAVENOUS at 10:21

## 2022-11-05 RX ADMIN — PIPERACILLIN AND TAZOBACTAM 4.5 G: 4; .5 INJECTION, POWDER, FOR SOLUTION INTRAVENOUS at 04:24

## 2022-11-05 RX ADMIN — ACETYLCYSTEINE 3 ML: 200 INHALANT RESPIRATORY (INHALATION) at 14:32

## 2022-11-05 RX ADMIN — FAMOTIDINE 20 MG: 10 INJECTION, SOLUTION INTRAVENOUS at 16:20

## 2022-11-05 RX ADMIN — LEVOTHYROXINE SODIUM 50 MCG: 50 TABLET ORAL at 05:07

## 2022-11-05 RX ADMIN — ACETAMINOPHEN 650 MG: 325 TABLET, FILM COATED ORAL at 03:40

## 2022-11-05 RX ADMIN — HEPARIN SODIUM 5000 UNITS: 5000 INJECTION, SOLUTION INTRAVENOUS; SUBCUTANEOUS at 05:07

## 2022-11-05 RX ADMIN — ACETYLCYSTEINE 3 ML: 200 INHALANT RESPIRATORY (INHALATION) at 19:23

## 2022-11-05 ASSESSMENT — PAIN SCALES - PAIN ASSESSMENT IN ADVANCED DEMENTIA (PAINAD)
BODYLANGUAGE: RELAXED
FACIALEXPRESSION: SMILING OR INEXPRESSIVE
CONSOLABILITY: NO NEED TO CONSOLE
TOTALSCORE: 1
BREATHING: OCCASIONAL LABORED BREATHING, SHORT PERIOD OF HYPERVENTILATION

## 2022-11-05 ASSESSMENT — ENCOUNTER SYMPTOMS
COUGH: 1
NAUSEA: 0
BACK PAIN: 1
VOMITING: 0
SHORTNESS OF BREATH: 1

## 2022-11-05 ASSESSMENT — PAIN SCALES - WONG BAKER: WONGBAKER_NUMERICALRESPONSE: HURTS JUST A LITTLE BIT

## 2022-11-05 ASSESSMENT — PAIN DESCRIPTION - PAIN TYPE
TYPE: ACUTE PAIN

## 2022-11-05 NOTE — DIETARY
Nutrition Services Brief Update:    Problem: Nutritional:  Goal: Nutrition support tolerated and meeting greater than 85% of estimated needs  Outcome: Not progressing    TFs held this a.m. d/t pt coughing up TF-like secretions; per MD note, residuals 15 ml, soft abdomen, has been having BMs. KUB: mildly dilated small bowel loops.     Given pt has had frequent interruptions to tube feeding regimen during admit and has yet to meet full nutrition needs for even 1 day, he may be a candidate for parenteral nutrition. Concerns reviewed with MD Franco. MD responded has concerns for potential multiple complications r/t TPN if started.    RD continues to follow.

## 2022-11-05 NOTE — PROGRESS NOTES
12 hour chart check complete.     Telemetry summary  Rhythm: afib  Rate:   Ectopy: freq pvcs  -/0.10/-

## 2022-11-05 NOTE — CARE PLAN
The patient is Watcher - Medium risk of patient condition declining or worsening    Shift Goals  Clinical Goals: VSS, improve airway clearance  Patient Goals: ANETTE  Family Goals: stable, sleep    Progress made toward(s) clinical / shift goals:  Patient able to cough on request, beginning to self yaunker-self, neuro status improving    Patient is not progressing towards the following goals:patient with temperature overnight, 1x dose tylenol given     Problem: Risk for Aspiration  Goal: Patient's risk for aspiration will be absent or decrease  Outcome: Progressing     Problem: Skin Integrity  Goal: Skin integrity is maintained or improved  Outcome: Progressing     Problem: Fall Risk  Goal: Patient will remain free from falls  Outcome: Progressing

## 2022-11-05 NOTE — PROGRESS NOTES
Critical Care Progress Note    Date of admission  10/18/2022    Chief Complaint  57 y.o. male with history of alcohol abuse, meth use, A. fib not on anticoagulation, liver mass noted on US 8/2022 admitted 10/18/2022 with alcohol withdrawal.  Patient was initially intubated for worsening respiratory failure from alcohol withdrawal -was extubated on 10/21 and reintubated on 10/23 for worsening respiratory failure and altered mental status.  He underwent bronchoscopy on 10/24 due to copious secretions -cultures with MSSA and Pseudomonas so he was started on Zosyn.  He had thoracentesis on 10/25 with removal of 400 cc of pleural fluid -pleural studies with LDH 1020, glucose < 2, cell count with 3511 WBC, 3000 RBCs -culture showed WBCs but no growth.   Patient self extubated on 10/26 overnight and was shortly reintubated after he failed HFNC.  His episode of respiratory failure was complicated by bradycardia and hypotension requiring vasopressors.  On 10/27, he was noted to have significant abdominal distention with dark feculent material coming out of the NG tube.  Fluid resuscitation was continued he continued to have high output from NG tube. CT A/P was obtained which showed dilated loops of bowel concerning for ileus rather than early/low-grade SBO, right larger than left pleural effusions.  Liver nodule seen on ultrasound in 8/2020 she was not visualized on the CT. he was gradually weaned off vasopressors.  He was noted to have done well on his SBT but had low NIF so he was kept intubated.    10/31: Very weak and tired this morning.  Review of imaging and pleural studies concerning for a right loculated parapneumonic effusion -discussed case with IR for chest tube placement.  11/1: R mid-axillary chest tube placed for loculated effusion. Zosyn restarted - fell off for about one day but decided to continue it since he has this loculated effusion  11/2: extubated this AM to oxymask. Still with significant thin clear  oral secretions so started on glycopyrrolate PRN to decrease secretions and risk of aspiration post extubation.   11/3: appeared more tired/somnolent this AM - woke patient up and got him out of bed to improve his mental status. Daughter also working with him to help suction him and re-orient him.   11/4: CXR w/improved R effusion - 2 more doses of lytics remaining.     Chart review from the past 24 hours includes imaging, laboratory studies, vital signs and notes available.  Pertinent data for today's visit includes    Cardiac: tachy to 120s  Pulm: extubated 11/2, on 3L O2  Heme: stable  /renal: Cr stable  GI/endo: TFs paused after copious oral secretions that smelled like vomit - restart TFs 11/4  ID:  Rcx 10/24 w/MSSA, PSA, repeat culture 10/31 w/pseudomonas - zosyn (10/24-), WBC increasing 11/1 but pcal down to 0.43 from 7.27  I/O: even. CT output 650ml  Additional Labs/Imaging:   - trending to hyponatremia on FWF so DC them (he's been on D5 intermittently due to hypoglycemia from holding TFs)  - coughing up secretions that smell and look like tube feeds so Tfs held and getting KUB - has been having routine BMs and abd soft, non-tender  - CXR w/improved R effusion   - last dose of lytics 11/5 AM        Review of Systems  Review of Systems   Constitutional:  Positive for malaise/fatigue.   Respiratory:  Positive for cough and shortness of breath.    Cardiovascular:  Positive for chest pain.   Gastrointestinal:  Negative for nausea and vomiting.   Musculoskeletal:  Positive for back pain.      Vital Signs for last 24 hours   Pulse:  [] 121  Resp:  [13-48] 17  BP: ()/(61-99) 112/66  SpO2:  [91 %-100 %] 92 %    Hemodynamic parameters for last 24 hours       Respiratory Information for the last 24 hours       Physical Exam   Physical Exam  Vitals and nursing note reviewed.   Constitutional:       General: He is not in acute distress.  HENT:      Nose: No congestion.      Mouth/Throat:      Mouth: Mucous  membranes are moist.   Eyes:      General:         Right eye: Discharge present.         Left eye: Discharge present.  Cardiovascular:      Rate and Rhythm: Normal rate and regular rhythm.   Pulmonary:      Effort: Pulmonary effort is normal. No respiratory distress.      Comments: Diminished right breath sounds  Abdominal:      Palpations: Abdomen is soft.   Musculoskeletal:         General: No deformity.   Skin:     General: Skin is warm.   Neurological:      General: No focal deficit present.      Mental Status: He is alert.      Comments: Moving all extremities.  Weak but more alert now - able to answer with short sentences/words, follows commands.        Medications  Current Facility-Administered Medications   Medication Dose Route Frequency Provider Last Rate Last Admin    D5 1/2 NS infusion   Intravenous Continuous Nilda Salvador, M.D. 25 mL/hr at 11/05/22 0600 Rate Verify at 11/05/22 0600    QUEtiapine (Seroquel) tablet 25 mg  25 mg Oral Nightly Nilda Salvador, M.D.   25 mg at 11/04/22 2003    scopolamine (TRANSDERM-SCOP) patch 1 Patch  1 Patch Transdermal Q72HRS Nilda Salvador, M.D.   1 Patch at 11/03/22 1009    alteplase (Activase) 5 mg in NS 30 mL INTRAPLEURAL syringe  5 mg Intrapleural TWICE DAILY Nilda Salvador, M.D.   5 mg at 11/04/22 1848    And    dornase alpha (PULMOZYME) 5 mg in NS 30 mL INTRAPLEURAL syringe  5 mg Intrapleural TWICE DAILY Nilda Salvador, M.D.   5 mg at 11/04/22 1849    acetylcysteine (MUCOMYST) 20 % solution 3 mL  3 mL Inhalation 4X/DAY (RT) Nilda Salvador, M.D.   3 mL at 11/05/22 0738    albuterol (PROVENTIL) 2.5mg/0.5ml nebulizer solution 2.5 mg  2.5 mg Nebulization 4X/DAY (RT) Nilda Salvador, M.D.   2.5 mg at 11/05/22 0738    lactulose 20 GM/30ML solution 15 mL  15 mL Enteral Tube BID PRN Nilda Franco MNeetuDNeetu   15 mL at 11/03/22 1523    dextrose 10 % BOLUS 25 g  25 g Intravenous Q15 MIN PRN Chris Abrams M.D.   25 g at 11/03/22 1511     piperacillin-tazobactam (Zosyn) 4.5 g in  mL IVPB  4.5 g Intravenous Q8HRS Nilda Franco M.D. 25 mL/hr at 11/05/22 0600 Rate Verify at 11/05/22 0600    MD Alert...ICU Electrolyte Replacement per Pharmacy   Other PHARMACY TO DOSE Nilda Franco M.D.        artificial tears (EYE LUBRICANT) ophth ointment 1 Application  1 Application Both Eyes Q8HRS Chris Abrams M.D.   1 Application at 11/03/22 1318    heparin injection 5,000 Units  5,000 Units Subcutaneous Q8HRS Chris Abrams M.D.   5,000 Units at 11/05/22 0507    famotidine (PEPCID) injection 20 mg  20 mg Intravenous BID Chris Abrams M.D.   20 mg at 11/05/22 0507    Respiratory Therapy Consult   Nebulization Continuous RT Greyson Luciano D.O.        lidocaine (XYLOCAINE) 1 % injection 2 mL  2 mL Tracheal Tube Q30 MIN PRN Greyson Luciano D.O.        Pharmacy Consult: Enteral tube insertion - review meds/change route/product selection   Other PHARMACY TO DOSE Greyson Luciano D.O.        carboxymethylcellulose (REFRESH TEARS) 0.5 % ophthalmic drops 2 Drop  2 Drop Both Eyes PRN Greyson Luciano D.O.   2 Drop at 11/01/22 2206    albuterol inhaler 2 Puff  2 Puff Inhalation Q6HRS PRN Eva Rodriguez M.D.        Pharmacy Consult Request ...Pain Management Review 1 Each  1 Each Other PHARMACY TO DOSE Eva Rodriguez M.D.        levothyroxine (SYNTHROID) tablet 50 mcg  50 mcg Enteral Tube AM ES Eva Rodriguez M.D.   50 mcg at 11/05/22 0507    acetaminophen (Tylenol) tablet 650 mg  650 mg Enteral Tube Q6HRS PRN Eva Rodriguez M.D.   650 mg at 11/05/22 0340       Fluids    Intake/Output Summary (Last 24 hours) at 11/5/2022 0744  Last data filed at 11/5/2022 0600  Gross per 24 hour   Intake 3182.24 ml   Output 2665 ml   Net 517.24 ml         Laboratory          Recent Labs     11/03/22  0215 11/04/22  0320 11/04/22  0834 11/04/22  1538 11/05/22  0320   SODIUM 138  --  137 134* 131*   POTASSIUM 3.2* 3.4* 3.8 3.6 3.7    CHLORIDE 104  --  106 103 100   CO2 24  --  25 24 24   BUN 6*  --  6* 7* 7*   CREATININE 0.58  --  0.51 0.54 0.52   MAGNESIUM 1.6 1.5  --   --  1.7   PHOSPHORUS 2.6 2.9  --   --  3.6   CALCIUM 7.6*  --  7.6* 7.4* 7.7*       Recent Labs     11/03/22 0215 11/04/22  0834 11/04/22  1538 11/05/22  0320   ALTSGPT 14  --  10  --    ASTSGOT 15  --  12  --    ALKPHOSPHAT 114*  --  111*  --    TBILIRUBIN 0.6  --  0.6  --    GLUCOSE 89 103* 85 91       Recent Labs     11/03/22 0215 11/04/22 0320 11/04/22 1538 11/05/22  0320   WBC 10.0 10.5  --  11.5*   NEUTSPOLYS 70.50 68.70  --  67.60   LYMPHOCYTES 8.50* 9.50*  --  9.70*   MONOCYTES 10.30 13.50*  --  13.40   EOSINOPHILS 9.10* 7.10*  --  7.90*   BASOPHILS 0.50 0.60  --  0.60   ASTSGOT 15  --  12  --    ALTSGPT 14  --  10  --    ALKPHOSPHAT 114*  --  111*  --    TBILIRUBIN 0.6  --  0.6  --        Recent Labs     11/03/22 0215 11/04/22 0320 11/05/22  0320   RBC 3.32* 3.50* 3.73*   HEMOGLOBIN 10.9* 11.4* 12.3*   HEMATOCRIT 33.1* 33.9* 35.8*   PLATELETCT 439 516* 609*         Imaging  Reviewed    Assessment/Plan     Septic shock-resolved, now off pressors  MSSA and Pseudomonas pneumonia  Right loculated pleural effusion  Pleural studies from 10/25 with glucose <2, LDH around 1000 and increased WBCs -pleural fluid cultures negative so far patient was already on antibiotics.  This is a complicated parapneumonic effusion based on studies and imaging.  -Continue Zosyn -started 10/24 and finished on 10/30 but re-started on 11/1 since he has this loculated effusion w/low glucose and high LDH and had CT placed 11/1  -Now off all pressors -monitor hemodynamics  -IV fluids as needed  -R mid-axillary chest tube placed on 11/1 w/significant output since starting lytics and improved CXR  -Plan for intrapleural lytics x3 days (6 doses) 11/2 PM - 11/5AM  -Follow-up repeat pleural fluid culture from thoracentesis on 10/30 (only able to remove 150 cc at that time)    Acute hypoxic  respiratory failure  Likely secondary to pneumonia and effusion as noted above.  Also with significant weakness/deconditioning of been limiting his ability to be weaned from the vent.  -extubated 11/2   -supplemental O2 as needed  -Replete electrolytes appropriately  -pulmonary hygiene, ISS  -early mobilization    Atrial fibrillation -currently rate controlled  -Holding home metoprolol due to hypotension-can restart once stable  -Not on chronic anticoagulation -will consider risk versus benefit after chest tube placement    Liver mass  Liver nodule seen on ultrasound in 8/2020 she was not visualized on the CT scan.  -Once stable, can get hepatic mass protocol CT or MRI -can be done as outpatient as well.    Alcohol abuse  Alcohol withdrawal  Initially admitted and intubated for alcohol withdrawal-now resolved    Hyponatremia  Decreased from 150 to 142 after increasing FWF to 250ml q6h - decreased FWF to 200mg q8h 11/1.  - stop FWF for now hyponatremia to 131 - likely bc he's also been getting D5 for his hypoglycemia since Tfs have been held intermittently  - monitor    Hypoglycemia  Tube feeds held intermittently for coughing up secretions that resemble tube feeds. Abd soft, having BMs routinely. Only 15ml residuals  - hold TFs 11/5 am  - KUB w/mild dilated loops of small intestine  - monitor for aspiration  - D51/2NS if needed while holding TFs      VTE:  Lovenox  Ulcer: PPI  Lines: Central Line  Ongoing indication addressed    I have performed a physical exam and reviewed and updated ROS and Plan today (11/5/2022). In review of yesterday's note (11/4/2022), there are no changes except as documented above.     Discussed patient condition and risk of morbidity and/or mortality with Family, RN, RT, Pharmacy, and Charge nurse / hot rounds  The patient remains critically ill.  Critical care time = 55 minutes in directly providing and coordinating critical care and extensive data review.  No time overlap and excludes  procedures.        Nilda Franco MD  Pulmonary and Critical Care Medicine  [facility]

## 2022-11-05 NOTE — RESPIRATORY CARE
Pt tolerating svn therapy, coughing up moderate amts of thick whitish secretions. Will continue to monitor.

## 2022-11-05 NOTE — PROGRESS NOTES
12-hour chart check complete.    Monitor Summary  Rhythm: A-Fib  Rate:   Ectopy: Occasional PVCs  Measurements: -/0.10/-

## 2022-11-05 NOTE — CARE PLAN
The patient is Watcher - Medium risk of patient condition declining or worsening    Shift Goals  Clinical Goals: Maintain airway, VSS  Patient Goals: Advance diet  Family Goals: Patient stable    Progress made toward(s) clinical / shift goals:    Problem: Knowledge Deficit - Standard  Goal: Patient and family/care givers will demonstrate understanding of plan of care, disease process/condition, diagnostic tests and medications  Outcome: Progressing     Problem: Psychosocial  Goal: Patient's level of anxiety will decrease  Outcome: Progressing     Problem: Risk for Aspiration  Goal: Patient's risk for aspiration will be absent or decrease  Outcome: Progressing     Problem: Skin Integrity  Goal: Skin integrity is maintained or improved  Outcome: Progressing

## 2022-11-05 NOTE — PROCEDURES
Procedure Date: 11/4/2022    Procedure: Intrapleual Fibrinolytics (50414)    Physician: Donna Franco MD    Anesthesia Type: N/A    Indication: complicated parapneumonic pleural effusion    Time Out: Patient was identified with two patient identifiers and site was confirmed.    Pre-Op Dx: complicated parapneumonic pleural effusion    Post-Op Dx: complicated parapneumonic pleural effusion    Medications:   Alteplase 5mg  Dornase Alpha 5mg     Description:    5mg of Alteplase and 5mg of Dornase were prepared by pharmacy in normal saline and maintained chilled until administration.  These solutions were mixed at bedside and the total fluid instillation was 60ml.  Patient was positioned on his back due to difficulty in turning him. Chest tube connections were prepped and cleaned. Alteplase/Dornase solution was drawn up in the appropriate syringe and medications were then injected into the chest tube, avoiding the introduction of air. Chest tube connection to pleurvac was then reconnected. Chest tube was left clamped/closed. Chest tube will be placed back on suction in 2 hours.    Complications: none    F/U: routine chest tube care      Nilda Franco MD  Pulmonary and Critical Care Medicine  [facility]

## 2022-11-05 NOTE — PROCEDURES
Procedure Date: 11/4/2022    Procedure: Intrapleual Fibrinolytics (23762)    Physician: Donna Franco MD    Anesthesia Type: N/A    Indication: complicated parapneumonic pleural effusion    Time Out: Patient was identified with two patient identifiers and site was confirmed.    Pre-Op Dx: complicated parapneumonic pleural effusion    Post-Op Dx: complicated parapneumonic pleural effusion    Medications:   Alteplase 5mg  Dornase Alpha 5mg     Description:    5mg of Alteplase and 5mg of Dornase were prepared by pharmacy in normal saline and maintained chilled until administration.  These solutions were mixed at bedside and the total fluid instillation was 60ml.  Patient was positioned on his back due to difficulty in turning him. Chest tube connections were prepped and cleaned. Alteplase/Dornase solution was drawn up in the appropriate syringe and medications were then injected into the chest tube, avoiding the introduction of air. Chest tube connection to pleurvac was then reconnected. Chest tube was left clamped/closed. Chest tube will be placed back on suction in 1 hour.    Complications: none    F/U: routine chest tube care      Nilda Franco MD  Pulmonary and Critical Care Medicine  [facility]

## 2022-11-05 NOTE — CARE PLAN
The patient is Stable - Low risk of patient condition declining or worsening    Shift Goals  Clinical Goals: Afebrile; Continue TF  Patient Goals: improve neurologically and have restraints removed  Family Goals: stable, sleep    Progress made toward(s) clinical / shift goals:  .    Patient is not progressing towards the following goals:      Problem: Knowledge Deficit - Standard  Goal: Patient and family/care givers will demonstrate understanding of plan of care, disease process/condition, diagnostic tests and medications  Outcome: Not Progressing

## 2022-11-06 ENCOUNTER — APPOINTMENT (OUTPATIENT)
Dept: RADIOLOGY | Facility: MEDICAL CENTER | Age: 57
DRG: 870 | End: 2022-11-06
Attending: INTERNAL MEDICINE
Payer: COMMERCIAL

## 2022-11-06 LAB
ANION GAP SERPL CALC-SCNC: 10 MMOL/L (ref 7–16)
BASOPHILS # BLD AUTO: 0.9 % (ref 0–1.8)
BASOPHILS # BLD: 0.09 K/UL (ref 0–0.12)
BUN SERPL-MCNC: 8 MG/DL (ref 8–22)
CA-I SERPL-SCNC: 1.07 MMOL/L (ref 1.1–1.3)
CALCIUM SERPL-MCNC: 7.5 MG/DL (ref 8.4–10.2)
CHLORIDE SERPL-SCNC: 104 MMOL/L (ref 96–112)
CO2 SERPL-SCNC: 22 MMOL/L (ref 20–33)
CREAT SERPL-MCNC: 0.61 MG/DL (ref 0.5–1.4)
EOSINOPHIL # BLD AUTO: 0.89 K/UL (ref 0–0.51)
EOSINOPHIL NFR BLD: 9 % (ref 0–6.9)
ERYTHROCYTE [DISTWIDTH] IN BLOOD BY AUTOMATED COUNT: 50.4 FL (ref 35.9–50)
GFR SERPLBLD CREATININE-BSD FMLA CKD-EPI: 113 ML/MIN/1.73 M 2
GLUCOSE BLD STRIP.AUTO-MCNC: 70 MG/DL (ref 65–99)
GLUCOSE BLD STRIP.AUTO-MCNC: 72 MG/DL (ref 65–99)
GLUCOSE BLD STRIP.AUTO-MCNC: 82 MG/DL (ref 65–99)
GLUCOSE BLD STRIP.AUTO-MCNC: 83 MG/DL (ref 65–99)
GLUCOSE SERPL-MCNC: 81 MG/DL (ref 65–99)
HCT VFR BLD AUTO: 32.6 % (ref 42–52)
HGB BLD-MCNC: 11 G/DL (ref 14–18)
IMM GRANULOCYTES # BLD AUTO: 0.06 K/UL (ref 0–0.11)
IMM GRANULOCYTES NFR BLD AUTO: 0.6 % (ref 0–0.9)
LYMPHOCYTES # BLD AUTO: 1.16 K/UL (ref 1–4.8)
LYMPHOCYTES NFR BLD: 11.7 % (ref 22–41)
MAGNESIUM SERPL-MCNC: 1.8 MG/DL (ref 1.5–2.5)
MCH RBC QN AUTO: 32.7 PG (ref 27–33)
MCHC RBC AUTO-ENTMCNC: 33.7 G/DL (ref 33.7–35.3)
MCV RBC AUTO: 97 FL (ref 81.4–97.8)
MONOCYTES # BLD AUTO: 1.21 K/UL (ref 0–0.85)
MONOCYTES NFR BLD AUTO: 12.2 % (ref 0–13.4)
NEUTROPHILS # BLD AUTO: 6.51 K/UL (ref 1.82–7.42)
NEUTROPHILS NFR BLD: 65.6 % (ref 44–72)
NRBC # BLD AUTO: 0 K/UL
NRBC BLD-RTO: 0 /100 WBC
PHOSPHATE SERPL-MCNC: 4 MG/DL (ref 2.5–4.5)
PLATELET # BLD AUTO: 590 K/UL (ref 164–446)
PMV BLD AUTO: 10.9 FL (ref 9–12.9)
POTASSIUM SERPL-SCNC: 3.9 MMOL/L (ref 3.6–5.5)
RBC # BLD AUTO: 3.36 M/UL (ref 4.7–6.1)
SODIUM SERPL-SCNC: 136 MMOL/L (ref 135–145)
TSH SERPL DL<=0.005 MIU/L-ACNC: 3.71 UIU/ML (ref 0.38–5.33)
WBC # BLD AUTO: 9.9 K/UL (ref 4.8–10.8)

## 2022-11-06 PROCEDURE — A9270 NON-COVERED ITEM OR SERVICE: HCPCS | Performed by: HOSPITALIST

## 2022-11-06 PROCEDURE — A9270 NON-COVERED ITEM OR SERVICE: HCPCS | Performed by: STUDENT IN AN ORGANIZED HEALTH CARE EDUCATION/TRAINING PROGRAM

## 2022-11-06 PROCEDURE — 700111 HCHG RX REV CODE 636 W/ 250 OVERRIDE (IP): Performed by: STUDENT IN AN ORGANIZED HEALTH CARE EDUCATION/TRAINING PROGRAM

## 2022-11-06 PROCEDURE — 94640 AIRWAY INHALATION TREATMENT: CPT

## 2022-11-06 PROCEDURE — 84443 ASSAY THYROID STIM HORMONE: CPT

## 2022-11-06 PROCEDURE — 82330 ASSAY OF CALCIUM: CPT

## 2022-11-06 PROCEDURE — 84100 ASSAY OF PHOSPHORUS: CPT

## 2022-11-06 PROCEDURE — 700111 HCHG RX REV CODE 636 W/ 250 OVERRIDE (IP): Performed by: INTERNAL MEDICINE

## 2022-11-06 PROCEDURE — 770022 HCHG ROOM/CARE - ICU (200)

## 2022-11-06 PROCEDURE — 700105 HCHG RX REV CODE 258: Performed by: STUDENT IN AN ORGANIZED HEALTH CARE EDUCATION/TRAINING PROGRAM

## 2022-11-06 PROCEDURE — 94760 N-INVAS EAR/PLS OXIMETRY 1: CPT

## 2022-11-06 PROCEDURE — 94669 MECHANICAL CHEST WALL OSCILL: CPT

## 2022-11-06 PROCEDURE — 82962 GLUCOSE BLOOD TEST: CPT | Mod: 91

## 2022-11-06 PROCEDURE — 700102 HCHG RX REV CODE 250 W/ 637 OVERRIDE(OP): Performed by: INTERNAL MEDICINE

## 2022-11-06 PROCEDURE — 36592 COLLECT BLOOD FROM PICC: CPT

## 2022-11-06 PROCEDURE — 700102 HCHG RX REV CODE 250 W/ 637 OVERRIDE(OP): Performed by: HOSPITALIST

## 2022-11-06 PROCEDURE — 80048 BASIC METABOLIC PNL TOTAL CA: CPT

## 2022-11-06 PROCEDURE — 700105 HCHG RX REV CODE 258: Performed by: INTERNAL MEDICINE

## 2022-11-06 PROCEDURE — 83735 ASSAY OF MAGNESIUM: CPT

## 2022-11-06 PROCEDURE — 71045 X-RAY EXAM CHEST 1 VIEW: CPT

## 2022-11-06 PROCEDURE — 94668 MNPJ CHEST WALL SBSQ: CPT

## 2022-11-06 PROCEDURE — 99291 CRITICAL CARE FIRST HOUR: CPT | Performed by: INTERNAL MEDICINE

## 2022-11-06 PROCEDURE — 700102 HCHG RX REV CODE 250 W/ 637 OVERRIDE(OP): Performed by: STUDENT IN AN ORGANIZED HEALTH CARE EDUCATION/TRAINING PROGRAM

## 2022-11-06 PROCEDURE — 700101 HCHG RX REV CODE 250: Performed by: STUDENT IN AN ORGANIZED HEALTH CARE EDUCATION/TRAINING PROGRAM

## 2022-11-06 PROCEDURE — A9270 NON-COVERED ITEM OR SERVICE: HCPCS | Performed by: INTERNAL MEDICINE

## 2022-11-06 PROCEDURE — 85025 COMPLETE CBC W/AUTO DIFF WBC: CPT

## 2022-11-06 RX ORDER — CALCIUM GLUCONATE 20 MG/ML
1 INJECTION, SOLUTION INTRAVENOUS ONCE
Status: COMPLETED | OUTPATIENT
Start: 2022-11-06 | End: 2022-11-06

## 2022-11-06 RX ORDER — ACETAMINOPHEN 325 MG/1
650 TABLET ORAL EVERY 6 HOURS PRN
Status: DISCONTINUED | OUTPATIENT
Start: 2022-11-06 | End: 2022-11-06

## 2022-11-06 RX ORDER — MAGNESIUM SULFATE HEPTAHYDRATE 40 MG/ML
2 INJECTION, SOLUTION INTRAVENOUS ONCE
Status: COMPLETED | OUTPATIENT
Start: 2022-11-06 | End: 2022-11-06

## 2022-11-06 RX ORDER — CARBOXYMETHYLCELLULOSE SODIUM 5 MG/ML
2 SOLUTION/ DROPS OPHTHALMIC
Status: DISCONTINUED | OUTPATIENT
Start: 2022-11-06 | End: 2022-11-29 | Stop reason: HOSPADM

## 2022-11-06 RX ORDER — LANOLIN ALCOHOL/MO/W.PET/CERES
CREAM (GRAM) TOPICAL 3 TIMES DAILY PRN
Status: DISCONTINUED | OUTPATIENT
Start: 2022-11-06 | End: 2022-11-29 | Stop reason: HOSPADM

## 2022-11-06 RX ORDER — ENOXAPARIN SODIUM 100 MG/ML
40 INJECTION SUBCUTANEOUS DAILY
Status: DISCONTINUED | OUTPATIENT
Start: 2022-11-06 | End: 2022-11-29 | Stop reason: HOSPADM

## 2022-11-06 RX ORDER — ACETAMINOPHEN 325 MG/1
650 TABLET ORAL EVERY 6 HOURS PRN
Status: DISCONTINUED | OUTPATIENT
Start: 2022-11-06 | End: 2022-11-29 | Stop reason: HOSPADM

## 2022-11-06 RX ADMIN — ACETYLCYSTEINE 3 ML: 200 INHALANT RESPIRATORY (INHALATION) at 12:31

## 2022-11-06 RX ADMIN — ALBUTEROL SULFATE 2.5 MG: 2.5 SOLUTION RESPIRATORY (INHALATION) at 12:31

## 2022-11-06 RX ADMIN — POTASSIUM BICARBONATE 25 MEQ: 978 TABLET, EFFERVESCENT ORAL at 10:27

## 2022-11-06 RX ADMIN — ALBUTEROL SULFATE 2.5 MG: 2.5 SOLUTION RESPIRATORY (INHALATION) at 19:06

## 2022-11-06 RX ADMIN — CALCIUM GLUCONATE 1 G: 20 INJECTION, SOLUTION INTRAVENOUS at 08:43

## 2022-11-06 RX ADMIN — HYDROCODONE BITARTRATE AND ACETAMINOPHEN 1 TABLET: 5; 325 TABLET ORAL at 17:03

## 2022-11-06 RX ADMIN — MAGNESIUM SULFATE 2 G: 2 INJECTION INTRAVENOUS at 09:16

## 2022-11-06 RX ADMIN — HYDROCODONE BITARTRATE AND ACETAMINOPHEN 1 TABLET: 5; 325 TABLET ORAL at 23:12

## 2022-11-06 RX ADMIN — HEPARIN SODIUM 5000 UNITS: 5000 INJECTION, SOLUTION INTRAVENOUS; SUBCUTANEOUS at 05:10

## 2022-11-06 RX ADMIN — PIPERACILLIN AND TAZOBACTAM 4.5 G: 4; .5 INJECTION, POWDER, FOR SOLUTION INTRAVENOUS at 05:14

## 2022-11-06 RX ADMIN — ACETYLCYSTEINE 3 ML: 200 INHALANT RESPIRATORY (INHALATION) at 19:06

## 2022-11-06 RX ADMIN — ALBUTEROL SULFATE 2.5 MG: 2.5 SOLUTION RESPIRATORY (INHALATION) at 15:56

## 2022-11-06 RX ADMIN — ENOXAPARIN SODIUM 40 MG: 40 INJECTION SUBCUTANEOUS at 17:02

## 2022-11-06 RX ADMIN — ALBUTEROL SULFATE 2.5 MG: 2.5 SOLUTION RESPIRATORY (INHALATION) at 07:33

## 2022-11-06 RX ADMIN — METOPROLOL TARTRATE 12.5 MG: 25 TABLET, FILM COATED ORAL at 10:26

## 2022-11-06 RX ADMIN — SCOPOLAMINE 1 PATCH: 1.5 PATCH, EXTENDED RELEASE TRANSDERMAL at 09:15

## 2022-11-06 RX ADMIN — QUETIAPINE FUMARATE 25 MG: 25 TABLET, FILM COATED ORAL at 20:05

## 2022-11-06 RX ADMIN — ACETYLCYSTEINE 3 ML: 200 INHALANT RESPIRATORY (INHALATION) at 07:33

## 2022-11-06 RX ADMIN — METOPROLOL TARTRATE 12.5 MG: 25 TABLET, FILM COATED ORAL at 17:02

## 2022-11-06 RX ADMIN — MORPHINE SULFATE 2 MG: 4 INJECTION INTRAVENOUS at 03:34

## 2022-11-06 RX ADMIN — FAMOTIDINE 20 MG: 10 INJECTION, SOLUTION INTRAVENOUS at 05:10

## 2022-11-06 RX ADMIN — LEVOTHYROXINE SODIUM 50 MCG: 50 TABLET ORAL at 05:11

## 2022-11-06 RX ADMIN — MORPHINE SULFATE 2 MG: 4 INJECTION INTRAVENOUS at 20:23

## 2022-11-06 RX ADMIN — PIPERACILLIN AND TAZOBACTAM 4.5 G: 4; .5 INJECTION, POWDER, FOR SOLUTION INTRAVENOUS at 20:16

## 2022-11-06 RX ADMIN — PIPERACILLIN AND TAZOBACTAM 4.5 G: 4; .5 INJECTION, POWDER, FOR SOLUTION INTRAVENOUS at 12:19

## 2022-11-06 RX ADMIN — NICOTINE 7 MG: 7 PATCH TRANSDERMAL at 10:26

## 2022-11-06 RX ADMIN — ACETYLCYSTEINE 3 ML: 200 INHALANT RESPIRATORY (INHALATION) at 15:55

## 2022-11-06 ASSESSMENT — PAIN DESCRIPTION - PAIN TYPE
TYPE: ACUTE PAIN

## 2022-11-06 ASSESSMENT — ENCOUNTER SYMPTOMS
VOMITING: 0
SHORTNESS OF BREATH: 1
BACK PAIN: 1
COUGH: 1
NAUSEA: 0
HEADACHES: 0

## 2022-11-06 ASSESSMENT — COPD QUESTIONNAIRES
HAVE YOU SMOKED AT LEAST 100 CIGARETTES IN YOUR ENTIRE LIFE: YES
DURING THE PAST 4 WEEKS HOW MUCH DID YOU FEEL SHORT OF BREATH: NONE/LITTLE OF THE TIME
DO YOU EVER COUGH UP ANY MUCUS OR PHLEGM?: NO/ONLY WITH OCCASIONAL COLDS OR INFECTIONS
COPD SCREENING SCORE: 3

## 2022-11-06 ASSESSMENT — PAIN SCALES - WONG BAKER
WONGBAKER_NUMERICALRESPONSE: DOESN'T HURT AT ALL

## 2022-11-06 NOTE — PROGRESS NOTES
12 hour chart check complete.     Telemetry summary  Rhythm: afib  Rate:   Ectopy: occ pvc  -/0.10/-

## 2022-11-06 NOTE — PROGRESS NOTES
Have obtained 1 PIV with US guidance. Spoke with Dr Coto, stated: wait until tomorrow after X-ray swallow study to removed as pt has been requiring electrolyte replacement.

## 2022-11-06 NOTE — PROGRESS NOTES
Critical Care Progress Note    Date of admission  10/18/2022    Chief Complaint  57 y.o. male with history of alcohol abuse, meth use, A. fib not on anticoagulation, liver mass noted on US 8/2022 admitted 10/18/2022 with alcohol withdrawal.  Patient was initially intubated for worsening respiratory failure from alcohol withdrawal -was extubated on 10/21 and reintubated on 10/23 for worsening respiratory failure and altered mental status.  He underwent bronchoscopy on 10/24 due to copious secretions -cultures with MSSA and Pseudomonas so he was started on Zosyn.  He had thoracentesis on 10/25 with removal of 400 cc of pleural fluid -pleural studies with LDH 1020, glucose < 2, cell count with 3511 WBC, 3000 RBCs -culture showed WBCs but no growth.   Patient self extubated on 10/26 overnight and was shortly reintubated after he failed HFNC.  His episode of respiratory failure was complicated by bradycardia and hypotension requiring vasopressors.  On 10/27, he was noted to have significant abdominal distention with dark feculent material coming out of the NG tube.  Fluid resuscitation was continued he continued to have high output from NG tube. CT A/P was obtained which showed dilated loops of bowel concerning for ileus rather than early/low-grade SBO, right larger than left pleural effusions.  Liver nodule seen on ultrasound in 8/2020 she was not visualized on the CT. he was gradually weaned off vasopressors.  He was noted to have done well on his SBT but had low NIF so he was kept intubated.    10/31: Very weak and tired this morning.  Review of imaging and pleural studies concerning for a right loculated parapneumonic effusion -discussed case with IR for chest tube placement.  11/1: R mid-axillary chest tube placed for loculated effusion. Zosyn restarted - fell off for about one day but decided to continue it since he has this loculated effusion  11/2: extubated this AM to oxymask. Still with significant thin clear  oral secretions so started on glycopyrrolate PRN to decrease secretions and risk of aspiration post extubation.   11/3: appeared more tired/somnolent this AM - woke patient up and got him out of bed to improve his mental status. Daughter also working with him to help suction him and re-orient him.   11/4: CXR w/improved R effusion - 2 more doses of lytics remaining.   11/6: patient more awake today, denies pain, knows he is in the hospital     Chart review from the past 24 hours includes imaging, laboratory studies, vital signs and notes available.  Pertinent data for today's visit includes    Cardiac: tachy to 100s-120s  Pulm: extubated 11/2, on 3L O2, chest tube -400 yesterday  Heme: stable  /renal: Cr stable  GI/endo: TFs paused after copious oral secretions that smelled like vomit - restart TFs 11/4  ID:  Rcx 10/24 w/MSSA, PSA, repeat culture 10/31 w/pseudomonas - zosyn (10/24-)  I/O: even, CT -400  Additional Labs/Imaging:   - Na normal today   - CXR w/improved R effusion on 11/4  - last dose of lytics 11/5 AM    Review of Systems  Review of Systems   Constitutional:  Positive for malaise/fatigue.   Respiratory:  Positive for cough and shortness of breath.    Cardiovascular:  Negative for chest pain.   Gastrointestinal:  Negative for nausea and vomiting.   Musculoskeletal:  Positive for back pain.   Neurological:  Negative for headaches.      Vital Signs for last 24 hours   Pulse:  [] 103  Resp:  [15-66] 22  BP: (104-132)/(60-79) 132/72  SpO2:  [85 %-100 %] 97 %    Hemodynamic parameters for last 24 hours       Respiratory Information for the last 24 hours       Physical Exam   Physical Exam  Vitals and nursing note reviewed.   Constitutional:       General: He is not in acute distress.  HENT:      Nose: No congestion.      Mouth/Throat:      Mouth: Mucous membranes are moist.   Cardiovascular:      Rate and Rhythm: Normal rate and regular rhythm.   Pulmonary:      Effort: Pulmonary effort is normal. No  respiratory distress.      Comments: Diminished right breath sounds  Abdominal:      Palpations: Abdomen is soft.   Musculoskeletal:         General: No deformity.   Skin:     General: Skin is warm.   Neurological:      General: No focal deficit present.      Mental Status: He is alert.      Comments: Moving all extremities.  More awake today. Denies pain.        Medications  Current Facility-Administered Medications   Medication Dose Route Frequency Provider Last Rate Last Admin    D5 NS infusion   Intravenous Continuous Nilda Franco M.D. 25 mL/hr at 11/06/22 0600 Rate Verify at 11/06/22 0600    QUEtiapine (Seroquel) tablet 25 mg  25 mg Enteral Tube Nightly Nilda Franco M.D.   25 mg at 11/05/22 2003    morphine 4 MG/ML injection 2 mg  2 mg Intravenous Q4HRS PRN Sheeba Coto D.O.   2 mg at 11/06/22 0334    HYDROcodone-acetaminophen (NORCO) 5-325 MG per tablet 1 Tablet  1 Tablet Enteral Tube Q6HRS PRN Sheeba Coto D.O.   1 Tablet at 11/05/22 1621    scopolamine (TRANSDERM-SCOP) patch 1 Patch  1 Patch Transdermal Q72HRS Nilda Franco M.D.   1 Patch at 11/03/22 1009    acetylcysteine (MUCOMYST) 20 % solution 3 mL  3 mL Inhalation 4X/DAY (RT) Nilda Franco M.D.   3 mL at 11/06/22 0733    albuterol (PROVENTIL) 2.5mg/0.5ml nebulizer solution 2.5 mg  2.5 mg Nebulization 4X/DAY (RT) Nilda Franco M.D.   2.5 mg at 11/06/22 0733    lactulose 20 GM/30ML solution 15 mL  15 mL Enteral Tube BID PRN Nilda Franco M.D.   15 mL at 11/03/22 1523    dextrose 10 % BOLUS 25 g  25 g Intravenous Q15 MIN PRN Chris Abrams M.D.   25 g at 11/03/22 1519    piperacillin-tazobactam (Zosyn) 4.5 g in  mL IVPB  4.5 g Intravenous Q8HRS Nilda Franco M.D. 25 mL/hr at 11/06/22 0600 Rate Verify at 11/06/22 0600    MD Alert...ICU Electrolyte Replacement per Pharmacy   Other PHARMACY TO DOSE Nilda Franco M.D.        heparin injection 5,000 Units  5,000 Units Subcutaneous  Q8HRS Chris Abrams M.D.   5,000 Units at 11/06/22 0510    famotidine (PEPCID) injection 20 mg  20 mg Intravenous BID Chris Abrams M.D.   20 mg at 11/06/22 0510    Respiratory Therapy Consult   Nebulization Continuous RT Greyson Luciano D.O.        lidocaine (XYLOCAINE) 1 % injection 2 mL  2 mL Tracheal Tube Q30 MIN PRN Greyson Luciano D.O.        Pharmacy Consult: Enteral tube insertion - review meds/change route/product selection   Other PHARMACY TO DOSE Greyson Luciano D.O.        carboxymethylcellulose (REFRESH TEARS) 0.5 % ophthalmic drops 2 Drop  2 Drop Both Eyes PRN Greyson Luciano D.O.   2 Drop at 11/01/22 2206    albuterol inhaler 2 Puff  2 Puff Inhalation Q6HRS PRN Eva Rodriguez M.D.        Pharmacy Consult Request ...Pain Management Review 1 Each  1 Each Other PHARMACY TO DOSE Eva Rodriguez M.D.        levothyroxine (SYNTHROID) tablet 50 mcg  50 mcg Enteral Tube AM ES Eva Rodriguez M.D.   50 mcg at 11/06/22 0511    acetaminophen (Tylenol) tablet 650 mg  650 mg Enteral Tube Q6HRS PRN Eva Rodriguez M.D.   650 mg at 11/05/22 0340     Fluids    Intake/Output Summary (Last 24 hours) at 11/6/2022 0737  Last data filed at 11/6/2022 0600  Gross per 24 hour   Intake 1261.27 ml   Output 1359 ml   Net -97.73 ml       Laboratory          Recent Labs     11/04/22  0320 11/04/22  0834 11/04/22  1538 11/05/22  0320 11/06/22  0330   SODIUM  --    < > 134* 131* 136   POTASSIUM 3.4*   < > 3.6 3.7 3.9   CHLORIDE  --    < > 103 100 104   CO2  --    < > 24 24 22   BUN  --    < > 7* 7* 8   CREATININE  --    < > 0.54 0.52 0.61   MAGNESIUM 1.5  --   --  1.7 1.8   PHOSPHORUS 2.9  --   --  3.6 4.0   CALCIUM  --    < > 7.4* 7.7* 7.5*    < > = values in this interval not displayed.       Recent Labs     11/04/22  1538 11/05/22  0320 11/06/22  0330   ALTSGPT 10  --   --    ASTSGOT 12  --   --    ALKPHOSPHAT 111*  --   --    TBILIRUBIN 0.6  --   --    GLUCOSE 85 91 81       Recent Labs      11/04/22  0320 11/04/22  1538 11/05/22 0320 11/06/22  0330   WBC 10.5  --  11.5* 9.9   NEUTSPOLYS 68.70  --  67.60 65.60   LYMPHOCYTES 9.50*  --  9.70* 11.70*   MONOCYTES 13.50*  --  13.40 12.20   EOSINOPHILS 7.10*  --  7.90* 9.00*   BASOPHILS 0.60  --  0.60 0.90   ASTSGOT  --  12  --   --    ALTSGPT  --  10  --   --    ALKPHOSPHAT  --  111*  --   --    TBILIRUBIN  --  0.6  --   --        Recent Labs     11/04/22 0320 11/05/22 0320 11/06/22 0330   RBC 3.50* 3.73* 3.36*   HEMOGLOBIN 11.4* 12.3* 11.0*   HEMATOCRIT 33.9* 35.8* 32.6*   PLATELETCT 516* 609* 590*       Imaging  Reviewed    Assessment/Plan     Septic shock-resolved, now off pressors  MSSA and Pseudomonas pneumonia  Right complicated loculated pleural effusion. S/p CT on 11/1 and tPA/Dornase x6 doses   Pleural studies from 10/25 with glucose <2, LDH around 1000 and increased WBCs -pleural fluid cultures negative so far patient was already on antibiotics.  This is a complicated parapneumonic effusion based on studies and imaging.  -Continue Zosyn 10/24-11/7 for pseudomonas, MSSA, and anaerobe coverage   -Change to Unasyn 11/7 for continued coverage for empyema  -Now off all pressors -monitor hemodynamics  -IV fluids as needed  -Follow-up repeat pleural fluid culture from thoracentesis on 10/30, NGTD at 4 days     Acute hypoxic respiratory failure, resolved   Likely secondary to pneumonia and effusion as noted above.  Also with significant weakness/deconditioning  -extubated 11/2   -supplemental O2 as needed  -Replete electrolytes appropriately  -pulmonary hygiene, ISS  -early mobilization    Atrial fibrillation -currently rate controlled  -Restart home MTP at 12.5 mg BID today   -Not on chronic anticoagulation -will consider risk versus benefit after chest tube placement    Liver mass  Liver nodule seen on ultrasound in 8/2020 she was not visualized on the CT scan.  -Once stable, can get hepatic mass protocol CT or MRI -can be done as outpatient as  well.    Alcohol abuse  Alcohol withdrawal  Initially admitted and intubated for alcohol withdrawal-now resolved    Nicotine withdrawal   Start nictoine patch at 7 mg    Hyponatremia, iatrogenic, resolved   Continue with D5NS at 25 cc, needed for hypoglycemia   Monitor Na daily     Hypoglycemia  Tube feeds held intermittently for coughing up secretions that resemble tube feeds.   - continue TF, advance to 20/hr   - monitor for aspiration  - D51/2NS     VTE:  Lovenox  Ulcer: Not Indicated  Lines: DC CVC and wheeler today     I have performed a physical exam and reviewed and updated ROS and Plan today (11/6/2022). In review of yesterday's note (11/5/2022), there are no changes except as documented above.     Discussed patient condition and risk of morbidity and/or mortality with Family, RN, RT, Pharmacy, and Charge nurse / hot rounds  The patient remains critically ill.  Critical care time = 52 minutes in directly providing and coordinating critical care and extensive data review.  No time overlap and excludes procedures.      Sheeba Coto, DO   Pulmonary and Critical Care

## 2022-11-06 NOTE — PROGRESS NOTES
Patient A&OX3. Follows commands. Hemodynamically stable. On NC @ 2L.  Copious amounts of thick white/tan secretions. Needs encouragement to cough. Ancillary staff noted patient vomiting TF.  Upon shift assessment, residuals were only 15mls.  TF were held and NGT placed to intermittent LWS. KUB done.  No measurable output in canister.  TF restarted @ 10ml/hr as ordered.  No free water flushes due to hyponatremia. No BM this shift. Adequate UOP per wheeler. Chest tube output 300ml of serosanguineous drainage. Patient restless the majority of the shift requiring frequent reorientation. Daughter at bedside and updated throughout shift.  At this time, patient stable and will continue to monitor.

## 2022-11-06 NOTE — CARE PLAN
The patient is Stable - Low risk of patient condition declining or worsening    Shift Goals  Clinical Goals: VSS, monitor oxygen  Patient Goals: ANETTE  Family Goals: ANETTE    Progress made toward(s) clinical / shift goals:  patients VSS, strength of cough improving    Patient is not progressing towards the following goals: patients neurological status waxes and wanes    Problem: Knowledge Deficit - Standard  Goal: Patient and family/care givers will demonstrate understanding of plan of care, disease process/condition, diagnostic tests and medications  Outcome: Not Progressing     Problem: Fall Risk  Goal: Patient will remain free from falls  Outcome: Progressing     Problem: Respiratory  Goal: Patient will achieve/maintain optimum respiratory ventilation and gas exchange  Outcome: Progressing

## 2022-11-07 ENCOUNTER — APPOINTMENT (OUTPATIENT)
Dept: RADIOLOGY | Facility: MEDICAL CENTER | Age: 57
DRG: 870 | End: 2022-11-07
Attending: INTERNAL MEDICINE
Payer: COMMERCIAL

## 2022-11-07 LAB
ANION GAP SERPL CALC-SCNC: 7 MMOL/L (ref 7–16)
BASOPHILS # BLD AUTO: 1.3 % (ref 0–1.8)
BASOPHILS # BLD: 0.12 K/UL (ref 0–0.12)
BUN SERPL-MCNC: 9 MG/DL (ref 8–22)
CA-I SERPL-SCNC: 1.1 MMOL/L (ref 1.1–1.3)
CALCIUM SERPL-MCNC: 8 MG/DL (ref 8.4–10.2)
CHLORIDE SERPL-SCNC: 106 MMOL/L (ref 96–112)
CO2 SERPL-SCNC: 25 MMOL/L (ref 20–33)
CREAT SERPL-MCNC: 0.6 MG/DL (ref 0.5–1.4)
EOSINOPHIL # BLD AUTO: 1.24 K/UL (ref 0–0.51)
EOSINOPHIL NFR BLD: 13.2 % (ref 0–6.9)
ERYTHROCYTE [DISTWIDTH] IN BLOOD BY AUTOMATED COUNT: 51.4 FL (ref 35.9–50)
GFR SERPLBLD CREATININE-BSD FMLA CKD-EPI: 113 ML/MIN/1.73 M 2
GLUCOSE BLD STRIP.AUTO-MCNC: 75 MG/DL (ref 65–99)
GLUCOSE BLD STRIP.AUTO-MCNC: 76 MG/DL (ref 65–99)
GLUCOSE SERPL-MCNC: 81 MG/DL (ref 65–99)
HCT VFR BLD AUTO: 32 % (ref 42–52)
HGB BLD-MCNC: 10.6 G/DL (ref 14–18)
IMM GRANULOCYTES # BLD AUTO: 0.07 K/UL (ref 0–0.11)
IMM GRANULOCYTES NFR BLD AUTO: 0.7 % (ref 0–0.9)
LYMPHOCYTES # BLD AUTO: 1.39 K/UL (ref 1–4.8)
LYMPHOCYTES NFR BLD: 14.8 % (ref 22–41)
MAGNESIUM SERPL-MCNC: 1.7 MG/DL (ref 1.5–2.5)
MCH RBC QN AUTO: 32.3 PG (ref 27–33)
MCHC RBC AUTO-ENTMCNC: 33.1 G/DL (ref 33.7–35.3)
MCV RBC AUTO: 97.6 FL (ref 81.4–97.8)
MONOCYTES # BLD AUTO: 1.28 K/UL (ref 0–0.85)
MONOCYTES NFR BLD AUTO: 13.6 % (ref 0–13.4)
NEUTROPHILS # BLD AUTO: 5.28 K/UL (ref 1.82–7.42)
NEUTROPHILS NFR BLD: 56.4 % (ref 44–72)
NRBC # BLD AUTO: 0 K/UL
NRBC BLD-RTO: 0 /100 WBC
PHOSPHATE SERPL-MCNC: 4.9 MG/DL (ref 2.5–4.5)
PLATELET # BLD AUTO: 636 K/UL (ref 164–446)
PMV BLD AUTO: 10.4 FL (ref 9–12.9)
POTASSIUM SERPL-SCNC: 4.2 MMOL/L (ref 3.6–5.5)
PREALB SERPL-MCNC: 4.5 MG/DL (ref 18–38)
RBC # BLD AUTO: 3.28 M/UL (ref 4.7–6.1)
SODIUM SERPL-SCNC: 138 MMOL/L (ref 135–145)
WBC # BLD AUTO: 9.4 K/UL (ref 4.8–10.8)

## 2022-11-07 PROCEDURE — C1751 CATH, INF, PER/CENT/MIDLINE: HCPCS

## 2022-11-07 PROCEDURE — 82330 ASSAY OF CALCIUM: CPT

## 2022-11-07 PROCEDURE — 02HV33Z INSERTION OF INFUSION DEVICE INTO SUPERIOR VENA CAVA, PERCUTANEOUS APPROACH: ICD-10-PCS | Performed by: INTERNAL MEDICINE

## 2022-11-07 PROCEDURE — 99291 CRITICAL CARE FIRST HOUR: CPT | Performed by: INTERNAL MEDICINE

## 2022-11-07 PROCEDURE — 82962 GLUCOSE BLOOD TEST: CPT | Mod: 91

## 2022-11-07 PROCEDURE — 80048 BASIC METABOLIC PNL TOTAL CA: CPT

## 2022-11-07 PROCEDURE — 700105 HCHG RX REV CODE 258: Performed by: INTERNAL MEDICINE

## 2022-11-07 PROCEDURE — 84134 ASSAY OF PREALBUMIN: CPT

## 2022-11-07 PROCEDURE — 83605 ASSAY OF LACTIC ACID: CPT

## 2022-11-07 PROCEDURE — A9270 NON-COVERED ITEM OR SERVICE: HCPCS | Performed by: INTERNAL MEDICINE

## 2022-11-07 PROCEDURE — 94760 N-INVAS EAR/PLS OXIMETRY 1: CPT

## 2022-11-07 PROCEDURE — 97166 OT EVAL MOD COMPLEX 45 MIN: CPT

## 2022-11-07 PROCEDURE — 700111 HCHG RX REV CODE 636 W/ 250 OVERRIDE (IP): Performed by: INTERNAL MEDICINE

## 2022-11-07 PROCEDURE — 92526 ORAL FUNCTION THERAPY: CPT

## 2022-11-07 PROCEDURE — 700111 HCHG RX REV CODE 636 W/ 250 OVERRIDE (IP): Performed by: STUDENT IN AN ORGANIZED HEALTH CARE EDUCATION/TRAINING PROGRAM

## 2022-11-07 PROCEDURE — 36600 WITHDRAWAL OF ARTERIAL BLOOD: CPT

## 2022-11-07 PROCEDURE — 83735 ASSAY OF MAGNESIUM: CPT

## 2022-11-07 PROCEDURE — 700102 HCHG RX REV CODE 250 W/ 637 OVERRIDE(OP): Performed by: HOSPITALIST

## 2022-11-07 PROCEDURE — 700102 HCHG RX REV CODE 250 W/ 637 OVERRIDE(OP): Performed by: INTERNAL MEDICINE

## 2022-11-07 PROCEDURE — 97163 PT EVAL HIGH COMPLEX 45 MIN: CPT

## 2022-11-07 PROCEDURE — A9270 NON-COVERED ITEM OR SERVICE: HCPCS | Performed by: STUDENT IN AN ORGANIZED HEALTH CARE EDUCATION/TRAINING PROGRAM

## 2022-11-07 PROCEDURE — 94640 AIRWAY INHALATION TREATMENT: CPT

## 2022-11-07 PROCEDURE — 71045 X-RAY EXAM CHEST 1 VIEW: CPT

## 2022-11-07 PROCEDURE — 85025 COMPLETE CBC W/AUTO DIFF WBC: CPT

## 2022-11-07 PROCEDURE — A9270 NON-COVERED ITEM OR SERVICE: HCPCS | Performed by: HOSPITALIST

## 2022-11-07 PROCEDURE — 700101 HCHG RX REV CODE 250: Performed by: STUDENT IN AN ORGANIZED HEALTH CARE EDUCATION/TRAINING PROGRAM

## 2022-11-07 PROCEDURE — 700102 HCHG RX REV CODE 250 W/ 637 OVERRIDE(OP): Performed by: STUDENT IN AN ORGANIZED HEALTH CARE EDUCATION/TRAINING PROGRAM

## 2022-11-07 PROCEDURE — 84100 ASSAY OF PHOSPHORUS: CPT

## 2022-11-07 PROCEDURE — 82803 BLOOD GASES ANY COMBINATION: CPT

## 2022-11-07 PROCEDURE — 770022 HCHG ROOM/CARE - ICU (200)

## 2022-11-07 PROCEDURE — 92612 ENDOSCOPY SWALLOW (FEES) VID: CPT

## 2022-11-07 PROCEDURE — 94669 MECHANICAL CHEST WALL OSCILL: CPT

## 2022-11-07 RX ORDER — LORAZEPAM 2 MG/ML
1 INJECTION INTRAMUSCULAR ONCE
Status: COMPLETED | OUTPATIENT
Start: 2022-11-08 | End: 2022-11-08

## 2022-11-07 RX ORDER — SCOLOPAMINE TRANSDERMAL SYSTEM 1 MG/1
1 PATCH, EXTENDED RELEASE TRANSDERMAL
Status: DISCONTINUED | OUTPATIENT
Start: 2022-11-07 | End: 2022-11-29 | Stop reason: HOSPADM

## 2022-11-07 RX ORDER — QUETIAPINE FUMARATE 25 MG/1
25 TABLET, FILM COATED ORAL ONCE
Status: COMPLETED | OUTPATIENT
Start: 2022-11-07 | End: 2022-11-07

## 2022-11-07 RX ORDER — CALCIUM GLUCONATE 20 MG/ML
1 INJECTION, SOLUTION INTRAVENOUS ONCE
Status: COMPLETED | OUTPATIENT
Start: 2022-11-07 | End: 2022-11-07

## 2022-11-07 RX ORDER — MAGNESIUM SULFATE HEPTAHYDRATE 40 MG/ML
2 INJECTION, SOLUTION INTRAVENOUS ONCE
Status: COMPLETED | OUTPATIENT
Start: 2022-11-07 | End: 2022-11-07

## 2022-11-07 RX ADMIN — PIPERACILLIN AND TAZOBACTAM 4.5 G: 4; .5 INJECTION, POWDER, FOR SOLUTION INTRAVENOUS at 13:12

## 2022-11-07 RX ADMIN — NICOTINE 7 MG: 7 PATCH TRANSDERMAL at 05:34

## 2022-11-07 RX ADMIN — METOPROLOL TARTRATE 12.5 MG: 25 TABLET, FILM COATED ORAL at 05:34

## 2022-11-07 RX ADMIN — QUETIAPINE FUMARATE 25 MG: 25 TABLET, FILM COATED ORAL at 20:34

## 2022-11-07 RX ADMIN — ACETYLCYSTEINE 3 ML: 200 INHALANT RESPIRATORY (INHALATION) at 14:52

## 2022-11-07 RX ADMIN — QUETIAPINE FUMARATE 25 MG: 25 TABLET, FILM COATED ORAL at 23:12

## 2022-11-07 RX ADMIN — CALCIUM GLUCONATE 1 G: 20 INJECTION, SOLUTION INTRAVENOUS at 07:47

## 2022-11-07 RX ADMIN — PIPERACILLIN AND TAZOBACTAM 4.5 G: 4; .5 INJECTION, POWDER, FOR SOLUTION INTRAVENOUS at 05:46

## 2022-11-07 RX ADMIN — PIPERACILLIN AND TAZOBACTAM 4.5 G: 4; .5 INJECTION, POWDER, FOR SOLUTION INTRAVENOUS at 20:34

## 2022-11-07 RX ADMIN — ALBUTEROL SULFATE 2.5 MG: 2.5 SOLUTION RESPIRATORY (INHALATION) at 06:48

## 2022-11-07 RX ADMIN — ENOXAPARIN SODIUM 40 MG: 40 INJECTION SUBCUTANEOUS at 17:30

## 2022-11-07 RX ADMIN — ACETYLCYSTEINE 3 ML: 200 INHALANT RESPIRATORY (INHALATION) at 06:48

## 2022-11-07 RX ADMIN — MORPHINE SULFATE 2 MG: 4 INJECTION INTRAVENOUS at 19:51

## 2022-11-07 RX ADMIN — ACETYLCYSTEINE 3 ML: 200 INHALANT RESPIRATORY (INHALATION) at 19:21

## 2022-11-07 RX ADMIN — MORPHINE SULFATE 2 MG: 4 INJECTION INTRAVENOUS at 23:32

## 2022-11-07 RX ADMIN — ALBUTEROL SULFATE 2.5 MG: 2.5 SOLUTION RESPIRATORY (INHALATION) at 09:27

## 2022-11-07 RX ADMIN — ALBUTEROL SULFATE 2.5 MG: 2.5 SOLUTION RESPIRATORY (INHALATION) at 19:21

## 2022-11-07 RX ADMIN — Medication: at 20:35

## 2022-11-07 RX ADMIN — ACETYLCYSTEINE 3 ML: 200 INHALANT RESPIRATORY (INHALATION) at 09:28

## 2022-11-07 RX ADMIN — LEVOTHYROXINE SODIUM 50 MCG: 50 TABLET ORAL at 05:34

## 2022-11-07 RX ADMIN — ALBUTEROL SULFATE 2.5 MG: 2.5 SOLUTION RESPIRATORY (INHALATION) at 14:52

## 2022-11-07 RX ADMIN — METOPROLOL TARTRATE 12.5 MG: 25 TABLET, FILM COATED ORAL at 17:30

## 2022-11-07 RX ADMIN — MAGNESIUM SULFATE 2 G: 2 INJECTION INTRAVENOUS at 07:45

## 2022-11-07 RX ADMIN — SCOPOLAMINE 1 PATCH: 1.5 PATCH, EXTENDED RELEASE TRANSDERMAL at 10:15

## 2022-11-07 ASSESSMENT — PAIN DESCRIPTION - PAIN TYPE
TYPE: ACUTE PAIN

## 2022-11-07 ASSESSMENT — COGNITIVE AND FUNCTIONAL STATUS - GENERAL
SUGGESTED CMS G CODE MODIFIER MOBILITY: CN
DRESSING REGULAR LOWER BODY CLOTHING: TOTAL
TOILETING: TOTAL
STANDING UP FROM CHAIR USING ARMS: TOTAL
HELP NEEDED FOR BATHING: TOTAL
MOVING TO AND FROM BED TO CHAIR: UNABLE
TURNING FROM BACK TO SIDE WHILE IN FLAT BAD: UNABLE
SUGGESTED CMS G CODE MODIFIER DAILY ACTIVITY: CN
DRESSING REGULAR UPPER BODY CLOTHING: TOTAL
WALKING IN HOSPITAL ROOM: TOTAL
MOVING FROM LYING ON BACK TO SITTING ON SIDE OF FLAT BED: UNABLE
PERSONAL GROOMING: TOTAL
EATING MEALS: TOTAL
CLIMB 3 TO 5 STEPS WITH RAILING: TOTAL
MOBILITY SCORE: 6
DAILY ACTIVITIY SCORE: 6

## 2022-11-07 ASSESSMENT — ENCOUNTER SYMPTOMS
WEAKNESS: 1
VOMITING: 0
HEADACHES: 0
NAUSEA: 0
SHORTNESS OF BREATH: 1
COUGH: 1

## 2022-11-07 ASSESSMENT — GAIT ASSESSMENTS: GAIT LEVEL OF ASSIST: UNABLE TO PARTICIPATE

## 2022-11-07 ASSESSMENT — ACTIVITIES OF DAILY LIVING (ADL): TOILETING: INDEPENDENT

## 2022-11-07 NOTE — THERAPY
Physical Therapy   Initial Evaluation     Patient Name: Tyree Whiteside  Age:  57 y.o., Sex:  male  Medical Record #: [id number]  Today's Date: 11/7/2022     Precautions  Precautions: Fall Risk;Swallow Precautions ( See Comments);Nasogastric Tube    Assessment    Patient is 57 y.o. male who was recently admitted for alcohol withdrawal, acute respiratory failure, and PNA. Pt was intubated during admission and was recently extubated on 11/2. Pt was agreeable to therapy evaluation and was cleared by RN to participate. Pt presented PT with impaired balance, impaired gait, impaired coordination, weakness, poor sequencing/motor planning, and intermittent confusion. Pt was primarily limited due to poor safety awareness and impulsivity along with poor balance/coordination/motor planning. These impairments are limiting the patients ability to safely perform bed mobility, sit<>stands, transfers, and ambulation. Pt currently requires Mod to Max A for all upright mobility.  Pt was able to participate in 2-3 sit<>stands along with side stepping along EOB which required Mod to Max A x 2 person assist. Pt will continue to benefit from skilled PT while in house, with recommendation for post acute therapy prior to d/c home.     Plan    Recommend Physical Therapy 3 times per week until therapy goals are met for the following treatments:  Bed Mobility, Community Re-integration, Equipment, Gait Training, Manual Therapy, Neuro Re-Education / Balance, Self Care/Home Evaluation, Stair Training, Therapeutic Activities, and Therapeutic Exercises    DC Equipment Recommendations: (P) Unable to determine at this time  Discharge Recommendations: (P) Recommend post-acute placement for additional physical therapy services prior to discharge home     Objective       11/07/22 1315   Initial Contact Note    Initial Contact Note Order Received and Verified, Physical Therapy Evaluation in Progress with Full Report to Follow.   Precautions    Precautions Fall Risk;Swallow Precautions ( See Comments);Nasogastric Tube   Vitals   O2 (LPM) 4   O2 Delivery Device Silicone Nasal Cannula   Pain 0 - 10 Group   Therapist Pain Assessment Nurse Notified;0   Prior Living Situation   Prior Services Home-Independent   Housing / Facility Motel   Steps Into Home 0   Steps In Home 0   Elevator Yes   Bathroom Set up Bathtub / Shower Combination   Equipment Owned None   Lives with - Patient's Self Care Capacity Alone and Able to Care For Self   Comments per patient he is primarily indpendent   Prior Level of Functional Mobility   Bed Mobility Independent   Transfer Status Independent   Ambulation Independent   Distance Ambulation (Feet)   (community)   Assistive Devices Used None   Stairs Independent   History of Falls   History of Falls No   Cognition    Cognition / Consciousness X   Speech/ Communication Delayed Responses   Level of Consciousness Alert   Safety Awareness Impaired;Impulsive   Attention Impaired   Comments pt presents with poor attention and impulsivity, requires frequent redirection throughout session   Passive ROM Lower Body   Passive ROM Lower Body WDL   Active ROM Lower Body    Active ROM Lower Body  WDL   Strength Lower Body   Lower Body Strength  X   Gross Strength Generalized Weakness, Equal Bilaterally   Comments presents with gross strength of 4/5 in B LE   Sensation Lower Body   Lower Extremity Sensation   Not Tested   Lower Body Muscle Tone   Lower Body Muscle Tone  WDL   Strength Upper Body   Upper Body Strength  Not Tested   Upper Body Muscle Tone   Upper Body Muscle Tone  WDL   Coordination Upper Body   Coordination X   Comments tremulous activity in B UE   Coordination Lower Body    Coordination Lower Body  X   Comments tremulous activity in B LE; poor foot placement during marching and side steps   Balance Assessment   Sitting Balance (Static) Fair -   Sitting Balance (Dynamic) Poor +   Standing Balance (Static) Trace +   Standing Balance  (Dynamic) Trace   Weight Shift Sitting Poor   Weight Shift Standing Poor   Comments w/2 person HHA   Gait Analysis   Gait Level Of Assist Unable to Participate   Weight Bearing Status fwb   Comments able to take side steps only towards HOB   Bed Mobility    Supine to Sit Moderate Assist   Sit to Supine Maximal Assist   Scooting Moderate Assist   Comments HOB elevated and rails up   Functional Mobility   Sit to Stand Maximal Assist   Bed, Chair, Wheelchair Transfer Unable to Participate   Mobility EOB, sit<>stand x 3, side steps, marching, back to bed   How much difficulty does the patient currently have...   Turning over in bed (including adjusting bedclothes, sheets and blankets)? 1   Sitting down on and standing up from a chair with arms (e.g., wheelchair, bedside commode, etc.) 1   Moving from lying on back to sitting on the side of the bed? 1   How much help from another person does the patient currently need...   Moving to and from a bed to a chair (including a wheelchair)? 1   Need to walk in a hospital room? 1   Climbing 3-5 steps with a railing? 1   6 clicks Mobility Score 6   Activity Tolerance   Sitting in Chair NT   Sitting Edge of Bed 10 mins   Standing 1-2 mins x 3   Comments limited due to behavior and impulsivity   Edema / Skin Assessment   Edema / Skin  Not Assessed   Patient / Family Goals    Patient / Family Goal #1 none stated   Short Term Goals    Short Term Goal # 1 pt will go supine<>sit w/hob elevated and rails up w/Min A in 6tx   Short Term Goal # 2 pt will go sit<>stand w/fww w/Min A in 6tx   Short Term Goal # 3 pt will transfer bed<>chair w/fww w/Mod A in 6tx   Education Group   Education Provided Role of Physical Therapist   Role of Physical Therapist Patient Response Patient;Acceptance;Explanation;Demonstration;Verbal Demonstration;Action Demonstration   Problem List    Problems Impaired Bed Mobility;Impaired Transfers;Impaired Ambulation;Impaired Balance;Functional Strength  Deficit;Impaired Coordination;Decreased Activity Tolerance;Safety Awareness Deficits / Cognition;Motor Planning / Sequencing   Anticipated Discharge Equipment and Recommendations   DC Equipment Recommendations Unable to determine at this time   Discharge Recommendations Recommend post-acute placement for additional physical therapy services prior to discharge home   Interdisciplinary Plan of Care Collaboration   IDT Collaboration with  Nursing;Occupational Therapist   Patient Position at End of Therapy In Bed;Call Light within Reach;Tray Table within Reach;Phone within Reach;Wrist Restraints Applied   Collaboration Comments aware of visit and recs   Session Information   Date / Session Number  11/7-1 (1/3, 11/13)

## 2022-11-07 NOTE — CARE PLAN
The patient is Watcher - Medium risk of patient condition declining or worsening    Shift Goals  Clinical Goals: VSS, monitor oxygen  Patient Goals: ANETTE  Family Goals: ANETTE    Progress made toward(s) clinical / shift goals:  Maintaining sats > 95% on nasal cannula.     Patient is not progressing towards the following goals:

## 2022-11-07 NOTE — CARE PLAN
The patient is Stable - Low risk of patient condition declining or worsening    Shift Goals  Clinical Goals: Pt will not exhibit behavior needing restraints  Patient Goals: ANETTE  Family Goals: ANETTE    Progress made toward(s) clinical / shift goals:  .    Patient is not progressing towards the following goals:

## 2022-11-07 NOTE — THERAPY
Speech Language Pathology   Fiberoptic Endoscopic Evaluation of Swallow     Patient Name: Tyree Whiteside  AGE:  57 y.o., SEX:  male  Medical Record #: [id number]  Today's Date: 11/7/2022     Precautions  Precautions: Fall Risk, Swallow Precautions ( See Comments), Nasogastric Tube      HPI: The pt is a 56 y/o M who was admitted 10/18/22.  The pt presented with generalized weakness, tremors and nausea. Pt reported that he went to [facility] for assistance with alcohol detox however was referred to the emergency room. Apparently the patient drinks a pint of vodka and 4-5 beers daily. Pt was experiencing hallucinations, confusion, generalized weakness, N/V and headache for 1 day prior to admission.       Hospital course significant for:  10/18-10/21  Intubation  10/26  Self extubation; reintubation  10/26-11/02  Intubation        PMHx:   Alcohol abuse, meth use, Afib, HTN      Current Method of Nutrition   NGT/Cortrak      Pertinent Information:  Affect/Behavior: Cooperative, difficulty following directives  Oxygen Requirements:  4 L Nasal Cannula  Feeding Tube: NGT in situ to right nare  Dentition: Fair  Factor(s) Affecting Performance: Impaired command following    Discussed the risks, benefits, and alternatives of the FEES procedure. Patient/family acknowledged and agreed to proceed.     Assessment  Flexible Endoscopic Evaluation of Swallowing (FEES) completed at bedside today. The endoscope was passed transnasally via right nare to evaluate the anatomy and physiology of swallowing. Pt tolerated the procedure with no apparent distress.    Anatomic Findings:  some edema to false VF  Vocal Fold Motion:  difficult to assess given copious secretions, but appear to adduct completely  Secretion Management: Excess secretions, suctioning required  PO trials: ice chips, thin liquids, mildly thick liquids      Consistency PAS Score Timing Comments   Ice Chips 7 During swallow (inferred)    Thin Liquid 7  During swallow (inferred)    Mildly Thick Liquid (MTL) 5 During swallow (inferred)      Penetration-Aspiration Scale (PAS)  1     No contrast enters airway  2     Contrast enters the airway, remains above the vocal folds, and is ejected from the airway (not seen in the airway at the end of the swallow).  3     Contrast enters the airway, remains above the vocal folds, and is not ejected from the airway (is seen in the airway after the swallow).  4     Contrast enters the airway, contacts the vocal folds, and is ejected from the airway.  5     Contrast enters the airway, contacts the vocal folds, and is not ejected from the airway  6     Contrast enters the airway, crosses the plane of the vocal folds, and is ejected from the airway.  7     Contrast enters the airway, crosses the plane of the vocal folds, and is not ejected from the airway despite effort.  8     Contrast enters the airway, crosses the plane of the vocal folds, is not ejected from the airway and there is no response to aspiration.        Oral phase:  Premature spillage to laryngeal vestibule with trials of ice chips and thin liquids.      Pharyngeal phase:  Copious frothy secretions in the vallecular space, pyriform sinuses, and laryngeal vestibule prior to PO trials. Suspect aspiration of secretions with trials of ice chips as noted by secretions ejected from below glottis x1. Pt unable to consistently cough and clear secretions independently. RT present for session who suctioned pt following some PO trials, however, very quick collection/pooling of secretions noted.    Ice chips - Initially there was an absent swallow trigger with trials of ice requiring cue from SLP. There was penetration to the level of the vocal folds (VF) before the swallow and suspect aspiration during. Blue appreciated in laryngeal vestibule after the swallow. Moderate-severe collection of pharyngeal residue/secretions after the swallow.    Thin liquids - Suspect aspiration  during the swallow as noted by spillage to laryngeal vestibule and VF in abducted position. There was residue in the laryngeal vestibule after the swallow that remained there despite multiple reflexive coughs and swallows. Moderate-severe collection of pharyngeal residue/secretions after the swallow.    MTL - Suspect at least deep penetration to VF (but cannot rule out aspiration) as seen by blue sitting at level of the VF after the swallow. Despite effort, unable to cough and clear secretions/residue in laryngeal vestibule.    Deficits marked by impaired laryngeal vestibular closure, weak pharyngeal squeeze, impaired sensation, and laryngeal mistiming.    Clinical Impressions  The pt presents with a severe oropharyngeal dysphagia, likely acute related to prolonged intubation, prolonged NPO status, and generalized weakness. Pt with very weak/unproductive cough and appears to not be managing his secretions wells as noted by pooling in laryngeal vestibule prior to and after the swallow. Currently pt is at very high risk for aspiration so recommend continuation of NPO with NG for nutrition. Okay for small amounts of ice chips to minimize xerostomia and maintain integrity of the swallow. Discussed results and recommendations with pt's daughter who verbalized good understanding of education.      Recommendations  Continue NPO with NG for nutrition, okay for small amounts of ice chips to minimize xerostomia and maintain integrity of the swallow  2.  Swallowing Instructions & Precautions:   Medication: Non Oral   Oral Care: Q2h          Plan    Recommend Speech Therapy 5 times per week until therapy goals are met for the following treatments:  Dysphagia Training and Patient / Family / Caregiver Education.    Discharge Recommendations: Recommend post-acute placement for additional speech therapy services prior to discharge home       Objective       11/07/22 1412   Initial Contact Note    Initial Contact Note  Order  "Received and Verified, Speech Therapy Evaluation in Progress with Full Report to Follow.   Precautions   Precautions Fall Risk;Swallow Precautions ( See Comments);Nasogastric Tube   Vitals   O2 (LPM) 4   O2 Delivery Device Silicone Nasal Cannula   Pain 0 - 10 Group   Therapist Pain Assessment Post Activity Pain Same as Prior to Activity;Nurse Notified;0   Prior Living Situation   Lives with - Patient's Self Care Capacity Alone and Able to Care For Self   Prior Level Of Function   Communication Within Functional Limits   Swallow Within Functional Limits   Dentition Intact   Dentures None   Hearing Within Functional Limits for Evaluation   Hearing Aid None   Vision Within Functional Limits for Evaluation   Patient's Primary Language English   Patient / Family Goals   Patient / Family Goal #1 \"I could eat half of that ice\"   Goal #1 Outcome Progressing slower than expected   Short Term Goals   Short Term Goal # 1 Pt will participate in an instrumental swallow study to fully assess swallow function.   Goal Outcome # 1 Goal met   Short Term Goal # 2 Pt will consume pre-feeding trials with SLP to determine readiness to begin an oral diet.   Goal Outcome # 2  Progressing slower than expected   Education Group   Education Provided Dysphagia   Dysphagia Patient Response Patient;Family;Acceptance;Explanation;Verbal Demonstration;Reinforcement Needed   Anticipated Discharge Needs   Discharge Recommendations Recommend post-acute placement for additional speech therapy services prior to discharge home   Therapy Recommendations Upon DC Dysphagia Training;Community Re-Integration;Patient / Family / Caregiver Education   Interdisciplinary Plan of Care Collaboration   IDT Collaboration with  Nursing;Physician;Family / Caregiver   Patient Position at End of Therapy Seated;In Bed     "

## 2022-11-07 NOTE — DIETARY
Nutrition support weekly update:  Day 20 of admit.  Tyree Whiteside is a 57 y.o. male with admitting DX of Alcohol withdrawal delirium. Tube feeding re-started on 10/30. Current TF via NG tube is Impact Peptide 1.5 with a goal rate of 55 mL/hour. This will provide 1980 kcals, 124 g protein, and 1016 mL of free water. TF is currently running at 20 mL/hour.     Assessment:  Weight 11/3/22: 88.9 kg (bed scale)  Pt's wt has been variable. This is most likely r/t provision of IV fluids during hospitalization. Pt currently has 1+ edema to BLEs. At one point, pt's edema was 2+. He is currently -3.6 liters of fluids.  Re-estimate of nutritional needs as indicated not indicated.      Evaluation:   Pt has had inadequate nutrition x ~20 days. The highest that pt's TF rate has been during admit is 25 mL/hour. TF was held on 11/5. Since then it was re-started and is now at 20 mL/hour. TF should advance to 30 mL/hour today. TF advancing slowly due to refeeding risk.   SLP saw pt today for FEES at bedside and is recommending NPO w/ TF. Pt w/ severe oropharyngeal dysphagia. A small amount of ice chips is okay.  Labs: 11/7: phos=4.9, mag = 1.7  Current TF order continues to be appropriate. Pt would benefit from advancement of 15 mL/hour Q 24 hours as ordered so that he can approach his TF goal and meet his nutrition needs.       Malnutrition risk: risk noted due to poor nutrition x 20 days.     Recommendations/Plan:  Continue with Impact Peptide 1.5 with advancement of 15 mL/hour Q 24 hours as ordered. Goal rate is 55 mL/hour.  Additional fluids per MD  Monitor weights  Initiation of diet per SLP      RD will follow.

## 2022-11-07 NOTE — CARE PLAN
The patient is Watcher - Medium risk of patient condition declining or worsening    Shift Goals  Clinical Goals: Pt will not exhibit behavior needing restraints  Patient Goals: ANETTE  Family Goals: ANETTE        Patient is not progressing towards the following goals:      Problem: Knowledge Deficit - Standard  Goal: Patient and family/care givers will demonstrate understanding of plan of care, disease process/condition, diagnostic tests and medications  Outcome: Not Progressing     Problem: Psychosocial  Goal: Patient's level of anxiety will decrease  Outcome: Not Progressing     Problem: Risk for Aspiration  Goal: Patient's risk for aspiration will be absent or decrease  Outcome: Not Progressing

## 2022-11-07 NOTE — PROGRESS NOTES
12-hour chart check complete.    Monitor Summary  Rhythm: A FIB  Rate: 110  Ectopy: PVC/Bigem/Trigem.  Measurements: n/a/.1/n/a

## 2022-11-07 NOTE — THERAPY
Occupational Therapy   Initial Evaluation     Patient Name: Tyree Whiteside  Age:  57 y.o., Sex:  male  Medical Record #: [id number]  Today's Date: 11/7/2022     Precautions  Precautions: Fall Risk, Swallow Precautions ( See Comments), Nasogastric Tube    Assessment  Patient is 57 y.o. male with a diagnosis of ETOH withdrawl.  Additional factors influencing patient status / progress: Impaired balance, tremors, limited activity tolerance, impaired secretion management, Max assist x 2 sit/stand.  Patient reported living alone in a 5th floor motel room w/ elevator access.  PLOF Mod Indep to Indep for ADL's, transfers and functional  mobility w/out device. Therapist reviewed/educated patient on environmental/safety awareness, fall precautions.  Recommend continued skilled occupational therapy while in acute care.   Recommend post-acute placement for additional occupational therapy services prior to discharge home    Plan    Recommend Occupational Therapy 3 times per week until therapy goals are met for the following treatments:  Adaptive Equipment, Neuro Re-Education / Balance, Self Care/Activities of Daily Living, Therapeutic Activities, and Therapeutic Exercises.    DC Equipment Recommendations: Unable to determine at this time  Discharge Recommendations: Recommend post-acute placement for additional occupational therapy services prior to discharge home     Subjective    Patient was alert and cooperative w/ tx.     Objective       11/07/22 1321   Initial Contact Note    Initial Contact Note Order Received and Verified, Occupational Therapy Evaluation in Progress with Full Report to Follow.   Prior Living Situation   Prior Services Home-Independent   Housing / Facility Motel  (5th floor of motel)   Steps Into Home 0   Steps In Home 0   Elevator Yes   Bathroom Set up Bathtub / Shower Combination;Shower Curtain   Equipment Owned None   Lives with - Patient's Self Care Capacity Alone and Able to Care For Self    Prior Level of ADL Function   Self Feeding Independent   Grooming / Hygiene Independent   Bathing Independent   Dressing Independent   Toileting Independent   Prior Level of IADL Function   Medication Management Independent   Laundry Independent   Kitchen Mobility Independent   Finances Independent   Home Management Independent   Shopping Independent   Prior Level Of Mobility Independent Without Device in Community;Independent With Steps in Community;Independent Without Device in Home;Independent With Steps in Home   Driving / Transportation Relatives / Others Provide Transportation;Utilizes Public Transportation;Walks   Precautions   Precautions Fall Risk;Swallow Precautions ( See Comments);Nasogastric Tube   Pain   Intervention Declines   Cognition    Cognition / Consciousness WDL   Level of Consciousness Alert   Active ROM Upper Body   Active ROM Upper Body  WDL   Dominant Hand Right   Strength Upper Body   Upper Body Strength  WDL   Upper Body Muscle Tone   Upper Body Muscle Tone  WDL   Coordination Upper Body   Coordination WDL   Balance Assessment   Sitting Balance (Static) Poor -   Sitting Balance (Dynamic) Trace +   Standing Balance (Static) Poor -   Standing Balance (Dynamic) Trace +   Weight Shift Sitting Poor   Weight Shift Standing Poor   Bed Mobility    Supine to Sit Moderate Assist   Sit to Supine Maximal Assist   ADL Assessment   Lower Body Dressing Total Assist   Toileting Total Assist   How much help from another person does the patient currently need...   Putting on and taking off regular lower body clothing? 1   Bathing (including washing, rinsing, and drying)? 1   Toileting, which includes using a toilet, bedpan, or urinal? 1   Putting on and taking off regular upper body clothing? 1   Taking care of personal grooming such as brushing teeth? 1   Eating meals? 1   6 Clicks Daily Activity Score 6   Functional Mobility   Sit to Stand Maximal Assist   Comments Impaired coordination, balance and  tremors.   Edema / Skin Assessment   Edema / Skin  Not Assessed   Activity Tolerance   Sitting Edge of Bed 10   Standing 1 min x 4   Comments Noted right lateral lean sitting at EOB, Required physical assist x 2 to maintain sitting and standing   Short Term Goals   Short Term Goal # 1 Mod assist to Mod Indep for UB clothing management   Short Term Goal # 2 Mod assist to Mod Indep for LB clothing management via AE/DME PRN   Short Term Goal # 3 Mod assist to Mod indep for commode transfer (BSC) via DME   Short Term Goal # 4 Mod assist to Mod Indep for toileting task   Education Group   Education Provided Role of Occupational Therapist;Activities of Daily Living;Use of Call Light   Role of Occupational Therapist Patient Response Patient;Acceptance;Explanation;Verbal Demonstration   ADL Patient Response Patient;Acceptance;Explanation;Demonstration;Verbal Demonstration;Action Demonstration;Reinforcement Needed   Use of Call Light Patient Response Patient;Acceptance;Explanation;Verbal Demonstration   Problem List   Problem List Decreased Active Daily Living Skills;Decreased Functional Mobility;Decreased Activity Tolerance;Safety Awareness Deficits / Cognition;Impaired Postural Control / Balance   Anticipated Discharge Equipment and Recommendations   DC Equipment Recommendations Unable to determine at this time   Discharge Recommendations Recommend post-acute placement for additional occupational therapy services prior to discharge home

## 2022-11-07 NOTE — PROGRESS NOTES
PICC Insertion Final 3CG    Consents confirmed, vessel patency confirmed with ultrasound. Risks and benefits of procedure explained to patient/family and education regarding central line associated bloodstream infections provided. Questions answered.     PICC placed in LUE per MD order with ultrasound guidance. 5 Fr, double lumen PICC placed in basilic vein after one attempt(s). 4 cc's of 1% lidocaine injected intradermally, 21 gauge microintroducer needle and modified Seldinger technique used. 48 cm catheter inserted with good blood return. Secured at 1cm marker. Each lumen flushed without resistance with 10 mL 0.9% normal saline. PICC line secured with Biopatch and Tegaderm.    PICC placement is confirmed by reading radiologist via Chest xray. PICC line is appropriate for use at this time. Pt tolerated procedure well.  Patient condition relayed to unit RN or ordering physician via this post procedure note in the EMR.     [employer] PICC ref # [id number], Lot # [id number]

## 2022-11-07 NOTE — PROGRESS NOTES
Critical Care Progress Note    Date of admission  10/18/2022    Chief Complaint  57 y.o. male with history of alcohol abuse, meth use, A. fib not on anticoagulation, liver mass noted on US 8/2022 admitted 10/18/2022 with alcohol withdrawal.  Patient was initially intubated for worsening respiratory failure from alcohol withdrawal -was extubated on 10/21 and reintubated on 10/23 for worsening respiratory failure and altered mental status.  He underwent bronchoscopy on 10/24 due to copious secretions -cultures with MSSA and Pseudomonas so he was started on Zosyn.    He had thoracentesis on 10/25 with removal of 400 cc of pleural fluid -pleural studies with LDH 1020, glucose < 2, cell count with 3511 WBC, 3000 RBCs -culture showed WBCs but no growth.   Patient self extubated on 10/26 overnight and was shortly reintubated after he failed HFNC.  His episode of respiratory failure was complicated by bradycardia and hypotension requiring vasopressors.    On 10/27, he was noted to have significant abdominal distention with dark feculent material coming out of the NG tube.  Fluid resuscitation was continued he continued to have high output from NG tube. CT A/P was obtained which showed dilated loops of bowel concerning for ileus rather than early/low-grade SBO, right larger than left pleural effusions.  Liver nodule seen on ultrasound in 8/2020 she was not visualized on the CT. he was gradually weaned off vasopressors.  He was noted to have done well on his SBT but had low NIF so he was kept intubated.    10/31: Very weak and tired this morning.  Review of imaging and pleural studies concerning for a right loculated parapneumonic effusion -discussed case with IR for chest tube placement.  11/1: R mid-axillary chest tube placed for loculated effusion. Zosyn restarted - fell off for about one day but decided to continue it since he has this loculated effusion  11/2: extubated this AM to oxymask. Still with significant thin  clear oral secretions so started on glycopyrrolate PRN to decrease secretions and risk of aspiration post extubation.   11/3: appeared more tired/somnolent this AM - woke patient up and got him out of bed to improve his mental status. Daughter also working with him to help suction him and re-orient him.   11/4: CXR w/improved R effusion - 2 more doses of lytics remaining.   11/6: patient more awake today, denies pain, knows he is in the hospital   11/7: agitation overnight. Given morphine and norco.     Chart review from the past 24 hours includes imaging, laboratory studies, vital signs and notes available.  Pertinent data for today's visit includes    24h events: On 4lpm. Low grade fever. Given norco and morphine in the evening for agitation. A&Ox3 and follows commands.   Tubes, Lines, Drains: NGT, condom cath, R IJ   Drips: None  Nutrition: TF at goal   Notable lab trends: Stable  CXR: RLL disease, possible aspiration.   Tmax: 100.4  ProCal: none  Antibiotics: Zosyn (day 14) then switching to Unasyn for 14 days.   Steroids: None  Cultures: Pseudomonas on 10/31 bronch.   I/O: -3L since admission. >1L UOP past 24h.   PPX: Enoxaparin  BM regimen: MiraLAX, senna-docusate, milk magnesia, bisacodyl      Review of Systems  Review of Systems   Constitutional:  Positive for malaise/fatigue.   Respiratory:  Positive for cough and shortness of breath.    Cardiovascular:  Negative for chest pain.   Gastrointestinal:  Negative for nausea and vomiting.   Neurological:  Positive for weakness. Negative for headaches.      Vital Signs for last 24 hours   Temp:  [36.6 °C (97.8 °F)-38 °C (100.4 °F)] 36.6 °C (97.9 °F)  Pulse:  [] 110  Resp:  [0-47] 23  BP: (106-144)/(63-99) 118/83  SpO2:  [91 %-100 %] 96 %    Hemodynamic parameters for last 24 hours       Respiratory Information for the last 24 hours       Physical Exam   Physical Exam  Vitals and nursing note reviewed. Exam conducted with a chaperone present.    Constitutional:       General: He is not in acute distress.     Appearance: He is ill-appearing. He is not toxic-appearing or diaphoretic.   HENT:      Nose: No congestion.      Mouth/Throat:      Mouth: Mucous membranes are moist.   Eyes:      General: No scleral icterus.        Right eye: No discharge.         Left eye: No discharge.      Pupils: Pupils are equal, round, and reactive to light.   Neck:      Trachea: Abnormal tracheal secretions present.   Cardiovascular:      Rate and Rhythm: Regular rhythm. Tachycardia present.   Pulmonary:      Effort: Pulmonary effort is normal. No respiratory distress.      Breath sounds: Rhonchi present.      Comments: Diminished right breath sounds  Abdominal:      Palpations: Abdomen is soft.   Musculoskeletal:         General: No deformity.   Skin:     General: Skin is dry.      Comments: Flaky/dry skin    Neurological:      General: No focal deficit present.      Mental Status: He is alert and oriented to person, place, and time.      Comments: Moving all extremities.  More awake today. Denies pain.    Psychiatric:         Mood and Affect: Affect is labile.         Speech: Speech is delayed.         Behavior: Behavior is slowed.       Medications  Current Facility-Administered Medications   Medication Dose Route Frequency Provider Last Rate Last Admin    magnesium sulfate IVPB premix 2 g  2 g Intravenous Once Nilda Franco M.D. 25 mL/hr at 11/07/22 0745 2 g at 11/07/22 0745    carboxymethylcellulose (REFRESH TEARS) 0.5 % ophthalmic drops 2 Drop  2 Drop Both Eyes Q2HRS PRN GRACY MccannONeetu        acetaminophen (Tylenol) tablet 650 mg  650 mg Enteral Tube Q6HRS PRN Sheeba Coto D.O.        eucerin cream   Topical TID PRN Lashon Lozano D.O.        nicotine polacrilex (NICORETTE) 2 MG piece 2 mg  2 mg Oral Q HOUR PRN GRACY MccannONeetu        nicotine (NICODERM) 7 MG/24HR 7 mg  1 Patch Transdermal Daily-0600 Sheeba Coto D.O.   7 mg  at 11/07/22 0534    metoprolol tartrate (LOPRESSOR) tablet 12.5 mg  12.5 mg Enteral Tube TWICE DAILY GRACY MccannONeetu   12.5 mg at 11/07/22 0534    enoxaparin (Lovenox) inj 40 mg  40 mg Subcutaneous DAILY AT 1800 GRACY MccannONeetu   40 mg at 11/06/22 1702    [START ON 11/8/2022] ampicillin/sulbactam (UNASYN) 3 g in  mL IVPB  3 g Intravenous Q6HRS GRACY MccannONeetu        D5 NS infusion   Intravenous Continuous Nilda Franco M.D. 25 mL/hr at 11/06/22 0600 Rate Verify at 11/06/22 0600    QUEtiapine (Seroquel) tablet 25 mg  25 mg Enteral Tube Nightly Nilda Franco M.D.   25 mg at 11/06/22 2005    morphine 4 MG/ML injection 2 mg  2 mg Intravenous Q4HRS PRN GRACY MccannONeetu   2 mg at 11/06/22 2023    HYDROcodone-acetaminophen (NORCO) 5-325 MG per tablet 1 Tablet  1 Tablet Enteral Tube Q6HRS PRN GRACY MccannONeetu   1 Tablet at 11/06/22 2312    scopolamine (TRANSDERM-SCOP) patch 1 Patch  1 Patch Transdermal Q72HRS Nilda Franco M.D.   1 Patch at 11/06/22 0915    acetylcysteine (MUCOMYST) 20 % solution 3 mL  3 mL Inhalation 4X/DAY (RT) Nilda Franco M.D.   3 mL at 11/07/22 0648    albuterol (PROVENTIL) 2.5mg/0.5ml nebulizer solution 2.5 mg  2.5 mg Nebulization 4X/DAY (RT) Nilda Franco M.D.   2.5 mg at 11/07/22 0648    lactulose 20 GM/30ML solution 15 mL  15 mL Enteral Tube BID PRN Nilda Franco M.D.   15 mL at 11/03/22 1523    dextrose 10 % BOLUS 25 g  25 g Intravenous Q15 MIN PRN PARK Reinoso.D.   25 g at 11/03/22 1519    piperacillin-tazobactam (Zosyn) 4.5 g in  mL IVPB  4.5 g Intravenous Q8HRS Sheeba Coto D.O. 25 mL/hr at 11/07/22 0546 4.5 g at 11/07/22 0546    MD Alert...ICU Electrolyte Replacement per Pharmacy   Other PHARMACY TO DOSE Nilda Franco M.D.        Respiratory Therapy Consult   Nebulization Continuous RT Greyson Luciano D.O.        Pharmacy Consult: Enteral tube insertion - review  meds/change route/product selection   Other PHARMACY TO DOSE Greyson Luciano D.O.        albuterol inhaler 2 Puff  2 Puff Inhalation Q6HRS PRN Eva Rodriguez M.D.        levothyroxine (SYNTHROID) tablet 50 mcg  50 mcg Enteral Tube AM ES Eva Rodriguez M.D.   50 mcg at 11/07/22 0534     Fluids    Intake/Output Summary (Last 24 hours) at 11/7/2022 0904  Last data filed at 11/7/2022 0800  Gross per 24 hour   Intake 1255.44 ml   Output 1198 ml   Net 57.44 ml       Laboratory          Recent Labs     11/05/22 0320 11/06/22 0330 11/07/22  0405   SODIUM 131* 136 138   POTASSIUM 3.7 3.9 4.2   CHLORIDE 100 104 106   CO2 24 22 25   BUN 7* 8 9   CREATININE 0.52 0.61 0.60   MAGNESIUM 1.7 1.8 1.7   PHOSPHORUS 3.6 4.0 4.9*   CALCIUM 7.7* 7.5* 8.0*       Recent Labs     11/04/22 1538 11/05/22 0320 11/06/22  0330 11/07/22  0405   ALTSGPT 10  --   --   --    ASTSGOT 12  --   --   --    ALKPHOSPHAT 111*  --   --   --    TBILIRUBIN 0.6  --   --   --    GLUCOSE 85 91 81 81       Recent Labs     11/04/22  1538 11/05/22 0320 11/06/22 0330 11/07/22  0405   WBC  --  11.5* 9.9 9.4   NEUTSPOLYS  --  67.60 65.60 56.40   LYMPHOCYTES  --  9.70* 11.70* 14.80*   MONOCYTES  --  13.40 12.20 13.60*   EOSINOPHILS  --  7.90* 9.00* 13.20*   BASOPHILS  --  0.60 0.90 1.30   ASTSGOT 12  --   --   --    ALTSGPT 10  --   --   --    ALKPHOSPHAT 111*  --   --   --    TBILIRUBIN 0.6  --   --   --        Recent Labs     11/05/22 0320 11/06/22 0330 11/07/22  0405   RBC 3.73* 3.36* 3.28*   HEMOGLOBIN 12.3* 11.0* 10.6*   HEMATOCRIT 35.8* 32.6* 32.0*   PLATELETCT 609* 590* 636*       Imaging  Reviewed    Assessment/Plan    #Acute hypoxic respiratory failure  #MSSA and Pseudomonas pneumonia  #Right complicated loculated pleural effusion. S/p CT on 11/1 and tPA/Dornase x6 doses   #Chronic aspiration suspected   #Deconditioning 2/2 critical illness   #Atrial fibrillation -currently rate controlled  #Liver mass  #Alcohol abuse  #Nicotine withdrawal      Resolved:  #Septic shock-resolved, now off pressors  #Hypoglycemia  #Alcohol withdrawal    #Acute hypoxic respiratory failure  #Suspected chronic aspiration   #MSSA and Pseudomonas pneumonia  #Right complicated loculated pleural effusion. S/p CT on 11/1 and tPA/Dornase x6 doses   Pleural studies from 10/25 with glucose <2, LDH around 1000 and increased WBCs -pleural fluid cultures negative so far patient was already on antibiotics.  This is a complicated parapneumonic effusion based on studies and imaging.  -Continue Zosyn 10/24-11/7 for pseudomonas, MSSA, and anaerobe coverage   -Change to Unasyn 11/7 for continued coverage for empyema x 14 days  -Follow-up repeat pleural fluid culture from thoracentesis on 10/30, NGTD   -Chest tube output ~30 cc in the past 24h. CXR stable/improved.   -Maintain chest tube an additional 24h and re-evaluate for removal  -swallow eval today   -scopalamine patch and aggressive CPT for upper airway secretions   -PICC line requested d/t prolonged requirement for antibiotics in addition to difficult IV access     #Deconditioning 2/2 critical illness   -consults placed to PT and OT     Atrial fibrillation -currently rate controlled  -Home MTP at 12.5 mg BID today   -Not on chronic anticoagulation -consider risk/benefit on home disposition     Liver mass  Liver nodule seen on ultrasound in 8/2020 she was not visualized on the CT scan.  -work up as an outpatient     Alcohol abuse  Alcohol withdrawal  Initially admitted and intubated for alcohol withdrawal-now resolved    Nicotine withdrawal   nictoine patch at 7 mg    Hypoglycemia  Tube feeds held intermittently for coughing up secretions that resemble tube feeds.   - continue TF, advance to 20/hr   - monitor for aspiration  - D51/2NS       I have performed a physical exam and reviewed and updated ROS and Plan today (11/7/2022). In review of yesterday's note (11/6/2022), there are no changes except as documented above.     Discussed  patient condition and risk of morbidity and/or mortality with Family, RN, RT, Pharmacy, and Charge nurse / hot rounds    Dispo: Patient has improved but is still high risk for respiratory deterioration. He will remain in the ICU today for aggressive chest physiotherapy/secretions management. He may possibly downgrade tomorrow.     The patient remains critically ill.  Critical care time = 60 minutes in directly providing and coordinating critical care and extensive data review.  No time overlap and excludes procedures.    Greyson Luciano,   Staff Pulmonologist and Intensivist  [facility]     Please note that this dictation was created using voice recognition software. The accuracy of the dictation is limited to the abilities of the software. I have made every reasonable attempt to correct obvious errors, but I expect that there are errors of grammar and possibly content that I did not discover before finalizing the note.

## 2022-11-07 NOTE — THERAPY
"Speech Language Pathology  Daily Treatment     Patient Name: Tyree Whiteside  Age:  57 y.o., Sex:  male  Medical Record #: [id number]  Today's Date: 11/7/2022     Precautions  Precautions: Fall Risk, Swallow Precautions ( See Comments), Nasogastric Tube    Assessment    Pt seen on this date for dysphagia therapy/assessment for appropriateness for diagnostic. Pt's daughter present at bedside who endorsed that pt is doing better today compared to last week. Per RN, pt is still in bilateral wrist restraints and have intermittent difficulty following commands. Pt with wet vocal quality and audible breath sounds. Mouth is severely xerostomic. Immediate coughing noted in 100% of trials which is highly concerning for penetration/aspiration. Absent swallow in majority of ice chip trials. Copious secretions removed following coughing events. Recommend proceeding with FEES later today as appropriate. Okay for ice chips to minimize xerostomia and maintain integrity of the swallow.    Plan    1) NPO pending FEES    Continue current treatment plan.    Discharge Recommendations: Recommend post-acute placement for additional speech therapy services prior to discharge home       Objective       11/07/22 0839   Precautions   Precautions Fall Risk;Swallow Precautions ( See Comments);Nasogastric Tube   Vitals   O2 (LPM) 4   O2 Delivery Device Silicone Nasal Cannula   Pain 0 - 10 Group   Therapist Pain Assessment Post Activity Pain Same as Prior to Activity;Nurse Notified;0   Patient / Family Goals   Patient / Family Goal #1 \"I could eat half of that ice\"   Goal #1 Outcome Progressing slower than expected   Short Term Goals   Short Term Goal # 1 Pt will participate in an instrumental swallow study to fully assess swallow function.   Goal Outcome # 1 Goal not met   Short Term Goal # 2 Pt will consume pre-feeding trials with SLP to determine readiness to begin an oral diet.   Goal Outcome # 2  Progressing slower than expected "   Education Group   Education Provided Dysphagia   Dysphagia Patient Response Patient;Family;Acceptance;Explanation;Verbal Demonstration;Reinforcement Needed   Anticipated Discharge Needs   Discharge Recommendations Recommend post-acute placement for additional speech therapy services prior to discharge home   Therapy Recommendations Upon DC Dysphagia Training;Community Re-Integration;Patient / Family / Caregiver Education   Interdisciplinary Plan of Care Collaboration   IDT Collaboration with  Nursing;Physician   Patient Position at End of Therapy Seated;In Bed;Call Light within Reach;Tray Table within Reach;Phone within Reach

## 2022-11-08 LAB
AMMONIA PLAS-SCNC: 23 UMOL/L (ref 11–45)
ANION GAP SERPL CALC-SCNC: 8 MMOL/L (ref 7–16)
BASOPHILS # BLD AUTO: 1.1 % (ref 0–1.8)
BASOPHILS # BLD: 0.09 K/UL (ref 0–0.12)
BUN SERPL-MCNC: 8 MG/DL (ref 8–22)
CA-I SERPL-SCNC: 1.04 MMOL/L (ref 1.1–1.3)
CALCIUM SERPL-MCNC: 7.5 MG/DL (ref 8.4–10.2)
CHLORIDE SERPL-SCNC: 106 MMOL/L (ref 96–112)
CO2 SERPL-SCNC: 23 MMOL/L (ref 20–33)
CREAT SERPL-MCNC: 0.53 MG/DL (ref 0.5–1.4)
EOSINOPHIL # BLD AUTO: 1.33 K/UL (ref 0–0.51)
EOSINOPHIL NFR BLD: 16.8 % (ref 0–6.9)
ERYTHROCYTE [DISTWIDTH] IN BLOOD BY AUTOMATED COUNT: 51.8 FL (ref 35.9–50)
GFR SERPLBLD CREATININE-BSD FMLA CKD-EPI: 118 ML/MIN/1.73 M 2
GLUCOSE BLD STRIP.AUTO-MCNC: 110 MG/DL (ref 65–99)
GLUCOSE BLD STRIP.AUTO-MCNC: 74 MG/DL (ref 65–99)
GLUCOSE BLD STRIP.AUTO-MCNC: 77 MG/DL (ref 65–99)
GLUCOSE BLD STRIP.AUTO-MCNC: 78 MG/DL (ref 65–99)
GLUCOSE BLD STRIP.AUTO-MCNC: 86 MG/DL (ref 65–99)
GLUCOSE BLD STRIP.AUTO-MCNC: 87 MG/DL (ref 65–99)
GLUCOSE SERPL-MCNC: 186 MG/DL (ref 65–99)
HCT VFR BLD AUTO: 31.2 % (ref 42–52)
HGB BLD-MCNC: 10.2 G/DL (ref 14–18)
IMM GRANULOCYTES # BLD AUTO: 0.07 K/UL (ref 0–0.11)
IMM GRANULOCYTES NFR BLD AUTO: 0.9 % (ref 0–0.9)
LYMPHOCYTES # BLD AUTO: 1.2 K/UL (ref 1–4.8)
LYMPHOCYTES NFR BLD: 15.2 % (ref 22–41)
MAGNESIUM SERPL-MCNC: 1.5 MG/DL (ref 1.5–2.5)
MCH RBC QN AUTO: 32.4 PG (ref 27–33)
MCHC RBC AUTO-ENTMCNC: 32.7 G/DL (ref 33.7–35.3)
MCV RBC AUTO: 99 FL (ref 81.4–97.8)
MONOCYTES # BLD AUTO: 1.05 K/UL (ref 0–0.85)
MONOCYTES NFR BLD AUTO: 13.3 % (ref 0–13.4)
NEUTROPHILS # BLD AUTO: 4.17 K/UL (ref 1.82–7.42)
NEUTROPHILS NFR BLD: 52.7 % (ref 44–72)
NRBC # BLD AUTO: 0 K/UL
NRBC BLD-RTO: 0 /100 WBC
PHOSPHATE SERPL-MCNC: 4.7 MG/DL (ref 2.5–4.5)
PLATELET # BLD AUTO: 550 K/UL (ref 164–446)
PMV BLD AUTO: 10.2 FL (ref 9–12.9)
POTASSIUM SERPL-SCNC: 3.8 MMOL/L (ref 3.6–5.5)
RBC # BLD AUTO: 3.15 M/UL (ref 4.7–6.1)
SODIUM SERPL-SCNC: 137 MMOL/L (ref 135–145)
WBC # BLD AUTO: 7.9 K/UL (ref 4.8–10.8)

## 2022-11-08 PROCEDURE — 80048 BASIC METABOLIC PNL TOTAL CA: CPT

## 2022-11-08 PROCEDURE — 36415 COLL VENOUS BLD VENIPUNCTURE: CPT

## 2022-11-08 PROCEDURE — A9270 NON-COVERED ITEM OR SERVICE: HCPCS | Performed by: INTERNAL MEDICINE

## 2022-11-08 PROCEDURE — 700111 HCHG RX REV CODE 636 W/ 250 OVERRIDE (IP): Performed by: HOSPITALIST

## 2022-11-08 PROCEDURE — 700102 HCHG RX REV CODE 250 W/ 637 OVERRIDE(OP): Performed by: INTERNAL MEDICINE

## 2022-11-08 PROCEDURE — 83735 ASSAY OF MAGNESIUM: CPT

## 2022-11-08 PROCEDURE — 700101 HCHG RX REV CODE 250: Performed by: INTERNAL MEDICINE

## 2022-11-08 PROCEDURE — 770022 HCHG ROOM/CARE - ICU (200)

## 2022-11-08 PROCEDURE — 700111 HCHG RX REV CODE 636 W/ 250 OVERRIDE (IP): Performed by: INTERNAL MEDICINE

## 2022-11-08 PROCEDURE — 85025 COMPLETE CBC W/AUTO DIFF WBC: CPT

## 2022-11-08 PROCEDURE — 700105 HCHG RX REV CODE 258: Performed by: INTERNAL MEDICINE

## 2022-11-08 PROCEDURE — 84100 ASSAY OF PHOSPHORUS: CPT

## 2022-11-08 PROCEDURE — 92526 ORAL FUNCTION THERAPY: CPT

## 2022-11-08 PROCEDURE — 94760 N-INVAS EAR/PLS OXIMETRY 1: CPT

## 2022-11-08 PROCEDURE — 99291 CRITICAL CARE FIRST HOUR: CPT | Performed by: INTERNAL MEDICINE

## 2022-11-08 PROCEDURE — 82962 GLUCOSE BLOOD TEST: CPT

## 2022-11-08 PROCEDURE — 700102 HCHG RX REV CODE 250 W/ 637 OVERRIDE(OP): Performed by: HOSPITALIST

## 2022-11-08 PROCEDURE — 82330 ASSAY OF CALCIUM: CPT

## 2022-11-08 PROCEDURE — A9270 NON-COVERED ITEM OR SERVICE: HCPCS | Performed by: HOSPITALIST

## 2022-11-08 PROCEDURE — 82140 ASSAY OF AMMONIA: CPT

## 2022-11-08 RX ORDER — GLYCOPYRROLATE 0.2 MG/ML
0.1 INJECTION INTRAMUSCULAR; INTRAVENOUS ONCE
Status: COMPLETED | OUTPATIENT
Start: 2022-11-08 | End: 2022-11-08

## 2022-11-08 RX ORDER — POLYETHYLENE GLYCOL 3350 17 G/17G
1 POWDER, FOR SOLUTION ORAL
Status: DISCONTINUED | OUTPATIENT
Start: 2022-11-08 | End: 2022-11-29 | Stop reason: HOSPADM

## 2022-11-08 RX ORDER — CALCIUM GLUCONATE 20 MG/ML
1 INJECTION, SOLUTION INTRAVENOUS ONCE
Status: COMPLETED | OUTPATIENT
Start: 2022-11-08 | End: 2022-11-08

## 2022-11-08 RX ORDER — OLANZAPINE 5 MG/1
5 TABLET, ORALLY DISINTEGRATING ORAL EVERY EVENING
Status: DISCONTINUED | OUTPATIENT
Start: 2022-11-08 | End: 2022-11-09

## 2022-11-08 RX ORDER — BISACODYL 10 MG
10 SUPPOSITORY, RECTAL RECTAL
Status: DISCONTINUED | OUTPATIENT
Start: 2022-11-08 | End: 2022-11-29 | Stop reason: HOSPADM

## 2022-11-08 RX ORDER — AMOXICILLIN 250 MG
2 CAPSULE ORAL 2 TIMES DAILY
Status: DISCONTINUED | OUTPATIENT
Start: 2022-11-08 | End: 2022-11-29 | Stop reason: HOSPADM

## 2022-11-08 RX ORDER — MAGNESIUM SULFATE HEPTAHYDRATE 40 MG/ML
4 INJECTION, SOLUTION INTRAVENOUS ONCE
Status: COMPLETED | OUTPATIENT
Start: 2022-11-08 | End: 2022-11-08

## 2022-11-08 RX ADMIN — ENOXAPARIN SODIUM 40 MG: 40 INJECTION SUBCUTANEOUS at 17:57

## 2022-11-08 RX ADMIN — AMPICILLIN SODIUM AND SULBACTAM SODIUM 3 G: 2; 1 INJECTION, POWDER, FOR SOLUTION INTRAMUSCULAR; INTRAVENOUS at 12:41

## 2022-11-08 RX ADMIN — LORAZEPAM 1 MG: 2 INJECTION INTRAMUSCULAR; INTRAVENOUS at 00:08

## 2022-11-08 RX ADMIN — SENNOSIDES AND DOCUSATE SODIUM 2 TABLET: 50; 8.6 TABLET ORAL at 13:16

## 2022-11-08 RX ADMIN — NICOTINE 7 MG: 7 PATCH TRANSDERMAL at 05:04

## 2022-11-08 RX ADMIN — CALCIUM GLUCONATE 1 G: 20 INJECTION, SOLUTION INTRAVENOUS at 06:46

## 2022-11-08 RX ADMIN — LEVOTHYROXINE SODIUM 50 MCG: 50 TABLET ORAL at 05:04

## 2022-11-08 RX ADMIN — OLANZAPINE 5 MG: 5 TABLET, ORALLY DISINTEGRATING ORAL at 17:57

## 2022-11-08 RX ADMIN — MAGNESIUM SULFATE HEPTAHYDRATE 4 G: 40 INJECTION, SOLUTION INTRAVENOUS at 06:50

## 2022-11-08 RX ADMIN — AMPICILLIN SODIUM AND SULBACTAM SODIUM 3 G: 2; 1 INJECTION, POWDER, FOR SOLUTION INTRAMUSCULAR; INTRAVENOUS at 23:18

## 2022-11-08 RX ADMIN — AMPICILLIN SODIUM AND SULBACTAM SODIUM 3 G: 2; 1 INJECTION, POWDER, FOR SOLUTION INTRAMUSCULAR; INTRAVENOUS at 05:14

## 2022-11-08 RX ADMIN — GLYCOPYRROLATE 0.1 MG: 0.2 INJECTION INTRAMUSCULAR; INTRAVENOUS at 09:03

## 2022-11-08 RX ADMIN — POTASSIUM BICARBONATE 25 MEQ: 978 TABLET, EFFERVESCENT ORAL at 06:50

## 2022-11-08 RX ADMIN — METOPROLOL TARTRATE 12.5 MG: 25 TABLET, FILM COATED ORAL at 05:04

## 2022-11-08 RX ADMIN — METOPROLOL TARTRATE 12.5 MG: 25 TABLET, FILM COATED ORAL at 17:57

## 2022-11-08 RX ADMIN — AMPICILLIN SODIUM AND SULBACTAM SODIUM 3 G: 2; 1 INJECTION, POWDER, FOR SOLUTION INTRAMUSCULAR; INTRAVENOUS at 17:57

## 2022-11-08 ASSESSMENT — LIFESTYLE VARIABLES
ORIENTATION AND CLOUDING OF SENSORIUM: DISORIENTED FOR PLACE AND / OR PERSON
PAROXYSMAL SWEATS: NO SWEAT VISIBLE
ANXIETY: MILDLY ANXIOUS
TOTAL SCORE: 6
AGITATION: SOMEWHAT MORE THAN NORMAL ACTIVITY
NAUSEA AND VOMITING: NO NAUSEA AND NO VOMITING
VISUAL DISTURBANCES: NOT PRESENT
AUDITORY DISTURBANCES: NOT PRESENT
HEADACHE, FULLNESS IN HEAD: NOT PRESENT
TREMOR: NO TREMOR

## 2022-11-08 ASSESSMENT — PAIN DESCRIPTION - PAIN TYPE
TYPE: ACUTE PAIN

## 2022-11-08 ASSESSMENT — PAIN SCALES - WONG BAKER
WONGBAKER_NUMERICALRESPONSE: DOESN'T HURT AT ALL

## 2022-11-08 NOTE — CARE PLAN
The patient is Stable - Low risk of patient condition declining or worsening    Shift Goals  Clinical Goals: VSS, inprove neuro status  Patient Goals: ANETTE  Family Goals: ANETTE    Progress made toward(s) clinical / shift goals:  patient with increased agitation, PRN medication needed    Patient is not progressing towards the following goals: patient free from falls      Problem: Psychosocial  Goal: Patient's level of anxiety will decrease  Outcome: Not Progressing     Problem: Fall Risk  Goal: Patient will remain free from falls  Outcome: Progressing     Problem: Psychosocial  Goal: Patient's level of anxiety will decrease  Outcome: Not Progressing

## 2022-11-08 NOTE — ACP (ADVANCE CARE PLANNING)
Advance Care Planning Note    Discussion date:  11/8/2022  Discussion participants:  daughter    The patient wishes to discuss Advanced Care Planning today and the following is a brief summary of our discussion.    Patient does not have  capacity to make their own medical decisions.  Health Care Agent/Surrogate Decision Maker not documented in chart.    Documents of Advance Directives:  None    Communication of relevant diagnoses: I spoke with the patient's daughter Laura at bedside today.  Updated her on his overall condition.  Unfortunately, he continues to aspirate his own saliva and any food, medication or liquid that is put in his mouth.  Chart reviewed, he does have significant brain atrophy likely from his long-term alcohol use.  Laura did state that preceding the hospitalization, his mental status had been declining and he was exhibiting signs of dementia.  I explained that his brain atrophy in addition to his recent acute illness makes it very hard for him to participate in speech therapy and that my optimism for him making a recovery independent of requiring ventilators or feeding tubes is waning. She stated that she does not want him to undergo another intubation and would not want a feeding tube. If he is going to die, she would like for him to die at home ideally. I explained palliative care and hospice to her and she is in agreement to have a discussion with them.     Communication of prognosis: poor    Communication of treatment goals/options: change of code status    Treatment decisions:  Patient to be DNR/DNI. I did reiterate that this does NOT mean that we withdraw care. It simply means that we will not reintubate. In a situation where he is choking/having respiratory arrest, he would be allowed to die a natural death. The alternative would be to re-intubate at which point he would need a referral for tracheostomy and a feeding tube, as this would be his 3rd intubation during this admission.      Code status:  do not resuscitate (DNR)    The patient's family would like:   Life-sustaining antibiotics: antibiotics by IV as necessary   Artificially administered fluids: ok   Artificially administered nutrition: ok    Other limitations of medical interventions:  no intubation     Time statement:  I discussed advance care planning with the patient's family for at least 20 minutes, including diagnosis, prognosis, plan of care, risks and benefits of any therapies that could be offered, as well as alternatives including palliation and hospice, as appropriate, exclusive of evaluation and management or other separately billable procedures.    Greyson Luciano D.O.

## 2022-11-08 NOTE — PROGRESS NOTES
12 hour chart check complete.     Telemetry summary  Rhythm: afib  Rate:   Ectopy: occ pvc  --/0.10/--

## 2022-11-08 NOTE — PROGRESS NOTES
Critical Care Progress Note    Date of admission  10/18/2022    Chief Complaint  57 y.o. male with history of alcohol abuse, meth use, A. fib not on anticoagulation, liver mass noted on US 8/2022 admitted 10/18/2022 with alcohol withdrawal.  Patient was initially intubated for worsening respiratory failure from alcohol withdrawal -was extubated on 10/21 and reintubated on 10/23 for worsening respiratory failure and altered mental status.  He underwent bronchoscopy on 10/24 due to copious secretions -cultures with MSSA and Pseudomonas so he was started on Zosyn.    He had thoracentesis on 10/25 with removal of 400 cc of pleural fluid -pleural studies with LDH 1020, glucose < 2, cell count with 3511 WBC, 3000 RBCs -culture showed WBCs but no growth.   Patient self extubated on 10/26 overnight and was shortly reintubated after he failed HFNC.  His episode of respiratory failure was complicated by bradycardia and hypotension requiring vasopressors.    On 10/27, he was noted to have significant abdominal distention with dark feculent material coming out of the NG tube.  Fluid resuscitation was continued he continued to have high output from NG tube. CT A/P was obtained which showed dilated loops of bowel concerning for ileus rather than early/low-grade SBO, right larger than left pleural effusions.  Liver nodule seen on ultrasound in 8/2020 she was not visualized on the CT. he was gradually weaned off vasopressors.  He was noted to have done well on his SBT but had low NIF so he was kept intubated.    10/31: Very weak and tired this morning.  Review of imaging and pleural studies concerning for a right loculated parapneumonic effusion -discussed case with IR for chest tube placement.  11/1: R mid-axillary chest tube placed for loculated effusion. Zosyn restarted - fell off for about one day but decided to continue it since he has this loculated effusion  11/2: extubated this AM to oxymask. Still with significant thin  clear oral secretions so started on glycopyrrolate PRN to decrease secretions and risk of aspiration post extubation.   11/3: appeared more tired/somnolent this AM - woke patient up and got him out of bed to improve his mental status. Daughter also working with him to help suction him and re-orient him.   11/4: CXR w/improved R effusion - 2 more doses of lytics remaining.   11/6: patient more awake today, denies pain, knows he is in the hospital   11/7: agitation overnight. Given morphine and norco. Failed swallow eval. Given scopalamine patch for upper airway secretions. PICC line placed.     Chart review from the past 24 hours includes imaging, laboratory studies, vital signs and notes available.  Pertinent data for today's visit includes    24h events: Stood up with PT yesterday. Having upper airway secretions still. Still sundowning in the evening.   Tubes, Lines, Drains: NGT, condom cath, PICC, Chest tube   Drips: None  Nutrition: TF at goal   Notable lab trends: Stable  CXR: RLL disease, possible aspiration.   Tmax: 98.5F  ProCal: none  Antibiotics: Finished Zosyn x14 days, now on Unasyn d1 of 14 for empyema.   Steroids: None  Cultures: Pseudomonas on 10/31 bronch.   I/O: -3L since admission. >1L UOP past 24h.   PPX: Enoxaparin  BM regimen: MiraLAX, senna-docusate, milk magnesia, bisacodyl      Review of Systems  Review of Systems   Unable to perform ROS: Acuity of condition      Vital Signs for last 24 hours   Temp:  [36.1 °C (97 °F)-36.9 °C (98.5 °F)] 36.1 °C (97 °F)  Pulse:  [] 96  Resp:  [18-66] 45  BP: (102-152)/(59-86) 119/67  SpO2:  [95 %-100 %] 99 %    Hemodynamic parameters for last 24 hours       Respiratory Information for the last 24 hours       Physical Exam   Physical Exam  Vitals and nursing note reviewed. Exam conducted with a chaperone present.   Constitutional:       General: He is not in acute distress.     Appearance: He is ill-appearing. He is not toxic-appearing or diaphoretic.    HENT:      Nose: No congestion.      Mouth/Throat:      Mouth: Mucous membranes are moist.   Eyes:      General: No scleral icterus.        Right eye: No discharge.         Left eye: No discharge.      Pupils: Pupils are equal, round, and reactive to light.   Neck:      Trachea: Abnormal tracheal secretions present.   Cardiovascular:      Rate and Rhythm: Normal rate and regular rhythm.   Pulmonary:      Effort: Pulmonary effort is normal. No respiratory distress.      Breath sounds: Rhonchi present.      Comments: Diminished right breath sounds  Abdominal:      Palpations: Abdomen is soft.   Musculoskeletal:         General: No deformity.   Skin:     General: Skin is dry.      Comments: Flaky/dry skin    Neurological:      General: No focal deficit present.      Mental Status: He is alert and oriented to person, place, and time.      Comments: Moving all extremities.  More awake today. Denies pain.    Psychiatric:         Mood and Affect: Affect is labile.         Speech: Speech is delayed.         Behavior: Behavior is slowed.       Medications  Current Facility-Administered Medications   Medication Dose Route Frequency Provider Last Rate Last Admin    magnesium sulfate IVPB premix 4 g  4 g Intravenous Once Greyson Luciano D.O. 25 mL/hr at 11/08/22 0650 4 g at 11/08/22 0650    calcium GLUConate 1 g in NaCl IVPB premix  1 g Intravenous Once GRACY KolbO.   1 g at 11/08/22 0646    scopolamine (TRANSDERM-SCOP) patch 1 Patch  1 Patch Transdermal Q72HRS Greyson Luciano D.O.   1 Patch at 11/07/22 1015    carboxymethylcellulose (REFRESH TEARS) 0.5 % ophthalmic drops 2 Drop  2 Drop Both Eyes Q2HRS PRN Sheeba Coto D.O.        acetaminophen (Tylenol) tablet 650 mg  650 mg Enteral Tube Q6HRS PRN Sheeba Coto D.O.        eucerin cream   Topical TID PRN Lashon Lozano D.O.   Given at 11/07/22 2035    nicotine (NICODERM) 7 MG/24HR 7 mg  1 Patch Transdermal Daily-0600 Sheeba BRADLEY  GRACY CtooONeetu   7 mg at 11/08/22 0504    metoprolol tartrate (LOPRESSOR) tablet 12.5 mg  12.5 mg Enteral Tube TWICE DAILY Sheeba Coto D.O.   12.5 mg at 11/08/22 0504    enoxaparin (Lovenox) inj 40 mg  40 mg Subcutaneous DAILY AT 1800 GRACY MccannONeetu   40 mg at 11/07/22 1730    ampicillin/sulbactam (UNASYN) 3 g in  mL IVPB  3 g Intravenous Q6HRS GRACY MccannONeetu   Stopped at 11/08/22 0544    D5 NS infusion   Intravenous Continuous Nilda Franco M.D. 25 mL/hr at 11/08/22 0600 Rate Verify at 11/08/22 0600    QUEtiapine (Seroquel) tablet 25 mg  25 mg Enteral Tube Nightly Nilda Franco M.D.   25 mg at 11/07/22 2034    morphine 4 MG/ML injection 2 mg  2 mg Intravenous Q4HRS PRN GRACY MccannO.   2 mg at 11/07/22 2332    HYDROcodone-acetaminophen (NORCO) 5-325 MG per tablet 1 Tablet  1 Tablet Enteral Tube Q6HRS PRN GRACY MccannONeetu   1 Tablet at 11/06/22 2312    acetylcysteine (MUCOMYST) 20 % solution 3 mL  3 mL Inhalation 4X/DAY (RT) Nilda Franco M.D.   3 mL at 11/07/22 1921    albuterol (PROVENTIL) 2.5mg/0.5ml nebulizer solution 2.5 mg  2.5 mg Nebulization 4X/DAY (RT) Nilda Franco M.D.   2.5 mg at 11/07/22 1921    lactulose 20 GM/30ML solution 15 mL  15 mL Enteral Tube BID PRN Nilda Franco M.D.   15 mL at 11/03/22 1523    dextrose 10 % BOLUS 25 g  25 g Intravenous Q15 MIN PRN Chris Abrams M.D.   25 g at 11/03/22 1519    MD Alert...ICU Electrolyte Replacement per Pharmacy   Other PHARMACY TO DOSE Nilda Franco M.D.        Respiratory Therapy Consult   Nebulization Continuous RT Greyson Luciano D.O.        Pharmacy Consult: Enteral tube insertion - review meds/change route/product selection   Other PHARMACY TO DOSE Greyson Luciano D.O.        albuterol inhaler 2 Puff  2 Puff Inhalation Q6HRS PRN Eva Rodriguez M.D.        levothyroxine (SYNTHROID) tablet 50 mcg  50 mcg Enteral Tube AM ES Eva Rodriguez M.D.    50 mcg at 11/08/22 0504     Fluids    Intake/Output Summary (Last 24 hours) at 11/8/2022 0713  Last data filed at 11/8/2022 0600  Gross per 24 hour   Intake 2162.25 ml   Output 1945 ml   Net 217.25 ml       Laboratory          Recent Labs     11/06/22  0330 11/07/22  0405 11/08/22  0405   SODIUM 136 138 137   POTASSIUM 3.9 4.2 3.8   CHLORIDE 104 106 106   CO2 22 25 23   BUN 8 9 8   CREATININE 0.61 0.60 0.53   MAGNESIUM 1.8 1.7 1.5   PHOSPHORUS 4.0 4.9* 4.7*   CALCIUM 7.5* 8.0* 7.5*       Recent Labs     11/06/22  0330 11/07/22  0405 11/07/22  1010 11/08/22  0405   PREALBUMIN  --   --  4.5*  --    GLUCOSE 81 81  --  186*       Recent Labs     11/06/22  0330 11/07/22  0405 11/08/22  0405   WBC 9.9 9.4 7.9   NEUTSPOLYS 65.60 56.40 52.70   LYMPHOCYTES 11.70* 14.80* 15.20*   MONOCYTES 12.20 13.60* 13.30   EOSINOPHILS 9.00* 13.20* 16.80*   BASOPHILS 0.90 1.30 1.10       Recent Labs     11/06/22  0330 11/07/22  0405 11/08/22  0405   RBC 3.36* 3.28* 3.15*   HEMOGLOBIN 11.0* 10.6* 10.2*   HEMATOCRIT 32.6* 32.0* 31.2*   PLATELETCT 590* 636* 550*       Imaging  Reviewed    Assessment/Plan    #Acute hypoxic respiratory failure  #MSSA and Pseudomonas pneumonia  #Right complicated loculated pleural effusion. S/p CT on 11/1 and tPA/Dornase x6 doses   #Chronic aspiration suspected   #Deconditioning 2/2 critical illness   #Atrial fibrillation -currently rate controlled  #Liver mass  #Alcohol abuse  #Nicotine withdrawal     Resolved:  #Septic shock-resolved, now off pressors  #Hypoglycemia  #Alcohol withdrawal    #Acute hypoxic respiratory failure  #Suspected chronic aspiration   #MSSA and Pseudomonas pneumonia  #Right complicated loculated pleural effusion. S/p CT on 11/1 and tPA/Dornase x6 doses   Pleural studies from 10/25 with glucose <2, LDH around 1000 and increased WBCs -pleural fluid cultures negative so far patient was already on antibiotics.  This is a complicated parapneumonic effusion based on studies and  imaging.  -Continue Zosyn 10/24-11/7 for pseudomonas, MSSA, and anaerobe coverage   -Change to Unasyn 11/7 for continued coverage for empyema x 14 days  -Follow-up repeat pleural fluid culture from thoracentesis on 10/30, NGTD   -Chest tube output ~12 cc in the past 24h. CXR stable/improved.   -likely removal today   -failed swallow eval on 11/7  -scopalamine patch and aggressive CPT for upper airway secretions. Adding glycopyrrolate today.   -PICC line placed on 11/7; CVC removed     #Metabolic encephalopathy   #Dementia   Signs of significant atrophy on CT brain likely 2/2 longstanding ETOH abuse  Now with superimposed sundowning/delirium in the evenings despite Seroquel  Will limit/avoid benzos  Changed seroquel to zyprexa    #Deconditioning 2/2 critical illness   -consults placed to PT and OT     Atrial fibrillation -currently rate controlled  -Home MTP at 12.5 mg BID today   -Not on chronic anticoagulation -consider risk/benefit on home disposition     Liver mass  Liver nodule seen on ultrasound in 8/2020 she was not visualized on the CT scan.  -work up as an outpatient     Alcohol abuse  Alcohol withdrawal  Initially admitted and intubated for alcohol withdrawal-now resolved    Nicotine withdrawal   nictoine patch at 7 mg    Hypoglycemia  Tube feeds held intermittently for coughing up secretions that resemble tube feeds.   - continue TF, advance to 20/hr   - monitor for aspiration  - D51/2NS       I have performed a physical exam and reviewed and updated ROS and Plan today (11/8/2022). In review of yesterday's note (11/7/2022), there are no changes except as documented above.     Discussed patient condition and risk of morbidity and/or mortality with Family, RN, RT, Pharmacy, and Charge nurse / hot rounds    Dispo: Patient has improved but is still high risk for respiratory deterioration. He will remain in the ICU today for aggressive chest physiotherapy/secretions management. He may possibly downgrade  tomorrow.     The patient remains critically ill.  Critical care time = 50 minutes in directly providing and coordinating critical care and extensive data review.  No time overlap and excludes procedures.    Greyson Luciano DO  Staff Pulmonologist and Intensivist  [facility]     Please note that this dictation was created using voice recognition software. The accuracy of the dictation is limited to the abilities of the software. I have made every reasonable attempt to correct obvious errors, but I expect that there are errors of grammar and possibly content that I did not discover before finalizing the note.

## 2022-11-08 NOTE — DISCHARGE PLANNING
Case Management Discharge Planning    Admission Date: 10/18/2022  GMLOS: 9.6  ALOS: 21    6-Clicks ADL Score: 6  6-Clicks Mobility Score: 6  PT and/or OT Eval ordered: Yes  Post-acute Referrals Ordered: No  Post-acute Choice Obtained: Yes  Has referral(s) been sent to post-acute provider:  Yes      Anticipated Discharge Dispo: Discharge Disposition: D/T to SNF with Medicare cert in anticipation of skilled care (03)    DME Needed: No    Action(s) Taken: RNCM participated in IDT rounds. Patient is no longer intubated. Patient is following commands and participated with PT/OT. Both recommend SNF. Due to patients medical needs, choice was obtained for LTAC. Per Dr. Luciano, patient has improved but is still at risk for respiratory deterioration.     Escalations Completed: None    Medically Clear: No    Next Steps: RNCM will continue to follow up with LTACs as well as any discharge planning needs.     Barriers to Discharge: Medical clearance, Pending Placement, and Transportation    Is the patient up for discharge tomorrow: No

## 2022-11-08 NOTE — PROGRESS NOTES
Neurologically intact. Hemodynamically stable. On 4 L NC with copious amounts of thick tan secretions.  Productive cough.  On TF and tolerating well. No BM this shift. Condom cath in place.

## 2022-11-08 NOTE — PROGRESS NOTES
Monitor Summary:    Rhythm:  Afib   Rate Range:     Ectopy: Rare to Occasional PVC's, Rare couplets    Measurements:  -/0.08/-

## 2022-11-08 NOTE — DIETARY
Nutrition Services:     Pt with tube feed consult. RD spoke with pt's nurse to clarify consult because pt is currently on TF. Per nurse, pt's glucose levels have been running on the low end. Pt is receiving D5 to prevent low glucose levels. MD wants RD to reassess to see if TF can be advanced more quickly.     Pt w/ extended period with inadequate nutrition and at risk for refeeding. Pt's phos is elevated now at 4.7 and potassium and mag are WNL. No signs of refeeding noted and risk may have passed. TF advancement will be changed to 25 mL/hour every 8 hours (current protocol).     RD will continue to follow.

## 2022-11-08 NOTE — DISCHARGE PLANNING
Received Choice form at 5200  Agency/Facility Name: [facility]   Referral sent per Choice form @ 0204

## 2022-11-08 NOTE — THERAPY
Speech Language Pathology  Daily Treatment     Patient Name: Tyree Whiteside  Age:  57 y.o., Sex:  male  Medical Record #: [id number]  Today's Date: 11/8/2022     Precautions  Precautions: Fall Risk, Swallow Precautions ( See Comments), Nasogastric Tube    Assessment    Pt seen on this date for dysphagia therapy. Pt lethargic as this pt entered room, however, after receiving oral care from RN pt awake and participative. Wet vocal quality noted prior to PO trials despite use of Yankour. Absent swallow trigger noted in first trial of ice chips, however, pt began consistently triggering a swallow with verbal and tactile cues. Immediate coughing/choking noted after the swallow with both ice chips and thin liquids via teaspoon which is highly concerning for penetration/aspiration. Moderately delayed swallow trigger noted with trials of thin liquids and pt often began coughing prior to triggering swallow. Laryngeal elevation was palpated as weak. Yankour provided after all trials and copious thick secretions removed from back of throat. He completed 10 reps of pharyngeal squeeze exercises with fair quality. At this time, recommend continuation of NPO with non-oral source of nutrition. Okay for a few ice chips an hour with RN, following oral care, to minimize xerostomia and maintain integrity of the swallow. SLP following.    Plan    1) continue NPO with NG  2) okay for a few ice chips an hour with RN following oral care    Continue current treatment plan.    Discharge Recommendations: Recommend post-acute placement for additional speech therapy services prior to discharge home       Objective       11/08/22 1322   Precautions   Precautions Fall Risk;Swallow Precautions ( See Comments);Nasogastric Tube   Vitals   O2 (LPM) 4   O2 Delivery Device Silicone Nasal Cannula   Pain 0 - 10 Group   Therapist Pain Assessment Post Activity Pain Same as Prior to Activity;Nurse Notified;0   Patient / Family Goals   Patient /  "Family Goal #1 \"I could eat half of that ice\"   Goal #1 Outcome Progressing slower than expected   Short Term Goals   Short Term Goal # 1 Pt will participate in an instrumental swallow study to fully assess swallow function.   Goal Outcome # 1 Goal met   Short Term Goal # 2 Pt will consume pre-feeding trials with SLP to determine readiness to begin an oral diet.   Goal Outcome # 2  Progressing slower than expected   Education Group   Education Provided Dysphagia   Dysphagia Patient Response Patient;Family;Acceptance;Explanation;Verbal Demonstration;Reinforcement Needed   Anticipated Discharge Needs   Discharge Recommendations Recommend post-acute placement for additional speech therapy services prior to discharge home   Therapy Recommendations Upon DC Dysphagia Training;Community Re-Integration;Patient / Family / Caregiver Education   Interdisciplinary Plan of Care Collaboration   IDT Collaboration with  Nursing;Family / Caregiver   Patient Position at End of Therapy Seated;In Bed;Wrist Restraints Applied;Family / Friend in Room     "

## 2022-11-09 ENCOUNTER — APPOINTMENT (OUTPATIENT)
Dept: RADIOLOGY | Facility: MEDICAL CENTER | Age: 57
DRG: 870 | End: 2022-11-09
Attending: INTERNAL MEDICINE
Payer: COMMERCIAL

## 2022-11-09 LAB
ANION GAP SERPL CALC-SCNC: 11 MMOL/L (ref 7–16)
BASOPHILS # BLD AUTO: 1.1 % (ref 0–1.8)
BASOPHILS # BLD: 0.1 K/UL (ref 0–0.12)
BUN SERPL-MCNC: 11 MG/DL (ref 8–22)
CA-I SERPL-SCNC: 1.08 MMOL/L (ref 1.1–1.3)
CALCIUM SERPL-MCNC: 8.1 MG/DL (ref 8.4–10.2)
CHLORIDE SERPL-SCNC: 101 MMOL/L (ref 96–112)
CO2 SERPL-SCNC: 23 MMOL/L (ref 20–33)
CREAT SERPL-MCNC: 0.47 MG/DL (ref 0.5–1.4)
EOSINOPHIL # BLD AUTO: 1.39 K/UL (ref 0–0.51)
EOSINOPHIL NFR BLD: 15.9 % (ref 0–6.9)
ERYTHROCYTE [DISTWIDTH] IN BLOOD BY AUTOMATED COUNT: 50.8 FL (ref 35.9–50)
GFR SERPLBLD CREATININE-BSD FMLA CKD-EPI: 122 ML/MIN/1.73 M 2
GLUCOSE BLD STRIP.AUTO-MCNC: 105 MG/DL (ref 65–99)
GLUCOSE BLD STRIP.AUTO-MCNC: 159 MG/DL (ref 65–99)
GLUCOSE BLD STRIP.AUTO-MCNC: 63 MG/DL (ref 65–99)
GLUCOSE BLD STRIP.AUTO-MCNC: 76 MG/DL (ref 65–99)
GLUCOSE BLD STRIP.AUTO-MCNC: 78 MG/DL (ref 65–99)
GLUCOSE BLD STRIP.AUTO-MCNC: 82 MG/DL (ref 65–99)
GLUCOSE BLD STRIP.AUTO-MCNC: 83 MG/DL (ref 65–99)
GLUCOSE BLD STRIP.AUTO-MCNC: 86 MG/DL (ref 65–99)
GLUCOSE SERPL-MCNC: 87 MG/DL (ref 65–99)
HCT VFR BLD AUTO: 34.4 % (ref 42–52)
HGB BLD-MCNC: 11.4 G/DL (ref 14–18)
IMM GRANULOCYTES # BLD AUTO: 0.07 K/UL (ref 0–0.11)
IMM GRANULOCYTES NFR BLD AUTO: 0.8 % (ref 0–0.9)
LYMPHOCYTES # BLD AUTO: 1.56 K/UL (ref 1–4.8)
LYMPHOCYTES NFR BLD: 17.8 % (ref 22–41)
MAGNESIUM SERPL-MCNC: 1.6 MG/DL (ref 1.5–2.5)
MCH RBC QN AUTO: 32.1 PG (ref 27–33)
MCHC RBC AUTO-ENTMCNC: 33.1 G/DL (ref 33.7–35.3)
MCV RBC AUTO: 96.9 FL (ref 81.4–97.8)
MONOCYTES # BLD AUTO: 1.04 K/UL (ref 0–0.85)
MONOCYTES NFR BLD AUTO: 11.9 % (ref 0–13.4)
NEUTROPHILS # BLD AUTO: 4.59 K/UL (ref 1.82–7.42)
NEUTROPHILS NFR BLD: 52.5 % (ref 44–72)
NRBC # BLD AUTO: 0 K/UL
NRBC BLD-RTO: 0 /100 WBC
PHOSPHATE SERPL-MCNC: 3.5 MG/DL (ref 2.5–4.5)
PLATELET # BLD AUTO: 602 K/UL (ref 164–446)
PMV BLD AUTO: 10.7 FL (ref 9–12.9)
POTASSIUM SERPL-SCNC: 4.1 MMOL/L (ref 3.6–5.5)
RBC # BLD AUTO: 3.55 M/UL (ref 4.7–6.1)
SODIUM SERPL-SCNC: 135 MMOL/L (ref 135–145)
WBC # BLD AUTO: 8.8 K/UL (ref 4.8–10.8)

## 2022-11-09 PROCEDURE — 700111 HCHG RX REV CODE 636 W/ 250 OVERRIDE (IP): Performed by: INTERNAL MEDICINE

## 2022-11-09 PROCEDURE — 94669 MECHANICAL CHEST WALL OSCILL: CPT

## 2022-11-09 PROCEDURE — 84100 ASSAY OF PHOSPHORUS: CPT

## 2022-11-09 PROCEDURE — 83735 ASSAY OF MAGNESIUM: CPT

## 2022-11-09 PROCEDURE — A9270 NON-COVERED ITEM OR SERVICE: HCPCS | Performed by: HOSPITALIST

## 2022-11-09 PROCEDURE — 02HV33Z INSERTION OF INFUSION DEVICE INTO SUPERIOR VENA CAVA, PERCUTANEOUS APPROACH: ICD-10-PCS | Performed by: INTERNAL MEDICINE

## 2022-11-09 PROCEDURE — 85025 COMPLETE CBC W/AUTO DIFF WBC: CPT

## 2022-11-09 PROCEDURE — A9270 NON-COVERED ITEM OR SERVICE: HCPCS | Performed by: INTERNAL MEDICINE

## 2022-11-09 PROCEDURE — 92526 ORAL FUNCTION THERAPY: CPT

## 2022-11-09 PROCEDURE — 82962 GLUCOSE BLOOD TEST: CPT

## 2022-11-09 PROCEDURE — 94760 N-INVAS EAR/PLS OXIMETRY 1: CPT

## 2022-11-09 PROCEDURE — 700102 HCHG RX REV CODE 250 W/ 637 OVERRIDE(OP): Performed by: INTERNAL MEDICINE

## 2022-11-09 PROCEDURE — 700105 HCHG RX REV CODE 258: Performed by: INTERNAL MEDICINE

## 2022-11-09 PROCEDURE — 82330 ASSAY OF CALCIUM: CPT

## 2022-11-09 PROCEDURE — 99232 SBSQ HOSP IP/OBS MODERATE 35: CPT | Performed by: INTERNAL MEDICINE

## 2022-11-09 PROCEDURE — 80048 BASIC METABOLIC PNL TOTAL CA: CPT

## 2022-11-09 PROCEDURE — C1751 CATH, INF, PER/CENT/MIDLINE: HCPCS

## 2022-11-09 PROCEDURE — 700102 HCHG RX REV CODE 250 W/ 637 OVERRIDE(OP): Performed by: HOSPITALIST

## 2022-11-09 PROCEDURE — 770020 HCHG ROOM/CARE - TELE (206)

## 2022-11-09 RX ORDER — IPRATROPIUM BROMIDE AND ALBUTEROL SULFATE 2.5; .5 MG/3ML; MG/3ML
3 SOLUTION RESPIRATORY (INHALATION)
Status: DISCONTINUED | OUTPATIENT
Start: 2022-11-09 | End: 2022-11-29 | Stop reason: HOSPADM

## 2022-11-09 RX ORDER — MAGNESIUM SULFATE HEPTAHYDRATE 40 MG/ML
2 INJECTION, SOLUTION INTRAVENOUS ONCE
Status: COMPLETED | OUTPATIENT
Start: 2022-11-09 | End: 2022-11-09

## 2022-11-09 RX ORDER — OLANZAPINE 5 MG/1
5 TABLET, ORALLY DISINTEGRATING ORAL EVERY EVENING
Status: DISCONTINUED | OUTPATIENT
Start: 2022-11-09 | End: 2022-11-11

## 2022-11-09 RX ORDER — CALCIUM GLUCONATE 20 MG/ML
1 INJECTION, SOLUTION INTRAVENOUS ONCE
Status: COMPLETED | OUTPATIENT
Start: 2022-11-09 | End: 2022-11-09

## 2022-11-09 RX ORDER — DILTIAZEM HYDROCHLORIDE 5 MG/ML
10 INJECTION INTRAVENOUS EVERY 4 HOURS PRN
Status: DISCONTINUED | OUTPATIENT
Start: 2022-11-09 | End: 2022-11-27

## 2022-11-09 RX ADMIN — METOPROLOL TARTRATE 12.5 MG: 25 TABLET, FILM COATED ORAL at 17:37

## 2022-11-09 RX ADMIN — MAGNESIUM SULFATE 2 G: 2 INJECTION INTRAVENOUS at 09:50

## 2022-11-09 RX ADMIN — OLANZAPINE 5 MG: 5 TABLET, ORALLY DISINTEGRATING ORAL at 17:42

## 2022-11-09 RX ADMIN — METOPROLOL TARTRATE 25 MG: 25 TABLET, FILM COATED ORAL at 23:05

## 2022-11-09 RX ADMIN — DEXTROSE MONOHYDRATE 25 G: 100 INJECTION, SOLUTION INTRAVENOUS at 16:13

## 2022-11-09 RX ADMIN — CALCIUM GLUCONATE 1 G: 20 INJECTION, SOLUTION INTRAVENOUS at 09:13

## 2022-11-09 RX ADMIN — DEXTROSE MONOHYDRATE 25 G: 100 INJECTION, SOLUTION INTRAVENOUS at 18:00

## 2022-11-09 RX ADMIN — METOPROLOL TARTRATE 12.5 MG: 25 TABLET, FILM COATED ORAL at 05:09

## 2022-11-09 RX ADMIN — NICOTINE 7 MG: 7 PATCH TRANSDERMAL at 05:10

## 2022-11-09 RX ADMIN — LEVOTHYROXINE SODIUM 50 MCG: 50 TABLET ORAL at 05:09

## 2022-11-09 RX ADMIN — Medication: at 01:51

## 2022-11-09 RX ADMIN — ENOXAPARIN SODIUM 40 MG: 40 INJECTION SUBCUTANEOUS at 17:42

## 2022-11-09 RX ADMIN — AMPICILLIN SODIUM AND SULBACTAM SODIUM 3 G: 2; 1 INJECTION, POWDER, FOR SOLUTION INTRAMUSCULAR; INTRAVENOUS at 05:14

## 2022-11-09 RX ADMIN — AMPICILLIN SODIUM AND SULBACTAM SODIUM 3 G: 2; 1 INJECTION, POWDER, FOR SOLUTION INTRAMUSCULAR; INTRAVENOUS at 12:57

## 2022-11-09 ASSESSMENT — PAIN DESCRIPTION - PAIN TYPE
TYPE: ACUTE PAIN

## 2022-11-09 NOTE — PROGRESS NOTES
12-hour chart check complete.    Monitor Summary  Rhythm: AFIB  Rate: 90s-100s  Ectopy: None  Measurements: na /na /na

## 2022-11-09 NOTE — FLOWSHEET NOTE
11/09/22 1501   Events/Summary/Plan   Events/Summary/Plan Attempted to do acapella patient is not following instructions. Able to cough up secretions, RT used krystin.   Vital Signs   Pulse 93   Respiration 20   Pulse Oximetry 97 %   $ Pulse Oximetry (Spot Check) Yes   Breath Sounds   RUL Breath Sounds Clear After Suction   RML Breath Sounds Diminished   RLL Breath Sounds Diminished   NAHOMI Breath Sounds Clear After Suction   LLL Breath Sounds Diminished   Secretions   Cough Productive   How Sputum Obtained Suction;Oropharyngeal   Sputum Amount Moderate   Sputum Color White   Sputum Consistency Thin;Thick   Oxygen   O2 (LPM) 0   O2 Delivery Device Room air w/o2 available

## 2022-11-09 NOTE — PROGRESS NOTES
"  PICC Line Instructions    How to Care for your PICC  Do not take a bath, swim, or use hot tubs when you have a PICC. Cover PICC line with clear plastic wrap and tape to keep it dry while showering  Check the PICC insertion site daily for leakage, redness, swelling, or pain.  Flush the PICC as directed by your health care provider. Let your health care provider know right away if the PICC is difficult to flush or does not flush. Do not use force to flush the PICC.  Do not use a syringe that is less than 10 mL to flush the PICC  Avoid blood pressure checks on the arm with the PICC.  Do not take the PICC out yourself. Only a trained clinical professional should remove the PICC  Make sure you or anyone who access your PICC Line washes their hands before using the line  Make sure the hub of the line is \"scrubbed\" prior to using the line  Dressing Changes  Change the PICC dressing as instructed by your health care provider.  Change your PICC dressing if it becomes loose or wet.  When to seek medical attention  PICC is accidentally pulled all the way out  There is any type of drainage, redness, or swelling where the PICC enters the skin.  You cannot flush the PICC, it is difficult to flush, or the PICC leaks around the insertion site when it is flushed.  You hear a \"flushing\" sound when the PICC is flushed.  You notice a hole or tear in the PICC.  You develop chills or a fever      "

## 2022-11-09 NOTE — THERAPY
"Speech Language Pathology  Daily Treatment     Patient Name: Tyree Whiteside  Age:  57 y.o., Sex:  male  Medical Record #: [id number]  Today's Date: 11/9/2022     Precautions  Precautions: Fall Risk, Swallow Precautions ( See Comments)    Assessment    Pt seen on this date for dysphagia therapy. Therapy was completed in two sessions 2/2 pt needing PICC line replaced. Per RN, pt given single ice chips earlier and immediate coughing/choking appreciated. Pt awake and better able to follow directives, although appeared more agitated today as noted by attempting to climb out of bed, RN aware. Immediate coughing and wet vocal quality after the swallow noted with trials of ice chips which is highly concerning for penetration/aspiration. He required suctioning following every trial to clear secretions and bolus. He completed 10 reps of laryngeal elevation and 35 reps of pharyngeal squeeze/base of tongue retraction with fair accuracy. At this time, pt continues to present with s/sx of aspiration so recommend continuation of NPO with NG for nutrition. SLP following.    Plan    1) continue NPO with NG  2) okay for small amount of ice chips an hour with RN following oral care    Continue current treatment plan.    Discharge Recommendations: Recommend post-acute placement for additional speech therapy services prior to discharge home         Objective       11/09/22 1150   Precautions   Precautions Fall Risk;Swallow Precautions ( See Comments)   Vitals   O2 (LPM) 0   O2 Delivery Device None - Room Air   Pain 0 - 10 Group   Therapist Pain Assessment Post Activity Pain Same as Prior to Activity;Nurse Notified;0   Patient / Family Goals   Patient / Family Goal #1 \"I could eat half of that ice\"   Goal #1 Outcome Progressing slower than expected   Short Term Goals   Short Term Goal # 1 Pt will participate in an instrumental swallow study to fully assess swallow function.   Goal Outcome # 1 Goal met   Short Term Goal # 2 Pt " will consume pre-feeding trials with SLP to determine readiness to begin an oral diet.   Goal Outcome # 2  Progressing slower than expected   Education Group   Education Provided Dysphagia   Dysphagia Patient Response Patient;Acceptance;Explanation;Verbal Demonstration;Reinforcement Needed   Anticipated Discharge Needs   Discharge Recommendations Recommend post-acute placement for additional speech therapy services prior to discharge home   Therapy Recommendations Upon DC Dysphagia Training;Community Re-Integration;Patient / Family / Caregiver Education   Interdisciplinary Plan of Care Collaboration   IDT Collaboration with  Nursing   Patient Position at End of Therapy Seated;In Bed;Wrist Restraints Applied  (mitts donned)

## 2022-11-09 NOTE — CARE PLAN
The patient is Watcher - Medium risk of patient condition declining or worsening    Shift Goals  Clinical Goals: VSS, improved neuro  Patient Goals: ANETTE  Family Goals: stable    Progress made toward(s) clinical / shift goals:    Problem: Safety - Medical Restraint  Goal: Remains free of injury from restraints (Restraint for Interference with Medical Device)  Outcome: Progressing     Problem: Skin Integrity  Goal: Skin integrity is maintained or improved  Outcome: Progressing     Problem: Fall Risk  Goal: Patient will remain free from falls  Outcome: Progressing     Problem: Pain - Standard  Goal: Alleviation of pain or a reduction in pain to the patient’s comfort goal  Outcome: Progressing     Problem: Respiratory  Goal: Patient will achieve/maintain optimum respiratory ventilation and gas exchange  Outcome: Progressing     Problem: Hemodynamics  Goal: Patient's hemodynamics, fluid balance and neurologic status will be stable or improve  Outcome: Progressing       Patient is not progressing towards the following goals:      Problem: Safety - Medical Restraint  Goal: Free from restraint(s) (Restraint for Interference with Medical Device)  Outcome: Not Progressing     Problem: Knowledge Deficit - Standard  Goal: Patient and family/care givers will demonstrate understanding of plan of care, disease process/condition, diagnostic tests and medications  Outcome: Not Progressing     Problem: Lifestyle Changes  Goal: Patient's ability to identify lifestyle changes and available resources to help reduce recurrence of condition will improve  Outcome: Not Progressing     Problem: Psychosocial  Goal: Patient's level of anxiety will decrease  Outcome: Not Progressing     Problem: Risk for Aspiration  Goal: Patient's risk for aspiration will be absent or decrease  Outcome: Not Progressing

## 2022-11-09 NOTE — DISCHARGE PLANNING
Case Management Discharge Planning    Admission Date: 10/18/2022  GMLOS: 9.6  ALOS: 22    6-Clicks ADL Score: 6  6-Clicks Mobility Score: 6  PT and/or OT Eval ordered: Yes  Post-acute Referrals Ordered: Yes  Post-acute Choice Obtained: Yes  Has referral(s) been sent to post-acute provider:  Yes      Anticipated Discharge Dispo: Discharge Disposition: D/T to SNF with Medicare cert in anticipation of skilled care (03)    DME Needed: No    Action(s) Taken: RNCM participated in LLOS meeting with leadership. Patient was making improvements, however per Dr. Luciano's ACP notes, patient was to be DNR/DNI and daughter Laura did not want patient to be reintubated. However, Laura changed her mind and patient is back to full code and could possibly be intubated if needed which would result in trach and feeding tube. RNCM sent to \[facility]\"" and [facility] and expanded to [facility] and [facility].     Escalations Completed: None    Medically Clear: No    Next Steps: RNCM will continue to follow for any discharge planning assistance.     Barriers to Discharge: Medical clearance and Pending Placement    Is the patient up for discharge tomorrow: No

## 2022-11-09 NOTE — CARE PLAN
The patient is Watcher - Medium risk of patient condition declining or worsening    Shift Goals   Clinical Goals: Improvement of Neuro status, less secretions  Patient Goals: ANETTE  Family Goals: Go home    Progress made toward(s) clinical / shift goals:  Chest tube discontinued today, no adverse events, pt. tolerating well. Less secretions throughout the day after Glycopyrrolate and frequent suctioning; patient was able to cough on demand / clear secretions. Speech therapy saw improvement from yesterday; allowed 2-3 ice chips per hour provided by nurse. Tube feedings at goal, D51/2NS discontinued, BG's are still low 70's - 80's throughout day.    Patient is not progressing towards the following goals: Skin is still itchy, red, irritated, flaky, scaly.      Problem: Skin Integrity  Goal: Skin integrity is maintained or improved  Outcome: Not Progressing

## 2022-11-09 NOTE — PROGRESS NOTES
Critical Care Progress Note    Date of admission  10/18/2022    Chief Complaint  57 y.o. male with history of alcohol abuse, meth use, A. fib not on anticoagulation, liver mass noted on US 8/2022 admitted 10/18/2022 with alcohol withdrawal.  Patient was initially intubated for worsening respiratory failure from alcohol withdrawal -was extubated on 10/21 and reintubated on 10/23 for worsening respiratory failure and altered mental status.  He underwent bronchoscopy on 10/24 due to copious secretions -cultures with MSSA and Pseudomonas so he was started on Zosyn.    He had thoracentesis on 10/25 with removal of 400 cc of pleural fluid -pleural studies with LDH 1020, glucose < 2, cell count with 3511 WBC, 3000 RBCs -culture showed WBCs but no growth.   Patient self extubated on 10/26 overnight and was shortly reintubated after he failed HFNC.  His episode of respiratory failure was complicated by bradycardia and hypotension requiring vasopressors.    On 10/27, he was noted to have significant abdominal distention with dark feculent material coming out of the NG tube.  Fluid resuscitation was continued he continued to have high output from NG tube. CT A/P was obtained which showed dilated loops of bowel concerning for ileus rather than early/low-grade SBO, right larger than left pleural effusions.  Liver nodule seen on ultrasound in 8/2020 she was not visualized on the CT. he was gradually weaned off vasopressors.  He was noted to have done well on his SBT but had low NIF so he was kept intubated.    10/31: Very weak and tired this morning.  Review of imaging and pleural studies concerning for a right loculated parapneumonic effusion -discussed case with IR for chest tube placement.  11/1: R mid-axillary chest tube placed for loculated effusion. Zosyn restarted - fell off for about one day but decided to continue it since he has this loculated effusion  11/2: extubated this AM to oxymask. Still with significant thin  clear oral secretions so started on glycopyrrolate PRN to decrease secretions and risk of aspiration post extubation.   11/3: appeared more tired/somnolent this AM - woke patient up and got him out of bed to improve his mental status. Daughter also working with him to help suction him and re-orient him.   11/4: CXR w/improved R effusion - 2 more doses of lytics remaining.   11/6: patient more awake today, denies pain, knows he is in the hospital   11/7: agitation overnight. Given morphine and norco. Failed swallow eval. Given scopalamine patch for upper airway secretions. PICC line placed.   11/8: Having upper airway secretions still. Still sundowning in the evening. Palliative care consulted. His daughter initially made him DNR/DNI but then changed her mind. Chest tube removed.     Chart review from the past 24 hours includes imaging, laboratory studies, vital signs and notes available.  Pertinent data for today's visit includes    24h events: Pulled mitts off today and pulled PICC line out. More awake. Coughs with ice chips.    Drips: None  Nutrition: TF at goal   Notable lab trends: Stable  CXR: RLL disease, possible aspiration.   Tmax: 98.5F  ProCal: none  Antibiotics: Finished Zosyn x14 days, now on Unasyn d2 of 14 for empyema.   Steroids: None  Cultures: Pseudomonas on 10/31 bronch.   I/O: -3L since admission. >1L UOP past 24h.   PPX: Enoxaparin  BM regimen: MiraLAX, senna-docusate, milk magnesia, bisacodyl      Review of Systems  Review of Systems   Unable to perform ROS: Acuity of condition      Vital Signs for last 24 hours   Temp:  [36.4 °C (97.5 °F)-36.7 °C (98.1 °F)] 36.5 °C (97.7 °F)  Pulse:  [] 110  Resp:  [15-54] 44  BP: ()/(56-82) 113/73  SpO2:  [81 %-100 %] 95 %    Hemodynamic parameters for last 24 hours       Respiratory Information for the last 24 hours       Physical Exam   Physical Exam  Vitals and nursing note reviewed. Exam conducted with a chaperone present.   Constitutional:        General: He is not in acute distress.     Appearance: He is ill-appearing. He is not toxic-appearing or diaphoretic.   HENT:      Nose: No congestion.      Mouth/Throat:      Mouth: Mucous membranes are moist.   Eyes:      General: No scleral icterus.        Right eye: No discharge.         Left eye: No discharge.      Pupils: Pupils are equal, round, and reactive to light.   Neck:      Trachea: Abnormal tracheal secretions present.   Cardiovascular:      Rate and Rhythm: Normal rate and regular rhythm.   Pulmonary:      Effort: Pulmonary effort is normal. No respiratory distress.      Breath sounds: Rhonchi present.      Comments: Diminished right breath sounds  Abdominal:      Palpations: Abdomen is soft.   Musculoskeletal:         General: No deformity.   Skin:     General: Skin is dry.      Comments: Flaky/dry skin    Neurological:      General: No focal deficit present.      Mental Status: He is alert. He is confused.   Psychiatric:         Mood and Affect: Affect is labile.         Speech: Speech is delayed.         Behavior: Behavior is slowed.       Medications  Current Facility-Administered Medications   Medication Dose Route Frequency Provider Last Rate Last Admin    OLANZapine zydis (ZYPREXA TBDP) disintegrating tablet 5 mg  5 mg Enteral Tube Q EVENING Greyson Luciano D.O.        magnesium sulfate IVPB premix 2 g  2 g Intravenous Once Greyson Luciano D.O.        calcium GLUConate 1 g in NaCl IVPB premix  1 g Intravenous Once Greyson Luciano D.O.        senna-docusate (PERICOLACE or SENOKOT S) 8.6-50 MG per tablet 2 Tablet  2 Tablet Enteral Tube BID Greyson Luciano D.O.   2 Tablet at 11/08/22 1316    And    polyethylene glycol/lytes (MIRALAX) PACKET 1 Packet  1 Packet Enteral Tube QDAY PRN Greyson Luciano D.O.        And    magnesium hydroxide (MILK OF MAGNESIA) suspension 30 mL  30 mL Enteral Tube QDAY PRN Greyson Luciano D.O.        And    bisacodyl (DULCOLAX) suppository  10 mg  10 mg Rectal QDAY PRN Greyson Luciano D.O.        scopolamine (TRANSDERM-SCOP) patch 1 Patch  1 Patch Transdermal Q72HRS Greyson Luciano D.O.   1 Patch at 11/07/22 1015    carboxymethylcellulose (REFRESH TEARS) 0.5 % ophthalmic drops 2 Drop  2 Drop Both Eyes Q2HRS PRN Sheeba Coto D.O.        acetaminophen (Tylenol) tablet 650 mg  650 mg Enteral Tube Q6HRS PRN Sheeba Coto D.O.        eucerin cream   Topical TID PRN Lashon Lozano D.O.   Given at 11/09/22 0151    nicotine (NICODERM) 7 MG/24HR 7 mg  1 Patch Transdermal Daily-0600 Sheeba Coto D.O.   7 mg at 11/09/22 0510    metoprolol tartrate (LOPRESSOR) tablet 12.5 mg  12.5 mg Enteral Tube TWICE DAILY GRACY MccannONeetu   12.5 mg at 11/09/22 0509    enoxaparin (Lovenox) inj 40 mg  40 mg Subcutaneous DAILY AT 1800 GRACY MccannONeetu   40 mg at 11/08/22 1757    ampicillin/sulbactam (UNASYN) 3 g in  mL IVPB  3 g Intravenous Q6HRS Sheeba Coto D.O.   Stopped at 11/09/22 0544    lactulose 20 GM/30ML solution 15 mL  15 mL Enteral Tube BID PRN Nilda Franco M.D.   15 mL at 11/03/22 1523    dextrose 10 % BOLUS 25 g  25 g Intravenous Q15 MIN PRN Chris Abrams M.D.   25 g at 11/03/22 1519    MD Alert...ICU Electrolyte Replacement per Pharmacy   Other PHARMACY TO DOSE Nilda Franco M.D.        Respiratory Therapy Consult   Nebulization Continuous RT Greyson Luciano D.O.        Pharmacy Consult: Enteral tube insertion - review meds/change route/product selection   Other PHARMACY TO DOSE Greyson Luciano D.O.        albuterol inhaler 2 Puff  2 Puff Inhalation Q6HRS PRN Eva Rodriguez M.D.        levothyroxine (SYNTHROID) tablet 50 mcg  50 mcg Enteral Tube AM ES Eva Rodriguez M.D.   50 mcg at 11/09/22 0509     Fluids    Intake/Output Summary (Last 24 hours) at 11/9/2022 0726  Last data filed at 11/9/2022 0600  Gross per 24 hour   Intake 2195.06 ml   Output 2125 ml   Net 70.06  ml       Laboratory          Recent Labs     11/07/22  0405 11/08/22 0405 11/09/22  0152   SODIUM 138 137 135   POTASSIUM 4.2 3.8 4.1   CHLORIDE 106 106 101   CO2 25 23 23   BUN 9 8 11   CREATININE 0.60 0.53 0.47*   MAGNESIUM 1.7 1.5 1.6   PHOSPHORUS 4.9* 4.7* 3.5   CALCIUM 8.0* 7.5* 8.1*       Recent Labs     11/07/22  0405 11/07/22  1010 11/08/22  0405 11/09/22  0152   PREALBUMIN  --  4.5*  --   --    GLUCOSE 81  --  186* 87       Recent Labs     11/07/22 0405 11/08/22 0405 11/09/22  0152   WBC 9.4 7.9 8.8   NEUTSPOLYS 56.40 52.70 52.50   LYMPHOCYTES 14.80* 15.20* 17.80*   MONOCYTES 13.60* 13.30 11.90   EOSINOPHILS 13.20* 16.80* 15.90*   BASOPHILS 1.30 1.10 1.10       Recent Labs     11/07/22 0405 11/08/22  0405 11/09/22 0152   RBC 3.28* 3.15* 3.55*   HEMOGLOBIN 10.6* 10.2* 11.4*   HEMATOCRIT 32.0* 31.2* 34.4*   PLATELETCT 636* 550* 602*       Imaging  Reviewed    Assessment/Plan    #Acute hypoxic respiratory failure  #MSSA and Pseudomonas pneumonia  #Right complicated loculated pleural effusion. S/p CT on 11/1 and tPA/Dornase x6 doses   #Chronic aspiration suspected   #Deconditioning 2/2 critical illness   #Atrial fibrillation -currently rate controlled  #Liver mass  #Alcohol abuse  #Nicotine withdrawal     Resolved:  #Septic shock-resolved, now off pressors  #Hypoglycemia  #Alcohol withdrawal    #Acute hypoxic respiratory failure  #Suspected chronic aspiration   #MSSA and Pseudomonas pneumonia  #Right complicated loculated pleural effusion. S/p CT on 11/1 and tPA/Dornase x6 doses   Pleural studies from 10/25 with glucose <2, LDH around 1000 and increased WBCs -pleural fluid cultures negative so far patient was already on antibiotics.  This is a complicated parapneumonic effusion based on studies and imaging.  -Continue Zosyn 10/24-11/7 for pseudomonas, MSSA, and anaerobe coverage   -Change to Unasyn 11/7 for continued coverage for empyema x 14 days  -Follow-up repeat pleural fluid culture from thoracentesis  on 10/30, NGTD   -failed swallow eval on 11/7  -scopalamine patch and aggressive CPT for upper airway secretions. PRN glycopyrrolate   -PICC line placed on 11/7; patient pulled it out overnight. New PICC order placed.     #Metabolic encephalopathy   #Dementia   Signs of significant atrophy on CT brain likely 2/2 longstanding ETOH abuse  Now with superimposed sundowning/delirium in the evenings despite Seroquel  Will limit/avoid benzos  Changed seroquel to zyprexa on 11/8 with some improvement noted     #Deconditioning 2/2 critical illness   -consults placed to PT and OT     Atrial fibrillation -currently rate controlled  -Home MTP at 12.5 mg BID today   -Not on chronic anticoagulation -consider risk/benefit on home disposition     Liver mass  Liver nodule seen on ultrasound in 8/2020 she was not visualized on the CT scan.  -work up as an outpatient     Alcohol abuse  Alcohol withdrawal  Initially admitted and intubated for alcohol withdrawal-now resolved    Nicotine withdrawal   nictoine patch at 7 mg    Hypoglycemia  Tube feeds held intermittently for coughing up secretions that resemble tube feeds.   - continue TF, advance to 20/hr   - monitor for aspiration  - D51/2NS     Dispo: Has had some improvement in his mental status and is more alert today.  He is requiring restraints and frequent reorienting.  His upper airway secretions have lessened to the extent that he can be downgraded from the ICU today.    I have performed a physical exam and reviewed and updated ROS and Plan today (11/9/2022). In review of yesterday's note (11/8/2022), there are no changes except as documented above.     Discussed patient condition and risk of morbidity and/or mortality with Family, RN, RT, Pharmacy, and Charge nurse / hot rounds    Total consult time: 40 minutes which included time spent on chart review, personally reviewing pertinent images and labs, time spent counseling and educating the patient and/or family members, and  coordinating care with the healthcare team to include consultants.     Update: cased discussed with antibiotic stewardship. Will trial d/cing antibiotics at this time, as the patient appears to have clinically improved from an infection source control standpoint.     Greyson Luciano DO  Staff Pulmonologist and Intensivist  [facility]     Please note that this dictation was created using voice recognition software. The accuracy of the dictation is limited to the abilities of the software. I have made every reasonable attempt to correct obvious errors, but I expect that there are errors of grammar and possibly content that I did not discover before finalizing the note.

## 2022-11-09 NOTE — PROGRESS NOTES
I have been paged by patient nurse, Jeannette, to inform you that patient's daughter has changed her mind about the CODE STATUS.  She wants the code to be changed to a full code.     CODE STATUS changed to FULL.

## 2022-11-09 NOTE — CARE PLAN
The patient is Stable - Low risk of patient condition declining or worsening    Shift Goals  Clinical Goals: VSS, improved neuro  Patient Goals: ANETTE  Family Goals: stable    Progress made toward(s) clinical / shift goals: Patient's VSS, able to wean patient to room air    Problem: Knowledge Deficit - Standard  Goal: Patient and family/care givers will demonstrate understanding of plan of care, disease process/condition, diagnostic tests and medications  Outcome: Not Progressing     Problem: Fall Risk  Goal: Patient will remain free from falls  Outcome: Progressing     Problem: Risk for Aspiration  Goal: Patient's risk for aspiration will be absent or decrease  Outcome: Progressing

## 2022-11-09 NOTE — PROGRESS NOTES
"The patient's daughter Laura called this PM and expressed wishes to change the patient's code status from DNR/DNI back to Full Code. Patient's daughter expressed \"I made a mistake\" \"I want him to be intubated\"    The hospitalist on-call Dr. Rodriguez was notified of this, and orders placed to changed patient to FULL CODE.   "

## 2022-11-09 NOTE — DIETARY
Nutrition Services Brief Update:    Problem: Nutritional:  Goal: Nutrition support tolerated and meeting greater than 85% of estimated needs  Outcome: MET     TF running at goal rate 55 ml/hr per flowsheets.    RD continues to follow.

## 2022-11-09 NOTE — DISCHARGE PLANNING
Received Choice form at [phone]  Agency/Facility Name: [facility]   Referral sent per Choice form @ [phone]

## 2022-11-09 NOTE — PALLIATIVE CARE
Palliative Care   Received several calls regarding GOC discussion. Chart reviewed.    Called daughter to set up a time to meet and discuss GOC, Laura would prefer to meet today. Will meet with dtr today at 1600 to discuss GOC.         Thank you for allowing Palliative Care to participate in this patient's care. Please feel free to call [phone] with any questions or concerns.

## 2022-11-10 PROBLEM — J69.0 ASPIRATION PNEUMONIA (HCC): Status: ACTIVE | Noted: 2022-11-10

## 2022-11-10 PROBLEM — J86.9 EMPYEMA (HCC): Status: ACTIVE | Noted: 2022-10-24

## 2022-11-10 PROBLEM — R13.10 DYSPHAGIA: Status: ACTIVE | Noted: 2022-11-10

## 2022-11-10 PROBLEM — D75.838 REACTIVE THROMBOCYTOSIS: Status: ACTIVE | Noted: 2022-11-10

## 2022-11-10 PROBLEM — I50.9 CONGESTIVE HEART FAILURE (CHF) (HCC): Status: ACTIVE | Noted: 2022-11-10

## 2022-11-10 PROBLEM — F10.139 ALCOHOL ABUSE WITH WITHDRAWAL (HCC): Status: ACTIVE | Noted: 2022-10-18

## 2022-11-10 PROBLEM — E51.2 WERNICKE ENCEPHALOPATHY SYNDROME: Status: ACTIVE | Noted: 2022-11-10

## 2022-11-10 PROBLEM — E83.42 HYPOMAGNESEMIA: Status: ACTIVE | Noted: 2022-11-10

## 2022-11-10 LAB
ANION GAP SERPL CALC-SCNC: 11 MMOL/L (ref 7–16)
BASOPHILS # BLD AUTO: 1.5 % (ref 0–1.8)
BASOPHILS # BLD: 0.14 K/UL (ref 0–0.12)
BUN SERPL-MCNC: 16 MG/DL (ref 8–22)
CA-I SERPL-SCNC: 1.1 MMOL/L (ref 1.1–1.3)
CALCIUM SERPL-MCNC: 8.3 MG/DL (ref 8.4–10.2)
CHLORIDE SERPL-SCNC: 101 MMOL/L (ref 96–112)
CO2 SERPL-SCNC: 21 MMOL/L (ref 20–33)
CREAT SERPL-MCNC: 0.51 MG/DL (ref 0.5–1.4)
EOSINOPHIL # BLD AUTO: 1.61 K/UL (ref 0–0.51)
EOSINOPHIL NFR BLD: 17.8 % (ref 0–6.9)
ERYTHROCYTE [DISTWIDTH] IN BLOOD BY AUTOMATED COUNT: 47.8 FL (ref 35.9–50)
GFR SERPLBLD CREATININE-BSD FMLA CKD-EPI: 119 ML/MIN/1.73 M 2
GLUCOSE BLD STRIP.AUTO-MCNC: 113 MG/DL (ref 65–99)
GLUCOSE BLD STRIP.AUTO-MCNC: 70 MG/DL (ref 65–99)
GLUCOSE BLD STRIP.AUTO-MCNC: 73 MG/DL (ref 65–99)
GLUCOSE BLD STRIP.AUTO-MCNC: 74 MG/DL (ref 65–99)
GLUCOSE BLD STRIP.AUTO-MCNC: 78 MG/DL (ref 65–99)
GLUCOSE BLD STRIP.AUTO-MCNC: 78 MG/DL (ref 65–99)
GLUCOSE BLD STRIP.AUTO-MCNC: 81 MG/DL (ref 65–99)
GLUCOSE BLD STRIP.AUTO-MCNC: 88 MG/DL (ref 65–99)
GLUCOSE BLD STRIP.AUTO-MCNC: 89 MG/DL (ref 65–99)
GLUCOSE BLD STRIP.AUTO-MCNC: 90 MG/DL (ref 65–99)
GLUCOSE SERPL-MCNC: 95 MG/DL (ref 65–99)
HCT VFR BLD AUTO: 34.7 % (ref 42–52)
HGB BLD-MCNC: 11.9 G/DL (ref 14–18)
IMM GRANULOCYTES # BLD AUTO: 0.06 K/UL (ref 0–0.11)
IMM GRANULOCYTES NFR BLD AUTO: 0.7 % (ref 0–0.9)
LYMPHOCYTES # BLD AUTO: 1.45 K/UL (ref 1–4.8)
LYMPHOCYTES NFR BLD: 16 % (ref 22–41)
MAGNESIUM SERPL-MCNC: 1.5 MG/DL (ref 1.5–2.5)
MCH RBC QN AUTO: 32.2 PG (ref 27–33)
MCHC RBC AUTO-ENTMCNC: 34.3 G/DL (ref 33.7–35.3)
MCV RBC AUTO: 93.8 FL (ref 81.4–97.8)
MONOCYTES # BLD AUTO: 0.97 K/UL (ref 0–0.85)
MONOCYTES NFR BLD AUTO: 10.7 % (ref 0–13.4)
NEUTROPHILS # BLD AUTO: 4.84 K/UL (ref 1.82–7.42)
NEUTROPHILS NFR BLD: 53.3 % (ref 44–72)
NRBC # BLD AUTO: 0 K/UL
NRBC BLD-RTO: 0 /100 WBC
PHOSPHATE SERPL-MCNC: 4.3 MG/DL (ref 2.5–4.5)
PLATELET # BLD AUTO: 687 K/UL (ref 164–446)
PMV BLD AUTO: 9.9 FL (ref 9–12.9)
POTASSIUM SERPL-SCNC: 4.2 MMOL/L (ref 3.6–5.5)
RBC # BLD AUTO: 3.7 M/UL (ref 4.7–6.1)
SODIUM SERPL-SCNC: 133 MMOL/L (ref 135–145)
WBC # BLD AUTO: 9.1 K/UL (ref 4.8–10.8)

## 2022-11-10 PROCEDURE — 700111 HCHG RX REV CODE 636 W/ 250 OVERRIDE (IP): Performed by: INTERNAL MEDICINE

## 2022-11-10 PROCEDURE — 770020 HCHG ROOM/CARE - TELE (206)

## 2022-11-10 PROCEDURE — 700105 HCHG RX REV CODE 258: Performed by: FAMILY MEDICINE

## 2022-11-10 PROCEDURE — 700102 HCHG RX REV CODE 250 W/ 637 OVERRIDE(OP): Performed by: INTERNAL MEDICINE

## 2022-11-10 PROCEDURE — 99233 SBSQ HOSP IP/OBS HIGH 50: CPT | Performed by: FAMILY MEDICINE

## 2022-11-10 PROCEDURE — 700105 HCHG RX REV CODE 258: Performed by: INTERNAL MEDICINE

## 2022-11-10 PROCEDURE — A9270 NON-COVERED ITEM OR SERVICE: HCPCS | Performed by: HOSPITALIST

## 2022-11-10 PROCEDURE — 84100 ASSAY OF PHOSPHORUS: CPT

## 2022-11-10 PROCEDURE — 700102 HCHG RX REV CODE 250 W/ 637 OVERRIDE(OP): Performed by: FAMILY MEDICINE

## 2022-11-10 PROCEDURE — 82105 ALPHA-FETOPROTEIN SERUM: CPT

## 2022-11-10 PROCEDURE — 94669 MECHANICAL CHEST WALL OSCILL: CPT

## 2022-11-10 PROCEDURE — 85025 COMPLETE CBC W/AUTO DIFF WBC: CPT

## 2022-11-10 PROCEDURE — 80048 BASIC METABOLIC PNL TOTAL CA: CPT

## 2022-11-10 PROCEDURE — 82330 ASSAY OF CALCIUM: CPT

## 2022-11-10 PROCEDURE — A9270 NON-COVERED ITEM OR SERVICE: HCPCS | Performed by: INTERNAL MEDICINE

## 2022-11-10 PROCEDURE — 83735 ASSAY OF MAGNESIUM: CPT

## 2022-11-10 PROCEDURE — 99497 ADVNCD CARE PLAN 30 MIN: CPT | Performed by: NURSE PRACTITIONER

## 2022-11-10 PROCEDURE — 700102 HCHG RX REV CODE 250 W/ 637 OVERRIDE(OP): Performed by: HOSPITALIST

## 2022-11-10 PROCEDURE — 82962 GLUCOSE BLOOD TEST: CPT | Mod: 91

## 2022-11-10 PROCEDURE — A9270 NON-COVERED ITEM OR SERVICE: HCPCS | Performed by: FAMILY MEDICINE

## 2022-11-10 PROCEDURE — 700111 HCHG RX REV CODE 636 W/ 250 OVERRIDE (IP): Performed by: FAMILY MEDICINE

## 2022-11-10 RX ORDER — MAGNESIUM SULFATE HEPTAHYDRATE 40 MG/ML
4 INJECTION, SOLUTION INTRAVENOUS ONCE
Status: COMPLETED | OUTPATIENT
Start: 2022-11-10 | End: 2022-11-10

## 2022-11-10 RX ORDER — ASPIRIN 81 MG/1
81 TABLET, CHEWABLE ORAL DAILY
Status: DISCONTINUED | OUTPATIENT
Start: 2022-11-10 | End: 2022-11-29 | Stop reason: HOSPADM

## 2022-11-10 RX ORDER — MAGNESIUM SULFATE HEPTAHYDRATE 40 MG/ML
2 INJECTION, SOLUTION INTRAVENOUS ONCE
Status: COMPLETED | OUTPATIENT
Start: 2022-11-10 | End: 2022-11-10

## 2022-11-10 RX ADMIN — OLANZAPINE 5 MG: 5 TABLET, ORALLY DISINTEGRATING ORAL at 16:57

## 2022-11-10 RX ADMIN — ENOXAPARIN SODIUM 40 MG: 40 INJECTION SUBCUTANEOUS at 16:57

## 2022-11-10 RX ADMIN — METOPROLOL TARTRATE 25 MG: 25 TABLET, FILM COATED ORAL at 16:57

## 2022-11-10 RX ADMIN — DEXTROSE MONOHYDRATE 25 G: 100 INJECTION, SOLUTION INTRAVENOUS at 01:36

## 2022-11-10 RX ADMIN — LEVOTHYROXINE SODIUM 50 MCG: 50 TABLET ORAL at 05:42

## 2022-11-10 RX ADMIN — THIAMINE HYDROCHLORIDE 500 MG: 100 INJECTION, SOLUTION INTRAMUSCULAR; INTRAVENOUS at 17:22

## 2022-11-10 RX ADMIN — METOPROLOL TARTRATE 25 MG: 25 TABLET, FILM COATED ORAL at 05:43

## 2022-11-10 RX ADMIN — MAGNESIUM SULFATE HEPTAHYDRATE 4 G: 40 INJECTION, SOLUTION INTRAVENOUS at 06:16

## 2022-11-10 RX ADMIN — SCOPOLAMINE 1 PATCH: 1.5 PATCH, EXTENDED RELEASE TRANSDERMAL at 09:18

## 2022-11-10 RX ADMIN — ASPIRIN 81 MG CHEWABLE TABLET 81 MG: 81 TABLET CHEWABLE at 15:54

## 2022-11-10 RX ADMIN — MAGNESIUM SULFATE 2 G: 2 INJECTION INTRAVENOUS at 15:54

## 2022-11-10 RX ADMIN — DILTIAZEM HYDROCHLORIDE 10 MG: 5 INJECTION INTRAVENOUS at 04:02

## 2022-11-10 RX ADMIN — NICOTINE 7 MG: 7 PATCH TRANSDERMAL at 05:42

## 2022-11-10 RX ADMIN — SENNOSIDES AND DOCUSATE SODIUM 2 TABLET: 50; 8.6 TABLET ORAL at 16:57

## 2022-11-10 ASSESSMENT — COGNITIVE AND FUNCTIONAL STATUS - GENERAL
TOILETING: A LOT
TURNING FROM BACK TO SIDE WHILE IN FLAT BAD: A LITTLE
HELP NEEDED FOR BATHING: A LOT
MOVING FROM LYING ON BACK TO SITTING ON SIDE OF FLAT BED: A LITTLE
DRESSING REGULAR LOWER BODY CLOTHING: A LOT
MOBILITY SCORE: 15
WALKING IN HOSPITAL ROOM: A LOT
EATING MEALS: A LOT
SUGGESTED CMS G CODE MODIFIER MOBILITY: CK
PERSONAL GROOMING: A LOT
DAILY ACTIVITIY SCORE: 12
SUGGESTED CMS G CODE MODIFIER DAILY ACTIVITY: CL
MOVING TO AND FROM BED TO CHAIR: A LITTLE
DRESSING REGULAR UPPER BODY CLOTHING: A LOT
CLIMB 3 TO 5 STEPS WITH RAILING: A LOT
STANDING UP FROM CHAIR USING ARMS: A LOT

## 2022-11-10 NOTE — PROGRESS NOTES
Palliative Care Advance Care Planning Progress Note    HPI: 57 year old male admitted for alcohol withdrawal on 10/18/22. Pt has a history of A-fib, asthma, eczema, HTN, gynecomastia, and current 1/4-1/2 pk/day smoking and alcohol, 1 pint vodka and 4-5 beers. Pt presented to [facility] ED under the advice of his PCP for alcohol detox.  Patient's hospitalization has been complication by dysphagia, concern for aspiration.    Discussion: This APRN met with patient's daughter Laura and sister Minda in floor waiting room.  I advised I had spoken with Dr. Gallego at nurse's station and offered to come complete POLST form with family.  Patient is single and Laura is his only child, so per [facility], she would be his default healthcare surrogate.    Discussed pros (improved hydration, enhanced nutrition/healing) and cons (irritant, nausea, vomiting, edema/ascites) of artificial nutrition.  Educated family about both NG tubes and PEG tubes, with indications for each.  They expressed their concern about patient's dysphagia, indicating they are expecting to have further discussions with speech language staff re: patient's dysphagia.  Minda feels patient would not want to be on PEG tube longer than 2 months.    Discussed code status.  Laura affirmed DNR/DNI status, and shared her perspective that her dad would not want to be on life support.      Discussed POLST form and educated family about purpose of form.  POLST form reviewed, completed and scanned into EMR.  Laura selected A) DNR/DNI  B) Selective treatment  C) Artifical nutrition/feeding tube trial no longer than 6 months.  This APRN made color copy of POLST form and emailed to PC RN Adia Zapata to be scanned into EMR.  Orignal POLST returned to Laura/Minda.  Advised them to place POLST form on refrigerator at home post-discharge.  All questions answered.    Laura somewhat tearful during discussion.  I acknowledged the care and support she is providing to her dad.   Advised me and my team are here to support her and her dad during this difficult time.       Provided therapeutic communication including open-ended questions, therapeutic silence, reflective listening, empathic support, validation, and therapeutic touch throughout encounter. Provided Laura with PC business card, along with outpatient PC pamphlet with PC contact information written on pamphlet.  Encouraged her to call with any questions or needs.    Outcome: POLST form completed; will be scanned into EMR by PC staff.    Plan: Palliative care to continue to follow, provide support, and help facilitate decision making as clinical picture evolves.    Updated: Dr. Gallego in-person.    Thank you for allowing Palliative Care to participate in Tyree's care. Please call our team with questions and/or additional needs.    As appropriate, Palliative Care encourages medical team to engage in further conversation, education for patient/family at bedside regarding code status, GOC/POC.    Total visit time was 20 minutes discussing and coordinating advance care planning.    Marissa Rendon, JAJA, APRN, AGACNP-BC  Palliative Care Nurse Practitioner  [phone]

## 2022-11-10 NOTE — ASSESSMENT & PLAN NOTE
-Intubated for respiratory acidosis and altered mental status with need for repeat intubation twice thereafter  -Cultures from sputum and BAL grew MSSA and pseudomonas, pt completed 14d of Zosyn, and an additional 1.5d of Unasyn for empyema  -also complicated by right empyema, s/p diagnostic thoracentesis and chest tubes  - Currently off antibiotics, back on room air   - RESOLVED

## 2022-11-10 NOTE — DISCHARGE PLANNING
RN ANGEL received call from Krishna George in [place] who states he is unable to accept patient at this time. Patient must be out of restraints for 24-48 hours in order to be considered for acceptance.

## 2022-11-10 NOTE — ASSESSMENT & PLAN NOTE
"- Seen on US 8/2022 \"right lobe of the liver measuring 1.7 x 1.3 x 1.0 cm in size\"  - AFP negative    "

## 2022-11-10 NOTE — PROGRESS NOTES
12-hour chart check complete.    Monitor Summary  Rhythm: AFIB  Rate:   Ectopy: None  Measurements: /.08/

## 2022-11-10 NOTE — THERAPY
Missed Therapy     Patient Name: Tyree Whiteside  Age:  57 y.o., Sex:  male  Medical Record #: [id number]  Today's Date: 11/10/2022    Discussed missed therapy with RN       11/10/22 1428   Interdisciplinary Plan of Care Collaboration   IDT Collaboration with  Nursing   Collaboration Comments Attempted PT txt, however, RN advising to hold as pt has been agitated throughout the day pulling on IV/lines. Will re-attempt once more appropriate for therapy services.

## 2022-11-10 NOTE — DISCHARGE PLANNING
Case Management Discharge Planning    Admission Date: 10/18/2022  GMLOS: 9.6  ALOS: 23    6-Clicks ADL Score: 12  6-Clicks Mobility Score: 15    Anticipated Discharge Dispo: Discharge Disposition: D/T to SNF with Medicare cert in anticipation of skilled care (03)      Action(s) Taken: RNCM participated in IDT rounds. In anticipation of discharge and patient being a difficult placement, RNCM extended referrals to [facility] in [facility] and to [facility] in [place].     Escalations Completed: None    Medically Clear: No    Next Steps: RNCM will continue to follow up on placement.     Barriers to Discharge: Medical clearance and Pending Placement    Is the patient up for discharge tomorrow: No

## 2022-11-10 NOTE — ASSESSMENT & PLAN NOTE
- Initial echo with preserved EF, then repeat echo with EF 40% - unclear if he was in RVR or other variable that would impact an acute decrease in his EF  - Not overloaded on exam, no acute exacerbation  - On metoprolol, add Losartan now as Bps remain stable

## 2022-11-10 NOTE — ASSESSMENT & PLAN NOTE
- Pt has failed swallow evals last week and this week - has NGT in place  - Failed MBSS today  - Multifactorial with likely Wernicke's, deconditioning from PNA and being on the vent, acute illness  - Poor long term prognosis given his cerebral atrophy, chronic EtOH abuse and methampetamine abuse likely contributing to his lack of swallow coordination.  - Treat Wernicke's with high dose Thiamine, will continue working with SLP  11/17/2022-patient had PEG tube placement with [facility] Dr. Sebastian Mcgrath    Repeat MBSS performed today, preliminary results showing patient continued to fail swallow test. Will need to change TF continuous to bolus in preparation for discharging. 11/23    Tolerating bolus feeds, will continue  Orderd DME for PEG feeds to boluses, placed orders for Rx

## 2022-11-10 NOTE — CONSULTS
Reason for PC Consult: Advance Care Planning    Consulted by:   Dr. Gutierrez    Assessment:  General:   57 year old male admitted for alcohol withdrawal on 10/18/22. Pt has a history of A-fib, asthma, eczema, HTN, gynecomastia, and current 1/4-1/2 pk/day smoking and alcohol, 1 pint vodka and 4-5 beers. Pt presented to [facility] ED under the advise of his PCP for alcohol detox.    Prior PC Consult:   None on File    Social:   Prior to admission, pt was living alone. He had recently lost his job due to absenteeism. Pt had stopped drinking due to lack of money, hence why he came to the hospital. He was independent with his ADLs, dtr Laura would provide support/help when needed. Pt has been a long term alcoholic, however when his wife  last year of liver failure, his drinking increased significantly and he started missing work.     Dyspnea: No, 96% on RA  Last BM: 22   Pain: No, Medicated per MAR  Depression: Mood appropriate for situation    Dementia: Unable to Determine       Spiritual:  Is Muslim or spirituality important for coping with this illness? Yes, Placed order for  visit  Has a  or spiritual provider visit been requested? Yes    Palliative Performance Scale: 30%    Advance Directive: None on File   DPOA: None on File  POLST: None on File    To locate the AD/POLST, please hover the cursor over the patient's code status to find all linked ACP documents.    Code Status: Full      Outcome:  PC RN met with Laura in family waiting room. Introduced self and role of PC. Discussed her understanding of clinical picture, she verbalized limited insight. Long discussion regarding hospitalization, clinical trajectory and current concerns. Answered questions and answers, Laura, before now, did not understand how severe pt's clinical picture was. She became tearful and verbalized understanding. She needs to call pt's family and provide updates as nobody really understood what is going  "on.    Discussed pt's ongoing confusion, long term vs short term SNF, long term vs short term feeding tubes, explained that TF does not limit the risk of pt aspirating on his own secretions. Explored thoughts and feelings about clinical picture and what pt's prior expressed preferences are. Laura has not had any specific discussions with pt regarding his medical preferences. She did express that pt would not want to live dependent in a nursing home, she does not want him suffering, and pt would want any chance to get better.    Discussed quality vs quantity of life,  benefits vs burdens of treatment and explored pt's definition of \"living\". Laura had difficulty in defining this for pt, she hopes that pt can get better but understands now that pt has a lot of barriers and potential complications preventing him from returning to his prior way of living.     Hospice Discussion: Briefly discussed home with hospice if pt were to not clinically improve. If Laura sees clinical decline, this will be re-addressed in detail.    Advanced Care Planning Documents: Per Laura, pt does not have any ACP documents. If pt is able to engage in GOC discussion, ACP documents will be address/completed as appropriate.    Provided Laura with business card, encouraged her to call with any questions or concerns.    Active listening, education, validation, normalization, therapeutic touch, and emotional support provided throughout encounter.    Updated:   Charge RN    Plan:   Continue GOC discussion as clinical picture unfolds.    Thank you for allowing Palliative Care to participate in this patient's care. Please feel free to call [phone] with any questions or concerns.   "

## 2022-11-10 NOTE — PROGRESS NOTES
Telemetry Shift Summary     Rhythm: Afib  Rate: 90s-110s  Measurements: -/0.08/-  Ectopy (reported by Monitor Tech): r-o PVC    Patient went into Afib with RVR 130s-150s at approximately 2200, Provider made aware.     Normal Values  Rhythm: Sinus  HR:   Measurements: 0.12-0.20/0.06-0.10/0.30-0.52

## 2022-11-10 NOTE — ASSESSMENT & PLAN NOTE
- Completed antibiotics, now on room air  - Has failed multiple swallow evals, currently receiving tube feeds via NGT  - Failed his MBSS - discussed PEG with pt's daughter, family wishes to move forward  - Dr Mcgrath placed PEG 11/17 - tolerated well  Repeat MBSS performed today 11/23, preliminary results showing patient continued to fail swallow test. Will need to change TF continuous to bolus in preparation for discharging.  CXR showed new small left pleural effusion.  Patient is starting to get more mobile.  Monitoring, holding ABX at this time as CBC did not show new infection and pt has remained afebrile.

## 2022-11-10 NOTE — ASSESSMENT & PLAN NOTE
- On the right, moderate-large per CXR of 10/30  - S/p diagnostic thoracentesis 10/25/22: albumin gradient is less than 1.2 g/dl which indicate exudates, his was 0.9, also inflammative effusion with very high LDH 1020 (serum in the 240s range)  - Cultures negative but pt was on antibiotics - did have chest tubes which have since been removed, completed antibiotic course.  resolved

## 2022-11-10 NOTE — ASSESSMENT & PLAN NOTE
- Pt with significant dementia symptoms, swallow dysfunction, cerebral atrophy on CT  - Completed high dose IV Thiamine, continue PO supplementation  - Continue ASA

## 2022-11-10 NOTE — CARE PLAN
The patient is Watcher - Medium risk of patient condition declining or worsening    Shift Goals  Clinical Goals: Neuro Status  Patient Goals: Comfort  Family Goals: stable    Progress made toward(s) clinical / shift goals:    Problem: Skin Integrity  Goal: Skin integrity is maintained or improved  Outcome: Progressing     Problem: Fall Risk  Goal: Patient will remain free from falls  Outcome: Progressing       Patient is not progressing towards the following goals:

## 2022-11-10 NOTE — PROGRESS NOTES
Hospital Medicine Daily Progress Note    Date of Service  11/10/2022    Chief Complaint  Tyree Whiteside is a 57 y.o. male admitted 10/18/2022 with AMS    Hospital Course  Pt is a 56yo with a history of atrial fibrillation, HTN, alcohol dependence with abuse, methamphetamine abuse and liver mass who initially presented to [facility] for help with EtOH detoxification but was sent to [facility] ER as he was having hallucinations, confusion, weakness, nausea and vomiting. He was admitted to ICU on 10/18/22 for EtOH withdrawal and respiratory depression and was placed on the ventilator and had a protracted ICU course - he required reintubation on 10/23 and again 10/26 after self extubation, diagnostic thoracentesis  and has had MSSA and Pseudomonas PNA, recurrent aspiration, bradycardia, hypotension, ileus, chest tube which has since been removed and loculated pleural effusion. His daughter initially made him DNR/DNI yesterday but then changed her mind. Palliative care met with the pt's daughter yesterday, he remains a full code.     Interval Problem Update  11/10 - Pt afebrile overnight but remains with some tachycardia - mild hypotension this am. He has completed 14d of Zosyn and Unasyn for coverage of empyema .To summarize his ID history - he had Pseudomonas on tracheal aspirate 10/31, MSSA and Pseudomonas on BAL 10/24 and pleural fluid was negative for growth on 10/30. Chest tubes are now out. Echo demonstrates an EF of 40% with global hypokinesis, CT brain shows cerebral atrophy. Will give IV thiamine, Wernicke's dosing.    Discussed ACP planning with pt's daughter and his sister today, as pt does not currently have capacity to dictate his own care.  I did discuss the nature of aspiration and recurrent aspiration with them, as well as the eventual need for a G tube if his swallow dysfunction persists, and advanced planning of what to do in the case of repeat aspiration.  Per Dr Luciano's note, if pt was reintubated,  he would require tracheostomy and feeding tube given his repeated intubations this stay.  The patient's family is clear that he would not want that, and they would like to pursue DNR/DNI status, but continue full support otherwise. I will change his status in our system and Palliative is filling out a POLST with the family.      Add free water flushes to his tube feeds via NGT given his relative hypotension and tachycardia.     I have discussed this patient's plan of care and discharge plan at IDT rounds today with Case Management, Nursing, Nursing leadership, and other members of the IDT team.    Consultants/Specialty  critical care    Code Status  Full Code    Disposition  Patient is not medically cleared for discharge.   Anticipate discharge to to a long-term acute care hospital.  I have placed the appropriate orders for post-discharge needs.    Review of Systems  Review of Systems   Unable to perform ROS: Medical condition   All other systems reviewed and are negative.     Physical Exam  Temp:  [36.1 °C (96.9 °F)-37.1 °C (98.7 °F)] 36.8 °C (98.2 °F)  Pulse:  [] 102  Resp:  [18-48] 24  BP: ()/(66-92) 98/72  SpO2:  [90 %-98 %] 95 %    Physical Exam  Vitals and nursing note reviewed.   Constitutional:       Appearance: He is ill-appearing. He is not toxic-appearing.   HENT:      Head: Normocephalic and atraumatic.      Nose:      Comments: NGT in place     Mouth/Throat:      Mouth: Mucous membranes are dry.   Cardiovascular:      Rate and Rhythm: Regular rhythm. Tachycardia present.      Heart sounds: No murmur heard.  Pulmonary:      Comments: Coarse breath sounds bilaterally    Abdominal:      General: Abdomen is flat. Bowel sounds are normal.      Palpations: Abdomen is soft.   Musculoskeletal:         General: No swelling.   Skin:     General: Skin is warm and dry.      Coloration: Skin is not pale.   Neurological:      Mental Status: He is alert. He is disoriented.      GCS: GCS eye subscore is 4.  GCS verbal subscore is 5. GCS motor subscore is 6.      Cranial Nerves: No cranial nerve deficit.      Motor: Weakness present.      Comments: Oriented to year and president, but significantly delayed responses, some inappropriate laughter to defer answer response, delayed verbal responses and inattentiveness     Psychiatric:         Speech: Speech is delayed.      Comments: Delayed verbal responses       Fluids    Intake/Output Summary (Last 24 hours) at 11/10/2022 0737  Last data filed at 11/9/2022 1600  Gross per 24 hour   Intake 695 ml   Output 900 ml   Net -205 ml       Laboratory  Recent Labs     11/08/22  0405 11/09/22  0152 11/10/22  0441   WBC 7.9 8.8 9.1   RBC 3.15* 3.55* 3.70*   HEMOGLOBIN 10.2* 11.4* 11.9*   HEMATOCRIT 31.2* 34.4* 34.7*   MCV 99.0* 96.9 93.8   MCH 32.4 32.1 32.2   MCHC 32.7* 33.1* 34.3   RDW 51.8* 50.8* 47.8   PLATELETCT 550* 602* 687*   MPV 10.2 10.7 9.9     Recent Labs     11/08/22  0405 11/09/22  0152 11/10/22  0441   SODIUM 137 135 133*   POTASSIUM 3.8 4.1 4.2   CHLORIDE 106 101 101   CO2 23 23 21   GLUCOSE 186* 87 95   BUN 8 11 16   CREATININE 0.53 0.47* 0.51   CALCIUM 7.5* 8.1* 8.3*                   Imaging  DX-CHEST-FOR LINE PLACEMENT Perform procedure in: Patient's Room   Final Result            1. A left peripherally inserted catheter with tip projects appropriately over the expected area of the lower SVC.      DX-CHEST-FOR LINE PLACEMENT Perform procedure in: Patient's Room   Final Result            1. A right peripherally inserted catheter with tip not well seen but likely projects over the expected area of the cavoatrial junction/right atrium.      IR-PICC LINE PLACEMENT W/ GUIDANCE > AGE 5   Final Result                  Ultrasound-guided PICC placement performed by qualified nursing staff as    above.          DX-CHEST-FOR LINE PLACEMENT Perform procedure in: Patient's Room   Final Result      1.  Interval placement of a PICC line which is satisfactory in position.      2.   Otherwise stable exam.      IR-PICC LINE PLACEMENT W/ GUIDANCE > AGE 5   Final Result                  Ultrasound-guided PICC placement performed by qualified nursing staff as    above.          DX-CHEST-PORTABLE (1 VIEW)   Final Result      1.  Stable bibasilar atelectasis, right greater than left.      2.  Stable right and improved left pleural effusion.      DX-CHEST-PORTABLE (1 VIEW)   Final Result      1.  Increased right pleural effusion, now small/moderate.   2.  Increased right base airspace disease.   3.  Similar small left pleural effusion with mildly increased adjacent airspace disease.      [id number] VIEW   Final Result      Mildly dilated loops of small intestine.      DX-CHEST-PORTABLE (1 VIEW)   Final Result      1.  Interval removal of the right sided chest tube.      2.  Stable bibasilar atelectasis.      3.  Interval decrease in size of a right pleural effusion.      DX-CHEST-PORTABLE (1 VIEW)   Final Result      1.  Stable support devices.      2.  Stable bilateral pleural effusions, right greater than left.      DX-CHEST-PORTABLE (1 VIEW)   Final Result      1.  Interval placement of RIGHT chest tube   2.  Moderate RIGHT hydropneumothorax   3.  Small LEFT pleural effusion   4.  Unchanged BILATERAL atelectasis with possible superimposed pneumonia or other airspace disease      DX-CHEST-PORTABLE (1 VIEW)   Final Result      1.  No significant change      2.  Lines and tubes appear appropriately located      3.  Right pleural effusion, probably large in size      4.  Bilateral atelectasis      EC-ECHOCARDIOGRAM LTD W/ CONT   Final Result      DX-CHEST-PORTABLE (1 VIEW)   Final Result         No significant interval change      CT-CHEST,ABDOMEN,PELVIS W/O   Final Result      1.  RIGHT larger than LEFT pleural effusions with overlying atelectasis. Superimposed pneumonia is not excluded.   2.  Healing fractures the RIGHT fifth and sixth ribs   3.  Distended loops of bowel in the abdomen as  described above, likely ileus rather than early or low-grade small bowel obstruction   4.  Enlarged inguinal lymph nodes   5.  The hypoechoic nodule in the liver demonstrated on ultrasound from 8/28/2022 is not seen on this unenhanced CT. Hepatic mass protocol CT or MRI should be pursued when clinically appropriate for further assessment.   6.  RIGHT renal cyst         [id number] VIEW   Final Result         1.  Air-filled distended loops of bowel are seen with reactive mucosal pattern, appearance suggests ileus or enteritis versus evolving obstructive changes. Recommend radiographic followup to resolution to exclude progression to obstruction.   2.  Nasogastric tube is coiled within the stomach, the tip terminates overlying the expected location of the gastric fundus.   3.  Bilateral lower lobe infiltrates   4.  Moderate right and small left pleural effusion      DX-CHEST-PORTABLE (1 VIEW)   Final Result         1.  Bilateral pulmonary edema and/or infiltrates.   2.  Moderate right and small left pleural effusion, stable compared to prior study   3.  Cardiomegaly      DX-CHEST-PORTABLE (1 VIEW)   Final Result         1.  Bilateral pulmonary edema and/or infiltrates.   2.  Moderate right and small left pleural effusion, stable compared to prior study   3.  Cardiomegaly      [id number] VIEW   Final Result         Diffuse gaseous distention of the small bowel and colon, worse in prior, could relate to ileus      DX-CHEST-PORTABLE (1 VIEW)   Final Result         1. No significant interval change.      DX-CHEST-PORTABLE (1 VIEW)   Final Result         1.  Bilateral pulmonary edema and/or infiltrates.   2.  Moderate right and small left pleural effusion, increased on the right compared to prior study   3.  Cardiomegaly      DX-CHEST-PORTABLE (1 VIEW)   Final Result         1.  Pulmonary edema and/or infiltrates are identified, which are stable since the prior exam.   2.  Moderate right pleural effusion, stable.   3.   Cardiomegaly      DX-CHEST-PORTABLE (1 VIEW)   Final Result      1.  Well-positioned ETT.   2.  Moderate right pleural effusion and associated atelectasis versus consolidation.   3.  Retrocardiac atelectasis versus consolidation. No significant left effusion.      [id number] VIEW   Final Result      NGT tip is in the stomach.   1.  Nonobstructive bowel gas pattern. Small amount of stool throughout the colon.   2.  Ill-defined right basilar atelectasis versus consolidation and small right pleural effusion.      DX-CHEST-PORTABLE (1 VIEW)   Final Result         1. Low lung volume with hypoventilatory change and bibasilar atelectasis.      CT-HEAD W/O   Final Result      1.  Cerebral atrophy.      2.  Otherwise, Head CT without contrast within normal limits. No evidence of acute cerebral infarction, hemorrhage or mass lesion.         DX-CHEST-FOR LINE PLACEMENT Perform procedure in: Patient's Room   Final Result      1.  Endotracheal tube overlies the mid trachea.      2.  NG tube courses beneath the diaphragms.      DX-CHEST-PORTABLE (1 VIEW)   Final Result      No radiographic evidence of acute cardiopulmonary process.      MR-BRAIN-W/O    (Results Pending)   MR-ABDOMEN-WITH & W/O    (Results Pending)        Assessment/Plan  Congestive heart failure (CHF) (HCC)  Assessment & Plan  - Initial echo with preserved EF, then repeat echo with EF 40% - unclear if he was in RVR or other variable that would impact an acute decrease in his EF  - Not overloaded on exam  - On metoprolol, add ARB when Bps will allow    Dysphagia  Assessment & Plan  - Pt has failed swallow evals last week and this week - has NGT in place  - Multifactorial with likely Wernicke's, deconditioning from PNA and being on the vent, acute illness  - Poor long term prognosis given his cerebral atrophy, chronic EtOH abuse and methampetamine abuse likely contributing to his lack of swallow coordination.  - Hoping that as his secretions decrease with  treatment of his PNA, he may have an easier time  - Treat Wernicke's with high dose Thiamine, will continue working with SLP  - Will discuss PEG tube in the future if he continues to fail his swallows     Aspiration pneumonia (HCC)  Assessment & Plan  - Completed antibiotics, now on room air  - Has failed multiple swallow evals, currently receiving tube feeds via NGT    Wernicke encephalopathy syndrome  Assessment & Plan  - Pt with significant dementia symptoms, swallow dysfunction, cerebral atrophy on CT  - Will treat with high dose IV Thiamine but suspect chronic damage is largely irreversible, has had continued decline over months per family    Hypomagnesemia  Assessment & Plan  - Jump IV     Reactive thrombocytosis  Assessment & Plan  - Continue to monitor       Sepsis due to Pseudomonas species (HCC)- (present on admission)  Assessment & Plan  This is Severe Sepsis Present on admission  SIRS criteria identified on my evaluation include: Fever, with temperature greater than 101 deg F, Tachycardia, with heart rate greater than 90 BPM, Tachypnea, with respirations greater than 20 per minute and Leukocytosis, with WBC greater than 12,000  Clinical indicators of end organ dysfunction include Systolic blood pressure (SBP) <90 mmHg or mean arterial pressure <65 mmHg and Acute respiratory failure as evidenced by a new need for invasive or non-invasive mechanical ventilation (Ventilator or BiPap)   Source is pneumonia by MSSA and pseudomonas  Sepsis protocol initiated  Crystalloid Fluid Administration: Patient has volume overload with >11 lt since admission, (NYHA class III or symptoms with minimal exertion, Or NYHA class IV or symptoms at rest or with activity), for this/these reason(s) it is unsafe for this patient to receive 30 mL/kg of fluid.  Partial Fluid Dose Given on 10/24, patient to be reassessed shortly after completion of partial fluid bolus to ensure adequacy of resuscitation  IV antibiotics as appropriate  "for source of sepsis  Reassessment: I have reassessed the patient's hemodynamic status  Will provide another partial bolus today and wean down the vasopressors     .  Pt had septic shock requiring pressor support, with MSSA and pseudomonas in BAL and tracheal aspirate. Blood cultures negative. Sepsis has resolved.     Empyema (HCC)- (present on admission)  Assessment & Plan  - On the right, moderate-large per CXR of 10/30  - S/p diagnostic thoracentesis 10/25/22: albumin gradient is less than 1.2 g/dl which indicate exudates, his was 0.9, also inflammative effusion with very high LDH 1020 (serum in the 240s range)  - Cultures negative but pt was on antibiotics - did have chest tubes which have since been removed, completed antibiotic course.     Acute respiratory failure with hypoxia (HCC)- (present on admission)  Assessment & Plan  -Intubated for respiratory acidosis and altered mental status with need for repeat intubation twice thereafter  -Cultures from sputum and BAL grew MSSA and pseudomonas, pt completed 14d of Zosyn, and an additional 1.5d of Unasyn for empyema  -also complicated by right empyema, s/p diagnostic thoracentesis  - Currently off antibiotics and on room air    Alcohol abuse with withdrawal (HCC)  Assessment & Plan  Severe withdrawal with respiratory depression requiring intubation on admit  Out of withdrawal window now     Liver mass- (present on admission)  Assessment & Plan  - Seen on US 8/2022 \"right lobe of the liver measuring 1.7 x 1.3 x 1.0 cm in size\"  - Will check AFP and MRI liver protocol    Atrial fibrillation (HCC)- (present on admission)  Assessment & Plan  Not on anticoagulation prior to admission - appears he was just diagnosed by PCP in June of this year and referred to Cardiology, but I don't see where he was seen by them  - [id number] score of 1-2 - new echo with EF of 40% but prior to that was 50%, and may have some effect of sepsis on EF   - Will start ASA for now  - Continue " Metoprolol for rate control, titrate up as needed     Other specified hypothyroidism- (present on admission)  Assessment & Plan  - TSH normal, continue Synthroid     Hyponatremia  Assessment & Plan  - Mild, monitor with addition of free water flushes       VTE prophylaxis: SCDs/TEDs and enoxaparin ppx    I have performed a physical exam and reviewed and updated ROS and Plan today (11/10/2022). In review of yesterday's note (11/9/2022), there are no changes except as documented above.

## 2022-11-10 NOTE — DISCHARGE PLANNING
Per RN CM ,DPA faxed [facility] referrals to [facility] .    Ag[facility]Facility Name: [facility]  Spoke To: Admissions  Outcome: Per Admissions, she provided a fax number for DPA to fax referral to, [phone]. DPA to manually fax referral to provided fax number.    [phone]  Agency/Facility Name: [facility]   Spoke To: Admissions  Outcome: Per Admissions, they are not contracted with the pt's insurance.   RN CM notified

## 2022-11-10 NOTE — ASSESSMENT & PLAN NOTE
Pt had septic shock requiring pressor support, with MSSA and pseudomonas in BAL and tracheal aspirate. Blood cultures negative. Sepsis has resolved.

## 2022-11-10 NOTE — PROGRESS NOTES
4 Eyes Skin Assessment Completed by CALLUM Cross and CALLUM Perkins.    Head WDL  Ears WDL  Nose Redness  Mouth WDL  Neck WDL  Breast/Chest Rash  Shoulder Blades Redness, Rash  Spine Redness, Rash  (R) Arm/Elbow/Hand Redness, Rash  (L) Arm/Elbow/Hand Redness, Rash  Abdomen Redness  Groin WDL  Scrotum/Coccyx/Buttocks WDL  (R) Leg Redness and Scab  (L) Leg Redness and Scab, Scratch  (R) Heel/Foot/Toe Flaky  (L) Heel/Foot/Toe Flaky          Devices In Places Tele Box and OG/NG      Interventions In Place N/A    Possible Skin Injury No    Pictures Uploaded Into Epic N/A  Wound Consult Placed N/A  RN Wound Prevention Protocol Ordered No

## 2022-11-11 ENCOUNTER — APPOINTMENT (OUTPATIENT)
Dept: RADIOLOGY | Facility: MEDICAL CENTER | Age: 57
DRG: 870 | End: 2022-11-11
Attending: FAMILY MEDICINE
Payer: COMMERCIAL

## 2022-11-11 PROBLEM — R45.1 AGITATION: Status: ACTIVE | Noted: 2022-11-11

## 2022-11-11 PROBLEM — D72.19 PERIPHERAL EOSINOPHILIA: Status: ACTIVE | Noted: 2022-11-11

## 2022-11-11 LAB
ANION GAP SERPL CALC-SCNC: 12 MMOL/L (ref 7–16)
BASOPHILS # BLD AUTO: 1.8 % (ref 0–1.8)
BASOPHILS # BLD: 0.16 K/UL (ref 0–0.12)
BUN SERPL-MCNC: 22 MG/DL (ref 8–22)
CALCIUM SERPL-MCNC: 8.3 MG/DL (ref 8.4–10.2)
CHLORIDE SERPL-SCNC: 105 MMOL/L (ref 96–112)
CO2 SERPL-SCNC: 22 MMOL/L (ref 20–33)
CREAT SERPL-MCNC: 0.5 MG/DL (ref 0.5–1.4)
EOSINOPHIL # BLD AUTO: 1.94 K/UL (ref 0–0.51)
EOSINOPHIL NFR BLD: 22.2 % (ref 0–6.9)
ERYTHROCYTE [DISTWIDTH] IN BLOOD BY AUTOMATED COUNT: 49 FL (ref 35.9–50)
GFR SERPLBLD CREATININE-BSD FMLA CKD-EPI: 120 ML/MIN/1.73 M 2
GLUCOSE BLD STRIP.AUTO-MCNC: 79 MG/DL (ref 65–99)
GLUCOSE BLD STRIP.AUTO-MCNC: 82 MG/DL (ref 65–99)
GLUCOSE BLD STRIP.AUTO-MCNC: 93 MG/DL (ref 65–99)
GLUCOSE SERPL-MCNC: 99 MG/DL (ref 65–99)
HCT VFR BLD AUTO: 34 % (ref 42–52)
HGB BLD-MCNC: 11.6 G/DL (ref 14–18)
HIV 1+2 AB+HIV1 P24 AG SERPL QL IA: NORMAL
IMM GRANULOCYTES # BLD AUTO: 0.06 K/UL (ref 0–0.11)
IMM GRANULOCYTES NFR BLD AUTO: 0.7 % (ref 0–0.9)
LYMPHOCYTES # BLD AUTO: 1.42 K/UL (ref 1–4.8)
LYMPHOCYTES NFR BLD: 16.3 % (ref 22–41)
MAGNESIUM SERPL-MCNC: 1.8 MG/DL (ref 1.5–2.5)
MCH RBC QN AUTO: 32.2 PG (ref 27–33)
MCHC RBC AUTO-ENTMCNC: 34.1 G/DL (ref 33.7–35.3)
MCV RBC AUTO: 94.4 FL (ref 81.4–97.8)
MONOCYTES # BLD AUTO: 1.08 K/UL (ref 0–0.85)
MONOCYTES NFR BLD AUTO: 12.4 % (ref 0–13.4)
NEUTROPHILS # BLD AUTO: 4.07 K/UL (ref 1.82–7.42)
NEUTROPHILS NFR BLD: 46.6 % (ref 44–72)
NRBC # BLD AUTO: 0 K/UL
NRBC BLD-RTO: 0 /100 WBC
PHOSPHATE SERPL-MCNC: 6 MG/DL (ref 2.5–4.5)
PLATELET # BLD AUTO: 615 K/UL (ref 164–446)
PMV BLD AUTO: 9.8 FL (ref 9–12.9)
POTASSIUM SERPL-SCNC: 4.2 MMOL/L (ref 3.6–5.5)
PROCALCITONIN SERPL-MCNC: 0.8 NG/ML
PROCALCITONIN SERPL-MCNC: 0.83 NG/ML
RBC # BLD AUTO: 3.6 M/UL (ref 4.7–6.1)
RHEUMATOID FACT SER IA-ACNC: 13 IU/ML (ref 0–14)
SODIUM SERPL-SCNC: 139 MMOL/L (ref 135–145)
WBC # BLD AUTO: 8.7 K/UL (ref 4.8–10.8)

## 2022-11-11 PROCEDURE — A9270 NON-COVERED ITEM OR SERVICE: HCPCS | Performed by: HOSPITALIST

## 2022-11-11 PROCEDURE — 87389 HIV-1 AG W/HIV-1&-2 AB AG IA: CPT

## 2022-11-11 PROCEDURE — 86038 ANTINUCLEAR ANTIBODIES: CPT

## 2022-11-11 PROCEDURE — 31720 CLEARANCE OF AIRWAYS: CPT

## 2022-11-11 PROCEDURE — 85025 COMPLETE CBC W/AUTO DIFF WBC: CPT

## 2022-11-11 PROCEDURE — 97112 NEUROMUSCULAR REEDUCATION: CPT

## 2022-11-11 PROCEDURE — A9270 NON-COVERED ITEM OR SERVICE: HCPCS | Performed by: INTERNAL MEDICINE

## 2022-11-11 PROCEDURE — 770020 HCHG ROOM/CARE - TELE (206)

## 2022-11-11 PROCEDURE — 83520 IMMUNOASSAY QUANT NOS NONAB: CPT

## 2022-11-11 PROCEDURE — 83516 IMMUNOASSAY NONANTIBODY: CPT | Mod: 91

## 2022-11-11 PROCEDURE — 71045 X-RAY EXAM CHEST 1 VIEW: CPT

## 2022-11-11 PROCEDURE — 82785 ASSAY OF IGE: CPT

## 2022-11-11 PROCEDURE — 700111 HCHG RX REV CODE 636 W/ 250 OVERRIDE (IP): Performed by: FAMILY MEDICINE

## 2022-11-11 PROCEDURE — 700105 HCHG RX REV CODE 258: Performed by: FAMILY MEDICINE

## 2022-11-11 PROCEDURE — 700102 HCHG RX REV CODE 250 W/ 637 OVERRIDE(OP): Performed by: INTERNAL MEDICINE

## 2022-11-11 PROCEDURE — 80048 BASIC METABOLIC PNL TOTAL CA: CPT

## 2022-11-11 PROCEDURE — 700102 HCHG RX REV CODE 250 W/ 637 OVERRIDE(OP): Performed by: HOSPITALIST

## 2022-11-11 PROCEDURE — 700111 HCHG RX REV CODE 636 W/ 250 OVERRIDE (IP): Performed by: INTERNAL MEDICINE

## 2022-11-11 PROCEDURE — 94760 N-INVAS EAR/PLS OXIMETRY 1: CPT

## 2022-11-11 PROCEDURE — 92526 ORAL FUNCTION THERAPY: CPT

## 2022-11-11 PROCEDURE — A9270 NON-COVERED ITEM OR SERVICE: HCPCS | Performed by: FAMILY MEDICINE

## 2022-11-11 PROCEDURE — 82962 GLUCOSE BLOOD TEST: CPT | Mod: 91

## 2022-11-11 PROCEDURE — 84100 ASSAY OF PHOSPHORUS: CPT

## 2022-11-11 PROCEDURE — 84145 PROCALCITONIN (PCT): CPT

## 2022-11-11 PROCEDURE — 83735 ASSAY OF MAGNESIUM: CPT

## 2022-11-11 PROCEDURE — 99233 SBSQ HOSP IP/OBS HIGH 50: CPT | Performed by: FAMILY MEDICINE

## 2022-11-11 PROCEDURE — 97530 THERAPEUTIC ACTIVITIES: CPT

## 2022-11-11 PROCEDURE — 86431 RHEUMATOID FACTOR QUANT: CPT

## 2022-11-11 PROCEDURE — 36415 COLL VENOUS BLD VENIPUNCTURE: CPT

## 2022-11-11 PROCEDURE — 700102 HCHG RX REV CODE 250 W/ 637 OVERRIDE(OP): Performed by: FAMILY MEDICINE

## 2022-11-11 RX ORDER — TRIAMCINOLONE ACETONIDE 1 MG/G
CREAM TOPICAL 2 TIMES DAILY
Status: DISCONTINUED | OUTPATIENT
Start: 2022-11-11 | End: 2022-11-29 | Stop reason: HOSPADM

## 2022-11-11 RX ORDER — METOPROLOL TARTRATE 50 MG/1
50 TABLET, FILM COATED ORAL TWICE DAILY
Status: DISCONTINUED | OUTPATIENT
Start: 2022-11-11 | End: 2022-11-12

## 2022-11-11 RX ORDER — QUETIAPINE FUMARATE 25 MG/1
25 TABLET, FILM COATED ORAL 2 TIMES DAILY
Status: DISCONTINUED | OUTPATIENT
Start: 2022-11-11 | End: 2022-11-12

## 2022-11-11 RX ADMIN — SENNOSIDES AND DOCUSATE SODIUM 2 TABLET: 50; 8.6 TABLET ORAL at 05:21

## 2022-11-11 RX ADMIN — TRIAMCINOLONE ACETONIDE: 1 CREAM TOPICAL at 17:20

## 2022-11-11 RX ADMIN — SENNOSIDES AND DOCUSATE SODIUM 2 TABLET: 50; 8.6 TABLET ORAL at 17:19

## 2022-11-11 RX ADMIN — ASPIRIN 81 MG CHEWABLE TABLET 81 MG: 81 TABLET CHEWABLE at 06:00

## 2022-11-11 RX ADMIN — NICOTINE 7 MG: 7 PATCH TRANSDERMAL at 05:20

## 2022-11-11 RX ADMIN — THIAMINE HYDROCHLORIDE 500 MG: 100 INJECTION, SOLUTION INTRAMUSCULAR; INTRAVENOUS at 08:51

## 2022-11-11 RX ADMIN — QUETIAPINE FUMARATE 25 MG: 25 TABLET, FILM COATED ORAL at 17:20

## 2022-11-11 RX ADMIN — METOPROLOL TARTRATE 50 MG: 50 TABLET, FILM COATED ORAL at 17:20

## 2022-11-11 RX ADMIN — QUETIAPINE FUMARATE 25 MG: 25 TABLET, FILM COATED ORAL at 10:19

## 2022-11-11 RX ADMIN — THIAMINE HYDROCHLORIDE 500 MG: 100 INJECTION, SOLUTION INTRAMUSCULAR; INTRAVENOUS at 20:33

## 2022-11-11 RX ADMIN — THIAMINE HYDROCHLORIDE 500 MG: 100 INJECTION, SOLUTION INTRAMUSCULAR; INTRAVENOUS at 15:05

## 2022-11-11 RX ADMIN — METOPROLOL TARTRATE 25 MG: 25 TABLET, FILM COATED ORAL at 05:21

## 2022-11-11 RX ADMIN — TRIAMCINOLONE ACETONIDE: 1 CREAM TOPICAL at 11:57

## 2022-11-11 RX ADMIN — LEVOTHYROXINE SODIUM 50 MCG: 50 TABLET ORAL at 05:21

## 2022-11-11 RX ADMIN — ENOXAPARIN SODIUM 40 MG: 40 INJECTION SUBCUTANEOUS at 17:19

## 2022-11-11 ASSESSMENT — COGNITIVE AND FUNCTIONAL STATUS - GENERAL
MOVING FROM LYING ON BACK TO SITTING ON SIDE OF FLAT BED: UNABLE
MOBILITY SCORE: 6
MOVING TO AND FROM BED TO CHAIR: UNABLE
SUGGESTED CMS G CODE MODIFIER MOBILITY: CN
STANDING UP FROM CHAIR USING ARMS: TOTAL
TURNING FROM BACK TO SIDE WHILE IN FLAT BAD: UNABLE
CLIMB 3 TO 5 STEPS WITH RAILING: TOTAL
WALKING IN HOSPITAL ROOM: TOTAL

## 2022-11-11 ASSESSMENT — GAIT ASSESSMENTS: GAIT LEVEL OF ASSIST: UNABLE TO PARTICIPATE

## 2022-11-11 ASSESSMENT — FIBROSIS 4 INDEX: FIB4 SCORE: 0.35

## 2022-11-11 NOTE — CARE PLAN
The patient is Watcher - Medium risk of patient condition declining or worsening    Shift Goals  Clinical Goals: Neuro Status  Patient Goals: Comfort  Family Goals: stable    Progress made toward(s) clinical / shift goals:    Problem: Safety - Medical Restraint  Goal: Remains free of injury from restraints (Restraint for Interference with Medical Device)  Outcome: Progressing     Problem: Fall Risk  Goal: Patient will remain free from falls  Outcome: Progressing       Patient is not progressing towards the following goals:

## 2022-11-11 NOTE — DISCHARGE PLANNING
[facility]  Outcome: Left a voicemail regarding referral status. DPA requested a call back.    Agency/Facility Name: [facility]  Outcome: Left a voicemail regarding referral status. DPA requested a call back.    Agency/Facility Name: [facility]  Spoke To: Krishna  Outcome: Per Krishna, the pt needs to be out of restraints for at least 48 hours. Per Krishna, there are currently no [facility] beds open at this time.  RN ANGEL notified

## 2022-11-11 NOTE — ASSESSMENT & PLAN NOTE
- Doing well with thiamine replacement and 25mg qpm Seroquel   Daytime somnolence -doing well after stopping daytime seroquel  Patient is calm and cooperative

## 2022-11-11 NOTE — THERAPY
Physical Therapy   Daily Treatment     Patient Name: Tyree Whiteside  Age:  57 y.o., Sex:  male  Medical Record #: [id number]  Today's Date: 11/11/2022     Precautions  Precautions: (P) Fall Risk;Swallow Precautions ( See Comments)  Comments: (P) restraints    Assessment    Pt is progressing slower than expected and continues to be restraint due to poor behavior and pulling on IV/lines and scratching/bleeding skin. Pt was unable to follow commands well today and required Max A for bed mobility and rolling in bed for repositioning. Pt required 3 person assist for safety concerns. Pt was provided with VC, facilitation, and postural facilitation in order to maintain sitting posture. Pt was able to maintain sitting for about 15 seconds at times with self supported posture, however, due to behavior he was fall backwards and would require Max A to come back to sitting position. Pt was limited in skilled therapy today due to poor participating and ability to follow. If patient continues to demonstrate with poor participation given cognitive status and is unable to follow commands, pt will be signed off of case load due to no likely benefiting from therapy services. Will continue to follow update plan of care.     Plan    Continue current treatment plan.    DC Equipment Recommendations: (P) Unable to determine at this time  Discharge Recommendations: (P) Recommend post-acute placement for additional physical therapy services prior to discharge home    Objective       11/11/22 1223   Precautions   Precautions Fall Risk;Swallow Precautions ( See Comments)   Comments restraints   Pain 0 - 10 Group   Therapist Pain Assessment Nurse Notified;0   Cognition    Cognition / Consciousness X   Speech/ Communication Delayed Responses   Level of Consciousness Alert   Safety Awareness Impaired;Impulsive   Attention Impaired   Comments pt unable to follow simple commands today and demonstrates with worsening cognitive status. Pt  was agitated throughout session and required frequent redirection   Balance   Sitting Balance (Static) Poor -   Sitting Balance (Dynamic) Trace +   Weight Shift Sitting Poor   Skilled Intervention Verbal Cuing;Postural Facilitation;Facilitation   Comments pt was able to maintain static balance for about 15 seconds, however, due to behavior and confusion pt tended to fall back posteriorly in bed   Gait Analysis   Gait Level Of Assist Unable to Participate   Weight Bearing Status fwb   Bed Mobility    Supine to Sit Maximal Assist   Sit to Supine Maximal Assist   Scooting Maximal Assist   Rolling Maximal Assist to Rt.;Maximum Assist to Lt.   Skilled Intervention Verbal Cuing;Sequencing   Comments 3 person assist for saftey concerns   Functional Mobility   Sit to Stand Unable to Participate   Bed, Chair, Wheelchair Transfer Unable to Participate   How much difficulty does the patient currently have...   Turning over in bed (including adjusting bedclothes, sheets and blankets)? 1   Sitting down on and standing up from a chair with arms (e.g., wheelchair, bedside commode, etc.) 1   Moving from lying on back to sitting on the side of the bed? 1   How much help from another person does the patient currently need...   Moving to and from a bed to a chair (including a wheelchair)? 1   Need to walk in a hospital room? 1   Climbing 3-5 steps with a railing? 1   6 clicks Mobility Score 6   Activity Tolerance   Sitting in Chair NT   Sitting Edge of Bed 5 mins   Standing NT   Comments limited due to behavior   Patient / Family Goals    Patient / Family Goal #1 none stated   Short Term Goals    Short Term Goal # 1 pt will go supine<>sit w/hob elevated and rails up w/Min A in 6tx   Goal Outcome # 1 goal not met   Short Term Goal # 2 pt will go sit<>stand w/fww w/Min A in 6tx   Goal Outcome # 2 Goal not met   Short Term Goal # 3 pt will transfer bed<>chair w/fww w/Mod A in 6tx   Goal Outcome # 3 Goal not met   Education Group   Role  of Physical Therapist Patient Response Patient;Acceptance;Explanation;Demonstration;Verbal Demonstration;Action Demonstration   Anticipated Discharge Equipment and Recommendations   DC Equipment Recommendations Unable to determine at this time   Discharge Recommendations Recommend post-acute placement for additional physical therapy services prior to discharge home   Interdisciplinary Plan of Care Collaboration   IDT Collaboration with  Nursing   Patient Position at End of Therapy In Bed;Call Light within Reach;Tray Table within Reach;Phone within Reach;Bed Alarm On;Wrist Restraints Applied   Collaboration Comments aware of visit and recs   Session Information   Date / Session Number  11/11-2 (2/3, 11/13)

## 2022-11-11 NOTE — CONSULTS
ETHICS CONSULT NOTE  Patient Name: Isac Menchaca  MRN: [id number]  DATE OF SERVICE: 11/11/2022     An ethics consultation was requested for the patient to assist with determining the goals of care including code status for a patient that lacks cacpity to make medical decisions. Palliative Care executed a POLST form with the patient's daughter establishing code status and treatment goals. There are no current ethical issues per the treatment team.  Please contact the Ethics Committee if there is a need for an ethics consultation.   Thank you for involving the Clinical Ethics Committee in the care of this patient. Please let me know if you have any other questions or concerns.     Respectfully submitted,   Maureen Rendon RN, MSN [facility]  Ethics Consultant  Office [phone]  Cell [phone] or Voalte

## 2022-11-11 NOTE — CARE PLAN
The patient is Watcher - Medium risk of patient condition declining or worsening    Shift Goals  Clinical Goals: Neuro Status  Patient Goals: Comfort  Family Goals: stable    Progress made toward(s) clinical / shift goals:  pt agitated and safety risk. Requiring restraints     Patient is not progressing towards the following goals:

## 2022-11-11 NOTE — ASSESSMENT & PLAN NOTE
Ddx includes HES, Churg Greta, HIV, parasite infection (less likely as no significant travel), eosinophilic malignancy. 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  - Jak2 and BCRABL, peripheral flow cytometry pending  - f/u with [facility] after discharge from [facility] on results

## 2022-11-11 NOTE — THERAPY
Speech Language Pathology  Daily Treatment     Patient Name: Tyree Whiteside  Age:  57 y.o., Sex:  male  Medical Record #: [id number]  Today's Date: 11/11/2022     Precautions  Precautions: (P) Fall Risk, Swallow Precautions ( See Comments), Nasogastric Tube    Assessment    Pt seen on this date for dysphagia therapy. Pt with significant audible upper airway congestion which was unable to clear despite use of Yankour and appears more orally adverse today. Contacted RT for possible deep suctioning if appropriate. Oral care provided to pt. Immediate coughing and wet vocal quality after the swallow noted with all ice chip trials which is highly concerning for penetration/aspiration. Pt unable to clear secretions despite max cues to cough and use Yankour. Pt intermittently following directives, however, less responsive to questions today compared to previous sessions. At this time, continue to recommend NPO with NG for nutrition. Pt will benefit from a repeat diagnostic when appropriate, however, given increased agitation and difficulty following directions, pt is not appropriate for a diagnostic at this time. Recommendations, dysphagia education, and role of SLP education provided to pt's daughter and she verbalized good understanding. SLP to follow.    Plan    1) continue NPO with NG, okay for up to 5 ice chips an hour with RN to minimize xerostomia and maintain integrity of the swallow  2) will need repeat diagnostic when appropriate but currently not appropriate at this time    Continue current treatment plan.    Discharge Recommendations: (P) Recommend post-acute placement for additional speech therapy services prior to discharge home         Objective       11/11/22 0956   Precautions   Precautions Fall Risk;Swallow Precautions ( See Comments);Nasogastric Tube   Vitals   O2 (LPM) 5   O2 Delivery Device Silicone Nasal Cannula   Pain 0 - 10 Group   Therapist Pain Assessment Post Activity Pain Same as Prior  "to Activity;Nurse Notified;0   Patient / Family Goals   Patient / Family Goal #1 \"I could eat half of that ice\"   Goal #1 Outcome Progressing slower than expected   Short Term Goals   Short Term Goal # 1 Pt will participate in an instrumental swallow study to fully assess swallow function.   Goal Outcome # 1 Goal met   Short Term Goal # 2 Pt will consume pre-feeding trials with SLP to determine readiness to begin an oral diet.   Goal Outcome # 2  Progressing slower than expected   Education Group   Education Provided Dysphagia   Dysphagia Patient Response Family;Acceptance;Explanation;Patient;Reinforcement Needed;No Learning Evidence   Role of SLP Patient Response Family;Acceptance;Explanation;Verbal Demonstration;Patient;Reinforcement Needed;No Learning Evidence   Anticipated Discharge Needs   Discharge Recommendations Recommend post-acute placement for additional speech therapy services prior to discharge home   Therapy Recommendations Upon DC Dysphagia Training;Community Re-Integration;Patient / Family / Caregiver Education   Interdisciplinary Plan of Care Collaboration   IDT Collaboration with  Nursing;Family / Caregiver   Patient Position at End of Therapy Seated;In Bed;Call Light within Reach;Tray Table within Reach;Phone within Reach     "

## 2022-11-11 NOTE — THERAPY
Missed Therapy     Patient Name: Tyree Whiteside  Age:  57 y.o., Sex:  male  Medical Record #: [id number]  Today's Date: 11/11/2022    Discussed missed therapy with RN     11/11/22 0802   Interdisciplinary Plan of Care Collaboration   IDT Collaboration with  Nursing   Collaboration Comments Pt continues to be agitated/confused throughout the day pulling on IV/lines. Will re-attempt once more appropriate for therapy services.

## 2022-11-11 NOTE — PROGRESS NOTES
Telemetry Shift Summary     Rhythm: Afib  Rate: 90s-110s  Measurements: -/0.10/-  Ectopy (reported by Monitor Tech): rPVC     Normal Values  Rhythm: Sinus  HR:   Measurements: 0.12-0.20/0.06-0.10/0.30-0.52

## 2022-11-11 NOTE — PROGRESS NOTES
Hospital Medicine Daily Progress Note    Date of Service  11/11/2022    Chief Complaint  Tyree Whiteside is a 57 y.o. male admitted 10/18/2022 with AMS    Hospital Course  Pt is a 56yo with a history of atrial fibrillation, HTN, alcohol dependence with abuse, methamphetamine abuse and liver mass who initially presented to [facility] for help with EtOH detoxification but was sent to [facility] ER as he was having hallucinations, confusion, weakness, nausea and vomiting. He was admitted to ICU on 10/18/22 for EtOH withdrawal and respiratory depression and was placed on the ventilator and had a protracted ICU course - he required reintubation on 10/23 and again 10/26 after self extubation, diagnostic thoracentesis  and has had MSSA and Pseudomonas PNA, recurrent aspiration, bradycardia, hypotension, ileus, chest tube which has since been removed and loculated pleural effusion. His daughter initially made him DNR/DNI yesterday but then changed her mind. Palliative care met with the pt's daughter yesterday, he remains a full code.     Interval Problem Update  11/10 - Pt afebrile overnight but remains with some tachycardia - mild hypotension this am. He has completed 14d of Zosyn and Unasyn for coverage of empyema .To summarize his ID history - he had Pseudomonas on tracheal aspirate 10/31, MSSA and Pseudomonas on BAL 10/24 and pleural fluid was negative for growth on 10/30. Chest tubes are now out. Echo demonstrates an EF of 40% with global hypokinesis, CT brain shows cerebral atrophy. Will give IV thiamine, Wernicke's dosing.    Discussed ACP planning with pt's daughter and his sister today, as pt does not currently have capacity to dictate his own care.  I did discuss the nature of aspiration and recurrent aspiration with them, as well as the eventual need for a G tube if his swallow dysfunction persists, and advanced planning of what to do in the case of repeat aspiration.  Per Dr Luciano's note, if pt was reintubated,  he would require tracheostomy and feeding tube given his repeated intubations this stay.  The patient's family is clear that he would not want that, and they would like to pursue DNR/DNI status, but continue full support otherwise. I will change his status in our system and Palliative is filling out a POLST with the family.      Add free water flushes to his tube feeds via NGT given his relative hypotension and tachycardia.     11/11 - Pt with intermittent tachycardia, Tm to 99.2 and was placed on 5L O2 last night without documentation of any hypoxia. Had poor waveform on pulse ox today, replaced with ear probe and pt fine on room air.  No white count elevation, will check CXR and procalcitonin. Eosinophilia increasing - ddx includes HES, Churg Greta, HIV, parasite infection (less likely as no recent travel), eosinophilic malignancy. Will check a tryptase, RUBIA, ANCA, HIV, O&P, RF, IgE and cortisol level. Reordered home eczema cream. Needs to be out of restraints for SNF or LTAC, will change Zyprexa to Seroquel BID. Increase metoprolol for RVR. Pt's agitation is increased today, very impulsive, at risk of pulling out NG and PICC if not in restraints.      I have discussed this patient's plan of care and discharge plan at IDT rounds today with Case Management, Nursing, Nursing leadership, and other members of the IDT team.    Consultants/Specialty  critical care    Code Status  DNAR/DNI    Disposition  Patient is not medically cleared for discharge.   Anticipate discharge to to a long-term acute care hospital.  I have placed the appropriate orders for post-discharge needs.    Review of Systems  Review of Systems   Unable to perform ROS: Medical condition   All other systems reviewed and are negative.     Physical Exam  Temp:  [36.1 °C (97 °F)-37.3 °C (99.2 °F)] 36.9 °C (98.4 °F)  Pulse:  [] 108  Resp:  [16-24] 20  BP: (117-129)/() 117/77  SpO2:  [92 %-100 %] 94 %    Physical Exam  Vitals and nursing note  reviewed.   Constitutional:       Appearance: He is ill-appearing. He is not toxic-appearing.   HENT:      Head: Normocephalic and atraumatic.      Nose:      Comments: NGT in place     Mouth/Throat:      Mouth: Mucous membranes are dry.   Cardiovascular:      Rate and Rhythm: Regular rhythm. Tachycardia present.      Heart sounds: No murmur heard.  Pulmonary:      Comments: Coarse breath sounds bilaterally    Abdominal:      General: Abdomen is flat. Bowel sounds are normal.      Palpations: Abdomen is soft.   Musculoskeletal:         General: No swelling.   Skin:     General: Skin is warm and dry.      Coloration: Skin is not pale.   Neurological:      Mental Status: He is alert. He is disoriented.      GCS: GCS eye subscore is 4. GCS verbal subscore is 5. GCS motor subscore is 6.      Cranial Nerves: No cranial nerve deficit.      Motor: Weakness present.      Comments: Oriented to year and president, but significantly delayed responses, some inappropriate laughter to defer answer response, delayed verbal responses and inattentiveness     Psychiatric:         Speech: Speech is delayed.      Comments: Delayed verbal responses       Fluids    Intake/Output Summary (Last 24 hours) at 11/11/2022 0737  Last data filed at 11/11/2022 0531  Gross per 24 hour   Intake 937.1 ml   Output 850 ml   Net 87.1 ml         Laboratory  Recent Labs     11/09/22  0152 11/10/22  0441 11/11/22  0325   WBC 8.8 9.1 8.7   RBC 3.55* 3.70* 3.60*   HEMOGLOBIN 11.4* 11.9* 11.6*   HEMATOCRIT 34.4* 34.7* 34.0*   MCV 96.9 93.8 94.4   MCH 32.1 32.2 32.2   MCHC 33.1* 34.3 34.1   RDW 50.8* 47.8 49.0   PLATELETCT 602* 687* 615*   MPV 10.7 9.9 9.8       Recent Labs     11/09/22  0152 11/10/22  0441 11/11/22  0325   SODIUM 135 133* 139   POTASSIUM 4.1 4.2 4.2   CHLORIDE 101 101 105   CO2 23 21 22   GLUCOSE 87 95 99   BUN 11 16 22   CREATININE 0.47* 0.51 0.50   CALCIUM 8.1* 8.3* 8.3*                     Imaging  DX-CHEST-FOR LINE PLACEMENT Perform  procedure in: Patient's Room   Final Result            1. A left peripherally inserted catheter with tip projects appropriately over the expected area of the lower SVC.      DX-CHEST-FOR LINE PLACEMENT Perform procedure in: Patient's Room   Final Result            1. A right peripherally inserted catheter with tip not well seen but likely projects over the expected area of the cavoatrial junction/right atrium.      IR-PICC LINE PLACEMENT W/ GUIDANCE > AGE 5   Final Result                  Ultrasound-guided PICC placement performed by qualified nursing staff as    above.          DX-CHEST-FOR LINE PLACEMENT Perform procedure in: Patient's Room   Final Result      1.  Interval placement of a PICC line which is satisfactory in position.      2.  Otherwise stable exam.      IR-PICC LINE PLACEMENT W/ GUIDANCE > AGE 5   Final Result                  Ultrasound-guided PICC placement performed by qualified nursing staff as    above.          DX-CHEST-PORTABLE (1 VIEW)   Final Result      1.  Stable bibasilar atelectasis, right greater than left.      2.  Stable right and improved left pleural effusion.      DX-CHEST-PORTABLE (1 VIEW)   Final Result      1.  Increased right pleural effusion, now small/moderate.   2.  Increased right base airspace disease.   3.  Similar small left pleural effusion with mildly increased adjacent airspace disease.      [id number] VIEW   Final Result      Mildly dilated loops of small intestine.      DX-CHEST-PORTABLE (1 VIEW)   Final Result      1.  Interval removal of the right sided chest tube.      2.  Stable bibasilar atelectasis.      3.  Interval decrease in size of a right pleural effusion.      DX-CHEST-PORTABLE (1 VIEW)   Final Result      1.  Stable support devices.      2.  Stable bilateral pleural effusions, right greater than left.      DX-CHEST-PORTABLE (1 VIEW)   Final Result      1.  Interval placement of RIGHT chest tube   2.  Moderate RIGHT hydropneumothorax   3.  Small LEFT  pleural effusion   4.  Unchanged BILATERAL atelectasis with possible superimposed pneumonia or other airspace disease      DX-CHEST-PORTABLE (1 VIEW)   Final Result      1.  No significant change      2.  Lines and tubes appear appropriately located      3.  Right pleural effusion, probably large in size      4.  Bilateral atelectasis      EC-ECHOCARDIOGRAM LTD W/ CONT   Final Result      DX-CHEST-PORTABLE (1 VIEW)   Final Result         No significant interval change      CT-CHEST,ABDOMEN,PELVIS W/O   Final Result      1.  RIGHT larger than LEFT pleural effusions with overlying atelectasis. Superimposed pneumonia is not excluded.   2.  Healing fractures the RIGHT fifth and sixth ribs   3.  Distended loops of bowel in the abdomen as described above, likely ileus rather than early or low-grade small bowel obstruction   4.  Enlarged inguinal lymph nodes   5.  The hypoechoic nodule in the liver demonstrated on ultrasound from 8/28/2022 is not seen on this unenhanced CT. Hepatic mass protocol CT or MRI should be pursued when clinically appropriate for further assessment.   6.  RIGHT renal cyst         [id number] VIEW   Final Result         1.  Air-filled distended loops of bowel are seen with reactive mucosal pattern, appearance suggests ileus or enteritis versus evolving obstructive changes. Recommend radiographic followup to resolution to exclude progression to obstruction.   2.  Nasogastric tube is coiled within the stomach, the tip terminates overlying the expected location of the gastric fundus.   3.  Bilateral lower lobe infiltrates   4.  Moderate right and small left pleural effusion      DX-CHEST-PORTABLE (1 VIEW)   Final Result         1.  Bilateral pulmonary edema and/or infiltrates.   2.  Moderate right and small left pleural effusion, stable compared to prior study   3.  Cardiomegaly      DX-CHEST-PORTABLE (1 VIEW)   Final Result         1.  Bilateral pulmonary edema and/or infiltrates.   2.  Moderate  right and small left pleural effusion, stable compared to prior study   3.  Cardiomegaly      [id number] VIEW   Final Result         Diffuse gaseous distention of the small bowel and colon, worse in prior, could relate to ileus      DX-CHEST-PORTABLE (1 VIEW)   Final Result         1. No significant interval change.      DX-CHEST-PORTABLE (1 VIEW)   Final Result         1.  Bilateral pulmonary edema and/or infiltrates.   2.  Moderate right and small left pleural effusion, increased on the right compared to prior study   3.  Cardiomegaly      DX-CHEST-PORTABLE (1 VIEW)   Final Result         1.  Pulmonary edema and/or infiltrates are identified, which are stable since the prior exam.   2.  Moderate right pleural effusion, stable.   3.  Cardiomegaly      DX-CHEST-PORTABLE (1 VIEW)   Final Result      1.  Well-positioned ETT.   2.  Moderate right pleural effusion and associated atelectasis versus consolidation.   3.  Retrocardiac atelectasis versus consolidation. No significant left effusion.      [id number] VIEW   Final Result      NGT tip is in the stomach.   1.  Nonobstructive bowel gas pattern. Small amount of stool throughout the colon.   2.  Ill-defined right basilar atelectasis versus consolidation and small right pleural effusion.      DX-CHEST-PORTABLE (1 VIEW)   Final Result         1. Low lung volume with hypoventilatory change and bibasilar atelectasis.      CT-HEAD W/O   Final Result      1.  Cerebral atrophy.      2.  Otherwise, Head CT without contrast within normal limits. No evidence of acute cerebral infarction, hemorrhage or mass lesion.         DX-CHEST-FOR LINE PLACEMENT Perform procedure in: Patient's Room   Final Result      1.  Endotracheal tube overlies the mid trachea.      2.  NG tube courses beneath the diaphragms.      DX-CHEST-PORTABLE (1 VIEW)   Final Result      No radiographic evidence of acute cardiopulmonary process.      MR-BRAIN-W/O    (Results Pending)   MR-ABDOMEN-WITH & W/O     (Results Pending)   DX-CHEST-PORTABLE (1 VIEW)    (Results Pending)          Assessment/Plan  Agitation  Assessment & Plan  - Will change Zyprexa to BID Seroquel and monitor results    Peripheral eosinophilia  Assessment & Plan  Eosinophilia increasing - ddx includes HES, Churg Greta, HIV, parasite infection (less likely), eosinophilic malignancy. Will check a tryptase, RUBIA, ANCA, HIV, O&P, RF, IgE and cortisol level    Congestive heart failure (CHF) (HCC)  Assessment & Plan  - Initial echo with preserved EF, then repeat echo with EF 40% - unclear if he was in RVR or other variable that would impact an acute decrease in his EF  - Not overloaded on exam  - On metoprolol, add ARB when Bps will allow    Dysphagia  Assessment & Plan  - Pt has failed swallow evals last week and this week - has NGT in place  - Multifactorial with likely Wernicke's, deconditioning from PNA and being on the vent, acute illness  - Poor long term prognosis given his cerebral atrophy, chronic EtOH abuse and methampetamine abuse likely contributing to his lack of swallow coordination.  - Hoping that as his secretions decrease with treatment of his PNA, he may have an easier time  - Treat Wernicke's with high dose Thiamine, will continue working with SLP  - Will discuss PEG tube in the future if he continues to fail his swallows     Aspiration pneumonia (HCC)  Assessment & Plan  - Completed antibiotics, now on room air  - Has failed multiple swallow evals, currently receiving tube feeds via NGT    Wernicke encephalopathy syndrome  Assessment & Plan  - Pt with significant dementia symptoms, swallow dysfunction, cerebral atrophy on CT  - Will treat with high dose IV Thiamine but suspect chronic damage is largely irreversible, has had continued decline over months per family    Hypomagnesemia  Assessment & Plan  - Replace as needed    Reactive thrombocytosis  Assessment & Plan  - Continue to monitor       Sepsis due to Pseudomonas species ([facility])-  (present on admission)  Assessment & Plan  This is Severe Sepsis Present on admission  SIRS criteria identified on my evaluation include: Fever, with temperature greater than 101 deg F, Tachycardia, with heart rate greater than 90 BPM, Tachypnea, with respirations greater than 20 per minute and Leukocytosis, with WBC greater than 12,000  Clinical indicators of end organ dysfunction include Systolic blood pressure (SBP) <90 mmHg or mean arterial pressure <65 mmHg and Acute respiratory failure as evidenced by a new need for invasive or non-invasive mechanical ventilation (Ventilator or BiPap)   Source is pneumonia by MSSA and pseudomonas  Sepsis protocol initiated  Crystalloid Fluid Administration: Patient has volume overload with >11 lt since admission, (NYHA class III or symptoms with minimal exertion, Or NYHA class IV or symptoms at rest or with activity), for this/these reason(s) it is unsafe for this patient to receive 30 mL/kg of fluid.  Partial Fluid Dose Given on 10/24, patient to be reassessed shortly after completion of partial fluid bolus to ensure adequacy of resuscitation  IV antibiotics as appropriate for source of sepsis  Reassessment: I have reassessed the patient's hemodynamic status  Will provide another partial bolus today and wean down the vasopressors     .  Pt had septic shock requiring pressor support, with MSSA and pseudomonas in BAL and tracheal aspirate. Blood cultures negative. Sepsis has resolved.     Empyema (HCC)- (present on admission)  Assessment & Plan  - On the right, moderate-large per CXR of 10/30  - S/p diagnostic thoracentesis 10/25/22: albumin gradient is less than 1.2 g/dl which indicate exudates, his was 0.9, also inflammative effusion with very high LDH 1020 (serum in the 240s range)  - Cultures negative but pt was on antibiotics - did have chest tubes which have since been removed, completed antibiotic course.     Acute respiratory failure with hypoxia (HCC)- (present on  "admission)  Assessment & Plan  -Intubated for respiratory acidosis and altered mental status with need for repeat intubation twice thereafter  -Cultures from sputum and BAL grew MSSA and pseudomonas, pt completed 14d of Zosyn, and an additional 1.5d of Unasyn for empyema  -also complicated by right empyema, s/p diagnostic thoracentesis  - Currently off antibiotics, back on room air after being placed on 5L last evening.     Alcohol abuse with withdrawal (HCC)  Assessment & Plan  Severe withdrawal with respiratory depression requiring intubation on admit  Out of withdrawal window now     Liver mass- (present on admission)  Assessment & Plan  - Seen on US 8/2022 \"right lobe of the liver measuring 1.7 x 1.3 x 1.0 cm in size\"  - Will check AFP  - Pt will not tolerate MRI at this time - too agitated, high risk for aspiration    Atrial fibrillation (HCC)- (present on admission)  Assessment & Plan  Not on anticoagulation prior to admission - appears he was just diagnosed by PCP in June of this year and referred to Cardiology, but I don't see where he was seen by them  - Saeci2Sfpm score of 1-2 - new echo with EF of 40% but prior to that was 50%, and may have some effect of sepsis on EF   - Will start ASA for now  - Continue Metoprolol for rate control, titrate up today for RVR    Other specified hypothyroidism- (present on admission)  Assessment & Plan  - TSH normal, continue Synthroid     Rash- (present on admission)  Assessment & Plan  - Pt with pathology positive eczema, order his home triamcinolone cream    Hyponatremia  Assessment & Plan  - Resolved with free water flushes, pt was dry       VTE prophylaxis: SCDs/TEDs and enoxaparin ppx    I have performed a physical exam and reviewed and updated ROS and Plan today (11/11/2022). In review of yesterday's note (11/10/2022), there are no changes except as documented above.        "

## 2022-11-11 NOTE — RESPIRATORY CARE
"   COPD EDUCATION by COPD CLINICAL EDUCATOR  11/11/2022 at 7:20 AM by Abida Olmos, RRT     Patient reviewed by COPD education team. Patient does not have a history or diagnosis of COPD. Patient is a smoker, smoking cessation education unable to perform due to pt condition to participate.    COPD Screen  COPD Risk Screening  Do you have a history of COPD?: No  COPD Population Screener  During the past 4 weeks, how much did you feel short of breath?: None/Little of the time  Do you ever cough up any mucus or phlegm?: No/only with occasional colds or infections  In the past 12 months, you do less than you used to because of your breathing problems: Disagree/unsure  Have you smoked at least 100 cigarettes in your entire life?: Yes  How old are you?: 50-59  COPD Screening Score: 3  COPD Coordinator Not Recommended: Yes    COPD Assessment  COPD Clinical Specialists ONLY  COPD Education Initiated: No--Quick Screen (pt AMS -hx of A- fib, HTN, alcohol dependence w/abuse, methamphetamine abuse, liver mass, EtOH detoxification but was sent to ER as he was having hallucinations, confusion, weakness, nausea and vomiting - unable to provide education. Seen by Palliative.)  Is this a COPD exacerbation patient?: No    PFT Results    No results found for: PFT    Meds to Beds  Would the patient like to opt in for Bedside Medication Delivery at Discharge?: Unable to ask     MY COPD ACTION PLAN     It is recommended that patients and physicians /healthcare providers complete this action plan together. This plan should be discussed at each physician visit and updated as needed.    The green, yellow and red zones show groups of symptoms of COPD. This list of symptoms is not comprehensive, and you may experience other symptoms. In the \"Actions\" column, your healthcare provider has recommended actions for you to take based on your symptoms.    Patient Name: Tyree Whiteside   YOB: 1965   Last Updated on:  " "   Green Zone:  I am doing well today Actions     Usual activitiy and exercise level   Take daily medications     Usual amounts of cough and phlegm/mucus   Use oxygen as prescribed     Sleep well at night   Continue regular exercise/diet plan     Appetite is good   At all times avoid cigarette smoke, inhaled irritants     Daily Medications (these medications are taken every day):                Yellow Zone:  I am having a bad day or a COPD flare Actions     More breathless than usual   Continue daily medications     I have less energy for my daily activities   Use quick relief inhaler as ordered     Increased or thicker phlegm/mucus   Use oxygen as prescribed     Using quick relief inhaler/nebulizer more often   Get plenty of rest     Swelling of ankles more than usual   Use pursed lip breathing     More coughing than usual   At all times avoid cigarette smoke, inhaled irritants     I feel like I have a \"chest cold\"     Poor sleep and my symptoms woke me up     My appetite is not good     My medicine is not helping      Call provider immediately if symptoms don’t improve     Continue daily medications, add rescue medications:               Medications to be used during a flare up, (as Discussed with Provider):              Red Zone:  I need urgent medical care Actions     Severe shortness of breath even at rest   Call 911 or seek medical care immediately     Not able to do any activity because of breathing      Fever or shaking chills      Feeling confused or very drowsy       Chest pains      Coughing up blood                  "

## 2022-11-11 NOTE — DISCHARGE PLANNING
Case Management Discharge Planning    Admission Date: 10/18/2022  GMLOS: 9.6  ALOS: 24    6-Clicks ADL Score: 12  6-Clicks Mobility Score: 15  PT and/or OT Eval ordered: Yes  Post-acute Referrals Ordered: Yes  Post-acute Choice Obtained: Yes  Has referral(s) been sent to post-acute provider:  Yes      Anticipated Discharge Dispo: Discharge Disposition: D/T to SNF with Medicare cert in anticipation of skilled care (03)    DME Needed: No    Action(s) Taken: per Krishna Marie in [place], patient would need to be out of restraints for 48 hours in order for them to consider accepting patient.     Escalations Completed: None    Medically Clear: No    Next Steps: RNCM will continue to follow up with Ariel regarding patients current status.     Barriers to Discharge: Medical clearance    Is the patient up for discharge tomorrow: No

## 2022-11-11 NOTE — PALLIATIVE CARE
Palliative Care follow-up  Called dtr Laura to check in, she verbalized appreciation for all the updates she has been getting. She understands pt's clinical picture now, she did complete a POLST with DAHLIA Ann.     Laura denies any needs at this time.    Active listening, education, validation, normalization, therapeutic touch, and emotional support provided.     Plan:   Continue GOC discussion as clinical picture unfolds.       Thank you for allowing Palliative Care to participate in this patient's care. Please feel free to call [phone] with any questions or concerns.

## 2022-11-12 LAB
AFP-TM SERPL-MCNC: 2 NG/ML (ref 0–9)
ANION GAP SERPL CALC-SCNC: 10 MMOL/L (ref 7–16)
BASOPHILS # BLD AUTO: 1.4 % (ref 0–1.8)
BASOPHILS # BLD: 0.12 K/UL (ref 0–0.12)
BUN SERPL-MCNC: 26 MG/DL (ref 8–22)
CALCIUM SERPL-MCNC: 8.7 MG/DL (ref 8.4–10.2)
CHLORIDE SERPL-SCNC: 105 MMOL/L (ref 96–112)
CO2 SERPL-SCNC: 23 MMOL/L (ref 20–33)
CORTIS SERPL-MCNC: 10.1 UG/DL (ref 0–23)
CREAT SERPL-MCNC: 0.54 MG/DL (ref 0.5–1.4)
EOSINOPHIL # BLD AUTO: 1.88 K/UL (ref 0–0.51)
EOSINOPHIL NFR BLD: 22 % (ref 0–6.9)
ERYTHROCYTE [DISTWIDTH] IN BLOOD BY AUTOMATED COUNT: 50.5 FL (ref 35.9–50)
GFR SERPLBLD CREATININE-BSD FMLA CKD-EPI: 117 ML/MIN/1.73 M 2
GLUCOSE BLD STRIP.AUTO-MCNC: 81 MG/DL (ref 65–99)
GLUCOSE BLD STRIP.AUTO-MCNC: 91 MG/DL (ref 65–99)
GLUCOSE BLD STRIP.AUTO-MCNC: 93 MG/DL (ref 65–99)
GLUCOSE SERPL-MCNC: 102 MG/DL (ref 65–99)
HCT VFR BLD AUTO: 35.2 % (ref 42–52)
HGB BLD-MCNC: 11.6 G/DL (ref 14–18)
IMM GRANULOCYTES # BLD AUTO: 0.04 K/UL (ref 0–0.11)
IMM GRANULOCYTES NFR BLD AUTO: 0.5 % (ref 0–0.9)
LYMPHOCYTES # BLD AUTO: 1.49 K/UL (ref 1–4.8)
LYMPHOCYTES NFR BLD: 17.4 % (ref 22–41)
MAGNESIUM SERPL-MCNC: 1.8 MG/DL (ref 1.5–2.5)
MCH RBC QN AUTO: 31.5 PG (ref 27–33)
MCHC RBC AUTO-ENTMCNC: 33 G/DL (ref 33.7–35.3)
MCV RBC AUTO: 95.7 FL (ref 81.4–97.8)
MONOCYTES # BLD AUTO: 0.97 K/UL (ref 0–0.85)
MONOCYTES NFR BLD AUTO: 11.4 % (ref 0–13.4)
NEUTROPHILS # BLD AUTO: 4.04 K/UL (ref 1.82–7.42)
NEUTROPHILS NFR BLD: 47.3 % (ref 44–72)
NRBC # BLD AUTO: 0 K/UL
NRBC BLD-RTO: 0 /100 WBC
PHOSPHATE SERPL-MCNC: 5.2 MG/DL (ref 2.5–4.5)
PLATELET # BLD AUTO: 620 K/UL (ref 164–446)
PMV BLD AUTO: 9.8 FL (ref 9–12.9)
POTASSIUM SERPL-SCNC: 4.1 MMOL/L (ref 3.6–5.5)
RBC # BLD AUTO: 3.68 M/UL (ref 4.7–6.1)
SODIUM SERPL-SCNC: 138 MMOL/L (ref 135–145)
WBC # BLD AUTO: 8.5 K/UL (ref 4.8–10.8)

## 2022-11-12 PROCEDURE — 700102 HCHG RX REV CODE 250 W/ 637 OVERRIDE(OP): Performed by: FAMILY MEDICINE

## 2022-11-12 PROCEDURE — 700102 HCHG RX REV CODE 250 W/ 637 OVERRIDE(OP): Performed by: HOSPITALIST

## 2022-11-12 PROCEDURE — 99233 SBSQ HOSP IP/OBS HIGH 50: CPT | Performed by: FAMILY MEDICINE

## 2022-11-12 PROCEDURE — 85025 COMPLETE CBC W/AUTO DIFF WBC: CPT

## 2022-11-12 PROCEDURE — 770020 HCHG ROOM/CARE - TELE (206)

## 2022-11-12 PROCEDURE — 82533 TOTAL CORTISOL: CPT

## 2022-11-12 PROCEDURE — A9270 NON-COVERED ITEM OR SERVICE: HCPCS | Performed by: FAMILY MEDICINE

## 2022-11-12 PROCEDURE — 94760 N-INVAS EAR/PLS OXIMETRY 1: CPT

## 2022-11-12 PROCEDURE — 700102 HCHG RX REV CODE 250 W/ 637 OVERRIDE(OP): Performed by: INTERNAL MEDICINE

## 2022-11-12 PROCEDURE — 80048 BASIC METABOLIC PNL TOTAL CA: CPT

## 2022-11-12 PROCEDURE — A9270 NON-COVERED ITEM OR SERVICE: HCPCS | Performed by: INTERNAL MEDICINE

## 2022-11-12 PROCEDURE — 84100 ASSAY OF PHOSPHORUS: CPT

## 2022-11-12 PROCEDURE — 700111 HCHG RX REV CODE 636 W/ 250 OVERRIDE (IP): Performed by: INTERNAL MEDICINE

## 2022-11-12 PROCEDURE — 82962 GLUCOSE BLOOD TEST: CPT

## 2022-11-12 PROCEDURE — 700111 HCHG RX REV CODE 636 W/ 250 OVERRIDE (IP): Performed by: FAMILY MEDICINE

## 2022-11-12 PROCEDURE — 83735 ASSAY OF MAGNESIUM: CPT

## 2022-11-12 PROCEDURE — 700105 HCHG RX REV CODE 258: Performed by: FAMILY MEDICINE

## 2022-11-12 PROCEDURE — A9270 NON-COVERED ITEM OR SERVICE: HCPCS | Performed by: HOSPITALIST

## 2022-11-12 RX ORDER — QUETIAPINE FUMARATE 25 MG/1
12.5 TABLET, FILM COATED ORAL EVERY MORNING
Status: DISCONTINUED | OUTPATIENT
Start: 2022-11-13 | End: 2022-11-15

## 2022-11-12 RX ORDER — QUETIAPINE FUMARATE 25 MG/1
25 TABLET, FILM COATED ORAL
Status: DISCONTINUED | OUTPATIENT
Start: 2022-11-12 | End: 2022-11-29 | Stop reason: HOSPADM

## 2022-11-12 RX ORDER — QUETIAPINE FUMARATE 25 MG/1
12.5 TABLET, FILM COATED ORAL EVERY MORNING
Status: DISCONTINUED | OUTPATIENT
Start: 2022-11-13 | End: 2022-11-12

## 2022-11-12 RX ADMIN — METOPROLOL TARTRATE 50 MG: 50 TABLET, FILM COATED ORAL at 05:19

## 2022-11-12 RX ADMIN — NICOTINE 7 MG: 7 PATCH TRANSDERMAL at 05:19

## 2022-11-12 RX ADMIN — QUETIAPINE FUMARATE 25 MG: 25 TABLET ORAL at 21:47

## 2022-11-12 RX ADMIN — QUETIAPINE FUMARATE 25 MG: 25 TABLET, FILM COATED ORAL at 05:19

## 2022-11-12 RX ADMIN — THIAMINE HYDROCHLORIDE 500 MG: 100 INJECTION, SOLUTION INTRAMUSCULAR; INTRAVENOUS at 15:36

## 2022-11-12 RX ADMIN — TRIAMCINOLONE ACETONIDE: 1 CREAM TOPICAL at 05:25

## 2022-11-12 RX ADMIN — TRIAMCINOLONE ACETONIDE: 1 CREAM TOPICAL at 17:08

## 2022-11-12 RX ADMIN — THIAMINE HYDROCHLORIDE 500 MG: 100 INJECTION, SOLUTION INTRAMUSCULAR; INTRAVENOUS at 08:36

## 2022-11-12 RX ADMIN — LEVOTHYROXINE SODIUM 50 MCG: 50 TABLET ORAL at 05:19

## 2022-11-12 RX ADMIN — METOPROLOL TARTRATE 75 MG: 50 TABLET, FILM COATED ORAL at 17:08

## 2022-11-12 RX ADMIN — ASPIRIN 81 MG CHEWABLE TABLET 81 MG: 81 TABLET CHEWABLE at 05:19

## 2022-11-12 RX ADMIN — ENOXAPARIN SODIUM 40 MG: 40 INJECTION SUBCUTANEOUS at 17:07

## 2022-11-12 ASSESSMENT — PAIN DESCRIPTION - PAIN TYPE: TYPE: ACUTE PAIN

## 2022-11-12 ASSESSMENT — FIBROSIS 4 INDEX: FIB4 SCORE: 0.34

## 2022-11-12 NOTE — FLOWSHEET NOTE
11/11/22 0880   Events/Summary/Plan   Events/Summary/Plan NT suction L nare   Vital Signs   Pulse 100   Respiration 20   Pulse Oximetry 95 %   $ Pulse Oximetry (Spot Check) Yes   Chest Exam   Work Of Breathing / Effort Within Normal Limits   Breath Sounds   RUL Breath Sounds Clear After Suction   RML Breath Sounds Clear After Suction   RLL Breath Sounds Diminished   NAHOMI Breath Sounds Clear After Suction   LLL Breath Sounds Diminished   Secretions   Cough Productive   How Sputum Obtained Nasal Tracheal   Sputum Amount Moderate;Large   Sputum Color Tan;White   Sputum Consistency Thick;Thin   Oxygen   O2 Delivery Device None - Room Air

## 2022-11-12 NOTE — THERAPY
Occupational Therapy  Daily Treatment     Patient Name: Tyree Whiteside  Age:  57 y.o., Sex:  male  Medical Record #: [id number]  Today's Date: 11/11/2022     Precautions  Precautions: Fall Risk, Swallow Precautions ( See Comments)  Comments: restraints    Assessment  Pt presents in wrist restraints, Ox0-1, easily agitated. Family in room. NG tube in place. Wrist restraints removed for session; RN,PT,OT attempt to assist pt EOB; Maxa . No command following noted. Pt repositioned in bed; Max/TotalA with rolling, scooting. Restraints in place at end of session. OT will continue to see if improvements in attn, command following next session.         Plan  Continue current treatment plan.    DC Equipment Recommendations: Unable to determine at this time  Discharge Recommendations: Recommend post-acute placement for additional occupational therapy services prior to discharge home      Objective     11/11/22 1218   Cognition    Cognition / Consciousness X   Speech/ Communication Delayed Responses;Incomprehensible Words;Slurred   Level of Consciousness Responds to voice   Safety Awareness Impaired;Impulsive   Attention Impaired   Comments Ox0-1   Balance   Sitting Balance (Static) Poor -   Sitting Balance (Dynamic) Trace +   Weight Shift Sitting Poor   Skilled Intervention Tactile Cuing;Verbal Cuing   Bed Mobility    Supine to Sit Maximal Assist   Sit to Supine Maximal Assist   Scooting Maximal Assist   Skilled Intervention Verbal Cuing   Comments 3-assist   Activities of Daily Living   Lower Body Dressing Total Assist   Toileting Total Assist   Skilled Intervention Verbal Cuing   Functional Mobility   Sit to Stand Unable to Participate   Bed, Chair, Wheelchair Transfer Unable to Participate   Activity Tolerance   Sitting Edge of Bed 5   Short Term Goals   Goal Outcome # 1 Goal not met   Goal Outcome # 2 Goal not met   Goal Outcome # 3 Goal not met   Goal Outcome # 4 Goal not met   Anticipated Discharge Equipment  and Recommendations   DC Equipment Recommendations Unable to determine at this time   Discharge Recommendations Recommend post-acute placement for additional occupational therapy services prior to discharge home

## 2022-11-12 NOTE — PROGRESS NOTES
Hospital Medicine Daily Progress Note    Date of Service  11/12/2022    Chief Complaint  Tyree Whiteside is a 57 y.o. male admitted 10/18/2022 with AMS    Hospital Course  Pt is a 58yo with a history of atrial fibrillation, HTN, alcohol dependence with abuse, methamphetamine abuse and liver mass who initially presented to [facility] for help with EtOH detoxification but was sent to [facility] ER as he was having hallucinations, confusion, weakness, nausea and vomiting. He was admitted to ICU on 10/18/22 for EtOH withdrawal and respiratory depression and was placed on the ventilator and had a protracted ICU course - he required reintubation on 10/23 and again 10/26 after self extubation, diagnostic thoracentesis  and has had MSSA and Pseudomonas PNA, recurrent aspiration, bradycardia, hypotension, ileus, chest tube which has since been removed and loculated pleural effusion. His daughter initially made him DNR/DNI yesterday but then changed her mind. Palliative care met with the pt's daughter yesterday, he remains a full code.     Interval Problem Update  11/10 - Pt afebrile overnight but remains with some tachycardia - mild hypotension this am. He has completed 14d of Zosyn and Unasyn for coverage of empyema .To summarize his ID history - he had Pseudomonas on tracheal aspirate 10/31, MSSA and Pseudomonas on BAL 10/24 and pleural fluid was negative for growth on 10/30. Chest tubes are now out. Echo demonstrates an EF of 40% with global hypokinesis, CT brain shows cerebral atrophy. Will give IV thiamine, Wernicke's dosing.    Discussed ACP planning with pt's daughter and his sister today, as pt does not currently have capacity to dictate his own care.  I did discuss the nature of aspiration and recurrent aspiration with them, as well as the eventual need for a G tube if his swallow dysfunction persists, and advanced planning of what to do in the case of repeat aspiration.  Per Dr Luciano's note, if pt was reintubated,  he would require tracheostomy and feeding tube given his repeated intubations this stay.  The patient's family is clear that he would not want that, and they would like to pursue DNR/DNI status, but continue full support otherwise. I will change his status in our system and Palliative is filling out a POLST with the family.      Add free water flushes to his tube feeds via NGT given his relative hypotension and tachycardia.     11/11 - Pt with intermittent tachycardia, Tm to 99.2 and was placed on 5L O2 last night without documentation of any hypoxia. Had poor waveform on pulse ox today, replaced with ear probe and pt fine on room air.  No white count elevation, will check CXR and procalcitonin. Eosinophilia increasing - ddx includes HES, Churg Greta, HIV, parasite infection (less likely as no recent travel), eosinophilic malignancy. Will check a tryptase, RUBIA, ANCA, HIV, O&P, RF, IgE and cortisol level. Reordered home eczema cream. Needs to be out of restraints for SNF or LTAC, will change Zyprexa to Seroquel BID. Increase metoprolol for RVR. Pt's agitation is increased today, very impulsive, at risk of pulling out NG and PICC if not in restraints.      11/12 - Pt significantly more awake, alert and less agitated today - unclear if this is secondary to Seroquel or high dose thiamine.  Will give him a trial out of restraints today, wrap his PICC so he can't pull at it. Will increase free water as pt complaining of thirst. Goal for this week is improvement with secretions so we can attempt repeat swallow vs pursuing a PEG tube, and to have pt stay out of restraints for LTAC placement.     I have discussed this patient's plan of care and discharge plan at IDT rounds today with Case Management, Nursing, Nursing leadership, and other members of the IDT team.    Consultants/Specialty  critical care    Code Status  DNAR/DNI    Disposition  Patient is not medically cleared for discharge.   Anticipate discharge to to a  longterm acute [facility].  I have placed the appropriate orders for post-discharge needs.    Review of Systems  Review of Systems   Unable to perform ROS: Medical condition   All other systems reviewed and are negative.     Physical Exam  Temp:  [36.3 °C (97.3 °F)-37.2 °C (99 °F)] 36.7 °C (98.1 °F)  Pulse:  [] 108  Resp:  [18-34] 34  BP: ()/(42-85) 114/85  SpO2:  [91 %-98 %] 92 %    Physical Exam  Vitals and nursing note reviewed.   Constitutional:       Appearance: He is ill-appearing. He is not toxic-appearing.   HENT:      Head: Normocephalic and atraumatic.      Nose:      Comments: NGT in place     Mouth/Throat:      Mouth: Mucous membranes are dry.   Cardiovascular:      Rate and Rhythm: Regular rhythm. Tachycardia present.      Heart sounds: No murmur heard.  Pulmonary:      Comments: Coarse breath sounds bilaterally    Abdominal:      General: Abdomen is flat. Bowel sounds are normal.      Palpations: Abdomen is soft.   Musculoskeletal:         General: No swelling.   Skin:     General: Skin is warm and dry.      Coloration: Skin is not pale.   Neurological:      Mental Status: He is alert. He is disoriented.      GCS: GCS eye subscore is 4. GCS verbal subscore is 5. GCS motor subscore is 6.      Cranial Nerves: No cranial nerve deficit.      Motor: Weakness present.      Comments: Much more oriented today     Psychiatric:         Speech: Speech is delayed.      Comments: Delayed verbal responses       Fluids    Intake/Output Summary (Last 24 hours) at 11/12/2022 1207  Last data filed at 11/12/2022 0519  Gross per 24 hour   Intake 90 ml   Output 700 ml   Net -610 ml         Laboratory  Recent Labs     11/10/22  0441 11/11/22  0325 11/12/22  0440   WBC 9.1 8.7 8.5   RBC 3.70* 3.60* 3.68*   HEMOGLOBIN 11.9* 11.6* 11.6*   HEMATOCRIT 34.7* 34.0* 35.2*   MCV 93.8 94.4 95.7   MCH 32.2 32.2 31.5   MCHC 34.3 34.1 33.0*   RDW 47.8 49.0 50.5*   PLATELETCT 687* 615* 620*   MPV 9.9 9.8 9.8       Recent  Labs     11/10/22  0441 11/11/22  0325 11/12/22  0440   SODIUM 133* 139 138   POTASSIUM 4.2 4.2 4.1   CHLORIDE 101 105 105   CO2 21 22 23   GLUCOSE 95 99 102*   BUN 16 22 26*   CREATININE 0.51 0.50 0.54   CALCIUM 8.3* 8.3* 8.7                     Imaging  DX-CHEST-PORTABLE (1 VIEW)   Final Result      1.  Bibasilar and RIGHT perihilar atelectasis which could obscure an additional process. This has decreased since the prior study.   2.  Persistent focal lingular opacity, which could be atelectasis or early pneumonia   3.  RIGHT pleural effusion      DX-CHEST-FOR LINE PLACEMENT Perform procedure in: Patient's Room   Final Result            1. A left peripherally inserted catheter with tip projects appropriately over the expected area of the lower SVC.      DX-CHEST-FOR LINE PLACEMENT Perform procedure in: Patient's Room   Final Result            1. A right peripherally inserted catheter with tip not well seen but likely projects over the expected area of the cavoatrial junction/right atrium.      IR-PICC LINE PLACEMENT W/ GUIDANCE > AGE 5   Final Result                  Ultrasound-guided PICC placement performed by qualified nursing staff as    above.          DX-CHEST-FOR LINE PLACEMENT Perform procedure in: Patient's Room   Final Result      1.  Interval placement of a PICC line which is satisfactory in position.      2.  Otherwise stable exam.      IR-PICC LINE PLACEMENT W/ GUIDANCE > AGE 5   Final Result                  Ultrasound-guided PICC placement performed by qualified nursing staff as    above.          DX-CHEST-PORTABLE (1 VIEW)   Final Result      1.  Stable bibasilar atelectasis, right greater than left.      2.  Stable right and improved left pleural effusion.      DX-CHEST-PORTABLE (1 VIEW)   Final Result      1.  Increased right pleural effusion, now small/moderate.   2.  Increased right base airspace disease.   3.  Similar small left pleural effusion with mildly increased adjacent airspace disease.       [id number] VIEW   Final Result      Mildly dilated loops of small intestine.      DX-CHEST-PORTABLE (1 VIEW)   Final Result      1.  Interval removal of the right sided chest tube.      2.  Stable bibasilar atelectasis.      3.  Interval decrease in size of a right pleural effusion.      DX-CHEST-PORTABLE (1 VIEW)   Final Result      1.  Stable support devices.      2.  Stable bilateral pleural effusions, right greater than left.      DX-CHEST-PORTABLE (1 VIEW)   Final Result      1.  Interval placement of RIGHT chest tube   2.  Moderate RIGHT hydropneumothorax   3.  Small LEFT pleural effusion   4.  Unchanged BILATERAL atelectasis with possible superimposed pneumonia or other airspace disease      DX-CHEST-PORTABLE (1 VIEW)   Final Result      1.  No significant change      2.  Lines and tubes appear appropriately located      3.  Right pleural effusion, probably large in size      4.  Bilateral atelectasis      EC-ECHOCARDIOGRAM LTD W/ CONT   Final Result      DX-CHEST-PORTABLE (1 VIEW)   Final Result         No significant interval change      CT-CHEST,ABDOMEN,PELVIS W/O   Final Result      1.  RIGHT larger than LEFT pleural effusions with overlying atelectasis. Superimposed pneumonia is not excluded.   2.  Healing fractures the RIGHT fifth and sixth ribs   3.  Distended loops of bowel in the abdomen as described above, likely ileus rather than early or low-grade small bowel obstruction   4.  Enlarged inguinal lymph nodes   5.  The hypoechoic nodule in the liver demonstrated on ultrasound from 8/28/2022 is not seen on this unenhanced CT. Hepatic mass protocol CT or MRI should be pursued when clinically appropriate for further assessment.   6.  RIGHT renal cyst         [id number] VIEW   Final Result         1.  Air-filled distended loops of bowel are seen with reactive mucosal pattern, appearance suggests ileus or enteritis versus evolving obstructive changes. Recommend radiographic followup to resolution  to exclude progression to obstruction.   2.  Nasogastric tube is coiled within the stomach, the tip terminates overlying the expected location of the gastric fundus.   3.  Bilateral lower lobe infiltrates   4.  Moderate right and small left pleural effusion      DX-CHEST-PORTABLE (1 VIEW)   Final Result         1.  Bilateral pulmonary edema and/or infiltrates.   2.  Moderate right and small left pleural effusion, stable compared to prior study   3.  Cardiomegaly      DX-CHEST-PORTABLE (1 VIEW)   Final Result         1.  Bilateral pulmonary edema and/or infiltrates.   2.  Moderate right and small left pleural effusion, stable compared to prior study   3.  Cardiomegaly      [id number] VIEW   Final Result         Diffuse gaseous distention of the small bowel and colon, worse in prior, could relate to ileus      DX-CHEST-PORTABLE (1 VIEW)   Final Result         1. No significant interval change.      DX-CHEST-PORTABLE (1 VIEW)   Final Result         1.  Bilateral pulmonary edema and/or infiltrates.   2.  Moderate right and small left pleural effusion, increased on the right compared to prior study   3.  Cardiomegaly      DX-CHEST-PORTABLE (1 VIEW)   Final Result         1.  Pulmonary edema and/or infiltrates are identified, which are stable since the prior exam.   2.  Moderate right pleural effusion, stable.   3.  Cardiomegaly      DX-CHEST-PORTABLE (1 VIEW)   Final Result      1.  Well-positioned ETT.   2.  Moderate right pleural effusion and associated atelectasis versus consolidation.   3.  Retrocardiac atelectasis versus consolidation. No significant left effusion.      [id number] VIEW   Final Result      NGT tip is in the stomach.   1.  Nonobstructive bowel gas pattern. Small amount of stool throughout the colon.   2.  Ill-defined right basilar atelectasis versus consolidation and small right pleural effusion.      DX-CHEST-PORTABLE (1 VIEW)   Final Result         1. Low lung volume with hypoventilatory change  and bibasilar atelectasis.      CT-HEAD W/O   Final Result      1.  Cerebral atrophy.      2.  Otherwise, Head CT without contrast within normal limits. No evidence of acute cerebral infarction, hemorrhage or mass lesion.         DX-CHEST-FOR LINE PLACEMENT Perform procedure in: Patient's Room   Final Result      1.  Endotracheal tube overlies the mid trachea.      2.  NG tube courses beneath the diaphragms.      DX-CHEST-PORTABLE (1 VIEW)   Final Result      No radiographic evidence of acute cardiopulmonary process.             Assessment/Plan  Agitation  Assessment & Plan  - Did well with BID Seroquel but slept quite a bit yesterday - will continue 25mg qhs but decrease am dose to 12.5mg.    Peripheral eosinophilia  Assessment & Plan  Ddx includes HES, Churg Greta, HIV, parasite infection (less likely), eosinophilic malignancy. Will check a tryptase, RUBIA, ANCA, HIV (negative), O&P, RF (negative), IgE and cortisol level (negative)    Congestive heart failure (CHF) (HCC)  Assessment & Plan  - Initial echo with preserved EF, then repeat echo with EF 40% - unclear if he was in RVR or other variable that would impact an acute decrease in his EF  - Not overloaded on exam  - On metoprolol, add ARB when Bps will allow    Dysphagia  Assessment & Plan  - Pt has failed swallow evals last week and this week - has NGT in place  - Multifactorial with likely Wernicke's, deconditioning from PNA and being on the vent, acute illness  - Poor long term prognosis given his cerebral atrophy, chronic EtOH abuse and methampetamine abuse likely contributing to his lack of swallow coordination.  - Hoping that as his secretions decrease with treatment of his PNA, he may have an easier time  - Treat Wernicke's with high dose Thiamine, will continue working with SLP  - Will discuss PEG tube in the future if he continues to fail his swallows     Aspiration pneumonia (HCC)  Assessment & Plan  - Completed antibiotics, now on room air  - Has  failed multiple swallow evals, currently receiving tube feeds via NGT  - If no improvement in swallow this coming week, will need a PEG     Wernicke encephalopathy syndrome  Assessment & Plan  - Pt with significant dementia symptoms, swallow dysfunction, cerebral atrophy on CT  - Will treat with high dose IV Thiamine but suspect chronic damage is largely irreversible, has had continued decline over months per family    Hypomagnesemia  Assessment & Plan  - Replace as needed    Reactive thrombocytosis  Assessment & Plan  - Continue to monitor       Sepsis due to Pseudomonas species (HCC)- (present on admission)  Assessment & Plan  This is Severe Sepsis Present on admission  SIRS criteria identified on my evaluation include: Fever, with temperature greater than 101 deg F, Tachycardia, with heart rate greater than 90 BPM, Tachypnea, with respirations greater than 20 per minute and Leukocytosis, with WBC greater than 12,000  Clinical indicators of end organ dysfunction include Systolic blood pressure (SBP) <90 mmHg or mean arterial pressure <65 mmHg and Acute respiratory failure as evidenced by a new need for invasive or non-invasive mechanical ventilation (Ventilator or BiPap)   Source is pneumonia by MSSA and pseudomonas  Sepsis protocol initiated  Crystalloid Fluid Administration: Patient has volume overload with >11 lt since admission, (NYHA class III or symptoms with minimal exertion, Or NYHA class IV or symptoms at rest or with activity), for this/these reason(s) it is unsafe for this patient to receive 30 mL/kg of fluid.  Partial Fluid Dose Given on 10/24, patient to be reassessed shortly after completion of partial fluid bolus to ensure adequacy of resuscitation  IV antibiotics as appropriate for source of sepsis  Reassessment: I have reassessed the patient's hemodynamic status  Will provide another partial bolus today and wean down the vasopressors     .  Pt had septic shock requiring pressor support, with MSSA  "and pseudomonas in BAL and tracheal aspirate. Blood cultures negative. Sepsis has resolved.     Empyema (HCC)- (present on admission)  Assessment & Plan  - On the right, moderate-large per CXR of 10/30  - S/p diagnostic thoracentesis 10/25/22: albumin gradient is less than 1.2 g/dl which indicate exudates, his was 0.9, also inflammative effusion with very high LDH 1020 (serum in the 240s range)  - Cultures negative but pt was on antibiotics - did have chest tubes which have since been removed, completed antibiotic course.     Acute respiratory failure with hypoxia (HCC)- (present on admission)  Assessment & Plan  -Intubated for respiratory acidosis and altered mental status with need for repeat intubation twice thereafter  -Cultures from sputum and BAL grew MSSA and pseudomonas, pt completed 14d of Zosyn, and an additional 1.5d of Unasyn for empyema  -also complicated by right empyema, s/p diagnostic thoracentesis  - Currently off antibiotics, back on room air     Alcohol abuse with withdrawal (HCC)  Assessment & Plan  Severe withdrawal with respiratory depression requiring intubation on admit  Out of withdrawal window now     Liver mass- (present on admission)  Assessment & Plan  - Seen on US 8/2022 \"right lobe of the liver measuring 1.7 x 1.3 x 1.0 cm in size\"  - AFP negative  - Pt will not tolerate MRI at this time - too agitated, high risk for aspiration    Atrial fibrillation (HCC)- (present on admission)  Assessment & Plan  Not on anticoagulation prior to admission - appears he was just diagnosed by PCP in June of this year and referred to Cardiology, but I don't see where he was seen by them  - [id number] score of 1-2 - new echo with EF of 40% but prior to that was 50%, and may have some effect of sepsis on EF   - ASA for now  - Continue Metoprolol for rate control, titrate up today for RVR    Other specified hypothyroidism- (present on admission)  Assessment & Plan  - TSH normal, continue Synthroid     Rash- " (present on admission)  Assessment & Plan  - Pt with pathology positive eczema, order his home triamcinolone cream    Hyponatremia  Assessment & Plan  - Resolved with free water flushes, pt was dry       VTE prophylaxis: SCDs/TEDs and enoxaparin ppx    I have performed a physical exam and reviewed and updated ROS and Plan today (11/12/2022). In review of yesterday's note (11/11/2022), there are no changes except as documented above.

## 2022-11-12 NOTE — FLOWSHEET NOTE
11/12/22 0400   Secretions   Cough Moist;Productive   How Sputum Obtained Oropharyngeal;Spontaneous;Suction   Sputum Amount Large   Sputum Color White   Sputum Consistency Thick

## 2022-11-12 NOTE — PROGRESS NOTES
Telemetry Shift Summary     Rhythm:Afib  HR: 103-117 up to 155  Ectopy:rPVC, rCoup  Measurements: -/10/-     Normal Values  Rhythm: SR  HR:   Measurements: 0.12-0.20 / 0.06-0.10 / 0.30-0.52    Strip reviewed and placed in chart

## 2022-11-12 NOTE — CARE PLAN
The patient is Watcher - Medium risk of patient condition declining or worsening    Shift Goals  Clinical Goals: Maintain stability  Patient Goals: Comfort  Family Goals: stable    Progress made toward(s) clinical / shift goals:    Problem: Safety - Medical Restraint  Goal: Remains free of injury from restraints (Restraint for Interference with Medical Device)  Outcome: Progressing  Note: Patient unable to verbalize understanding of discontinuation criteria, however family is aware. All documentation if accurate and complete at this time regarding restraints. Will continue to assess the presence of discontinuation criteria.      Problem: Knowledge Deficit - Standard  Goal: Patient and family/care givers will demonstrate understanding of plan of care, disease process/condition, diagnostic tests and medications  Outcome: Progressing  Note: Updated patient's family on plan of care and encouraged verbalization of questions and concerns. All concerns were addressed by this RN at that time.        Patient is not progressing towards the following goals:

## 2022-11-12 NOTE — CARE PLAN
The patient is Stable - Low risk of patient condition declining or worsening    Shift Goals  Clinical Goals: Manage Secretions, Neuro Status  Patient Goals: Comfort  Family Goals: stable    Progress made toward(s) clinical / shift goals:    Problem: Skin Integrity  Goal: Skin integrity is maintained or improved  Outcome: Progressing     Problem: Fall Risk  Goal: Patient will remain free from falls  Outcome: Progressing       Patient is not progressing towards the following goals:

## 2022-11-12 NOTE — PROGRESS NOTES
Telemetry Shift Summary     Rhythm: Afib  Rate: 80s-110s  Measurements: -/0.10/-  Ectopy (reported by Monitor Tech): rPVC, rCouplet     Normal Values  Rhythm: Sinus  HR:   Measurements: 0.12-0.20/0.06-0.10/0.30-0.52

## 2022-11-13 LAB
ANION GAP SERPL CALC-SCNC: 10 MMOL/L (ref 7–16)
BASOPHILS # BLD AUTO: 1.3 % (ref 0–1.8)
BASOPHILS # BLD: 0.12 K/UL (ref 0–0.12)
BUN SERPL-MCNC: 30 MG/DL (ref 8–22)
CALCIUM SERPL-MCNC: 8.9 MG/DL (ref 8.4–10.2)
CHLORIDE SERPL-SCNC: 107 MMOL/L (ref 96–112)
CO2 SERPL-SCNC: 23 MMOL/L (ref 20–33)
CREAT SERPL-MCNC: 0.51 MG/DL (ref 0.5–1.4)
EOSINOPHIL # BLD AUTO: 1.89 K/UL (ref 0–0.51)
EOSINOPHIL NFR BLD: 20.9 % (ref 0–6.9)
ERYTHROCYTE [DISTWIDTH] IN BLOOD BY AUTOMATED COUNT: 50.5 FL (ref 35.9–50)
GFR SERPLBLD CREATININE-BSD FMLA CKD-EPI: 119 ML/MIN/1.73 M 2
GLUCOSE BLD STRIP.AUTO-MCNC: 87 MG/DL (ref 65–99)
GLUCOSE BLD STRIP.AUTO-MCNC: 94 MG/DL (ref 65–99)
GLUCOSE BLD STRIP.AUTO-MCNC: 98 MG/DL (ref 65–99)
GLUCOSE SERPL-MCNC: 97 MG/DL (ref 65–99)
HCT VFR BLD AUTO: 34.9 % (ref 42–52)
HGB BLD-MCNC: 11.5 G/DL (ref 14–18)
IGE SERPL-ACNC: ABNORMAL KU/L
IMM GRANULOCYTES # BLD AUTO: 0.07 K/UL (ref 0–0.11)
IMM GRANULOCYTES NFR BLD AUTO: 0.8 % (ref 0–0.9)
LYMPHOCYTES # BLD AUTO: 1.56 K/UL (ref 1–4.8)
LYMPHOCYTES NFR BLD: 17.2 % (ref 22–41)
MAGNESIUM SERPL-MCNC: 1.7 MG/DL (ref 1.5–2.5)
MCH RBC QN AUTO: 31.5 PG (ref 27–33)
MCHC RBC AUTO-ENTMCNC: 33 G/DL (ref 33.7–35.3)
MCV RBC AUTO: 95.6 FL (ref 81.4–97.8)
MONOCYTES # BLD AUTO: 0.71 K/UL (ref 0–0.85)
MONOCYTES NFR BLD AUTO: 7.8 % (ref 0–13.4)
NEUTROPHILS # BLD AUTO: 4.7 K/UL (ref 1.82–7.42)
NEUTROPHILS NFR BLD: 52 % (ref 44–72)
NRBC # BLD AUTO: 0 K/UL
NRBC BLD-RTO: 0 /100 WBC
NUCLEAR IGG SER QL IA: NORMAL
PHOSPHATE SERPL-MCNC: 5.2 MG/DL (ref 2.5–4.5)
PLATELET # BLD AUTO: 624 K/UL (ref 164–446)
PMV BLD AUTO: 9.9 FL (ref 9–12.9)
POTASSIUM SERPL-SCNC: 4.6 MMOL/L (ref 3.6–5.5)
RBC # BLD AUTO: 3.65 M/UL (ref 4.7–6.1)
SODIUM SERPL-SCNC: 140 MMOL/L (ref 135–145)
TRYPTASE SERPL-MCNC: 8.4 UG/L
WBC # BLD AUTO: 9.1 K/UL (ref 4.8–10.8)

## 2022-11-13 PROCEDURE — 99232 SBSQ HOSP IP/OBS MODERATE 35: CPT | Performed by: FAMILY MEDICINE

## 2022-11-13 PROCEDURE — 700102 HCHG RX REV CODE 250 W/ 637 OVERRIDE(OP): Performed by: INTERNAL MEDICINE

## 2022-11-13 PROCEDURE — 700102 HCHG RX REV CODE 250 W/ 637 OVERRIDE(OP): Performed by: FAMILY MEDICINE

## 2022-11-13 PROCEDURE — 700105 HCHG RX REV CODE 258: Performed by: FAMILY MEDICINE

## 2022-11-13 PROCEDURE — 83735 ASSAY OF MAGNESIUM: CPT

## 2022-11-13 PROCEDURE — 94760 N-INVAS EAR/PLS OXIMETRY 1: CPT

## 2022-11-13 PROCEDURE — A9270 NON-COVERED ITEM OR SERVICE: HCPCS | Performed by: INTERNAL MEDICINE

## 2022-11-13 PROCEDURE — 85025 COMPLETE CBC W/AUTO DIFF WBC: CPT

## 2022-11-13 PROCEDURE — 700102 HCHG RX REV CODE 250 W/ 637 OVERRIDE(OP): Performed by: HOSPITALIST

## 2022-11-13 PROCEDURE — 700111 HCHG RX REV CODE 636 W/ 250 OVERRIDE (IP): Performed by: INTERNAL MEDICINE

## 2022-11-13 PROCEDURE — 84100 ASSAY OF PHOSPHORUS: CPT

## 2022-11-13 PROCEDURE — 700111 HCHG RX REV CODE 636 W/ 250 OVERRIDE (IP): Performed by: FAMILY MEDICINE

## 2022-11-13 PROCEDURE — A9270 NON-COVERED ITEM OR SERVICE: HCPCS | Performed by: HOSPITALIST

## 2022-11-13 PROCEDURE — 770020 HCHG ROOM/CARE - TELE (206)

## 2022-11-13 PROCEDURE — 82962 GLUCOSE BLOOD TEST: CPT

## 2022-11-13 PROCEDURE — 80048 BASIC METABOLIC PNL TOTAL CA: CPT

## 2022-11-13 PROCEDURE — A9270 NON-COVERED ITEM OR SERVICE: HCPCS | Performed by: FAMILY MEDICINE

## 2022-11-13 RX ORDER — METOPROLOL TARTRATE 50 MG/1
50 TABLET, FILM COATED ORAL TWICE DAILY
Status: DISCONTINUED | OUTPATIENT
Start: 2022-11-13 | End: 2022-11-29 | Stop reason: HOSPADM

## 2022-11-13 RX ORDER — LANOLIN ALCOHOL/MO/W.PET/CERES
400 CREAM (GRAM) TOPICAL ONCE
Status: COMPLETED | OUTPATIENT
Start: 2022-11-13 | End: 2022-11-13

## 2022-11-13 RX ORDER — DIPHENHYDRAMINE HCL 25 MG
25 TABLET ORAL EVERY 6 HOURS PRN
Status: DISCONTINUED | OUTPATIENT
Start: 2022-11-13 | End: 2022-11-15

## 2022-11-13 RX ADMIN — LEVOTHYROXINE SODIUM 50 MCG: 50 TABLET ORAL at 05:35

## 2022-11-13 RX ADMIN — ENOXAPARIN SODIUM 40 MG: 40 INJECTION SUBCUTANEOUS at 17:07

## 2022-11-13 RX ADMIN — SENNOSIDES AND DOCUSATE SODIUM 2 TABLET: 50; 8.6 TABLET ORAL at 05:34

## 2022-11-13 RX ADMIN — SCOPOLAMINE 1 PATCH: 1.5 PATCH, EXTENDED RELEASE TRANSDERMAL at 09:32

## 2022-11-13 RX ADMIN — Medication 400 MG: at 12:17

## 2022-11-13 RX ADMIN — DIPHENHYDRAMINE HYDROCHLORIDE 25 MG: 25 TABLET ORAL at 23:22

## 2022-11-13 RX ADMIN — NICOTINE 7 MG: 7 PATCH TRANSDERMAL at 05:34

## 2022-11-13 RX ADMIN — TRIAMCINOLONE ACETONIDE: 1 CREAM TOPICAL at 17:08

## 2022-11-13 RX ADMIN — TRIAMCINOLONE ACETONIDE: 1 CREAM TOPICAL at 06:00

## 2022-11-13 RX ADMIN — METOPROLOL TARTRATE 75 MG: 50 TABLET, FILM COATED ORAL at 05:35

## 2022-11-13 RX ADMIN — QUETIAPINE FUMARATE 25 MG: 25 TABLET ORAL at 20:28

## 2022-11-13 RX ADMIN — THIAMINE HYDROCHLORIDE 250 MG: 100 INJECTION, SOLUTION INTRAMUSCULAR; INTRAVENOUS at 05:40

## 2022-11-13 RX ADMIN — ASPIRIN 81 MG CHEWABLE TABLET 81 MG: 81 TABLET CHEWABLE at 05:34

## 2022-11-13 RX ADMIN — Medication: at 22:24

## 2022-11-13 RX ADMIN — QUETIAPINE FUMARATE 12.5 MG: 25 TABLET, FILM COATED ORAL at 05:34

## 2022-11-13 ASSESSMENT — PAIN DESCRIPTION - PAIN TYPE: TYPE: ACUTE PAIN

## 2022-11-13 ASSESSMENT — FIBROSIS 4 INDEX: FIB4 SCORE: 0.34

## 2022-11-13 NOTE — CARE PLAN
The patient is Stable - Low risk of patient condition declining or worsening    Shift Goals  Clinical Goals: rest and comfort  Patient Goals: to go home  Family Goals: no more restraints    Progress made toward(s) clinical / shift goals:  not on physical or respiratory distress.    Patient is not progressing towards the following goals:

## 2022-11-13 NOTE — DOCUMENTATION QUERY
[facility]                                                                       Query Response Note      PATIENT:               KATIE PRITCHARD  ACCT #:                  [id number]  MRN:                     [id number]  :                      1965  ADMIT DATE:       10/18/2022 5:09 PM  DISCH DATE:          RESPONDING  PROVIDER #:        [id number]           QUERY TEXT:    Based on the noted clinical findings and your clinical judgement, can the diagnosis of Congestive Heart Failure be further specified?  Please document you response in the progress notes or the dc summary (includes probable or suspected).    NOTE:  If an appropriate response is not listed below, please respond with a new note.      The patient's Clinical Indicators include:  The clinical documentation has noted CHF  ECHO shows EF of 40% - unclear if he was in RVR or other variable that would impact an acute decrease in his EF, not fluid overloaded on exam.  On metoprolol and adding ARB when BP's allow.   NTproBNP 2214  Risk:  pt has significant co-morbid conditions and requiring intubation / ventilation likely will require a trach if he is intubated again.    Thank you,  Naty Eckert RN, BSN  Clinical    [facility]  Connect via inthinc  [phone]  [email or web address]  Options provided:   -- Acute Systolic heart failure   -- Chronic Systolic heart failure   -- Acute on Chronic Systolic heart failure   -- Acute Diastolic heart failure   -- Chronic Diastolic heart failure   -- Acute on Chronic Diastolic heart failure   -- Acute Systolic and Diastolic heart failure   -- Chronic Systolic and Diastolic heart failure   -- Acute on Chronic Systolic and diastolic heart failure   -- Other explanation, Please specify   -- Unable to determine      Query created by: Naty Eckert on 2022 3:55 PM    RESPONSE TEXT:    Chronic  Systolic heart failure          Electronically signed by:  NICHELLE BARAJAS MD 11/13/2022 6:59 AM

## 2022-11-13 NOTE — PROGRESS NOTES
Telemetry Shift Summary    Rhythm AFib  HR Range   Ectopy rPVC  Measurements -/.10/-        Normal Values  Rhythm SR  HR Range    Measurements 0.12-0.20 / 0.06-0.10  / 0.30-0.52

## 2022-11-13 NOTE — PROGRESS NOTES
Hospital Medicine Daily Progress Note    Date of Service  11/13/2022    Chief Complaint  Tyree Whiteside is a 57 y.o. male admitted 10/18/2022 with AMS    Hospital Course  Pt is a 56yo with a history of atrial fibrillation, HTN, alcohol dependence with abuse, methamphetamine abuse and liver mass who initially presented to [facility] for help with EtOH detoxification but was sent to [facility] ER as he was having hallucinations, confusion, weakness, nausea and vomiting. He was admitted to ICU on 10/18/22 for EtOH withdrawal and respiratory depression and was placed on the ventilator and had a protracted ICU course - he required reintubation on 10/23 and again 10/26 after self extubation, diagnostic thoracentesis  and has had MSSA and Pseudomonas PNA, recurrent aspiration, bradycardia, hypotension, ileus, chest tube which has since been removed and loculated pleural effusion. His daughter initially made him DNR/DNI yesterday but then changed her mind. Palliative care met with the pt's daughter yesterday, he remains a full code.     Interval Problem Update  11/10 - Pt afebrile overnight but remains with some tachycardia - mild hypotension this am. He has completed 14d of Zosyn and Unasyn for coverage of empyema .To summarize his ID history - he had Pseudomonas on tracheal aspirate 10/31, MSSA and Pseudomonas on BAL 10/24 and pleural fluid was negative for growth on 10/30. Chest tubes are now out. Echo demonstrates an EF of 40% with global hypokinesis, CT brain shows cerebral atrophy. Will give IV thiamine, Wernicke's dosing.    Discussed ACP planning with pt's daughter and his sister today, as pt does not currently have capacity to dictate his own care.  I did discuss the nature of aspiration and recurrent aspiration with them, as well as the eventual need for a G tube if his swallow dysfunction persists, and advanced planning of what to do in the case of repeat aspiration.  Per Dr Luciano's note, if pt was reintubated,  he would require tracheostomy and feeding tube given his repeated intubations this stay.  The patient's family is clear that he would not want that, and they would like to pursue DNR/DNI status, but continue full support otherwise. I will change his status in our system and Palliative is filling out a POLST with the family.      Add free water flushes to his tube feeds via NGT given his relative hypotension and tachycardia.     11/11 - Pt with intermittent tachycardia, Tm to 99.2 and was placed on 5L O2 last night without documentation of any hypoxia. Had poor waveform on pulse ox today, replaced with ear probe and pt fine on room air.  No white count elevation, will check CXR and procalcitonin. Eosinophilia increasing - ddx includes HES, Churg Greta, HIV, parasite infection (less likely as no recent travel), eosinophilic malignancy. Will check a tryptase, RUBIA, ANCA, HIV, O&P, RF, IgE and cortisol level. Reordered home eczema cream. Needs to be out of restraints for SNF or LTAC, will change Zyprexa to Seroquel BID. Increase metoprolol for RVR. Pt's agitation is increased today, very impulsive, at risk of pulling out NG a11/12 - Pt significantly more awake, alert and less agitated today - unclear if this is secondary to Seroquel or high dose thiamine.  Will give him a trial out of restraints today, wrap his PICC so he can't pull at it. Will increase free water as pt complaining of thirst. Goal for this week is improvement with secretions so we can attempt repeat swallow vs pursuing a PEG tube, and to have pt stay out of restraints for LTAC placement.     11/13 - Significant improvement today from yesterday - stayed out of restraints since yesterday afternoon, significantly more oriented, no noted agitation, and is now self suctioning and following instructions. Will plan on repeating swallow study tomorrow, will talk to SLP then. If no improvement on this swallow, will likely need to pursue PEG. Will touch base  with LTAC tomorrow if he is able to stay out of restraints x 48 hours. HR doing better than yesterday. Labs remain stable.     I have discussed this patient's plan of care and discharge plan at IDT rounds today with Case Management, Nursing, Nursing leadership, and other members of the IDT team.    Consultants/Specialty  critical care    Code Status  DNAR/DNI    Disposition  Patient is not medically cleared for discharge.   Anticipate discharge to to a long-term acute care hospital.  I have placed the appropriate orders for post-discharge needs.    Review of Systems  Review of Systems   Unable to perform ROS: Medical condition   All other systems reviewed and are negative.     Physical Exam  Temp:  [36.5 °C (97.7 °F)-36.8 °C (98.3 °F)] 36.8 °C (98.3 °F)  Pulse:  [] 84  Resp:  [18-34] 18  BP: (104-120)/(44-74) 112/72  SpO2:  [92 %-98 %] 93 %    Physical Exam  Vitals and nursing note reviewed.   Constitutional:       Appearance: He is ill-appearing. He is not toxic-appearing.   HENT:      Head: Normocephalic and atraumatic.      Nose:      Comments: NGT in place     Mouth/Throat:      Mouth: Mucous membranes are dry.   Cardiovascular:      Rate and Rhythm: Regular rhythm. Tachycardia present.      Heart sounds: No murmur heard.  Pulmonary:      Comments: Coarse breath sounds bilaterally    Abdominal:      General: Abdomen is flat. Bowel sounds are normal.      Palpations: Abdomen is soft.   Musculoskeletal:         General: No swelling.   Skin:     General: Skin is warm and dry.      Coloration: Skin is not pale.   Neurological:      Mental Status: He is alert. He is disoriented.      GCS: GCS eye subscore is 4. GCS verbal subscore is 5. GCS motor subscore is 6.      Cranial Nerves: No cranial nerve deficit.      Motor: Weakness present.      Comments: Significantly more oriented today, remains out of restraints      Psychiatric:         Speech: Speech is delayed.      Comments: Delayed verbal responses        Fluids    Intake/Output Summary (Last 24 hours) at 11/13/2022 1154  Last data filed at 11/13/2022 0900  Gross per 24 hour   Intake 300 ml   Output --   Net 300 ml         Laboratory  Recent Labs     11/11/22 0325 11/12/22  0440 11/13/22  0850   WBC 8.7 8.5 9.1   RBC 3.60* 3.68* 3.65*   HEMOGLOBIN 11.6* 11.6* 11.5*   HEMATOCRIT 34.0* 35.2* 34.9*   MCV 94.4 95.7 95.6   MCH 32.2 31.5 31.5   MCHC 34.1 33.0* 33.0*   RDW 49.0 50.5* 50.5*   PLATELETCT 615* 620* 624*   MPV 9.8 9.8 9.9       Recent Labs     11/11/22 0325 11/12/22  0440 11/13/22  0850   SODIUM 139 138 140   POTASSIUM 4.2 4.1 4.6   CHLORIDE 105 105 107   CO2 22 23 23   GLUCOSE 99 102* 97   BUN 22 26* 30*   CREATININE 0.50 0.54 0.51   CALCIUM 8.3* 8.7 8.9                     Imaging  DX-CHEST-PORTABLE (1 VIEW)   Final Result      1.  Bibasilar and RIGHT perihilar atelectasis which could obscure an additional process. This has decreased since the prior study.   2.  Persistent focal lingular opacity, which could be atelectasis or early pneumonia   3.  RIGHT pleural effusion      DX-CHEST-FOR LINE PLACEMENT Perform procedure in: Patient's Room   Final Result            1. A left peripherally inserted catheter with tip projects appropriately over the expected area of the lower SVC.      DX-CHEST-FOR LINE PLACEMENT Perform procedure in: Patient's Room   Final Result            1. A right peripherally inserted catheter with tip not well seen but likely projects over the expected area of the cavoatrial junction/right atrium.      IR-PICC LINE PLACEMENT W/ GUIDANCE > AGE 5   Final Result                  Ultrasound-guided PICC placement performed by qualified nursing staff as    above.          DX-CHEST-FOR LINE PLACEMENT Perform procedure in: Patient's Room   Final Result      1.  Interval placement of a PICC line which is satisfactory in position.      2.  Otherwise stable exam.      IR-PICC LINE PLACEMENT W/ GUIDANCE > AGE 5   Final Result                   Ultrasound-guided PICC placement performed by qualified nursing staff as    above.          DX-CHEST-PORTABLE (1 VIEW)   Final Result      1.  Stable bibasilar atelectasis, right greater than left.      2.  Stable right and improved left pleural effusion.      DX-CHEST-PORTABLE (1 VIEW)   Final Result      1.  Increased right pleural effusion, now small/moderate.   2.  Increased right base airspace disease.   3.  Similar small left pleural effusion with mildly increased adjacent airspace disease.      [id number] VIEW   Final Result      Mildly dilated loops of small intestine.      DX-CHEST-PORTABLE (1 VIEW)   Final Result      1.  Interval removal of the right sided chest tube.      2.  Stable bibasilar atelectasis.      3.  Interval decrease in size of a right pleural effusion.      DX-CHEST-PORTABLE (1 VIEW)   Final Result      1.  Stable support devices.      2.  Stable bilateral pleural effusions, right greater than left.      DX-CHEST-PORTABLE (1 VIEW)   Final Result      1.  Interval placement of RIGHT chest tube   2.  Moderate RIGHT hydropneumothorax   3.  Small LEFT pleural effusion   4.  Unchanged BILATERAL atelectasis with possible superimposed pneumonia or other airspace disease      DX-CHEST-PORTABLE (1 VIEW)   Final Result      1.  No significant change      2.  Lines and tubes appear appropriately located      3.  Right pleural effusion, probably large in size      4.  Bilateral atelectasis      EC-ECHOCARDIOGRAM LTD W/ CONT   Final Result      DX-CHEST-PORTABLE (1 VIEW)   Final Result         No significant interval change      CT-CHEST,ABDOMEN,PELVIS W/O   Final Result      1.  RIGHT larger than LEFT pleural effusions with overlying atelectasis. Superimposed pneumonia is not excluded.   2.  Healing fractures the RIGHT fifth and sixth ribs   3.  Distended loops of bowel in the abdomen as described above, likely ileus rather than early or low-grade small bowel obstruction   4.  Enlarged inguinal  lymph nodes   5.  The hypoechoic nodule in the liver demonstrated on ultrasound from 8/28/2022 is not seen on this unenhanced CT. Hepatic mass protocol CT or MRI should be pursued when clinically appropriate for further assessment.   6.  RIGHT renal cyst         [id number] VIEW   Final Result         1.  Air-filled distended loops of bowel are seen with reactive mucosal pattern, appearance suggests ileus or enteritis versus evolving obstructive changes. Recommend radiographic followup to resolution to exclude progression to obstruction.   2.  Nasogastric tube is coiled within the stomach, the tip terminates overlying the expected location of the gastric fundus.   3.  Bilateral lower lobe infiltrates   4.  Moderate right and small left pleural effusion      DX-CHEST-PORTABLE (1 VIEW)   Final Result         1.  Bilateral pulmonary edema and/or infiltrates.   2.  Moderate right and small left pleural effusion, stable compared to prior study   3.  Cardiomegaly      DX-CHEST-PORTABLE (1 VIEW)   Final Result         1.  Bilateral pulmonary edema and/or infiltrates.   2.  Moderate right and small left pleural effusion, stable compared to prior study   3.  Cardiomegaly      [id number] VIEW   Final Result         Diffuse gaseous distention of the small bowel and colon, worse in prior, could relate to ileus      DX-CHEST-PORTABLE (1 VIEW)   Final Result         1. No significant interval change.      DX-CHEST-PORTABLE (1 VIEW)   Final Result         1.  Bilateral pulmonary edema and/or infiltrates.   2.  Moderate right and small left pleural effusion, increased on the right compared to prior study   3.  Cardiomegaly      DX-CHEST-PORTABLE (1 VIEW)   Final Result         1.  Pulmonary edema and/or infiltrates are identified, which are stable since the prior exam.   2.  Moderate right pleural effusion, stable.   3.  Cardiomegaly      DX-CHEST-PORTABLE (1 VIEW)   Final Result      1.  Well-positioned ETT.   2.  Moderate  right pleural effusion and associated atelectasis versus consolidation.   3.  Retrocardiac atelectasis versus consolidation. No significant left effusion.      [id number] VIEW   Final Result      NGT tip is in the stomach.   1.  Nonobstructive bowel gas pattern. Small amount of stool throughout the colon.   2.  Ill-defined right basilar atelectasis versus consolidation and small right pleural effusion.      DX-CHEST-PORTABLE (1 VIEW)   Final Result         1. Low lung volume with hypoventilatory change and bibasilar atelectasis.      CT-HEAD W/O   Final Result      1.  Cerebral atrophy.      2.  Otherwise, Head CT without contrast within normal limits. No evidence of acute cerebral infarction, hemorrhage or mass lesion.         DX-CHEST-FOR LINE PLACEMENT Perform procedure in: Patient's Room   Final Result      1.  Endotracheal tube overlies the mid trachea.      2.  NG tube courses beneath the diaphragms.      DX-CHEST-PORTABLE (1 VIEW)   Final Result      No radiographic evidence of acute cardiopulmonary process.             Assessment/Plan  Agitation  Assessment & Plan  - Doing well with thiamine replacement and 12.5mg qam Seroquel and 25mg qpm Seroquel     Peripheral eosinophilia  Assessment & Plan  Ddx includes HES, Churg Greta, HIV, parasite infection (less likely), eosinophilic malignancy. Will check a tryptase, RUBIA (negative), ANCA, HIV (negative), O&P, RF (negative), IgE and cortisol level (negative)    Congestive heart failure (CHF) (HCC)  Assessment & Plan  - Initial echo with preserved EF, then repeat echo with EF 40% - unclear if he was in RVR or other variable that would impact an acute decrease in his EF  - Not overloaded on exam, no acute exacerbation  - On metoprolol, add ARB tmrw am if Bps improve    Dysphagia  Assessment & Plan  - Pt has failed swallow evals last week and this week - has NGT in place  - Multifactorial with likely Wernicke's, deconditioning from PNA and being on the vent, acute  illness  - Poor long term prognosis given his cerebral atrophy, chronic EtOH abuse and methampetamine abuse likely contributing to his lack of swallow coordination.  - Hoping that as his secretions decrease with treatment of his PNA, he may have an easier time  - Treat Wernicke's with high dose Thiamine, will continue working with SLP  - Will discuss PEG tube in the future if he continues to fail his swallows     Aspiration pneumonia (HCC)  Assessment & Plan  - Completed antibiotics, now on room air  - Has failed multiple swallow evals, currently receiving tube feeds via NGT  - If no improvement in swallow this coming week, will need a PEG     Wernicke encephalopathy syndrome  Assessment & Plan  - Pt with significant dementia symptoms, swallow dysfunction, cerebral atrophy on CT  - Will treat with high dose IV Thiamine   - Showing some improvement     Hypomagnesemia  Assessment & Plan  - Replace as needed    Reactive thrombocytosis  Assessment & Plan  - Continue to monitor       Sepsis due to Pseudomonas species (HCC)- (present on admission)  Assessment & Plan  This is Severe Sepsis Present on admission  SIRS criteria identified on my evaluation include: Fever, with temperature greater than 101 deg F, Tachycardia, with heart rate greater than 90 BPM, Tachypnea, with respirations greater than 20 per minute and Leukocytosis, with WBC greater than 12,000  Clinical indicators of end organ dysfunction include Systolic blood pressure (SBP) <90 mmHg or mean arterial pressure <65 mmHg and Acute respiratory failure as evidenced by a new need for invasive or non-invasive mechanical ventilation (Ventilator or BiPap)   Source is pneumonia by MSSA and pseudomonas  Sepsis protocol initiated  Crystalloid Fluid Administration: Patient has volume overload with >11 lt since admission, (NYHA class III or symptoms with minimal exertion, Or NYHA class IV or symptoms at rest or with activity), for this/these reason(s) it is unsafe for  "this patient to receive 30 mL/kg of fluid.  Partial Fluid Dose Given on 10/24, patient to be reassessed shortly after completion of partial fluid bolus to ensure adequacy of resuscitation  IV antibiotics as appropriate for source of sepsis  Reassessment: I have reassessed the patient's hemodynamic status  Will provide another partial bolus today and wean down the vasopressors     .  Pt had septic shock requiring pressor support, with MSSA and pseudomonas in BAL and tracheal aspirate. Blood cultures negative. Sepsis has resolved.     Empyema (HCC)- (present on admission)  Assessment & Plan  - On the right, moderate-large per CXR of 10/30  - S/p diagnostic thoracentesis 10/25/22: albumin gradient is less than 1.2 g/dl which indicate exudates, his was 0.9, also inflammative effusion with very high LDH 1020 (serum in the 240s range)  - Cultures negative but pt was on antibiotics - did have chest tubes which have since been removed, completed antibiotic course.     Acute respiratory failure with hypoxia (HCC)- (present on admission)  Assessment & Plan  -Intubated for respiratory acidosis and altered mental status with need for repeat intubation twice thereafter  -Cultures from sputum and BAL grew MSSA and pseudomonas, pt completed 14d of Zosyn, and an additional 1.5d of Unasyn for empyema  -also complicated by right empyema, s/p diagnostic thoracentesis  - Currently off antibiotics, back on room air   - RESOLVED     Alcohol abuse with withdrawal (HCC)  Assessment & Plan  Severe withdrawal with respiratory depression requiring intubation on admit  Out of withdrawal window now     Liver mass- (present on admission)  Assessment & Plan  - Seen on US 8/2022 \"right lobe of the liver measuring 1.7 x 1.3 x 1.0 cm in size\"  - AFP negative  - Pt will not tolerate MRI at this time - too agitated, high risk for aspiration    Atrial fibrillation (HCC)- (present on admission)  Assessment & Plan  Not on anticoagulation prior to " admission - appears he was just diagnosed by PCP in June of this year and referred to Cardiology, but I don't see where he was seen by them  - [id number] score of 1-2 - new echo with EF of 40% but prior to that was 50%, and may have some effect of sepsis on EF   - ASA for now  - Continue Metoprolol for rate control    Other specified hypothyroidism- (present on admission)  Assessment & Plan  - TSH normal, continue Synthroid     Rash- (present on admission)  Assessment & Plan  - Pt with pathology positive eczema, order his home triamcinolone cream    Hyponatremia  Assessment & Plan  - Resolved with free water flushes, pt was dry       VTE prophylaxis: SCDs/TEDs and enoxaparin ppx    I have performed a physical exam and reviewed and updated ROS and Plan today (11/13/2022). In review of yesterday's note (11/12/2022), there are no changes except as documented above.

## 2022-11-14 ENCOUNTER — APPOINTMENT (OUTPATIENT)
Dept: RADIOLOGY | Facility: MEDICAL CENTER | Age: 57
DRG: 870 | End: 2022-11-14
Attending: FAMILY MEDICINE
Payer: COMMERCIAL

## 2022-11-14 LAB
ANION GAP SERPL CALC-SCNC: 11 MMOL/L (ref 7–16)
BASOPHILS # BLD AUTO: 1.6 % (ref 0–1.8)
BASOPHILS # BLD: 0.15 K/UL (ref 0–0.12)
BUN SERPL-MCNC: 31 MG/DL (ref 8–22)
CALCIUM SERPL-MCNC: 9.5 MG/DL (ref 8.4–10.2)
CHLORIDE SERPL-SCNC: 108 MMOL/L (ref 96–112)
CO2 SERPL-SCNC: 24 MMOL/L (ref 20–33)
CREAT SERPL-MCNC: 0.52 MG/DL (ref 0.5–1.4)
EOSINOPHIL # BLD AUTO: 1.75 K/UL (ref 0–0.51)
EOSINOPHIL NFR BLD: 18.3 % (ref 0–6.9)
ERYTHROCYTE [DISTWIDTH] IN BLOOD BY AUTOMATED COUNT: 52.6 FL (ref 35.9–50)
GFR SERPLBLD CREATININE-BSD FMLA CKD-EPI: 118 ML/MIN/1.73 M 2
GLUCOSE BLD STRIP.AUTO-MCNC: 90 MG/DL (ref 65–99)
GLUCOSE BLD STRIP.AUTO-MCNC: 92 MG/DL (ref 65–99)
GLUCOSE BLD STRIP.AUTO-MCNC: 99 MG/DL (ref 65–99)
GLUCOSE SERPL-MCNC: 93 MG/DL (ref 65–99)
HCT VFR BLD AUTO: 38.7 % (ref 42–52)
HGB BLD-MCNC: 12.7 G/DL (ref 14–18)
IMM GRANULOCYTES # BLD AUTO: 0.02 K/UL (ref 0–0.11)
IMM GRANULOCYTES NFR BLD AUTO: 0.2 % (ref 0–0.9)
LYMPHOCYTES # BLD AUTO: 1.59 K/UL (ref 1–4.8)
LYMPHOCYTES NFR BLD: 16.6 % (ref 22–41)
MAGNESIUM SERPL-MCNC: 1.8 MG/DL (ref 1.5–2.5)
MCH RBC QN AUTO: 31.8 PG (ref 27–33)
MCHC RBC AUTO-ENTMCNC: 32.8 G/DL (ref 33.7–35.3)
MCV RBC AUTO: 97 FL (ref 81.4–97.8)
MONOCYTES # BLD AUTO: 0.91 K/UL (ref 0–0.85)
MONOCYTES NFR BLD AUTO: 9.5 % (ref 0–13.4)
MYELOPEROXIDASE AB SER-ACNC: 0 AU/ML (ref 0–19)
NEUTROPHILS # BLD AUTO: 5.15 K/UL (ref 1.82–7.42)
NEUTROPHILS NFR BLD: 53.8 % (ref 44–72)
NRBC # BLD AUTO: 0 K/UL
NRBC BLD-RTO: 0 /100 WBC
PHOSPHATE SERPL-MCNC: 6 MG/DL (ref 2.5–4.5)
PLATELET # BLD AUTO: 711 K/UL (ref 164–446)
PMV BLD AUTO: 9.6 FL (ref 9–12.9)
POTASSIUM SERPL-SCNC: 4.3 MMOL/L (ref 3.6–5.5)
PREALB SERPL-MCNC: 12.9 MG/DL (ref 18–38)
PROTEINASE3 AB SER-ACNC: 0 AU/ML (ref 0–19)
RBC # BLD AUTO: 3.99 M/UL (ref 4.7–6.1)
SODIUM SERPL-SCNC: 143 MMOL/L (ref 135–145)
WBC # BLD AUTO: 9.6 K/UL (ref 4.8–10.8)

## 2022-11-14 PROCEDURE — 700102 HCHG RX REV CODE 250 W/ 637 OVERRIDE(OP): Performed by: INTERNAL MEDICINE

## 2022-11-14 PROCEDURE — 82962 GLUCOSE BLOOD TEST: CPT | Mod: 91

## 2022-11-14 PROCEDURE — 770020 HCHG ROOM/CARE - TELE (206)

## 2022-11-14 PROCEDURE — A9270 NON-COVERED ITEM OR SERVICE: HCPCS | Performed by: HOSPITALIST

## 2022-11-14 PROCEDURE — 97535 SELF CARE MNGMENT TRAINING: CPT

## 2022-11-14 PROCEDURE — A9270 NON-COVERED ITEM OR SERVICE: HCPCS | Performed by: INTERNAL MEDICINE

## 2022-11-14 PROCEDURE — 74230 X-RAY XM SWLNG FUNCJ C+: CPT

## 2022-11-14 PROCEDURE — 84134 ASSAY OF PREALBUMIN: CPT

## 2022-11-14 PROCEDURE — 700105 HCHG RX REV CODE 258: Performed by: FAMILY MEDICINE

## 2022-11-14 PROCEDURE — 700102 HCHG RX REV CODE 250 W/ 637 OVERRIDE(OP): Performed by: FAMILY MEDICINE

## 2022-11-14 PROCEDURE — 80048 BASIC METABOLIC PNL TOTAL CA: CPT

## 2022-11-14 PROCEDURE — A9270 NON-COVERED ITEM OR SERVICE: HCPCS | Performed by: FAMILY MEDICINE

## 2022-11-14 PROCEDURE — 92611 MOTION FLUOROSCOPY/SWALLOW: CPT

## 2022-11-14 PROCEDURE — 36415 COLL VENOUS BLD VENIPUNCTURE: CPT

## 2022-11-14 PROCEDURE — 700102 HCHG RX REV CODE 250 W/ 637 OVERRIDE(OP): Performed by: HOSPITALIST

## 2022-11-14 PROCEDURE — 99233 SBSQ HOSP IP/OBS HIGH 50: CPT | Performed by: FAMILY MEDICINE

## 2022-11-14 PROCEDURE — 94760 N-INVAS EAR/PLS OXIMETRY 1: CPT

## 2022-11-14 PROCEDURE — 85025 COMPLETE CBC W/AUTO DIFF WBC: CPT

## 2022-11-14 PROCEDURE — 97112 NEUROMUSCULAR REEDUCATION: CPT

## 2022-11-14 PROCEDURE — 92526 ORAL FUNCTION THERAPY: CPT

## 2022-11-14 PROCEDURE — 83735 ASSAY OF MAGNESIUM: CPT

## 2022-11-14 PROCEDURE — 84100 ASSAY OF PHOSPHORUS: CPT

## 2022-11-14 PROCEDURE — 700111 HCHG RX REV CODE 636 W/ 250 OVERRIDE (IP): Performed by: FAMILY MEDICINE

## 2022-11-14 PROCEDURE — 700111 HCHG RX REV CODE 636 W/ 250 OVERRIDE (IP): Performed by: INTERNAL MEDICINE

## 2022-11-14 RX ORDER — LOSARTAN POTASSIUM 25 MG/1
12.5 TABLET ORAL
Status: DISCONTINUED | OUTPATIENT
Start: 2022-11-14 | End: 2022-11-15

## 2022-11-14 RX ADMIN — LOSARTAN POTASSIUM 12.5 MG: 25 TABLET, FILM COATED ORAL at 16:42

## 2022-11-14 RX ADMIN — ENOXAPARIN SODIUM 40 MG: 40 INJECTION SUBCUTANEOUS at 16:41

## 2022-11-14 RX ADMIN — TRIAMCINOLONE ACETONIDE: 1 CREAM TOPICAL at 06:00

## 2022-11-14 RX ADMIN — QUETIAPINE FUMARATE 12.5 MG: 25 TABLET, FILM COATED ORAL at 04:36

## 2022-11-14 RX ADMIN — SENNOSIDES AND DOCUSATE SODIUM 2 TABLET: 50; 8.6 TABLET ORAL at 16:42

## 2022-11-14 RX ADMIN — METOPROLOL TARTRATE 50 MG: 50 TABLET, FILM COATED ORAL at 04:35

## 2022-11-14 RX ADMIN — TRIAMCINOLONE ACETONIDE: 1 CREAM TOPICAL at 16:43

## 2022-11-14 RX ADMIN — QUETIAPINE FUMARATE 25 MG: 25 TABLET ORAL at 21:17

## 2022-11-14 RX ADMIN — LEVOTHYROXINE SODIUM 50 MCG: 50 TABLET ORAL at 04:36

## 2022-11-14 RX ADMIN — NICOTINE 7 MG: 7 PATCH TRANSDERMAL at 05:48

## 2022-11-14 RX ADMIN — SENNOSIDES AND DOCUSATE SODIUM 2 TABLET: 50; 8.6 TABLET ORAL at 04:35

## 2022-11-14 RX ADMIN — DIPHENHYDRAMINE HYDROCHLORIDE 25 MG: 25 TABLET ORAL at 16:42

## 2022-11-14 RX ADMIN — ASPIRIN 81 MG CHEWABLE TABLET 81 MG: 81 TABLET CHEWABLE at 04:35

## 2022-11-14 RX ADMIN — THIAMINE HYDROCHLORIDE 250 MG: 100 INJECTION, SOLUTION INTRAMUSCULAR; INTRAVENOUS at 04:34

## 2022-11-14 RX ADMIN — METOPROLOL TARTRATE 50 MG: 50 TABLET, FILM COATED ORAL at 16:42

## 2022-11-14 RX ADMIN — DIPHENHYDRAMINE HYDROCHLORIDE 25 MG: 25 TABLET ORAL at 04:35

## 2022-11-14 ASSESSMENT — FIBROSIS 4 INDEX: FIB4 SCORE: 0.34

## 2022-11-14 NOTE — CARE PLAN
The patient is Watcher - Medium risk of patient condition declining or worsening    Shift Goals  Clinical Goals: Safety, maintain skin integrity  Patient Goals: comfort, rest, decrease itchiness  Family Goals: no more restraints    Progress made toward(s) clinical / shift goals:      Pt continues to be free of restraints. Although pt continues to be confused, he is able to follow commands. Pt worked with PT/OT today and has made progress with pt able to ambulate and follow Qs. Pt turns self in bed.   Problem: Safety - Medical Restraint  Goal: Free from restraint(s) (Restraint for Interference with Medical Device)  Outcome: Progressing     Problem: Skin Integrity  Goal: Skin integrity is maintained or improved  Outcome: Progressing     Problem: Fall Risk  Goal: Patient will remain free from falls  Outcome: Progressing     Problem: Respiratory  Goal: Patient will achieve/maintain optimum respiratory ventilation and gas exchange  Outcome: Progressing       Patient is not progressing towards the following goals:      Problem: Risk for Aspiration  Goal: Patient's risk for aspiration will be absent or decrease  Outcome: Not Progressing   Pt failed swallow test today, pt will continue with NG tube feeding. Will continue to monitor for signs of aspiration. Suction equipment set up at bedside.

## 2022-11-14 NOTE — THERAPY
"Occupational Therapy  Daily Treatment     Patient Name: Tyree Whiteside  Age:  57 y.o., Sex:  male  Medical Record #: [id number]  Today's Date: 11/14/2022     Precautions: Fall Risk, Swallow Precautions ( See Comments), Nasogastric Tube    Assessment  Pt is back to room from working with SLP. Shows improved mentation; Ox1, follows 1-step commands, no agitation noted. Demonstrates STS with Kenneth, few steps chair to bed with Kenneth. Gown change with Kenneth. Oral care, face wash with Kenneth; spv with mouth swabs. BTB with CGA,v/c's. Daughter in room visiting. OT will continue to follow while in house.          Plan  Continue current treatment plan.    DC Equipment Recommendations: (P) Unable to determine at this time  Discharge Recommendations: (P) Recommend post-acute placement for additional occupational therapy services prior to discharge home    Subjective  \"I'm cold\"      Objective     11/14/22 1133   Cognition    Cognition / Consciousness X   Speech/ Communication Delayed Responses   Level of Consciousness Alert   Comments Improved attn, safety, command following. Ox1, confusion. Off restraints.   Balance   Sitting Balance (Static) Fair -   Sitting Balance (Dynamic) Poor +   Standing Balance (Static) Fair -   Standing Balance (Dynamic) Poor +   Weight Shift Sitting Fair   Weight Shift Standing Fair   Skilled Intervention Verbal Cuing;Tactile Cuing;Sequencing   Comments 2-assist   Bed Mobility    Sit to Supine Contact Guard Assist   Scooting Contact Guard Assist   Rolling Contact Guard Assist   Activities of Daily Living   Upper Body Dressing Minimal Assist   Lower Body Dressing Maximal Assist   Skilled Intervention Verbal Cuing   Functional Mobility   Sit to Stand Minimal Assist   Bed, Chair, Wheelchair Transfer Minimal Assist   Transfer Method Stand Step   Comments 2-assist, v/c's   Activity Tolerance   Sitting in Chair in mbs chair prior to seeing   Sitting Edge of Bed 10   Standing 3   Short Term Goals   Goal " Outcome # 1 Progressing slower than expected   Goal Outcome # 2 Progressing slower than expected   Goal Outcome # 3 Progressing slower than expected   Goal Outcome # 4 Progressing slower than expected   Education Group   Role of Occupational Therapist Patient Response Patient;Family;Acceptance;Explanation;Verbal Demonstration   ADL Patient Response Patient;Acceptance;Explanation;Action Demonstration   Anticipated Discharge Equipment and Recommendations   DC Equipment Recommendations Unable to determine at this time   Discharge Recommendations Recommend post-acute placement for additional occupational therapy services prior to discharge home   Interdisciplinary Plan of Care Collaboration   IDT Collaboration with  Family / Caregiver;Nursing;Speech Therapist   Patient Position at End of Therapy In Bed;Bed Alarm On;Family / Friend in Room;Call Light within Reach   Collaboration Comments Results. Pt has been off restraints several days, no agitation today. Pt Ox1, improved attn and command following. Swallow prec's.

## 2022-11-14 NOTE — PROGRESS NOTES
Telemetry Shift Summary     Rhythm: Afib  HR:   Ectopy:rPVC  Measurements:-/.10/-      Normal Values  Rhythm: SR  HR:   Measurements: 0.12-0.20 / 0.06-0.10 / 0.30-0.52    Strip reviewed and placed in chart

## 2022-11-14 NOTE — CARE PLAN
The patient is Stable - Low risk of patient condition declining or worsening    Shift Goals  Clinical Goals: off restraint for 48 hrs, rest and comfort  Patient Goals: Get out of bed  Family Goals: no more restraints    Progress made toward(s) clinical / shift goals:  off restraint the whole shift. Not on physical or respiratory distress.    Patient is not progressing towards the following goals:

## 2022-11-14 NOTE — PROGRESS NOTES
Monitor Summary     Rhythm:Afib 62-95, touched 44  Measurements: -/0.10/-  ECTOPIES: rPVC        Normal Values  Rhythm SR  HR Range    Measurements 0.12-0.20 / 0.06-0.10  / 0.30-0.52

## 2022-11-14 NOTE — DISCHARGE PLANNING
Received Choice form at 1014  Agency/Facility Name: [facility]  Referral sent per Choice form @ 1021    1100-  Agency/Facility Name: [facility]  Spoke To: Celestine  Outcome: DPA informed Pt is declined due to Pt's insurance.

## 2022-11-14 NOTE — DIETARY
Nutrition support weekly update:  Day 27 of admit.  Tyree Whiteside is a 57 y.o. male with admitting DX of Alcohol withdrawal delirium .  Tube feeding initiated on 10/30. Current TF via NG tube  is Impact Peptide 1.5 @ 55 mL/hour providing 1980 kcals, 124 g protein, and 1016 mL free water.     Assessment:  Weight 11/14/22: 69.2 kg on bed scale. Pt w/ 17.1 kg (20%) wt loss x ~ 1 month. Wt loss is significant. Most likely wt loss is due to a combination of limited nutrition for extended period of time  and fluid status. Per flow sheets, pt had edema. He also has CHF dx.   Re-estimate of nutritional needs as indicated:  MSJ: REE=1528 x  1.2= 1834 kcals  -3770-9407 kcals (25-27 kcals/kg)  -83-97 g protein (1.2-1.4 g/kg)      Evaluation:   Labs; 11/14/22: Bun=31, phos=6, mag=1.8    Meds: thiamine  SLP following. Per SLP note, plan is for MBSS later today.   Now that pt is no longer in the ICU, non-elemental formula is more appropriate. Pt would benefit from change of TF to Fibersource HN at a goal rate of 70 mL/hour providing 2016 kcals, 91 g protein, and 1361 mL of free water. This should meet pt's estimated nutrition needs.   Pt is also receiving 60 mL of fluids Q 6 hours providing 360 mL of fluids. Total water from TF and flushes is 1721 mL/day.   Addendum: pt failed MBSS. Pt and sister are going to discuss possible PEG placement vs hospice tonight and have decision tomorrow. Per MD note, plan is for pt to be NPO tonight for possible PEG placement. If pt decides on PEG, change of TF to Fibersource HN as stated in #4 would be appropriate.     Malnutrition risk: ASPEN criteria not met.    Recommendations/Plan:  If PEG is placed, change tube feed to Fibersource HN with a goal rate of 70 mL/hour.    Additional fluids per MD  Monitor wts.     RD will follow.

## 2022-11-14 NOTE — PROGRESS NOTES
Telemetry Shift Summary    Rhythm AFib  HR Range 65-97  Ectopy rPVC  Measurements -/.10/-        Normal Values  Rhythm SR  HR Range    Measurements 0.12-0.20 / 0.06-0.10  / 0.30-0.52

## 2022-11-14 NOTE — PROGRESS NOTES
Addended by: Caridad Fuller on: 4/16/2018 04:04 PM     Modules accepted: Orders Hospital Medicine Daily Progress Note    Date of Service  11/14/2022    Chief Complaint  Tyree Whiteside is a 57 y.o. male admitted 10/18/2022 with AMS    Hospital Course  Pt is a 56yo with a history of atrial fibrillation, HTN, alcohol dependence with abuse, methamphetamine abuse and liver mass who initially presented to [facility] for help with EtOH detoxification but was sent to [facility] ER as he was having hallucinations, confusion, weakness, nausea and vomiting. He was admitted to ICU on 10/18/22 for EtOH withdrawal and respiratory depression and was placed on the ventilator and had a protracted ICU course - he required reintubation on 10/23 and again 10/26 after self extubation, diagnostic thoracentesis  and has had MSSA and Pseudomonas PNA, recurrent aspiration, bradycardia, hypotension, ileus, chest tube which has since been removed and loculated pleural effusion. His daughter initially made him DNR/DNI yesterday but then changed her mind. Palliative care met with the pt's daughter yesterday, he remains a full code.     Interval Problem Update  11/10 - Pt afebrile overnight but remains with some tachycardia - mild hypotension this am. He has completed 14d of Zosyn and Unasyn for coverage of empyema .To summarize his ID history - he had Pseudomonas on tracheal aspirate 10/31, MSSA and Pseudomonas on BAL 10/24 and pleural fluid was negative for growth on 10/30. Chest tubes are now out. Echo demonstrates an EF of 40% with global hypokinesis, CT brain shows cerebral atrophy. Will give IV thiamine, Wernicke's dosing.    Discussed ACP planning with pt's daughter and his sister today, as pt does not currently have capacity to dictate his own care.  I did discuss the nature of aspiration and recurrent aspiration with them, as well as the eventual need for a G tube if his swallow dysfunction persists, and advanced planning of what to do in the case of repeat aspiration.  Per Dr Luciano's note, if pt was reintubated,  he would require tracheostomy and feeding tube given his repeated intubations this stay.  The patient's family is clear that he would not want that, and they would like to pursue DNR/DNI status, but continue full support otherwise. I will change his status in our system and Palliative is filling out a POLST with the family.      Add free water flushes to his tube feeds via NGT given his relative hypotension and tachycardia.     11/11 - Pt with intermittent tachycardia, Tm to 99.2 and was placed on 5L O2 last night without documentation of any hypoxia. Had poor waveform on pulse ox today, replaced with ear probe and pt fine on room air.  No white count elevation, will check CXR and procalcitonin. Eosinophilia increasing - ddx includes HES, Churg Greta, HIV, parasite infection (less likely as no recent travel), eosinophilic malignancy. Will check a tryptase, RUBIA, ANCA, HIV, O&P, RF, IgE and cortisol level. Reordered home eczema cream. Needs to be out of restraints for SNF or LTAC, will change Zyprexa to Seroquel BID. Increase metoprolol for RVR. Pt's agitation is increased today, very impulsive, at risk of pulling out NG a11/12 - Pt significantly more awake, alert and less agitated today - unclear if this is secondary to Seroquel or high dose thiamine.  Will give him a trial out of restraints today, wrap his PICC so he can't pull at it. Will increase free water as pt complaining of thirst. Goal for this week is improvement with secretions so we can attempt repeat swallow vs pursuing a PEG tube, and to have pt stay out of restraints for LTAC placement.     11/13 - Significant improvement today from yesterday - stayed out of restraints since yesterday afternoon, significantly more oriented, no noted agitation, and is now self suctioning and following instructions. Will plan on repeating swallow study tomorrow, will talk to SLP then. If no improvement on this swallow, will likely need to pursue PEG. Will touch base  with LTAC tomorrow if he is able to stay out of restraints x 48 hours. HR doing better than yesterday. Labs remain stable.     11/14 - Pt remains out of restraints and cooperative today - LTACs have been notified and SNF referrals started.  Unfortunately, pt failed his MBSS today - discussed with pt's daughter, her and the pt are going to discuss PEG placement vs comfort care today, and will let us know tomorrow what is decided. I will hold th pt's tube feeds at midnight in case they decide to proceed. We also discussed his afib and lack of anticoagulation - [id number] score is 2 if you figure in CHF (second echo with depressed EF, first echo on admission normal, may have a temporary stress induced cardiomyopathy), 1 if not. HADBLED is 1. As of now, the decision is to continue with ASA only, but she will also discuss that with the patient further. Labs continue to demonstrate stability, eosinophils remain elevated, and IgE is significantly elevated with a negative tryptase, ANCA, RUBIA, RF, HIV, O&P and cortisol level.  Strongyloides serology ordered.  Will discuss with Heme Onc today given his encephalopathy, eczema and depressed EF.     I have discussed this patient's plan of care and discharge plan at IDT rounds today with Case Management, Nursing, Nursing leadership, and other members of the IDT team.    Consultants/Specialty  critical care    Code Status  DNAR/DNI    Disposition  Patient is not medically cleared for discharge.   Anticipate discharge to to a long-term acute care hospital.  I have placed the appropriate orders for post-discharge needs.    Review of Systems  Review of Systems   Unable to perform ROS: Medical condition   All other systems reviewed and are negative.     Physical Exam  Temp:  [36.3 °C (97.4 °F)-36.8 °C (98.3 °F)] 36.3 °C (97.4 °F)  Pulse:  [] 92  Resp:  [18-26] 18  BP: ()/(61-82) 113/72  SpO2:  [88 %-97 %] 91 %    Physical Exam  Vitals and nursing note reviewed.    Constitutional:       Appearance: He is ill-appearing. He is not toxic-appearing.   HENT:      Head: Normocephalic and atraumatic.      Nose:      Comments: NGT in place     Mouth/Throat:      Pharynx: Oropharynx is clear.   Cardiovascular:      Rate and Rhythm: Normal rate and regular rhythm.      Heart sounds: No murmur heard.  Pulmonary:      Comments: Coarse breath sounds bilaterally    Abdominal:      General: Abdomen is flat. Bowel sounds are normal.      Palpations: Abdomen is soft.   Musculoskeletal:         General: No swelling.   Skin:     General: Skin is warm and dry.      Coloration: Skin is not pale.   Neurological:      General: No focal deficit present.      Mental Status: He is alert. He is disoriented.      GCS: GCS eye subscore is 4. GCS verbal subscore is 5. GCS motor subscore is 6.      Cranial Nerves: No cranial nerve deficit.      Motor: Weakness present.      Comments: Significantly more oriented today, remains out of restraints, significantly less impulsive     Psychiatric:         Speech: Speech is delayed.      Comments: Delayed verbal responses but significantly better mentation       Fluids    Intake/Output Summary (Last 24 hours) at 11/14/2022 1440  Last data filed at 11/14/2022 1300  Gross per 24 hour   Intake 240 ml   Output --   Net 240 ml         Laboratory  Recent Labs     11/12/22  0440 11/13/22  0850 11/14/22  0852   WBC 8.5 9.1 9.6   RBC 3.68* 3.65* 3.99*   HEMOGLOBIN 11.6* 11.5* 12.7*   HEMATOCRIT 35.2* 34.9* 38.7*   MCV 95.7 95.6 97.0   MCH 31.5 31.5 31.8   MCHC 33.0* 33.0* 32.8*   RDW 50.5* 50.5* 52.6*   PLATELETCT 620* 624* 711*   MPV 9.8 9.9 9.6       Recent Labs     11/12/22  0440 11/13/22  0850 11/14/22  0852   SODIUM 138 140 143   POTASSIUM 4.1 4.6 4.3   CHLORIDE 105 107 108   CO2 23 23 24   GLUCOSE 102* 97 93   BUN 26* 30* 31*   CREATININE 0.54 0.51 0.52   CALCIUM 8.7 8.9 9.5                     Imaging  DX-CHEST-PORTABLE (1 VIEW)   Final Result      1.  Bibasilar and  RIGHT perihilar atelectasis which could obscure an additional process. This has decreased since the prior study.   2.  Persistent focal lingular opacity, which could be atelectasis or early pneumonia   3.  RIGHT pleural effusion      DX-CHEST-FOR LINE PLACEMENT Perform procedure in: Patient's Room   Final Result            1. A left peripherally inserted catheter with tip projects appropriately over the expected area of the lower SVC.      DX-CHEST-FOR LINE PLACEMENT Perform procedure in: Patient's Room   Final Result            1. A right peripherally inserted catheter with tip not well seen but likely projects over the expected area of the cavoatrial junction/right atrium.      IR-PICC LINE PLACEMENT W/ GUIDANCE > AGE 5   Final Result                  Ultrasound-guided PICC placement performed by qualified nursing staff as    above.          DX-CHEST-FOR LINE PLACEMENT Perform procedure in: Patient's Room   Final Result      1.  Interval placement of a PICC line which is satisfactory in position.      2.  Otherwise stable exam.      IR-PICC LINE PLACEMENT W/ GUIDANCE > AGE 5   Final Result                  Ultrasound-guided PICC placement performed by qualified nursing staff as    above.          DX-CHEST-PORTABLE (1 VIEW)   Final Result      1.  Stable bibasilar atelectasis, right greater than left.      2.  Stable right and improved left pleural effusion.      DX-CHEST-PORTABLE (1 VIEW)   Final Result      1.  Increased right pleural effusion, now small/moderate.   2.  Increased right base airspace disease.   3.  Similar small left pleural effusion with mildly increased adjacent airspace disease.      [id number] VIEW   Final Result      Mildly dilated loops of small intestine.      DX-CHEST-PORTABLE (1 VIEW)   Final Result      1.  Interval removal of the right sided chest tube.      2.  Stable bibasilar atelectasis.      3.  Interval decrease in size of a right pleural effusion.      DX-CHEST-PORTABLE (1  VIEW)   Final Result      1.  Stable support devices.      2.  Stable bilateral pleural effusions, right greater than left.      DX-CHEST-PORTABLE (1 VIEW)   Final Result      1.  Interval placement of RIGHT chest tube   2.  Moderate RIGHT hydropneumothorax   3.  Small LEFT pleural effusion   4.  Unchanged BILATERAL atelectasis with possible superimposed pneumonia or other airspace disease      DX-CHEST-PORTABLE (1 VIEW)   Final Result      1.  No significant change      2.  Lines and tubes appear appropriately located      3.  Right pleural effusion, probably large in size      4.  Bilateral atelectasis      EC-ECHOCARDIOGRAM LTD W/ CONT   Final Result      DX-CHEST-PORTABLE (1 VIEW)   Final Result         No significant interval change      CT-CHEST,ABDOMEN,PELVIS W/O   Final Result      1.  RIGHT larger than LEFT pleural effusions with overlying atelectasis. Superimposed pneumonia is not excluded.   2.  Healing fractures the RIGHT fifth and sixth ribs   3.  Distended loops of bowel in the abdomen as described above, likely ileus rather than early or low-grade small bowel obstruction   4.  Enlarged inguinal lymph nodes   5.  The hypoechoic nodule in the liver demonstrated on ultrasound from 8/28/2022 is not seen on this unenhanced CT. Hepatic mass protocol CT or MRI should be pursued when clinically appropriate for further assessment.   6.  RIGHT renal cyst         [id number] VIEW   Final Result         1.  Air-filled distended loops of bowel are seen with reactive mucosal pattern, appearance suggests ileus or enteritis versus evolving obstructive changes. Recommend radiographic followup to resolution to exclude progression to obstruction.   2.  Nasogastric tube is coiled within the stomach, the tip terminates overlying the expected location of the gastric fundus.   3.  Bilateral lower lobe infiltrates   4.  Moderate right and small left pleural effusion      DX-CHEST-PORTABLE (1 VIEW)   Final Result         1.   Bilateral pulmonary edema and/or infiltrates.   2.  Moderate right and small left pleural effusion, stable compared to prior study   3.  Cardiomegaly      DX-CHEST-PORTABLE (1 VIEW)   Final Result         1.  Bilateral pulmonary edema and/or infiltrates.   2.  Moderate right and small left pleural effusion, stable compared to prior study   3.  Cardiomegaly      [id number] VIEW   Final Result         Diffuse gaseous distention of the small bowel and colon, worse in prior, could relate to ileus      DX-CHEST-PORTABLE (1 VIEW)   Final Result         1. No significant interval change.      DX-CHEST-PORTABLE (1 VIEW)   Final Result         1.  Bilateral pulmonary edema and/or infiltrates.   2.  Moderate right and small left pleural effusion, increased on the right compared to prior study   3.  Cardiomegaly      DX-CHEST-PORTABLE (1 VIEW)   Final Result         1.  Pulmonary edema and/or infiltrates are identified, which are stable since the prior exam.   2.  Moderate right pleural effusion, stable.   3.  Cardiomegaly      DX-CHEST-PORTABLE (1 VIEW)   Final Result      1.  Well-positioned ETT.   2.  Moderate right pleural effusion and associated atelectasis versus consolidation.   3.  Retrocardiac atelectasis versus consolidation. No significant left effusion.      [id number] VIEW   Final Result      NGT tip is in the stomach.   1.  Nonobstructive bowel gas pattern. Small amount of stool throughout the colon.   2.  Ill-defined right basilar atelectasis versus consolidation and small right pleural effusion.      DX-CHEST-PORTABLE (1 VIEW)   Final Result         1. Low lung volume with hypoventilatory change and bibasilar atelectasis.      CT-HEAD W/O   Final Result      1.  Cerebral atrophy.      2.  Otherwise, Head CT without contrast within normal limits. No evidence of acute cerebral infarction, hemorrhage or mass lesion.         DX-CHEST-FOR LINE PLACEMENT Perform procedure in: Patient's Room   Final Result      1.   Endotracheal tube overlies the mid trachea.      2.  NG tube courses beneath the diaphragms.      DX-CHEST-PORTABLE (1 VIEW)   Final Result      No radiographic evidence of acute cardiopulmonary process.      DX-ESOPHAGUS - [id number]    (Results Pending)          Assessment/Plan  Agitation  Assessment & Plan  - Doing well with thiamine replacement and 12.5mg qam Seroquel and 25mg qpm Seroquel     Peripheral eosinophilia  Assessment & Plan  Ddx includes HES, Churg Greta, HIV, parasite infection (less likely as no significant travel), eosinophilic malignancy. Will check a tryptase (negative), RUBIA (negative), ANCA (negative), HIV (negative), O&P, RF (negative), IgE (significantly elevated) and cortisol level (negative)  - Will touch base with Heme Onc to discuss possibility of HES, as pt has depressed EF, encephalopathy, severe eczema and significantly elevated eosinophils   - Strongy serology ordered and pending    Congestive heart failure (CHF) ([facility])  Assessment & Plan  - Initial echo with preserved EF, then repeat echo with EF 40% - unclear if he was in RVR or other variable that would impact an acute decrease in his EF  - Not overloaded on exam, no acute exacerbation  - On metoprolol, add Losartan now as Bps remain stable    Dysphagia  Assessment & Plan  - Pt has failed swallow evals last week and this week - has NGT in place  - Failed MBSS today  - Multifactorial with likely Wernicke's, deconditioning from PNA and being on the vent, acute illness  - Poor long term prognosis given his cerebral atrophy, chronic EtOH abuse and methampetamine abuse likely contributing to his lack of swallow coordination.  - Hoping that as his secretions decrease with treatment of his PNA, he may have an easier time  - Treat Wernicke's with high dose Thiamine, will continue working with SLP  - Daughter deciding on whether to move forward with PEG or not, NPO after midnight in case she decides to move foward    Aspiration  pneumonia (HCC)  Assessment & Plan  - Completed antibiotics, now on room air  - Has failed multiple swallow evals, currently receiving tube feeds via NGT  - Failed his MBSS today - discussed PEG with pt's daughter, they will discuss tonight and decide PEG vs comfort care tomorrow. Will keep NPO at midnight in case of decision to move forward with PEG     Wernicke encephalopathy syndrome  Assessment & Plan  - Pt with significant dementia symptoms, swallow dysfunction, cerebral atrophy on CT  - Will treat with high dose IV Thiamine   - Showing some improvement   - ?? Subclinical CVA with swallow dysfunction? Cannot check MRI as he cannot lie flat without risk of aspiration. Continue ASA     Hypomagnesemia  Assessment & Plan  - Replace as needed    Reactive thrombocytosis  Assessment & Plan  - Continue to monitor       Sepsis due to Pseudomonas species (HCC)- (present on admission)  Assessment & Plan  This is Severe Sepsis Present on admission  SIRS criteria identified on my evaluation include: Fever, with temperature greater than 101 deg F, Tachycardia, with heart rate greater than 90 BPM, Tachypnea, with respirations greater than 20 per minute and Leukocytosis, with WBC greater than 12,000  Clinical indicators of end organ dysfunction include Systolic blood pressure (SBP) <90 mmHg or mean arterial pressure <65 mmHg and Acute respiratory failure as evidenced by a new need for invasive or non-invasive mechanical ventilation (Ventilator or BiPap)   Source is pneumonia by MSSA and pseudomonas  Sepsis protocol initiated  Crystalloid Fluid Administration: Patient has volume overload with >11 lt since admission, (NYHA class III or symptoms with minimal exertion, Or NYHA class IV or symptoms at rest or with activity), for this/these reason(s) it is unsafe for this patient to receive 30 mL/kg of fluid.  Partial Fluid Dose Given on 10/24, patient to be reassessed shortly after completion of partial fluid bolus to ensure  "adequacy of resuscitation  IV antibiotics as appropriate for source of sepsis  Reassessment: I have reassessed the patient's hemodynamic status  Will provide another partial bolus today and wean down the vasopressors     .  Pt had septic shock requiring pressor support, with MSSA and pseudomonas in BAL and tracheal aspirate. Blood cultures negative. Sepsis has resolved.     Empyema (HCC)- (present on admission)  Assessment & Plan  - On the right, moderate-large per CXR of 10/30  - S/p diagnostic thoracentesis 10/25/22: albumin gradient is less than 1.2 g/dl which indicate exudates, his was 0.9, also inflammative effusion with very high LDH 1020 (serum in the 240s range)  - Cultures negative but pt was on antibiotics - did have chest tubes which have since been removed, completed antibiotic course.     Acute respiratory failure with hypoxia (HCC)- (present on admission)  Assessment & Plan  -Intubated for respiratory acidosis and altered mental status with need for repeat intubation twice thereafter  -Cultures from sputum and BAL grew MSSA and pseudomonas, pt completed 14d of Zosyn, and an additional 1.5d of Unasyn for empyema  -also complicated by right empyema, s/p diagnostic thoracentesis and chest tubes  - Currently off antibiotics, back on room air   - RESOLVED     Alcohol abuse with withdrawal (HCC)  Assessment & Plan  Severe withdrawal with respiratory depression requiring intubation on admit  Out of withdrawal window now     Liver mass- (present on admission)  Assessment & Plan  - Seen on  8/2022 \"right lobe of the liver measuring 1.7 x 1.3 x 1.0 cm in size\"  - AFP negative  - Pt will not tolerate MRI at this time - high risk for aspiration for aspiration if laying flat    Atrial fibrillation (HCC)- (present on admission)  Assessment & Plan  - Not on anticoagulation prior to admission - appears he was just diagnosed by PCP in June of this year and referred to Cardiology, but I don't see where he was seen by " them  - [id number] score of 1-2 - new echo with EF of 40% but prior to that was 50%, and may have some effect of sepsis on EF   - ASA for now after discussion with pt's daughter  - Continue Metoprolol for rate control    Other specified hypothyroidism- (present on admission)  Assessment & Plan  - TSH normal, continue Synthroid     Rash- (present on admission)  Assessment & Plan  - Pt with pathology positive eczema, order his home triamcinolone cream    Hyponatremia  Assessment & Plan  - Resolved with free water flushes, pt was dry       VTE prophylaxis: SCDs/TEDs and enoxaparin ppx    I have performed a physical exam and reviewed and updated ROS and Plan today (11/14/2022). In review of yesterday's note (11/13/2022), there are no changes except as documented above.

## 2022-11-14 NOTE — CARE PLAN
The patient is Stable - Low risk of patient condition declining or worsening    Shift Goals  Clinical Goals: Out of bed, accurate weight  Patient Goals: Get out of bed  Family Goals: no more restraints    Progress made toward(s) clinical / shift goals:    Problem: Knowledge Deficit - Standard  Goal: Patient and family/care givers will demonstrate understanding of plan of care, disease process/condition, diagnostic tests and medications  Outcome: Progressing  Note: Spoke with patient and daughter about care plan in including barriers to removing NG tube and need for improving and increasing patient mobility. They verbalized understanding.        Patient is not progressing towards the following goals:

## 2022-11-14 NOTE — THERAPY
"Speech Language Pathology  Daily Treatment     Patient Name: Tyree Whiteside  Age:  57 y.o., Sex:  male  Medical Record #: [id number]  Today's Date: 11/14/2022     Precautions  Precautions: Fall Risk, Swallow Precautions ( See Comments), Nasogastric Tube  Comments: restraints    Assessment    Pt seen on this date for dysphagia therapy. Pt awake and oriented x2 (self and place), pt's daughter at bedside. Pt is out of restraints and self suctioning independently. Voice was clear prior to PO trials. Immediate coughing and wet vocal quality noted after the swallow following trials of ice chips and thin liquids which is concerning for possible penetration/aspiration. Pt following directives for volitional cough and clearing with Yankour (improvement from last Friday). Given improved KENYA and direction following, recommend a MBSS to further assess swallow function and determine safest diet. Pt and pt's daughter consented to procedure. SLP to follow for MBSS later today. Okay for a few ice chips an hour with RN to minimize xerostomia and maintain integrity of the swallow function.    Plan    1) continue NPO with NG, okay for a few ice chips an hour with RN  2) MBSS later today    Continue current treatment plan.    Discharge Recommendations: Recommend post-acute placement for additional speech therapy services prior to discharge home       Objective       11/14/22 1031   Precautions   Precautions Fall Risk;Swallow Precautions ( See Comments);Nasogastric Tube   Vitals   O2 (LPM) 0   O2 Delivery Device None - Room Air   Pain 0 - 10 Group   Therapist Pain Assessment Post Activity Pain Same as Prior to Activity;Nurse Notified;0   Patient / Family Goals   Patient / Family Goal #1 \"I could eat half of that ice\"   Goal #1 Outcome Progressing as expected   Short Term Goals   Short Term Goal # 1 Pt will participate in an instrumental swallow study to fully assess swallow function.   Goal Outcome # 1 Goal met   Short Term " Goal # 2 Pt will consume pre-feeding trials with SLP to determine readiness to begin an oral diet.   Goal Outcome # 2  Progressing slower than expected   Education Group   Education Provided Dysphagia   Dysphagia Patient Response Patient;Family;Acceptance;Explanation;Verbal Demonstration;Reinforcement Needed   Anticipated Discharge Needs   Discharge Recommendations Recommend post-acute placement for additional speech therapy services prior to discharge home   Therapy Recommendations Upon DC Dysphagia Training;Community Re-Integration;Patient / Family / Caregiver Education   Interdisciplinary Plan of Care Collaboration   IDT Collaboration with  Nursing;Family / Caregiver;Physician   Patient Position at End of Therapy Seated;In Bed;Call Light within Reach;Family / Friend in Room

## 2022-11-14 NOTE — DISCHARGE PLANNING
Case Management Discharge Planning    Admission Date: 10/18/2022  GMLOS: 9.6  ALOS: 27    6-Clicks ADL Score: 12  6-Clicks Mobility Score: 6  PT and/or OT Eval ordered: Yes  Post-acute Referrals Ordered: Yes  Post-acute Choice Obtained: Yes  Has referral(s) been sent to post-acute provider:  Yes      Anticipated Discharge Dispo: Discharge Disposition: D/T to SNF with Medicare cert in anticipation of skilled care (03)    DME Needed: No    Action(s) Taken: Spoke to Forest from [facility]. Forest requested updated clinicals be faxed. RNCM faxed labs, last MD progress note, MAR, and vitals to [phone] as requested by Forest.    RNCM placed SNF order per protocol in the case that pt no longer meets LTACH criteria. RNCM spoke to pt's daughter Laura. Received verbal approval to send blanket referral to local SNFs. Choice form completed and faxed to [facility].    Spoke to Kimberly from [facility]. [facility] may be able to accept pt, Kimberly to verify DC plan with pt's daughter Laura.    Per Kimberly from [facility], they accept pt if auth is obtained.    Escalations Completed: None    Medically Clear: Yes    Next Steps: f/u with LTACH and SNFs regarding acceptance    Barriers to Discharge: Pending Placement

## 2022-11-15 LAB
ANION GAP SERPL CALC-SCNC: 14 MMOL/L (ref 7–16)
BASE EXCESS BLDA CALC-SCNC: 0 MMOL/L (ref -4–3)
BASOPHILS # BLD AUTO: 1.6 % (ref 0–1.8)
BASOPHILS # BLD: 0.16 K/UL (ref 0–0.12)
BODY TEMPERATURE: ABNORMAL DEGREES
BUN SERPL-MCNC: 30 MG/DL (ref 8–22)
CALCIUM SERPL-MCNC: 9.2 MG/DL (ref 8.4–10.2)
CHLORIDE SERPL-SCNC: 107 MMOL/L (ref 96–112)
CO2 BLDA-SCNC: 27 MMOL/L (ref 20–33)
CO2 SERPL-SCNC: 22 MMOL/L (ref 20–33)
COMMENT 1642: NORMAL
CREAT SERPL-MCNC: 0.53 MG/DL (ref 0.5–1.4)
EOSINOPHIL # BLD AUTO: 2.09 K/UL (ref 0–0.51)
EOSINOPHIL NFR BLD: 21.1 % (ref 0–6.9)
ERYTHROCYTE [DISTWIDTH] IN BLOOD BY AUTOMATED COUNT: 53.1 FL (ref 35.9–50)
GFR SERPLBLD CREATININE-BSD FMLA CKD-EPI: 117 ML/MIN/1.73 M 2
GLUCOSE BLD STRIP.AUTO-MCNC: 72 MG/DL (ref 65–99)
GLUCOSE BLD STRIP.AUTO-MCNC: 81 MG/DL (ref 65–99)
GLUCOSE BLD STRIP.AUTO-MCNC: 94 MG/DL (ref 65–99)
GLUCOSE BLD STRIP.AUTO-MCNC: 97 MG/DL (ref 65–99)
GLUCOSE SERPL-MCNC: 82 MG/DL (ref 65–99)
HCO3 BLDA-SCNC: 26.1 MMOL/L (ref 17–25)
HCT VFR BLD AUTO: 36.3 % (ref 42–52)
HGB BLD-MCNC: 11.9 G/DL (ref 14–18)
HOROWITZ INDEX BLDA+IHG-RTO: 55 MM[HG]
IMM GRANULOCYTES # BLD AUTO: 0.05 K/UL (ref 0–0.11)
IMM GRANULOCYTES NFR BLD AUTO: 0.5 % (ref 0–0.9)
LACTATE BLD-SCNC: 0.7 MMOL/L (ref 0.5–2)
LYMPHOCYTES # BLD AUTO: 1.76 K/UL (ref 1–4.8)
LYMPHOCYTES NFR BLD: 17.8 % (ref 22–41)
MAGNESIUM SERPL-MCNC: 1.9 MG/DL (ref 1.5–2.5)
MCH RBC QN AUTO: 32 PG (ref 27–33)
MCHC RBC AUTO-ENTMCNC: 32.8 G/DL (ref 33.7–35.3)
MCV RBC AUTO: 97.6 FL (ref 81.4–97.8)
MONOCYTES # BLD AUTO: 0.82 K/UL (ref 0–0.85)
MONOCYTES NFR BLD AUTO: 8.3 % (ref 0–13.4)
NEUTROPHILS # BLD AUTO: 5.02 K/UL (ref 1.82–7.42)
NEUTROPHILS NFR BLD: 50.7 % (ref 44–72)
NRBC # BLD AUTO: 0 K/UL
NRBC BLD-RTO: 0 /100 WBC
O2/TOTAL GAS SETTING VFR VENT: 100 %
PCO2 BLDA: 45.8 MMHG (ref 26–37)
PH BLDA: 7.36 [PH] (ref 7.4–7.5)
PH TEMP ADJ BLDA: 7.36 [PH] (ref 7.4–7.5)
PHOSPHATE SERPL-MCNC: 5.6 MG/DL (ref 2.5–4.5)
PLATELET # BLD AUTO: 616 K/UL (ref 164–446)
PLATELET BLD QL SMEAR: NORMAL
PMV BLD AUTO: 9.7 FL (ref 9–12.9)
PO2 BLDA: 55 MMHG (ref 64–87)
PO2 TEMP ADJ BLDA: 55 MMHG (ref 64–87)
POTASSIUM SERPL-SCNC: 4 MMOL/L (ref 3.6–5.5)
RBC # BLD AUTO: 3.72 M/UL (ref 4.7–6.1)
RBC BLD AUTO: NORMAL
SAO2 % BLDA: 87 % (ref 93–99)
SODIUM SERPL-SCNC: 143 MMOL/L (ref 135–145)
SPECIMEN DRAWN FROM PATIENT: ABNORMAL
WBC # BLD AUTO: 9.9 K/UL (ref 4.8–10.8)

## 2022-11-15 PROCEDURE — 85025 COMPLETE CBC W/AUTO DIFF WBC: CPT

## 2022-11-15 PROCEDURE — 81219 CALR GENE COM VARIANTS: CPT

## 2022-11-15 PROCEDURE — A9270 NON-COVERED ITEM OR SERVICE: HCPCS | Performed by: INTERNAL MEDICINE

## 2022-11-15 PROCEDURE — 36415 COLL VENOUS BLD VENIPUNCTURE: CPT

## 2022-11-15 PROCEDURE — 700102 HCHG RX REV CODE 250 W/ 637 OVERRIDE(OP): Performed by: HOSPITALIST

## 2022-11-15 PROCEDURE — 81206 BCR/ABL1 GENE MAJOR BP: CPT

## 2022-11-15 PROCEDURE — 700102 HCHG RX REV CODE 250 W/ 637 OVERRIDE(OP): Performed by: FAMILY MEDICINE

## 2022-11-15 PROCEDURE — A9270 NON-COVERED ITEM OR SERVICE: HCPCS | Performed by: HOSPITALIST

## 2022-11-15 PROCEDURE — 88184 FLOWCYTOMETRY/ TC 1 MARKER: CPT

## 2022-11-15 PROCEDURE — 88185 FLOWCYTOMETRY/TC ADD-ON: CPT | Mod: 91

## 2022-11-15 PROCEDURE — 700111 HCHG RX REV CODE 636 W/ 250 OVERRIDE (IP): Performed by: FAMILY MEDICINE

## 2022-11-15 PROCEDURE — 700111 HCHG RX REV CODE 636 W/ 250 OVERRIDE (IP): Performed by: INTERNAL MEDICINE

## 2022-11-15 PROCEDURE — 700105 HCHG RX REV CODE 258: Performed by: FAMILY MEDICINE

## 2022-11-15 PROCEDURE — 94760 N-INVAS EAR/PLS OXIMETRY 1: CPT

## 2022-11-15 PROCEDURE — 81270 JAK2 GENE: CPT

## 2022-11-15 PROCEDURE — 700102 HCHG RX REV CODE 250 W/ 637 OVERRIDE(OP): Performed by: INTERNAL MEDICINE

## 2022-11-15 PROCEDURE — 770020 HCHG ROOM/CARE - TELE (206)

## 2022-11-15 PROCEDURE — 99233 SBSQ HOSP IP/OBS HIGH 50: CPT | Performed by: INTERNAL MEDICINE

## 2022-11-15 PROCEDURE — 81208 BCR/ABL1 GENE OTHER BP: CPT

## 2022-11-15 PROCEDURE — 81207 BCR/ABL1 GENE MINOR BP: CPT

## 2022-11-15 PROCEDURE — 84100 ASSAY OF PHOSPHORUS: CPT

## 2022-11-15 PROCEDURE — A9270 NON-COVERED ITEM OR SERVICE: HCPCS | Performed by: FAMILY MEDICINE

## 2022-11-15 PROCEDURE — 80048 BASIC METABOLIC PNL TOTAL CA: CPT

## 2022-11-15 PROCEDURE — 86682 HELMINTH ANTIBODY: CPT

## 2022-11-15 PROCEDURE — 83735 ASSAY OF MAGNESIUM: CPT

## 2022-11-15 PROCEDURE — 82962 GLUCOSE BLOOD TEST: CPT | Mod: 91

## 2022-11-15 PROCEDURE — 81338 MPL GENE COMMON VARIANTS: CPT

## 2022-11-15 RX ORDER — LOSARTAN POTASSIUM 25 MG/1
12.5 TABLET ORAL DAILY
Status: DISCONTINUED | OUTPATIENT
Start: 2022-11-15 | End: 2022-11-29 | Stop reason: HOSPADM

## 2022-11-15 RX ORDER — DIPHENHYDRAMINE HCL 25 MG
25 TABLET ORAL EVERY 6 HOURS PRN
Status: DISCONTINUED | OUTPATIENT
Start: 2022-11-15 | End: 2022-11-29 | Stop reason: HOSPADM

## 2022-11-15 RX ADMIN — ENOXAPARIN SODIUM 40 MG: 40 INJECTION SUBCUTANEOUS at 17:27

## 2022-11-15 RX ADMIN — ASPIRIN 81 MG CHEWABLE TABLET 81 MG: 81 TABLET CHEWABLE at 06:17

## 2022-11-15 RX ADMIN — QUETIAPINE FUMARATE 12.5 MG: 25 TABLET, FILM COATED ORAL at 06:17

## 2022-11-15 RX ADMIN — QUETIAPINE FUMARATE 25 MG: 25 TABLET ORAL at 21:35

## 2022-11-15 RX ADMIN — TRIAMCINOLONE ACETONIDE: 1 CREAM TOPICAL at 06:00

## 2022-11-15 RX ADMIN — ALTEPLASE 2 MG: 2.2 INJECTION, POWDER, LYOPHILIZED, FOR SOLUTION INTRAVENOUS at 15:28

## 2022-11-15 RX ADMIN — TRIAMCINOLONE ACETONIDE: 1 CREAM TOPICAL at 17:29

## 2022-11-15 RX ADMIN — SENNOSIDES AND DOCUSATE SODIUM 2 TABLET: 50; 8.6 TABLET ORAL at 06:17

## 2022-11-15 RX ADMIN — NICOTINE 7 MG: 7 PATCH TRANSDERMAL at 06:16

## 2022-11-15 RX ADMIN — THIAMINE HYDROCHLORIDE 250 MG: 100 INJECTION, SOLUTION INTRAMUSCULAR; INTRAVENOUS at 06:18

## 2022-11-15 RX ADMIN — LEVOTHYROXINE SODIUM 50 MCG: 50 TABLET ORAL at 06:17

## 2022-11-15 NOTE — THERAPY
Speech Language Pathology   Video Swallow Evaluation     Patient Name: Tyree Whtieside  AGE:  57 y.o., SEX:  male  Medical Record #: [id number]  Today's Date: 11/14/2022     Precautions  Precautions: Fall Risk, Swallow Precautions ( See Comments), Nasogastric Tube  Comments: restraints      HPI:  he pt is a 56 y/o M who was admitted 10/18/22.  The pt presented with generalized weakness, tremors and nausea. Pt reported that he went to [facility] for assistance with alcohol detox however was referred to the emergency room. Apparently the patient drinks a pint of vodka and 4-5 beers daily. Pt was experiencing hallucinations, confusion, generalized weakness, N/V and headache for 1 day prior to admission.       Hospital course significant for:  10/18-10/21  Intubation  10/26  Self extubation; reintubation  10/26-11/02  Intubation        PMHx:   Alcohol abuse, meth use, Afib, HTN      Current Method of Nutrition   NGT/Cortrak    Pertinent Information  Affect/Behavior: Appropriate, Cooperative  Oxygen Requirements: Room Air  Secretion Management: Excess secretions, suctioning required  Feeding Tube: NGT in situ to right nare  Dentition: Good  Factor(s) Affecting Performance: None      Discussed with the risks, benefits, and alternatives of the VFSS procedure. Patient/family acknowledged and agreed to proceed.    Assessment  Videofluoroscopic Swallow Study was conducted in the lateral projection(s) to evaluate oropharyngeal swallow function. A radiology tech was present to assist with the procedure.       Positioning: seated upright in fluoroscopy chair  Anatomic View: WNL  Bolus Administration: SLP  PO barium contrast trials: Varibar thin liquid, Varibar nectar (mildly thick) liquid, liquidized mixed with Varibar powder, Varibar pudding      Consistency PAS Score Timing Comments   Thin Liquid 7 Pre swallow and During swallow    Mildly Thick Liquid (MTL) 8 During swallow    Liquidized 8 Post swallow     Pudding 7 During swallow      Penetration-Aspiration Scale (PAS)  1     No contrast enters airway  2     Contrast enters the airway, remains above the vocal folds, and is ejected from the airway (not seen in the airway at the end of the swallow).  3     Contrast enters the airway, remains above the vocal folds, and is not ejected from the airway (is seen in the airway after the swallow).  4     Contrast enters the airway, contacts the vocal folds, and is ejected from the airway.  5     Contrast enters the airway, contacts the vocal folds, and is not ejected from the airway  6     Contrast enters the airway, crosses the plane of the vocal folds, and is ejected from the airway.  7     Contrast enters the airway, crosses the plane of the vocal folds, and is not ejected from the airway despite effort.  8     Contrast enters the airway, crosses the plane of the vocal folds, is not ejected from the airway and there is no response to aspiration.        Oral phase:  Premature spillage into airway before the swallow. Pharyngeal swallow triggering at level of the pyriform sinuses with trials of thin liquids and at level of the vallecular space with trials of apple sauce and pudding.      Pharyngeal phase:    Thin liquids- Aspiration before with teaspoon sips and during the swallow with both teaspoon and cup sips. Severe vallecular space and pyriform sinus residue noted after the swallow and unfortunately d/t copious residue there was spillage from pyriform sinuses into airway.    MTL - There was penetration before the swallow to level of the vocal folds (VF) and aspiration during the swallow which was silent in nature. In and out aspiration x1 which pt was able to clear reflexively from trachea. Severe pharyngeal residue after the swallow and pt reflexively triggering multiple swallows in attempt to clear, however, no successful for significant improvement.      Apple sauce - There was penetration during the swallow  (residue not clearing from laryngeal vestibule) and copious penetration after the swallow which resulted in silent aspiration. Moderate vallecular space, posterior pharyngeal wall, and pyriform sinus residue noted after the swallow.    Pudding - Profound vallecular space and pyriform sinus residue noted after the swallow which did not improve despite effort. Cough and use of Yankour only minimally effective for improving residue. MTL wash resulted in aspiration of pudding. Significant residue remained in pharynx despite MTL wash.    Deficits marked by laryngeal mistiming, weak pharyngeal squeeze, impaired hyoid excursion, epiglottis not inverting, impaired sensation, weak laryngeal elevation/laryngeal vestibular closure, and weak/unproductive cough.      Compensatory Strategies:  Multiple swallows - not effective for clearing or significantly improving pharyngeal residue  Effortful swallows - not effective for clearing or significantly improving pharyngeal residue  Liquid wash - resulted in aspiration of previous trial, not effective for clearing or significantly improving pharyngeal residue      Clinical Impressions  The pt presents with a profound oropharyngeal dysphagia, likely acute related to generalized weakness and prolonged intubation status. Although pt's cognition and participation have improved since last week, unfortunately swallow function has not made significant gains. Pt still continues to exhibit difficulty managing secretions requiring use of Yankour for expectoration of secretions. Given results of this evaluation, pt may benefit from a long-term source of nutrition, such as PEG if this is within pt's/family's wishes. Results of study and recommendations provided to both pt and his daughter. His daughter, Laura, verbalized understanding of education. SLP to follow.      Recommendations  Recommend NPO with continuation of NG for nutrition. Pt may benefit from long-term source of nutrition given  "minimal improvement to swallow function. Okay for small amounts of ice chips an hour.  2.  Swallowing Instructions & Precautions:   Medication: Non Oral   Oral Care: Q2h         Plan    Recommend Speech Therapy 5 times per week until therapy goals are met for the following treatments:  Dysphagia Training and Patient / Family / Caregiver Education.    Discharge Recommendations: Recommend post-acute placement for additional speech therapy services prior to discharge home       Objective       11/14/22 1201   Precautions   Precautions Fall Risk;Swallow Precautions ( See Comments);Nasogastric Tube   Vitals   O2 (LPM) 0   O2 Delivery Device None - Room Air   Pain 0 - 10 Group   Therapist Pain Assessment Post Activity Pain Same as Prior to Activity;Nurse Notified;0   Prior Living Situation   Housing / Facility Motel   Lives with - Patient's Self Care Capacity Alone and Able to Care For Self   Prior Level Of Function   Communication Within Functional Limits   Swallow Within Functional Limits   Dentition Intact   Dentures None   Hearing Within Functional Limits for Evaluation   Hearing Aid None   Vision Within Functional Limits for Evaluation   Patient's Primary Language English   Patient / Family Goals   Patient / Family Goal #1 \"I could eat half of that ice\"   Goal #1 Outcome Progressing as expected   Short Term Goals   Short Term Goal # 1 Pt will participate in an instrumental swallow study to fully assess swallow function.   Goal Outcome # 1 Goal met   Short Term Goal # 2 Pt will consume pre-feeding trials with SLP to determine readiness to begin an oral diet.   Goal Outcome # 2  Goal not met   Education Group   Education Provided Dysphagia   Dysphagia Patient Response Patient;Family;Acceptance;Explanation;Verbal Demonstration;Reinforcement Needed   Anticipated Discharge Needs   Discharge Recommendations Recommend post-acute placement for additional speech therapy services prior to discharge home   Therapy " Recommendations Upon DC Dysphagia Training;Community Re-Integration;Patient / Family / Caregiver Education   Interdisciplinary Plan of Care Collaboration   IDT Collaboration with  Nursing;Family / Caregiver;Physician   Patient Position at End of Therapy Seated;Other (Comments)  (OT in room)

## 2022-11-15 NOTE — CARE PLAN
Problem: Knowledge Deficit - Standard  Goal: Patient and family/care givers will demonstrate understanding of plan of care, disease process/condition, diagnostic tests and medications  Outcome: Progressing     Problem: Skin Integrity  Goal: Skin integrity is maintained or improved  Outcome: Progressing   The patient is Stable - Low risk of patient condition declining or worsening    Shift Goals  Clinical Goals: safety, remain out of restraints  Patient Goals: rest  Family Goals: continue tube feed    Progress made toward(s) clinical / shift goals:  updated pts family on plan of care, pt lethargic and not participating in assessment at start of shift. Pt now oriented x4 and able to follow commands. Tube feed restarted per MD order. Will monitor tube feed     Patient is not progressing towards the following goals:

## 2022-11-15 NOTE — HOSPITAL COURSE
Pt is a 58yo with a history of atrial fibrillation, HTN, alcohol dependence with abuse, methamphetamine abuse and liver mass who initially presented to [facility] for help with EtOH detoxification but was sent to the ER as he was having hallucinations, confusion, weakness, nausea and vomiting. He was admitted to ICU on 10/18/22 for EtOH withdrawal and respiratory depression and was placed on the ventilator and had a protracted ICU course - he required reintubation on 10/23 and again 10/26 after self extubation, diagnostic thoracentesis and has had MSSA and Pseudomonas PNA, recurrent aspiration, bradycardia, hypotension, ileus, chest tube which has since been removed and loculated pleural effusion.  He transitioned out of the ICU and is still having severe difficulty with swallowing.  Daughter discussed with family regarding moving forward with PEG tube and would like this placed, GI to consult.  (Per prior hospitalist)    11/17/2022-patient had PEG tube placement with RAYMOND Mcgrath  11/7 and 11/9 - PICC lines placed while in ICU  11/2-4 -pt had intrapleural fibrinolytics in ICU  11/01 - s/p right side chest tube placement in ICU  10/25, 10/30 - bedside right sided thoracentesis in ICU  10/26 - patient re-intubated  10/23 - patient intubated and had Bronchopscopy in ICU    ABX given:  10/22-24 & 11/08-09 -- Unasyn  10/24- 11/07 -- Zosyn for eosinophilia  11/17 - cefazolin for PEG placement

## 2022-11-15 NOTE — CONSULTS
Consult Note: Hematology/Oncology    Date of consultation: 11/15/2022 11:46 AM    Referring provider: Dr NICHELLE BARAJAS    Reason for consultation: Eosinophilia      History of presenting illness:     Mr. Mejia is a 57 y.o. year old Gentleman with a history of alcoholism, atrial fibrillation eczema was admitted to hospital in mid October 2022 for symptoms of Hallucinations, confusion, weakness nausea and vomiting.  He was admitted to ICU October 18, 2022 for alcohol withdrawal and respiratory depression he was on ventilator support and had a complicated ICU course required reintubation October 2030 and again in October 26 after self extubation.  Patient underwent diagnostic paracentesis did not grow any bacteria.  Patient during this hospitalization diagnosed with MSSA and Pseudomonas pneumonia recurrent aspiration bradycardia hypotension ileus chest tube placement and removal for pleural effusion loculated. Patient is also diagnosed with possible Warnicke's encephalopathy for his mentation changes chronic is on NG tube currently patient family is being in discussions regards to palliative hospice care versus doing a gastrostomy tube for feeds.  If in case patient had to go for another intubation high likely chance that patient might need tracheostomy tube as well.    Patient on admission had normal white cells microcytosis MCV up to 105 with some anemia platelet count normal between 2 50-3 50,000 I have noticed patient MCV normalized over time and platelets gradually increased along with eosinophils starting November 2, 2022.  He is eosinophil percentage on admission was normal.     Agree not responsive we went to deep stimuli he just grimaces.  Patient has NG tube looks cachectic severe PCM emaciated eczema is noted on the body.    Past Medical History:    Past Medical History:   Diagnosis Date    A-fib (HCC)     ASTHMA     Eczema     Hypertension        Past surgical history:  History reviewed. No pertinent  surgical history.    Allergies:  Patient has no known allergies.    Medications:    Current Facility-Administered Medications   Medication Dose Route Frequency Provider Last Rate Last Admin    losartan (COZAAR) tablet 12.5 mg  12.5 mg Oral Q DAY Blanche Gallego M.D.   12.5 mg at 11/14/22 1642    metoprolol tartrate (LOPRESSOR) tablet 50 mg  50 mg Enteral Tube TWICE DAILY Blanche Gallego M.D.   50 mg at 11/14/22 1642    diphenhydrAMINE (BENADRYL) tablet/capsule 25 mg  25 mg Oral Q6HRS PRN GRACY MustafaONeetu   25 mg at 11/14/22 1642    QUEtiapine (Seroquel) tablet 25 mg  25 mg Enteral Tube QHS Blanche Gallego M.D.   25 mg at 11/14/22 2117    QUEtiapine (Seroquel) tablet 12.5 mg  12.5 mg Enteral Tube QAM Blanche Gallego M.D.   12.5 mg at 11/15/22 0617    triamcinolone acetonide (KENALOG) 0.1 % cream   Topical BID Blanche Gallego M.D.   Given at 11/15/22 0600    thiamine (B-1) 250 mg in dextrose 5% 100 mL IVPB  250 mg Intravenous DAILY Blanche Gallego M.D. 200 mL/hr at 11/15/22 0618 250 mg at 11/15/22 0618    Followed by    [START ON 11/18/2022] thiamine (B-1) 100 mg in dextrose 5% 100 mL IVPB  100 mg Intravenous DAILY Blanche Gallego M.D.        aspirin (ASA) chewable tab 81 mg  81 mg Enteral Tube DAILY Blanche Gallego M.D.   81 mg at 11/15/22 0617    ipratropium-albuterol (DUONEB) nebulizer solution  3 mL Nebulization Q4H PRN (RT) Greyson Luciano D.O.        dilTIAZem (Cardizem) injection 10 mg  10 mg Intravenous Q4HRS PRN Nils Seymour D.O.   10 mg at 11/10/22 0402    senna-docusate (PERICOLACE or SENOKOT S) 8.6-50 MG per tablet 2 Tablet  2 Tablet Enteral Tube BID Greyson Luciano D.O.   2 Tablet at 11/15/22 0617    And    polyethylene glycol/lytes (MIRALAX) PACKET 1 Packet  1 Packet Enteral Tube QDAY PRN Greyson Luciano D.O.        And    magnesium hydroxide (MILK OF MAGNESIA) suspension 30 mL  30 mL Enteral Tube QDAY PRN Greyson Luciano D.O.        And    bisacodyl (DULCOLAX) suppository 10  mg  10 mg Rectal QDAY PRN Greyson Luciano D.O.        scopolamine (TRANSDERM-SCOP) patch 1 Patch  1 Patch Transdermal Q72HRS Greyson Luciano D.O.   1 Patch at 11/13/22 0932    carboxymethylcellulose (REFRESH TEARS) 0.5 % ophthalmic drops 2 Drop  2 Drop Both Eyes Q2HRS PRN Sheeba Coto D.O.        acetaminophen (Tylenol) tablet 650 mg  650 mg Enteral Tube Q6HRS PRN Sheeba Coto D.O.        eucerin cream   Topical TID PRN Lashon Lozano D.O.   Given at 11/13/22 2224    nicotine (NICODERM) 7 MG/24HR 7 mg  1 Patch Transdermal Daily-0600 Sheeba Coto D.O.   7 mg at 11/15/22 0616    enoxaparin (Lovenox) inj 40 mg  40 mg Subcutaneous DAILY AT 1800 Sheeba Coto D.O.   40 mg at 11/14/22 1641    lactulose 20 GM/30ML solution 15 mL  15 mL Enteral Tube BID PRN Nilda Franco M.D.   15 mL at 11/03/22 1523    dextrose 10 % BOLUS 25 g  25 g Intravenous Q15 MIN PRN Chris Abrams M.D.   25 g at 11/10/22 0136    Respiratory Therapy Consult   Nebulization Continuous RT Greyson Luciano D.O.        Pharmacy Consult: Enteral tube insertion - review meds/change route/product selection   Other PHARMACY TO DOSE Greyson Luciano D.O.        levothyroxine (SYNTHROID) tablet 50 mcg  50 mcg Enteral Tube AM ES Eva Rodriguez M.D.   50 mcg at 11/15/22 0617       Social History:     Social History     Socioeconomic History    Marital status:      Spouse name: Not on file    Number of children: Not on file    Years of education: Not on file    Highest education level: Not on file   Occupational History    Not on file   Tobacco Use    Smoking status: Every Day     Packs/day: 0.33     Years: 25.00     Pack years: 8.25     Types: Cigarettes    Smokeless tobacco: Never   Vaping Use    Vaping Use: Never used   Substance and Sexual Activity    Alcohol use: Yes     Comment: 4 drinks daily    Drug use: Never     Comment: Hx of methamphetamin, marijuana    Sexual activity: Yes   Other  "Topics Concern    Not on file   Social History Narrative    Not on file     Social Determinants of Health     Financial Resource Strain: Not on file   Food Insecurity: Not on file   Transportation Needs: Not on file   Physical Activity: Not on file   Stress: Not on file   Social Connections: Not on file   Intimate Partner Violence: Not on file   Housing Stability: Not on file       Family History:     Family History   Problem Relation Age of Onset    Heart Disease Father     Stroke Father     Hypertension Brother     Cancer Brother         has 12 sibs, one brother had an unknown type of ca       Review of Systems: Unable to obtain due to clinical condition.    Physical Exam:  Vitals:   /64   Pulse (!) 114 Comment: Rn notified   Temp 36.6 °C (97.8 °F) (Oral)   Resp 18   Ht 1.778 m (5' 10\")   Wt 69.2 kg (152 lb 8.9 oz)   SpO2 92%   BMI 21.89 kg/m²     General: Not in acute distress, alert and oriented x 0  HEENT: No pallor, icterus. NG tube noted  Neck: Supple, no palpable masses.  Lymph nodes: No palpable cervical, supraclavicular, axillary or inguinal lymphadenopathy.    CVS: regular rate and rhythm, no rubs or gallops  RESP: Clear to auscultate bilaterally, no wheezing or crackles. Decreased breath sounds B/L Lower lobes.  ABD: Soft, non tender, non distended, positive bowel sounds, no palpable organomegaly  EXT: No edema or cyanosis  CNS: Alert and oriented x0, No focal deficits.  Skin- Eczematous rash as well as from itching noted.      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  - Starting November 2, 2022 eosinophilia started going up along with platelets started going up but they were wavering.  Most recently [place] count is more than 2200 absolute which is 22% approximately IgE was elevated around 12,000  - Upon reviewing MAR I could see Zosyn and Unasyn which could be contributing to eosinophilia from penicillin family  - I would request please review all the medication list current and in the past with pharmacy and remove any of the medication that can be contributing to eosinophilia.  - Extensive autoimmune work-up and infectious work-up negative.  ANCA RUBIA ANCA rheumatoid factor hepatitis HIV were negative.  - Rarely underlying infection like pneumonia he had can cause eosinophilia as well.  - I am going to check peripheral blood for flow cytometry JAK2 and BCR ABL. If those are negative and patient has ongoing eosinophilia persistent then he would need a bone marrow biopsy.    - Discussed with Dr. Rutledge    2. MSSA and Pseudomonas pneumonia with effusion loculated s/p chest tube and antibiotics recovering.    3. Encephalopathy/altered mentation      4. Severe PCM    5. ETOH/DT    Primary team already discussed with family about [facility] palliative care. Looks like family would like to go ahead and get patient G-tube And further care.    Prognosis is poor        SIGNATURES:  Beena Wagner M.D.

## 2022-11-15 NOTE — PROGRESS NOTES
Telemetry Shift Summary    Rhythm AFib  HR Range 44-77  Ectopy rPVC  Measurements -/.10/-        Normal Values  Rhythm SR  HR Range    Measurements 0.12-0.20 / 0.06-0.10  / 0.30-0.52

## 2022-11-15 NOTE — DISCHARGE PLANNING
Case Management Discharge Planning    Admission Date: 10/18/2022  GMLOS: 9.6  ALOS: 28    6-Clicks ADL Score: 12  6-Clicks Mobility Score: 6  PT and/or OT Eval ordered: Yes  Post-acute Referrals Ordered: Yes  Post-acute Choice Obtained: Yes  Has referral(s) been sent to post-acute provider:  Yes      Anticipated Discharge Dispo: Discharge Disposition: D/T to SNF with Medicare cert in anticipation of skilled care (03)    DME Needed: No    Action(s) Taken: OTHER, ANGEL RN completed chart review. Per notes pt pending auth at [facility] in [place], [place]. ANGEL RN reached out to Kimberly the liaison at [facility] at [phone] to verify auth is started. Per Kimberly pt is accepted but will need auth prior. Kimberly working on getting auth with [facility].     1415: ANGEL RN spoke to Kimberly from [facility] in [place]. Per Kimberly called to start auth and not showing covered. CM RN reached out to [facility] to verify coverage. PFA re scanned coverage into [employer] and CM RN faxed copy to [facility]. Per Kimberly reached back out to [facility] and continued to be told pt does not have coverage. [facility] unable to start insurance auth.     Escalations Completed: None    Medically Clear: Yes    Next Steps: ANGEL RN to follow up with Kimberly with any needs    Barriers to Discharge: Pending Placement, Insurance Authorization, Transport.     Is the patient up for discharge tomorrow: No

## 2022-11-15 NOTE — FLOWSHEET NOTE
11/15/22 0746   Vital Signs   Pulse (!) 102   Respiration (!) 22   Pulse Oximetry 97 %   $ Pulse Oximetry (Spot Check) Yes   Respiratory Assessment   Respiratory Pattern Within Normal Limits   Level of Consciousness Alert   Secretions   Cough Productive;Strong   How Sputum Obtained Spontaneous;Expectorated;Oropharyngeal;Suction   Oxygen   O2 Delivery Device Room air w/o2 available

## 2022-11-15 NOTE — CARE PLAN
The patient is Stable - Low risk of patient condition declining or worsening    Shift Goals  Clinical Goals: safety, rest and comfort  Patient Goals: comfort, rest, decrease itchiness  Family Goals: no more restraints    Progress made toward(s) clinical / shift goals:  not on physical or respiratory distress. Tube feed held after midnight.    Patient is not progressing towards the following goals:

## 2022-11-15 NOTE — PALLIATIVE CARE
Palliative Care follow-up  Called dtr Laura, answered questions regarding next steps. Discussed PEG tube placement, reviewed SLP notes. Discussed pending insurance auth and therapy recommendations. Explained how pt's needs following SNF discharge is dependent on his progress made. If pt's PEG tube is permanent then that can be managed at home, however if he needs physical 24 hr care then he might need to stay in a long term care facility.     Laura reports she is seeing improvement, he is engaging and talking with her. Pt was able to brush his teeth yesterday. Laura is grateful for seeing some improvement that she was hoping to see. Discussed completing and AD if pt is able too, Laura would like to get this done if possible.    Per Dr. Rutledge, pt is not able to complete an AD at this time.     Active listening, education, validation, normalization, therapeutic touch, and emotional support provided.     Updated:   Dr. Rutledge    Plan:   Continue GOC discussion as clinical picture unfolds, awaiting PEG tube placement and SNF for therapies.      Thank you for allowing Palliative Care to participate in this patient's care. Please feel free to call [phone] with any questions or concerns.

## 2022-11-15 NOTE — PROGRESS NOTES
Hospital Medicine Daily Progress Note    Date of Service  11/15/2022    Chief Complaint  Tyree Whiteside is a 57 y.o. male admitted 10/18/2022 with confusion and hallucinations.    Hospital Course  Pt is a 56yo with a history of atrial fibrillation, HTN, alcohol dependence with abuse, methamphetamine abuse and liver mass who initially presented to [facility] for help with EtOH detoxification but was sent to the ER as he was having hallucinations, confusion, weakness, nausea and vomiting. He was admitted to ICU on 10/18/22 for EtOH withdrawal and respiratory depression and was placed on the ventilator and had a protracted ICU course - he required reintubation on 10/23 and again 10/26 after self extubation, diagnostic thoracentesis  and has had MSSA and Pseudomonas PNA, recurrent aspiration, bradycardia, hypotension, ileus, chest tube which has since been removed and loculated pleural effusion.  He transitioned out of the ICU and is still having severe difficulty with swallowing.  Daughter discussed with family regarding moving forward with PEG tube and would like this placed, GI to consult.      Interval Problem Update  11/15 Patient somnolent at time of discharge.  Daughter at bedside states he was much more interactive before the seroquel was initiated.  I'll dc the am dose of seroquel to see if this helps with mentation.  He has failed his swallow evaluation and PEG tube recommended for continued nutrition.  GI to consult for probable PEG on Thursday.  Discussed with hematology and there is concern for zosyn induced eosinophilia.  Jak2, BCRABL and peripheral blood flow cytometry pending as part of workup.      I have discussed this patient's plan of care and discharge plan at IDT rounds today with Case Management, Nursing, Nursing leadership, and other members of the IDT team.    Consultants/Specialty  oncology    Code Status  DNAR/DNI    Disposition  Patient is not medically cleared for discharge.    Anticipate discharge to to a long-term acute care hospital.  I have placed the appropriate orders for post-discharge needs.    Review of Systems  Review of Systems   Unable to perform ROS: Mental status change      Physical Exam  Temp:  [36.2 °C (97.2 °F)-36.7 °C (98 °F)] 36.6 °C (97.8 °F)  Pulse:  [] 114  Resp:  [16-22] 18  BP: ()/(56-75) 102/64  SpO2:  [92 %-97 %] 92 %    Physical Exam  Vitals and nursing note reviewed.   Constitutional:       General: He is not in acute distress.     Appearance: Normal appearance.   HENT:      Head: Normocephalic and atraumatic.   Eyes:      General: No scleral icterus.     Extraocular Movements: Extraocular movements intact.   Cardiovascular:      Rate and Rhythm: Normal rate and regular rhythm.      Pulses: Normal pulses.      Heart sounds: Normal heart sounds. No murmur heard.  Pulmonary:      Effort: Pulmonary effort is normal. No respiratory distress.      Breath sounds: Normal breath sounds. No wheezing, rhonchi or rales.   Abdominal:      General: Abdomen is flat. Bowel sounds are normal. There is no distension.      Palpations: Abdomen is soft.      Tenderness: There is no rebound.   Musculoskeletal:         General: No swelling or tenderness.      Cervical back: Normal range of motion and neck supple.   Lymphadenopathy:      Cervical: No cervical adenopathy.   Skin:     Coloration: Skin is not jaundiced.      Findings: No erythema.      Comments: Healing scratches on all extremities.      Neurological:      General: No focal deficit present.      Mental Status: He is alert and oriented to person, place, and time. Mental status is at baseline.      Cranial Nerves: No cranial nerve deficit.   Psychiatric:      Comments: Briefly opens eyes to command, cannot follow other orders.         Fluids    Intake/Output Summary (Last 24 hours) at 11/15/2022 1429  Last data filed at 11/15/2022 1300  Gross per 24 hour   Intake 1010 ml   Output --   Net 1010 ml        Laboratory  Recent Labs     11/13/22  0850 11/14/22  0852 11/15/22  0850   WBC 9.1 9.6 9.9   RBC 3.65* 3.99* 3.72*   HEMOGLOBIN 11.5* 12.7* 11.9*   HEMATOCRIT 34.9* 38.7* 36.3*   MCV 95.6 97.0 97.6   MCH 31.5 31.8 32.0   MCHC 33.0* 32.8* 32.8*   RDW 50.5* 52.6* 53.1*   PLATELETCT 624* 711* 616*   MPV 9.9 9.6 9.7     Recent Labs     11/13/22  0850 11/14/22  0852 11/15/22  0850   SODIUM 140 143 143   POTASSIUM 4.6 4.3 4.0   CHLORIDE 107 108 107   CO2 23 24 22   GLUCOSE 97 93 82   BUN 30* 31* 30*   CREATININE 0.51 0.52 0.53   CALCIUM 8.9 9.5 9.2                   Imaging  DX-ESOPHAGUS - [id number]   Final Result      Please refer to dedicated speech pathology report for complete details.      DX-CHEST-PORTABLE (1 VIEW)   Final Result      1.  Bibasilar and RIGHT perihilar atelectasis which could obscure an additional process. This has decreased since the prior study.   2.  Persistent focal lingular opacity, which could be atelectasis or early pneumonia   3.  RIGHT pleural effusion      DX-CHEST-FOR LINE PLACEMENT Perform procedure in: Patient's Room   Final Result            1. A left peripherally inserted catheter with tip projects appropriately over the expected area of the lower SVC.      DX-CHEST-FOR LINE PLACEMENT Perform procedure in: Patient's Room   Final Result            1. A right peripherally inserted catheter with tip not well seen but likely projects over the expected area of the cavoatrial junction/right atrium.      IR-PICC LINE PLACEMENT W/ GUIDANCE > AGE 5   Final Result                  Ultrasound-guided PICC placement performed by qualified nursing staff as    above.          DX-CHEST-FOR LINE PLACEMENT Perform procedure in: Patient's Room   Final Result      1.  Interval placement of a PICC line which is satisfactory in position.      2.  Otherwise stable exam.      IR-PICC LINE PLACEMENT W/ GUIDANCE > AGE 5   Final Result                  Ultrasound-guided PICC placement performed by  qualified nursing staff as    above.          DX-CHEST-PORTABLE (1 VIEW)   Final Result      1.  Stable bibasilar atelectasis, right greater than left.      2.  Stable right and improved left pleural effusion.      DX-CHEST-PORTABLE (1 VIEW)   Final Result      1.  Increased right pleural effusion, now small/moderate.   2.  Increased right base airspace disease.   3.  Similar small left pleural effusion with mildly increased adjacent airspace disease.      [id number] VIEW   Final Result      Mildly dilated loops of small intestine.      DX-CHEST-PORTABLE (1 VIEW)   Final Result      1.  Interval removal of the right sided chest tube.      2.  Stable bibasilar atelectasis.      3.  Interval decrease in size of a right pleural effusion.      DX-CHEST-PORTABLE (1 VIEW)   Final Result      1.  Stable support devices.      2.  Stable bilateral pleural effusions, right greater than left.      DX-CHEST-PORTABLE (1 VIEW)   Final Result      1.  Interval placement of RIGHT chest tube   2.  Moderate RIGHT hydropneumothorax   3.  Small LEFT pleural effusion   4.  Unchanged BILATERAL atelectasis with possible superimposed pneumonia or other airspace disease      DX-CHEST-PORTABLE (1 VIEW)   Final Result      1.  No significant change      2.  Lines and tubes appear appropriately located      3.  Right pleural effusion, probably large in size      4.  Bilateral atelectasis      EC-ECHOCARDIOGRAM LTD W/ CONT   Final Result      DX-CHEST-PORTABLE (1 VIEW)   Final Result         No significant interval change      CT-CHEST,ABDOMEN,PELVIS W/O   Final Result      1.  RIGHT larger than LEFT pleural effusions with overlying atelectasis. Superimposed pneumonia is not excluded.   2.  Healing fractures the RIGHT fifth and sixth ribs   3.  Distended loops of bowel in the abdomen as described above, likely ileus rather than early or low-grade small bowel obstruction   4.  Enlarged inguinal lymph nodes   5.  The hypoechoic nodule in the  liver demonstrated on ultrasound from 8/28/2022 is not seen on this unenhanced CT. Hepatic mass protocol CT or MRI should be pursued when clinically appropriate for further assessment.   6.  RIGHT renal cyst         [id number] VIEW   Final Result         1.  Air-filled distended loops of bowel are seen with reactive mucosal pattern, appearance suggests ileus or enteritis versus evolving obstructive changes. Recommend radiographic followup to resolution to exclude progression to obstruction.   2.  Nasogastric tube is coiled within the stomach, the tip terminates overlying the expected location of the gastric fundus.   3.  Bilateral lower lobe infiltrates   4.  Moderate right and small left pleural effusion      DX-CHEST-PORTABLE (1 VIEW)   Final Result         1.  Bilateral pulmonary edema and/or infiltrates.   2.  Moderate right and small left pleural effusion, stable compared to prior study   3.  Cardiomegaly      DX-CHEST-PORTABLE (1 VIEW)   Final Result         1.  Bilateral pulmonary edema and/or infiltrates.   2.  Moderate right and small left pleural effusion, stable compared to prior study   3.  Cardiomegaly      [id number] VIEW   Final Result         Diffuse gaseous distention of the small bowel and colon, worse in prior, could relate to ileus      DX-CHEST-PORTABLE (1 VIEW)   Final Result         1. No significant interval change.      DX-CHEST-PORTABLE (1 VIEW)   Final Result         1.  Bilateral pulmonary edema and/or infiltrates.   2.  Moderate right and small left pleural effusion, increased on the right compared to prior study   3.  Cardiomegaly      DX-CHEST-PORTABLE (1 VIEW)   Final Result         1.  Pulmonary edema and/or infiltrates are identified, which are stable since the prior exam.   2.  Moderate right pleural effusion, stable.   3.  Cardiomegaly      DX-CHEST-PORTABLE (1 VIEW)   Final Result      1.  Well-positioned ETT.   2.  Moderate right pleural effusion and associated atelectasis  versus consolidation.   3.  Retrocardiac atelectasis versus consolidation. No significant left effusion.      [id number] VIEW   Final Result      NGT tip is in the stomach.   1.  Nonobstructive bowel gas pattern. Small amount of stool throughout the colon.   2.  Ill-defined right basilar atelectasis versus consolidation and small right pleural effusion.      DX-CHEST-PORTABLE (1 VIEW)   Final Result         1. Low lung volume with hypoventilatory change and bibasilar atelectasis.      CT-HEAD W/O   Final Result      1.  Cerebral atrophy.      2.  Otherwise, Head CT without contrast within normal limits. No evidence of acute cerebral infarction, hemorrhage or mass lesion.         DX-CHEST-FOR LINE PLACEMENT Perform procedure in: Patient's Room   Final Result      1.  Endotracheal tube overlies the mid trachea.      2.  NG tube courses beneath the diaphragms.      DX-CHEST-PORTABLE (1 VIEW)   Final Result      No radiographic evidence of acute cardiopulmonary process.           Assessment/Plan  Agitation  Assessment & Plan  - Doing well with thiamine replacement and 25mg qpm Seroquel   Daytime somnolence - stop daytime seroquel      Peripheral eosinophilia  Assessment & Plan  Ddx includes HES, Churg Greta, HIV, parasite infection (less likely as no significant travel), eosinophilic malignancy. Will check a tryptase (negative), RUBIA (negative), ANCA (negative), HIV (negative), O&P, RF (negative), IgE (significantly elevated) and cortisol level (negative)  -Appreciate hematology consult, Discussed with Dr Valdivia - suspicion is due to Zosyn for prolonged time  - Jak2 and BCRABL, peripheral flow cytometry.    Congestive heart failure (CHF) (HCC)  Assessment & Plan  - Initial echo with preserved EF, then repeat echo with EF 40% - unclear if he was in RVR or other variable that would impact an acute decrease in his EF  - Not overloaded on exam, no acute exacerbation  - On metoprolol, add Losartan now as Bps remain  stable    Dysphagia  Assessment & Plan  - Pt has failed swallow evals last week and this week - has NGT in place  - Failed MBSS today  - Multifactorial with likely Wernicke's, deconditioning from PNA and being on the vent, acute illness  - Poor long term prognosis given his cerebral atrophy, chronic EtOH abuse and methampetamine abuse likely contributing to his lack of swallow coordination.  - Treat Wernicke's with high dose Thiamine, will continue working with SLP  - Daughter wants PEG tube placed, Dr Mcgrath to consult, NPO Wednesday night    Aspiration pneumonia (HCC)  Assessment & Plan  - Completed antibiotics, now on room air  - Has failed multiple swallow evals, currently receiving tube feeds via NGT  - Failed his MBSS today - discussed PEG with pt's daughter, family wishes to move forward  - Dr Mcgrath to consult, likley PEG on Thursday.    Wernicke encephalopathy syndrome  Assessment & Plan  - Pt with significant dementia symptoms, swallow dysfunction, cerebral atrophy on CT  - Will treat with high dose IV Thiamine   - Continue ASA     Hypomagnesemia  Assessment & Plan  - Replace as needed    Reactive thrombocytosis  Assessment & Plan  - Continue to monitor       Sepsis due to Pseudomonas species (HCC)- (present on admission)  Assessment & Plan  This is Severe Sepsis Present on admission  SIRS criteria identified on my evaluation include: Fever, with temperature greater than 101 deg F, Tachycardia, with heart rate greater than 90 BPM, Tachypnea, with respirations greater than 20 per minute and Leukocytosis, with WBC greater than 12,000  Clinical indicators of end organ dysfunction include Systolic blood pressure (SBP) <90 mmHg or mean arterial pressure <65 mmHg and Acute respiratory failure as evidenced by a new need for invasive or non-invasive mechanical ventilation (Ventilator or BiPap)   Source is pneumonia by MSSA and pseudomonas  Sepsis protocol initiated  Crystalloid Fluid Administration: Patient has  "volume overload with >11 lt since admission, (NYHA class III or symptoms with minimal exertion, Or NYHA class IV or symptoms at rest or with activity), for this/these reason(s) it is unsafe for this patient to receive 30 mL/kg of fluid.  Partial Fluid Dose Given on 10/24, patient to be reassessed shortly after completion of partial fluid bolus to ensure adequacy of resuscitation  IV antibiotics as appropriate for source of sepsis  Reassessment: I have reassessed the patient's hemodynamic status  Will provide another partial bolus today and wean down the vasopressors     .  Pt had septic shock requiring pressor support, with MSSA and pseudomonas in BAL and tracheal aspirate. Blood cultures negative. Sepsis has resolved.     Empyema (HCC)- (present on admission)  Assessment & Plan  - On the right, moderate-large per CXR of 10/30  - S/p diagnostic thoracentesis 10/25/22: albumin gradient is less than 1.2 g/dl which indicate exudates, his was 0.9, also inflammative effusion with very high LDH 1020 (serum in the 240s range)  - Cultures negative but pt was on antibiotics - did have chest tubes which have since been removed, completed antibiotic course.     Acute respiratory failure with hypoxia (HCC)- (present on admission)  Assessment & Plan  -Intubated for respiratory acidosis and altered mental status with need for repeat intubation twice thereafter  -Cultures from sputum and BAL grew MSSA and pseudomonas, pt completed 14d of Zosyn, and an additional 1.5d of Unasyn for empyema  -also complicated by right empyema, s/p diagnostic thoracentesis and chest tubes  - Currently off antibiotics, back on room air   - RESOLVED     Alcohol abuse with withdrawal (HCC)  Assessment & Plan  Severe withdrawal with respiratory depression requiring intubation on admit  Out of withdrawal window now     Liver mass- (present on admission)  Assessment & Plan  - Seen on US 8/2022 \"right lobe of the liver measuring 1.7 x 1.3 x 1.0 cm in " "size\"  - AFP negative  - Pt will not tolerate MRI at this time - high risk for aspiration for aspiration if laying flat    Atrial fibrillation (HCC)- (present on admission)  Assessment & Plan  - Not on anticoagulation prior to admission - appears he was just diagnosed by PCP in June of this year and referred to Cardiology, but I don't see where he was seen by them  - [id number] score of 1-2 - new echo with EF of 40% but prior to that was 50%, and may have some effect of sepsis on EF   - ASA for now after discussion with pt's daughter  - Continue Metoprolol for rate control    Other specified hypothyroidism- (present on admission)  Assessment & Plan  - TSH normal, continue Synthroid     Rash- (present on admission)  Assessment & Plan  - Pt with pathology positive eczema, order his home triamcinolone cream    Hyponatremia  Assessment & Plan  - Resolved with free water flushes, pt was dry         VTE prophylaxis: SCDs/TEDs    I have performed a physical exam and reviewed and updated ROS and Plan today (11/15/2022). In review of yesterday's note (11/14/2022), there are no changes except as documented above.        "

## 2022-11-16 LAB
ALBUMIN SERPL BCP-MCNC: 3 G/DL (ref 3.2–4.9)
ALBUMIN/GLOB SERPL: 0.6 G/DL
ALP SERPL-CCNC: 90 U/L (ref 30–99)
ALT SERPL-CCNC: 8 U/L (ref 2–50)
ANION GAP SERPL CALC-SCNC: 10 MMOL/L (ref 7–16)
AST SERPL-CCNC: 15 U/L (ref 12–45)
BASOPHILS # BLD AUTO: 1 % (ref 0–1.8)
BASOPHILS # BLD: 0.11 K/UL (ref 0–0.12)
BILIRUB SERPL-MCNC: 0.3 MG/DL (ref 0.1–1.5)
BUN SERPL-MCNC: 27 MG/DL (ref 8–22)
CALCIUM SERPL-MCNC: 9.2 MG/DL (ref 8.4–10.2)
CHLORIDE SERPL-SCNC: 107 MMOL/L (ref 96–112)
CO2 SERPL-SCNC: 25 MMOL/L (ref 20–33)
CREAT SERPL-MCNC: 0.49 MG/DL (ref 0.5–1.4)
EOSINOPHIL # BLD AUTO: 2.18 K/UL (ref 0–0.51)
EOSINOPHIL NFR BLD: 20 % (ref 0–6.9)
ERYTHROCYTE [DISTWIDTH] IN BLOOD BY AUTOMATED COUNT: 51.7 FL (ref 35.9–50)
GFR SERPLBLD CREATININE-BSD FMLA CKD-EPI: 120 ML/MIN/1.73 M 2
GLOBULIN SER CALC-MCNC: 5 G/DL (ref 1.9–3.5)
GLUCOSE BLD STRIP.AUTO-MCNC: 78 MG/DL (ref 65–99)
GLUCOSE BLD STRIP.AUTO-MCNC: 78 MG/DL (ref 65–99)
GLUCOSE BLD STRIP.AUTO-MCNC: 86 MG/DL (ref 65–99)
GLUCOSE BLD STRIP.AUTO-MCNC: 88 MG/DL (ref 65–99)
GLUCOSE SERPL-MCNC: 94 MG/DL (ref 65–99)
HCT VFR BLD AUTO: 35.2 % (ref 42–52)
HGB BLD-MCNC: 11.6 G/DL (ref 14–18)
INR PPP: 1.11 (ref 0.87–1.13)
LYMPHOCYTES # BLD AUTO: 0.87 K/UL (ref 1–4.8)
LYMPHOCYTES NFR BLD: 8 % (ref 22–41)
MAGNESIUM SERPL-MCNC: 1.7 MG/DL (ref 1.5–2.5)
MANUAL DIFF BLD: NORMAL
MCH RBC QN AUTO: 31.9 PG (ref 27–33)
MCHC RBC AUTO-ENTMCNC: 33 G/DL (ref 33.7–35.3)
MCV RBC AUTO: 96.7 FL (ref 81.4–97.8)
MONOCYTES # BLD AUTO: 0.65 K/UL (ref 0–0.85)
MONOCYTES NFR BLD AUTO: 6 % (ref 0–13.4)
NEUTROPHILS # BLD AUTO: 7.09 K/UL (ref 1.82–7.42)
NEUTROPHILS NFR BLD: 64 % (ref 44–72)
NEUTS BAND NFR BLD MANUAL: 1 % (ref 0–10)
NRBC # BLD AUTO: 0 K/UL
NRBC BLD-RTO: 0 /100 WBC
PHOSPHATE SERPL-MCNC: 5 MG/DL (ref 2.5–4.5)
PLATELET # BLD AUTO: 618 K/UL (ref 164–446)
PLATELET BLD QL SMEAR: NORMAL
PMV BLD AUTO: 9.6 FL (ref 9–12.9)
POTASSIUM SERPL-SCNC: 3.6 MMOL/L (ref 3.6–5.5)
PROT SERPL-MCNC: 8 G/DL (ref 6–8.2)
PROTHROMBIN TIME: 13.9 SEC (ref 12–14.6)
RBC # BLD AUTO: 3.64 M/UL (ref 4.7–6.1)
SODIUM SERPL-SCNC: 142 MMOL/L (ref 135–145)
WBC # BLD AUTO: 10.9 K/UL (ref 4.8–10.8)

## 2022-11-16 PROCEDURE — 700105 HCHG RX REV CODE 258: Performed by: FAMILY MEDICINE

## 2022-11-16 PROCEDURE — 80053 COMPREHEN METABOLIC PANEL: CPT

## 2022-11-16 PROCEDURE — 770020 HCHG ROOM/CARE - TELE (206)

## 2022-11-16 PROCEDURE — 85025 COMPLETE CBC W/AUTO DIFF WBC: CPT

## 2022-11-16 PROCEDURE — A9270 NON-COVERED ITEM OR SERVICE: HCPCS | Performed by: INTERNAL MEDICINE

## 2022-11-16 PROCEDURE — 700102 HCHG RX REV CODE 250 W/ 637 OVERRIDE(OP): Performed by: INTERNAL MEDICINE

## 2022-11-16 PROCEDURE — 85007 BL SMEAR W/DIFF WBC COUNT: CPT

## 2022-11-16 PROCEDURE — 700111 HCHG RX REV CODE 636 W/ 250 OVERRIDE (IP): Performed by: FAMILY MEDICINE

## 2022-11-16 PROCEDURE — 92526 ORAL FUNCTION THERAPY: CPT

## 2022-11-16 PROCEDURE — A9270 NON-COVERED ITEM OR SERVICE: HCPCS | Performed by: FAMILY MEDICINE

## 2022-11-16 PROCEDURE — 700111 HCHG RX REV CODE 636 W/ 250 OVERRIDE (IP): Performed by: INTERNAL MEDICINE

## 2022-11-16 PROCEDURE — 84100 ASSAY OF PHOSPHORUS: CPT

## 2022-11-16 PROCEDURE — 700102 HCHG RX REV CODE 250 W/ 637 OVERRIDE(OP): Performed by: HOSPITALIST

## 2022-11-16 PROCEDURE — 85610 PROTHROMBIN TIME: CPT

## 2022-11-16 PROCEDURE — 83735 ASSAY OF MAGNESIUM: CPT

## 2022-11-16 PROCEDURE — 99232 SBSQ HOSP IP/OBS MODERATE 35: CPT | Performed by: INTERNAL MEDICINE

## 2022-11-16 PROCEDURE — 700102 HCHG RX REV CODE 250 W/ 637 OVERRIDE(OP): Performed by: FAMILY MEDICINE

## 2022-11-16 PROCEDURE — 82962 GLUCOSE BLOOD TEST: CPT | Mod: 91

## 2022-11-16 PROCEDURE — A9270 NON-COVERED ITEM OR SERVICE: HCPCS | Performed by: HOSPITALIST

## 2022-11-16 RX ADMIN — METOPROLOL TARTRATE 50 MG: 50 TABLET, FILM COATED ORAL at 18:15

## 2022-11-16 RX ADMIN — TRIAMCINOLONE ACETONIDE: 1 CREAM TOPICAL at 06:00

## 2022-11-16 RX ADMIN — ENOXAPARIN SODIUM 40 MG: 40 INJECTION SUBCUTANEOUS at 18:16

## 2022-11-16 RX ADMIN — METOPROLOL TARTRATE 50 MG: 50 TABLET, FILM COATED ORAL at 05:04

## 2022-11-16 RX ADMIN — SCOPOLAMINE 1 PATCH: 1.5 PATCH, EXTENDED RELEASE TRANSDERMAL at 10:35

## 2022-11-16 RX ADMIN — ASPIRIN 81 MG CHEWABLE TABLET 81 MG: 81 TABLET CHEWABLE at 05:03

## 2022-11-16 RX ADMIN — LOSARTAN POTASSIUM 12.5 MG: 25 TABLET, FILM COATED ORAL at 05:03

## 2022-11-16 RX ADMIN — LEVOTHYROXINE SODIUM 50 MCG: 50 TABLET ORAL at 05:03

## 2022-11-16 RX ADMIN — NICOTINE 7 MG: 7 PATCH TRANSDERMAL at 05:04

## 2022-11-16 RX ADMIN — SENNOSIDES AND DOCUSATE SODIUM 2 TABLET: 50; 8.6 TABLET ORAL at 05:03

## 2022-11-16 RX ADMIN — SENNOSIDES AND DOCUSATE SODIUM 2 TABLET: 50; 8.6 TABLET ORAL at 18:15

## 2022-11-16 RX ADMIN — TRIAMCINOLONE ACETONIDE: 1 CREAM TOPICAL at 18:15

## 2022-11-16 RX ADMIN — THIAMINE HYDROCHLORIDE 250 MG: 100 INJECTION, SOLUTION INTRAMUSCULAR; INTRAVENOUS at 05:35

## 2022-11-16 RX ADMIN — DIPHENHYDRAMINE HYDROCHLORIDE 25 MG: 25 TABLET ORAL at 05:03

## 2022-11-16 RX ADMIN — QUETIAPINE FUMARATE 25 MG: 25 TABLET ORAL at 20:02

## 2022-11-16 ASSESSMENT — ENCOUNTER SYMPTOMS
BLOOD IN STOOL: 0
DIZZINESS: 0
INSOMNIA: 0
CLAUDICATION: 0
CHILLS: 0
NERVOUS/ANXIOUS: 0
CONSTIPATION: 0
COUGH: 0
ABDOMINAL PAIN: 0
VOMITING: 0
PHOTOPHOBIA: 0
FEVER: 0
SPEECH CHANGE: 0
WEAKNESS: 0
DEPRESSION: 0
SHORTNESS OF BREATH: 0
MYALGIAS: 0
SENSORY CHANGE: 0
HEARTBURN: 0
BLURRED VISION: 0
NAUSEA: 0
HEADACHES: 0
DIARRHEA: 0

## 2022-11-16 ASSESSMENT — PAIN DESCRIPTION - PAIN TYPE
TYPE: ACUTE PAIN
TYPE: ACUTE PAIN

## 2022-11-16 NOTE — DISCHARGE PLANNING
Case Management Discharge Planning        Anticipated Discharge Dispo: Discharge Disposition: Disch to a long term care facility (63)    DME Needed: No    Action(s) Taken: Discussed pt in [facility] meeting. Pt has [employer] insurance. [facility] not able to start insurance auth. Plan to call [facility].    LSW called [facility], spoke to liaison Krishna. Per Krishna they do not have any medicaid beds and do not know when a medicaid bed will become available.    Escalations Completed: Long Length of Stay Committee     Next Steps: LSW to continue assisting with d/c barriers and d/c planning.    Barriers to Discharge: [employer] insurance    Is the patient up for discharge tomorrow: No

## 2022-11-16 NOTE — THERAPY
Speech Language Pathology  Daily Treatment     Patient Name: Tyree Whiteside  Age:  57 y.o., Sex:  male  Medical Record #: [id number]  Today's Date: 11/16/2022     Precautions  Precautions: Fall Risk, Swallow Precautions ( See Comments), Nasogastric Tube  Comments: restraints    Assessment    Pt seen on this date for dysphagia therapy. Pt A&Ox4 and agreeable to therapy; pt's daughter at bedside. Overall, cognition appears to be improving and he appears to be better managing his secretions with independent use of Yankour. He completed 50-60 reps of pharyngeal squeeze exercises, 10 laryngeal elevation, and 10 PPW movement exercises with good quality. Pt following directions appropriately for exercises. Trials of ice chips and thin liquids via teaspoon and cup sip resulted in immediate and delayed coughing which is highly concerning for penetration/aspiration. Vocal quality after the swallow was wet and gurgly. Discussed benefits of pt completing exercises through out day vs only with SLP and pt/family verbalized good understanding. Recommend continuation of NPO with NG for nutrition but okay for small amounts of ice chips through out day to minimize xerostomia and maintain integrity of the swallow. SLP following.    Plan  1) continue NPO with NG for nutrition but okay for small amounts of ice chips to minimize xerostomia and maintain integrity of the swallow  2) encourage implementation of swallowing exercises    Continue current treatment plan.    Discharge Recommendations: Recommend post-acute placement for additional speech therapy services prior to discharge home         Objective       11/16/22 1122   Precautions   Precautions Fall Risk;Swallow Precautions ( See Comments);Nasogastric Tube   Vitals   O2 (LPM) 0   O2 Delivery Device None - Room Air   Pain 0 - 10 Group   Therapist Pain Assessment Post Activity Pain Same as Prior to Activity;Nurse Notified;0   Patient / Family Goals   Patient / Family Goal #1  "\"I could eat half of that ice\"   Goal #1 Outcome Progressing as expected   Short Term Goals   Short Term Goal # 1 Pt will participate in an instrumental swallow study to fully assess swallow function.   Goal Outcome # 1 Goal met   Short Term Goal # 2 Pt will consume pre-feeding trials with SLP to determine readiness to begin an oral diet.   Goal Outcome # 2  Progressing slower than expected   Education Group   Education Provided Dysphagia;Role of Speech Therapy   Dysphagia Patient Response Patient;Family;Acceptance;Explanation;Verbal Demonstration;Reinforcement Needed   Role of SLP Patient Response Patient;Family;Acceptance;Explanation;Verbal Demonstration;Reinforcement Needed   Anticipated Discharge Needs   Discharge Recommendations Recommend post-acute placement for additional speech therapy services prior to discharge home   Therapy Recommendations Upon DC Dysphagia Training;Community Re-Integration;Patient / Family / Caregiver Education   Interdisciplinary Plan of Care Collaboration   IDT Collaboration with  Nursing;Family / Caregiver   Patient Position at End of Therapy Seated;In Bed;Call Light within Reach;Family / Friend in Room     "

## 2022-11-16 NOTE — DIETARY
Nutrition Services - Brief Update    SLP evaluated pt this a.m., recommends continuing NG w/ TF. Per chart notes, TF to be held @ midnight tonight for PEG placement 11/17. Appropriate to change TF per RD recommendations 11/15: Fibersource HN at a goal rate of 70 mL/hour providing 2016 kcals, 91 g protein, and 1361 mL of free water. This should meet pt's estimated nutrition needs. Given that pt has been on a fiber-free formula, he would benefit from TF start at 25 ml/hr and advancement per protocol to establish tolerance to fiber-containing formula.    PLAN/RECOMMEND - RD will update TF order to reflect new TF formula, goal rate, and rate advancement recommendations.

## 2022-11-16 NOTE — PROGRESS NOTES
Telemetry Shift Summary    Rhythm a fib  HR Range   Ectopy r-fPVC  Measurements -/0.08/-        Normal Values  Rhythm SR  HR Range    Measurements 0.12-0.20 / 0.06-0.10  / 0.30-0.52

## 2022-11-16 NOTE — PROGRESS NOTES
Attempted alteplase administration in picc blue lumen. Alteplase would not instill into the line. Charge RN and MD notified.

## 2022-11-16 NOTE — PROGRESS NOTES
Tube feeds switched to Fiber Source HN starting at 25mL/hr. Patient tolerating current rate and will increase in 8 hours as tolerated.

## 2022-11-16 NOTE — PROGRESS NOTES
Hospital Medicine Daily Progress Note    Date of Service  11/16/2022    Chief Complaint  Tyree Whiteside is a 57 y.o. male admitted 10/18/2022 with confusion and hallucinations.    Hospital Course  Pt is a 58yo with a history of atrial fibrillation, HTN, alcohol dependence with abuse, methamphetamine abuse and liver mass who initially presented to [facility] for help with EtOH detoxification but was sent to the ER as he was having hallucinations, confusion, weakness, nausea and vomiting. He was admitted to ICU on 10/18/22 for EtOH withdrawal and respiratory depression and was placed on the ventilator and had a protracted ICU course - he required reintubation on 10/23 and again 10/26 after self extubation, diagnostic thoracentesis  and has had MSSA and Pseudomonas PNA, recurrent aspiration, bradycardia, hypotension, ileus, chest tube which has since been removed and loculated pleural effusion.  He transitioned out of the ICU and is still having severe difficulty with swallowing.  Daughter discussed with family regarding moving forward with PEG tube and would like this placed, GI to consult.      Interval Problem Update  11/15 Patient somnolent at time of discharge.  Daughter at bedside states he was much more interactive before the seroquel was initiated.  I'll dc the am dose of seroquel to see if this helps with mentation.  He has failed his swallow evaluation and PEG tube recommended for continued nutrition.  GI to consult for probable PEG on Thursday.  Discussed with hematology and there is concern for zosyn induced eosinophilia.  Jak2, BCRABL and peripheral blood flow cytometry pending as part of workup.    11/16 Patient awake and conversant this am, PEG planned for tomorrow with Dr Mcgrath at 830.  PT/OT evaluations pending, anticipate he will need placement in SNF given prolonged hospitalization and weakness.    I have discussed this patient's plan of care and discharge plan at IDT rounds today with Case  Management, Nursing, Nursing leadership, and other members of the IDT team.    Consultants/Specialty  oncology    Code Status  DNAR/DNI    Disposition  Patient is not medically cleared for discharge.   Anticipate discharge to to a long-term acute care hospital.  I have placed the appropriate orders for post-discharge needs.    Review of Systems  Review of Systems   Constitutional:  Negative for chills and fever.   HENT:  Negative for congestion.    Eyes:  Negative for blurred vision and photophobia.   Respiratory:  Negative for cough and shortness of breath.    Cardiovascular:  Negative for chest pain, claudication and leg swelling.   Gastrointestinal:  Negative for abdominal pain, constipation, diarrhea, heartburn and vomiting.   Genitourinary:  Negative for dysuria and hematuria.   Musculoskeletal:  Negative for joint pain and myalgias.   Skin:  Negative for itching and rash.   Neurological:  Negative for dizziness, sensory change, speech change, weakness and headaches.   Psychiatric/Behavioral:  Negative for depression. The patient is not nervous/anxious and does not have insomnia.       Physical Exam  Temp:  [36.4 °C (97.5 °F)-37.1 °C (98.7 °F)] 36.7 °C (98.1 °F)  Pulse:  [] 82  Resp:  [16-18] 18  BP: (104-124)/(61-99) 110/68  SpO2:  [90 %-99 %] 93 %    Physical Exam  Vitals and nursing note reviewed.   Constitutional:       General: He is not in acute distress.     Appearance: Normal appearance.   HENT:      Head: Normocephalic and atraumatic.   Eyes:      General: No scleral icterus.     Extraocular Movements: Extraocular movements intact.   Cardiovascular:      Rate and Rhythm: Normal rate and regular rhythm.      Pulses: Normal pulses.      Heart sounds: Normal heart sounds. No murmur heard.  Pulmonary:      Effort: Pulmonary effort is normal. No respiratory distress.      Breath sounds: Normal breath sounds. No wheezing, rhonchi or rales.   Abdominal:      General: Abdomen is flat. Bowel sounds are  normal. There is no distension.      Palpations: Abdomen is soft.      Tenderness: There is no rebound.   Musculoskeletal:         General: No swelling or tenderness.      Cervical back: Normal range of motion and neck supple.   Lymphadenopathy:      Cervical: No cervical adenopathy.   Skin:     Coloration: Skin is not jaundiced.      Findings: No erythema.      Comments: Healing scratches on all extremities.      Neurological:      General: No focal deficit present.      Mental Status: He is alert and oriented to person, place, and time. Mental status is at baseline.      Cranial Nerves: No cranial nerve deficit.   Psychiatric:      Comments: Briefly opens eyes to command, cannot follow other orders.         Fluids    Intake/Output Summary (Last 24 hours) at 11/16/2022 1452  Last data filed at 11/16/2022 1200  Gross per 24 hour   Intake 1630 ml   Output --   Net 1630 ml         Laboratory  Recent Labs     11/14/22  0852 11/15/22  0850 11/16/22  0916   WBC 9.6 9.9 10.9*   RBC 3.99* 3.72* 3.64*   HEMOGLOBIN 12.7* 11.9* 11.6*   HEMATOCRIT 38.7* 36.3* 35.2*   MCV 97.0 97.6 96.7   MCH 31.8 32.0 31.9   MCHC 32.8* 32.8* 33.0*   RDW 52.6* 53.1* 51.7*   PLATELETCT 711* 616* 618*   MPV 9.6 9.7 9.6       Recent Labs     11/14/22  0852 11/15/22  0850 11/16/22  0916   SODIUM 143 143 142   POTASSIUM 4.3 4.0 3.6   CHLORIDE 108 107 107   CO2 24 22 25   GLUCOSE 93 82 94   BUN 31* 30* 27*   CREATININE 0.52 0.53 0.49*   CALCIUM 9.5 9.2 9.2       Recent Labs     11/16/22  0815   INR 1.11               Imaging  DX-ESOPHAGUS - [id number]   Final Result      Please refer to dedicated speech pathology report for complete details.      DX-CHEST-PORTABLE (1 VIEW)   Final Result      1.  Bibasilar and RIGHT perihilar atelectasis which could obscure an additional process. This has decreased since the prior study.   2.  Persistent focal lingular opacity, which could be atelectasis or early pneumonia   3.  RIGHT pleural effusion       DX-CHEST-FOR LINE PLACEMENT Perform procedure in: Patient's Room   Final Result            1. A left peripherally inserted catheter with tip projects appropriately over the expected area of the lower SVC.      DX-CHEST-FOR LINE PLACEMENT Perform procedure in: Patient's Room   Final Result            1. A right peripherally inserted catheter with tip not well seen but likely projects over the expected area of the cavoatrial junction/right atrium.      IR-PICC LINE PLACEMENT W/ GUIDANCE > AGE 5   Final Result                  Ultrasound-guided PICC placement performed by qualified nursing staff as    above.          DX-CHEST-FOR LINE PLACEMENT Perform procedure in: Patient's Room   Final Result      1.  Interval placement of a PICC line which is satisfactory in position.      2.  Otherwise stable exam.      IR-PICC LINE PLACEMENT W/ GUIDANCE > AGE 5   Final Result                  Ultrasound-guided PICC placement performed by qualified nursing staff as    above.          DX-CHEST-PORTABLE (1 VIEW)   Final Result      1.  Stable bibasilar atelectasis, right greater than left.      2.  Stable right and improved left pleural effusion.      DX-CHEST-PORTABLE (1 VIEW)   Final Result      1.  Increased right pleural effusion, now small/moderate.   2.  Increased right base airspace disease.   3.  Similar small left pleural effusion with mildly increased adjacent airspace disease.      BF-ESSEOFS-2 VIEW   Final Result      Mildly dilated loops of small intestine.      DX-CHEST-PORTABLE (1 VIEW)   Final Result      1.  Interval removal of the right sided chest tube.      2.  Stable bibasilar atelectasis.      3.  Interval decrease in size of a right pleural effusion.      DX-CHEST-PORTABLE (1 VIEW)   Final Result      1.  Stable support devices.      2.  Stable bilateral pleural effusions, right greater than left.      DX-CHEST-PORTABLE (1 VIEW)   Final Result      1.  Interval placement of RIGHT chest tube   2.  Moderate  RIGHT hydropneumothorax   3.  Small LEFT pleural effusion   4.  Unchanged BILATERAL atelectasis with possible superimposed pneumonia or other airspace disease      DX-CHEST-PORTABLE (1 VIEW)   Final Result      1.  No significant change      2.  Lines and tubes appear appropriately located      3.  Right pleural effusion, probably large in size      4.  Bilateral atelectasis      EC-ECHOCARDIOGRAM LTD W/ CONT   Final Result      DX-CHEST-PORTABLE (1 VIEW)   Final Result         No significant interval change      CT-CHEST,ABDOMEN,PELVIS W/O   Final Result      1.  RIGHT larger than LEFT pleural effusions with overlying atelectasis. Superimposed pneumonia is not excluded.   2.  Healing fractures the RIGHT fifth and sixth ribs   3.  Distended loops of bowel in the abdomen as described above, likely ileus rather than early or low-grade small bowel obstruction   4.  Enlarged inguinal lymph nodes   5.  The hypoechoic nodule in the liver demonstrated on ultrasound from 8/28/2022 is not seen on this unenhanced CT. Hepatic mass protocol CT or MRI should be pursued when clinically appropriate for further assessment.   6.  RIGHT renal cyst         CS-PIBVUAL-8 VIEW   Final Result         1.  Air-filled distended loops of bowel are seen with reactive mucosal pattern, appearance suggests ileus or enteritis versus evolving obstructive changes. Recommend radiographic followup to resolution to exclude progression to obstruction.   2.  Nasogastric tube is coiled within the stomach, the tip terminates overlying the expected location of the gastric fundus.   3.  Bilateral lower lobe infiltrates   4.  Moderate right and small left pleural effusion      DX-CHEST-PORTABLE (1 VIEW)   Final Result         1.  Bilateral pulmonary edema and/or infiltrates.   2.  Moderate right and small left pleural effusion, stable compared to prior study   3.  Cardiomegaly      DX-CHEST-PORTABLE (1 VIEW)   Final Result         1.  Bilateral pulmonary  edema and/or infiltrates.   2.  Moderate right and small left pleural effusion, stable compared to prior study   3.  Cardiomegaly      [id number] VIEW   Final Result         Diffuse gaseous distention of the small bowel and colon, worse in prior, could relate to ileus      DX-CHEST-PORTABLE (1 VIEW)   Final Result         1. No significant interval change.      DX-CHEST-PORTABLE (1 VIEW)   Final Result         1.  Bilateral pulmonary edema and/or infiltrates.   2.  Moderate right and small left pleural effusion, increased on the right compared to prior study   3.  Cardiomegaly      DX-CHEST-PORTABLE (1 VIEW)   Final Result         1.  Pulmonary edema and/or infiltrates are identified, which are stable since the prior exam.   2.  Moderate right pleural effusion, stable.   3.  Cardiomegaly      DX-CHEST-PORTABLE (1 VIEW)   Final Result      1.  Well-positioned ETT.   2.  Moderate right pleural effusion and associated atelectasis versus consolidation.   3.  Retrocardiac atelectasis versus consolidation. No significant left effusion.      CH-[id number]-3 VIEW   Final Result      NGT tip is in the stomach.   1.  Nonobstructive bowel gas pattern. Small amount of stool throughout the colon.   2.  Ill-defined right basilar atelectasis versus consolidation and small right pleural effusion.      DX-CHEST-PORTABLE (1 VIEW)   Final Result         1. Low lung volume with hypoventilatory change and bibasilar atelectasis.      CT-HEAD W/O   Final Result      1.  Cerebral atrophy.      2.  Otherwise, Head CT without contrast within normal limits. No evidence of acute cerebral infarction, hemorrhage or mass lesion.         DX-CHEST-FOR LINE PLACEMENT Perform procedure in: Patient's Room   Final Result      1.  Endotracheal tube overlies the mid trachea.      2.  NG tube courses beneath the diaphragms.      DX-CHEST-PORTABLE (1 VIEW)   Final Result      No radiographic evidence of acute cardiopulmonary process.              Assessment/Plan  Agitation  Assessment & Plan  - Doing well with thiamine replacement and 25mg qpm Seroquel   Daytime somnolence - stop daytime seroquel      Peripheral eosinophilia  Assessment & Plan  Ddx includes HES, Churg Greta, HIV, parasite infection (less likely as no significant travel), eosinophilic malignancy. Will check a tryptase (negative), RUBIA (negative), ANCA (negative), HIV (negative), O&P, RF (negative), IgE (significantly elevated) and cortisol level (negative)  -Appreciate hematology consult, Discussed with Dr Valdivia - suspicion is due to Zosyn for prolonged time  - Jak2 and BCRABL, peripheral flow cytometry.    Congestive heart failure (CHF) (HCC)  Assessment & Plan  - Initial echo with preserved EF, then repeat echo with EF 40% - unclear if he was in RVR or other variable that would impact an acute decrease in his EF  - Not overloaded on exam, no acute exacerbation  - On metoprolol, add Losartan now as Bps remain stable    Dysphagia  Assessment & Plan  - Pt has failed swallow evals last week and this week - has NGT in place  - Failed MBSS today  - Multifactorial with likely Wernicke's, deconditioning from PNA and being on the vent, acute illness  - Poor long term prognosis given his cerebral atrophy, chronic EtOH abuse and methampetamine abuse likely contributing to his lack of swallow coordination.  - Treat Wernicke's with high dose Thiamine, will continue working with SLP  - Daughter wants PEG tube placed, Dr Mcgrath to consult, NPO Wednesday night    Aspiration pneumonia (HCC)  Assessment & Plan  - Completed antibiotics, now on room air  - Has failed multiple swallow evals, currently receiving tube feeds via NGT  - Failed his MBSS today - discussed PEG with pt's daughter, family wishes to move forward  - Dr Mcgrath to consult, PEG on Thursday. 830am    Wernicke encephalopathy syndrome  Assessment & Plan  - Pt with significant dementia symptoms, swallow dysfunction, cerebral atrophy on  CT  - Will treat with high dose IV Thiamine   - Continue ASA     Hypomagnesemia  Assessment & Plan  - Replace as needed    Reactive thrombocytosis  Assessment & Plan  - Continue to monitor       Sepsis due to Pseudomonas species (HCC)- (present on admission)  Assessment & Plan  This is Severe Sepsis Present on admission  SIRS criteria identified on my evaluation include: Fever, with temperature greater than 101 deg F, Tachycardia, with heart rate greater than 90 BPM, Tachypnea, with respirations greater than 20 per minute and Leukocytosis, with WBC greater than 12,000  Clinical indicators of end organ dysfunction include Systolic blood pressure (SBP) <90 mmHg or mean arterial pressure <65 mmHg and Acute respiratory failure as evidenced by a new need for invasive or non-invasive mechanical ventilation (Ventilator or BiPap)   Source is pneumonia by MSSA and pseudomonas  Sepsis protocol initiated  Crystalloid Fluid Administration: Patient has volume overload with >11 lt since admission, (NYHA class III or symptoms with minimal exertion, Or NYHA class IV or symptoms at rest or with activity), for this/these reason(s) it is unsafe for this patient to receive 30 mL/kg of fluid.  Partial Fluid Dose Given on 10/24, patient to be reassessed shortly after completion of partial fluid bolus to ensure adequacy of resuscitation  IV antibiotics as appropriate for source of sepsis  Reassessment: I have reassessed the patient's hemodynamic status  Will provide another partial bolus today and wean down the vasopressors     .  Pt had septic shock requiring pressor support, with MSSA and pseudomonas in BAL and tracheal aspirate. Blood cultures negative. Sepsis has resolved.     Empyema (HCC)- (present on admission)  Assessment & Plan  - On the right, moderate-large per CXR of 10/30  - S/p diagnostic thoracentesis 10/25/22: albumin gradient is less than 1.2 g/dl which indicate exudates, his was 0.9, also inflammative effusion with very  "high LDH 1020 (serum in the 240s range)  - Cultures negative but pt was on antibiotics - did have chest tubes which have since been removed, completed antibiotic course.     Acute respiratory failure with hypoxia (HCC)- (present on admission)  Assessment & Plan  -Intubated for respiratory acidosis and altered mental status with need for repeat intubation twice thereafter  -Cultures from sputum and BAL grew MSSA and pseudomonas, pt completed 14d of Zosyn, and an additional 1.5d of Unasyn for empyema  -also complicated by right empyema, s/p diagnostic thoracentesis and chest tubes  - Currently off antibiotics, back on room air   - RESOLVED     Alcohol abuse with withdrawal (HCC)  Assessment & Plan  Severe withdrawal with respiratory depression requiring intubation on admit  Out of withdrawal window now     Liver mass- (present on admission)  Assessment & Plan  - Seen on US 8/2022 \"right lobe of the liver measuring 1.7 x 1.3 x 1.0 cm in size\"  - AFP negative  - Pt will not tolerate MRI at this time - high risk for aspiration for aspiration if laying flat    Atrial fibrillation (HCC)- (present on admission)  Assessment & Plan  - Not on anticoagulation prior to admission - appears he was just diagnosed by PCP in June of this year and referred to Cardiology, but I don't see where he was seen by them  - [id number] score of 1-2 - new echo with EF of 40% but prior to that was 50%, and may have some effect of sepsis on EF   - ASA for now after discussion with pt's daughter  - Continue Metoprolol for rate control    Other specified hypothyroidism- (present on admission)  Assessment & Plan  - TSH normal, continue Synthroid     Rash- (present on admission)  Assessment & Plan  - Pt with pathology positive eczema, order his home triamcinolone cream    Hyponatremia  Assessment & Plan  - Resolved with free water flushes, pt was dry         VTE prophylaxis: SCDs/TEDs    I have performed a physical exam and reviewed and updated ROS " and Plan today (11/16/2022). In review of yesterday's note (11/15/2022), there are no changes except as documented above.

## 2022-11-16 NOTE — CARE PLAN
The patient is Stable - Low risk of patient condition declining or worsening    Shift Goals  Clinical Goals: safety, rest and comfort  Patient Goals: to go home  Family Goals: continue tube feed    Progress made toward(s) clinical / shift goals:  not physical or respiratory distress.    Patient is not progressing towards the following goals:

## 2022-11-16 NOTE — PROGRESS NOTES
Telemetry Shift Summary    Rhythm AFib  HR Range   Ectopy r-fPVC  Measurements -/.10/-        Normal Values  Rhythm SR  HR Range    Measurements 0.12-0.20 / 0.06-0.10  / 0.30-0.52

## 2022-11-16 NOTE — PROGRESS NOTES
Gastroenterology Progress Note     Author: Sebastian Mcgrath M.D.   Date & Time Created: 11/16/2022 1:50 PM    Chief Complaint:  Inability to eat    Interval History:  Patient says that he is still agreeable to PEG placement tomorrow. Denies new issues    Review of Systems:  Review of Systems   Constitutional:  Negative for chills and fever.   Respiratory:  Negative for cough.    Cardiovascular:  Negative for chest pain.   Gastrointestinal:  Negative for abdominal pain, blood in stool, constipation, diarrhea, heartburn, melena, nausea and vomiting.     Physical Exam:  Physical Exam  Constitutional:       Appearance: Normal appearance.   Abdominal:      General: Abdomen is flat. Bowel sounds are normal. There is no distension.      Palpations: Abdomen is soft.      Tenderness: There is no abdominal tenderness.   Neurological:      Mental Status: He is alert.       Labs:          Recent Labs     11/14/22  0852 11/15/22  0850 11/16/22 0916   SODIUM 143 143 142   POTASSIUM 4.3 4.0 3.6   CHLORIDE 108 107 107   CO2 24 22 25   BUN 31* 30* 27*   CREATININE 0.52 0.53 0.49*   MAGNESIUM 1.8 1.9 1.7   PHOSPHORUS 6.0* 5.6* 5.0*   CALCIUM 9.5 9.2 9.2     Recent Labs     11/14/22 0852 11/15/22  0850 11/16/22  0916   ALTSGPT  --   --  8   ASTSGOT  --   --  15   ALKPHOSPHAT  --   --  90   TBILIRUBIN  --   --  0.3   PREALBUMIN 12.9*  --   --    GLUCOSE 93 82 94     Recent Labs     11/14/22 0852 11/15/22  0850 11/16/22  0815 11/16/22  0916   RBC 3.99* 3.72*  --  3.64*   HEMOGLOBIN 12.7* 11.9*  --  11.6*   HEMATOCRIT 38.7* 36.3*  --  35.2*   PLATELETCT 711* 616*  --  618*   PROTHROMBTM  --   --  13.9  --    INR  --   --  1.11  --      Recent Labs     11/14/22 0852 11/15/22  0850 11/16/22 0916   WBC 9.6 9.9 10.9*   NEUTSPOLYS 53.80 50.70 64.00   LYMPHOCYTES 16.60* 17.80* 8.00*   MONOCYTES 9.50 8.30 6.00   EOSINOPHILS 18.30* 21.10* 20.00*   BASOPHILS 1.60 1.60 1.00   ASTSGOT  --   --  15   ALTSGPT  --   --  8   ALKPHOSPHAT  --    --  90   TBILIRUBIN  --   --  0.3     Hemodynamics:  Temp (24hrs), Av.7 °C (98.1 °F), Min:36.4 °C (97.5 °F), Max:37.1 °C (98.7 °F)  Temperature: 36.7 °C (98.1 °F)  Pulse  Av.3  Min: 35  Max: 143   Blood Pressure: 110/68    Respiratory:    Respiration: 18, Pulse Oximetry: 93 %     Work Of Breathing / Effort: Moderate  RUL Breath Sounds: Diminished, RML Breath Sounds: Diminished, RLL Breath Sounds: Diminished, NAHOMI Breath Sounds: Diminished, LLL Breath Sounds: Diminished  Fluids:    Intake/Output Summary (Last 24 hours) at 2022 1351  Last data filed at 2022 1200  Gross per 24 hour   Intake 1630 ml   Output --   Net 1630 ml        GI/Nutrition:  Orders Placed This Encounter   Procedures    Diet: Diet Tube Feed; Formula: Fibersource HN (Start at 25 ml/hr and advance per protocol: 25 mL/hour q8hrs); Goal Rate (mL/Hour): 70; Duration: 24 HR     Standing Status:   Standing     Number of Occurrences:   1     Order Specific Question:   Diet     Answer:   Diet Tube Feed [35]     Order Specific Question:   Formula:     Answer:   Fibersource HN     Comments:   Start at 25 ml/hr and advance per protocol: 25 mL/hour q8hrs     Order Specific Question:   Goal Rate (mL/Hour)     Answer:   70     Order Specific Question:   Route     Answer:   Gtube/PEG     Order Specific Question:   Duration     Answer:   24 HR    Diet NPO Restrict to: Strict (Stop tube feeds)     Standing Status:   Standing     Number of Occurrences:   8     Order Specific Question:   Diet NPO Restrict to:     Answer:   Strict [1]     Comments:   Stop tube feeds     Medical Decision Making, by Problem:  Active Hospital Problems    Diagnosis     Peripheral eosinophilia [D72.19]     Agitation [R45.1]     Reactive thrombocytosis [D75.838]     Hypomagnesemia [E83.42]     Wernicke encephalopathy syndrome [E51.2]     Aspiration pneumonia (HCC) [J69.0]     Dysphagia [R13.10]     Congestive heart failure (CHF) (HCC) [I50.9]     Sepsis due to Pseudomonas  species (HCC) [A41.52]     Acute respiratory failure with hypoxia (HCC) [J96.01]     Empyema (HCC) [J86.9]     On mechanically assisted ventilation (HCC) [Z99.11]     Alcohol abuse with withdrawal (HCC) [F10.139]     Liver mass [R16.0]     Atrial fibrillation (HCC) [I48.91]     Other specified hypothyroidism [E03.8]     Rash [R21]     Hyponatremia [E87.1]        Plan:  Pneumonia, ETOH liver disease, oropharyngeal dysphagia -  plan for PEG tomorrow. Patient will be on the endoscopy schedule at 830A    Quality-Core Measures

## 2022-11-16 NOTE — CONSULTS
Gastroenterology Consult Note     Date of Consult: 11/15/2022  Referring Physician: Maty     Reason for consult: PEG tube placement.         HPI: This is a 58 yo male who was originally admitted in October for alcohol withdrawal and delirium. He was ultimately admitted to the ICU for ETOh withdrawal and respiratory depression. He required intubation. His ICU course was complicated by reintubation multiple times. He was also found to have pseudomonas pneumonia and loculated pleural effusion. He was able to be extubated and has continued to have altered mental status. He was diagnosed with Wernickes encephalopathy. He has had an NGT in place for nutritional support and family requests that he get access for long term nutritional support and placement of a PEG tube. He was able to say that he would like to proceed with PEG placement. The patient's daughter agreed to proceed as well.     PMHX:  Past Medical History:   Diagnosis Date    A-fib (HCC)     ASTHMA     Eczema     Hypertension           PSurgHx: History reviewed. No pertinent surgical history.     ALLERGIES:Patient has no known allergies.     SocHx:   Social History     Socioeconomic History    Marital status:      Spouse name: Not on file    Number of children: Not on file    Years of education: Not on file    Highest education level: Not on file   Occupational History    Not on file   Tobacco Use    Smoking status: Every Day     Packs/day: 0.33     Years: 25.00     Pack years: 8.25     Types: Cigarettes    Smokeless tobacco: Never   Vaping Use    Vaping Use: Never used   Substance and Sexual Activity    Alcohol use: Yes     Comment: 4 drinks daily    Drug use: Never     Comment: Hx of methamphetamin, marijuana    Sexual activity: Yes   Other Topics Concern    Not on file   Social History Narrative    Not on file     Social Determinants of Health     Financial Resource Strain: Not on file   Food Insecurity:  Not on file   Transportation Needs: Not on file   Physical Activity: Not on file   Stress: Not on file   Social Connections: Not on file   Intimate Partner Violence: Not on file   Housing Stability: Not on file        FAMHx:   Family History   Problem Relation Age of Onset    Heart Disease Father     Stroke Father     Hypertension Brother     Cancer Brother         has 12 sibs, one brother had an unknown type of ca        ROS:  Unable to obtain due to mental status     PE:  Vitals:    11/15/22 0746 11/15/22 0800 11/15/22 1127 11/15/22 1602   BP:  109/66 102/64 114/76   Pulse: (!) 102 89 (!) 114 (!) 103   Resp: (!) 22 20 18 18   Temp:  36.2 °C (97.2 °F) 36.6 °C (97.8 °F) 37.1 °C (98.7 °F)   TempSrc:  Oral Oral Oral   SpO2: 97% 94% 92% 96%   Weight:       Height:         Gen: Alert oriented x2, NAD, lying in bed  HEENT: PERRL, NGT in place, nares patent, Mucous membranes moist  Neck: supple, no cervical or supraclavicular adenopathy  CVS: regular rhythm, tachycardic, no MRG  Pulm: CTAB, no crackles  Abd: soft, Nd, NT, no guarding or rebound  Ext: no edema, normal sensation  NEURO: confused and weak  Skin: warm, no rash  Psych: odd affect     LABS:  Lab Results   Component Value Date/Time    SODIUM 143 11/15/2022 08:50 AM    POTASSIUM 4.0 11/15/2022 08:50 AM    CHLORIDE 107 11/15/2022 08:50 AM    CO2 22 11/15/2022 08:50 AM    GLUCOSE 82 11/15/2022 08:50 AM    BUN 30 (H) 11/15/2022 08:50 AM    CREATININE 0.53 11/15/2022 08:50 AM      Lab Results   Component Value Date/Time    WBC 9.9 11/15/2022 08:50 AM    RBC 3.72 (L) 11/15/2022 08:50 AM    HEMOGLOBIN 11.9 (L) 11/15/2022 08:50 AM    HEMATOCRIT 36.3 (L) 11/15/2022 08:50 AM    MCV 97.6 11/15/2022 08:50 AM    MCH 32.0 11/15/2022 08:50 AM    MCHC 32.8 (L) 11/15/2022 08:50 AM    MPV 9.7 11/15/2022 08:50 AM    NEUTSPOLYS 50.70 11/15/2022 08:50 AM    LYMPHOCYTES 17.80 (L) 11/15/2022 08:50 AM    MONOCYTES 8.30 11/15/2022 08:50 AM    EOSINOPHILS 21.10 (H) 11/15/2022 08:50 AM     "BASOPHILS 1.60 11/15/2022 08:50 AM    ANISOCYTOSIS 1+ 02/27/2015 06:50 AM        Lab Results   Component Value Date/Time    PROTHROMBTM 15.9 (H) 10/28/2022 09:13 AM    INR 1.33 (H) 10/28/2022 09:13 AM               Problem List Items Addressed This Visit       Hyponatremia     - Resolved with free water flushes, pt was dry         Relevant Orders    Referral to Skilled Nursing Facility    Overweight (BMI 25.0-29.9)    Relevant Orders    Referral to Skilled Nursing Facility    Rash     - Pt with pathology positive eczema, order his home triamcinolone cream         Relevant Orders    Referral to Skilled Nursing Facility    Vitamin D deficiency    Relevant Orders    Referral to Skilled Nursing Facility    Other specified hypothyroidism     - TSH normal, continue Synthroid          Relevant Medications    levothyroxine (SYNTHROID) tablet 50 mcg    Other Relevant Orders    Referral to Skilled Nursing Facility    Ringing in ears, bilateral    Relevant Orders    Referral to Skilled Nursing Facility    History of alcohol abuse    Relevant Orders    Referral to Skilled Nursing Facility    SOB (shortness of breath)    Relevant Orders    Referral to Skilled Nursing Facility    Tobacco dependence    Relevant Orders    Referral to Skilled Nursing Facility    Atrial fibrillation (HCC)     Holding metoprolol due to hypotension  Currently self rate controlled.  Patient is not on chronic anticoagulation.  He was Given Amiodarone 150 mg x1 10/23 a.m.           Relevant Medications    enoxaparin (Lovenox) inj 40 mg    dilTIAZem (Cardizem) injection 10 mg    metoprolol tartrate (LOPRESSOR) tablet 50 mg    losartan (COZAAR) tablet 12.5 mg (Start on 11/15/2022  4:30 PM)    Other Relevant Orders    Referral to Skilled Nursing Facility    Liver mass     - Seen on US 8/2022 \"right lobe of the liver measuring 1.7 x 1.3 x 1.0 cm in size\"  - AFP negative  - Pt will not tolerate MRI at this time - high risk for aspiration for aspiration if " laying flat         Relevant Orders    Referral to Skilled Nursing Facility    Alcohol abuse with withdrawal (HCC)     CIWAs as above   Restraints necessary as he is pulling at lines/tubes  Seroquel 50 mg bid started through OGT on 10/22  Infectious workup - cultures NGTD   RPR ordered - negative         Relevant Orders    Referral to Skilled Nursing Facility    On mechanically assisted ventilation ([facility])     -Patient initial intubation for etoh withdrawal and delirium with resp depression, patient was extubated on 10/21/2022.  -He was reintubated on 10/23/2022 due to respiratory acidosis and altered mental status found to have HAP.  -patient self extubated on the late night of 10/25 and re intubated shortly after  -Continued with lung protective strategies on mechanical ventilation.  -Continues with versed and fentanyl for sedation  -ABCDEF bundle  -failed SBT today due to abundant secretions and tachypnea, will attempt a thoracentesis today to maximize his chances of extubation.         Relevant Orders    Referral to Skilled Nursing Facility    Acute respiratory failure with hypoxia ([facility])     -Intubated for respiratory acidosis and altered mental status with need for repeat intubation twice thereafter  -Cultures from sputum and BAL grew MSSA and pseudomonas, pt completed 14d of Zosyn, and an additional 1.5d of Unasyn for empyema  -also complicated by right empyema, s/p diagnostic thoracentesis and chest tubes  - Currently off antibiotics, back on room air   - RESOLVED          Relevant Orders    Referral to Skilled Nursing Facility    Empyema ([facility])     - On the right, moderate-large per CXR of 10/30  - S/p diagnostic thoracentesis 10/25/22: albumin gradient is less than 1.2 g/dl which indicate exudates, his was 0.9, also inflammative effusion with very high LDH 1020 (serum in the 240s range)  - Cultures negative but pt was on antibiotics - did have chest tubes which have since been removed, completed antibiotic  course.          Relevant Orders    Referral to Skilled Nursing Facility    Sepsis due to Pseudomonas species (HCC)     This is Severe Sepsis Present on admission  SIRS criteria identified on my evaluation include: Fever, with temperature greater than 101 deg F, Tachycardia, with heart rate greater than 90 BPM, Tachypnea, with respirations greater than 20 per minute and Leukocytosis, with WBC greater than 12,000  Clinical indicators of end organ dysfunction include Systolic blood pressure (SBP) <90 mmHg or mean arterial pressure <65 mmHg and Acute respiratory failure as evidenced by a new need for invasive or non-invasive mechanical ventilation (Ventilator or BiPap)   Source is pneumonia by MSSA and pseudomonas  Sepsis protocol initiated  Crystalloid Fluid Administration: Patient has volume overload with >11 lt since admission, (NYHA class III or symptoms with minimal exertion, Or NYHA class IV or symptoms at rest or with activity), for this/these reason(s) it is unsafe for this patient to receive 30 mL/kg of fluid.  Partial Fluid Dose Given on 10/24, patient to be reassessed shortly after completion of partial fluid bolus to ensure adequacy of resuscitation  IV antibiotics as appropriate for source of sepsis  Reassessment: I have reassessed the patient's hemodynamic status  Will provide another partial bolus today and wean down the vasopressors     .  Pt had septic shock requiring pressor support, with MSSA and pseudomonas in BAL and tracheal aspirate. Blood cultures negative. Sepsis has resolved.          Relevant Orders    Referral to Skilled Nursing Facility    Reactive thrombocytosis     - Continue to monitor            Relevant Orders    Referral to Skilled Nursing Facility    Hypomagnesemia     - Replace as needed         Relevant Orders    Referral to Skilled Nursing Facility    Wernicke encephalopathy syndrome     - Pt with significant dementia symptoms, swallow dysfunction, cerebral atrophy on CT  - Will  treat with high dose IV Thiamine   - Continue ASA          Relevant Orders    Referral to Skilled Nursing Facility    Dysphagia     - Pt has failed swallow evals last week and this week - has NGT in place  - Failed MBSS today  - Multifactorial with likely Wernicke's, deconditioning from PNA and being on the vent, acute illness  - Poor long term prognosis given his cerebral atrophy, chronic EtOH abuse and methampetamine abuse likely contributing to his lack of swallow coordination.  - Treat Wernicke's with high dose Thiamine, will continue working with SLP  - Daughter wants PEG tube placed, Dr Mcgrath to consult, NPO Wednesday night         Relevant Orders    Referral to Skilled Nursing Facility    Peripheral eosinophilia     Ddx includes HES, Churg Greta, HIV, parasite infection (less likely as no significant travel), eosinophilic malignancy. Will check a tryptase (negative), RUBIA (negative), ANCA (negative), HIV (negative), O&P, RF (negative), IgE (significantly elevated) and cortisol level (negative)  -Appreciate hematology consult, Discussed with Dr Valdivia - suspicion is due to Zosyn for prolonged time  - Jak2 and BCRABL, peripheral flow cytometry.         Relevant Orders    Referral to Skilled Nursing Facility    Agitation     - Doing well with thiamine replacement and 25mg qpm Seroquel   Daytime somnolence - stop daytime seroquel           Relevant Orders    Referral to Skilled Nursing Facility     Other Visit Diagnoses       Delirium tremens (HCC)        Relevant Orders    Referral to Skilled Nursing Facility    Alcohol withdrawal syndrome, with delirium (HCC)        Relevant Orders    Referral to Skilled Nursing Facility    Ichthyosis        Relevant Orders    Referral to Skilled Nursing Facility    Acute respiratory failure with hypoxia and hypercapnia (HCC)        Relevant Orders    Referral to Skilled Nursing Facility    Other constipation        Relevant Orders    Referral to Skilled Nursing Facility     Scabies        Relevant Orders    Referral to Skilled Nursing Facility    Alcohol dependence with withdrawal delirium (HCC)        Relevant Orders    Referral to Skilled Nursing Facility    Essential hypertension        Relevant Medications    enoxaparin (Lovenox) inj 40 mg    dilTIAZem (Cardizem) injection 10 mg    metoprolol tartrate (LOPRESSOR) tablet 50 mg    losartan (COZAAR) tablet 12.5 mg (Start on 11/15/2022  4:30 PM)    Other Relevant Orders    Referral to Skilled Nursing Facility    ESAU (acute kidney injury) (HCC)        Relevant Orders    Referral to Skilled Nursing Facility             ASSESSMENT: 58 yo male with multiple medical issues arising from ETOH dependence and complicated hospital course. His family wants the patient to get rehab and therefore he will need nutritional support long term. He was found to have oropharyngeal dysphagia by speech pathology. We will therefore plan for EGD with PEG placement     PLAN:   1) plan for EGD with PEG placement on Thursday; this is the first available endoscopy time  2) will place orders tomorrow      Thank you for this consult.     Sebastian Mcgrath MD

## 2022-11-17 ENCOUNTER — ANESTHESIA (OUTPATIENT)
Dept: SURGERY | Facility: MEDICAL CENTER | Age: 57
DRG: 870 | End: 2022-11-17
Payer: COMMERCIAL

## 2022-11-17 ENCOUNTER — ANESTHESIA EVENT (OUTPATIENT)
Dept: SURGERY | Facility: MEDICAL CENTER | Age: 57
DRG: 870 | End: 2022-11-17
Payer: COMMERCIAL

## 2022-11-17 LAB
ANION GAP SERPL CALC-SCNC: 15 MMOL/L (ref 7–16)
BASOPHILS # BLD AUTO: 1.5 % (ref 0–1.8)
BASOPHILS # BLD: 0.17 K/UL (ref 0–0.12)
BUN SERPL-MCNC: 22 MG/DL (ref 8–22)
CALCIUM SERPL-MCNC: 9.3 MG/DL (ref 8.4–10.2)
CHLORIDE SERPL-SCNC: 104 MMOL/L (ref 96–112)
CO2 SERPL-SCNC: 22 MMOL/L (ref 20–33)
CREAT SERPL-MCNC: 0.59 MG/DL (ref 0.5–1.4)
EOSINOPHIL # BLD AUTO: 1.98 K/UL (ref 0–0.51)
EOSINOPHIL NFR BLD: 17.5 % (ref 0–6.9)
ERYTHROCYTE [DISTWIDTH] IN BLOOD BY AUTOMATED COUNT: 53.1 FL (ref 35.9–50)
GFR SERPLBLD CREATININE-BSD FMLA CKD-EPI: 114 ML/MIN/1.73 M 2
GLUCOSE SERPL-MCNC: 76 MG/DL (ref 65–99)
HCT VFR BLD AUTO: 36.2 % (ref 42–52)
HGB BLD-MCNC: 11.7 G/DL (ref 14–18)
IMM GRANULOCYTES # BLD AUTO: 0.04 K/UL (ref 0–0.11)
IMM GRANULOCYTES NFR BLD AUTO: 0.4 % (ref 0–0.9)
LYMPHOCYTES # BLD AUTO: 1.96 K/UL (ref 1–4.8)
LYMPHOCYTES NFR BLD: 17.3 % (ref 22–41)
MAGNESIUM SERPL-MCNC: 1.7 MG/DL (ref 1.5–2.5)
MCH RBC QN AUTO: 31.6 PG (ref 27–33)
MCHC RBC AUTO-ENTMCNC: 32.3 G/DL (ref 33.7–35.3)
MCV RBC AUTO: 97.8 FL (ref 81.4–97.8)
MONOCYTES # BLD AUTO: 0.97 K/UL (ref 0–0.85)
MONOCYTES NFR BLD AUTO: 8.6 % (ref 0–13.4)
NEUTROPHILS # BLD AUTO: 6.2 K/UL (ref 1.82–7.42)
NEUTROPHILS NFR BLD: 54.7 % (ref 44–72)
NRBC # BLD AUTO: 0 K/UL
NRBC BLD-RTO: 0 /100 WBC
PHOSPHATE SERPL-MCNC: 5.5 MG/DL (ref 2.5–4.5)
PLATELET # BLD AUTO: 601 K/UL (ref 164–446)
PMV BLD AUTO: 10.1 FL (ref 9–12.9)
POTASSIUM SERPL-SCNC: 3.7 MMOL/L (ref 3.6–5.5)
RBC # BLD AUTO: 3.7 M/UL (ref 4.7–6.1)
SODIUM SERPL-SCNC: 141 MMOL/L (ref 135–145)
WBC # BLD AUTO: 11.3 K/UL (ref 4.8–10.8)

## 2022-11-17 PROCEDURE — 99232 SBSQ HOSP IP/OBS MODERATE 35: CPT | Performed by: INTERNAL MEDICINE

## 2022-11-17 PROCEDURE — A9270 NON-COVERED ITEM OR SERVICE: HCPCS | Performed by: INTERNAL MEDICINE

## 2022-11-17 PROCEDURE — 160009 HCHG ANES TIME/MIN: Performed by: INTERNAL MEDICINE

## 2022-11-17 PROCEDURE — 700102 HCHG RX REV CODE 250 W/ 637 OVERRIDE(OP): Performed by: FAMILY MEDICINE

## 2022-11-17 PROCEDURE — 0DH63UZ INSERTION OF FEEDING DEVICE INTO STOMACH, PERCUTANEOUS APPROACH: ICD-10-PCS | Performed by: INTERNAL MEDICINE

## 2022-11-17 PROCEDURE — 160048 HCHG OR STATISTICAL LEVEL 1-5: Performed by: INTERNAL MEDICINE

## 2022-11-17 PROCEDURE — 700111 HCHG RX REV CODE 636 W/ 250 OVERRIDE (IP): Performed by: INTERNAL MEDICINE

## 2022-11-17 PROCEDURE — 700105 HCHG RX REV CODE 258: Performed by: INTERNAL MEDICINE

## 2022-11-17 PROCEDURE — 80048 BASIC METABOLIC PNL TOTAL CA: CPT

## 2022-11-17 PROCEDURE — 700111 HCHG RX REV CODE 636 W/ 250 OVERRIDE (IP): Performed by: ANESTHESIOLOGY

## 2022-11-17 PROCEDURE — 700101 HCHG RX REV CODE 250: Performed by: ANESTHESIOLOGY

## 2022-11-17 PROCEDURE — 700111 HCHG RX REV CODE 636 W/ 250 OVERRIDE (IP): Performed by: FAMILY MEDICINE

## 2022-11-17 PROCEDURE — 160208 HCHG ENDO MINUTES - EA ADDL 1 MIN LEVEL 4: Performed by: INTERNAL MEDICINE

## 2022-11-17 PROCEDURE — 110372 HCHG SHELL REV 278: Performed by: INTERNAL MEDICINE

## 2022-11-17 PROCEDURE — 700101 HCHG RX REV CODE 250: Performed by: INTERNAL MEDICINE

## 2022-11-17 PROCEDURE — 88305 TISSUE EXAM BY PATHOLOGIST: CPT

## 2022-11-17 PROCEDURE — 700105 HCHG RX REV CODE 258: Performed by: FAMILY MEDICINE

## 2022-11-17 PROCEDURE — 770006 HCHG ROOM/CARE - MED/SURG/GYN SEMI*

## 2022-11-17 PROCEDURE — 85025 COMPLETE CBC W/AUTO DIFF WBC: CPT

## 2022-11-17 PROCEDURE — 83735 ASSAY OF MAGNESIUM: CPT

## 2022-11-17 PROCEDURE — 160002 HCHG RECOVERY MINUTES (STAT): Performed by: INTERNAL MEDICINE

## 2022-11-17 PROCEDURE — 84100 ASSAY OF PHOSPHORUS: CPT

## 2022-11-17 PROCEDURE — 88312 SPECIAL STAINS GROUP 1: CPT

## 2022-11-17 PROCEDURE — 700102 HCHG RX REV CODE 250 W/ 637 OVERRIDE(OP): Performed by: INTERNAL MEDICINE

## 2022-11-17 PROCEDURE — A9270 NON-COVERED ITEM OR SERVICE: HCPCS | Performed by: FAMILY MEDICINE

## 2022-11-17 PROCEDURE — 160035 HCHG PACU - 1ST 60 MINS PHASE I: Performed by: INTERNAL MEDICINE

## 2022-11-17 PROCEDURE — 160203 HCHG ENDO MINUTES - 1ST 30 MINS LEVEL 4: Performed by: INTERNAL MEDICINE

## 2022-11-17 PROCEDURE — 00731 ANES UPR GI NDSC PX NOS: CPT | Performed by: ANESTHESIOLOGY

## 2022-11-17 DEVICE — KIT 20 FRENCH PULL SAFETY PEG: Type: IMPLANTABLE DEVICE | Status: FUNCTIONAL

## 2022-11-17 RX ORDER — DIPHENHYDRAMINE HYDROCHLORIDE 50 MG/ML
12.5 INJECTION INTRAMUSCULAR; INTRAVENOUS
Status: DISCONTINUED | OUTPATIENT
Start: 2022-11-17 | End: 2022-11-17 | Stop reason: HOSPADM

## 2022-11-17 RX ORDER — LIDOCAINE HYDROCHLORIDE 10 MG/ML
INJECTION, SOLUTION INFILTRATION; PERINEURAL
Status: DISCONTINUED | OUTPATIENT
Start: 2022-11-17 | End: 2022-11-17 | Stop reason: HOSPADM

## 2022-11-17 RX ORDER — LIDOCAINE HYDROCHLORIDE 20 MG/ML
INJECTION, SOLUTION EPIDURAL; INFILTRATION; INTRACAUDAL; PERINEURAL PRN
Status: DISCONTINUED | OUTPATIENT
Start: 2022-11-17 | End: 2022-11-17 | Stop reason: SURG

## 2022-11-17 RX ORDER — SODIUM CHLORIDE, SODIUM LACTATE, POTASSIUM CHLORIDE, CALCIUM CHLORIDE 600; 310; 30; 20 MG/100ML; MG/100ML; MG/100ML; MG/100ML
INJECTION, SOLUTION INTRAVENOUS CONTINUOUS
Status: DISCONTINUED | OUTPATIENT
Start: 2022-11-17 | End: 2022-11-17 | Stop reason: HOSPADM

## 2022-11-17 RX ORDER — HALOPERIDOL 5 MG/ML
1 INJECTION INTRAMUSCULAR
Status: DISCONTINUED | OUTPATIENT
Start: 2022-11-17 | End: 2022-11-17 | Stop reason: HOSPADM

## 2022-11-17 RX ORDER — MIDAZOLAM HYDROCHLORIDE 1 MG/ML
INJECTION INTRAMUSCULAR; INTRAVENOUS PRN
Status: DISCONTINUED | OUTPATIENT
Start: 2022-11-17 | End: 2022-11-17 | Stop reason: SURG

## 2022-11-17 RX ORDER — OXYCODONE HCL 5 MG/5 ML
5 SOLUTION, ORAL ORAL
Status: DISCONTINUED | OUTPATIENT
Start: 2022-11-17 | End: 2022-11-17 | Stop reason: HOSPADM

## 2022-11-17 RX ORDER — OXYCODONE HCL 5 MG/5 ML
10 SOLUTION, ORAL ORAL
Status: DISCONTINUED | OUTPATIENT
Start: 2022-11-17 | End: 2022-11-17 | Stop reason: HOSPADM

## 2022-11-17 RX ORDER — SODIUM CHLORIDE, SODIUM LACTATE, POTASSIUM CHLORIDE, CALCIUM CHLORIDE 600; 310; 30; 20 MG/100ML; MG/100ML; MG/100ML; MG/100ML
INJECTION, SOLUTION INTRAVENOUS CONTINUOUS
Status: ACTIVE | OUTPATIENT
Start: 2022-11-17 | End: 2022-11-17

## 2022-11-17 RX ORDER — HYDROMORPHONE HYDROCHLORIDE 1 MG/ML
0.4 INJECTION, SOLUTION INTRAMUSCULAR; INTRAVENOUS; SUBCUTANEOUS
Status: DISCONTINUED | OUTPATIENT
Start: 2022-11-17 | End: 2022-11-17 | Stop reason: HOSPADM

## 2022-11-17 RX ORDER — ONDANSETRON 2 MG/ML
4 INJECTION INTRAMUSCULAR; INTRAVENOUS
Status: DISCONTINUED | OUTPATIENT
Start: 2022-11-17 | End: 2022-11-17 | Stop reason: HOSPADM

## 2022-11-17 RX ORDER — HYDROMORPHONE HYDROCHLORIDE 1 MG/ML
0.1 INJECTION, SOLUTION INTRAMUSCULAR; INTRAVENOUS; SUBCUTANEOUS
Status: DISCONTINUED | OUTPATIENT
Start: 2022-11-17 | End: 2022-11-17 | Stop reason: HOSPADM

## 2022-11-17 RX ORDER — HYDROMORPHONE HYDROCHLORIDE 1 MG/ML
0.2 INJECTION, SOLUTION INTRAMUSCULAR; INTRAVENOUS; SUBCUTANEOUS
Status: DISCONTINUED | OUTPATIENT
Start: 2022-11-17 | End: 2022-11-17 | Stop reason: HOSPADM

## 2022-11-17 RX ORDER — SUCCINYLCHOLINE CHLORIDE 20 MG/ML
INJECTION INTRAMUSCULAR; INTRAVENOUS PRN
Status: DISCONTINUED | OUTPATIENT
Start: 2022-11-17 | End: 2022-11-17 | Stop reason: SURG

## 2022-11-17 RX ADMIN — SENNOSIDES AND DOCUSATE SODIUM 2 TABLET: 50; 8.6 TABLET ORAL at 18:16

## 2022-11-17 RX ADMIN — NICOTINE 7 MG: 7 PATCH TRANSDERMAL at 05:21

## 2022-11-17 RX ADMIN — CEFAZOLIN 2 G: 2 INJECTION, POWDER, FOR SOLUTION INTRAMUSCULAR; INTRAVENOUS at 05:29

## 2022-11-17 RX ADMIN — ROCURONIUM BROMIDE 3 MG: 10 INJECTION, SOLUTION INTRAVENOUS at 07:51

## 2022-11-17 RX ADMIN — ACETAMINOPHEN 650 MG: 325 TABLET, FILM COATED ORAL at 13:30

## 2022-11-17 RX ADMIN — THIAMINE HYDROCHLORIDE 250 MG: 100 INJECTION, SOLUTION INTRAMUSCULAR; INTRAVENOUS at 05:21

## 2022-11-17 RX ADMIN — QUETIAPINE FUMARATE 25 MG: 25 TABLET ORAL at 20:31

## 2022-11-17 RX ADMIN — LIDOCAINE HYDROCHLORIDE 40 MG: 20 INJECTION, SOLUTION EPIDURAL; INFILTRATION; INTRACAUDAL at 08:02

## 2022-11-17 RX ADMIN — MIDAZOLAM HYDROCHLORIDE 2 MG: 1 INJECTION, SOLUTION INTRAMUSCULAR; INTRAVENOUS at 07:49

## 2022-11-17 RX ADMIN — ENOXAPARIN SODIUM 40 MG: 40 INJECTION SUBCUTANEOUS at 18:16

## 2022-11-17 RX ADMIN — SODIUM CHLORIDE, POTASSIUM CHLORIDE, SODIUM LACTATE AND CALCIUM CHLORIDE: 600; 310; 30; 20 INJECTION, SOLUTION INTRAVENOUS at 07:47

## 2022-11-17 RX ADMIN — FENTANYL CITRATE 25 MCG: 50 INJECTION, SOLUTION INTRAMUSCULAR; INTRAVENOUS at 07:56

## 2022-11-17 RX ADMIN — PROPOFOL 100 MG: 10 INJECTION, EMULSION INTRAVENOUS at 07:51

## 2022-11-17 RX ADMIN — FENTANYL CITRATE 25 MCG: 50 INJECTION, SOLUTION INTRAMUSCULAR; INTRAVENOUS at 08:02

## 2022-11-17 RX ADMIN — SUCCINYLCHOLINE CHLORIDE 100 MG: 20 INJECTION, SOLUTION INTRAMUSCULAR; INTRAVENOUS; PARENTERAL at 07:51

## 2022-11-17 RX ADMIN — METOPROLOL TARTRATE 50 MG: 50 TABLET, FILM COATED ORAL at 18:16

## 2022-11-17 ASSESSMENT — ENCOUNTER SYMPTOMS
BLURRED VISION: 0
VOMITING: 0
HEADACHES: 0
SHORTNESS OF BREATH: 0
PHOTOPHOBIA: 0
SENSORY CHANGE: 0
INSOMNIA: 0
MYALGIAS: 0
WEAKNESS: 0
NERVOUS/ANXIOUS: 0
DIZZINESS: 0
CHILLS: 0
FEVER: 0
DIARRHEA: 0
CLAUDICATION: 0
SPEECH CHANGE: 0
DEPRESSION: 0
HEARTBURN: 0
ABDOMINAL PAIN: 0
CONSTIPATION: 0
COUGH: 0

## 2022-11-17 ASSESSMENT — COGNITIVE AND FUNCTIONAL STATUS - GENERAL
CLIMB 3 TO 5 STEPS WITH RAILING: A LOT
DRESSING REGULAR LOWER BODY CLOTHING: A LOT
MOBILITY SCORE: 14
EATING MEALS: A LITTLE
DRESSING REGULAR UPPER BODY CLOTHING: A LOT
PERSONAL GROOMING: A LITTLE
MOVING FROM LYING ON BACK TO SITTING ON SIDE OF FLAT BED: A LOT
TURNING FROM BACK TO SIDE WHILE IN FLAT BAD: A LITTLE
DAILY ACTIVITIY SCORE: 14
STANDING UP FROM CHAIR USING ARMS: A LOT
HELP NEEDED FOR BATHING: A LOT
SUGGESTED CMS G CODE MODIFIER DAILY ACTIVITY: CK
WALKING IN HOSPITAL ROOM: A LOT
SUGGESTED CMS G CODE MODIFIER MOBILITY: CL
MOVING TO AND FROM BED TO CHAIR: A LITTLE
TOILETING: A LOT

## 2022-11-17 ASSESSMENT — PAIN DESCRIPTION - PAIN TYPE
TYPE: ACUTE PAIN

## 2022-11-17 NOTE — OR NURSING
0819: Patient arrived from [facility] via gurney. PEG tube site patent, no bleeding/drainage noted on dressing.     +Stop Bang per protocol for a score of 6/10.    Sedation/Resp Status: Drowsy, eye opening to verbal.  Respirations spontaneous and non-labored.    HR 80s Afib with Ventricle delay; VSS on 6L 02 via mask.     0830: Cont drowsy, stable. No changes to PET tube insertion site.     0845: Cont sleeping, no changes.      0900: Cont sleeping, no changes.     0915: Denies pain/nausea, wakes to voice. Oriented. PEG tube dressing CDI.     0919: Meets criteria to transfer back to floor room for cont care. Report to floor RN. 02 tank at 438.

## 2022-11-17 NOTE — DOCUMENTATION QUERY
On license of [facility]                                                                       Query Response Note      PATIENT:               KATIE PRITCHARD  ACCT #:                  [id number]  MRN:                     [id number]  :                      1965  ADMIT DATE:       10/18/2022 5:09 PM  DISCH DATE:          RESPONDING  PROVIDER #:        [id number]           QUERY TEXT:    Based on the clinical finding and your clinical judgement can you please provide additional clinical indicators supportive of the diagnosis of  severe protein calorie malnutrition.    Note: If an appropriate response is not listed below, please respond with a new note.            The patient's Clinical Indicators include:  Consultation note 11/15/22 (Hematology / Oncology) has noted:  Patient has NG tube, looks cachectic severe PCM emaciated eczema is noted on the body.  Per RD assessment noted hx of 13.6% weight loss in 11 months; wt loss is not significant, nutrition focused physical exam limited given pt is intubated/sedated.  At bedside, observed flat temporals, no other overt signs of fat/muscle wasting note.  ASPEN criteria not met.   Treatment:  PEG tube placement, and RD to update TF formula, goal rate, and rate advancement, ongoing   Risk:  High risk due to current clinical status and nutrition status requiring artificial nutrition     Thank you,  Naty Eckert, RN, BSN  Clinical    [facility]  Connect via [employer]  [phone]  [email or web address]  Options provided:   -- Condition of severe protein calorie malnutrition exists, please document additional clinical indicators   -- Condition of severe protein calorie malnutrition does not exist and amended documentation provided in the medical record   -- Other level of PCM, Please specify level (e.g. Mild, moderate)   -- Unable to determine      Query created by:  Naty Eckert on 11/17/2022 8:58 AM    RESPONSE TEXT:    condition of severe protein calorie malnutrition exists -          Electronically signed by:  MARBIN ESQUIVEL DO 11/17/2022 10:03 AM

## 2022-11-17 NOTE — PROGRESS NOTES
Per Dr. Lion regalado to pause on Q4 FSBG checks as pt needs Tube feed paused for PEG tube placement in AM.

## 2022-11-17 NOTE — THERAPY
Missed Therapy     Patient Name: Tyree Whiteside  Age:  57 y.o., Sex:  male  Medical Record #: [id number]  Today's Date: 11/17/2022 11/17/22 0711   Treatment Variance   Reason For Missed Therapy Medical - Patient  in Procedure   Interdisciplinary Plan of Care Collaboration   IDT Collaboration with  Other (See Comments)   Collaboration Comments Per GI note, pt having PEG tube placement today. SLP to follow when pt is able to participate in therapy.

## 2022-11-17 NOTE — DISCHARGE PLANNING
Case Management Discharge Planning        Anticipated Discharge Dispo: Discharge Disposition: Disch to a long term care facility (63)    Action(s) Taken: Pt getting peg tube today.    LSW called Huong Disla at [facility] ([phone]) to get assistance with placement for pt. LSW left  requesting call back.     1445: LSW [facility] D[facility] and [facility].     Escalations Completed: Long Length of Stay Committee  and Leadership    Next Steps: LSW to follow up with Huong at [facility].    Barriers to Discharge: Pending Placement, insurance    Is the patient up for discharge tomorrow: No    '

## 2022-11-17 NOTE — CARE PLAN
The patient is Watcher - Medium risk of patient condition declining or worsening    Shift Goals  Clinical Goals: monitor blood glucose, strict NPO, monitor tube feeds  Patient Goals: get stronger  Family Goals: continue tube feed    Progress made toward(s) clinical / shift goals:  blood glucose monitored q4hrs, patient has remained strict NPO and on tube feeding at this time. Patient tolerating change of tube feed formula. Patient self suctions to remove secretions. Patient's skin intact and patient remains free from falls.      Problem: Safety - Medical Restraint  Goal: Free from restraint(s) (Restraint for Interference with Medical Device)  Outcome: Progressing     Problem: Knowledge Deficit - Standard  Goal: Patient and family/care givers will demonstrate understanding of plan of care, disease process/condition, diagnostic tests and medications  Outcome: Progressing     Problem: Psychosocial  Goal: Patient's level of anxiety will decrease  Outcome: Progressing     Problem: Skin Integrity  Goal: Skin integrity is maintained or improved  Outcome: Progressing     Problem: Fall Risk  Goal: Patient will remain free from falls  Outcome: Progressing     Problem: Pain - Standard  Goal: Alleviation of pain or a reduction in pain to the patient’s comfort goal  Outcome: Progressing       Patient is not progressing towards the following goals:

## 2022-11-17 NOTE — ANESTHESIA PREPROCEDURE EVALUATION
Case: [id number] Date/Time: 11/17/22 0815    Procedures:       GASTROSCOPY      EGD, WITH PEG TUBE INSERTION    Location:  ENDOSCOPIC ULTRASOUND ROOM / SURGERY [facility]    Surgeons: Sebastian Mcgrath M.D.        ETOH  Relevant Problems   PULMONARY   (positive) Aspiration pneumonia (HCC)   (positive) SOB (shortness of breath)      CARDIAC   (positive) Atrial fibrillation (HCC)   (positive) Congestive heart failure (CHF) (HCC)      ENDO   (positive) Other specified hypothyroidism       Physical Exam    Airway   Mallampati: III  TM distance: >3 FB  Neck ROM: full       Cardiovascular - normal exam  Rhythm: regular  Rate: normal  (-) murmur     Dental - normal exam           Pulmonary - normal exam  Breath sounds clear to auscultation     Abdominal    Neurological - normal exam         Other findings: NG T present            Anesthesia Plan    ASA 3   ASA physical status 3 criteria: moderate reduction of ejection fraction and alcohol and/or substance dependence or abuse    Plan - general       Airway plan will be ETT          Induction: intravenous    Postoperative Plan: Postoperative administration of opioids is intended.    Pertinent diagnostic labs and testing reviewed    Informed Consent:    Anesthetic plan and risks discussed with patient.    Use of blood products discussed with: patient whom consented to blood products.

## 2022-11-17 NOTE — PROGRESS NOTES
Telemetry Shift Summary    Rhythm AFIB  HR Range 82-94  Ectopy rPVC  Measurements --/.10/--        Normal Values  Rhythm SR  HR Range    Measurements 0.12-0.20 / 0.06-0.10  / 0.30-0.52

## 2022-11-17 NOTE — ANESTHESIA PROCEDURE NOTES
Airway    Date/Time: 11/17/2022 7:52 AM  Performed by: Hakeem Lang M.D.  Authorized by: Hakeem Lang M.D.     Location:  OR  Urgency:  Elective  Indications for Airway Management:  Anesthesia      Spontaneous Ventilation: absent    Sedation Level:  Deep  Preoxygenated: Yes    Patient Position:  Sniffing  Final Airway Type:  Endotracheal airway  Final Endotracheal Airway:  ETT  Cuffed: Yes    Technique Used for Successful ETT Placement:  Direct laryngoscopy    Insertion Site:  Oral  Blade Type:  Arriola  Laryngoscope Blade/Videolaryngoscope Blade Size:  2  ETT Size (mm):  8.0  Measured from:  Teeth  ETT to Teeth (cm):  23  Placement Verified by: auscultation and capnometry    Cormack-Lehane Classification:  Grade IIb - view of arytenoids or posterior of glottis only  Number of Attempts at Approach:  1

## 2022-11-17 NOTE — PROGRESS NOTES
Telemetry Shift Summary    Rhythm Afib  HR Range 85-96  Ectopy rPAC  Measurements 0.12/0.08/0.28      Normal Values  Rhythm SR  HR Range:   Measurements: 0.12-0.20/0.06-0.10/0.30-0.52

## 2022-11-17 NOTE — ANESTHESIA POSTPROCEDURE EVALUATION
Patient: Tyree Whiteside    Procedure Summary     Date: 11/17/22 Room / Location:  ENDOSCOPIC ULTRASOUND ROOM / SURGERY [facility]    Anesthesia Start: 0747 Anesthesia Stop: 0822    Procedures:       GASTROSCOPY      EGD, WITH PEG TUBE INSERTION (Abdomen) Diagnosis: (oropharyngeal dysphagia )    Surgeons: Sebastian Mcgrath M.D. Responsible Provider: Hakeem Lang M.D.    Anesthesia Type: general ASA Status: 3          Final Anesthesia Type: general  Last vitals  BP   Blood Pressure: 120/78    Temp   36.6 °C (97.9 °F)    Pulse   (!) 120   Resp   18    SpO2   100 %      Anesthesia Post Evaluation    Patient location during evaluation: PACU  Patient participation: complete - patient participated  Level of consciousness: awake and alert    Airway patency: patent  Anesthetic complications: no  Cardiovascular status: hemodynamically stable  Respiratory status: acceptable  Hydration status: euvolemic    PONV: none          No notable events documented.     Nurse Pain Score: 0 (NPRS)         No

## 2022-11-17 NOTE — ANESTHESIA TIME REPORT
Anesthesia Start and Stop Event Times     Date Time Event    11/17/2022 0747 Anesthesia Start     0822 Anesthesia Stop        Responsible Staff  11/17/22    Name Role Begin End    Hakeem Lang M.D. Anesth 0747 0822        Overtime Reason:  no overtime (within assigned shift)    Comments:

## 2022-11-17 NOTE — PROGRESS NOTES
Received report from Maryam NOLEN. Pt AOx4 and reports no pain. Pt updated on Poc for the day. Pt has no further questions or needs at this time.

## 2022-11-17 NOTE — CARE PLAN
The patient is Stable - Low risk of patient condition declining or worsening    Shift Goals  Clinical Goals: Monitor FSBG, Strict NPO, Tube feed monitoring  Patient Goals: Feel better  Family Goals: ANETTE    Progress made toward(s) clinical / shift goals:    Problem: Knowledge Deficit - Standard  Goal: Patient and family/care givers will demonstrate understanding of plan of care, disease process/condition, diagnostic tests and medications  Outcome: Progressing: pt verbalized understanding of POC     Problem: Optimal Care for Alcohol Withdrawal  Goal: Optimal Care for the alcohol withdrawal patient  Outcome: Progressing: CIWAs Dcd for low CIWA scores      Problem: Pain - Standard  Goal: Alleviation of pain or a reduction in pain to the patient’s comfort goal  Outcome: Progressing: pt reported no pain throughout  shift

## 2022-11-17 NOTE — OP REPORT
DATE OF SERVICE:  11/17/2022     INDICATION FOR PROCEDURE:  oropharyngeal dysphagia    PROCEDURE PERFORMED: EGD with PEG placement     CONSENT:  Informed consent was obtained directly from the patient after   benefits, risks and possible alternatives were discussed.     MEDICATIONS:  The patient received general anesthesia for this procedure. In addition, he received 2g Ancef prior to the procedure       PROCEDURE DESCRIPTION:  The patient was sedated and intubated by the anesthesiologist. The upper endoscope was placed in the patient's mouth and advanced easily and carefully to the esophagus and subsequently to the stomach and duodenum.The scope was slowly withdrawn and mucosa carefully examined. Retroflexion was performed in the stomach. The patients toleration of this procedure was excellent     FINDINGS:    Esophagus: normal    Stomach: 1cm ulcer in the antrum. Biopsy was obtained    Duodenum: normal    PEG PLACEMENT: After initial inspection, insufflation and transillumination of the stomach was performed. A site in the LUQ was identified and marked for tube placement. The skin was then cleaned with chloraprep and the site was sterilely draped. 5cc of 1% Lidocaine were then administered in the PEG tract. Using a trochar the stomach was accessed and a wire was passed into the stomach. The wire was grasped using a snare and the endoscope and wire were pulled through the mouth. A 20F pull-type gastrostomy tube was loaded on the wire and pulled through the upper abdominal wall. The bumper was secured at 3cm. Antibiotic ointment was placed over the site and sterile gauze was placed under the bumper. A re-look EGD confirmed placement in the stomach in the appropriate location.       COMPLICATIONS:  No complications or blood loss during or in the immediate postoperative period.     IMPRESSION:  1.  successful PEG placement     RECOMMENDATION:    1. Ok to use PEG for meds and water today  2. Hold tube feeds until  tomorrow  3. Clean around and under bumper daily

## 2022-11-17 NOTE — DISCHARGE PLANNING
Per LSW, DPA refaxed SNF referrals to [facility], and [facility].

## 2022-11-17 NOTE — OR NURSING
0740 In person COVID verbal screening completed; negative.  Patient allergies and NPO status verified, home medication reconciliation completed and belongings secured. Surgical site verified with patient. Patient verbalizes understanding of pain scale, expected course of stay and plan of care; patient and family state verbal understanding at this time. IV access verified and central line dressing change in pre-op as dressing loose on arrival.

## 2022-11-17 NOTE — PROGRESS NOTES
Hospital Medicine Daily Progress Note    Date of Service  11/17/2022    Chief Complaint  Tyree Whiteside is a 57 y.o. male admitted 10/18/2022 with confusion and hallucinations.    Hospital Course  Pt is a 58yo with a history of atrial fibrillation, HTN, alcohol dependence with abuse, methamphetamine abuse and liver mass who initially presented to [facility] for help with EtOH detoxification but was sent to the ER as he was having hallucinations, confusion, weakness, nausea and vomiting. He was admitted to ICU on 10/18/22 for EtOH withdrawal and respiratory depression and was placed on the ventilator and had a protracted ICU course - he required reintubation on 10/23 and again 10/26 after self extubation, diagnostic thoracentesis  and has had MSSA and Pseudomonas PNA, recurrent aspiration, bradycardia, hypotension, ileus, chest tube which has since been removed and loculated pleural effusion.  He transitioned out of the ICU and is still having severe difficulty with swallowing.  Daughter discussed with family regarding moving forward with PEG tube and would like this placed, GI to consult.      Interval Problem Update  11/15 Patient somnolent at time of discharge.  Daughter at bedside states he was much more interactive before the seroquel was initiated.  I'll dc the am dose of seroquel to see if this helps with mentation.  He has failed his swallow evaluation and PEG tube recommended for continued nutrition.  GI to consult for probable PEG on Thursday.  Discussed with hematology and there is concern for zosyn induced eosinophilia.  Jak2, BCRABL and peripheral blood flow cytometry pending as part of workup.    11/16 Patient awake and conversant this am, PEG planned for tomorrow with Dr Mcgrath at 830.  PT/OT evaluations pending, anticipate he will need placement in SNF given prolonged hospitalization and weakness.  11/17 Patient seen after PEG placement this am, he tolerated the procedure well.  PEG site looks  good.  Plan for meds and water okay in tube today and resume tube feeding tomorrow.  PT/OT re-evaluation, medically cleared for SNF as of tomorrow.    I have discussed this patient's plan of care and discharge plan at IDT rounds today with Case Management, Nursing, Nursing leadership, and other members of the IDT team.    Consultants/Specialty  oncology    Code Status  DNAR/DNI    Disposition  Patient is not medically cleared for discharge.   Anticipate discharge to to skilled nursing facility.  I have placed the appropriate orders for post-discharge needs.    Review of Systems  Review of Systems   Constitutional:  Negative for chills and fever.   HENT:  Negative for congestion.    Eyes:  Negative for blurred vision and photophobia.   Respiratory:  Negative for cough and shortness of breath.    Cardiovascular:  Negative for chest pain, claudication and leg swelling.   Gastrointestinal:  Negative for abdominal pain, constipation, diarrhea, heartburn and vomiting.   Genitourinary:  Negative for dysuria and hematuria.   Musculoskeletal:  Negative for joint pain and myalgias.   Skin:  Negative for itching and rash.   Neurological:  Negative for dizziness, sensory change, speech change, weakness and headaches.   Psychiatric/Behavioral:  Negative for depression. The patient is not nervous/anxious and does not have insomnia.       Physical Exam  Temp:  [36.4 °C (97.5 °F)-36.8 °C (98.2 °F)] 36.6 °C (97.9 °F)  Pulse:  [] 95  Resp:  [14-18] 18  BP: ()/(65-86) 111/81  SpO2:  [95 %-100 %] 96 %    Physical Exam  Vitals and nursing note reviewed.   Constitutional:       General: He is not in acute distress.     Appearance: Normal appearance.   HENT:      Head: Normocephalic and atraumatic.   Eyes:      General: No scleral icterus.     Extraocular Movements: Extraocular movements intact.   Cardiovascular:      Rate and Rhythm: Normal rate and regular rhythm.      Pulses: Normal pulses.      Heart sounds: Normal heart  sounds. No murmur heard.  Pulmonary:      Effort: Pulmonary effort is normal. No respiratory distress.      Breath sounds: Normal breath sounds. No wheezing, rhonchi or rales.   Abdominal:      General: Abdomen is flat. Bowel sounds are normal. There is no distension.      Palpations: Abdomen is soft.      Tenderness: There is no rebound.      Comments: Left abdominal pain at site of PEG.     Musculoskeletal:         General: No swelling or tenderness.      Cervical back: Normal range of motion and neck supple.   Lymphadenopathy:      Cervical: No cervical adenopathy.   Skin:     Coloration: Skin is not jaundiced.      Findings: No erythema.      Comments: Healing scratches on all extremities.      Neurological:      General: No focal deficit present.      Mental Status: He is alert and oriented to person, place, and time. Mental status is at baseline.      Cranial Nerves: No cranial nerve deficit.   Psychiatric:         Mood and Affect: Mood normal.         Behavior: Behavior normal.      Comments: A&O x 4           Fluids    Intake/Output Summary (Last 24 hours) at 11/17/2022 1324  Last data filed at 11/17/2022 0919  Gross per 24 hour   Intake 655 ml   Output 250 ml   Net 405 ml         Laboratory  Recent Labs     11/15/22  0850 11/16/22  0916 11/17/22  0140   WBC 9.9 10.9* 11.3*   RBC 3.72* 3.64* 3.70*   HEMOGLOBIN 11.9* 11.6* 11.7*   HEMATOCRIT 36.3* 35.2* 36.2*   MCV 97.6 96.7 97.8   MCH 32.0 31.9 31.6   MCHC 32.8* 33.0* 32.3*   RDW 53.1* 51.7* 53.1*   PLATELETCT 616* 618* 601*   MPV 9.7 9.6 10.1       Recent Labs     11/15/22  0850 11/16/22  0916 11/17/22  0140   SODIUM 143 142 141   POTASSIUM 4.0 3.6 3.7   CHLORIDE 107 107 104   CO2 22 25 22   GLUCOSE 82 94 76   BUN 30* 27* 22   CREATININE 0.53 0.49* 0.59   CALCIUM 9.2 9.2 9.3       Recent Labs     11/16/22  0815   INR 1.11                 Imaging  DX-ESOPHAGUS - [id number]   Final Result      Please refer to dedicated speech pathology report for complete  details.      DX-CHEST-PORTABLE (1 VIEW)   Final Result      1.  Bibasilar and RIGHT perihilar atelectasis which could obscure an additional process. This has decreased since the prior study.   2.  Persistent focal lingular opacity, which could be atelectasis or early pneumonia   3.  RIGHT pleural effusion      DX-CHEST-FOR LINE PLACEMENT Perform procedure in: Patient's Room   Final Result            1. A left peripherally inserted catheter with tip projects appropriately over the expected area of the lower SVC.      DX-CHEST-FOR LINE PLACEMENT Perform procedure in: Patient's Room   Final Result            1. A right peripherally inserted catheter with tip not well seen but likely projects over the expected area of the cavoatrial junction/right atrium.      IR-PICC LINE PLACEMENT W/ GUIDANCE > AGE 5   Final Result                  Ultrasound-guided PICC placement performed by qualified nursing staff as    above.          DX-CHEST-FOR LINE PLACEMENT Perform procedure in: Patient's Room   Final Result      1.  Interval placement of a PICC line which is satisfactory in position.      2.  Otherwise stable exam.      IR-PICC LINE PLACEMENT W/ GUIDANCE > AGE 5   Final Result                  Ultrasound-guided PICC placement performed by qualified nursing staff as    above.          DX-CHEST-PORTABLE (1 VIEW)   Final Result      1.  Stable bibasilar atelectasis, right greater than left.      2.  Stable right and improved left pleural effusion.      DX-CHEST-PORTABLE (1 VIEW)   Final Result      1.  Increased right pleural effusion, now small/moderate.   2.  Increased right base airspace disease.   3.  Similar small left pleural effusion with mildly increased adjacent airspace disease.      [id number] VIEW   Final Result      Mildly dilated loops of small intestine.      DX-CHEST-PORTABLE (1 VIEW)   Final Result      1.  Interval removal of the right sided chest tube.      2.  Stable bibasilar atelectasis.      3.   Interval decrease in size of a right pleural effusion.      DX-CHEST-PORTABLE (1 VIEW)   Final Result      1.  Stable support devices.      2.  Stable bilateral pleural effusions, right greater than left.      DX-CHEST-PORTABLE (1 VIEW)   Final Result      1.  Interval placement of RIGHT chest tube   2.  Moderate RIGHT hydropneumothorax   3.  Small LEFT pleural effusion   4.  Unchanged BILATERAL atelectasis with possible superimposed pneumonia or other airspace disease      DX-CHEST-PORTABLE (1 VIEW)   Final Result      1.  No significant change      2.  Lines and tubes appear appropriately located      3.  Right pleural effusion, probably large in size      4.  Bilateral atelectasis      EC-ECHOCARDIOGRAM LTD W/ CONT   Final Result      DX-CHEST-PORTABLE (1 VIEW)   Final Result         No significant interval change      CT-CHEST,ABDOMEN,PELVIS W/O   Final Result      1.  RIGHT larger than LEFT pleural effusions with overlying atelectasis. Superimposed pneumonia is not excluded.   2.  Healing fractures the RIGHT fifth and sixth ribs   3.  Distended loops of bowel in the abdomen as described above, likely ileus rather than early or low-grade small bowel obstruction   4.  Enlarged inguinal lymph nodes   5.  The hypoechoic nodule in the liver demonstrated on ultrasound from 8/28/2022 is not seen on this unenhanced CT. Hepatic mass protocol CT or MRI should be pursued when clinically appropriate for further assessment.   6.  RIGHT renal cyst         [id number] VIEW   Final Result         1.  Air-filled distended loops of bowel are seen with reactive mucosal pattern, appearance suggests ileus or enteritis versus evolving obstructive changes. Recommend radiographic followup to resolution to exclude progression to obstruction.   2.  Nasogastric tube is coiled within the stomach, the tip terminates overlying the expected location of the gastric fundus.   3.  Bilateral lower lobe infiltrates   4.  Moderate right and small  left pleural effusion      DX-CHEST-PORTABLE (1 VIEW)   Final Result         1.  Bilateral pulmonary edema and/or infiltrates.   2.  Moderate right and small left pleural effusion, stable compared to prior study   3.  Cardiomegaly      DX-CHEST-PORTABLE (1 VIEW)   Final Result         1.  Bilateral pulmonary edema and/or infiltrates.   2.  Moderate right and small left pleural effusion, stable compared to prior study   3.  Cardiomegaly      [id number] VIEW   Final Result         Diffuse gaseous distention of the small bowel and colon, worse in prior, could relate to ileus      DX-CHEST-PORTABLE (1 VIEW)   Final Result         1. No significant interval change.      DX-CHEST-PORTABLE (1 VIEW)   Final Result         1.  Bilateral pulmonary edema and/or infiltrates.   2.  Moderate right and small left pleural effusion, increased on the right compared to prior study   3.  Cardiomegaly      DX-CHEST-PORTABLE (1 VIEW)   Final Result         1.  Pulmonary edema and/or infiltrates are identified, which are stable since the prior exam.   2.  Moderate right pleural effusion, stable.   3.  Cardiomegaly      DX-CHEST-PORTABLE (1 VIEW)   Final Result      1.  Well-positioned ETT.   2.  Moderate right pleural effusion and associated atelectasis versus consolidation.   3.  Retrocardiac atelectasis versus consolidation. No significant left effusion.      [id number] VIEW   Final Result      NGT tip is in the stomach.   1.  Nonobstructive bowel gas pattern. Small amount of stool throughout the colon.   2.  Ill-defined right basilar atelectasis versus consolidation and small right pleural effusion.      DX-CHEST-PORTABLE (1 VIEW)   Final Result         1. Low lung volume with hypoventilatory change and bibasilar atelectasis.      CT-HEAD W/O   Final Result      1.  Cerebral atrophy.      2.  Otherwise, Head CT without contrast within normal limits. No evidence of acute cerebral infarction, hemorrhage or mass lesion.          DX-CHEST-FOR LINE PLACEMENT Perform procedure in: Patient's Room   Final Result      1.  Endotracheal tube overlies the mid trachea.      2.  NG tube courses beneath the diaphragms.      DX-CHEST-PORTABLE (1 VIEW)   Final Result      No radiographic evidence of acute cardiopulmonary process.             Assessment/Plan  Agitation  Assessment & Plan  - Doing well with thiamine replacement and 25mg qpm Seroquel   Daytime somnolence -doing well after stopping daytime seroquel      Peripheral eosinophilia  Assessment & Plan  Ddx includes HES, Churg Greta, HIV, parasite infection (less likely as no significant travel), eosinophilic malignancy. Will check a tryptase (negative), RUBIA (negative), ANCA (negative), HIV (negative), O&P, RF (negative), IgE (significantly elevated) and cortisol level (negative)  -Appreciate hematology consult, Discussed with Dr Valdivia - suspicion is due to Zosyn for prolonged time  - Jak2 and BCRABL, peripheral flow cytometry.    Congestive heart failure (CHF) (HCC)  Assessment & Plan  - Initial echo with preserved EF, then repeat echo with EF 40% - unclear if he was in RVR or other variable that would impact an acute decrease in his EF  - Not overloaded on exam, no acute exacerbation  - On metoprolol, add Losartan now as Bps remain stable    Dysphagia  Assessment & Plan  - Pt has failed swallow evals last week and this week - has NGT in place  - Failed MBSS today  - Multifactorial with likely Wernicke's, deconditioning from PNA and being on the vent, acute illness  - Poor long term prognosis given his cerebral atrophy, chronic EtOH abuse and methampetamine abuse likely contributing to his lack of swallow coordination.  - Treat Wernicke's with high dose Thiamine, will continue working with SLP  - Daughter wants PEG tube placed, Dr Mcgrath to consult, NPO Wednesday night    Aspiration pneumonia (HCC)  Assessment & Plan  - Completed antibiotics, now on room air  - Has failed multiple swallow  dejan, currently receiving tube feeds via NGT  - Failed his MBSS - discussed PEG with pt's daughter, family wishes to move forward  - Dr Mcgrath placed PEG 11/17 - tolerated well      Wernicke encephalopathy syndrome  Assessment & Plan  - Pt with significant dementia symptoms, swallow dysfunction, cerebral atrophy on CT  - Will treat with high dose IV Thiamine   - Continue ASA     Hypomagnesemia  Assessment & Plan  - Replace as needed    Reactive thrombocytosis  Assessment & Plan  - Continue to monitor       Sepsis due to Pseudomonas species (HCC)- (present on admission)  Assessment & Plan  This is Severe Sepsis Present on admission  SIRS criteria identified on my evaluation include: Fever, with temperature greater than 101 deg F, Tachycardia, with heart rate greater than 90 BPM, Tachypnea, with respirations greater than 20 per minute and Leukocytosis, with WBC greater than 12,000  Clinical indicators of end organ dysfunction include Systolic blood pressure (SBP) <90 mmHg or mean arterial pressure <65 mmHg and Acute respiratory failure as evidenced by a new need for invasive or non-invasive mechanical ventilation (Ventilator or BiPap)   Source is pneumonia by MSSA and pseudomonas  Sepsis protocol initiated  Crystalloid Fluid Administration: Patient has volume overload with >11 lt since admission, (NYHA class III or symptoms with minimal exertion, Or NYHA class IV or symptoms at rest or with activity), for this/these reason(s) it is unsafe for this patient to receive 30 mL/kg of fluid.  Partial Fluid Dose Given on 10/24, patient to be reassessed shortly after completion of partial fluid bolus to ensure adequacy of resuscitation  IV antibiotics as appropriate for source of sepsis  Reassessment: I have reassessed the patient's hemodynamic status  Will provide another partial bolus today and wean down the vasopressors     .  Pt had septic shock requiring pressor support, with MSSA and pseudomonas in BAL and tracheal  "aspirate. Blood cultures negative. Sepsis has resolved.     Empyema (HCC)- (present on admission)  Assessment & Plan  - On the right, moderate-large per CXR of 10/30  - S/p diagnostic thoracentesis 10/25/22: albumin gradient is less than 1.2 g/dl which indicate exudates, his was 0.9, also inflammative effusion with very high LDH 1020 (serum in the 240s range)  - Cultures negative but pt was on antibiotics - did have chest tubes which have since been removed, completed antibiotic course.     Acute respiratory failure with hypoxia (HCC)- (present on admission)  Assessment & Plan  -Intubated for respiratory acidosis and altered mental status with need for repeat intubation twice thereafter  -Cultures from sputum and BAL grew MSSA and pseudomonas, pt completed 14d of Zosyn, and an additional 1.5d of Unasyn for empyema  -also complicated by right empyema, s/p diagnostic thoracentesis and chest tubes  - Currently off antibiotics, back on room air   - RESOLVED     Alcohol abuse with withdrawal (HCC)  Assessment & Plan  Severe withdrawal with respiratory depression requiring intubation on admit  Out of withdrawal window now     Liver mass- (present on admission)  Assessment & Plan  - Seen on US 8/2022 \"right lobe of the liver measuring 1.7 x 1.3 x 1.0 cm in size\"  - AFP negative  - Pt will not tolerate MRI at this time - high risk for aspiration for aspiration if laying flat    Atrial fibrillation (HCC)- (present on admission)  Assessment & Plan  - Not on anticoagulation prior to admission - appears he was just diagnosed by PCP in June of this year and referred to Cardiology, but I don't see where he was seen by them  - [id number] score of 1-2 - new echo with EF of 40% but prior to that was 50%, and may have some effect of sepsis on EF   - ASA for now after discussion with pt's daughter  - Continue Metoprolol for rate control    Other specified hypothyroidism- (present on admission)  Assessment & Plan  - TSH normal, " continue Synthroid     Rash- (present on admission)  Assessment & Plan  - Pt with pathology positive eczema, order his home triamcinolone cream    Hyponatremia  Assessment & Plan  - Resolved with free water flushes, pt was dry         VTE prophylaxis: SCDs/TEDs    I have performed a physical exam and reviewed and updated ROS and Plan today (11/17/2022). In review of yesterday's note (11/16/2022), there are no changes except as documented above.

## 2022-11-18 LAB
ACUTE LEUKEMIA MARKERS SPEC-IMP: NORMAL
ANION GAP SERPL CALC-SCNC: 15 MMOL/L (ref 7–16)
BASOPHILS # BLD AUTO: 1.4 % (ref 0–1.8)
BASOPHILS # BLD: 0.15 K/UL (ref 0–0.12)
BUN SERPL-MCNC: 18 MG/DL (ref 8–22)
CALCIUM SERPL-MCNC: 9.1 MG/DL (ref 8.4–10.2)
CHLORIDE SERPL-SCNC: 101 MMOL/L (ref 96–112)
CO2 SERPL-SCNC: 21 MMOL/L (ref 20–33)
CREAT SERPL-MCNC: 0.58 MG/DL (ref 0.5–1.4)
EOSINOPHIL # BLD AUTO: 1.89 K/UL (ref 0–0.51)
EOSINOPHIL NFR BLD: 17.4 % (ref 0–6.9)
ERYTHROCYTE [DISTWIDTH] IN BLOOD BY AUTOMATED COUNT: 50.7 FL (ref 35.9–50)
EVENTS COUNTED SPEC: 26 MARKERS
GFR SERPLBLD CREATININE-BSD FMLA CKD-EPI: 114 ML/MIN/1.73 M 2
GLUCOSE BLD STRIP.AUTO-MCNC: 101 MG/DL (ref 65–99)
GLUCOSE BLD STRIP.AUTO-MCNC: 112 MG/DL (ref 65–99)
GLUCOSE BLD STRIP.AUTO-MCNC: 77 MG/DL (ref 65–99)
GLUCOSE SERPL-MCNC: 73 MG/DL (ref 65–99)
HCT VFR BLD AUTO: 33.4 % (ref 42–52)
HGB BLD-MCNC: 10.9 G/DL (ref 14–18)
IMM GRANULOCYTES # BLD AUTO: 0.04 K/UL (ref 0–0.11)
IMM GRANULOCYTES NFR BLD AUTO: 0.4 % (ref 0–0.9)
LYMPHOCYTES # BLD AUTO: 1.85 K/UL (ref 1–4.8)
LYMPHOCYTES NFR BLD: 17.1 % (ref 22–41)
MAGNESIUM SERPL-MCNC: 1.6 MG/DL (ref 1.5–2.5)
MCH RBC QN AUTO: 31.4 PG (ref 27–33)
MCHC RBC AUTO-ENTMCNC: 32.6 G/DL (ref 33.7–35.3)
MCV RBC AUTO: 96.3 FL (ref 81.4–97.8)
MONOCYTES # BLD AUTO: 0.91 K/UL (ref 0–0.85)
MONOCYTES NFR BLD AUTO: 8.4 % (ref 0–13.4)
NEUTROPHILS # BLD AUTO: 6.01 K/UL (ref 1.82–7.42)
NEUTROPHILS NFR BLD: 55.3 % (ref 44–72)
NRBC # BLD AUTO: 0 K/UL
NRBC BLD-RTO: 0 /100 WBC
PHOSPHATE SERPL-MCNC: 5.1 MG/DL (ref 2.5–4.5)
PLATELET # BLD AUTO: 538 K/UL (ref 164–446)
PMV BLD AUTO: 9.4 FL (ref 9–12.9)
POTASSIUM SERPL-SCNC: 3.9 MMOL/L (ref 3.6–5.5)
RBC # BLD AUTO: 3.47 M/UL (ref 4.7–6.1)
SODIUM SERPL-SCNC: 137 MMOL/L (ref 135–145)
SOURCE 9121: NORMAL
TEST NAME 95000: NORMAL
WBC # BLD AUTO: 10.9 K/UL (ref 4.8–10.8)

## 2022-11-18 PROCEDURE — A9270 NON-COVERED ITEM OR SERVICE: HCPCS | Performed by: INTERNAL MEDICINE

## 2022-11-18 PROCEDURE — 700102 HCHG RX REV CODE 250 W/ 637 OVERRIDE(OP): Performed by: HOSPITALIST

## 2022-11-18 PROCEDURE — 92526 ORAL FUNCTION THERAPY: CPT

## 2022-11-18 PROCEDURE — A9270 NON-COVERED ITEM OR SERVICE: HCPCS | Performed by: FAMILY MEDICINE

## 2022-11-18 PROCEDURE — 85025 COMPLETE CBC W/AUTO DIFF WBC: CPT

## 2022-11-18 PROCEDURE — 770006 HCHG ROOM/CARE - MED/SURG/GYN SEMI*

## 2022-11-18 PROCEDURE — 700102 HCHG RX REV CODE 250 W/ 637 OVERRIDE(OP): Performed by: INTERNAL MEDICINE

## 2022-11-18 PROCEDURE — A9270 NON-COVERED ITEM OR SERVICE: HCPCS | Performed by: HOSPITALIST

## 2022-11-18 PROCEDURE — 700105 HCHG RX REV CODE 258: Performed by: FAMILY MEDICINE

## 2022-11-18 PROCEDURE — 700102 HCHG RX REV CODE 250 W/ 637 OVERRIDE(OP): Performed by: FAMILY MEDICINE

## 2022-11-18 PROCEDURE — 97530 THERAPEUTIC ACTIVITIES: CPT

## 2022-11-18 PROCEDURE — 700111 HCHG RX REV CODE 636 W/ 250 OVERRIDE (IP): Performed by: FAMILY MEDICINE

## 2022-11-18 PROCEDURE — 84100 ASSAY OF PHOSPHORUS: CPT

## 2022-11-18 PROCEDURE — 700111 HCHG RX REV CODE 636 W/ 250 OVERRIDE (IP): Performed by: INTERNAL MEDICINE

## 2022-11-18 PROCEDURE — 97116 GAIT TRAINING THERAPY: CPT

## 2022-11-18 PROCEDURE — 82962 GLUCOSE BLOOD TEST: CPT | Mod: 91

## 2022-11-18 PROCEDURE — 83735 ASSAY OF MAGNESIUM: CPT

## 2022-11-18 PROCEDURE — 99232 SBSQ HOSP IP/OBS MODERATE 35: CPT | Performed by: INTERNAL MEDICINE

## 2022-11-18 PROCEDURE — 80048 BASIC METABOLIC PNL TOTAL CA: CPT

## 2022-11-18 RX ORDER — GAUZE BANDAGE 2" X 2"
100 BANDAGE TOPICAL DAILY
Status: DISCONTINUED | OUTPATIENT
Start: 2022-11-19 | End: 2022-11-29 | Stop reason: HOSPADM

## 2022-11-18 RX ADMIN — SENNOSIDES AND DOCUSATE SODIUM 2 TABLET: 50; 8.6 TABLET ORAL at 06:00

## 2022-11-18 RX ADMIN — NICOTINE 7 MG: 7 PATCH TRANSDERMAL at 06:00

## 2022-11-18 RX ADMIN — TRIAMCINOLONE ACETONIDE: 1 CREAM TOPICAL at 06:00

## 2022-11-18 RX ADMIN — LEVOTHYROXINE SODIUM 50 MCG: 50 TABLET ORAL at 06:00

## 2022-11-18 RX ADMIN — TRIAMCINOLONE ACETONIDE: 1 CREAM TOPICAL at 18:22

## 2022-11-18 RX ADMIN — ASPIRIN 81 MG CHEWABLE TABLET 81 MG: 81 TABLET CHEWABLE at 06:00

## 2022-11-18 RX ADMIN — METOPROLOL TARTRATE 50 MG: 50 TABLET, FILM COATED ORAL at 06:00

## 2022-11-18 RX ADMIN — QUETIAPINE FUMARATE 25 MG: 25 TABLET ORAL at 20:26

## 2022-11-18 RX ADMIN — ENOXAPARIN SODIUM 40 MG: 40 INJECTION SUBCUTANEOUS at 18:22

## 2022-11-18 RX ADMIN — METOPROLOL TARTRATE 50 MG: 50 TABLET, FILM COATED ORAL at 18:22

## 2022-11-18 RX ADMIN — THIAMINE HYDROCHLORIDE 100 MG: 100 INJECTION, SOLUTION INTRAMUSCULAR; INTRAVENOUS at 05:59

## 2022-11-18 ASSESSMENT — COGNITIVE AND FUNCTIONAL STATUS - GENERAL
STANDING UP FROM CHAIR USING ARMS: A LITTLE
SUGGESTED CMS G CODE MODIFIER MOBILITY: CJ
MOBILITY SCORE: 20
MOVING FROM LYING ON BACK TO SITTING ON SIDE OF FLAT BED: A LITTLE
CLIMB 3 TO 5 STEPS WITH RAILING: A LITTLE
WALKING IN HOSPITAL ROOM: A LITTLE

## 2022-11-18 ASSESSMENT — ENCOUNTER SYMPTOMS
MYALGIAS: 0
NERVOUS/ANXIOUS: 0
SENSORY CHANGE: 0
DIZZINESS: 0
CONSTIPATION: 0
HEADACHES: 0
SPEECH CHANGE: 0
CLAUDICATION: 0
INSOMNIA: 0
FEVER: 0
PHOTOPHOBIA: 0
BLURRED VISION: 0
DIARRHEA: 0
HEARTBURN: 0
CHILLS: 0
VOMITING: 0
DEPRESSION: 0
COUGH: 0
SHORTNESS OF BREATH: 0
WEAKNESS: 0
ABDOMINAL PAIN: 0

## 2022-11-18 ASSESSMENT — GAIT ASSESSMENTS
DISTANCE (FEET): 100
ASSISTIVE DEVICE: FRONT WHEEL WALKER
GAIT LEVEL OF ASSIST: CONTACT GUARD ASSIST
DEVIATION: DECREASED BASE OF SUPPORT

## 2022-11-18 ASSESSMENT — PAIN DESCRIPTION - PAIN TYPE
TYPE: ACUTE PAIN
TYPE: ACUTE PAIN

## 2022-11-18 NOTE — THERAPY
Physical Therapy   Daily Treatment     Patient Name: Tyree Whiteside  Age:  57 y.o., Sex:  male  Medical Record #: [id number]  Today's Date: 11/18/2022     Precautions  Precautions: Fall Risk;Swallow Precautions ( See Comments);Nasogastric Tube    Assessment    Mentation greatly improved Pt able to amb 100 feet with CGA  Plan    Continue current treatment plan.    DC Equipment Recommendations: Unable to determine at this time  Discharge Recommendations: Recommend post-acute placement for additional physical therapy services prior to discharge home       Objective       11/18/22 1100   Charge Group   PT Gait Training 1   PT Therapeutic Activities 1   Balance   Sitting Balance (Static) Fair +   Sitting Balance (Dynamic) Fair +   Standing Balance (Static) Fair   Standing Balance (Dynamic) Fair   Weight Shift Sitting Fair   Weight Shift Standing Fair   Gait Analysis   Gait Level Of Assist Contact Guard Assist   Assistive Device Front Wheel Walker   Distance (Feet) 100   # of Times Distance was Traveled 1   Deviation Decreased Base Of Support   # of Stairs Climbed 0   Weight Bearing Status full   Bed Mobility    Supine to Sit Modified Independent   Sit to Supine Modified Independent   Scooting Modified Independent   Functional Mobility   Sit to Stand Contact Guard Assist   Bed, Chair, Wheelchair Transfer Contact Guard Assist   Transfer Method Stand Step   How much difficulty does the patient currently have...   Turning over in bed (including adjusting bedclothes, sheets and blankets)? 4   Sitting down on and standing up from a chair with arms (e.g., wheelchair, bedside commode, etc.) 3   Moving from lying on back to sitting on the side of the bed? 4   How much help from another person does the patient currently need...   Moving to and from a bed to a chair (including a wheelchair)? 3   Need to walk in a hospital room? 3   Climbing 3-5 steps with a railing? 3   6 clicks Mobility Score 20   Activity Tolerance    Sitting Edge of Bed 10   Standing 10   Short Term Goals    Short Term Goal # 1 pt will go supine<>sit w/hob elevated and rails up w/Min A in 6tx   Goal Outcome # 1 Goal met   Short Term Goal # 2 pt will go sit<>stand w/fww w/Min A in 6tx   Goal Outcome # 2 Progressing as expected   Short Term Goal # 3 pt will transfer bed<>chair w/fww w/Mod A in 6tx   Goal Outcome # 3 Progressing as expected   Interdisciplinary Plan of Care Collaboration   IDT Collaboration with  Nursing   Session Information   Date / Session Number  11/18-3 3/3 11/13

## 2022-11-18 NOTE — PROGRESS NOTES
HEMATOLOGY-ONCOLOGY PROGRESS NOTE    Events: No events    Subjective: Patient is A&Ox3. Able to eat NG tube out. Says he has eczema for 20+ years. No fevers no bone pains.     Objective:  Medications reviewed and notable for:  Current Facility-Administered Medications   Medication Dose    losartan (COZAAR) tablet 12.5 mg  12.5 mg    diphenhydrAMINE (BENADRYL) tablet/capsule 25 mg  25 mg    metoprolol tartrate (LOPRESSOR) tablet 50 mg  50 mg    QUEtiapine (Seroquel) tablet 25 mg  25 mg    triamcinolone acetonide (KENALOG) 0.1 % cream      thiamine (B-1) 100 mg in dextrose 5% 100 mL IVPB  100 mg    aspirin (ASA) chewable tab 81 mg  81 mg    ipratropium-albuterol (DUONEB) nebulizer solution  3 mL    dilTIAZem (Cardizem) injection 10 mg  10 mg    senna-docusate (PERICOLACE or SENOKOT S) 8.6-50 MG per tablet 2 Tablet  2 Tablet    And    polyethylene glycol/lytes (MIRALAX) PACKET 1 Packet  1 Packet    And    magnesium hydroxide (MILK OF MAGNESIA) suspension 30 mL  30 mL    And    bisacodyl (DULCOLAX) suppository 10 mg  10 mg    scopolamine (TRANSDERM-SCOP) patch 1 Patch  1 Patch    carboxymethylcellulose (REFRESH TEARS) 0.5 % ophthalmic drops 2 Drop  2 Drop    acetaminophen (Tylenol) tablet 650 mg  650 mg    eucerin cream      nicotine (NICODERM) 7 MG/24HR 7 mg  1 Patch    enoxaparin (Lovenox) inj 40 mg  40 mg    lactulose 20 GM/30ML solution 15 mL  15 mL    dextrose 10 % BOLUS 25 g  25 g    Respiratory Therapy Consult      Pharmacy Consult: Enteral tube insertion - review meds/change route/product selection      levothyroxine (SYNTHROID) tablet 50 mcg  50 mcg       ROS:   Constitutional: No fatigue, no fevers or chills, no night sweats  Resp: No cough or SOB  Cardio:No chest pain or palpitations  Pschy: No depression or anxiety   Neuro: No headaches, no seizure, no vision changes  GI: no abdominal pain, nausea or vomiting. No diarrhea or constipation   All other ROS negative    /85   Pulse 86   Temp 36.8 °C (98.2  "°F) (Oral)   Resp 18   Ht 1.778 m (5' 10\")   Wt 69.2 kg (152 lb 8.9 oz)   SpO2 95%     ECOG PS:  General:  comfortable, NAD  HEENT:  sclera anicteric, pupils equal, round, reactive to light, oral cavity and oropharynx clear, mucous membranes moist  Neck:   supple, no lymphadenopathy  Cor:   regular rate and rhythm, no murmurs, rubs, or gallops  Pulm:   clear to auscultation bilaterally  Abd:   bowel sounds present, soft, nontender, nondistended, no palpable masses or organomegaly  Extremities:  warm, no lower extremity edema  Neurologic:  A&O x 3  Pyschiatric:  Appropriate mood and affect    Labs reviewed and notable for:  Recent Labs     11/16/22  0916 11/17/22  0140 11/18/22  0107   WBC 10.9* 11.3* 10.9*   RBC 3.64* 3.70* 3.47*   HEMOGLOBIN 11.6* 11.7* 10.9*   HEMATOCRIT 35.2* 36.2* 33.4*   MCV 96.7 97.8 96.3   MCH 31.9 31.6 31.4   MCHC 33.0* 32.3* 32.6*   RDW 51.7* 53.1* 50.7*   PLATELETCT 618* 601* 538*   MPV 9.6 10.1 9.4     Recent Labs     11/16/22  0815   INR 1.11     .@CMP  Recent Results (from the past 24 hour(s))   CBC WITH DIFFERENTIAL    Collection Time: 11/18/22  1:07 AM   Result Value Ref Range    WBC 10.9 (H) 4.8 - 10.8 K/uL    RBC 3.47 (L) 4.70 - 6.10 M/uL    Hemoglobin 10.9 (L) 14.0 - 18.0 g/dL    Hematocrit 33.4 (L) 42.0 - 52.0 %    MCV 96.3 81.4 - 97.8 fL    MCH 31.4 27.0 - 33.0 pg    MCHC 32.6 (L) 33.7 - 35.3 g/dL    RDW 50.7 (H) 35.9 - 50.0 fL    Platelet Count 538 (H) 164 - 446 K/uL    MPV 9.4 9.0 - 12.9 fL    Neutrophils-Polys 55.30 44.00 - 72.00 %    Lymphocytes 17.10 (L) 22.00 - 41.00 %    Monocytes 8.40 0.00 - 13.40 %    Eosinophils 17.40 (H) 0.00 - 6.90 %    Basophils 1.40 0.00 - 1.80 %    Immature Granulocytes 0.40 0.00 - 0.90 %    Nucleated RBC 0.00 /100 WBC    Neutrophils (Absolute) 6.01 1.82 - 7.42 K/uL    Lymphs (Absolute) 1.85 1.00 - 4.80 K/uL    Monos (Absolute) 0.91 (H) 0.00 - 0.85 K/uL    Eos (Absolute) 1.89 (H) 0.00 - 0.51 K/uL    Baso (Absolute) 0.15 (H) 0.00 - 0.12 K/uL    " Immature Granulocytes (abs) 0.04 0.00 - 0.11 K/uL    NRBC (Absolute) 0.00 K/uL   MAGNESIUM    Collection Time: 11/18/22  1:07 AM   Result Value Ref Range    Magnesium 1.6 1.5 - 2.5 mg/dL   PHOSPHORUS    Collection Time: 11/18/22  1:07 AM   Result Value Ref Range    Phosphorus 5.1 (H) 2.5 - 4.5 mg/dL   Basic Metabolic Panel    Collection Time: 11/18/22  1:07 AM   Result Value Ref Range    Sodium 137 135 - 145 mmol/L    Potassium 3.9 3.6 - 5.5 mmol/L    Chloride 101 96 - 112 mmol/L    Co2 21 20 - 33 mmol/L    Glucose 73 65 - 99 mg/dL    Bun 18 8 - 22 mg/dL    Creatinine 0.58 0.50 - 1.40 mg/dL    Calcium 9.1 8.4 - 10.2 mg/dL    Anion Gap 15.0 7.0 - 16.0   ESTIMATED GFR    Collection Time: 11/18/22  1:07 AM   Result Value Ref Range    GFR (CKD-EPI) 114 >60 mL/min/1.73 m 2       Diagnostic imaging:      Assessment and Recommendations:  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  - Starting November 2, 2022 eosinophilia started going up along with platelets started going up but they were wavering.  Most recently [place] count is more than 2200 absolute which is 22% approximately IgE was elevated around 12,000  - Upon reviewing MAR I could see Zosyn and Unasyn which could be contributing to eosinophilia from penicillin family  - Extensive autoimmune work-up and infectious work-up negative.  ANCA RUBIA ANCA rheumatoid factor hepatitis HIV were negative.  - Rarely underlying infection like pneumonia he had can cause eosinophilia as well.  - IPending peripheral blood for flow cytometry JAK2 and BCR ABL. If those are negative and patient has ongoing eosinophilia persistent then he would need a bone marrow biopsy.  - Unfortunately, our practice cannot accept patient insurance. I would request Primary team to arrange outpatient follow with With [facility] for ongoing follow up. Please  have a follow up appt with [facility] prior to discharge.        2. MSSA and Pseudomonas pneumonia with effusion loculated s/p chest tube and antibiotics recovering.     3. Encephalopathy/altered mentation- resolved     4. Severe PCM     5. ETOH/DT      High complexicity/Drug monitoring     We will continue to follow with you; please call with any questions, [phone].        [employer]        Beena Wagner MD  [facility]

## 2022-11-18 NOTE — PROGRESS NOTES
Patient back from PACU, PEG tube placed. PEG tube flushed and saline locked. Patient A&O4, no complaints.

## 2022-11-18 NOTE — PROGRESS NOTES
Hospital Medicine Daily Progress Note    Date of Service  11/18/2022    Chief Complaint  Tyree Whiteside is a 57 y.o. male admitted 10/18/2022 with confusion and hallucinations.    Hospital Course  Pt is a 56yo with a history of atrial fibrillation, HTN, alcohol dependence with abuse, methamphetamine abuse and liver mass who initially presented to [facility] for help with EtOH detoxification but was sent to the ER as he was having hallucinations, confusion, weakness, nausea and vomiting. He was admitted to ICU on 10/18/22 for EtOH withdrawal and respiratory depression and was placed on the ventilator and had a protracted ICU course - he required reintubation on 10/23 and again 10/26 after self extubation, diagnostic thoracentesis  and has had MSSA and Pseudomonas PNA, recurrent aspiration, bradycardia, hypotension, ileus, chest tube which has since been removed and loculated pleural effusion.  He transitioned out of the ICU and is still having severe difficulty with swallowing.  Daughter discussed with family regarding moving forward with PEG tube and would like this placed, GI to consult.      Interval Problem Update  11/15 Patient somnolent at time of discharge.  Daughter at bedside states he was much more interactive before the seroquel was initiated.  I'll dc the am dose of seroquel to see if this helps with mentation.  He has failed his swallow evaluation and PEG tube recommended for continued nutrition.  GI to consult for probable PEG on Thursday.  Discussed with hematology and there is concern for zosyn induced eosinophilia.  Jak2, BCRABL and peripheral blood flow cytometry pending as part of workup.    11/16 Patient awake and conversant this am, PEG planned for tomorrow with Dr Mcgrath at 830.  PT/OT evaluations pending, anticipate he will need placement in SNF given prolonged hospitalization and weakness.  11/17 Patient seen after PEG placement this am, he tolerated the procedure well.  PEG site looks  good.  Plan for meds and water okay in tube today and resume tube feeding tomorrow.  PT/OT re-evaluation, medically cleared for SNF as of tomorrow.  11/18 Patient states he feels better today, hungry as tube feeding stopped yesterday for PEG placement.  Resumed tube feeding this am.  SLP would like another repeat FEES prior to transfer if it can be coordinated, will continue with ice chips for oral moisture and tube feeding for nutrition.  PT/OT to reassess patient - SNF placement pending.    I have discussed this patient's plan of care and discharge plan at IDT rounds today with Case Management, Nursing, Nursing leadership, and other members of the IDT team.    Consultants/Specialty  oncology    Code Status  DNAR/DNI    Disposition  Patient is not medically cleared for discharge.   Anticipate discharge to to skilled nursing facility.  I have placed the appropriate orders for post-discharge needs.    Review of Systems  Review of Systems   Constitutional:  Negative for chills and fever.   HENT:  Negative for congestion.    Eyes:  Negative for blurred vision and photophobia.   Respiratory:  Negative for cough and shortness of breath.    Cardiovascular:  Negative for chest pain, claudication and leg swelling.   Gastrointestinal:  Negative for abdominal pain, constipation, diarrhea, heartburn and vomiting.   Genitourinary:  Negative for dysuria and hematuria.   Musculoskeletal:  Negative for joint pain and myalgias.   Skin:  Negative for itching and rash.   Neurological:  Negative for dizziness, sensory change, speech change, weakness and headaches.   Psychiatric/Behavioral:  Negative for depression. The patient is not nervous/anxious and does not have insomnia.       Physical Exam  Temp:  [36.4 °C (97.6 °F)-36.8 °C (98.2 °F)] 36.7 °C (98.1 °F)  Pulse:  [] 87  Resp:  [18] 18  BP: (107-120)/(65-85) 111/65  SpO2:  [94 %-100 %] 94 %    Physical Exam  Vitals and nursing note reviewed.   Constitutional:       General:  He is not in acute distress.     Appearance: Normal appearance.   HENT:      Head: Normocephalic and atraumatic.   Eyes:      General: No scleral icterus.     Extraocular Movements: Extraocular movements intact.   Cardiovascular:      Rate and Rhythm: Normal rate and regular rhythm.      Pulses: Normal pulses.      Heart sounds: Normal heart sounds. No murmur heard.  Pulmonary:      Effort: Pulmonary effort is normal. No respiratory distress.      Breath sounds: Normal breath sounds. No wheezing, rhonchi or rales.   Abdominal:      General: Abdomen is flat. Bowel sounds are normal. There is no distension.      Palpations: Abdomen is soft.      Tenderness: There is no rebound.      Comments: Left abdominal pain at site of PEG.     Musculoskeletal:         General: No swelling or tenderness.      Cervical back: Normal range of motion and neck supple.   Lymphadenopathy:      Cervical: No cervical adenopathy.   Skin:     Coloration: Skin is not jaundiced.      Findings: No erythema.      Comments: Healing scratches on all extremities.      Neurological:      General: No focal deficit present.      Mental Status: He is alert and oriented to person, place, and time. Mental status is at baseline.      Cranial Nerves: No cranial nerve deficit.   Psychiatric:         Mood and Affect: Mood normal.         Behavior: Behavior normal.      Comments: A&O x 4           Fluids    Intake/Output Summary (Last 24 hours) at 11/18/2022 1340  Last data filed at 11/18/2022 1200  Gross per 24 hour   Intake 90 ml   Output --   Net 90 ml         Laboratory  Recent Labs     11/16/22  0916 11/17/22  0140 11/18/22  0107   WBC 10.9* 11.3* 10.9*   RBC 3.64* 3.70* 3.47*   HEMOGLOBIN 11.6* 11.7* 10.9*   HEMATOCRIT 35.2* 36.2* 33.4*   MCV 96.7 97.8 96.3   MCH 31.9 31.6 31.4   MCHC 33.0* 32.3* 32.6*   RDW 51.7* 53.1* 50.7*   PLATELETCT 618* 601* 538*   MPV 9.6 10.1 9.4       Recent Labs     11/16/22  0916 11/17/22  0140 11/18/22  0107   SODIUM 142  141 137   POTASSIUM 3.6 3.7 3.9   CHLORIDE 107 104 101   CO2 25 22 21   GLUCOSE 94 76 73   BUN 27* 22 18   CREATININE 0.49* 0.59 0.58   CALCIUM 9.2 9.3 9.1       Recent Labs     11/16/22  0815   INR 1.11                 Imaging  DX-ESOPHAGUS - [id number]   Final Result      Please refer to dedicated speech pathology report for complete details.      DX-CHEST-PORTABLE (1 VIEW)   Final Result      1.  Bibasilar and RIGHT perihilar atelectasis which could obscure an additional process. This has decreased since the prior study.   2.  Persistent focal lingular opacity, which could be atelectasis or early pneumonia   3.  RIGHT pleural effusion      DX-CHEST-FOR LINE PLACEMENT Perform procedure in: Patient's Room   Final Result            1. A left peripherally inserted catheter with tip projects appropriately over the expected area of the lower SVC.      DX-CHEST-FOR LINE PLACEMENT Perform procedure in: Patient's Room   Final Result            1. A right peripherally inserted catheter with tip not well seen but likely projects over the expected area of the cavoatrial junction/right atrium.      IR-PICC LINE PLACEMENT W/ GUIDANCE > AGE 5   Final Result                  Ultrasound-guided PICC placement performed by qualified nursing staff as    above.          DX-CHEST-FOR LINE PLACEMENT Perform procedure in: Patient's Room   Final Result      1.  Interval placement of a PICC line which is satisfactory in position.      2.  Otherwise stable exam.      IR-PICC LINE PLACEMENT W/ GUIDANCE > AGE 5   Final Result                  Ultrasound-guided PICC placement performed by qualified nursing staff as    above.          DX-CHEST-PORTABLE (1 VIEW)   Final Result      1.  Stable bibasilar atelectasis, right greater than left.      2.  Stable right and improved left pleural effusion.      DX-CHEST-PORTABLE (1 VIEW)   Final Result      1.  Increased right pleural effusion, now small/moderate.   2.  Increased right base airspace  disease.   3.  Similar small left pleural effusion with mildly increased adjacent airspace disease.      [id number]-0 VIEW   Final Result      Mildly dilated loops of small intestine.      DX-CHEST-PORTABLE (1 VIEW)   Final Result      1.  Interval removal of the right sided chest tube.      2.  Stable bibasilar atelectasis.      3.  Interval decrease in size of a right pleural effusion.      DX-CHEST-PORTABLE (1 VIEW)   Final Result      1.  Stable support devices.      2.  Stable bilateral pleural effusions, right greater than left.      DX-CHEST-PORTABLE (1 VIEW)   Final Result      1.  Interval placement of RIGHT chest tube   2.  Moderate RIGHT hydropneumothorax   3.  Small LEFT pleural effusion   4.  Unchanged BILATERAL atelectasis with possible superimposed pneumonia or other airspace disease      DX-CHEST-PORTABLE (1 VIEW)   Final Result      1.  No significant change      2.  Lines and tubes appear appropriately located      3.  Right pleural effusion, probably large in size      4.  Bilateral atelectasis      EC-ECHOCARDIOGRAM LTD W/ CONT   Final Result      DX-CHEST-PORTABLE (1 VIEW)   Final Result         No significant interval change      CT-CHEST,ABDOMEN,PELVIS W/O   Final Result      1.  RIGHT larger than LEFT pleural effusions with overlying atelectasis. Superimposed pneumonia is not excluded.   2.  Healing fractures the RIGHT fifth and sixth ribs   3.  Distended loops of bowel in the abdomen as described above, likely ileus rather than early or low-grade small bowel obstruction   4.  Enlarged inguinal lymph nodes   5.  The hypoechoic nodule in the liver demonstrated on ultrasound from 8/28/2022 is not seen on this unenhanced CT. Hepatic mass protocol CT or MRI should be pursued when clinically appropriate for further assessment.   6.  RIGHT renal cyst         [id number] VIEW   Final Result         1.  Air-filled distended loops of bowel are seen with reactive mucosal pattern, appearance suggests  ileus or enteritis versus evolving obstructive changes. Recommend radiographic followup to resolution to exclude progression to obstruction.   2.  Nasogastric tube is coiled within the stomach, the tip terminates overlying the expected location of the gastric fundus.   3.  Bilateral lower lobe infiltrates   4.  Moderate right and small left pleural effusion      DX-CHEST-PORTABLE (1 VIEW)   Final Result         1.  Bilateral pulmonary edema and/or infiltrates.   2.  Moderate right and small left pleural effusion, stable compared to prior study   3.  Cardiomegaly      DX-CHEST-PORTABLE (1 VIEW)   Final Result         1.  Bilateral pulmonary edema and/or infiltrates.   2.  Moderate right and small left pleural effusion, stable compared to prior study   3.  Cardiomegaly      [id number] VIEW   Final Result         Diffuse gaseous distention of the small bowel and colon, worse in prior, could relate to ileus      DX-CHEST-PORTABLE (1 VIEW)   Final Result         1. No significant interval change.      DX-CHEST-PORTABLE (1 VIEW)   Final Result         1.  Bilateral pulmonary edema and/or infiltrates.   2.  Moderate right and small left pleural effusion, increased on the right compared to prior study   3.  Cardiomegaly      DX-CHEST-PORTABLE (1 VIEW)   Final Result         1.  Pulmonary edema and/or infiltrates are identified, which are stable since the prior exam.   2.  Moderate right pleural effusion, stable.   3.  Cardiomegaly      DX-CHEST-PORTABLE (1 VIEW)   Final Result      1.  Well-positioned ETT.   2.  Moderate right pleural effusion and associated atelectasis versus consolidation.   3.  Retrocardiac atelectasis versus consolidation. No significant left effusion.      [id number] VIEW   Final Result      NGT tip is in the stomach.   1.  Nonobstructive bowel gas pattern. Small amount of stool throughout the colon.   2.  Ill-defined right basilar atelectasis versus consolidation and small right pleural effusion.       DX-CHEST-PORTABLE (1 VIEW)   Final Result         1. Low lung volume with hypoventilatory change and bibasilar atelectasis.      CT-HEAD W/O   Final Result      1.  Cerebral atrophy.      2.  Otherwise, Head CT without contrast within normal limits. No evidence of acute cerebral infarction, hemorrhage or mass lesion.         DX-CHEST-FOR LINE PLACEMENT Perform procedure in: Patient's Room   Final Result      1.  Endotracheal tube overlies the mid trachea.      2.  NG tube courses beneath the diaphragms.      DX-CHEST-PORTABLE (1 VIEW)   Final Result      No radiographic evidence of acute cardiopulmonary process.             Assessment/Plan  Agitation  Assessment & Plan  - Doing well with thiamine replacement and 25mg qpm Seroquel   Daytime somnolence -doing well after stopping daytime seroquel      Peripheral eosinophilia  Assessment & Plan  Ddx includes HES, Churg Greta, HIV, parasite infection (less likely as no significant travel), eosinophilic malignancy. 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  - Jak2 and BCRABL, peripheral flow cytometry pending  - f/u with [facility] after discharge from SNF.      Congestive heart failure (CHF) (HCC)  Assessment & Plan  - Initial echo with preserved EF, then repeat echo with EF 40% - unclear if he was in RVR or other variable that would impact an acute decrease in his EF  - Not overloaded on exam, no acute exacerbation  - On metoprolol, add Losartan now as Bps remain stable    Dysphagia  Assessment & Plan  - Pt has failed swallow evals last week and this week - has NGT in place  - Failed MBSS today  - Multifactorial with likely Wernicke's, deconditioning from PNA and being on the vent, acute illness  - Poor long term  prognosis given his cerebral atrophy, chronic EtOH abuse and methampetamine abuse likely contributing to his lack of swallow coordination.  - Treat Wernicke's with high dose Thiamine, will continue working with SLP  - Daughter wants PEG tube placed, Dr Mcgrath to consult, NPO Wednesday night    Aspiration pneumonia (HCC)  Assessment & Plan  - Completed antibiotics, now on room air  - Has failed multiple swallow evals, currently receiving tube feeds via NGT  - Failed his MBSS - discussed PEG with pt's daughter, family wishes to move forward  - Dr Mcgrath placed PEG 11/17 - tolerated well      Wernicke encephalopathy syndrome  Assessment & Plan  - Pt with significant dementia symptoms, swallow dysfunction, cerebral atrophy on CT  - Will treat with high dose IV Thiamine   - Continue ASA     Hypomagnesemia  Assessment & Plan  - Replace as needed    Reactive thrombocytosis  Assessment & Plan  - Continue to monitor       Sepsis due to Pseudomonas species (HCC)- (present on admission)  Assessment & Plan  This is Severe Sepsis Present on admission  SIRS criteria identified on my evaluation include: Fever, with temperature greater than 101 deg F, Tachycardia, with heart rate greater than 90 BPM, Tachypnea, with respirations greater than 20 per minute and Leukocytosis, with WBC greater than 12,000  Clinical indicators of end organ dysfunction include Systolic blood pressure (SBP) <90 mmHg or mean arterial pressure <65 mmHg and Acute respiratory failure as evidenced by a new need for invasive or non-invasive mechanical ventilation (Ventilator or BiPap)   Source is pneumonia by MSSA and pseudomonas  Sepsis protocol initiated  Crystalloid Fluid Administration: Patient has volume overload with >11 lt since admission, (NYHA class III or symptoms with minimal exertion, Or NYHA class IV or symptoms at rest or with activity), for this/these reason(s) it is unsafe for this patient to receive 30 mL/kg of fluid.  Partial Fluid Dose Given  "on 10/24, patient to be reassessed shortly after completion of partial fluid bolus to ensure adequacy of resuscitation  IV antibiotics as appropriate for source of sepsis  Reassessment: I have reassessed the patient's hemodynamic status  Will provide another partial bolus today and wean down the vasopressors     .  Pt had septic shock requiring pressor support, with MSSA and pseudomonas in BAL and tracheal aspirate. Blood cultures negative. Sepsis has resolved.     Empyema (HCC)- (present on admission)  Assessment & Plan  - On the right, moderate-large per CXR of 10/30  - S/p diagnostic thoracentesis 10/25/22: albumin gradient is less than 1.2 g/dl which indicate exudates, his was 0.9, also inflammative effusion with very high LDH 1020 (serum in the 240s range)  - Cultures negative but pt was on antibiotics - did have chest tubes which have since been removed, completed antibiotic course.     Acute respiratory failure with hypoxia (HCC)- (present on admission)  Assessment & Plan  -Intubated for respiratory acidosis and altered mental status with need for repeat intubation twice thereafter  -Cultures from sputum and BAL grew MSSA and pseudomonas, pt completed 14d of Zosyn, and an additional 1.5d of Unasyn for empyema  -also complicated by right empyema, s/p diagnostic thoracentesis and chest tubes  - Currently off antibiotics, back on room air   - RESOLVED     Alcohol abuse with withdrawal (HCC)  Assessment & Plan  Severe withdrawal with respiratory depression requiring intubation on admit  Out of withdrawal window now     Liver mass- (present on admission)  Assessment & Plan  - Seen on US 8/2022 \"right lobe of the liver measuring 1.7 x 1.3 x 1.0 cm in size\"  - AFP negative  - Pt will not tolerate MRI at this time - high risk for aspiration for aspiration if laying flat    Atrial fibrillation (HCC)- (present on admission)  Assessment & Plan  - Not on anticoagulation prior to admission - appears he was just diagnosed " by PCP in June of this year and referred to Cardiology, but I don't see where he was seen by them  - [id number] score of 1-2 - new echo with EF of 40% but prior to that was 50%, and may have some effect of sepsis on EF   - ASA for now after discussion with pt's daughter  - Continue Metoprolol for rate control    Other specified hypothyroidism- (present on admission)  Assessment & Plan  - TSH normal, continue Synthroid     Rash- (present on admission)  Assessment & Plan  - Pt with pathology positive eczema, order his home triamcinolone cream    Hyponatremia  Assessment & Plan  - Resolved with free water flushes, pt was dry         VTE prophylaxis: SCDs/TEDs    I have performed a physical exam and reviewed and updated ROS and Plan today (11/18/2022). In review of yesterday's note (11/17/2022), there are no changes except as documented above.

## 2022-11-18 NOTE — PROGRESS NOTES
Telemetry Shift Summary    Rhythm Afib  HR Range   Ectopy none  Measurements -/0.08/-      Normal Values  Rhythm SR  HR Range:   Measurements: 0.12-0.20/0.06-0.10/0.30-0.52

## 2022-11-18 NOTE — THERAPY
Speech Language Pathology  Daily Treatment     Patient Name: Tyree Whiteside  Age:  57 y.o., Sex:  male  Medical Record #: [id number]  Today's Date: 11/18/2022     Precautions  Precautions: Fall Risk, Swallow Precautions ( See Comments), Nasogastric Tube  Comments: restraints    Assessment    Pt seen on this date for dysphagia therapy. Pt underwent PEG placement yesterday and denies any pain today. Secretions appears better managed today compared to previous sessions. Pt attempting to drink melted ice chip water upon entering room and educated pt and family regarding recommendation for small amount of ice chips vs water d/t aspiration of thin liquids found during MBSS on 11/14. Pt able to independently recall swallowing exercises that were taught on 11/16. Pt completed 60 reps pharyngeal squeeze, 10 reps laryngeal elevation, and 10 reps PPW movement exercises with good quality. Intermittent coughing and wet vocal quality after the swallow noted with trials of ice chips and thin liquids via teaspoon. Pt followed directives appropriately for compensatory strategies. At this time, continue to recommend NPO with PEG for nutrition and okay for small amounts of ice chips to minimize xerostomia and maintain integrity of the swallow. Pt would benefit from repeat MBSS before d/c to SNF if feasible, however, should not hold up d/c as pt has had two diagnostic studies in last 11 days which have both found severe oropharyngeal dysphagia.      Plan    1) continue NPO with PEG for nutrition  2) repeat MBSS prior to d/c to SNF if feasible, however, should not hold up d/c as pt has had two diagnostic swallowing evaluations in past 11 days which have both found severe oropharyngeal dysphagia     Continue current treatment plan.    Discharge Recommendations: Recommend post-acute placement for additional speech therapy services prior to discharge home       Objective       11/18/22 0951   Precautions   Precautions Fall  "Risk;Swallow Precautions ( See Comments);Nasogastric Tube   Vitals   O2 (LPM) 0   O2 Delivery Device None - Room Air   Pain 0 - 10 Group   Therapist Pain Assessment Post Activity Pain Same as Prior to Activity;Nurse Notified;0   Patient / Family Goals   Patient / Family Goal #1 \"I could eat half of that ice\"   Goal #1 Outcome Progressing as expected   Short Term Goals   Short Term Goal # 1 Pt will participate in an instrumental swallow study to fully assess swallow function.   Goal Outcome # 1 Goal met   Short Term Goal # 2 Pt will consume pre-feeding trials with SLP to determine readiness to begin an oral diet.   Goal Outcome # 2  Progressing as expected   Education Group   Education Provided Dysphagia   Dysphagia Patient Response Patient;Family;Acceptance;Explanation;Verbal Demonstration;Reinforcement Needed   Anticipated Discharge Needs   Discharge Recommendations Recommend post-acute placement for additional speech therapy services prior to discharge home   Therapy Recommendations Upon DC Dysphagia Training;Community Re-Integration;Patient / Family / Caregiver Education   Interdisciplinary Plan of Care Collaboration   IDT Collaboration with  Nursing;Physician;Family / Caregiver   Patient Position at End of Therapy Seated;In Bed;Call Light within Reach;Tray Table within Reach;Phone within Reach     "

## 2022-11-18 NOTE — PROGRESS NOTES
Fibersource tube feeds restarted through PEG at 25mL/hr and will be increased to 50 mL/hr if patient tolerates initial rate.

## 2022-11-18 NOTE — DISCHARGE PLANNING
Case Management Discharge Planning    Admission Date: 10/18/2022  GMLOS: 9.6  ALOS: 31    6-Clicks ADL Score: 14  6-Clicks Mobility Score: 20    Anticipated Discharge Dispo: Discharge Disposition: D/T to SNF with Medicare cert in anticipation of skilled care (03)      Action(s) Taken: RNCM participated in IDT rounds. DWAYNE Sparks left message with [facility]. RNCM will continue to follow up with [facility] regarding placement.    Escalations Completed: None    Medically Clear: Yes    Next Steps: RNCM will continue to follow up with [facility] regarding placement.      Barriers to Discharge: Pending Placement    Is the patient up for discharge tomorrow: patient is medically cleared to discharge pending placement.     1239 RNCM received notification that [facility] did an onsite eval. The DON and  are going to review. Will need to f/u on Monday for outcome.

## 2022-11-18 NOTE — DISCHARGE PLANNING
Agency/Facility Name: [facility]  Outcome: Left a voicemail regarding referral status. DPA requested a call back.  RN CM notified    @[phone]  Agency/Facility Name: [facility]  Outcome: Left a voice mail regarding restraint status and referral status.    @[phone]  Agency/Facility Name: [facility]  Spoke To: Lakeisha  Outcome: Per Lakeisha, they did an onsite eval for the pt. Per Lakeisha, they're going to write a report and have their DON and  review it. Per Lakeisha, she requested DPA call Monday, 11/21 to discuss possible acceptance.  RN CM notified

## 2022-11-18 NOTE — PROGRESS NOTES
Telemetry Shift Summary    Rhythm Afib  HR Range 89-96  Ectopy rPVC, 2.8 pause, F-pauses,  Measurements --/.08/--        Normal Values  Rhythm SR  HR Range    Measurements 0.12-0.20 / 0.06-0.10  / 0.30-0.52

## 2022-11-18 NOTE — CARE PLAN
The patient is Stable - Low risk of patient condition declining or worsening    Shift Goals  Clinical Goals: Monitor PEG tube  Patient Goals: Go home  Family Goals: ANETTE    Progress made toward(s) clinical / shift goals:    Problem: Knowledge Deficit - Standard  Goal: Patient and family/care givers will demonstrate understanding of plan of care, disease process/condition, diagnostic tests and medications  Outcome: Progressing: pt verbalized understanding of POC and education     Problem: Fall Risk  Goal: Patient will remain free from falls  Outcome: Progressing: pt uses FWW and is able to tolerate PT OT     Problem: Pain - Standard  Goal: Alleviation of pain or a reduction in pain to the patient’s comfort goal  Outcome: Progressing: pt reported no pain throughout  shift

## 2022-11-19 LAB
BASOPHILS # BLD AUTO: 1.3 % (ref 0–1.8)
BASOPHILS # BLD: 0.15 K/UL (ref 0–0.12)
EOSINOPHIL # BLD AUTO: 1.59 K/UL (ref 0–0.51)
EOSINOPHIL NFR BLD: 14.2 % (ref 0–6.9)
ERYTHROCYTE [DISTWIDTH] IN BLOOD BY AUTOMATED COUNT: 49.7 FL (ref 35.9–50)
GLUCOSE BLD STRIP.AUTO-MCNC: 103 MG/DL (ref 65–99)
GLUCOSE BLD STRIP.AUTO-MCNC: 86 MG/DL (ref 65–99)
GLUCOSE BLD STRIP.AUTO-MCNC: 92 MG/DL (ref 65–99)
GLUCOSE BLD STRIP.AUTO-MCNC: 98 MG/DL (ref 65–99)
HCT VFR BLD AUTO: 35.4 % (ref 42–52)
HGB BLD-MCNC: 11.7 G/DL (ref 14–18)
IMM GRANULOCYTES # BLD AUTO: 0.04 K/UL (ref 0–0.11)
IMM GRANULOCYTES NFR BLD AUTO: 0.4 % (ref 0–0.9)
LYMPHOCYTES # BLD AUTO: 1.74 K/UL (ref 1–4.8)
LYMPHOCYTES NFR BLD: 15.5 % (ref 22–41)
MAGNESIUM SERPL-MCNC: 1.6 MG/DL (ref 1.5–2.5)
MCH RBC QN AUTO: 31.2 PG (ref 27–33)
MCHC RBC AUTO-ENTMCNC: 33.1 G/DL (ref 33.7–35.3)
MCV RBC AUTO: 94.4 FL (ref 81.4–97.8)
MONOCYTES # BLD AUTO: 1.1 K/UL (ref 0–0.85)
MONOCYTES NFR BLD AUTO: 9.8 % (ref 0–13.4)
NEUTROPHILS # BLD AUTO: 6.58 K/UL (ref 1.82–7.42)
NEUTROPHILS NFR BLD: 58.8 % (ref 44–72)
NRBC # BLD AUTO: 0 K/UL
NRBC BLD-RTO: 0 /100 WBC
PHOSPHATE SERPL-MCNC: 4.7 MG/DL (ref 2.5–4.5)
PLATELET # BLD AUTO: 501 K/UL (ref 164–446)
PMV BLD AUTO: 9.8 FL (ref 9–12.9)
RBC # BLD AUTO: 3.75 M/UL (ref 4.7–6.1)
WBC # BLD AUTO: 11.2 K/UL (ref 4.8–10.8)

## 2022-11-19 PROCEDURE — 84100 ASSAY OF PHOSPHORUS: CPT

## 2022-11-19 PROCEDURE — 700111 HCHG RX REV CODE 636 W/ 250 OVERRIDE (IP): Performed by: INTERNAL MEDICINE

## 2022-11-19 PROCEDURE — 85025 COMPLETE CBC W/AUTO DIFF WBC: CPT

## 2022-11-19 PROCEDURE — A9270 NON-COVERED ITEM OR SERVICE: HCPCS | Performed by: INTERNAL MEDICINE

## 2022-11-19 PROCEDURE — 82962 GLUCOSE BLOOD TEST: CPT | Mod: 91

## 2022-11-19 PROCEDURE — 700102 HCHG RX REV CODE 250 W/ 637 OVERRIDE(OP): Performed by: INTERNAL MEDICINE

## 2022-11-19 PROCEDURE — 770006 HCHG ROOM/CARE - MED/SURG/GYN SEMI*

## 2022-11-19 PROCEDURE — 700102 HCHG RX REV CODE 250 W/ 637 OVERRIDE(OP): Performed by: FAMILY MEDICINE

## 2022-11-19 PROCEDURE — 700102 HCHG RX REV CODE 250 W/ 637 OVERRIDE(OP): Performed by: HOSPITALIST

## 2022-11-19 PROCEDURE — 83735 ASSAY OF MAGNESIUM: CPT

## 2022-11-19 PROCEDURE — A9270 NON-COVERED ITEM OR SERVICE: HCPCS | Performed by: HOSPITALIST

## 2022-11-19 PROCEDURE — A9270 NON-COVERED ITEM OR SERVICE: HCPCS | Performed by: FAMILY MEDICINE

## 2022-11-19 PROCEDURE — 99232 SBSQ HOSP IP/OBS MODERATE 35: CPT | Performed by: INTERNAL MEDICINE

## 2022-11-19 RX ADMIN — ENOXAPARIN SODIUM 40 MG: 40 INJECTION SUBCUTANEOUS at 18:08

## 2022-11-19 RX ADMIN — LEVOTHYROXINE SODIUM 50 MCG: 50 TABLET ORAL at 05:57

## 2022-11-19 RX ADMIN — METOPROLOL TARTRATE 50 MG: 50 TABLET, FILM COATED ORAL at 05:57

## 2022-11-19 RX ADMIN — SENNOSIDES AND DOCUSATE SODIUM 2 TABLET: 50; 8.6 TABLET ORAL at 18:09

## 2022-11-19 RX ADMIN — SCOPOLAMINE 1 PATCH: 1.5 PATCH, EXTENDED RELEASE TRANSDERMAL at 11:53

## 2022-11-19 RX ADMIN — THIAMINE HCL TAB 100 MG 100 MG: 100 TAB at 05:57

## 2022-11-19 RX ADMIN — QUETIAPINE FUMARATE 25 MG: 25 TABLET ORAL at 20:54

## 2022-11-19 RX ADMIN — TRIAMCINOLONE ACETONIDE: 1 CREAM TOPICAL at 18:09

## 2022-11-19 RX ADMIN — NICOTINE 7 MG: 7 PATCH TRANSDERMAL at 05:57

## 2022-11-19 RX ADMIN — ASPIRIN 81 MG CHEWABLE TABLET 81 MG: 81 TABLET CHEWABLE at 05:57

## 2022-11-19 ASSESSMENT — ENCOUNTER SYMPTOMS
NERVOUS/ANXIOUS: 0
INSOMNIA: 0
WEAKNESS: 0
CHILLS: 0
SPEECH CHANGE: 0
DIARRHEA: 0
FEVER: 0
DIZZINESS: 0
DEPRESSION: 0
CONSTIPATION: 0
MYALGIAS: 0
VOMITING: 0
CLAUDICATION: 0
SHORTNESS OF BREATH: 0
HEARTBURN: 0
COUGH: 0
HEADACHES: 0
SENSORY CHANGE: 0
BLURRED VISION: 0
PHOTOPHOBIA: 0
ABDOMINAL PAIN: 0

## 2022-11-19 ASSESSMENT — FIBROSIS 4 INDEX: FIB4 SCORE: 0.59

## 2022-11-19 ASSESSMENT — PAIN DESCRIPTION - PAIN TYPE: TYPE: ACUTE PAIN

## 2022-11-19 NOTE — PROGRESS NOTES
Hospital Medicine Daily Progress Note    Date of Service  11/19/2022    Chief Complaint  Tyree Whiteside is a 57 y.o. male admitted 10/18/2022 with confusion and hallucinations.    Hospital Course  Pt is a 58yo with a history of atrial fibrillation, HTN, alcohol dependence with abuse, methamphetamine abuse and liver mass who initially presented to [facility] for help with EtOH detoxification but was sent to the ER as he was having hallucinations, confusion, weakness, nausea and vomiting. He was admitted to ICU on 10/18/22 for EtOH withdrawal and respiratory depression and was placed on the ventilator and had a protracted ICU course - he required reintubation on 10/23 and again 10/26 after self extubation, diagnostic thoracentesis  and has had MSSA and Pseudomonas PNA, recurrent aspiration, bradycardia, hypotension, ileus, chest tube which has since been removed and loculated pleural effusion.  He transitioned out of the ICU and is still having severe difficulty with swallowing.  Daughter discussed with family regarding moving forward with PEG tube and would like this placed, GI to consult.      Interval Problem Update  11/15 Patient somnolent at time of discharge.  Daughter at bedside states he was much more interactive before the seroquel was initiated.  I'll dc the am dose of seroquel to see if this helps with mentation.  He has failed his swallow evaluation and PEG tube recommended for continued nutrition.  GI to consult for probable PEG on Thursday.  Discussed with hematology and there is concern for zosyn induced eosinophilia.  Jak2, BCRABL and peripheral blood flow cytometry pending as part of workup.    11/16 Patient awake and conversant this am, PEG planned for tomorrow with Dr Mcgrath at 830.  PT/OT evaluations pending, anticipate he will need placement in SNF given prolonged hospitalization and weakness.  11/17 Patient seen after PEG placement this am, he tolerated the procedure well.  PEG site looks  good.  Plan for meds and water okay in tube today and resume tube feeding tomorrow.  PT/OT re-evaluation, medically cleared for SNF as of tomorrow.  11/18 Patient states he feels better today, hungry as tube feeding stopped yesterday for PEG placement.  Resumed tube feeding this am.  SLP would like another repeat FEES prior to transfer if it can be coordinated, will continue with ice chips for oral moisture and tube feeding for nutrition.  PT/OT to reassess patient - SNF placement pending.  11/19 Patient doing well, tolerating TF at goal.  Okay to ambulate with staff after working with PT yesterday.  Working toward getting to SNF once accepted.    I have discussed this patient's plan of care and discharge plan at IDT rounds today with Case Management, Nursing, Nursing leadership, and other members of the IDT team.    Consultants/Specialty  oncology    Code Status  DNAR/DNI    Disposition  Patient is not medically cleared for discharge.   Anticipate discharge to to skilled nursing facility.  I have placed the appropriate orders for post-discharge needs.    Review of Systems  Review of Systems   Constitutional:  Negative for chills and fever.   HENT:  Negative for congestion.    Eyes:  Negative for blurred vision and photophobia.   Respiratory:  Negative for cough and shortness of breath.    Cardiovascular:  Negative for chest pain, claudication and leg swelling.   Gastrointestinal:  Negative for abdominal pain, constipation, diarrhea, heartburn and vomiting.   Genitourinary:  Negative for dysuria and hematuria.   Musculoskeletal:  Negative for joint pain and myalgias.   Skin:  Negative for itching and rash.   Neurological:  Negative for dizziness, sensory change, speech change, weakness and headaches.   Psychiatric/Behavioral:  Negative for depression. The patient is not nervous/anxious and does not have insomnia.       Physical Exam  Temp:  [36.3 °C (97.4 °F)-37.7 °C (99.8 °F)] 37.2 °C (99 °F)  Pulse:  []  85  Resp:  [16-18] 18  BP: (103-117)/(58-79) 106/68  SpO2:  [91 %-96 %] 93 %    Physical Exam  Vitals and nursing note reviewed.   Constitutional:       General: He is not in acute distress.     Appearance: Normal appearance.   HENT:      Head: Normocephalic and atraumatic.   Eyes:      General: No scleral icterus.     Extraocular Movements: Extraocular movements intact.   Cardiovascular:      Rate and Rhythm: Normal rate and regular rhythm.      Pulses: Normal pulses.      Heart sounds: Normal heart sounds. No murmur heard.  Pulmonary:      Effort: Pulmonary effort is normal. No respiratory distress.      Breath sounds: Normal breath sounds. No wheezing, rhonchi or rales.   Abdominal:      General: Abdomen is flat. Bowel sounds are normal. There is no distension.      Palpations: Abdomen is soft.      Tenderness: There is no rebound.      Comments: Left abdominal pain at site of PEG.     Musculoskeletal:         General: No swelling or tenderness.      Cervical back: Normal range of motion and neck supple.   Lymphadenopathy:      Cervical: No cervical adenopathy.   Skin:     Coloration: Skin is not jaundiced.      Findings: No erythema.      Comments: Healing scratches on all extremities.      Neurological:      General: No focal deficit present.      Mental Status: He is alert and oriented to person, place, and time. Mental status is at baseline.      Cranial Nerves: No cranial nerve deficit.   Psychiatric:         Mood and Affect: Mood normal.         Behavior: Behavior normal.      Comments: A&O x 4           Fluids    Intake/Output Summary (Last 24 hours) at 11/19/2022 1427  Last data filed at 11/19/2022 1200  Gross per 24 hour   Intake 270 ml   Output 225 ml   Net 45 ml         Laboratory  Recent Labs     11/17/22  0140 11/18/22  0107 11/19/22  0108   WBC 11.3* 10.9* 11.2*   RBC 3.70* 3.47* 3.75*   HEMOGLOBIN 11.7* 10.9* 11.7*   HEMATOCRIT 36.2* 33.4* 35.4*   MCV 97.8 96.3 94.4   MCH 31.6 31.4 31.2   MCHC  32.3* 32.6* 33.1*   RDW 53.1* 50.7* 49.7   PLATELETCT 601* 538* 501*   MPV 10.1 9.4 9.8       Recent Labs     11/17/22  0140 11/18/22  0107   SODIUM 141 137   POTASSIUM 3.7 3.9   CHLORIDE 104 101   CO2 22 21   GLUCOSE 76 73   BUN 22 18   CREATININE 0.59 0.58   CALCIUM 9.3 9.1                       Imaging  DX-ESOPHAGUS - [id number]   Final Result      Please refer to dedicated speech pathology report for complete details.      DX-CHEST-PORTABLE (1 VIEW)   Final Result      1.  Bibasilar and RIGHT perihilar atelectasis which could obscure an additional process. This has decreased since the prior study.   2.  Persistent focal lingular opacity, which could be atelectasis or early pneumonia   3.  RIGHT pleural effusion      DX-CHEST-FOR LINE PLACEMENT Perform procedure in: Patient's Room   Final Result            1. A left peripherally inserted catheter with tip projects appropriately over the expected area of the lower SVC.      DX-CHEST-FOR LINE PLACEMENT Perform procedure in: Patient's Room   Final Result            1. A right peripherally inserted catheter with tip not well seen but likely projects over the expected area of the cavoatrial junction/right atrium.      IR-PICC LINE PLACEMENT W/ GUIDANCE > AGE 5   Final Result                  Ultrasound-guided PICC placement performed by qualified nursing staff as    above.          DX-CHEST-FOR LINE PLACEMENT Perform procedure in: Patient's Room   Final Result      1.  Interval placement of a PICC line which is satisfactory in position.      2.  Otherwise stable exam.      IR-PICC LINE PLACEMENT W/ GUIDANCE > AGE 5   Final Result                  Ultrasound-guided PICC placement performed by qualified nursing staff as    above.          DX-CHEST-PORTABLE (1 VIEW)   Final Result      1.  Stable bibasilar atelectasis, right greater than left.      2.  Stable right and improved left pleural effusion.      DX-CHEST-PORTABLE (1 VIEW)   Final Result      1.  Increased  right pleural effusion, now small/moderate.   2.  Increased right base airspace disease.   3.  Similar small left pleural effusion with mildly increased adjacent airspace disease.      [id number] VIEW   Final Result      Mildly dilated loops of small intestine.      DX-CHEST-PORTABLE (1 VIEW)   Final Result      1.  Interval removal of the right sided chest tube.      2.  Stable bibasilar atelectasis.      3.  Interval decrease in size of a right pleural effusion.      DX-CHEST-PORTABLE (1 VIEW)   Final Result      1.  Stable support devices.      2.  Stable bilateral pleural effusions, right greater than left.      DX-CHEST-PORTABLE (1 VIEW)   Final Result      1.  Interval placement of RIGHT chest tube   2.  Moderate RIGHT hydropneumothorax   3.  Small LEFT pleural effusion   4.  Unchanged BILATERAL atelectasis with possible superimposed pneumonia or other airspace disease      DX-CHEST-PORTABLE (1 VIEW)   Final Result      1.  No significant change      2.  Lines and tubes appear appropriately located      3.  Right pleural effusion, probably large in size      4.  Bilateral atelectasis      EC-ECHOCARDIOGRAM LTD W/ CONT   Final Result      DX-CHEST-PORTABLE (1 VIEW)   Final Result         No significant interval change      CT-CHEST,ABDOMEN,PELVIS W/O   Final Result      1.  RIGHT larger than LEFT pleural effusions with overlying atelectasis. Superimposed pneumonia is not excluded.   2.  Healing fractures the RIGHT fifth and sixth ribs   3.  Distended loops of bowel in the abdomen as described above, likely ileus rather than early or low-grade small bowel obstruction   4.  Enlarged inguinal lymph nodes   5.  The hypoechoic nodule in the liver demonstrated on ultrasound from 8/28/2022 is not seen on this unenhanced CT. Hepatic mass protocol CT or MRI should be pursued when clinically appropriate for further assessment.   6.  RIGHT renal cyst         [id number] VIEW   Final Result         1.  Air-filled  distended loops of bowel are seen with reactive mucosal pattern, appearance suggests ileus or enteritis versus evolving obstructive changes. Recommend radiographic followup to resolution to exclude progression to obstruction.   2.  Nasogastric tube is coiled within the stomach, the tip terminates overlying the expected location of the gastric fundus.   3.  Bilateral lower lobe infiltrates   4.  Moderate right and small left pleural effusion      DX-CHEST-PORTABLE (1 VIEW)   Final Result         1.  Bilateral pulmonary edema and/or infiltrates.   2.  Moderate right and small left pleural effusion, stable compared to prior study   3.  Cardiomegaly      DX-CHEST-PORTABLE (1 VIEW)   Final Result         1.  Bilateral pulmonary edema and/or infiltrates.   2.  Moderate right and small left pleural effusion, stable compared to prior study   3.  Cardiomegaly      [id number] VIEW   Final Result         Diffuse gaseous distention of the small bowel and colon, worse in prior, could relate to ileus      DX-CHEST-PORTABLE (1 VIEW)   Final Result         1. No significant interval change.      DX-CHEST-PORTABLE (1 VIEW)   Final Result         1.  Bilateral pulmonary edema and/or infiltrates.   2.  Moderate right and small left pleural effusion, increased on the right compared to prior study   3.  Cardiomegaly      DX-CHEST-PORTABLE (1 VIEW)   Final Result         1.  Pulmonary edema and/or infiltrates are identified, which are stable since the prior exam.   2.  Moderate right pleural effusion, stable.   3.  Cardiomegaly      DX-CHEST-PORTABLE (1 VIEW)   Final Result      1.  Well-positioned ETT.   2.  Moderate right pleural effusion and associated atelectasis versus consolidation.   3.  Retrocardiac atelectasis versus consolidation. No significant left effusion.      [id number] VIEW   Final Result      NGT tip is in the stomach.   1.  Nonobstructive bowel gas pattern. Small amount of stool throughout the colon.   2.   Ill-defined right basilar atelectasis versus consolidation and small right pleural effusion.      DX-CHEST-PORTABLE (1 VIEW)   Final Result         1. Low lung volume with hypoventilatory change and bibasilar atelectasis.      CT-HEAD W/O   Final Result      1.  Cerebral atrophy.      2.  Otherwise, Head CT without contrast within normal limits. No evidence of acute cerebral infarction, hemorrhage or mass lesion.         DX-CHEST-FOR LINE PLACEMENT Perform procedure in: Patient's Room   Final Result      1.  Endotracheal tube overlies the mid trachea.      2.  NG tube courses beneath the diaphragms.      DX-CHEST-PORTABLE (1 VIEW)   Final Result      No radiographic evidence of acute cardiopulmonary process.             Assessment/Plan  Agitation  Assessment & Plan  - Doing well with thiamine replacement and 25mg qpm Seroquel   Daytime somnolence -doing well after stopping daytime seroquel      Peripheral eosinophilia  Assessment & Plan  Ddx includes HES, Churg Greta, HIV, parasite infection (less likely as no significant travel), eosinophilic malignancy. 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  - Jak2 and BCRABL, peripheral flow cytometry pending  - f/u with [facility] after discharge from SNF.      Congestive heart failure (CHF) (HCC)  Assessment & Plan  - Initial echo with preserved EF, then repeat echo with EF 40% - unclear if he was in RVR or other variable that would impact an acute decrease in his EF  - Not overloaded on exam, no acute exacerbation  - On metoprolol, add Losartan now as Bps remain stable    Dysphagia  Assessment & Plan  - Pt has failed swallow evals last week and this week - has NGT in place  - Failed MBSS today  - Multifactorial with likely  Wernicke's, deconditioning from PNA and being on the vent, acute illness  - Poor long term prognosis given his cerebral atrophy, chronic EtOH abuse and methampetamine abuse likely contributing to his lack of swallow coordination.  - Treat Wernicke's with high dose Thiamine, will continue working with SLP  - Daughter wants PEG tube placed, Dr Mcgrath to consult, NPO Wednesday night    Aspiration pneumonia (HCC)  Assessment & Plan  - Completed antibiotics, now on room air  - Has failed multiple swallow evals, currently receiving tube feeds via NGT  - Failed his MBSS - discussed PEG with pt's daughter, family wishes to move forward  - Dr Mcgrath placed PEG 11/17 - tolerated well      Wernicke encephalopathy syndrome  Assessment & Plan  - Pt with significant dementia symptoms, swallow dysfunction, cerebral atrophy on CT  - Will treat with high dose IV Thiamine   - Continue ASA     Hypomagnesemia  Assessment & Plan  - Replace as needed    Reactive thrombocytosis  Assessment & Plan  - Continue to monitor       Sepsis due to Pseudomonas species (HCC)- (present on admission)  Assessment & Plan  This is Severe Sepsis Present on admission  SIRS criteria identified on my evaluation include: Fever, with temperature greater than 101 deg F, Tachycardia, with heart rate greater than 90 BPM, Tachypnea, with respirations greater than 20 per minute and Leukocytosis, with WBC greater than 12,000  Clinical indicators of end organ dysfunction include Systolic blood pressure (SBP) <90 mmHg or mean arterial pressure <65 mmHg and Acute respiratory failure as evidenced by a new need for invasive or non-invasive mechanical ventilation (Ventilator or BiPap)   Source is pneumonia by MSSA and pseudomonas  Sepsis protocol initiated  Crystalloid Fluid Administration: Patient has volume overload with >11 lt since admission, (NYHA class III or symptoms with minimal exertion, Or NYHA class IV or symptoms at rest or with activity), for this/these  "reason(s) it is unsafe for this patient to receive 30 mL/kg of fluid.  Partial Fluid Dose Given on 10/24, patient to be reassessed shortly after completion of partial fluid bolus to ensure adequacy of resuscitation  IV antibiotics as appropriate for source of sepsis  Reassessment: I have reassessed the patient's hemodynamic status  Will provide another partial bolus today and wean down the vasopressors     .  Pt had septic shock requiring pressor support, with MSSA and pseudomonas in BAL and tracheal aspirate. Blood cultures negative. Sepsis has resolved.     Empyema (HCC)- (present on admission)  Assessment & Plan  - On the right, moderate-large per CXR of 10/30  - S/p diagnostic thoracentesis 10/25/22: albumin gradient is less than 1.2 g/dl which indicate exudates, his was 0.9, also inflammative effusion with very high LDH 1020 (serum in the 240s range)  - Cultures negative but pt was on antibiotics - did have chest tubes which have since been removed, completed antibiotic course.     Acute respiratory failure with hypoxia (HCC)- (present on admission)  Assessment & Plan  -Intubated for respiratory acidosis and altered mental status with need for repeat intubation twice thereafter  -Cultures from sputum and BAL grew MSSA and pseudomonas, pt completed 14d of Zosyn, and an additional 1.5d of Unasyn for empyema  -also complicated by right empyema, s/p diagnostic thoracentesis and chest tubes  - Currently off antibiotics, back on room air   - RESOLVED     Alcohol abuse with withdrawal (HCC)  Assessment & Plan  Severe withdrawal with respiratory depression requiring intubation on admit  Out of withdrawal window now     Liver mass- (present on admission)  Assessment & Plan  - Seen on US 8/2022 \"right lobe of the liver measuring 1.7 x 1.3 x 1.0 cm in size\"  - AFP negative  - Pt will not tolerate MRI at this time - high risk for aspiration for aspiration if laying flat    Atrial fibrillation (HCC)- (present on " admission)  Assessment & Plan  - Not on anticoagulation prior to admission - appears he was just diagnosed by PCP in June of this year and referred to Cardiology, but I don't see where he was seen by them  - [id number] score of 1-2 - new echo with EF of 40% but prior to that was 50%, and may have some effect of sepsis on EF   - ASA for now after discussion with pt's daughter  - Continue Metoprolol for rate control    Other specified hypothyroidism- (present on admission)  Assessment & Plan  - TSH normal, continue Synthroid     Rash- (present on admission)  Assessment & Plan  - Pt with pathology positive eczema, order his home triamcinolone cream    Hyponatremia  Assessment & Plan  - Resolved with free water flushes, pt was dry         VTE prophylaxis: SCDs/TEDs    I have performed a physical exam and reviewed and updated ROS and Plan today (11/19/2022). In review of yesterday's note (11/18/2022), there are no changes except as documented above.

## 2022-11-19 NOTE — CARE PLAN
The patient is Stable - Low risk of patient condition declining or worsening    Shift Goals  Clinical Goals: MOnitor PEG tube and site, continue tube feeds  Patient Goals: Go home  Family Goals: ANETTE    Progress made toward(s) clinical / shift goals:    Problem: Knowledge Deficit - Standard  Goal: Patient and family/care givers will demonstrate understanding of plan of care, disease process/condition, diagnostic tests and medications  Outcome: Progressing: pt the verbalized understanding POC     Problem: Fall Risk  Goal: Patient will remain free from falls  Outcome: Progressing: pt frree from falls throughout shift     Problem: Pain - Standard  Goal: Alleviation of pain or a reduction in pain to the patient’s comfort goal  Outcome: Progressing: pt reported no pain from peg tube site

## 2022-11-19 NOTE — PROGRESS NOTES
Report received from Abeba NOLEN. Patient is comfortably laying in bed at time of report. No signs of labored breathing or discomfort. No needs at this time. Safety measures in place and call light within reach.       Patient's tube feed increased to 70mL/hr which is goal rate. Patient tolerating rate at this time and denies discomfort or pain.

## 2022-11-19 NOTE — PROGRESS NOTES
Telemetry Shift Summary    Rhythm AFIB   HR Range   Ectopy RPVC  Measurements --/.12/--        Normal Values  Rhythm SR  HR Range    Measurements 0.12-0.20 / 0.06-0.10  / 0.30-0.52

## 2022-11-19 NOTE — PROGRESS NOTES
Gastroenterology Progress Note         Author: Grisel Mohamud D.O.                                 Date & Time Created: 11/18/2022 5:44 PM         Chief Complaint:  Dysphagia s/p PEG    Interval History:  This is a 57-year-old male with atrial fibrillation, alcohol use and methamphetamine use who has dysphagia and had an EGD with PEG placement yesterday.  Patient is doing well postoperatively and is tolerating tube feed.  No significant abdominal pain unless he coughs causing contraction of his abdomen.  No GI bleed.    Review of Systems:  General: No fevers, chills, unintentional, weight loss.  HEENT: No scleral icterus, gum bleed, dysphagia, odynophagia.  Cardiology: No chest pain, palpitations, orthopnea.  Respiratory: No dyspnea, cough, wheezing.  Gastrointestinal: No abdominal pain, nausea, vomiting, changes in bowel habits, rectal bleed.  Genitourinary: No hematuria, dysuria, urgency.  Neurologic: No changes in memory, confusion, gait instability.  Skin: No ecchymosis, jaundice, telangiectasia.    Physical Exam:  Vitals:    11/18/22 0400 11/18/22 0729 11/18/22 1155 11/18/22 1504   BP: 111/85 107/73 111/65 109/79   Pulse: 86 88 87 (!) 108   Resp: 18 18 18 18   Temp: 36.8 °C (98.2 °F) 36.4 °C (97.6 °F) 36.7 °C (98.1 °F) 37.7 °C (99.8 °F)   TempSrc: Oral Oral Oral Oral   SpO2: 95% 100% 94% 93%   Weight:       Height:         General: No acute cardiopulmonary distress.  Respiratory: Breath sounds present. Symmetrical rise of anterior thorax.  Cardiovascular: Normal S1, S2.  Gastrointestinal: Soft, nontender. Normoactive bowel sounds. No guarding. Gastrostomy tube in place which is clean, dry and intact.  Neurologic: Alert and oriented.  Skin: Warm and dry.    Labs:              Recent Labs     11/16/22  0916 11/17/22  0140 11/18/22  0107   SODIUM 142 141 137   POTASSIUM 3.6 3.7 3.9   CHLORIDE 107 104 101   CO2 25 22 21   BUN 27* 22 18   CREATININE 0.49* 0.59 0.58   MAGNESIUM 1.7 1.7 1.6   PHOSPHORUS 5.0* 5.5* 5.1*    CALCIUM 9.2 9.3 9.1       Recent Labs     22  0916 22  0140 22  0107   ALTSGPT 8  --   --    ASTSGOT 15  --   --    ALKPHOSPHAT 90  --   --    TBILIRUBIN 0.3  --   --    GLUCOSE 94 76 73       Recent Labs     22  0815 22  0916 22  0140 22  0107   RBC  --  3.64* 3.70* 3.47*   HEMOGLOBIN  --  11.6* 11.7* 10.9*   HEMATOCRIT  --  35.2* 36.2* 33.4*   PLATELETCT  --  618* 601* 538*   PROTHROMBTM 13.9  --   --   --    INR 1.11  --   --   --        Recent Labs     22  0916 220 22  0107   WBC 10.9* 11.3* 10.9*   NEUTSPOLYS 64.00 54.70 55.30   LYMPHOCYTES 8.00* 17.30* 17.10*   MONOCYTES 6.00 8.60 8.40   EOSINOPHILS 20.00* 17.50* 17.40*   BASOPHILS 1.00 1.50 1.40   ASTSGOT 15  --   --    ALTSGPT 8  --   --    ALKPHOSPHAT 90  --   --    TBILIRUBIN 0.3  --   --        Hemodynamics:    Temp (24hrs), Av.8 °C (98.3 °F), Min:36.4 °C (97.6 °F), Max:37.7 °C (99.8 °F)  Temperature: 37.7 °C (99.8 °F)    Pulse  Av.4  Min: 35  Max: 143     Blood Pressure: 109/79        Respiratory:      Respiration: 18, Pulse Oximetry: 93 %              RUL Breath Sounds: Diminished, RML Breath Sounds: Diminished, RLL Breath Sounds: Diminished, NAHOMI Breath Sounds: Diminished, LLL Breath Sounds: Diminished    Fluids:      Intake/Output Summary (Last 24 hours) at 2022 8044  Last data filed at 2022 1200  Gross per 24 hour   Intake 120 ml   Output --   Net 120 ml            GI/Nutrition:    Orders Placed This Encounter   Procedures    Diet: Diet Tube Feed; Formula: Fibersource HN (Start at 25 ml/hr and advance per protocol: 25 mL/hour q8hrs); Goal Rate (mL/Hour): 70; Duration: 24 HR     Standing Status:   Standing     Number of Occurrences:   1     Order Specific Question:   Diet     Answer:   Diet Tube Feed [35]     Order Specific Question:   Formula:     Answer:   Fibersource HN     Comments:   Start at 25 ml/hr and advance per protocol: 25 mL/hour q8hrs     Order  Specific Question:   Goal Rate (mL/Hour)     Answer:   70     Order Specific Question:   Route     Answer:   Gtube/PEG     Order Specific Question:   Duration     Answer:   24 HR    Diet NPO Restrict to: Strict (Stop tube feeds)     Standing Status:   Standing     Number of Occurrences:   8     Order Specific Question:   Diet NPO Restrict to:     Answer:   Strict [1]     Comments:   Stop tube feeds       Medical Decision Making, by Problem:    Active Hospital Problems    Diagnosis     Peripheral eosinophilia [D72.19]     Agitation [R45.1]     Reactive thrombocytosis [D75.838]     Hypomagnesemia [E83.42]     Wernicke encephalopathy syndrome [E51.2]     Aspiration pneumonia (HCC) [J69.0]     Dysphagia [R13.10]     Congestive heart failure (CHF) (HCC) [I50.9]     Sepsis due to Pseudomonas species (HCC) [A41.52]     Acute respiratory failure with hypoxia (HCC) [J96.01]     Empyema (HCC) [J86.9]     On mechanically assisted ventilation (HCC) [Z99.11]     Alcohol abuse with withdrawal (HCC) [F10.139]     Liver mass [R16.0]     Atrial fibrillation (HCC) [I48.91]     Other specified hypothyroidism [E03.8]     Rash [R21]     Hyponatremia [E87.1]             Assessment:  -Dysphagia s/p EGD with PEG placement on 11/17/2022  -Alcohol use      Plan:  This is a 57-year-old male who presented with multiple complications and alcoholism.  Patient required mechanical ventilation and had pneumonia with aspiration.  He underwent an EGD with PEG placement on 11/17/2022.  He is doing well and has no complication.    Recommendations:  -Continue tube feeding.  -Flush G-tube with water after every use.  -Keep site clean and dry.    Please call GI further assistance is needed.  We will sign off.         Quality[employer] Measures    Grisel Mohamud D.O.  Gastroenterology  [facility]  [phone]

## 2022-11-19 NOTE — PROGRESS NOTES
Telemetry Shift Summary    Rhythm Afib  HR Range   Ectopy rPVC/Coup  Measurements -/0.12/-      Normal Values  Rhythm SR  HR Range:   Measurements: 0.12-0.20/0.06-0.10/0.30-0.52

## 2022-11-20 LAB
BASOPHILS # BLD AUTO: 1.3 % (ref 0–1.8)
BASOPHILS # BLD: 0.11 K/UL (ref 0–0.12)
EOSINOPHIL # BLD AUTO: 1.27 K/UL (ref 0–0.51)
EOSINOPHIL NFR BLD: 14.8 % (ref 0–6.9)
ERYTHROCYTE [DISTWIDTH] IN BLOOD BY AUTOMATED COUNT: 47.9 FL (ref 35.9–50)
GLUCOSE BLD STRIP.AUTO-MCNC: 100 MG/DL (ref 65–99)
GLUCOSE BLD STRIP.AUTO-MCNC: 101 MG/DL (ref 65–99)
GLUCOSE BLD STRIP.AUTO-MCNC: 93 MG/DL (ref 65–99)
HCT VFR BLD AUTO: 34.5 % (ref 42–52)
HGB BLD-MCNC: 11.4 G/DL (ref 14–18)
IMM GRANULOCYTES # BLD AUTO: 0.03 K/UL (ref 0–0.11)
IMM GRANULOCYTES NFR BLD AUTO: 0.4 % (ref 0–0.9)
LYMPHOCYTES # BLD AUTO: 1.78 K/UL (ref 1–4.8)
LYMPHOCYTES NFR BLD: 20.8 % (ref 22–41)
MAGNESIUM SERPL-MCNC: 1.5 MG/DL (ref 1.5–2.5)
MCH RBC QN AUTO: 30.8 PG (ref 27–33)
MCHC RBC AUTO-ENTMCNC: 33 G/DL (ref 33.7–35.3)
MCV RBC AUTO: 93.2 FL (ref 81.4–97.8)
MONOCYTES # BLD AUTO: 0.97 K/UL (ref 0–0.85)
MONOCYTES NFR BLD AUTO: 11.3 % (ref 0–13.4)
NEUTROPHILS # BLD AUTO: 4.4 K/UL (ref 1.82–7.42)
NEUTROPHILS NFR BLD: 51.4 % (ref 44–72)
NRBC # BLD AUTO: 0 K/UL
NRBC BLD-RTO: 0 /100 WBC
PHOSPHATE SERPL-MCNC: 5.6 MG/DL (ref 2.5–4.5)
PLATELET # BLD AUTO: 460 K/UL (ref 164–446)
PMV BLD AUTO: 10.2 FL (ref 9–12.9)
RBC # BLD AUTO: 3.7 M/UL (ref 4.7–6.1)
WBC # BLD AUTO: 8.6 K/UL (ref 4.8–10.8)

## 2022-11-20 PROCEDURE — 83735 ASSAY OF MAGNESIUM: CPT

## 2022-11-20 PROCEDURE — 85025 COMPLETE CBC W/AUTO DIFF WBC: CPT

## 2022-11-20 PROCEDURE — 700102 HCHG RX REV CODE 250 W/ 637 OVERRIDE(OP): Performed by: INTERNAL MEDICINE

## 2022-11-20 PROCEDURE — A9270 NON-COVERED ITEM OR SERVICE: HCPCS | Performed by: HOSPITALIST

## 2022-11-20 PROCEDURE — A9270 NON-COVERED ITEM OR SERVICE: HCPCS | Performed by: FAMILY MEDICINE

## 2022-11-20 PROCEDURE — 94760 N-INVAS EAR/PLS OXIMETRY 1: CPT

## 2022-11-20 PROCEDURE — A9270 NON-COVERED ITEM OR SERVICE: HCPCS | Performed by: INTERNAL MEDICINE

## 2022-11-20 PROCEDURE — 700111 HCHG RX REV CODE 636 W/ 250 OVERRIDE (IP): Performed by: INTERNAL MEDICINE

## 2022-11-20 PROCEDURE — 99232 SBSQ HOSP IP/OBS MODERATE 35: CPT | Performed by: INTERNAL MEDICINE

## 2022-11-20 PROCEDURE — 82962 GLUCOSE BLOOD TEST: CPT

## 2022-11-20 PROCEDURE — 700102 HCHG RX REV CODE 250 W/ 637 OVERRIDE(OP): Performed by: FAMILY MEDICINE

## 2022-11-20 PROCEDURE — 84100 ASSAY OF PHOSPHORUS: CPT

## 2022-11-20 PROCEDURE — 700102 HCHG RX REV CODE 250 W/ 637 OVERRIDE(OP): Performed by: HOSPITALIST

## 2022-11-20 PROCEDURE — 770006 HCHG ROOM/CARE - MED/SURG/GYN SEMI*

## 2022-11-20 RX ADMIN — TRIAMCINOLONE ACETONIDE: 1 CREAM TOPICAL at 06:38

## 2022-11-20 RX ADMIN — QUETIAPINE FUMARATE 25 MG: 25 TABLET ORAL at 20:33

## 2022-11-20 RX ADMIN — TRIAMCINOLONE ACETONIDE: 1 CREAM TOPICAL at 20:33

## 2022-11-20 RX ADMIN — SENNOSIDES AND DOCUSATE SODIUM 2 TABLET: 50; 8.6 TABLET ORAL at 06:20

## 2022-11-20 RX ADMIN — NICOTINE 7 MG: 7 PATCH TRANSDERMAL at 06:22

## 2022-11-20 RX ADMIN — METOPROLOL TARTRATE 50 MG: 50 TABLET, FILM COATED ORAL at 06:21

## 2022-11-20 RX ADMIN — Medication: at 20:33

## 2022-11-20 RX ADMIN — ENOXAPARIN SODIUM 40 MG: 40 INJECTION SUBCUTANEOUS at 16:58

## 2022-11-20 RX ADMIN — THIAMINE HCL TAB 100 MG 100 MG: 100 TAB at 06:21

## 2022-11-20 RX ADMIN — LEVOTHYROXINE SODIUM 50 MCG: 50 TABLET ORAL at 06:20

## 2022-11-20 RX ADMIN — LOSARTAN POTASSIUM 12.5 MG: 25 TABLET, FILM COATED ORAL at 06:20

## 2022-11-20 RX ADMIN — ASPIRIN 81 MG CHEWABLE TABLET 81 MG: 81 TABLET CHEWABLE at 06:21

## 2022-11-20 RX ADMIN — METOPROLOL TARTRATE 50 MG: 50 TABLET, FILM COATED ORAL at 16:58

## 2022-11-20 ASSESSMENT — ENCOUNTER SYMPTOMS
MYALGIAS: 0
HEARTBURN: 0
SPEECH CHANGE: 0
WEAKNESS: 0
COUGH: 0
CHILLS: 0
SENSORY CHANGE: 0
HEADACHES: 0
DIARRHEA: 0
VOMITING: 0
PHOTOPHOBIA: 0
INSOMNIA: 0
ABDOMINAL PAIN: 0
CONSTIPATION: 0
DIZZINESS: 0
BLURRED VISION: 0
FEVER: 0
SHORTNESS OF BREATH: 0
NERVOUS/ANXIOUS: 0
CLAUDICATION: 0
DEPRESSION: 0

## 2022-11-20 ASSESSMENT — PAIN DESCRIPTION - PAIN TYPE
TYPE: ACUTE PAIN
TYPE: ACUTE PAIN

## 2022-11-20 ASSESSMENT — FIBROSIS 4 INDEX: FIB4 SCORE: 0.59

## 2022-11-20 NOTE — PROGRESS NOTES
Hospital Medicine Daily Progress Note    Date of Service  11/20/2022    Chief Complaint  Tyree Whiteside is a 57 y.o. male admitted 10/18/2022 with confusion and hallucinations.    Hospital Course  Pt is a 56yo with a history of atrial fibrillation, HTN, alcohol dependence with abuse, methamphetamine abuse and liver mass who initially presented to [facility] for help with EtOH detoxification but was sent to the ER as he was having hallucinations, confusion, weakness, nausea and vomiting. He was admitted to ICU on 10/18/22 for EtOH withdrawal and respiratory depression and was placed on the ventilator and had a protracted ICU course - he required reintubation on 10/23 and again 10/26 after self extubation, diagnostic thoracentesis  and has had MSSA and Pseudomonas PNA, recurrent aspiration, bradycardia, hypotension, ileus, chest tube which has since been removed and loculated pleural effusion.  He transitioned out of the ICU and is still having severe difficulty with swallowing.  Daughter discussed with family regarding moving forward with PEG tube and would like this placed, GI to consult.      Interval Problem Update  11/15 Patient somnolent at time of discharge.  Daughter at bedside states he was much more interactive before the seroquel was initiated.  I'll dc the am dose of seroquel to see if this helps with mentation.  He has failed his swallow evaluation and PEG tube recommended for continued nutrition.  GI to consult for probable PEG on Thursday.  Discussed with hematology and there is concern for zosyn induced eosinophilia.  Jak2, BCRABL and peripheral blood flow cytometry pending as part of workup.    11/16 Patient awake and conversant this am, PEG planned for tomorrow with Dr Mcgrath at 830.  PT/OT evaluations pending, anticipate he will need placement in SNF given prolonged hospitalization and weakness.  11/17 Patient seen after PEG placement this am, he tolerated the procedure well.  PEG site looks  good.  Plan for meds and water okay in tube today and resume tube feeding tomorrow.  PT/OT re-evaluation, medically cleared for SNF as of tomorrow.  11/18 Patient states he feels better today, hungry as tube feeding stopped yesterday for PEG placement.  Resumed tube feeding this am.  SLP would like another repeat FEES prior to transfer if it can be coordinated, will continue with ice chips for oral moisture and tube feeding for nutrition.  PT/OT to reassess patient - SNF placement pending.  11/19 Patient doing well, tolerating TF at goal.  Okay to ambulate with staff after working with PT yesterday.  Working toward getting to SNF once accepted.  11/20 Patient without complaint, states difficulty sleeping last night.  Awaiting placement in SNF.  TF well tolerated, anticipate repeat swallow diagnostic tomorrow.      I have discussed this patient's plan of care and discharge plan at IDT rounds today with Case Management, Nursing, Nursing leadership, and other members of the IDT team.    Consultants/Specialty  oncology    Code Status  DNAR/DNI    Disposition  Patient is medically cleared for discharge.   Anticipate discharge to to skilled nursing facility.  I have placed the appropriate orders for post-discharge needs.    Review of Systems  Review of Systems   Constitutional:  Negative for chills and fever.   HENT:  Negative for congestion.    Eyes:  Negative for blurred vision and photophobia.   Respiratory:  Negative for cough and shortness of breath.    Cardiovascular:  Negative for chest pain, claudication and leg swelling.   Gastrointestinal:  Negative for abdominal pain, constipation, diarrhea, heartburn and vomiting.   Genitourinary:  Negative for dysuria and hematuria.   Musculoskeletal:  Negative for joint pain and myalgias.   Skin:  Negative for itching and rash.   Neurological:  Negative for dizziness, sensory change, speech change, weakness and headaches.   Psychiatric/Behavioral:  Negative for depression.  The patient is not nervous/anxious and does not have insomnia.       Physical Exam  Temp:  [36.7 °C (98 °F)-37.8 °C (100 °F)] 36.7 °C (98.1 °F)  Pulse:  [73-93] 73  Resp:  [16-18] 17  BP: ()/(56-72) 108/69  SpO2:  [92 %-98 %] 98 %    Physical Exam  Vitals and nursing note reviewed.   Constitutional:       General: He is not in acute distress.     Appearance: Normal appearance.   HENT:      Head: Normocephalic and atraumatic.   Eyes:      General: No scleral icterus.     Extraocular Movements: Extraocular movements intact.   Cardiovascular:      Rate and Rhythm: Normal rate and regular rhythm.      Pulses: Normal pulses.      Heart sounds: Normal heart sounds. No murmur heard.  Pulmonary:      Effort: Pulmonary effort is normal. No respiratory distress.      Breath sounds: Normal breath sounds. No wheezing, rhonchi or rales.   Abdominal:      General: Abdomen is flat. Bowel sounds are normal. There is no distension.      Palpations: Abdomen is soft.      Tenderness: There is no rebound.      Comments: Left abdominal pain at site of PEG.     Musculoskeletal:         General: No swelling or tenderness.      Cervical back: Normal range of motion and neck supple.   Lymphadenopathy:      Cervical: No cervical adenopathy.   Skin:     Coloration: Skin is not jaundiced.      Findings: No erythema.      Comments: Healing scratches on all extremities.      Neurological:      General: No focal deficit present.      Mental Status: He is alert and oriented to person, place, and time. Mental status is at baseline.      Cranial Nerves: No cranial nerve deficit.   Psychiatric:         Mood and Affect: Mood normal.         Behavior: Behavior normal.      Comments: A&O x 4           Fluids    Intake/Output Summary (Last 24 hours) at 11/20/2022 1307  Last data filed at 11/20/2022 1200  Gross per 24 hour   Intake 150 ml   Output --   Net 150 ml         Laboratory  Recent Labs     11/18/22  0107 11/19/22  0108 11/20/22  0430   WBC  10.9* 11.2* 8.6   RBC 3.47* 3.75* 3.70*   HEMOGLOBIN 10.9* 11.7* 11.4*   HEMATOCRIT 33.4* 35.4* 34.5*   MCV 96.3 94.4 93.2   MCH 31.4 31.2 30.8   MCHC 32.6* 33.1* 33.0*   RDW 50.7* 49.7 47.9   PLATELETCT 538* 501* 460*   MPV 9.4 9.8 10.2       Recent Labs     11/18/22  0107   SODIUM 137   POTASSIUM 3.9   CHLORIDE 101   CO2 21   GLUCOSE 73   BUN 18   CREATININE 0.58   CALCIUM 9.1                       Imaging  DX-ESOPHAGUS - [id number]   Final Result      Please refer to dedicated speech pathology report for complete details.      DX-CHEST-PORTABLE (1 VIEW)   Final Result      1.  Bibasilar and RIGHT perihilar atelectasis which could obscure an additional process. This has decreased since the prior study.   2.  Persistent focal lingular opacity, which could be atelectasis or early pneumonia   3.  RIGHT pleural effusion      DX-CHEST-FOR LINE PLACEMENT Perform procedure in: Patient's Room   Final Result            1. A left peripherally inserted catheter with tip projects appropriately over the expected area of the lower SVC.      DX-CHEST-FOR LINE PLACEMENT Perform procedure in: Patient's Room   Final Result            1. A right peripherally inserted catheter with tip not well seen but likely projects over the expected area of the cavoatrial junction/right atrium.      IR-PICC LINE PLACEMENT W/ GUIDANCE > AGE 5   Final Result                  Ultrasound-guided PICC placement performed by qualified nursing staff as    above.          DX-CHEST-FOR LINE PLACEMENT Perform procedure in: Patient's Room   Final Result      1.  Interval placement of a PICC line which is satisfactory in position.      2.  Otherwise stable exam.      IR-PICC LINE PLACEMENT W/ GUIDANCE > AGE 5   Final Result                  Ultrasound-guided PICC placement performed by qualified nursing staff as    above.          DX-CHEST-PORTABLE (1 VIEW)   Final Result      1.  Stable bibasilar atelectasis, right greater than left.      2.  Stable right  and improved left pleural effusion.      DX-CHEST-PORTABLE (1 VIEW)   Final Result      1.  Increased right pleural effusion, now small/moderate.   2.  Increased right base airspace disease.   3.  Similar small left pleural effusion with mildly increased adjacent airspace disease.      [id number] VIEW   Final Result      Mildly dilated loops of small intestine.      DX-CHEST-PORTABLE (1 VIEW)   Final Result      1.  Interval removal of the right sided chest tube.      2.  Stable bibasilar atelectasis.      3.  Interval decrease in size of a right pleural effusion.      DX-CHEST-PORTABLE (1 VIEW)   Final Result      1.  Stable support devices.      2.  Stable bilateral pleural effusions, right greater than left.      DX-CHEST-PORTABLE (1 VIEW)   Final Result      1.  Interval placement of RIGHT chest tube   2.  Moderate RIGHT hydropneumothorax   3.  Small LEFT pleural effusion   4.  Unchanged BILATERAL atelectasis with possible superimposed pneumonia or other airspace disease      DX-CHEST-PORTABLE (1 VIEW)   Final Result      1.  No significant change      2.  Lines and tubes appear appropriately located      3.  Right pleural effusion, probably large in size      4.  Bilateral atelectasis      EC-ECHOCARDIOGRAM LTD W/ CONT   Final Result      DX-CHEST-PORTABLE (1 VIEW)   Final Result         No significant interval change      CT-CHEST,ABDOMEN,PELVIS W/O   Final Result      1.  RIGHT larger than LEFT pleural effusions with overlying atelectasis. Superimposed pneumonia is not excluded.   2.  Healing fractures the RIGHT fifth and sixth ribs   3.  Distended loops of bowel in the abdomen as described above, likely ileus rather than early or low-grade small bowel obstruction   4.  Enlarged inguinal lymph nodes   5.  The hypoechoic nodule in the liver demonstrated on ultrasound from 8/28/2022 is not seen on this unenhanced CT. Hepatic mass protocol CT or MRI should be pursued when clinically appropriate for further  assessment.   6.  RIGHT renal cyst         [id number] VIEW   Final Result         1.  Air-filled distended loops of bowel are seen with reactive mucosal pattern, appearance suggests ileus or enteritis versus evolving obstructive changes. Recommend radiographic followup to resolution to exclude progression to obstruction.   2.  Nasogastric tube is coiled within the stomach, the tip terminates overlying the expected location of the gastric fundus.   3.  Bilateral lower lobe infiltrates   4.  Moderate right and small left pleural effusion      DX-CHEST-PORTABLE (1 VIEW)   Final Result         1.  Bilateral pulmonary edema and/or infiltrates.   2.  Moderate right and small left pleural effusion, stable compared to prior study   3.  Cardiomegaly      DX-CHEST-PORTABLE (1 VIEW)   Final Result         1.  Bilateral pulmonary edema and/or infiltrates.   2.  Moderate right and small left pleural effusion, stable compared to prior study   3.  Cardiomegaly      [id number] VIEW   Final Result         Diffuse gaseous distention of the small bowel and colon, worse in prior, could relate to ileus      DX-CHEST-PORTABLE (1 VIEW)   Final Result         1. No significant interval change.      DX-CHEST-PORTABLE (1 VIEW)   Final Result         1.  Bilateral pulmonary edema and/or infiltrates.   2.  Moderate right and small left pleural effusion, increased on the right compared to prior study   3.  Cardiomegaly      DX-CHEST-PORTABLE (1 VIEW)   Final Result         1.  Pulmonary edema and/or infiltrates are identified, which are stable since the prior exam.   2.  Moderate right pleural effusion, stable.   3.  Cardiomegaly      DX-CHEST-PORTABLE (1 VIEW)   Final Result      1.  Well-positioned ETT.   2.  Moderate right pleural effusion and associated atelectasis versus consolidation.   3.  Retrocardiac atelectasis versus consolidation. No significant left effusion.      [id number] VIEW   Final Result      NGT tip is in the stomach.    1.  Nonobstructive bowel gas pattern. Small amount of stool throughout the colon.   2.  Ill-defined right basilar atelectasis versus consolidation and small right pleural effusion.      DX-CHEST-PORTABLE (1 VIEW)   Final Result         1. Low lung volume with hypoventilatory change and bibasilar atelectasis.      CT-HEAD W/O   Final Result      1.  Cerebral atrophy.      2.  Otherwise, Head CT without contrast within normal limits. No evidence of acute cerebral infarction, hemorrhage or mass lesion.         DX-CHEST-FOR LINE PLACEMENT Perform procedure in: Patient's Room   Final Result      1.  Endotracheal tube overlies the mid trachea.      2.  NG tube courses beneath the diaphragms.      DX-CHEST-PORTABLE (1 VIEW)   Final Result      No radiographic evidence of acute cardiopulmonary process.             Assessment/Plan  Agitation  Assessment & Plan  - Doing well with thiamine replacement and 25mg qpm Seroquel   Daytime somnolence -doing well after stopping daytime seroquel      Peripheral eosinophilia  Assessment & Plan  Ddx includes HES, Churg Greta, HIV, parasite infection (less likely as no significant travel), eosinophilic malignancy. 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  - Jak2 and BCRABL, peripheral flow cytometry pending  - f/u with [facility] after discharge from SNF.      Congestive heart failure (CHF) (HCC)  Assessment & Plan  - Initial echo with preserved EF, then repeat echo with EF 40% - unclear if he was in RVR or other variable that would impact an acute decrease in his EF  - Not overloaded on exam, no acute exacerbation  - On metoprolol, add Losartan now as Bps remain stable    Dysphagia  Assessment & Plan  - Pt has failed swallow evals last week  and this week - has NGT in place  - Failed MBSS today  - Multifactorial with likely Wernicke's, deconditioning from PNA and being on the vent, acute illness  - Poor long term prognosis given his cerebral atrophy, chronic EtOH abuse and methampetamine abuse likely contributing to his lack of swallow coordination.  - Treat Wernicke's with high dose Thiamine, will continue working with SLP  - Daughter wants PEG tube placed, Dr Mcgrath to consult, NPO Wednesday night    Aspiration pneumonia (HCC)  Assessment & Plan  - Completed antibiotics, now on room air  - Has failed multiple swallow evals, currently receiving tube feeds via NGT  - Failed his MBSS - discussed PEG with pt's daughter, family wishes to move forward  - Dr Mcgrath placed PEG 11/17 - tolerated well      Wernicke encephalopathy syndrome  Assessment & Plan  - Pt with significant dementia symptoms, swallow dysfunction, cerebral atrophy on CT  - Completed high dose IV Thiamine, continue PO supplementation  - Continue ASA     Hypomagnesemia  Assessment & Plan  - Replace as needed    Reactive thrombocytosis  Assessment & Plan  - Continue to monitor       Sepsis due to Pseudomonas species (HCC)- (present on admission)  Assessment & Plan  This is Severe Sepsis Present on admission  SIRS criteria identified on my evaluation include: Fever, with temperature greater than 101 deg F, Tachycardia, with heart rate greater than 90 BPM, Tachypnea, with respirations greater than 20 per minute and Leukocytosis, with WBC greater than 12,000  Clinical indicators of end organ dysfunction include Systolic blood pressure (SBP) <90 mmHg or mean arterial pressure <65 mmHg and Acute respiratory failure as evidenced by a new need for invasive or non-invasive mechanical ventilation (Ventilator or BiPap)   Source is pneumonia by MSSA and pseudomonas  Sepsis protocol initiated  Crystalloid Fluid Administration: Patient has volume overload with >11 lt since admission, (NYHA class III or  "symptoms with minimal exertion, Or NYHA class IV or symptoms at rest or with activity), for this/these reason(s) it is unsafe for this patient to receive 30 mL/kg of fluid.  Partial Fluid Dose Given on 10/24, patient to be reassessed shortly after completion of partial fluid bolus to ensure adequacy of resuscitation  IV antibiotics as appropriate for source of sepsis  Reassessment: I have reassessed the patient's hemodynamic status  Will provide another partial bolus today and wean down the vasopressors     .  Pt had septic shock requiring pressor support, with MSSA and pseudomonas in BAL and tracheal aspirate. Blood cultures negative. Sepsis has resolved.     Empyema (HCC)- (present on admission)  Assessment & Plan  - On the right, moderate-large per CXR of 10/30  - S/p diagnostic thoracentesis 10/25/22: albumin gradient is less than 1.2 g/dl which indicate exudates, his was 0.9, also inflammative effusion with very high LDH 1020 (serum in the 240s range)  - Cultures negative but pt was on antibiotics - did have chest tubes which have since been removed, completed antibiotic course.     Acute respiratory failure with hypoxia (HCC)- (present on admission)  Assessment & Plan  -Intubated for respiratory acidosis and altered mental status with need for repeat intubation twice thereafter  -Cultures from sputum and BAL grew MSSA and pseudomonas, pt completed 14d of Zosyn, and an additional 1.5d of Unasyn for empyema  -also complicated by right empyema, s/p diagnostic thoracentesis and chest tubes  - Currently off antibiotics, back on room air   - RESOLVED     Alcohol abuse with withdrawal (HCC)  Assessment & Plan  Severe withdrawal with respiratory depression requiring intubation on admit  Out of withdrawal window now     Liver mass- (present on admission)  Assessment & Plan  - Seen on US 8/2022 \"right lobe of the liver measuring 1.7 x 1.3 x 1.0 cm in size\"  - AFP negative      Atrial fibrillation (HCC)- (present on " admission)  Assessment & Plan  - Not on anticoagulation prior to admission - appears he was just diagnosed by PCP in June of this year and referred to Cardiology, but I don't see where he was seen by them  - [id number] score of 1-2 - new echo with EF of 40% but prior to that was 50%, and may have some effect of sepsis on EF   - ASA for now after discussion with pt's daughter  - Continue Metoprolol for rate control  -Okay to dc telemetry      Other specified hypothyroidism- (present on admission)  Assessment & Plan  - TSH normal, continue Synthroid     Rash- (present on admission)  Assessment & Plan  - Pt with pathology positive eczema, order his home triamcinolone cream    Hyponatremia  Assessment & Plan  - Resolved with free water flushes, pt was dry         VTE prophylaxis: SCDs/TEDs    I have performed a physical exam and reviewed and updated ROS and Plan today (11/20/2022). In review of yesterday's note (11/19/2022), there are no changes except as documented above.

## 2022-11-20 NOTE — PROGRESS NOTES
Report called to Brea RN on GSU, patient stable and A/Ox4, transferring patient down now, care relinquished at this time.

## 2022-11-20 NOTE — CARE PLAN
The patient is Stable - Low risk of patient condition declining or worsening    Shift Goals  Clinical Goals: walk with nursing staff, monitor tube feeds.  Patient Goals: rest  Family Goals: ANETTE    Progress made toward(s) clinical / shift goals:  Patient ambulated to bathroom with CNA and nurse. Patient on continuous tube feed and has reached goal this shift. BG stable throughout shift. Patient progressing towards discharging.       Problem: Knowledge Deficit - Standard  Goal: Patient and family/care givers will demonstrate understanding of plan of care, disease process/condition, diagnostic tests and medications  Outcome: Progressing     Problem: Psychosocial  Goal: Patient's level of anxiety will decrease  Outcome: Progressing     Problem: Risk for Aspiration  Goal: Patient's risk for aspiration will be absent or decrease  Outcome: Progressing     Problem: Skin Integrity  Goal: Skin integrity is maintained or improved  Outcome: Progressing     Problem: Fall Risk  Goal: Patient will remain free from falls  Outcome: Progressing       Patient is not progressing towards the following goals:

## 2022-11-20 NOTE — CARE PLAN
The patient is Stable - Low risk of patient condition declining or worsening    Shift Goals  Clinical Goals: Rest  Patient Goals: Rest  Family Goals: ANETTE    Progress made toward(s) clinical / shift goals:    Problem: Knowledge Deficit - Standard  Goal: Patient and family/care givers will demonstrate understanding of plan of care, disease process/condition, diagnostic tests and medications  Outcome: Progressing     Problem: Optimal Care for Alcohol Withdrawal  Goal: Optimal Care for the alcohol withdrawal patient  Outcome: Progressing     Problem: Seizure Precautions  Goal: Implementation of seizure precautions  Outcome: Progressing     Problem: Lifestyle Changes  Goal: Patient's ability to identify lifestyle changes and available resources to help reduce recurrence of condition will improve  Outcome: Progressing     Problem: Psychosocial  Goal: Patient's level of anxiety will decrease  Outcome: Progressing

## 2022-11-20 NOTE — PROGRESS NOTES
Telemetry Shift Summary    Rhythm Afib  HR Range   Ectopy rPVC  Measurements -/0.08/-      Normal Values  Rhythm SR  HR Range:   Measurements: 0.12-0.20/0.06-0.10/0.30-0.52

## 2022-11-20 NOTE — PROGRESS NOTES
4 Eyes Skin Assessment Completed by CALLUM Dalton and CALLUM Warren.    Head WDL  Ears WDL  Nose WDL  Mouth WDL  Neck WDL  Breast/Chest WDL  Shoulder Blades WDL  Spine WDL  (R) Arm/Elbow/Hand WDL  (L) Arm/Elbow/Hand WDL  Abdomen Incision  Groin WDL  Scrotum/Coccyx/Buttocks WDL  (R) Leg Redness  (L) Leg Redness  (R) Heel/Foot/Toe WDL  (L) Heel/Foot/Toe WDL          Devices In Places G Tube      Interventions In Place Pillows    Possible Skin Injury No    Pictures Uploaded Into Epic N/A  Wound Consult Placed N/A  RN Wound Prevention Protocol Ordered No       PEG tube in place with dressing over site - c/d/I, changed today, to be changed daily. BLE dusky/ red with severely dried skin - receiving moisturizing treatment. Wctm.

## 2022-11-20 NOTE — CARE PLAN
The patient is Stable - Low risk of patient condition declining or worsening    Shift Goals  Clinical Goals: rest, PEG cleaning, SNF placement  Patient Goals: discharge  Family Goals: ANETTE    Progress made toward(s) clinical / shift goals:    Problem: Knowledge Deficit - Standard  Goal: Patient and family/care givers will demonstrate understanding of plan of care, disease process/condition, diagnostic tests and medications  Outcome: Progressing     Problem: Optimal Care for Alcohol Withdrawal  Goal: Optimal Care for the alcohol withdrawal patient  Outcome: Progressing     Problem: Seizure Precautions  Goal: Implementation of seizure precautions  Outcome: Progressing     Problem: Lifestyle Changes  Goal: Patient's ability to identify lifestyle changes and available resources to help reduce recurrence of condition will improve  Outcome: Progressing     Problem: Psychosocial  Goal: Patient's level of anxiety will decrease  Outcome: Progressing  Goal: Spiritual and cultural needs incorporated into hospitalization  Outcome: Progressing     Problem: Risk for Aspiration  Goal: Patient's risk for aspiration will be absent or decrease  Outcome: Progressing     Problem: Skin Integrity  Goal: Skin integrity is maintained or improved  Outcome: Progressing     Problem: Fall Risk  Goal: Patient will remain free from falls  Outcome: Progressing     Problem: Pain - Standard  Goal: Alleviation of pain or a reduction in pain to the patient’s comfort goal  Outcome: Progressing     Problem: Respiratory  Goal: Patient will achieve/maintain optimum respiratory ventilation and gas exchange  Outcome: Progressing     Problem: Hemodynamics  Goal: Patient's hemodynamics, fluid balance and neurologic status will be stable or improve  Outcome: Progressing       Patient is not progressing towards the following goals:

## 2022-11-21 LAB
AMBIGUOUS DTTM AMBI4: NORMAL
BASOPHILS # BLD AUTO: 1.4 % (ref 0–1.8)
BASOPHILS # BLD: 0.1 K/UL (ref 0–0.12)
EOSINOPHIL # BLD AUTO: 1.49 K/UL (ref 0–0.51)
EOSINOPHIL NFR BLD: 20.6 % (ref 0–6.9)
ERYTHROCYTE [DISTWIDTH] IN BLOOD BY AUTOMATED COUNT: 47.6 FL (ref 35.9–50)
FUNGUS SPEC CULT: NORMAL
FUNGUS SPEC FUNGUS STN: NORMAL
GLUCOSE BLD STRIP.AUTO-MCNC: 102 MG/DL (ref 65–99)
HCT VFR BLD AUTO: 36.4 % (ref 42–52)
HGB BLD-MCNC: 12 G/DL (ref 14–18)
IMM GRANULOCYTES # BLD AUTO: 0.03 K/UL (ref 0–0.11)
IMM GRANULOCYTES NFR BLD AUTO: 0.4 % (ref 0–0.9)
LYMPHOCYTES # BLD AUTO: 1.53 K/UL (ref 1–4.8)
LYMPHOCYTES NFR BLD: 21.2 % (ref 22–41)
MAGNESIUM SERPL-MCNC: 1.6 MG/DL (ref 1.5–2.5)
MCH RBC QN AUTO: 31.2 PG (ref 27–33)
MCHC RBC AUTO-ENTMCNC: 33 G/DL (ref 33.7–35.3)
MCV RBC AUTO: 94.5 FL (ref 81.4–97.8)
MONOCYTES # BLD AUTO: 0.83 K/UL (ref 0–0.85)
MONOCYTES NFR BLD AUTO: 11.5 % (ref 0–13.4)
NEUTROPHILS # BLD AUTO: 3.24 K/UL (ref 1.82–7.42)
NEUTROPHILS NFR BLD: 44.9 % (ref 44–72)
NRBC # BLD AUTO: 0 K/UL
NRBC BLD-RTO: 0 /100 WBC
PATHOLOGY CONSULT NOTE: NORMAL
PHOSPHATE SERPL-MCNC: 6 MG/DL (ref 2.5–4.5)
PLATELET # BLD AUTO: 425 K/UL (ref 164–446)
PMV BLD AUTO: 9.9 FL (ref 9–12.9)
PREALB SERPL-MCNC: 12.2 MG/DL (ref 18–38)
RBC # BLD AUTO: 3.85 M/UL (ref 4.7–6.1)
SIGNIFICANT IND 70042: NORMAL
SITE SITE: NORMAL
SOURCE SOURCE: NORMAL
WBC # BLD AUTO: 7.2 K/UL (ref 4.8–10.8)

## 2022-11-21 PROCEDURE — A9270 NON-COVERED ITEM OR SERVICE: HCPCS | Performed by: INTERNAL MEDICINE

## 2022-11-21 PROCEDURE — 700102 HCHG RX REV CODE 250 W/ 637 OVERRIDE(OP): Performed by: FAMILY MEDICINE

## 2022-11-21 PROCEDURE — 82962 GLUCOSE BLOOD TEST: CPT

## 2022-11-21 PROCEDURE — 700102 HCHG RX REV CODE 250 W/ 637 OVERRIDE(OP): Performed by: INTERNAL MEDICINE

## 2022-11-21 PROCEDURE — 85025 COMPLETE CBC W/AUTO DIFF WBC: CPT

## 2022-11-21 PROCEDURE — 94760 N-INVAS EAR/PLS OXIMETRY 1: CPT

## 2022-11-21 PROCEDURE — 84100 ASSAY OF PHOSPHORUS: CPT

## 2022-11-21 PROCEDURE — 99232 SBSQ HOSP IP/OBS MODERATE 35: CPT | Performed by: INTERNAL MEDICINE

## 2022-11-21 PROCEDURE — A9270 NON-COVERED ITEM OR SERVICE: HCPCS | Performed by: FAMILY MEDICINE

## 2022-11-21 PROCEDURE — 700102 HCHG RX REV CODE 250 W/ 637 OVERRIDE(OP): Performed by: HOSPITALIST

## 2022-11-21 PROCEDURE — 770006 HCHG ROOM/CARE - MED/SURG/GYN SEMI*

## 2022-11-21 PROCEDURE — 84134 ASSAY OF PREALBUMIN: CPT

## 2022-11-21 PROCEDURE — 83735 ASSAY OF MAGNESIUM: CPT

## 2022-11-21 PROCEDURE — A9270 NON-COVERED ITEM OR SERVICE: HCPCS | Performed by: HOSPITALIST

## 2022-11-21 RX ADMIN — LEVOTHYROXINE SODIUM 50 MCG: 50 TABLET ORAL at 05:18

## 2022-11-21 RX ADMIN — ASPIRIN 81 MG CHEWABLE TABLET 81 MG: 81 TABLET CHEWABLE at 05:18

## 2022-11-21 RX ADMIN — QUETIAPINE FUMARATE 25 MG: 25 TABLET ORAL at 20:11

## 2022-11-21 RX ADMIN — THIAMINE HCL TAB 100 MG 100 MG: 100 TAB at 05:18

## 2022-11-21 RX ADMIN — METOPROLOL TARTRATE 50 MG: 50 TABLET, FILM COATED ORAL at 17:10

## 2022-11-21 RX ADMIN — METOPROLOL TARTRATE 50 MG: 50 TABLET, FILM COATED ORAL at 05:18

## 2022-11-21 RX ADMIN — TRIAMCINOLONE ACETONIDE: 1 CREAM TOPICAL at 05:22

## 2022-11-21 RX ADMIN — NICOTINE 7 MG: 7 PATCH TRANSDERMAL at 05:18

## 2022-11-21 RX ADMIN — Medication: at 05:22

## 2022-11-21 RX ADMIN — SENNOSIDES AND DOCUSATE SODIUM 2 TABLET: 50; 8.6 TABLET ORAL at 05:18

## 2022-11-21 RX ADMIN — LOSARTAN POTASSIUM 12.5 MG: 25 TABLET, FILM COATED ORAL at 05:18

## 2022-11-21 ASSESSMENT — ENCOUNTER SYMPTOMS
CONSTIPATION: 0
DIZZINESS: 0
DEPRESSION: 0
VOMITING: 0
COUGH: 0
CHILLS: 0
MYALGIAS: 0
NERVOUS/ANXIOUS: 0
FEVER: 0
DIARRHEA: 0
WEAKNESS: 0
SHORTNESS OF BREATH: 0
INSOMNIA: 0
HEARTBURN: 0
CLAUDICATION: 0
BLURRED VISION: 0
ABDOMINAL PAIN: 0
PHOTOPHOBIA: 0
SPEECH CHANGE: 0
SENSORY CHANGE: 0
HEADACHES: 0

## 2022-11-21 ASSESSMENT — PAIN DESCRIPTION - PAIN TYPE: TYPE: ACUTE PAIN

## 2022-11-21 NOTE — DISCHARGE PLANNING
Case Management Discharge Planning    Admission Date: 10/18/2022  GMLOS: 13.2  ALOS: 34    6-Clicks ADL Score: 14  6-Clicks Mobility Score: 20  PT and/or OT Eval ordered: Yes  Post-acute Referrals Ordered: Yes  Post-acute Choice Obtained: Yes  Has referral(s) been sent to post-acute provider:  Yes      Anticipated Discharge Dispo: Discharge Disposition: D/T to SNF with Medicare cert in anticipation of skilled care (03)    DME Needed: No    Action(s) Taken: Updated Provider/Nurse on Discharge Plan    0840: RN CM called [facility] and spoke with Lakeisha who states she will review case and call RN ANGEL back.     1335: RN CM called [facility] to follow up, no answer, MIRTA left requesting call back.     Escalations Completed: Pending Discharge Destination    Medically Clear: Yes    Next Steps: Pending placement     Barriers to Discharge: Pending Placement

## 2022-11-21 NOTE — DIETARY
Nutrition support weekly update:  Day 34 of admit.  Tyree Whiteside is a 57 y.o. male with admitting DX of Alcohol withdrawal delirium .  Tube feeding initiated on 10/30. Current TF via PEG is Fibersource HN at a goal rate of 70 mL/hour providing 2016 kcals, 91 g protein, and 1361 mL of free water.    Assessment:  Weight 11/20: 70.9 kg. Weight has been stable since 11/13.   Re-estimate of nutritional needs as indicated: not indicated     Evaluation:   PEG was placed on 11/17. TF changed to Fibersource HN and slowly advanced to goal. Per MD note, pt is tolerating fiber containing TF. Per flow sheets TF is at goal.   SLP following. Per MD note, SLP will see pt today.   Pt has an order for 60 mL of water Q 6 hours providing 240 mL per day. Total water from TF and flushes is 1601 mL/day.   Labs: 11/21/22: phos=6 (trending up), mag=1.6. 11/18/22: sodium=134, Bun=18  Meds: thiamine, senna-docusate  GI: LBM on 11/18  Continue with current TF order.     Problem: Nutritional:  Goal: Nutrition support tolerated and meeting greater than 85% of estimated needs  Outcome: met       Malnutrition risk: ASPEN criteria not met    Recommendations/Plan:  Continue with Fibersource HN at 70 mL/ hour.   Additional fluids per MD  Monitor weights  Initiation of diet per SLP.     RD will follow.

## 2022-11-21 NOTE — PROGRESS NOTES
Hospital Medicine Daily Progress Note    Date of Service  11/21/2022    Chief Complaint  Tyree Whiteside is a 57 y.o. male admitted 10/18/2022 with confusion and hallucinations.    Hospital Course  Pt is a 58yo with a history of atrial fibrillation, HTN, alcohol dependence with abuse, methamphetamine abuse and liver mass who initially presented to [facility] for help with EtOH detoxification but was sent to the ER as he was having hallucinations, confusion, weakness, nausea and vomiting. He was admitted to ICU on 10/18/22 for EtOH withdrawal and respiratory depression and was placed on the ventilator and had a protracted ICU course - he required reintubation on 10/23 and again 10/26 after self extubation, diagnostic thoracentesis  and has had MSSA and Pseudomonas PNA, recurrent aspiration, bradycardia, hypotension, ileus, chest tube which has since been removed and loculated pleural effusion.  He transitioned out of the ICU and is still having severe difficulty with swallowing.  Daughter discussed with family regarding moving forward with PEG tube and would like this placed, GI to consult.      Interval Problem Update  11/15 Patient somnolent at time of discharge.  Daughter at bedside states he was much more interactive before the seroquel was initiated.  I'll dc the am dose of seroquel to see if this helps with mentation.  He has failed his swallow evaluation and PEG tube recommended for continued nutrition.  GI to consult for probable PEG on Thursday.  Discussed with hematology and there is concern for zosyn induced eosinophilia.  Jak2, BCRABL and peripheral blood flow cytometry pending as part of workup.    11/16 Patient awake and conversant this am, PEG planned for tomorrow with Dr Mcgrath at 830.  PT/OT evaluations pending, anticipate he will need placement in SNF given prolonged hospitalization and weakness.  11/17 Patient seen after PEG placement this am, he tolerated the procedure well.  PEG site looks  good.  Plan for meds and water okay in tube today and resume tube feeding tomorrow.  PT/OT re-evaluation, medically cleared for SNF as of tomorrow.  11/18 Patient states he feels better today, hungry as tube feeding stopped yesterday for PEG placement.  Resumed tube feeding this am.  SLP would like another repeat FEES prior to transfer if it can be coordinated, will continue with ice chips for oral moisture and tube feeding for nutrition.  PT/OT to reassess patient - SNF placement pending.  11/19 Patient doing well, tolerating TF at goal.  Okay to ambulate with staff after working with PT yesterday.  Working toward getting to SNF once accepted.  11/20 Patient without complaint, states difficulty sleeping last night.  Awaiting placement in SNF.  TF well tolerated, anticipate repeat swallow diagnostic tomorrow.  11/21 Patient doing well, awaiting SNF acceptance. Tolerating tube feeding, to see SLP again.     I have discussed this patient's plan of care and discharge plan at IDT rounds today with Case Management, Nursing, Nursing leadership, and other members of the IDT team.    Consultants/Specialty  oncology    Code Status  DNAR/DNI    Disposition  Patient is medically cleared for discharge.   Anticipate discharge to to skilled nursing facility.  I have placed the appropriate orders for post-discharge needs.    Review of Systems  Review of Systems   Constitutional:  Negative for chills and fever.   HENT:  Negative for congestion.    Eyes:  Negative for blurred vision and photophobia.   Respiratory:  Negative for cough and shortness of breath.    Cardiovascular:  Negative for chest pain, claudication and leg swelling.   Gastrointestinal:  Negative for abdominal pain, constipation, diarrhea, heartburn and vomiting.   Genitourinary:  Negative for dysuria and hematuria.   Musculoskeletal:  Negative for joint pain and myalgias.   Skin:  Negative for itching and rash.   Neurological:  Negative for dizziness, sensory  change, speech change, weakness and headaches.   Psychiatric/Behavioral:  Negative for depression. The patient is not nervous/anxious and does not have insomnia.       Physical Exam  Temp:  [36.7 °C (98 °F)] 36.7 °C (98 °F)  Pulse:  [73-96] 96  Resp:  [18-20] 20  BP: (105-112)/(59-67) 111/59  SpO2:  [98 %-100 %] 98 %    Physical Exam  Vitals and nursing note reviewed.   Constitutional:       General: He is not in acute distress.     Appearance: Normal appearance.   HENT:      Head: Normocephalic and atraumatic.   Eyes:      General: No scleral icterus.     Extraocular Movements: Extraocular movements intact.   Cardiovascular:      Rate and Rhythm: Normal rate and regular rhythm.      Pulses: Normal pulses.      Heart sounds: Normal heart sounds. No murmur heard.  Pulmonary:      Effort: Pulmonary effort is normal. No respiratory distress.      Breath sounds: Normal breath sounds. No wheezing, rhonchi or rales.   Abdominal:      General: Abdomen is flat. Bowel sounds are normal. There is no distension.      Palpations: Abdomen is soft.      Tenderness: There is no rebound.      Comments: Left abdominal pain at site of PEG.     Musculoskeletal:         General: No swelling or tenderness.      Cervical back: Normal range of motion and neck supple.   Lymphadenopathy:      Cervical: No cervical adenopathy.   Skin:     Coloration: Skin is not jaundiced.      Findings: No erythema.      Comments: Healing scratches on all extremities.      Neurological:      General: No focal deficit present.      Mental Status: He is alert and oriented to person, place, and time. Mental status is at baseline.      Cranial Nerves: No cranial nerve deficit.   Psychiatric:         Mood and Affect: Mood normal.         Behavior: Behavior normal.      Comments: A&O x 4           Fluids    Intake/Output Summary (Last 24 hours) at 11/21/2022 1046  Last data filed at 11/21/2022 0546  Gross per 24 hour   Intake 120 ml   Output 850 ml   Net -730 ml          Laboratory  Recent Labs     11/19/22  0108 11/20/22  0430 11/21/22  0745   WBC 11.2* 8.6 7.2   RBC 3.75* 3.70* 3.85*   HEMOGLOBIN 11.7* 11.4* 12.0*   HEMATOCRIT 35.4* 34.5* 36.4*   MCV 94.4 93.2 94.5   MCH 31.2 30.8 31.2   MCHC 33.1* 33.0* 33.0*   RDW 49.7 47.9 47.6   PLATELETCT 501* 460* 425   MPV 9.8 10.2 9.9                             Imaging  DX-ESOPHAGUS - [id number]   Final Result      Please refer to dedicated speech pathology report for complete details.      DX-CHEST-PORTABLE (1 VIEW)   Final Result      1.  Bibasilar and RIGHT perihilar atelectasis which could obscure an additional process. This has decreased since the prior study.   2.  Persistent focal lingular opacity, which could be atelectasis or early pneumonia   3.  RIGHT pleural effusion      DX-CHEST-FOR LINE PLACEMENT Perform procedure in: Patient's Room   Final Result            1. A left peripherally inserted catheter with tip projects appropriately over the expected area of the lower SVC.      DX-CHEST-FOR LINE PLACEMENT Perform procedure in: Patient's Room   Final Result            1. A right peripherally inserted catheter with tip not well seen but likely projects over the expected area of the cavoatrial junction/right atrium.      IR-PICC LINE PLACEMENT W/ GUIDANCE > AGE 5   Final Result                  Ultrasound-guided PICC placement performed by qualified nursing staff as    above.          DX-CHEST-FOR LINE PLACEMENT Perform procedure in: Patient's Room   Final Result      1.  Interval placement of a PICC line which is satisfactory in position.      2.  Otherwise stable exam.      IR-PICC LINE PLACEMENT W/ GUIDANCE > AGE 5   Final Result                  Ultrasound-guided PICC placement performed by qualified nursing staff as    above.          DX-CHEST-PORTABLE (1 VIEW)   Final Result      1.  Stable bibasilar atelectasis, right greater than left.      2.  Stable right and improved left pleural effusion.       DX-CHEST-PORTABLE (1 VIEW)   Final Result      1.  Increased right pleural effusion, now small/moderate.   2.  Increased right base airspace disease.   3.  Similar small left pleural effusion with mildly increased adjacent airspace disease.      [id number]-0 VIEW   Final Result      Mildly dilated loops of small intestine.      DX-CHEST-PORTABLE (1 VIEW)   Final Result      1.  Interval removal of the right sided chest tube.      2.  Stable bibasilar atelectasis.      3.  Interval decrease in size of a right pleural effusion.      DX-CHEST-PORTABLE (1 VIEW)   Final Result      1.  Stable support devices.      2.  Stable bilateral pleural effusions, right greater than left.      DX-CHEST-PORTABLE (1 VIEW)   Final Result      1.  Interval placement of RIGHT chest tube   2.  Moderate RIGHT hydropneumothorax   3.  Small LEFT pleural effusion   4.  Unchanged BILATERAL atelectasis with possible superimposed pneumonia or other airspace disease      DX-CHEST-PORTABLE (1 VIEW)   Final Result      1.  No significant change      2.  Lines and tubes appear appropriately located      3.  Right pleural effusion, probably large in size      4.  Bilateral atelectasis      EC-ECHOCARDIOGRAM LTD W/ CONT   Final Result      DX-CHEST-PORTABLE (1 VIEW)   Final Result         No significant interval change      CT-CHEST,ABDOMEN,PELVIS W/O   Final Result      1.  RIGHT larger than LEFT pleural effusions with overlying atelectasis. Superimposed pneumonia is not excluded.   2.  Healing fractures the RIGHT fifth and sixth ribs   3.  Distended loops of bowel in the abdomen as described above, likely ileus rather than early or low-grade small bowel obstruction   4.  Enlarged inguinal lymph nodes   5.  The hypoechoic nodule in the liver demonstrated on ultrasound from 8/28/2022 is not seen on this unenhanced CT. Hepatic mass protocol CT or MRI should be pursued when clinically appropriate for further assessment.   6.  RIGHT renal cyst          [id number] VIEW   Final Result         1.  Air-filled distended loops of bowel are seen with reactive mucosal pattern, appearance suggests ileus or enteritis versus evolving obstructive changes. Recommend radiographic followup to resolution to exclude progression to obstruction.   2.  Nasogastric tube is coiled within the stomach, the tip terminates overlying the expected location of the gastric fundus.   3.  Bilateral lower lobe infiltrates   4.  Moderate right and small left pleural effusion      DX-CHEST-PORTABLE (1 VIEW)   Final Result         1.  Bilateral pulmonary edema and/or infiltrates.   2.  Moderate right and small left pleural effusion, stable compared to prior study   3.  Cardiomegaly      DX-CHEST-PORTABLE (1 VIEW)   Final Result         1.  Bilateral pulmonary edema and/or infiltrates.   2.  Moderate right and small left pleural effusion, stable compared to prior study   3.  Cardiomegaly      [id number] VIEW   Final Result         Diffuse gaseous distention of the small bowel and colon, worse in prior, could relate to ileus      DX-CHEST-PORTABLE (1 VIEW)   Final Result         1. No significant interval change.      DX-CHEST-PORTABLE (1 VIEW)   Final Result         1.  Bilateral pulmonary edema and/or infiltrates.   2.  Moderate right and small left pleural effusion, increased on the right compared to prior study   3.  Cardiomegaly      DX-CHEST-PORTABLE (1 VIEW)   Final Result         1.  Pulmonary edema and/or infiltrates are identified, which are stable since the prior exam.   2.  Moderate right pleural effusion, stable.   3.  Cardiomegaly      DX-CHEST-PORTABLE (1 VIEW)   Final Result      1.  Well-positioned ETT.   2.  Moderate right pleural effusion and associated atelectasis versus consolidation.   3.  Retrocardiac atelectasis versus consolidation. No significant left effusion.      [id number]-5 VIEW   Final Result      NGT tip is in the stomach.   1.  Nonobstructive bowel gas pattern.  Small amount of stool throughout the colon.   2.  Ill-defined right basilar atelectasis versus consolidation and small right pleural effusion.      DX-CHEST-PORTABLE (1 VIEW)   Final Result         1. Low lung volume with hypoventilatory change and bibasilar atelectasis.      CT-HEAD W/O   Final Result      1.  Cerebral atrophy.      2.  Otherwise, Head CT without contrast within normal limits. No evidence of acute cerebral infarction, hemorrhage or mass lesion.         DX-CHEST-FOR LINE PLACEMENT Perform procedure in: Patient's Room   Final Result      1.  Endotracheal tube overlies the mid trachea.      2.  NG tube courses beneath the diaphragms.      DX-CHEST-PORTABLE (1 VIEW)   Final Result      No radiographic evidence of acute cardiopulmonary process.             Assessment/Plan  Agitation  Assessment & Plan  - Doing well with thiamine replacement and 25mg qpm Seroquel   Daytime somnolence -doing well after stopping daytime seroquel      Peripheral eosinophilia  Assessment & Plan  Ddx includes HES, Churg Greta, HIV, parasite infection (less likely as no significant travel), eosinophilic malignancy. 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  - Jak2 and BCRABL, peripheral flow cytometry pending  - f/u with [facility] after discharge from SNF.      Congestive heart failure (CHF) ([facility])  Assessment & Plan  - Initial echo with preserved EF, then repeat echo with EF 40% - unclear if he was in RVR or other variable that would impact an acute decrease in his EF  - Not overloaded on exam, no acute exacerbation  - On metoprolol, add Losartan now as Bps remain stable    Dysphagia  Assessment & Plan  - Pt has failed swallow evals last week and this week - has NGT in place  -  Failed MBSS today  - Multifactorial with likely Wernicke's, deconditioning from PNA and being on the vent, acute illness  - Poor long term prognosis given his cerebral atrophy, chronic EtOH abuse and methampetamine abuse likely contributing to his lack of swallow coordination.  - Treat Wernicke's with high dose Thiamine, will continue working with SLP  - Daughter wants PEG tube placed, Dr Mcgrath to consult, NPO Wednesday night    Aspiration pneumonia (HCC)  Assessment & Plan  - Completed antibiotics, now on room air  - Has failed multiple swallow evals, currently receiving tube feeds via NGT  - Failed his MBSS - discussed PEG with pt's daughter, family wishes to move forward  - Dr Mcgrath placed PEG 11/17 - tolerated well      Wernicke encephalopathy syndrome  Assessment & Plan  - Pt with significant dementia symptoms, swallow dysfunction, cerebral atrophy on CT  - Completed high dose IV Thiamine, continue PO supplementation  - Continue ASA     Hypomagnesemia  Assessment & Plan  - Replace as needed    Reactive thrombocytosis  Assessment & Plan  - Continue to monitor       Sepsis due to Pseudomonas species (HCC)- (present on admission)  Assessment & Plan  This is Severe Sepsis Present on admission  SIRS criteria identified on my evaluation include: Fever, with temperature greater than 101 deg F, Tachycardia, with heart rate greater than 90 BPM, Tachypnea, with respirations greater than 20 per minute and Leukocytosis, with WBC greater than 12,000  Clinical indicators of end organ dysfunction include Systolic blood pressure (SBP) <90 mmHg or mean arterial pressure <65 mmHg and Acute respiratory failure as evidenced by a new need for invasive or non-invasive mechanical ventilation (Ventilator or BiPap)   Source is pneumonia by MSSA and pseudomonas  Sepsis protocol initiated  Crystalloid Fluid Administration: Patient has volume overload with >11 lt since admission, (NYHA class III or symptoms with minimal exertion, Or  "NYHA class IV or symptoms at rest or with activity), for this/these reason(s) it is unsafe for this patient to receive 30 mL/kg of fluid.  Partial Fluid Dose Given on 10/24, patient to be reassessed shortly after completion of partial fluid bolus to ensure adequacy of resuscitation  IV antibiotics as appropriate for source of sepsis  Reassessment: I have reassessed the patient's hemodynamic status  Will provide another partial bolus today and wean down the vasopressors     .  Pt had septic shock requiring pressor support, with MSSA and pseudomonas in BAL and tracheal aspirate. Blood cultures negative. Sepsis has resolved.     Empyema (HCC)- (present on admission)  Assessment & Plan  - On the right, moderate-large per CXR of 10/30  - S/p diagnostic thoracentesis 10/25/22: albumin gradient is less than 1.2 g/dl which indicate exudates, his was 0.9, also inflammative effusion with very high LDH 1020 (serum in the 240s range)  - Cultures negative but pt was on antibiotics - did have chest tubes which have since been removed, completed antibiotic course.     Acute respiratory failure with hypoxia (HCC)- (present on admission)  Assessment & Plan  -Intubated for respiratory acidosis and altered mental status with need for repeat intubation twice thereafter  -Cultures from sputum and BAL grew MSSA and pseudomonas, pt completed 14d of Zosyn, and an additional 1.5d of Unasyn for empyema  -also complicated by right empyema, s/p diagnostic thoracentesis and chest tubes  - Currently off antibiotics, back on room air   - RESOLVED     Alcohol abuse with withdrawal (HCC)  Assessment & Plan  Severe withdrawal with respiratory depression requiring intubation on admit  Out of withdrawal window now     Liver mass- (present on admission)  Assessment & Plan  - Seen on US 8/2022 \"right lobe of the liver measuring 1.7 x 1.3 x 1.0 cm in size\"  - AFP negative      Atrial fibrillation (HCC)- (present on admission)  Assessment & Plan  - Not on " anticoagulation prior to admission - appears he was just diagnosed by PCP in June of this year and referred to Cardiology, but I don't see where he was seen by them  - [id number] score of 1-2 - new echo with EF of 40% but prior to that was 50%, and may have some effect of sepsis on EF   - ASA for now after discussion with pt's daughter  - Continue Metoprolol for rate control  -Okay to dc telemetry      Other specified hypothyroidism- (present on admission)  Assessment & Plan  - TSH normal, continue Synthroid     Rash- (present on admission)  Assessment & Plan  - Pt with pathology positive eczema, order his home triamcinolone cream    Hyponatremia  Assessment & Plan  - Resolved with free water flushes, pt was dry         VTE prophylaxis: SCDs/TEDs    I have performed a physical exam and reviewed and updated ROS and Plan today (11/21/2022). In review of yesterday's note (11/20/2022), there are no changes except as documented above.

## 2022-11-21 NOTE — CARE PLAN
The patient is Stable - Low risk of patient condition declining or worsening    Shift Goals Free from falls  Clinical Goals: Pt will remain free of falls  Patient Goals: Sleep comfortably  Family Goals: ANETTE    Progress made toward(s) clinical / shift goals:    Problem: Knowledge Deficit - Standard  Goal: Patient and family/care givers will demonstrate understanding of plan of care, disease process/condition, diagnostic tests and medications  Outcome: Progressing       Patient is not progressing towards the following goals:

## 2022-11-22 PROBLEM — E83.39 HYPERPHOSPHATEMIA: Status: ACTIVE | Noted: 2022-11-22

## 2022-11-22 LAB
BASOPHILS # BLD AUTO: 1.3 % (ref 0–1.8)
BASOPHILS # BLD: 0.1 K/UL (ref 0–0.12)
EOSINOPHIL # BLD AUTO: 1.64 K/UL (ref 0–0.51)
EOSINOPHIL NFR BLD: 21.1 % (ref 0–6.9)
ERYTHROCYTE [DISTWIDTH] IN BLOOD BY AUTOMATED COUNT: 46.9 FL (ref 35.9–50)
GLUCOSE BLD STRIP.AUTO-MCNC: 106 MG/DL (ref 65–99)
GLUCOSE BLD STRIP.AUTO-MCNC: 95 MG/DL (ref 65–99)
HCT VFR BLD AUTO: 34.9 % (ref 42–52)
HGB BLD-MCNC: 11.7 G/DL (ref 14–18)
IMM GRANULOCYTES # BLD AUTO: 0.02 K/UL (ref 0–0.11)
IMM GRANULOCYTES NFR BLD AUTO: 0.3 % (ref 0–0.9)
LYMPHOCYTES # BLD AUTO: 1.55 K/UL (ref 1–4.8)
LYMPHOCYTES NFR BLD: 19.9 % (ref 22–41)
MAGNESIUM SERPL-MCNC: 1.5 MG/DL (ref 1.5–2.5)
MCH RBC QN AUTO: 31.3 PG (ref 27–33)
MCHC RBC AUTO-ENTMCNC: 33.5 G/DL (ref 33.7–35.3)
MCV RBC AUTO: 93.3 FL (ref 81.4–97.8)
MONOCYTES # BLD AUTO: 0.82 K/UL (ref 0–0.85)
MONOCYTES NFR BLD AUTO: 10.6 % (ref 0–13.4)
NEUTROPHILS # BLD AUTO: 3.64 K/UL (ref 1.82–7.42)
NEUTROPHILS NFR BLD: 46.8 % (ref 44–72)
NRBC # BLD AUTO: 0 K/UL
NRBC BLD-RTO: 0 /100 WBC
PHOSPHATE SERPL-MCNC: 6.3 MG/DL (ref 2.5–4.5)
PLATELET # BLD AUTO: 423 K/UL (ref 164–446)
PMV BLD AUTO: 10.4 FL (ref 9–12.9)
RBC # BLD AUTO: 3.74 M/UL (ref 4.7–6.1)
WBC # BLD AUTO: 7.8 K/UL (ref 4.8–10.8)

## 2022-11-22 PROCEDURE — 700102 HCHG RX REV CODE 250 W/ 637 OVERRIDE(OP): Performed by: HOSPITALIST

## 2022-11-22 PROCEDURE — 92526 ORAL FUNCTION THERAPY: CPT

## 2022-11-22 PROCEDURE — A9270 NON-COVERED ITEM OR SERVICE: HCPCS | Performed by: HOSPITALIST

## 2022-11-22 PROCEDURE — 700102 HCHG RX REV CODE 250 W/ 637 OVERRIDE(OP): Performed by: INTERNAL MEDICINE

## 2022-11-22 PROCEDURE — A9270 NON-COVERED ITEM OR SERVICE: HCPCS | Performed by: INTERNAL MEDICINE

## 2022-11-22 PROCEDURE — A9270 NON-COVERED ITEM OR SERVICE: HCPCS | Performed by: FAMILY MEDICINE

## 2022-11-22 PROCEDURE — 770006 HCHG ROOM/CARE - MED/SURG/GYN SEMI*

## 2022-11-22 PROCEDURE — 99232 SBSQ HOSP IP/OBS MODERATE 35: CPT | Performed by: INTERNAL MEDICINE

## 2022-11-22 PROCEDURE — 85025 COMPLETE CBC W/AUTO DIFF WBC: CPT

## 2022-11-22 PROCEDURE — 84100 ASSAY OF PHOSPHORUS: CPT

## 2022-11-22 PROCEDURE — 83735 ASSAY OF MAGNESIUM: CPT

## 2022-11-22 PROCEDURE — 97535 SELF CARE MNGMENT TRAINING: CPT

## 2022-11-22 PROCEDURE — 82962 GLUCOSE BLOOD TEST: CPT

## 2022-11-22 PROCEDURE — 700102 HCHG RX REV CODE 250 W/ 637 OVERRIDE(OP): Performed by: FAMILY MEDICINE

## 2022-11-22 RX ADMIN — THIAMINE HCL TAB 100 MG 100 MG: 100 TAB at 05:18

## 2022-11-22 RX ADMIN — ASPIRIN 81 MG CHEWABLE TABLET 81 MG: 81 TABLET CHEWABLE at 05:18

## 2022-11-22 RX ADMIN — LEVOTHYROXINE SODIUM 50 MCG: 50 TABLET ORAL at 05:18

## 2022-11-22 RX ADMIN — LOSARTAN POTASSIUM 12.5 MG: 25 TABLET, FILM COATED ORAL at 05:18

## 2022-11-22 RX ADMIN — TRIAMCINOLONE ACETONIDE: 1 CREAM TOPICAL at 05:19

## 2022-11-22 RX ADMIN — METOPROLOL TARTRATE 50 MG: 50 TABLET, FILM COATED ORAL at 05:18

## 2022-11-22 RX ADMIN — QUETIAPINE FUMARATE 25 MG: 25 TABLET ORAL at 19:57

## 2022-11-22 RX ADMIN — NICOTINE 7 MG: 7 PATCH TRANSDERMAL at 05:18

## 2022-11-22 RX ADMIN — SCOPOLAMINE 1 PATCH: 1.5 PATCH, EXTENDED RELEASE TRANSDERMAL at 10:10

## 2022-11-22 ASSESSMENT — ENCOUNTER SYMPTOMS
BLURRED VISION: 0
BACK PAIN: 0
ABDOMINAL PAIN: 0
NAUSEA: 0
CHILLS: 0
PALPITATIONS: 0
NERVOUS/ANXIOUS: 0
INSOMNIA: 0
COUGH: 0
FEVER: 0
SHORTNESS OF BREATH: 0
HEADACHES: 0
DIZZINESS: 0
VOMITING: 0
EYE PAIN: 0

## 2022-11-22 ASSESSMENT — COGNITIVE AND FUNCTIONAL STATUS - GENERAL
SUGGESTED CMS G CODE MODIFIER DAILY ACTIVITY: CJ
DRESSING REGULAR LOWER BODY CLOTHING: A LITTLE
EATING MEALS: A LITTLE
HELP NEEDED FOR BATHING: A LITTLE
TOILETING: A LITTLE
DAILY ACTIVITIY SCORE: 20

## 2022-11-22 ASSESSMENT — PAIN DESCRIPTION - PAIN TYPE: TYPE: ACUTE PAIN

## 2022-11-22 NOTE — PROGRESS NOTES
Hospital Medicine Daily Progress Note    Date of Service  11/22/2022    Chief Complaint  Tyree Whiteside is a 57 y.o. male admitted 10/18/2022 with   Chief Complaint   Patient presents with    Medical Clearance     Patient was seen at [facility] for detox from alcohol last drink today.  Patient did attempt to check in at [facility] and was turned away because patient told the staff he was short of breath and has not been taking his medication for his atrial fibrillation.  Patient speaks in full sentences.  No increase work of breathing.  Patient does have eczema and was prescribed prednisone for it.  Patient was given valium and prednisone at [facility].  I spoke with Sharad NOLEN at [facility] and patient needs to be medical cleared         Hospital Course  Pt is a 58yo with a history of atrial fibrillation, HTN, alcohol dependence with abuse, methamphetamine abuse and liver mass who initially presented to [facility] for help with EtOH detoxification but was sent to the ER as he was having hallucinations, confusion, weakness, nausea and vomiting. He was admitted to ICU on 10/18/22 for EtOH withdrawal and respiratory depression and was placed on the ventilator and had a protracted ICU course - he required reintubation on 10/23 and again 10/26 after self extubation, diagnostic thoracentesis and has had MSSA and Pseudomonas PNA, recurrent aspiration, bradycardia, hypotension, ileus, chest tube which has since been removed and loculated pleural effusion.  He transitioned out of the ICU and is still having severe difficulty with swallowing.  Daughter discussed with family regarding moving forward with PEG tube and would like this placed, GI to consult.  (Per prior hospitalist)    11/17/2022-patient had PEG tube placement with [facility] Dr. Sebatsian Mcgrath  11/2-4 -pt had intrapleural fibrinolytics and ICU  10/25, 10/30 - bedside thoracentesis in ICU  10/26 - patient re-intubated  10/23 - patient intubated and had Bronchopscopy in ICU    ABX  given:  10/22-24 & 11/08-09 -- Unasyn  10/24- 11/07 -- Zosyn for eosinophilia  11/17 - cefazolin for PEG placement    Interval Problem Update  Patient stated he was doing well today, was requesting to see if he can eat today.  Spoke to speech therapy, who will evaluate patient with MBSS tomorrow.  Pending SNF placement.    I have discussed this patient's plan of care and discharge plan at IDT rounds today with Case Management, Nursing, Nursing leadership, and other members of the IDT team.    Consultants/Specialty  critical care, GI, oncology, and palliative care  [facility]    Code Status  DNAR/DNI    Disposition  Patient is medically cleared for discharge.   Anticipate discharge to to skilled nursing facility.  I have placed the appropriate orders for post-discharge needs.    Review of Systems  Review of Systems   Constitutional:  Negative for chills and fever.   HENT:  Negative for congestion and hearing loss.    Eyes:  Negative for blurred vision and pain.   Respiratory:  Negative for cough and shortness of breath.    Cardiovascular:  Negative for chest pain and palpitations.   Gastrointestinal:  Negative for abdominal pain, nausea and vomiting.   Genitourinary:  Negative for dysuria and hematuria.   Musculoskeletal:  Negative for back pain and joint pain.   Skin:  Negative for itching and rash.   Neurological:  Negative for dizziness and headaches.   Psychiatric/Behavioral:  The patient is not nervous/anxious and does not have insomnia.    All other systems reviewed and are negative.     Physical Exam  Temp:  [36.4 °C (97.5 °F)-36.7 °C (98.1 °F)] 36.4 °C (97.5 °F)  Pulse:  [76-87] 81  Resp:  [16-17] 17  BP: ()/(62-75) 95/72  SpO2:  [90 %-96 %] 90 %    Physical Exam  Vitals and nursing note reviewed.   Constitutional:       General: He is not in acute distress.     Appearance: He is not diaphoretic.   HENT:      Mouth/Throat:      Mouth: Mucous membranes are dry.      Pharynx: No oropharyngeal exudate.    Cardiovascular:      Rate and Rhythm: Normal rate and regular rhythm.      Pulses: Normal pulses.      Heart sounds: Normal heart sounds. No murmur heard.  Pulmonary:      Effort: Pulmonary effort is normal. No respiratory distress.      Breath sounds: Normal breath sounds. No wheezing or rales.   Abdominal:      General: Bowel sounds are normal. There is no distension.      Tenderness: There is no abdominal tenderness.      Comments: PEG tube in place   Musculoskeletal:         General: No swelling or tenderness. Normal range of motion.   Skin:     General: Skin is warm.      Capillary Refill: Capillary refill takes less than 2 seconds.      Coloration: Skin is not jaundiced or pale.   Neurological:      General: No focal deficit present.      Mental Status: He is alert and oriented to person, place, and time. Mental status is at baseline.      Motor: Weakness present.   Psychiatric:         Mood and Affect: Mood normal.         Behavior: Behavior normal.         Thought Content: Thought content normal.         Judgment: Judgment normal.       Fluids    Intake/Output Summary (Last 24 hours) at 11/22/2022 1547  Last data filed at 11/22/2022 1123  Gross per 24 hour   Intake 180 ml   Output 250 ml   Net -70 ml       Laboratory  Recent Labs     11/20/22  0430 11/21/22  0745 11/22/22  0740   WBC 8.6 7.2 7.8   RBC 3.70* 3.85* 3.74*   HEMOGLOBIN 11.4* 12.0* 11.7*   HEMATOCRIT 34.5* 36.4* 34.9*   MCV 93.2 94.5 93.3   MCH 30.8 31.2 31.3   MCHC 33.0* 33.0* 33.5*   RDW 47.9 47.6 46.9   PLATELETCT 460* 425 423   MPV 10.2 9.9 10.4                       Imaging  DX-ESOPHAGUS - [id number]   Final Result      Please refer to dedicated speech pathology report for complete details.      DX-CHEST-PORTABLE (1 VIEW)   Final Result      1.  Bibasilar and RIGHT perihilar atelectasis which could obscure an additional process. This has decreased since the prior study.   2.  Persistent focal lingular opacity, which could be atelectasis  or early pneumonia   3.  RIGHT pleural effusion      DX-CHEST-FOR LINE PLACEMENT Perform procedure in: Patient's Room   Final Result            1. A left peripherally inserted catheter with tip projects appropriately over the expected area of the lower SVC.      DX-CHEST-FOR LINE PLACEMENT Perform procedure in: Patient's Room   Final Result            1. A right peripherally inserted catheter with tip not well seen but likely projects over the expected area of the cavoatrial junction/right atrium.      IR-PICC LINE PLACEMENT W/ GUIDANCE > AGE 5   Final Result                  Ultrasound-guided PICC placement performed by qualified nursing staff as    above.          DX-CHEST-FOR LINE PLACEMENT Perform procedure in: Patient's Room   Final Result      1.  Interval placement of a PICC line which is satisfactory in position.      2.  Otherwise stable exam.      IR-PICC LINE PLACEMENT W/ GUIDANCE > AGE 5   Final Result                  Ultrasound-guided PICC placement performed by qualified nursing staff as    above.          DX-CHEST-PORTABLE (1 VIEW)   Final Result      1.  Stable bibasilar atelectasis, right greater than left.      2.  Stable right and improved left pleural effusion.      DX-CHEST-PORTABLE (1 VIEW)   Final Result      1.  Increased right pleural effusion, now small/moderate.   2.  Increased right base airspace disease.   3.  Similar small left pleural effusion with mildly increased adjacent airspace disease.      [id number] VIEW   Final Result      Mildly dilated loops of small intestine.      DX-CHEST-PORTABLE (1 VIEW)   Final Result      1.  Interval removal of the right sided chest tube.      2.  Stable bibasilar atelectasis.      3.  Interval decrease in size of a right pleural effusion.      DX-CHEST-PORTABLE (1 VIEW)   Final Result      1.  Stable support devices.      2.  Stable bilateral pleural effusions, right greater than left.      DX-CHEST-PORTABLE (1 VIEW)   Final Result      1.   Interval placement of RIGHT chest tube   2.  Moderate RIGHT hydropneumothorax   3.  Small LEFT pleural effusion   4.  Unchanged BILATERAL atelectasis with possible superimposed pneumonia or other airspace disease      DX-CHEST-PORTABLE (1 VIEW)   Final Result      1.  No significant change      2.  Lines and tubes appear appropriately located      3.  Right pleural effusion, probably large in size      4.  Bilateral atelectasis      EC-ECHOCARDIOGRAM LTD W/ CONT   Final Result      DX-CHEST-PORTABLE (1 VIEW)   Final Result         No significant interval change      CT-CHEST,ABDOMEN,PELVIS W/O   Final Result      1.  RIGHT larger than LEFT pleural effusions with overlying atelectasis. Superimposed pneumonia is not excluded.   2.  Healing fractures the RIGHT fifth and sixth ribs   3.  Distended loops of bowel in the abdomen as described above, likely ileus rather than early or low-grade small bowel obstruction   4.  Enlarged inguinal lymph nodes   5.  The hypoechoic nodule in the liver demonstrated on ultrasound from 8/28/2022 is not seen on this unenhanced CT. Hepatic mass protocol CT or MRI should be pursued when clinically appropriate for further assessment.   6.  RIGHT renal cyst         [id number] VIEW   Final Result         1.  Air-filled distended loops of bowel are seen with reactive mucosal pattern, appearance suggests ileus or enteritis versus evolving obstructive changes. Recommend radiographic followup to resolution to exclude progression to obstruction.   2.  Nasogastric tube is coiled within the stomach, the tip terminates overlying the expected location of the gastric fundus.   3.  Bilateral lower lobe infiltrates   4.  Moderate right and small left pleural effusion      DX-CHEST-PORTABLE (1 VIEW)   Final Result         1.  Bilateral pulmonary edema and/or infiltrates.   2.  Moderate right and small left pleural effusion, stable compared to prior study   3.  Cardiomegaly      DX-CHEST-PORTABLE (1  VIEW)   Final Result         1.  Bilateral pulmonary edema and/or infiltrates.   2.  Moderate right and small left pleural effusion, stable compared to prior study   3.  Cardiomegaly      [id number] VIEW   Final Result         Diffuse gaseous distention of the small bowel and colon, worse in prior, could relate to ileus      DX-CHEST-PORTABLE (1 VIEW)   Final Result         1. No significant interval change.      DX-CHEST-PORTABLE (1 VIEW)   Final Result         1.  Bilateral pulmonary edema and/or infiltrates.   2.  Moderate right and small left pleural effusion, increased on the right compared to prior study   3.  Cardiomegaly      DX-CHEST-PORTABLE (1 VIEW)   Final Result         1.  Pulmonary edema and/or infiltrates are identified, which are stable since the prior exam.   2.  Moderate right pleural effusion, stable.   3.  Cardiomegaly      DX-CHEST-PORTABLE (1 VIEW)   Final Result      1.  Well-positioned ETT.   2.  Moderate right pleural effusion and associated atelectasis versus consolidation.   3.  Retrocardiac atelectasis versus consolidation. No significant left effusion.      [id number] VIEW   Final Result      NGT tip is in the stomach.   1.  Nonobstructive bowel gas pattern. Small amount of stool throughout the colon.   2.  Ill-defined right basilar atelectasis versus consolidation and small right pleural effusion.      DX-CHEST-PORTABLE (1 VIEW)   Final Result         1. Low lung volume with hypoventilatory change and bibasilar atelectasis.      CT-HEAD W/O   Final Result      1.  Cerebral atrophy.      2.  Otherwise, Head CT without contrast within normal limits. No evidence of acute cerebral infarction, hemorrhage or mass lesion.         DX-CHEST-FOR LINE PLACEMENT Perform procedure in: Patient's Room   Final Result      1.  Endotracheal tube overlies the mid trachea.      2.  NG tube courses beneath the diaphragms.      DX-CHEST-PORTABLE (1 VIEW)   Final Result      No radiographic evidence of  acute cardiopulmonary process.      DX-ESOPHAGUS - [id number]    (Results Pending)        Assessment/Plan  * Alcohol dependence with withdrawal complicated by delirium (HCC)- (present on admission)  Assessment & Plan  With tremors, nausea vomiting and hallucinations  Alcohol withdrawal symptoms resolved, patient did require ICU stay.      Hyperphosphatemia- (present on admission)  Assessment & Plan  6.3  May be due to tube feeds  Recheck labs in the morning if remains elevated, will need to speak with dietitian to change feeds    Agitation  Assessment & Plan  - Doing well with thiamine replacement and 25mg qpm Seroquel   Daytime somnolence -doing well after stopping daytime seroquel  Patient is calm and cooperative    Peripheral eosinophilia  Assessment & Plan  Ddx includes HES, Churg Greta, HIV, parasite infection (less likely as no significant travel), eosinophilic malignancy. 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  - Jak2 and BCRABL, peripheral flow cytometry pending  - f/u with [facility] after discharge from [facility] on results    Congestive heart failure (CHF) (HCC)  Assessment & Plan  - Initial echo with preserved EF, then repeat echo with EF 40% - unclear if he was in RVR or other variable that would impact an acute decrease in his EF  - Not overloaded on exam, no acute exacerbation  - On metoprolol, add Losartan now as Bps remain stable    Dysphagia  Assessment & Plan  - Pt has failed swallow evals last week and this week - has NGT in place  - Failed MBSS today  - Multifactorial with likely Wernicke's, deconditioning from PNA and being on the vent, acute illness  - Poor long term prognosis given his cerebral atrophy, chronic EtOH abuse and methampetamine abuse  likely contributing to his lack of swallow coordination.  - Treat Wernicke's with high dose Thiamine, will continue working with SLP  11/17/2022-patient had PEG tube placement with [facility] Dr. Sebastian Mcgrath  Speech therapy to repeat MBSS on 11/23    Aspiration pneumonia (HCC)  Assessment & Plan  - Completed antibiotics, now on room air  - Has failed multiple swallow evals, currently receiving tube feeds via NGT  - Failed his MBSS - discussed PEG with pt's daughter, family wishes to move forward  - Dr Mcgrath placed PEG 11/17 - tolerated well  -Speech therapy to repeat MBSS for 11/23    Wernicke encephalopathy syndrome  Assessment & Plan  - Pt with significant dementia symptoms, swallow dysfunction, cerebral atrophy on CT  - Completed high dose IV Thiamine, continue PO supplementation  - Continue ASA     Hypomagnesemia  Assessment & Plan  - Replace as needed    Reactive thrombocytosis  Assessment & Plan  - Continue to monitor       Sepsis due to Pseudomonas species (HCC)- (present on admission)  Assessment & Plan  Pt had septic shock requiring pressor support, with MSSA and pseudomonas in BAL and tracheal aspirate. Blood cultures negative. Sepsis has resolved.     Empyema (HCC)- (present on admission)  Assessment & Plan  - On the right, moderate-large per CXR of 10/30  - S/p diagnostic thoracentesis 10/25/22: albumin gradient is less than 1.2 g/dl which indicate exudates, his was 0.9, also inflammative effusion with very high LDH 1020 (serum in the 240s range)  - Cultures negative but pt was on antibiotics - did have chest tubes which have since been removed, completed antibiotic course.     Acute respiratory failure with hypoxia (HCC)- (present on admission)  Assessment & Plan  -Intubated for respiratory acidosis and altered mental status with need for repeat intubation twice thereafter  -Cultures from sputum and BAL grew MSSA and pseudomonas, pt completed 14d of Zosyn, and an additional 1.5d of Unasyn for  "empyema  -also complicated by right empyema, s/p diagnostic thoracentesis and chest tubes  - Currently off antibiotics, back on room air   - RESOLVED     Alcohol abuse with withdrawal (HCC)  Assessment & Plan  Severe withdrawal with respiratory depression requiring intubation on admit  Out of withdrawal window now     Liver mass- (present on admission)  Assessment & Plan  - Seen on US 8/2022 \"right lobe of the liver measuring 1.7 x 1.3 x 1.0 cm in size\"  - AFP negative      Atrial fibrillation (HCC)- (present on admission)  Assessment & Plan  - Not on anticoagulation prior to admission - appears he was just diagnosed by PCP in June of this year and referred to Cardiology, but I don't see where he was seen by them  - [id number] score of 1-2 - new echo with EF of 40% but prior to that was 50%, and may have some effect of sepsis on EF   - ASA for now after discussion with pt's daughter  - Continue Metoprolol for rate control  -Okay to dc telemetry      Other specified hypothyroidism- (present on admission)  Assessment & Plan  - TSH normal, continue Synthroid     Rash- (present on admission)  Assessment & Plan  - Pt with pathology positive eczema, order his home triamcinolone cream    Hyponatremia  Assessment & Plan  - Resolved with free water flushes, pt was dry       VTE prophylaxis: enoxaparin ppx    I have performed a physical exam and reviewed and updated ROS and Plan today (11/22/2022). In review of yesterday's note (11/21/2022), there are no changes except as documented above.        "

## 2022-11-22 NOTE — CARE PLAN
The patient is Stable - Low risk of patient condition declining or worsening    Shift Goals Monitor CBG and safety  Clinical Goals: Free of falls and monitor tube feed  Patient Goals: Sleep comfortably  Family Goals: ANETTE    Progress made toward(s) clinical / shift goals:    Problem: Knowledge Deficit - Standard  Goal: Patient and family/care givers will demonstrate understanding of plan of care, disease process/condition, diagnostic tests and medications  Outcome: Progressing       Patient is not progressing towards the following goals:

## 2022-11-22 NOTE — DISCHARGE PLANNING
Case Management Discharge Planning    Admission Date: 10/18/2022  GMLOS: 13.2  ALOS: 35    6-Clicks ADL Score: 14  6-Clicks Mobility Score: 20  PT and/or OT Eval ordered: Yes  Post-acute Referrals Ordered: Yes  Post-acute Choice Obtained: Yes  Has referral(s) been sent to post-acute provider:  Yes      Anticipated Discharge Dispo: Discharge Disposition: D/T to SNF with Medicare cert in anticipation of skilled care (03)    DME Needed: No    Action(s) Taken: Updated Provider/Nurse on Discharge Plan    0920: RN CM called [facility], no answer, VM left requesting call back.     0940: Referrals expanded to [place] & [place] SNFs     1500: CALLUM ORTIZ spoke to Lakeisha at [facility] who states DON has not yet reviewed patient.     Escalations Completed: Pending Discharge Destination    Medically Clear: Yes    Next Steps: Follow up [facility]    Barriers to Discharge: Pending Placement

## 2022-11-22 NOTE — ASSESSMENT & PLAN NOTE
6.3  Decreased. TF changed by dietician.  Recheck labs in the morning if remains elevated, will need to speak with dietitian to change feeds

## 2022-11-22 NOTE — THERAPY
"Speech Language Pathology  Daily Treatment     Patient Name: Tyree Whiteside  Age:  57 y.o., Sex:  male  Medical Record #: [id number]  Today's Date: 11/22/2022     Precautions  Precautions: (P) Fall Risk, Swallow Precautions ( See Comments), PEG Tube  Comments: greatly improved cognition    Assessment    Pt seen on this date for dysphagia therapy. Per OT, pt with wet vocal quality during their session and pt using yankour to clear secretions through out SLP session. Consistent throat clearing noted with trials of ice chips which is highly concerning for penetration/aspiration. Pt able to recall swallowing exercises independently and endorsed completing through out day. Pt completed ~20 reps of effortful swallow and 10 reps PPW exercises with good quality. Discussed recommendation for repeat MBSS with pt and pt's daughter and they consented to procedure; order obtained from MD. Will follow for repeat MBSS on 11/23. Okay for small amount of ice chips to minimize xerostomia and maintain integrity of the swallow.    Plan    1) continue NPO with PEG for nutrition  2) repeat MBSS on 11/23    Continue current treatment plan.    Discharge Recommendations: (P) Recommend post-acute placement for additional speech therapy services prior to discharge home       Objective       11/22/22 1224   Precautions   Precautions Fall Risk;Swallow Precautions ( See Comments);PEG Tube   Vitals   O2 (LPM) 0   O2 Delivery Device None - Room Air   Pain 0 - 10 Group   Therapist Pain Assessment Post Activity Pain Same as Prior to Activity;Nurse Notified;0   Patient / Family Goals   Patient / Family Goal #1 \"I could eat half of that ice\"   Goal #1 Outcome Progressing as expected   Short Term Goals   Short Term Goal # 1 Pt will participate in an instrumental swallow study to fully assess swallow function.   Goal Outcome # 1 Goal met   Short Term Goal # 2 Pt will consume pre-feeding trials with SLP to determine readiness to begin an oral " diet.   Goal Outcome # 2  Progressing as expected   Education Group   Education Provided Dysphagia;Role of Speech Therapy   Dysphagia Patient Response Patient;Family;Acceptance;Explanation;Verbal Demonstration;Reinforcement Needed   Role of SLP Patient Response Patient;Family;Acceptance;Explanation;Verbal Demonstration;Reinforcement Needed   Anticipated Discharge Needs   Discharge Recommendations Recommend post-acute placement for additional speech therapy services prior to discharge home   Therapy Recommendations Upon DC Dysphagia Training;Community Re-Integration;Patient / Family / Caregiver Education   Interdisciplinary Plan of Care Collaboration   IDT Collaboration with  Nursing;Family / Caregiver;Physician   Patient Position at End of Therapy Seated;Family / Friend in Room  (up in chair)

## 2022-11-22 NOTE — THERAPY
"Occupational Therapy  Daily Treatment     Patient Name: Tyree Whiteside  Age:  57 y.o., Sex:  male  Medical Record #: [id number]  Today's Date: 11/22/2022     Precautions: Fall Risk, Swallow Precautions ( See Comments), Nasogastric Tube  Comments: greatly improved cognition    Assessment  Pt was seen for OT tx pt is making significant progress w/regards to ADL participation and functional mobility, pt was pleasant and cooperative through out session. Pt is limited by impaired dynamic balance during functional txfs and standing ADL's. OT will continue to follow, pt needs to increase OOB activity w/all staff, please have pt up to chair at least 3x/day and increasing ambulation opportunities.     Plan  Treatment plan modified to 4 times per week until therapy goals are met for the following treatments:  Adaptive Equipment, Self Care/Activities of Daily Living, Therapeutic Activities, and Therapeutic Exercises.    DC Equipment Recommendations: Front-Wheel Walker  Discharge Recommendations: Post acute placement, however pt is steadily improving from a functional standpoint and does have good support from daughter at dc, pt has the potential to progress to dc w/HH, defer to SLP needs    Subjective  \"I would love to go home soon\"      Objective     11/22/22 1210   Treatment Charges   Charges Yes   OT Self Care / ADL 2   Total Time Spent   OT Time Spent Yes   OT Self Care / ADL (Minutes) 26   OT Total Time Spent (Calculated) 26   Precautions   Precautions Fall Risk;Swallow Precautions ( See Comments);Nasogastric Tube   Comments greatly improved cognition   Pain 0 - 10 Group   Therapist Pain Assessment 0;Nurse Notified   Cognition    Cognition / Consciousness X   Level of Consciousness Alert   Safety Awareness Impulsive   Attention Impaired   Comments Overall greatly improved attention, able to follow 2 step commands, ortiented and motivated for recovery   Passive ROM Upper Body   Passive ROM Upper Body WDL   Active " ROM Upper Body   Active ROM Upper Body  WDL   Strength Upper Body   Upper Body Strength  WDL   Other Treatments   Other Treatments Provided Focused on OOB ADl's grooming, toileting and balance during tasks   Balance   Sitting Balance (Static) Good   Sitting Balance (Dynamic) Fair +   Standing Balance (Static) Fair   Standing Balance (Dynamic) Fair   Weight Shift Sitting Fair   Weight Shift Standing Fair   Skilled Intervention Verbal Cuing;Tactile Cuing;Compensatory Strategies   Comments w/fww   Bed Mobility    Supine to Sit Modified Independent   Sit to Supine Modified Independent   Scooting Modified Independent   Rolling Modified Independent   Skilled Intervention Verbal Cuing   Activities of Daily Living   Grooming Contact Guard Assist;Standing   Upper Body Dressing Supervision   Lower Body Dressing Supervision   Toileting Contact Guard Assist   Skilled Intervention Verbal Cuing;Tactile Cuing;Facilitation;Compensatory Strategies   Comments donned socks and underwear, primarily needed cues and CGA for clothing managment at toilet and balance standing at sink   How much help from another person does the patient currently need...   6 Clicks Daily Activity Score 20   Functional Mobility   Sit to Stand Contact Guard Assist   Bed, Chair, Wheelchair Transfer Contact Guard Assist   Toilet Transfers Contact Guard Assist   Mobility walking in room and hallway w/fww   Skilled Intervention Verbal Cuing;Tactile Cuing;Facilitation   Activity Tolerance   Comments no c/o pain or fatigue   Short Term Goals   Short Term Goal # 1 Mod assist to Mod Indep for UB clothing management   Goal Outcome # 1 Progressing as expected   Short Term Goal # 2 Mod assist to Mod Indep for LB clothing management via AE/DME PRN   Goal Outcome # 2 Progressing as expected   Short Term Goal # 3 Mod assist to Mod indep for commode transfer (BSC) via DME   Goal Outcome # 3 Goal met   Short Term Goal # 4 Mod assist to Mod Indep for toileting task   Goal  Outcome # 4 Progressing as expected   Education Group   Role of Occupational Therapist Patient Response Patient;Family;Acceptance;Explanation;Verbal Demonstration   ADL Patient Response Patient;Acceptance;Explanation;Action Demonstration   Anticipated Discharge Equipment and Recommendations   DC Equipment Recommendations Front-Wheel Walker   Discharge Recommendations   (see note)   Interdisciplinary Plan of Care Collaboration   IDT Collaboration with  Nursing;Family / Caregiver   Patient Position at End of Therapy Seated;Chair Alarm On;Call Light within Reach;Tray Table within Reach;Phone within Reach   Collaboration Comments RN aware of OT tx and pts efforts   Session Information   Date / Session Number  11/22 #4 (1/4, 11/27)   Priority   (progress to home)

## 2022-11-22 NOTE — DISCHARGE PLANNING
Agency/Facility Name: [facility]  Outcome: Left a voicemail regarding referral status. DPA requested a call back.    Received Choice form at 0938  Agency/Facility Name: [facility]  Referral sent per Choice form @ 0902     Agency/Facility Name: [facility]  Spoke To: Lakeisha  Outcome: Per Lakeisha, DON is still reviewing referral at this time. Per Lakeisha, she will follow up with DPA with an answer.  RN ANGEL notified    Received Choice form at 0939  Agency/Facility Name: [facility]  Referral sent per Choice form @ 1002     @1254  Agency/Facility Name: [facility]  Spoke To: Radha  Outcome: Per Radha, she left a voicemail declining the pt due to insurance.    Agency/Facility Name: [facility]  Spoke To: Nils  Outcome: Per Nils, pt has been declined due to not being able to meet the pt's needs.

## 2022-11-23 ENCOUNTER — APPOINTMENT (OUTPATIENT)
Dept: RADIOLOGY | Facility: MEDICAL CENTER | Age: 57
DRG: 870 | End: 2022-11-23
Attending: INTERNAL MEDICINE
Payer: COMMERCIAL

## 2022-11-23 LAB
BASOPHILS # BLD AUTO: 1.4 % (ref 0–1.8)
BASOPHILS # BLD: 0.11 K/UL (ref 0–0.12)
EOSINOPHIL # BLD AUTO: 1.52 K/UL (ref 0–0.51)
EOSINOPHIL NFR BLD: 19.4 % (ref 0–6.9)
ERYTHROCYTE [DISTWIDTH] IN BLOOD BY AUTOMATED COUNT: 47 FL (ref 35.9–50)
GLUCOSE BLD STRIP.AUTO-MCNC: 103 MG/DL (ref 65–99)
GLUCOSE BLD STRIP.AUTO-MCNC: 87 MG/DL (ref 65–99)
HCT VFR BLD AUTO: 34.5 % (ref 42–52)
HGB BLD-MCNC: 11.5 G/DL (ref 14–18)
IMM GRANULOCYTES # BLD AUTO: 0.03 K/UL (ref 0–0.11)
IMM GRANULOCYTES NFR BLD AUTO: 0.4 % (ref 0–0.9)
LYMPHOCYTES # BLD AUTO: 1.45 K/UL (ref 1–4.8)
LYMPHOCYTES NFR BLD: 18.5 % (ref 22–41)
MAGNESIUM SERPL-MCNC: 1.4 MG/DL (ref 1.5–2.5)
MCH RBC QN AUTO: 31 PG (ref 27–33)
MCHC RBC AUTO-ENTMCNC: 33.3 G/DL (ref 33.7–35.3)
MCV RBC AUTO: 93 FL (ref 81.4–97.8)
MONOCYTES # BLD AUTO: 0.86 K/UL (ref 0–0.85)
MONOCYTES NFR BLD AUTO: 11 % (ref 0–13.4)
NEUTROPHILS # BLD AUTO: 3.87 K/UL (ref 1.82–7.42)
NEUTROPHILS NFR BLD: 49.3 % (ref 44–72)
NRBC # BLD AUTO: 0 K/UL
NRBC BLD-RTO: 0 /100 WBC
PHOSPHATE SERPL-MCNC: 6 MG/DL (ref 2.5–4.5)
PLATELET # BLD AUTO: 384 K/UL (ref 164–446)
PMV BLD AUTO: 10 FL (ref 9–12.9)
RBC # BLD AUTO: 3.71 M/UL (ref 4.7–6.1)
WBC # BLD AUTO: 7.8 K/UL (ref 4.8–10.8)

## 2022-11-23 PROCEDURE — A9270 NON-COVERED ITEM OR SERVICE: HCPCS | Performed by: INTERNAL MEDICINE

## 2022-11-23 PROCEDURE — 700102 HCHG RX REV CODE 250 W/ 637 OVERRIDE(OP): Performed by: INTERNAL MEDICINE

## 2022-11-23 PROCEDURE — 770006 HCHG ROOM/CARE - MED/SURG/GYN SEMI*

## 2022-11-23 PROCEDURE — 700102 HCHG RX REV CODE 250 W/ 637 OVERRIDE(OP): Performed by: HOSPITALIST

## 2022-11-23 PROCEDURE — 83735 ASSAY OF MAGNESIUM: CPT

## 2022-11-23 PROCEDURE — 82962 GLUCOSE BLOOD TEST: CPT

## 2022-11-23 PROCEDURE — 84100 ASSAY OF PHOSPHORUS: CPT

## 2022-11-23 PROCEDURE — A9270 NON-COVERED ITEM OR SERVICE: HCPCS | Performed by: HOSPITALIST

## 2022-11-23 PROCEDURE — 97116 GAIT TRAINING THERAPY: CPT

## 2022-11-23 PROCEDURE — 700111 HCHG RX REV CODE 636 W/ 250 OVERRIDE (IP): Performed by: INTERNAL MEDICINE

## 2022-11-23 PROCEDURE — 85025 COMPLETE CBC W/AUTO DIFF WBC: CPT

## 2022-11-23 PROCEDURE — 97530 THERAPEUTIC ACTIVITIES: CPT

## 2022-11-23 PROCEDURE — 700102 HCHG RX REV CODE 250 W/ 637 OVERRIDE(OP): Performed by: FAMILY MEDICINE

## 2022-11-23 PROCEDURE — A9270 NON-COVERED ITEM OR SERVICE: HCPCS | Performed by: FAMILY MEDICINE

## 2022-11-23 PROCEDURE — 74230 X-RAY XM SWLNG FUNCJ C+: CPT

## 2022-11-23 PROCEDURE — 92611 MOTION FLUOROSCOPY/SWALLOW: CPT

## 2022-11-23 PROCEDURE — 99232 SBSQ HOSP IP/OBS MODERATE 35: CPT | Performed by: INTERNAL MEDICINE

## 2022-11-23 RX ORDER — VITAMIN C
1 TAB ORAL DAILY
Status: DISCONTINUED | OUTPATIENT
Start: 2022-11-23 | End: 2022-11-29 | Stop reason: HOSPADM

## 2022-11-23 RX ORDER — MAGNESIUM SULFATE HEPTAHYDRATE 40 MG/ML
2 INJECTION, SOLUTION INTRAVENOUS ONCE
Status: COMPLETED | OUTPATIENT
Start: 2022-11-23 | End: 2022-11-23

## 2022-11-23 RX ORDER — ZINC SULFATE 50(220)MG
220 CAPSULE ORAL DAILY
Status: DISCONTINUED | OUTPATIENT
Start: 2022-11-23 | End: 2022-11-29 | Stop reason: HOSPADM

## 2022-11-23 RX ADMIN — ENOXAPARIN SODIUM 40 MG: 40 INJECTION SUBCUTANEOUS at 17:19

## 2022-11-23 RX ADMIN — METOPROLOL TARTRATE 50 MG: 50 TABLET, FILM COATED ORAL at 17:18

## 2022-11-23 RX ADMIN — ZINC SULFATE 220 MG (50 MG) CAPSULE 220 MG: CAPSULE at 17:18

## 2022-11-23 RX ADMIN — TRIAMCINOLONE ACETONIDE: 1 CREAM TOPICAL at 05:38

## 2022-11-23 RX ADMIN — METOPROLOL TARTRATE 50 MG: 50 TABLET, FILM COATED ORAL at 05:37

## 2022-11-23 RX ADMIN — MAGNESIUM SULFATE HEPTAHYDRATE 2 G: 40 INJECTION, SOLUTION INTRAVENOUS at 11:20

## 2022-11-23 RX ADMIN — NICOTINE 7 MG: 7 PATCH TRANSDERMAL at 05:39

## 2022-11-23 RX ADMIN — THIAMINE HCL TAB 100 MG 100 MG: 100 TAB at 05:37

## 2022-11-23 RX ADMIN — SENNOSIDES AND DOCUSATE SODIUM 2 TABLET: 50; 8.6 TABLET ORAL at 17:19

## 2022-11-23 RX ADMIN — LOSARTAN POTASSIUM 12.5 MG: 25 TABLET, FILM COATED ORAL at 05:38

## 2022-11-23 RX ADMIN — LEVOTHYROXINE SODIUM 50 MCG: 50 TABLET ORAL at 05:37

## 2022-11-23 RX ADMIN — Medication 1 TABLET: at 17:19

## 2022-11-23 RX ADMIN — ASPIRIN 81 MG CHEWABLE TABLET 81 MG: 81 TABLET CHEWABLE at 05:37

## 2022-11-23 RX ADMIN — QUETIAPINE FUMARATE 25 MG: 25 TABLET ORAL at 20:19

## 2022-11-23 RX ADMIN — TRIAMCINOLONE ACETONIDE: 1 CREAM TOPICAL at 17:19

## 2022-11-23 ASSESSMENT — ENCOUNTER SYMPTOMS
COUGH: 0
BACK PAIN: 0
VOMITING: 0
SHORTNESS OF BREATH: 0
NAUSEA: 0
HEADACHES: 0
INSOMNIA: 0
CHILLS: 0
NERVOUS/ANXIOUS: 0
ABDOMINAL PAIN: 0
EYE PAIN: 0
DIZZINESS: 0
PALPITATIONS: 0
BLURRED VISION: 0
FEVER: 0

## 2022-11-23 ASSESSMENT — COGNITIVE AND FUNCTIONAL STATUS - GENERAL
STANDING UP FROM CHAIR USING ARMS: A LITTLE
SUGGESTED CMS G CODE MODIFIER MOBILITY: CJ
CLIMB 3 TO 5 STEPS WITH RAILING: A LITTLE
MOVING FROM LYING ON BACK TO SITTING ON SIDE OF FLAT BED: A LITTLE
WALKING IN HOSPITAL ROOM: A LITTLE
MOBILITY SCORE: 20

## 2022-11-23 ASSESSMENT — PAIN DESCRIPTION - PAIN TYPE: TYPE: ACUTE PAIN

## 2022-11-23 ASSESSMENT — GAIT ASSESSMENTS
GAIT LEVEL OF ASSIST: STANDBY ASSIST
DISTANCE (FEET): 120
ASSISTIVE DEVICE: FRONT WHEEL WALKER
DEVIATION: SHUFFLED GAIT;BRADYKINETIC

## 2022-11-23 NOTE — DIETARY
Nutrition support brief update:  Day 36 of admit.  Tyree Whiteside is a 57 y.o. male with admitting DX of Alcohol withdrawal delirium.      Tube feeding initiated on 10/30. Current TF via PEG is Fibersource HN, goal rate 70 ml/hr, providing 2016 kcals, 91 grams protein, 1361 mL free water. Current free water flush is 60 ml q6hrs. Total daily free water from TF and flushes = 1600 ml/day.    Assessment:  No new wts since RD note 11/20; current nutritional needs calculations based on wt 69.2 kg per RD update 11/14/22 remain appropriate:    MSJ: REE=1528 x  1.2= 1834 kcals  -5896-3531 kcals (27-29 kcals/kg)  -83-97 g protein (1.2-1.4 g/kg)  Estimated daily fluid needs: 0306-4814 ml/day (~1 ml/kcal, 27-30 ml/kg)    Evaluation:   MD Chaves requesting change in TF regimen to bolus feeds in preparation for discharge. Pt anticipated to discharge to SNF. SLP evaluated pt's swallow function via MBSS this a.m: SLP recommends for continued NPO with PEG for nutrition source.  Labs reviewed: Phos 6.0, consistently elevated/gradual trend up. Mag 1.4  MAR: levothyroxine, mag sulfate IVPB, thiamine, senna (refused)  Last BM 11/20; meliton UOP  Transition pt to boluses to allow freedom from pump in anticipation of discharge; pt could benefit from lower-phosphorous formula given elevated phosphorous labs trending up. Current TF regimen provides 1613 mg phos daily. Novasource Renal best meets pt's estimated nutrition needs and limits phosphorous intake.    Malnutrition risk: No new risks noted.    Recommendations/Plan:  Stop continuous feeds for at least 2 hours before first bolus. At first scheduled meal (dinner 11/23), provide 100 mL bolus of Novasource Renal. If tolerated, advance by 100 mL each feeding until goal of 250 mL QID (TID meals + HS) is reached.   Goal 250 mL QID of Novasource Renal will provide daily 2000 kcals, 90 grams protein, 720 mL free water, 819 mg phos.  Fluids per MD. Note that Novasource Renal is a  more concentrated formula compared to previous TF regimen Fibersource HN. New TF routine will provide 641 mL/day less free water than previous regimen. Suggested free water flush of 0224-0214 mL/day to meet estimated fluid needs. Total free water from TF + suggested additional free water flushes would provide 6335-3980 mL/day (~1 ml/kcal).  Continue to monitor weight trends.    RD following.

## 2022-11-23 NOTE — CARE PLAN
The patient is Stable - Low risk of patient condition declining or worsening    Shift Goals Monitor pt's safety  Clinical Goals: Free of falls  Patient Goals: Sleep comfortably  Family Goals: ANETTE    Progress made toward(s) clinical / shift goals:    Problem: Knowledge Deficit - Standard  Goal: Patient and family/care givers will demonstrate understanding of plan of care, disease process/condition, diagnostic tests and medications  Outcome: Progressing       Patient is not progressing towards the following goals:

## 2022-11-23 NOTE — THERAPY
Speech Language Pathology   Video Swallow Evaluation     Patient Name: Tyree Whiteside  AGE:  57 y.o., SEX:  male  Medical Record #: [id number]  Today's Date: 11/23/2022     Precautions  Precautions: Fall Risk, Swallow Precautions ( See Comments), PEG Tube  Comments: greatly improved cognition      Current Method of Nutrition   PEG tube    Pertinent Information  Affect/Behavior: Appropriate, Cooperative  Oxygen Requirements: Room Air  Secretion Management: WNL  Feeding Tube: PEG tube  Dentition: Fair  Factor(s) Affecting Performance: None      Discussed with the risks, benefits, and alternatives of the VFSS procedure. Patient/family acknowledged and agreed to proceed.    Assessment  Videofluoroscopic Swallow Study was conducted in the lateral projection(s) to evaluate oropharyngeal swallow function. A radiology tech was present to assist with the procedure.       Positioning: seated upright in fluoroscopy chair  Anatomic View: WNL  Bolus Administration: SLP and Patient  PO barium contrast trials: Varibar thin liquid, Varibar nectar (mildly thick) liquid, liquidized mixed with Varibar powder, Varibar pudding      Consistency PAS Score Timing Comments   Thin Liquid 8 Post swallow    Mildly Thick Liquid (MTL) 5 During swallow    Liquidized 3 During swallow    Pudding 1 N/A      Penetration-Aspiration Scale (PAS)  1     No contrast enters airway  2     Contrast enters the airway, remains above the vocal folds, and is ejected from the airway (not seen in the airway at the end of the swallow).  3     Contrast enters the airway, remains above the vocal folds, and is not ejected from the airway (is seen in the airway after the swallow).  4     Contrast enters the airway, contacts the vocal folds, and is ejected from the airway.  5     Contrast enters the airway, contacts the vocal folds, and is not ejected from the airway  6     Contrast enters the airway, crosses the plane of the vocal folds, and is ejected from  the airway.  7     Contrast enters the airway, crosses the plane of the vocal folds, and is not ejected from the airway despite effort.  8     Contrast enters the airway, crosses the plane of the vocal folds, is not ejected from the airway and there is no response to aspiration.        Oral phase:  Premature spillage to airway with trials of thin liquids and MTL via cup sip.      Pharyngeal phase:  Impaired epiglottic inversion, weak laryngeal elevation, weak pharyngeal squeeze, and impaired laryngeal vestibular closure resulted in:    1) Consistent penetration and/or aspiration during the swallow with trials of thin liquids  2) Consistent penetration and/or aspiration during the swallow with trials of MTL  3) Significant vallecular residue after the swallow which required 6-7 effortful swallow from pt to minimize to a mild to moderate level (severe with pudding)    Pt has improved sensation to penetration/aspiration events, however, is still requiring cues at times to cough/throat clear to expel residue from laryngeal vestibule. Cough/throat clear is not always successful for ejection of residue in laryngeal vestibule. Unfortunately chin tuck strategy was not successful for reducing/eliminating penetration or aspiration with trials of thin liquids or MTL and did not improve severe amount of vallecular residue with pudding. Multiple strong effortful swallows improved but did not eliminate pharyngeal residue after the swallow. Three second prep also not successful for reducing penetration/aspiration.       Esophageal phase:  Retention of contrast below UES appreciated through out trials but no obvious retrograde flow.        Clinical Impressions  The pt presents with a moderate-severe oropharyngeal swallow, likely acute related to prolonged intubation status. Swallowing function has improved from previous diagnostics however pt is still at high risk for aspiration with PO diet. Results and recommendations discussed  "with pt and his daughter and they verbalized understanding.       Recommendations  Continue to recommend NPO with PEG for nutrition. Okay for small amounts of ice chips to minimize xerostomia and maintain integrity of the swallow  2.  Swallowing Instructions & Precautions:   Medication: Non Oral   Oral Care: Q2h        Plan    Recommend Speech Therapy 4 times per week until therapy goals are met for the following treatments:  Dysphagia Training and Patient / Family / Caregiver Education.    Discharge Recommendations: (P) Recommend post-acute placement for additional speech therapy services prior to discharge home       Objective       11/23/22 1151   Vitals   O2 (LPM) 0   O2 Delivery Device None - Room Air   Pain 0 - 10 Group   Therapist Pain Assessment Post Activity Pain Same as Prior to Activity;Nurse Notified;0   Prior Living Situation   Lives with - Patient's Self Care Capacity Alone and Able to Care For Self   Prior Level Of Function   Communication Within Functional Limits   Swallow Within Functional Limits   Dentition Intact   Dentures None   Hearing Within Functional Limits for Evaluation   Hearing Aid None   Vision Within Functional Limits for Evaluation   Patient's Primary Language English   Patient / Family Goals   Patient / Family Goal #1 \"I could eat half of that ice\"   Goal #1 Outcome Progressing as expected   Short Term Goals   Short Term Goal # 1 Pt will participate in an instrumental swallow study to fully assess swallow function.   Goal Outcome # 1 Goal met   Short Term Goal # 2 Pt will consume pre-feeding trials with SLP to determine readiness to begin an oral diet.   Goal Outcome # 2  Progressing as expected   Education Group   Education Provided Dysphagia;Role of Speech Therapy   Dysphagia Patient Response Patient;Family;Acceptance;Explanation;Verbal Demonstration;Reinforcement Needed   Role of SLP Patient Response Patient;Family;Acceptance;Explanation;Verbal Demonstration;Reinforcement Needed "   Anticipated Discharge Needs   Discharge Recommendations Recommend post-acute placement for additional speech therapy services prior to discharge home   Therapy Recommendations Upon DC Dysphagia Training;Community Re-Integration;Patient / Family / Caregiver Education   Interdisciplinary Plan of Care Collaboration   IDT Collaboration with  Nursing;Family / Caregiver;Physician

## 2022-11-23 NOTE — PROGRESS NOTES
Hospital Medicine Daily Progress Note    Date of Service  11/23/2022    Chief Complaint  Tyree Whiteside is a 57 y.o. male admitted 10/18/2022 with   Chief Complaint   Patient presents with    Medical Clearance     Patient was seen at [facility] for detox from alcohol last drink today.  Patient did attempt to check in at [facility] and was turned away because patient told the staff he was short of breath and has not been taking his medication for his atrial fibrillation.  Patient speaks in full sentences.  No increase work of breathing.  Patient does have eczema and was prescribed prednisone for it.  Patient was given valium and prednisone at [facility].  I spoke with Sharad NOLEN at [facility] and patient needs to be medical cleared         Hospital Course  Pt is a 56yo with a history of atrial fibrillation, HTN, alcohol dependence with abuse, methamphetamine abuse and liver mass who initially presented to [facility] for help with EtOH detoxification but was sent to the ER as he was having hallucinations, confusion, weakness, nausea and vomiting. He was admitted to ICU on 10/18/22 for EtOH withdrawal and respiratory depression and was placed on the ventilator and had a protracted ICU course - he required reintubation on 10/23 and again 10/26 after self extubation, diagnostic thoracentesis and has had MSSA and Pseudomonas PNA, recurrent aspiration, bradycardia, hypotension, ileus, chest tube which has since been removed and loculated pleural effusion.  He transitioned out of the ICU and is still having severe difficulty with swallowing.  Daughter discussed with family regarding moving forward with PEG tube and would like this placed, GI to consult.  (Per prior hospitalist)    11/17/2022-[facility]  11/2-4 -pt had intrapleural fibrinolytics and ICU  10/25, 10/30 - bedside thoracentesis in ICU  10/26 - patient re-intubated  10/23 - patient intubated and had Bronchopscopy in ICU    ABX  given:  10/22-24 & 11/08-09 -- Unasyn  10/24- 11/07 -- Zosyn for eosinophilia  11/17 - cefazolin for PEG placement    Interval Problem Update  Patient stated he was doing well today, family at bedside today, explained course.  Repeat MBSS performed today, preliminary results showing patient continued to fail swallow test. Will need to change TF continuous to bolus in preparation for discharging.  Pending SNF placement.    I have discussed this patient's plan of care and discharge plan at IDT rounds today with Case Management, Nursing, Nursing leadership, and other members of the IDT team.    Consultants/Specialty  critical care, GI, oncology, and palliative care  [facility]    Code Status  DNAR/DNI    Disposition  Patient is medically cleared for discharge.   Anticipate discharge to to skilled nursing facility.  I have placed the appropriate orders for post-discharge needs.    Review of Systems  Review of Systems   Constitutional:  Negative for chills and fever.   HENT:  Negative for congestion and hearing loss.    Eyes:  Negative for blurred vision and pain.   Respiratory:  Negative for cough and shortness of breath.    Cardiovascular:  Negative for chest pain and palpitations.   Gastrointestinal:  Negative for abdominal pain, nausea and vomiting.   Genitourinary:  Negative for dysuria and hematuria.   Musculoskeletal:  Negative for back pain and joint pain.   Skin:  Positive for itching. Negative for rash.   Neurological:  Negative for dizziness and headaches.   Psychiatric/Behavioral:  The patient is not nervous/anxious and does not have insomnia.    All other systems reviewed and are negative.     Physical Exam  Temp:  [36.5 °C (97.7 °F)-36.7 °C (98.1 °F)] 36.7 °C (98.1 °F)  Pulse:  [76-88] 78  Resp:  [16-18] 16  BP: ()/(60-64) 99/62  SpO2:  [94 %-96 %] 95 %    Physical Exam  Vitals and nursing note reviewed.   Constitutional:       General: He is not in acute distress.     Appearance: He is not diaphoretic.    HENT:      Mouth/Throat:      Mouth: Mucous membranes are dry.      Pharynx: No oropharyngeal exudate.   Cardiovascular:      Rate and Rhythm: Normal rate and regular rhythm.      Pulses: Normal pulses.      Heart sounds: Normal heart sounds. No murmur heard.  Pulmonary:      Effort: Pulmonary effort is normal. No respiratory distress.      Breath sounds: Normal breath sounds. No wheezing or rales.   Abdominal:      General: Bowel sounds are normal. There is no distension.      Tenderness: There is no abdominal tenderness.      Comments: PEG tube in place   Musculoskeletal:         General: No swelling or tenderness. Normal range of motion.   Skin:     General: Skin is warm.      Capillary Refill: Capillary refill takes less than 2 seconds.      Coloration: Skin is not jaundiced or pale.   Neurological:      General: No focal deficit present.      Mental Status: He is alert and oriented to person, place, and time. Mental status is at baseline.      Motor: Weakness present.   Psychiatric:         Mood and Affect: Mood normal.         Behavior: Behavior normal.         Thought Content: Thought content normal.         Judgment: Judgment normal.       Fluids    Intake/Output Summary (Last 24 hours) at 11/23/2022 1229  Last data filed at 11/23/2022 1200  Gross per 24 hour   Intake 150 ml   Output 550 ml   Net -400 ml       Laboratory  Recent Labs     11/21/22  0745 11/22/22  0740 11/23/22  0606   WBC 7.2 7.8 7.8   RBC 3.85* 3.74* 3.71*   HEMOGLOBIN 12.0* 11.7* 11.5*   HEMATOCRIT 36.4* 34.9* 34.5*   MCV 94.5 93.3 93.0   MCH 31.2 31.3 31.0   MCHC 33.0* 33.5* 33.3*   RDW 47.6 46.9 47.0   PLATELETCT 425 423 384   MPV 9.9 10.4 10.0                       Imaging  DX-ESOPHAGUS - [id number]         DX-ESOPHAGUS - [id number]   Final Result      Please refer to dedicated speech pathology report for complete details.      DX-CHEST-PORTABLE (1 VIEW)   Final Result      1.  Bibasilar and RIGHT perihilar atelectasis which  could obscure an additional process. This has decreased since the prior study.   2.  Persistent focal lingular opacity, which could be atelectasis or early pneumonia   3.  RIGHT pleural effusion      DX-CHEST-FOR LINE PLACEMENT Perform procedure in: Patient's Room   Final Result            1. A left peripherally inserted catheter with tip projects appropriately over the expected area of the lower SVC.      DX-CHEST-FOR LINE PLACEMENT Perform procedure in: Patient's Room   Final Result            1. A right peripherally inserted catheter with tip not well seen but likely projects over the expected area of the cavoatrial junction/right atrium.      IR-PICC LINE PLACEMENT W/ GUIDANCE > AGE 5   Final Result                  Ultrasound-guided PICC placement performed by qualified nursing staff as    above.          DX-CHEST-FOR LINE PLACEMENT Perform procedure in: Patient's Room   Final Result      1.  Interval placement of a PICC line which is satisfactory in position.      2.  Otherwise stable exam.      IR-PICC LINE PLACEMENT W/ GUIDANCE > AGE 5   Final Result                  Ultrasound-guided PICC placement performed by qualified nursing staff as    above.          DX-CHEST-PORTABLE (1 VIEW)   Final Result      1.  Stable bibasilar atelectasis, right greater than left.      2.  Stable right and improved left pleural effusion.      DX-CHEST-PORTABLE (1 VIEW)   Final Result      1.  Increased right pleural effusion, now small/moderate.   2.  Increased right base airspace disease.   3.  Similar small left pleural effusion with mildly increased adjacent airspace disease.      [id number]-4 VIEW   Final Result      Mildly dilated loops of small intestine.      DX-CHEST-PORTABLE (1 VIEW)   Final Result      1.  Interval removal of the right sided chest tube.      2.  Stable bibasilar atelectasis.      3.  Interval decrease in size of a right pleural effusion.      DX-CHEST-PORTABLE (1 VIEW)   Final Result      1.  Stable  support devices.      2.  Stable bilateral pleural effusions, right greater than left.      DX-CHEST-PORTABLE (1 VIEW)   Final Result      1.  Interval placement of RIGHT chest tube   2.  Moderate RIGHT hydropneumothorax   3.  Small LEFT pleural effusion   4.  Unchanged BILATERAL atelectasis with possible superimposed pneumonia or other airspace disease      DX-CHEST-PORTABLE (1 VIEW)   Final Result      1.  No significant change      2.  Lines and tubes appear appropriately located      3.  Right pleural effusion, probably large in size      4.  Bilateral atelectasis      EC-ECHOCARDIOGRAM LTD W/ CONT   Final Result      DX-CHEST-PORTABLE (1 VIEW)   Final Result         No significant interval change      CT-CHEST,ABDOMEN,PELVIS W/O   Final Result      1.  RIGHT larger than LEFT pleural effusions with overlying atelectasis. Superimposed pneumonia is not excluded.   2.  Healing fractures the RIGHT fifth and sixth ribs   3.  Distended loops of bowel in the abdomen as described above, likely ileus rather than early or low-grade small bowel obstruction   4.  Enlarged inguinal lymph nodes   5.  The hypoechoic nodule in the liver demonstrated on ultrasound from 8/28/2022 is not seen on this unenhanced CT. Hepatic mass protocol CT or MRI should be pursued when clinically appropriate for further assessment.   6.  RIGHT renal cyst         [id number] VIEW   Final Result         1.  Air-filled distended loops of bowel are seen with reactive mucosal pattern, appearance suggests ileus or enteritis versus evolving obstructive changes. Recommend radiographic followup to resolution to exclude progression to obstruction.   2.  Nasogastric tube is coiled within the stomach, the tip terminates overlying the expected location of the gastric fundus.   3.  Bilateral lower lobe infiltrates   4.  Moderate right and small left pleural effusion      DX-CHEST-PORTABLE (1 VIEW)   Final Result         1.  Bilateral pulmonary edema and/or  infiltrates.   2.  Moderate right and small left pleural effusion, stable compared to prior study   3.  Cardiomegaly      DX-CHEST-PORTABLE (1 VIEW)   Final Result         1.  Bilateral pulmonary edema and/or infiltrates.   2.  Moderate right and small left pleural effusion, stable compared to prior study   3.  Cardiomegaly      [id number]-5 VIEW   Final Result         Diffuse gaseous distention of the small bowel and colon, worse in prior, could relate to ileus      DX-CHEST-PORTABLE (1 VIEW)   Final Result         1. No significant interval change.      DX-CHEST-PORTABLE (1 VIEW)   Final Result         1.  Bilateral pulmonary edema and/or infiltrates.   2.  Moderate right and small left pleural effusion, increased on the right compared to prior study   3.  Cardiomegaly      DX-CHEST-PORTABLE (1 VIEW)   Final Result         1.  Pulmonary edema and/or infiltrates are identified, which are stable since the prior exam.   2.  Moderate right pleural effusion, stable.   3.  Cardiomegaly      DX-CHEST-PORTABLE (1 VIEW)   Final Result      1.  Well-positioned ETT.   2.  Moderate right pleural effusion and associated atelectasis versus consolidation.   3.  Retrocardiac atelectasis versus consolidation. No significant left effusion.      MA-[id number]-2 VIEW   Final Result      NGT tip is in the stomach.   1.  Nonobstructive bowel gas pattern. Small amount of stool throughout the colon.   2.  Ill-defined right basilar atelectasis versus consolidation and small right pleural effusion.      DX-CHEST-PORTABLE (1 VIEW)   Final Result         1. Low lung volume with hypoventilatory change and bibasilar atelectasis.      CT-HEAD W/O   Final Result      1.  Cerebral atrophy.      2.  Otherwise, Head CT without contrast within normal limits. No evidence of acute cerebral infarction, hemorrhage or mass lesion.         DX-CHEST-FOR LINE PLACEMENT Perform procedure in: Patient's Room   Final Result      1.  Endotracheal tube overlies the  mid trachea.      2.  NG tube courses beneath the diaphragms.      DX-CHEST-PORTABLE (1 VIEW)   Final Result      No radiographic evidence of acute cardiopulmonary process.           Assessment/Plan  * Alcohol dependence with withdrawal complicated by delirium (HCC)- (present on admission)  Assessment & Plan  With tremors, nausea vomiting and hallucinations  Alcohol withdrawal symptoms resolved, patient did require ICU stay.      Hyperphosphatemia- (present on admission)  Assessment & Plan  6.3  May be due to tube feeds  Recheck labs in the morning if remains elevated, will need to speak with dietitian to change feeds    Agitation  Assessment & Plan  - Doing well with thiamine replacement and 25mg qpm Seroquel   Daytime somnolence -doing well after stopping daytime seroquel  Patient is calm and cooperative    Peripheral eosinophilia  Assessment & Plan  Ddx includes HES, Churg Greta, HIV, parasite infection (less likely as no significant travel), eosinophilic malignancy. 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  - Jak2 and BCRABL, peripheral flow cytometry pending  - f/u with [facility] after discharge from [facility] on results    Congestive heart failure (CHF) (HCC)  Assessment & Plan  - Initial echo with preserved EF, then repeat echo with EF 40% - unclear if he was in RVR or other variable that would impact an acute decrease in his EF  - Not overloaded on exam, no acute exacerbation  - On metoprolol, add Losartan now as Bps remain stable    Dysphagia  Assessment & Plan  - Pt has failed swallow evals last week and this week - has NGT in place  - Failed MBSS today  - Multifactorial with likely Wernicke's, deconditioning from PNA and being on the vent, acute illness  - Poor long  term prognosis given his cerebral atrophy, chronic EtOH abuse and methampetamine abuse likely contributing to his lack of swallow coordination.  - Treat Wernicke's with high dose Thiamine, will continue working with SLP  11/17/2022-patient had PEG tube placement with [facility] Dr. Sebastian Mcgrath    Repeat MBSS performed today, preliminary results showing patient continued to fail swallow test. Will need to change TF continuous to bolus in preparation for discharging.    Aspiration pneumonia (HCC)  Assessment & Plan  - Completed antibiotics, now on room air  - Has failed multiple swallow evals, currently receiving tube feeds via NGT  - Failed his MBSS - discussed PEG with pt's daughter, family wishes to move forward  - Dr Mcgrath placed PEG 11/17 - tolerated well  Repeat MBSS performed today 11/23, preliminary results showing patient continued to fail swallow test. Will need to change TF continuous to bolus in preparation for discharging.    Wernicke encephalopathy syndrome  Assessment & Plan  - Pt with significant dementia symptoms, swallow dysfunction, cerebral atrophy on CT  - Completed high dose IV Thiamine, continue PO supplementation  - Continue ASA     Hypomagnesemia  Assessment & Plan  Remains low  1.4  - IV replacement ordered  - recheck daily    Reactive thrombocytosis  Assessment & Plan  - Continue to monitor       Sepsis due to Pseudomonas species (HCC)- (present on admission)  Assessment & Plan  Pt had septic shock requiring pressor support, with MSSA and pseudomonas in BAL and tracheal aspirate. Blood cultures negative. Sepsis has resolved.     Empyema (HCC)- (present on admission)  Assessment & Plan  - On the right, moderate-large per CXR of 10/30  - S/p diagnostic thoracentesis 10/25/22: albumin gradient is less than 1.2 g/dl which indicate exudates, his was 0.9, also inflammative effusion with very high LDH 1020 (serum in the 240s range)  - Cultures negative but pt was on antibiotics - did have chest tubes  "which have since been removed, completed antibiotic course.  resolved     Acute respiratory failure with hypoxia (HCC)- (present on admission)  Assessment & Plan  -Intubated for respiratory acidosis and altered mental status with need for repeat intubation twice thereafter  -Cultures from sputum and BAL grew MSSA and pseudomonas, pt completed 14d of Zosyn, and an additional 1.5d of Unasyn for empyema  -also complicated by right empyema, s/p diagnostic thoracentesis and chest tubes  - Currently off antibiotics, back on room air   - RESOLVED     Alcohol abuse with withdrawal (HCC)  Assessment & Plan  Severe withdrawal with respiratory depression requiring intubation on admit  Out of withdrawal window now     Liver mass- (present on admission)  Assessment & Plan  - Seen on US 8/2022 \"right lobe of the liver measuring 1.7 x 1.3 x 1.0 cm in size\"  - AFP negative      Atrial fibrillation (HCC)- (present on admission)  Assessment & Plan  - Not on anticoagulation prior to admission - appears he was just diagnosed by PCP in June of this year and referred to Cardiology, but I don't see where he was seen by them  - [id number] score of 1-2 - new echo with EF of 40% but prior to that was 50%, and may have some effect of sepsis on EF   - ASA for now after discussion with pt's daughter  - Continue Metoprolol for rate control  - no telemetry needed further. Stable.    Other specified hypothyroidism- (present on admission)  Assessment & Plan  - TSH normal, continue Synthroid     Rash- (present on admission)  Assessment & Plan  - Pt with pathology positive eczema, order his home triamcinolone cream    Hyponatremia  Assessment & Plan  - Resolved with free water flushes, pt was dry       VTE prophylaxis: enoxaparin ppx    I have performed a physical exam and reviewed and updated ROS and Plan today (11/23/2022). In review of yesterday's note (11/22/2022), there are no changes except as documented above.        "

## 2022-11-23 NOTE — THERAPY
Physical Therapy   Daily Treatment     Patient Name: Tyree Whiteside  Age:  57 y.o., Sex:  male  Medical Record #: [id number]  Today's Date: 11/23/2022     Precautions  Precautions: Fall Risk;Swallow Precautions ( See Comments)    Assessment    Pt making good progress with PT, follows all commands and cooperates well with therapy. Recommend further therapy at SNF. Discussed with dtr if pt could live with her however she states in her current living situation she is unable to care for pt.     Plan    Continue current treatment plan.    DC Equipment Recommendations: Unable to determine at this time  Discharge Recommendations: Recommend post-acute placement for additional physical therapy services prior to discharge home       11/23/22 1224   Cognition    Comments Pt calm and cooperative, follows all simple commands.   Sitting Lower Body Exercises   Sit to Stand 1 set of 10   Standing Lower Body Exercises   Marching 1 set of 10   Balance   Sitting Balance (Static) Good   Sitting Balance (Dynamic) Fair +   Standing Balance (Static) Fair   Standing Balance (Dynamic) Fair -   Weight Shift Sitting Fair   Weight Shift Standing Fair   Skilled Intervention Postural Facilitation;Sequencing;Tactile Cuing;Verbal Cuing   Comments with FWW   Gait Analysis   Gait Level Of Assist Standby Assist   Assistive Device Front Wheel Walker   Distance (Feet) 120   # of Times Distance was Traveled 2   Deviation Shuffled Gait;Bradykinetic   Bed Mobility    Supine to Sit Modified Independent   Functional Mobility   Sit to Stand Standby Assist   Bed, Chair, Wheelchair Transfer Standby Assist   Toilet Transfers Standby Assist   Transfer Method Stand Step  (with FWW)   Skilled Intervention Tactile Cuing;Verbal Cuing   Comments Pt able to perform own martha-care after a BM   Activity Tolerance   Standing fatigues easily with activity   Short Term Goals    Short Term Goal # 2 pt will go sit<>stand w/fww w/Min A in 6tx   Goal Outcome # 2 Goal  met, new goal added   Short Term Goal # 2 B  Pt will be able to perform sit <> stand and transfer Aria in 6 visits.   Short Term Goal # 3 pt will transfer bed<>chair w/fww w/Mod A in 6tx   Goal Outcome # 3 Goal met   Short Term Goal # 4 Pt will be able to ambulate 300 ft with least restrtictive device Aria in 6 visits.

## 2022-11-23 NOTE — DISCHARGE PLANNING
Agency/Facility Name: [facility]  Outcome: Left a voicemail regarding referral status. DPA requested a call back.    Agency/Facility Name: [facility]  Spoke To: Virginia  Outcome: Per Virginia, she left a voicemail declining the pt due to no long term payor source and care exceeds capacity.    Agency/Facility Name: [facility]  Spoke To: Hope  Outcome: Per Hope, she left a voicemail declining the pt due no long term bed available.    Agency/Facility Name: [facility]  Spoke To: Celina  Outcome: Per Celina, she left a voicemail declining the pt due to not being Medicaid certified.    Agency/Facility Name: [facility]   Spoke To: Deirdre  Outcome: Per Deirdre, she left a voicemail declining the pt due to no having any beds available.

## 2022-11-24 ENCOUNTER — APPOINTMENT (OUTPATIENT)
Dept: RADIOLOGY | Facility: MEDICAL CENTER | Age: 57
DRG: 870 | End: 2022-11-24
Attending: INTERNAL MEDICINE
Payer: COMMERCIAL

## 2022-11-24 LAB
BASOPHILS # BLD AUTO: 1.5 % (ref 0–1.8)
BASOPHILS # BLD: 0.12 K/UL (ref 0–0.12)
EOSINOPHIL # BLD AUTO: 1.88 K/UL (ref 0–0.51)
EOSINOPHIL NFR BLD: 22.8 % (ref 0–6.9)
ERYTHROCYTE [DISTWIDTH] IN BLOOD BY AUTOMATED COUNT: 46.1 FL (ref 35.9–50)
GLUCOSE BLD STRIP.AUTO-MCNC: 94 MG/DL (ref 65–99)
HCT VFR BLD AUTO: 34.1 % (ref 42–52)
HGB BLD-MCNC: 11.4 G/DL (ref 14–18)
IMM GRANULOCYTES # BLD AUTO: 0.03 K/UL (ref 0–0.11)
IMM GRANULOCYTES NFR BLD AUTO: 0.4 % (ref 0–0.9)
LYMPHOCYTES # BLD AUTO: 1.56 K/UL (ref 1–4.8)
LYMPHOCYTES NFR BLD: 18.9 % (ref 22–41)
MAGNESIUM SERPL-MCNC: 1.6 MG/DL (ref 1.5–2.5)
MCH RBC QN AUTO: 30.9 PG (ref 27–33)
MCHC RBC AUTO-ENTMCNC: 33.4 G/DL (ref 33.7–35.3)
MCV RBC AUTO: 92.4 FL (ref 81.4–97.8)
MONOCYTES # BLD AUTO: 0.86 K/UL (ref 0–0.85)
MONOCYTES NFR BLD AUTO: 10.4 % (ref 0–13.4)
NEUTROPHILS # BLD AUTO: 3.8 K/UL (ref 1.82–7.42)
NEUTROPHILS NFR BLD: 46 % (ref 44–72)
NRBC # BLD AUTO: 0 K/UL
NRBC BLD-RTO: 0 /100 WBC
PHOSPHATE SERPL-MCNC: 5.7 MG/DL (ref 2.5–4.5)
PLATELET # BLD AUTO: 375 K/UL (ref 164–446)
PMV BLD AUTO: 10 FL (ref 9–12.9)
RBC # BLD AUTO: 3.69 M/UL (ref 4.7–6.1)
WBC # BLD AUTO: 8.3 K/UL (ref 4.8–10.8)

## 2022-11-24 PROCEDURE — A9270 NON-COVERED ITEM OR SERVICE: HCPCS | Performed by: INTERNAL MEDICINE

## 2022-11-24 PROCEDURE — 71045 X-RAY EXAM CHEST 1 VIEW: CPT

## 2022-11-24 PROCEDURE — 85025 COMPLETE CBC W/AUTO DIFF WBC: CPT

## 2022-11-24 PROCEDURE — 83735 ASSAY OF MAGNESIUM: CPT

## 2022-11-24 PROCEDURE — 700111 HCHG RX REV CODE 636 W/ 250 OVERRIDE (IP): Performed by: INTERNAL MEDICINE

## 2022-11-24 PROCEDURE — 99232 SBSQ HOSP IP/OBS MODERATE 35: CPT | Performed by: INTERNAL MEDICINE

## 2022-11-24 PROCEDURE — 700102 HCHG RX REV CODE 250 W/ 637 OVERRIDE(OP): Performed by: FAMILY MEDICINE

## 2022-11-24 PROCEDURE — 700102 HCHG RX REV CODE 250 W/ 637 OVERRIDE(OP): Performed by: INTERNAL MEDICINE

## 2022-11-24 PROCEDURE — 700102 HCHG RX REV CODE 250 W/ 637 OVERRIDE(OP): Performed by: HOSPITALIST

## 2022-11-24 PROCEDURE — 770006 HCHG ROOM/CARE - MED/SURG/GYN SEMI*

## 2022-11-24 PROCEDURE — A9270 NON-COVERED ITEM OR SERVICE: HCPCS | Performed by: HOSPITALIST

## 2022-11-24 PROCEDURE — A9270 NON-COVERED ITEM OR SERVICE: HCPCS | Performed by: FAMILY MEDICINE

## 2022-11-24 PROCEDURE — 84100 ASSAY OF PHOSPHORUS: CPT

## 2022-11-24 PROCEDURE — 94760 N-INVAS EAR/PLS OXIMETRY 1: CPT

## 2022-11-24 PROCEDURE — 82962 GLUCOSE BLOOD TEST: CPT

## 2022-11-24 RX ORDER — LANOLIN ALCOHOL/MO/W.PET/CERES
400 CREAM (GRAM) TOPICAL 2 TIMES DAILY
Status: DISCONTINUED | OUTPATIENT
Start: 2022-11-24 | End: 2022-11-29 | Stop reason: HOSPADM

## 2022-11-24 RX ADMIN — Medication 1 TABLET: at 05:40

## 2022-11-24 RX ADMIN — THIAMINE HCL TAB 100 MG 100 MG: 100 TAB at 05:40

## 2022-11-24 RX ADMIN — SENNOSIDES AND DOCUSATE SODIUM 2 TABLET: 50; 8.6 TABLET ORAL at 05:39

## 2022-11-24 RX ADMIN — ZINC SULFATE 220 MG (50 MG) CAPSULE 220 MG: CAPSULE at 05:40

## 2022-11-24 RX ADMIN — TRIAMCINOLONE ACETONIDE: 1 CREAM TOPICAL at 05:45

## 2022-11-24 RX ADMIN — ENOXAPARIN SODIUM 40 MG: 40 INJECTION SUBCUTANEOUS at 17:53

## 2022-11-24 RX ADMIN — METOPROLOL TARTRATE 50 MG: 50 TABLET, FILM COATED ORAL at 05:40

## 2022-11-24 RX ADMIN — QUETIAPINE FUMARATE 25 MG: 25 TABLET ORAL at 20:35

## 2022-11-24 RX ADMIN — METOPROLOL TARTRATE 50 MG: 50 TABLET, FILM COATED ORAL at 17:53

## 2022-11-24 RX ADMIN — LEVOTHYROXINE SODIUM 50 MCG: 50 TABLET ORAL at 05:39

## 2022-11-24 RX ADMIN — Medication 400 MG: at 17:53

## 2022-11-24 RX ADMIN — TRIAMCINOLONE ACETONIDE: 1 CREAM TOPICAL at 17:54

## 2022-11-24 RX ADMIN — ASPIRIN 81 MG CHEWABLE TABLET 81 MG: 81 TABLET CHEWABLE at 05:40

## 2022-11-24 RX ADMIN — NICOTINE 7 MG: 7 PATCH TRANSDERMAL at 05:39

## 2022-11-24 ASSESSMENT — LIFESTYLE VARIABLES
PAROXYSMAL SWEATS: NO SWEAT VISIBLE
AGITATION: NORMAL ACTIVITY
TREMOR: NO TREMOR
NAUSEA AND VOMITING: NO NAUSEA AND NO VOMITING
ANXIETY: MILDLY ANXIOUS
AUDITORY DISTURBANCES: NOT PRESENT
ORIENTATION AND CLOUDING OF SENSORIUM: DISORIENTED FOR PLACE AND / OR PERSON
VISUAL DISTURBANCES: NOT PRESENT
TOTAL SCORE: 5
HEADACHE, FULLNESS IN HEAD: NOT PRESENT

## 2022-11-24 ASSESSMENT — ENCOUNTER SYMPTOMS
SHORTNESS OF BREATH: 0
FEVER: 0
NAUSEA: 0
COUGH: 1
VOMITING: 0

## 2022-11-24 NOTE — PROGRESS NOTES
Hospital Medicine Daily Progress Note    Date of Service  11/24/2022    Chief Complaint  Tyree Whiteside is a 57 y.o. male admitted 10/18/2022 with   Chief Complaint   Patient presents with    Medical Clearance     Patient was seen at [facility] for detox from alcohol last drink today.  Patient did attempt to check in at [facility] and was turned away because patient told the staff he was short of breath and has not been taking his medication for his atrial fibrillation.  Patient speaks in full sentences.  No increase work of breathing.  Patient does have eczema and was prescribed prednisone for it.  Patient was given valium and prednisone at [facility].  I spoke with Sharad NOLEN at [facility] and patient needs to be medical cleared         Hospital Course  Pt is a 58yo with a history of atrial fibrillation, HTN, alcohol dependence with abuse, methamphetamine abuse and liver mass who initially presented to [facility] for help with EtOH detoxification but was sent to the ER as he was having hallucinations, confusion, weakness, nausea and vomiting. He was admitted to ICU on 10/18/22 for EtOH withdrawal and respiratory depression and was placed on the ventilator and had a protracted ICU course - he required reintubation on 10/23 and again 10/26 after self extubation, diagnostic thoracentesis and has had MSSA and Pseudomonas PNA, recurrent aspiration, bradycardia, hypotension, ileus, chest tube which has since been removed and loculated pleural effusion.  He transitioned out of the ICU and is still having severe difficulty with swallowing.  Daughter discussed with family regarding moving forward with PEG tube and would like this placed, GI to consult.  (Per prior hospitalist)    11/17/2022-patient had PEG tube placement with [facility] Dr. Sebastian Mcgrath  11/2-4 -pt had intrapleural fibrinolytics and ICU  10/25, 10/30 - bedside thoracentesis in ICU  10/26 - patient re-intubated  10/23 - patient intubated and had Bronchopscopy in ICU    ABX  given:  10/22-24 & 11/08-09 -- Unasyn  10/24- 11/07 -- Zosyn for eosinophilia  11/17 - cefazolin for PEG placement    Interval Problem Update  Patient stated he was doing well today, family at bedside today. Pt coughing a bit more today. Obtaining CXR.  Tolerating bolus feeds.    I have discussed this patient's plan of care and discharge plan at IDT rounds today with Case Management, Nursing, Nursing leadership, and other members of the IDT team.    Consultants/Specialty  critical care, GI, oncology, and palliative care  [facility]    Code Status  DNAR/DNI    Disposition  Patient is medically cleared for discharge.   Anticipate discharge to to skilled nursing facility.  I have placed the appropriate orders for post-discharge needs.    Review of Systems  Review of Systems   Constitutional:  Negative for fever.   Respiratory:  Positive for cough. Negative for shortness of breath.    Gastrointestinal:  Negative for nausea and vomiting.   Skin:  Positive for itching.   All other systems reviewed and are negative.     Physical Exam  Temp:  [36.6 °C (97.8 °F)-37.1 °C (98.8 °F)] 36.6 °C (97.8 °F)  Pulse:  [81-97] 97  Resp:  [16-20] 18  BP: ()/(61-76) 119/76  SpO2:  [93 %-96 %] 96 %    Physical Exam  Vitals and nursing note reviewed.   Constitutional:       Appearance: He is not ill-appearing.   Cardiovascular:      Rate and Rhythm: Normal rate and regular rhythm.      Pulses: Normal pulses.      Heart sounds: No murmur heard.  Pulmonary:      Effort: Pulmonary effort is normal. No respiratory distress.      Breath sounds: Rhonchi (left side >  right) present.   Abdominal:      General: Bowel sounds are normal. There is no distension.      Tenderness: There is no abdominal tenderness.      Comments: PEG tube in place   Musculoskeletal:         General: No swelling or tenderness. Normal range of motion.   Skin:     General: Skin is warm.      Coloration: Skin is not jaundiced or pale.      Comments: Multiple areas of  scratched skin healing   Neurological:      General: No focal deficit present.      Mental Status: He is alert and oriented to person, place, and time. Mental status is at baseline.      Motor: Weakness present.   Psychiatric:         Mood and Affect: Mood normal.         Behavior: Behavior normal.         Thought Content: Thought content normal.         Judgment: Judgment normal.       Fluids    Intake/Output Summary (Last 24 hours) at 11/24/2022 1217  Last data filed at 11/24/2022 1000  Gross per 24 hour   Intake 1850 ml   Output 200 ml   Net 1650 ml       Laboratory  Recent Labs     11/22/22  0740 11/23/22  0606 11/24/22  0453   WBC 7.8 7.8 8.3   RBC 3.74* 3.71* 3.69*   HEMOGLOBIN 11.7* 11.5* 11.4*   HEMATOCRIT 34.9* 34.5* 34.1*   MCV 93.3 93.0 92.4   MCH 31.3 31.0 30.9   MCHC 33.5* 33.3* 33.4*   RDW 46.9 47.0 46.1   PLATELETCT 423 384 375   MPV 10.4 10.0 10.0                       Imaging  DX-CHEST-PORTABLE (1 VIEW)   Final Result      1.  Worsening LEFT lung base atelectasis.   2.  New small LEFT pleural effusion.   3.  No pneumothorax.   4.  No other significant change from prior exam.         DX-ESOPHAGUS - [id number]   Final Result      DX-ESOPHAGUS - [id number]   Final Result      Please refer to dedicated speech pathology report for complete details.      DX-CHEST-PORTABLE (1 VIEW)   Final Result      1.  Bibasilar and RIGHT perihilar atelectasis which could obscure an additional process. This has decreased since the prior study.   2.  Persistent focal lingular opacity, which could be atelectasis or early pneumonia   3.  RIGHT pleural effusion      DX-CHEST-FOR LINE PLACEMENT Perform procedure in: Patient's Room   Final Result            1. A left peripherally inserted catheter with tip projects appropriately over the expected area of the lower SVC.      DX-CHEST-FOR LINE PLACEMENT Perform procedure in: Patient's Room   Final Result            1. A right peripherally inserted catheter with tip not  well seen but likely projects over the expected area of the cavoatrial junction/right atrium.      IR-PICC LINE PLACEMENT W/ GUIDANCE > AGE 5   Final Result                  Ultrasound-guided PICC placement performed by qualified nursing staff as    above.          DX-CHEST-FOR LINE PLACEMENT Perform procedure in: Patient's Room   Final Result      1.  Interval placement of a PICC line which is satisfactory in position.      2.  Otherwise stable exam.      IR-PICC LINE PLACEMENT W/ GUIDANCE > AGE 5   Final Result                  Ultrasound-guided PICC placement performed by qualified nursing staff as    above.          DX-CHEST-PORTABLE (1 VIEW)   Final Result      1.  Stable bibasilar atelectasis, right greater than left.      2.  Stable right and improved left pleural effusion.      DX-CHEST-PORTABLE (1 VIEW)   Final Result      1.  Increased right pleural effusion, now small/moderate.   2.  Increased right base airspace disease.   3.  Similar small left pleural effusion with mildly increased adjacent airspace disease.      [id number] VIEW   Final Result      Mildly dilated loops of small intestine.      DX-CHEST-PORTABLE (1 VIEW)   Final Result      1.  Interval removal of the right sided chest tube.      2.  Stable bibasilar atelectasis.      3.  Interval decrease in size of a right pleural effusion.      DX-CHEST-PORTABLE (1 VIEW)   Final Result      1.  Stable support devices.      2.  Stable bilateral pleural effusions, right greater than left.      DX-CHEST-PORTABLE (1 VIEW)   Final Result      1.  Interval placement of RIGHT chest tube   2.  Moderate RIGHT hydropneumothorax   3.  Small LEFT pleural effusion   4.  Unchanged BILATERAL atelectasis with possible superimposed pneumonia or other airspace disease      DX-CHEST-PORTABLE (1 VIEW)   Final Result      1.  No significant change      2.  Lines and tubes appear appropriately located      3.  Right pleural effusion, probably large in size      4.   Bilateral atelectasis      EC-ECHOCARDIOGRAM LTD W/ CONT   Final Result      DX-CHEST-PORTABLE (1 VIEW)   Final Result         No significant interval change      CT-CHEST,ABDOMEN,PELVIS W/O   Final Result      1.  RIGHT larger than LEFT pleural effusions with overlying atelectasis. Superimposed pneumonia is not excluded.   2.  Healing fractures the RIGHT fifth and sixth ribs   3.  Distended loops of bowel in the abdomen as described above, likely ileus rather than early or low-grade small bowel obstruction   4.  Enlarged inguinal lymph nodes   5.  The hypoechoic nodule in the liver demonstrated on ultrasound from 8/28/2022 is not seen on this unenhanced CT. Hepatic mass protocol CT or MRI should be pursued when clinically appropriate for further assessment.   6.  RIGHT renal cyst         [id number] VIEW   Final Result         1.  Air-filled distended loops of bowel are seen with reactive mucosal pattern, appearance suggests ileus or enteritis versus evolving obstructive changes. Recommend radiographic followup to resolution to exclude progression to obstruction.   2.  Nasogastric tube is coiled within the stomach, the tip terminates overlying the expected location of the gastric fundus.   3.  Bilateral lower lobe infiltrates   4.  Moderate right and small left pleural effusion      DX-CHEST-PORTABLE (1 VIEW)   Final Result         1.  Bilateral pulmonary edema and/or infiltrates.   2.  Moderate right and small left pleural effusion, stable compared to prior study   3.  Cardiomegaly      DX-CHEST-PORTABLE (1 VIEW)   Final Result         1.  Bilateral pulmonary edema and/or infiltrates.   2.  Moderate right and small left pleural effusion, stable compared to prior study   3.  Cardiomegaly      [id number] VIEW   Final Result         Diffuse gaseous distention of the small bowel and colon, worse in prior, could relate to ileus      DX-CHEST-PORTABLE (1 VIEW)   Final Result         1. No significant interval change.       DX-CHEST-PORTABLE (1 VIEW)   Final Result         1.  Bilateral pulmonary edema and/or infiltrates.   2.  Moderate right and small left pleural effusion, increased on the right compared to prior study   3.  Cardiomegaly      DX-CHEST-PORTABLE (1 VIEW)   Final Result         1.  Pulmonary edema and/or infiltrates are identified, which are stable since the prior exam.   2.  Moderate right pleural effusion, stable.   3.  Cardiomegaly      DX-CHEST-PORTABLE (1 VIEW)   Final Result      1.  Well-positioned ETT.   2.  Moderate right pleural effusion and associated atelectasis versus consolidation.   3.  Retrocardiac atelectasis versus consolidation. No significant left effusion.      [id number] VIEW   Final Result      NGT tip is in the stomach.   1.  Nonobstructive bowel gas pattern. Small amount of stool throughout the colon.   2.  Ill-defined right basilar atelectasis versus consolidation and small right pleural effusion.      DX-CHEST-PORTABLE (1 VIEW)   Final Result         1. Low lung volume with hypoventilatory change and bibasilar atelectasis.      CT-HEAD W/O   Final Result      1.  Cerebral atrophy.      2.  Otherwise, Head CT without contrast within normal limits. No evidence of acute cerebral infarction, hemorrhage or mass lesion.         DX-CHEST-FOR LINE PLACEMENT Perform procedure in: Patient's Room   Final Result      1.  Endotracheal tube overlies the mid trachea.      2.  NG tube courses beneath the diaphragms.      DX-CHEST-PORTABLE (1 VIEW)   Final Result      No radiographic evidence of acute cardiopulmonary process.           Assessment/Plan  * Alcohol dependence with withdrawal complicated by delirium (HCC)- (present on admission)  Assessment & Plan  With tremors, nausea vomiting and hallucinations  Alcohol withdrawal symptoms resolved, patient did require ICU stay.      Hyperphosphatemia- (present on admission)  Assessment & Plan  6.3  Decreased. TF changed by dietician.  Recheck labs in the  morning if remains elevated, will need to speak with dietitian to change feeds    Agitation  Assessment & Plan  - Doing well with thiamine replacement and 25mg qpm Seroquel   Daytime somnolence -doing well after stopping daytime seroquel  Patient is calm and cooperative    Peripheral eosinophilia  Assessment & Plan  Ddx includes HES, Churg Greta, HIV, parasite infection (less likely as no significant travel), eosinophilic malignancy. 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  - Jak2 and BCRABL, peripheral flow cytometry pending  - f/u with [facility] after discharge from [facility] on results    Congestive heart failure (CHF) (HCC)  Assessment & Plan  - Initial echo with preserved EF, then repeat echo with EF 40% - unclear if he was in RVR or other variable that would impact an acute decrease in his EF  - Not overloaded on exam, no acute exacerbation  - On metoprolol, add Losartan now as Bps remain stable    Dysphagia  Assessment & Plan  - Pt has failed swallow evals last week and this week - has NGT in place  - Failed MBSS today  - Multifactorial with likely Wernicke's, deconditioning from PNA and being on the vent, acute illness  - Poor long term prognosis given his cerebral atrophy, chronic EtOH abuse and methampetamine abuse likely contributing to his lack of swallow coordination.  - Treat Wernicke's with high dose Thiamine, will continue working with SLP  11/17/2022-patient had PEG tube placement with [facility] Dr. Sebastian Mcgarth    Repeat MBSS performed today, preliminary results showing patient continued to fail swallow test. Will need to change TF continuous to bolus in preparation for discharging. 11/23    Tolerating bolus feeds, will continue    Aspiration pneumonia  (HCC)  Assessment & Plan  - Completed antibiotics, now on room air  - Has failed multiple swallow evals, currently receiving tube feeds via NGT  - Failed his MBSS - discussed PEG with pt's daughter, family wishes to move forward  - Dr Mcgrath placed PEG 11/17 - tolerated well  Repeat MBSS performed today 11/23, preliminary results showing patient continued to fail swallow test. Will need to change TF continuous to bolus in preparation for discharging.  Checking repeat CXR    Wernicke encephalopathy syndrome  Assessment & Plan  - Pt with significant dementia symptoms, swallow dysfunction, cerebral atrophy on CT  - Completed high dose IV Thiamine, continue PO supplementation  - Continue ASA     Hypomagnesemia  Assessment & Plan  Remains low  1.6  - IV replacement as patient is NPO. Will start enteral mag.  - recheck daily    Reactive thrombocytosis  Assessment & Plan  - Continue to monitor       Sepsis due to Pseudomonas species (HCC)- (present on admission)  Assessment & Plan  Pt had septic shock requiring pressor support, with MSSA and pseudomonas in BAL and tracheal aspirate. Blood cultures negative. Sepsis has resolved.     Empyema (HCC)- (present on admission)  Assessment & Plan  - On the right, moderate-large per CXR of 10/30  - S/p diagnostic thoracentesis 10/25/22: albumin gradient is less than 1.2 g/dl which indicate exudates, his was 0.9, also inflammative effusion with very high LDH 1020 (serum in the 240s range)  - Cultures negative but pt was on antibiotics - did have chest tubes which have since been removed, completed antibiotic course.  resolved     Acute respiratory failure with hypoxia (HCC)- (present on admission)  Assessment & Plan  -Intubated for respiratory acidosis and altered mental status with need for repeat intubation twice thereafter  -Cultures from sputum and BAL grew MSSA and pseudomonas, pt completed 14d of Zosyn, and an additional 1.5d of Unasyn for empyema  -also complicated by right  "empyema, s/p diagnostic thoracentesis and chest tubes  - Currently off antibiotics, back on room air   - RESOLVED     Alcohol abuse with withdrawal (HCC)  Assessment & Plan  Severe withdrawal with respiratory depression requiring intubation on admit  Out of withdrawal window now     Liver mass- (present on admission)  Assessment & Plan  - Seen on US 8/2022 \"right lobe of the liver measuring 1.7 x 1.3 x 1.0 cm in size\"  - AFP negative      Atrial fibrillation (HCC)- (present on admission)  Assessment & Plan  - Not on anticoagulation prior to admission - appears he was just diagnosed by PCP in June of this year and referred to Cardiology, but I don't see where he was seen by them  - [id number] score of 1-2 - new echo with EF of 40% but prior to that was 50%, and may have some effect of sepsis on EF   - ASA for now after discussion with pt's daughter  - Continue Metoprolol for rate control  - no telemetry needed further. Stable.    Other specified hypothyroidism- (present on admission)  Assessment & Plan  - TSH normal, continue Synthroid     Rash- (present on admission)  Assessment & Plan  - Pt with pathology positive eczema, order his home triamcinolone cream    Hyponatremia  Assessment & Plan  - Resolved with free water flushes, pt was dry       VTE prophylaxis: enoxaparin ppx    I have performed a physical exam and reviewed and updated ROS and Plan today (11/24/2022). In review of yesterday's note (11/23/2022), there are no changes except as documented above.        "

## 2022-11-24 NOTE — CARE PLAN
The patient is Stable - Low risk of patient condition declining or worsening    Shift Goals Free of falls  Clinical Goals: Manage tube feeds and remain free of falls  Patient Goals: Sleep comfortably  Family Goals: ANETTE    Progress made toward(s) clinical / shift goals:    Problem: Knowledge Deficit - Standard  Goal: Patient and family/care givers will demonstrate understanding of plan of care, disease process/condition, diagnostic tests and medications  Outcome: Progressing       Patient is not progressing towards the following goals:

## 2022-11-24 NOTE — DISCHARGE PLANNING
Per IDT rounds patient is still being declined by SNF. Will have PT re-evaluate for the possibility of returning home with daughter and HH. Patient is tolerating bolus feeds. PICC line to be removed.

## 2022-11-25 PROBLEM — E51.2 WERNICKE ENCEPHALOPATHY SYNDROME: Status: RESOLVED | Noted: 2022-11-10 | Resolved: 2022-11-25

## 2022-11-25 PROBLEM — J86.9 EMPYEMA (HCC): Status: RESOLVED | Noted: 2022-10-24 | Resolved: 2022-11-25

## 2022-11-25 PROBLEM — Z02.9 PROBLEM RELATED TO DISCHARGE PLANNING: Status: ACTIVE | Noted: 2022-11-25

## 2022-11-25 PROBLEM — Z02.9 PROBLEM RELATED TO DISCHARGE PLANNING: Status: RESOLVED | Noted: 2022-11-25 | Resolved: 2022-11-25

## 2022-11-25 PROBLEM — Z99.11 ON MECHANICALLY ASSISTED VENTILATION (HCC): Status: RESOLVED | Noted: 2022-10-19 | Resolved: 2022-11-25

## 2022-11-25 PROBLEM — Z75.8 PROBLEM RELATED TO DISCHARGE PLANNING: Status: RESOLVED | Noted: 2022-11-25 | Resolved: 2022-11-25

## 2022-11-25 PROBLEM — Z75.8 PROBLEM RELATED TO DISCHARGE PLANNING: Status: ACTIVE | Noted: 2022-11-25

## 2022-11-25 PROBLEM — R45.1 AGITATION: Status: RESOLVED | Noted: 2022-11-11 | Resolved: 2022-11-25

## 2022-11-25 PROBLEM — E83.42 HYPOMAGNESEMIA: Status: RESOLVED | Noted: 2022-11-10 | Resolved: 2022-11-25

## 2022-11-25 PROBLEM — J96.01 ACUTE RESPIRATORY FAILURE WITH HYPOXIA (HCC): Status: RESOLVED | Noted: 2022-10-24 | Resolved: 2022-11-25

## 2022-11-25 PROBLEM — F10.239 ALCOHOL DEPENDENCE WITH WITHDRAWAL (HCC): Status: RESOLVED | Noted: 2022-10-18 | Resolved: 2022-11-25

## 2022-11-25 PROBLEM — J69.0 ASPIRATION PNEUMONIA (HCC): Status: RESOLVED | Noted: 2022-11-10 | Resolved: 2022-11-25

## 2022-11-25 PROBLEM — D75.838 REACTIVE THROMBOCYTOSIS: Status: RESOLVED | Noted: 2022-11-10 | Resolved: 2022-11-25

## 2022-11-25 PROBLEM — A41.52 SEPSIS DUE TO PSEUDOMONAS SPECIES (HCC): Status: RESOLVED | Noted: 2022-10-25 | Resolved: 2022-11-25

## 2022-11-25 PROBLEM — E83.39 HYPERPHOSPHATEMIA: Status: RESOLVED | Noted: 2022-11-22 | Resolved: 2022-11-25

## 2022-11-25 PROBLEM — R21 RASH: Status: RESOLVED | Noted: 2018-10-31 | Resolved: 2022-11-25

## 2022-11-25 LAB — GLUCOSE BLD STRIP.AUTO-MCNC: 98 MG/DL (ref 65–99)

## 2022-11-25 PROCEDURE — 700102 HCHG RX REV CODE 250 W/ 637 OVERRIDE(OP): Performed by: INTERNAL MEDICINE

## 2022-11-25 PROCEDURE — 94760 N-INVAS EAR/PLS OXIMETRY 1: CPT

## 2022-11-25 PROCEDURE — A9270 NON-COVERED ITEM OR SERVICE: HCPCS | Performed by: INTERNAL MEDICINE

## 2022-11-25 PROCEDURE — 92526 ORAL FUNCTION THERAPY: CPT

## 2022-11-25 PROCEDURE — A9270 NON-COVERED ITEM OR SERVICE: HCPCS | Performed by: HOSPITALIST

## 2022-11-25 PROCEDURE — 700102 HCHG RX REV CODE 250 W/ 637 OVERRIDE(OP): Performed by: FAMILY MEDICINE

## 2022-11-25 PROCEDURE — 700111 HCHG RX REV CODE 636 W/ 250 OVERRIDE (IP): Performed by: INTERNAL MEDICINE

## 2022-11-25 PROCEDURE — 82962 GLUCOSE BLOOD TEST: CPT

## 2022-11-25 PROCEDURE — A9270 NON-COVERED ITEM OR SERVICE: HCPCS | Performed by: FAMILY MEDICINE

## 2022-11-25 PROCEDURE — 770006 HCHG ROOM/CARE - MED/SURG/GYN SEMI*

## 2022-11-25 PROCEDURE — 700102 HCHG RX REV CODE 250 W/ 637 OVERRIDE(OP): Performed by: HOSPITALIST

## 2022-11-25 PROCEDURE — 99231 SBSQ HOSP IP/OBS SF/LOW 25: CPT | Performed by: INTERNAL MEDICINE

## 2022-11-25 PROCEDURE — 97164 PT RE-EVAL EST PLAN CARE: CPT

## 2022-11-25 RX ADMIN — TRIAMCINOLONE ACETONIDE: 1 CREAM TOPICAL at 06:04

## 2022-11-25 RX ADMIN — Medication 400 MG: at 05:55

## 2022-11-25 RX ADMIN — SCOPOLAMINE 1 PATCH: 1.5 PATCH, EXTENDED RELEASE TRANSDERMAL at 10:32

## 2022-11-25 RX ADMIN — ENOXAPARIN SODIUM 40 MG: 40 INJECTION SUBCUTANEOUS at 18:22

## 2022-11-25 RX ADMIN — THIAMINE HCL TAB 100 MG 100 MG: 100 TAB at 06:00

## 2022-11-25 RX ADMIN — METOPROLOL TARTRATE 50 MG: 50 TABLET, FILM COATED ORAL at 18:22

## 2022-11-25 RX ADMIN — NICOTINE 7 MG: 7 PATCH TRANSDERMAL at 05:56

## 2022-11-25 RX ADMIN — METOPROLOL TARTRATE 50 MG: 50 TABLET, FILM COATED ORAL at 05:54

## 2022-11-25 RX ADMIN — LEVOTHYROXINE SODIUM 50 MCG: 50 TABLET ORAL at 05:56

## 2022-11-25 RX ADMIN — QUETIAPINE FUMARATE 25 MG: 25 TABLET ORAL at 21:37

## 2022-11-25 RX ADMIN — SENNOSIDES AND DOCUSATE SODIUM 2 TABLET: 50; 8.6 TABLET ORAL at 05:56

## 2022-11-25 RX ADMIN — LOSARTAN POTASSIUM 12.5 MG: 25 TABLET, FILM COATED ORAL at 05:54

## 2022-11-25 RX ADMIN — SENNOSIDES AND DOCUSATE SODIUM 2 TABLET: 50; 8.6 TABLET ORAL at 18:22

## 2022-11-25 RX ADMIN — Medication 1 TABLET: at 05:55

## 2022-11-25 RX ADMIN — TRIAMCINOLONE ACETONIDE: 1 CREAM TOPICAL at 18:00

## 2022-11-25 RX ADMIN — ZINC SULFATE 220 MG (50 MG) CAPSULE 220 MG: CAPSULE at 05:56

## 2022-11-25 RX ADMIN — ASPIRIN 81 MG CHEWABLE TABLET 81 MG: 81 TABLET CHEWABLE at 05:55

## 2022-11-25 RX ADMIN — Medication 400 MG: at 18:22

## 2022-11-25 ASSESSMENT — LIFESTYLE VARIABLES
AGITATION: NORMAL ACTIVITY
VISUAL DISTURBANCES: NOT PRESENT
NAUSEA AND VOMITING: NO NAUSEA AND NO VOMITING
ANXIETY: MILDLY ANXIOUS
AUDITORY DISTURBANCES: NOT PRESENT
HEADACHE, FULLNESS IN HEAD: NOT PRESENT
TOTAL SCORE: 5
ORIENTATION AND CLOUDING OF SENSORIUM: DISORIENTED FOR PLACE AND / OR PERSON
PAROXYSMAL SWEATS: NO SWEAT VISIBLE
TREMOR: NO TREMOR

## 2022-11-25 ASSESSMENT — PATIENT HEALTH QUESTIONNAIRE - PHQ9
SUM OF ALL RESPONSES TO PHQ9 QUESTIONS 1 AND 2: 0
1. LITTLE INTEREST OR PLEASURE IN DOING THINGS: NOT AT ALL
2. FEELING DOWN, DEPRESSED, IRRITABLE, OR HOPELESS: NOT AT ALL

## 2022-11-25 ASSESSMENT — ENCOUNTER SYMPTOMS
SHORTNESS OF BREATH: 0
FEVER: 0
COUGH: 1
NAUSEA: 0
VOMITING: 0

## 2022-11-25 ASSESSMENT — GAIT ASSESSMENTS
GAIT LEVEL OF ASSIST: STANDBY ASSIST
DISTANCE (FEET): 200
DEVIATION: STEP TO

## 2022-11-25 ASSESSMENT — COGNITIVE AND FUNCTIONAL STATUS - GENERAL
SUGGESTED CMS G CODE MODIFIER MOBILITY: CH
MOBILITY SCORE: 24

## 2022-11-25 ASSESSMENT — PAIN DESCRIPTION - PAIN TYPE: TYPE: ACUTE PAIN

## 2022-11-25 NOTE — DISCHARGE PLANNING
Case Management Discharge Planning    Admission Date: 10/18/2022  GMLOS: 13.2  ALOS: 38    6-Clicks ADL Score: 20  6-Clicks Mobility Score: 24      Anticipated Discharge Dispo: Discharge Disposition: D/T to SNF with Medicare cert in anticipation of skilled care (03)    DME Needed: No    Action(s) Taken: Updated Provider/Nurse on Discharge Plan    Faxed choice for [place] SNFs to [facility] per protocol.    Escalations Completed: Pending Discharge Destination    Medically Clear: Yes    Next Steps: Pending Placement     Barriers to Discharge: Pending Placement

## 2022-11-25 NOTE — DIETARY
Nutrition Services Brief Update:    Problem: Nutritional:  Goal: Nutrition support tolerated and meeting greater than 85% of estimated needs  Outcome: progressing    Per flow sheets, pt's boluses of Novasource Renal are up to 200 mL. Pt is tolerating boluses per MD's progress note today. Water flushes of 300 mL QID have been ordered by MD. Phos slightly improved on 11/24. Current TF formula is low phos formula. RD will follow.

## 2022-11-25 NOTE — THERAPY
"Speech Language Pathology  Daily Treatment     Patient Name: Tyree Whiteside  Age:  57 y.o., Sex:  male  Medical Record #: [id number]  Today's Date: 11/25/2022     Precautions  Precautions: Fall Risk, Swallow Precautions ( See Comments)  Comments: greatly improved cognition    Assessment    Pt seen on this date for dysphagia therapy. Printed swallowing exercises were presented to the pt and his daughter targeting laryngeal elevation, laryngeal vestibular closure, forward movement of the larynx, base of tongue retraction, and PPW movement. He completed exercises with good accuracy. Extensive educated provided to pt and daughter regarding importance of completing exercises, inpatient vs outpatient/ SLP, and aspiration risk and they verbalized good understanding. SLP following.    Plan    1) continue NPO/PEG, okay for small amount of ice chips to minimize xerostomia and maintain integrity of the swallow function   2) encourage implementation of swallowing exercises    Continue current treatment plan.    Discharge Recommendations: Recommend post-acute placement for additional speech therapy services prior to discharge home       Objective       11/25/22 1351   Precautions   Precautions Fall Risk;Swallow Precautions ( See Comments)   Vitals   O2 (LPM) 0   O2 Delivery Device None - Room Air   Pain 0 - 10 Group   Therapist Pain Assessment Post Activity Pain Same as Prior to Activity;Nurse Notified;0   Patient / Family Goals   Patient / Family Goal #1 \"I could eat half of that ice\"   Goal #1 Outcome Progressing as expected   Short Term Goals   Short Term Goal # 1 Pt will participate in an instrumental swallow study to fully assess swallow function.   Goal Outcome # 1 Goal met   Short Term Goal # 2 Pt will consume pre-feeding trials with SLP to determine readiness to begin an oral diet.   Goal Outcome # 2  Progressing as expected   Short Term Goal # 3 NEW 11/26: Pt will complete 10 reps of swallowing exercises " targeting laryngeal elevation, base of tongue retraction, pharyngeal squeeze, and laryngeal vestibular closure with good accuracy   Goal Outcome  # 3 Progressing as expected   Education Group   Education Provided Dysphagia   Dysphagia Patient Response Patient;Family;Acceptance;Explanation;Verbal Demonstration;Reinforcement Needed   Anticipated Discharge Needs   Discharge Recommendations Recommend post-acute placement for additional speech therapy services prior to discharge home   Therapy Recommendations Upon DC Dysphagia Training;Community Re-Integration;Patient / Family / Caregiver Education   Interdisciplinary Plan of Care Collaboration   IDT Collaboration with  Nursing;Family / Caregiver   Patient Position at End of Therapy Seated;In Bed;Family / Friend in Room

## 2022-11-25 NOTE — PROGRESS NOTES
PICC line removed, per order. Line removed easily and intact, patient tolerated well. No s/s of infection, edema, or skin breakdown. Covered with sterile gauze and transparent dressing.

## 2022-11-25 NOTE — FACE TO FACE
Face to Face Supporting Documentation - Home Health    The encounter with this patient was in whole or in part the primary reason for home health admission.    Date of encounter:   Patient:                    MRN:                       YOB: 2021  Tyree Whiteside  [id number]  1965     Home health to see patient for:  Skilled Nursing care for assessment, interventions & education, Registered dietitian consult, Home health aide, Physical Therapy evaluation and treatment, Occupational therapy evaluation and treatment, and Speech Language Pathology evaluation and treatment    Skilled need for:  New Onset Medical Diagnosis dysphagia with now PEG tube, and Recent Deterioration of Health Status prolonged hospital stay over 30 days, significant weakness, impaired IADLS and mobility    Skilled nursing interventions to include:  Line/Drain/Airway education and care and Comment: Medication management, bolus feeding  maintenance    Homebound status evidenced by:  Need the aid of supportive devices such as crutches, canes, wheelchairs or walkers or Needs the assistance of another person in order to leave the home. Leaving home requires a considerable and taxing effort. There is a normal inability to leave the home.    Community Physician to provide follow up care: Deb Clayton M.D.     Optional Interventions? No      I certify the face to face encounter for this home health care referral meets the CMS requirements and the encounter/clinical assessment with the patient was, in whole, or in part, for the medical condition(s) listed above, which is the primary reason for home health care. Based on my clinical findings: the service(s) are medically necessary, support the need for home health care, and the homebound criteria are met.  I certify that this patient has had a face to face encounter by myself.  Anastacio Goss M.D. - NPI: [id number]

## 2022-11-25 NOTE — THERAPY
Physical Therapy   Daily Treatment     Patient Name: Tyree Whiteside  Age:  57 y.o., Sex:  male  Medical Record #: [id number]  Today's Date: 11/25/2022          Assessment    Pt is doing much better safe with transfers and ambulation with supervision    Plan    Continue current treatment plan.    DC Equipment Recommendations: Unable to determine at this time  Discharge Recommendations: Pt appears to be safe for home with supervison of daughter       Objective       11/25/22 0800   Charge Group   PT Re-evaluation PT Re-evaluation   Cognition    Cognition / Consciousness WDL   Balance   Sitting Balance (Static) Good   Sitting Balance (Dynamic) Good   Standing Balance (Static) Fair +   Standing Balance (Dynamic) Fair   Weight Shift Sitting Good   Weight Shift Standing Good   Gait Analysis   Gait Level Of Assist Standby Assist   Assistive Device None   Distance (Feet) 200   # of Times Distance was Traveled 1   Deviation Step To   Bed Mobility    Supine to Sit Independent   Sit to Supine Independent   Scooting Independent   Functional Mobility   Sit to Stand Supervised   Bed, Chair, Wheelchair Transfer Supervised   Toilet Transfers Supervised   Transfer Method Stand Step   How much difficulty does the patient currently have...   Turning over in bed (including adjusting bedclothes, sheets and blankets)? 4   Sitting down on and standing up from a chair with arms (e.g., wheelchair, bedside commode, etc.) 4   Moving from lying on back to sitting on the side of the bed? 4   How much help from another person does the patient currently need...   Moving to and from a bed to a chair (including a wheelchair)? 4   Need to walk in a hospital room? 4   Climbing 3-5 steps with a railing? 4   6 clicks Mobility Score 24   Activity Tolerance   Sitting Edge of Bed 10   Standing 10   Short Term Goals    Short Term Goal # 2 B  Pt will be able to perform sit <> stand and transfer Aria in 6 visits.   Goal Outcome # 2 B Goal met    Short Term Goal # 4 Pt will be able to ambulate 300 ft with least restrtictive device Aria in 6 visits.   Goal Outcome # 4 Progressing as expected

## 2022-11-25 NOTE — DISCHARGE PLANNING
Received Choice form at [phone]  Agency/Facility Name: [facility]  Referral sent per Choice form @ [phone]

## 2022-11-25 NOTE — ASSESSMENT & PLAN NOTE
Patient has been pending potential SNF placement but unfortunately we have not been able to obtain an accepting facility.  Patient states he is beginning to improve now walking with a walker.  We are teaching daughter about patient's bolus feeds with PEG tube.  They have both agreed to look for potential home health, while SNF placement is still being looked into.  They agreed if Monday there is no progress they will agree to going home with home health.  Started process with face-to-face and home health order today.    I have spoken with [employer] unlikely to be approved this weekend. Will need to wait until Monday. I have asked for this case to be brought to higher leadership.  Patient is eager to be able to go home. Daughter is organizing the apartment, located on first floor.

## 2022-11-25 NOTE — CARE PLAN
The patient is Stable - Low risk of patient condition declining or worsening    Shift Goals  Clinical Goals: Pt will tolerate tube feed with 0 residual.  Patient Goals: to go home  Family Goals: ANETTE    Progress made toward(s) clinical / shift goals:  Pt tolerated 250mL of tube feed with 0 residual noted.    Patient is not progressing towards the following goals:

## 2022-11-25 NOTE — PROGRESS NOTES
Hospital Medicine Daily Progress Note    Date of Service  11/25/2022    Chief Complaint  Tyree Whiteside is a 57 y.o. male admitted 10/18/2022 with   Chief Complaint   Patient presents with    Medical Clearance     Patient was seen at [facility] for detox from alcohol last drink today.  Patient did attempt to check in at [facility] and was turned away because patient told the staff he was short of breath and has not been taking his medication for his atrial fibrillation.  Patient speaks in full sentences.  No increase work of breathing.  Patient does have eczema and was prescribed prednisone for it.  Patient was given valium and prednisone at [facility].  I spoke with Sharad NOLEN at [facility] and patient needs to be medical cleared         Hospital Course  Pt is a 56yo with a history of atrial fibrillation, HTN, alcohol dependence with abuse, methamphetamine abuse and liver mass who initially presented to [facility] for help with EtOH detoxification but was sent to the ER as he was having hallucinations, confusion, weakness, nausea and vomiting. He was admitted to ICU on 10/18/22 for EtOH withdrawal and respiratory depression and was placed on the ventilator and had a protracted ICU course - he required reintubation on 10/23 and again 10/26 after self extubation, diagnostic thoracentesis and has had MSSA and Pseudomonas PNA, recurrent aspiration, bradycardia, hypotension, ileus, chest tube which has since been removed and loculated pleural effusion.  He transitioned out of the ICU and is still having severe difficulty with swallowing.  Daughter discussed with family regarding moving forward with PEG tube and would like this placed, GI to consult.  (Per prior hospitalist)    11/17/2022-patient had PEG tube placement with [facility] Dr. Sebastian Mcgrath  11/7 and 11/9 - PICC lines placed while in ICU  11/2-4 -pt had intrapleural fibrinolytics in ICU  11/01 - s/p right side chest tube placement in ICU  10/25, 10/30 - bedside right sided  thoracentesis in ICU  10/26 - patient re-intubated  10/23 - patient intubated and had Bronchopscopy in ICU    ABX given:  10/22-24 & 11/08-09 -- Unasyn  10/24- 11/07 -- Zosyn for eosinophilia  11/17 - cefazolin for PEG placement    Interval Problem Update  Patient stated he was doing well today.  CXR showed new small left pleural effusion, explained to patient and daughter.  Tolerating bolus feeds.  Left arm PICC line removed yesterday.  Daughter and patient agreed to see if they can receive Home Health, patient stated he is now walking with a walker.  They want to pursue SNF still given patient's dysphagia and weakness. HH orders sent.    I have discussed this patient's plan of care and discharge plan at IDT rounds today with Case Management, Nursing, Nursing leadership, and other members of the IDT team.    Consultants/Specialty  critical care, GI, oncology, and palliative care  [facility]    Code Status  DNAR/DNI    Disposition  Patient is medically cleared for discharge.   Anticipate discharge to to skilled nursing facility vs home with HH.  I have placed the appropriate orders for post-discharge needs.    Review of Systems  Review of Systems   Constitutional:  Negative for fever.   Respiratory:  Positive for cough. Negative for shortness of breath.    Gastrointestinal:  Negative for nausea and vomiting.   Skin:  Positive for itching.   All other systems reviewed and are negative.     Physical Exam  Temp:  [36.6 °C (97.8 °F)-36.8 °C (98.2 °F)] 36.6 °C (97.8 °F)  Pulse:  [75-96] 75  Resp:  [18-20] 18  BP: ()/(52-73) 94/60  SpO2:  [93 %-96 %] 94 %    Physical Exam  Vitals and nursing note reviewed.   Constitutional:       Appearance: He is not ill-appearing.   Cardiovascular:      Rate and Rhythm: Normal rate and regular rhythm.      Pulses: Normal pulses.      Heart sounds: No murmur heard.  Pulmonary:      Effort: Pulmonary effort is normal. No respiratory distress.      Breath sounds: Rhonchi (left side >   right) present.   Abdominal:      General: Bowel sounds are normal. There is no distension.      Tenderness: There is no abdominal tenderness.      Comments: PEG tube in place   Musculoskeletal:         General: No swelling or tenderness. Normal range of motion.   Skin:     General: Skin is warm.      Coloration: Skin is not jaundiced or pale.      Comments: Multiple areas of scratched skin healing   Neurological:      General: No focal deficit present.      Mental Status: He is alert and oriented to person, place, and time. Mental status is at baseline.      Motor: Weakness present.   Psychiatric:         Mood and Affect: Mood normal.         Behavior: Behavior normal.         Thought Content: Thought content normal.         Judgment: Judgment normal.       Fluids    Intake/Output Summary (Last 24 hours) at 11/25/2022 1327  Last data filed at 11/25/2022 0600  Gross per 24 hour   Intake 1950 ml   Output 700 ml   Net 1250 ml       Laboratory  Recent Labs     11/23/22  0606 11/24/22  0453   WBC 7.8 8.3   RBC 3.71* 3.69*   HEMOGLOBIN 11.5* 11.4*   HEMATOCRIT 34.5* 34.1*   MCV 93.0 92.4   MCH 31.0 30.9   MCHC 33.3* 33.4*   RDW 47.0 46.1   PLATELETCT 384 375   MPV 10.0 10.0                       Imaging  DX-CHEST-PORTABLE (1 VIEW)   Final Result      1.  Worsening LEFT lung base atelectasis.   2.  New small LEFT pleural effusion.   3.  No pneumothorax.   4.  No other significant change from prior exam.         DX-ESOPHAGUS - [id number]   Final Result      DX-ESOPHAGUS - [id number]   Final Result      Please refer to dedicated speech pathology report for complete details.      DX-CHEST-PORTABLE (1 VIEW)   Final Result      1.  Bibasilar and RIGHT perihilar atelectasis which could obscure an additional process. This has decreased since the prior study.   2.  Persistent focal lingular opacity, which could be atelectasis or early pneumonia   3.  RIGHT pleural effusion      DX-CHEST-FOR LINE PLACEMENT Perform procedure  in: Patient's Room   Final Result            1. A left peripherally inserted catheter with tip projects appropriately over the expected area of the lower SVC.      DX-CHEST-FOR LINE PLACEMENT Perform procedure in: Patient's Room   Final Result            1. A right peripherally inserted catheter with tip not well seen but likely projects over the expected area of the cavoatrial junction/right atrium.      IR-PICC LINE PLACEMENT W/ GUIDANCE > AGE 5   Final Result                  Ultrasound-guided PICC placement performed by qualified nursing staff as    above.          DX-CHEST-FOR LINE PLACEMENT Perform procedure in: Patient's Room   Final Result      1.  Interval placement of a PICC line which is satisfactory in position.      2.  Otherwise stable exam.      IR-PICC LINE PLACEMENT W/ GUIDANCE > AGE 5   Final Result                  Ultrasound-guided PICC placement performed by qualified nursing staff as    above.          DX-CHEST-PORTABLE (1 VIEW)   Final Result      1.  Stable bibasilar atelectasis, right greater than left.      2.  Stable right and improved left pleural effusion.      DX-CHEST-PORTABLE (1 VIEW)   Final Result      1.  Increased right pleural effusion, now small/moderate.   2.  Increased right base airspace disease.   3.  Similar small left pleural effusion with mildly increased adjacent airspace disease.      [id number] VIEW   Final Result      Mildly dilated loops of small intestine.      DX-CHEST-PORTABLE (1 VIEW)   Final Result      1.  Interval removal of the right sided chest tube.      2.  Stable bibasilar atelectasis.      3.  Interval decrease in size of a right pleural effusion.      DX-CHEST-PORTABLE (1 VIEW)   Final Result      1.  Stable support devices.      2.  Stable bilateral pleural effusions, right greater than left.      DX-CHEST-PORTABLE (1 VIEW)   Final Result      1.  Interval placement of RIGHT chest tube   2.  Moderate RIGHT hydropneumothorax   3.  Small LEFT pleural  effusion   4.  Unchanged BILATERAL atelectasis with possible superimposed pneumonia or other airspace disease      DX-CHEST-PORTABLE (1 VIEW)   Final Result      1.  No significant change      2.  Lines and tubes appear appropriately located      3.  Right pleural effusion, probably large in size      4.  Bilateral atelectasis      EC-ECHOCARDIOGRAM LTD W/ CONT   Final Result      DX-CHEST-PORTABLE (1 VIEW)   Final Result         No significant interval change      CT-CHEST,ABDOMEN,PELVIS W/O   Final Result      1.  RIGHT larger than LEFT pleural effusions with overlying atelectasis. Superimposed pneumonia is not excluded.   2.  Healing fractures the RIGHT fifth and sixth ribs   3.  Distended loops of bowel in the abdomen as described above, likely ileus rather than early or low-grade small bowel obstruction   4.  Enlarged inguinal lymph nodes   5.  The hypoechoic nodule in the liver demonstrated on ultrasound from 8/28/2022 is not seen on this unenhanced CT. Hepatic mass protocol CT or MRI should be pursued when clinically appropriate for further assessment.   6.  RIGHT renal cyst         [id number] VIEW   Final Result         1.  Air-filled distended loops of bowel are seen with reactive mucosal pattern, appearance suggests ileus or enteritis versus evolving obstructive changes. Recommend radiographic followup to resolution to exclude progression to obstruction.   2.  Nasogastric tube is coiled within the stomach, the tip terminates overlying the expected location of the gastric fundus.   3.  Bilateral lower lobe infiltrates   4.  Moderate right and small left pleural effusion      DX-CHEST-PORTABLE (1 VIEW)   Final Result         1.  Bilateral pulmonary edema and/or infiltrates.   2.  Moderate right and small left pleural effusion, stable compared to prior study   3.  Cardiomegaly      DX-CHEST-PORTABLE (1 VIEW)   Final Result         1.  Bilateral pulmonary edema and/or infiltrates.   2.  Moderate right and  small left pleural effusion, stable compared to prior study   3.  Cardiomegaly      [id number] VIEW   Final Result         Diffuse gaseous distention of the small bowel and colon, worse in prior, could relate to ileus      DX-CHEST-PORTABLE (1 VIEW)   Final Result         1. No significant interval change.      DX-CHEST-PORTABLE (1 VIEW)   Final Result         1.  Bilateral pulmonary edema and/or infiltrates.   2.  Moderate right and small left pleural effusion, increased on the right compared to prior study   3.  Cardiomegaly      DX-CHEST-PORTABLE (1 VIEW)   Final Result         1.  Pulmonary edema and/or infiltrates are identified, which are stable since the prior exam.   2.  Moderate right pleural effusion, stable.   3.  Cardiomegaly      DX-CHEST-PORTABLE (1 VIEW)   Final Result      1.  Well-positioned ETT.   2.  Moderate right pleural effusion and associated atelectasis versus consolidation.   3.  Retrocardiac atelectasis versus consolidation. No significant left effusion.      [id number] VIEW   Final Result      NGT tip is in the stomach.   1.  Nonobstructive bowel gas pattern. Small amount of stool throughout the colon.   2.  Ill-defined right basilar atelectasis versus consolidation and small right pleural effusion.      DX-CHEST-PORTABLE (1 VIEW)   Final Result         1. Low lung volume with hypoventilatory change and bibasilar atelectasis.      CT-HEAD W/O   Final Result      1.  Cerebral atrophy.      2.  Otherwise, Head CT without contrast within normal limits. No evidence of acute cerebral infarction, hemorrhage or mass lesion.         DX-CHEST-FOR LINE PLACEMENT Perform procedure in: Patient's Room   Final Result      1.  Endotracheal tube overlies the mid trachea.      2.  NG tube courses beneath the diaphragms.      DX-CHEST-PORTABLE (1 VIEW)   Final Result      No radiographic evidence of acute cardiopulmonary process.           Assessment/Plan  * Alcohol dependence with withdrawal  complicated by delirium (HCC)- (present on admission)  Assessment & Plan  With tremors, nausea vomiting and hallucinations  Alcohol withdrawal symptoms resolved, patient did require ICU stay.      Problem related to discharge planning- (present on admission)  Assessment & Plan  Patient has been pending potential SNF placement but unfortunately we have not been able to obtain an accepting facility.  Patient states he is beginning to improve now walking with a walker.  We are teaching daughter about patient's bolus feeds with PEG tube.  They have both agreed to look for potential home health, while SNF placement is still being looked into.  They agreed if Monday there is no progress they will agree to going home with home health.  Started process with face-to-face and home health order today.    Hyperphosphatemia- (present on admission)  Assessment & Plan  6.3  Decreased. TF changed by dietician.  Recheck labs in the morning if remains elevated, will need to speak with dietitian to change feeds    Agitation  Assessment & Plan  - Doing well with thiamine replacement and 25mg qpm Seroquel   Daytime somnolence -doing well after stopping daytime seroquel  Patient is calm and cooperative    Peripheral eosinophilia  Assessment & Plan  Ddx includes HES, Churg Greta, HIV, parasite infection (less likely as no significant travel), eosinophilic malignancy. 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  - Jak2 and BCRABL, peripheral flow cytometry pending  - f/u with [facility] after discharge from SNF on results    Congestive heart failure (CHF) (HCC)  Assessment & Plan  - Initial echo with preserved EF, then repeat echo with EF 40% - unclear if he was in RVR or other variable that would  impact an acute decrease in his EF  - Not overloaded on exam, no acute exacerbation  - On metoprolol, add Losartan now as Bps remain stable    Dysphagia  Assessment & Plan  - Pt has failed swallow evals last week and this week - has NGT in place  - Failed MBSS today  - Multifactorial with likely Wernicke's, deconditioning from PNA and being on the vent, acute illness  - Poor long term prognosis given his cerebral atrophy, chronic EtOH abuse and methampetamine abuse likely contributing to his lack of swallow coordination.  - Treat Wernicke's with high dose Thiamine, will continue working with SLP  11/17/2022-patient had PEG tube placement with [facility] Dr. Sebastian Mcgrath    Repeat MBSS performed today, preliminary results showing patient continued to fail swallow test. Will need to change TF continuous to bolus in preparation for discharging. 11/23    Tolerating bolus feeds, will continue    Aspiration pneumonia (HCC)  Assessment & Plan  - Completed antibiotics, now on room air  - Has failed multiple swallow evals, currently receiving tube feeds via NGT  - Failed his MBSS - discussed PEG with pt's daughter, family wishes to move forward  - Dr Mcgrath placed PEG 11/17 - tolerated well  Repeat MBSS performed today 11/23, preliminary results showing patient continued to fail swallow test. Will need to change TF continuous to bolus in preparation for discharging.  CXR showed new small left pleural effusion.  Patient is starting to get more mobile.  Monitoring, holding ABX at this time as CBC did not show new infection and pt has remained afebrile.    Wernicke encephalopathy syndrome  Assessment & Plan  - Pt with significant dementia symptoms, swallow dysfunction, cerebral atrophy on CT  - Completed high dose IV Thiamine, continue PO supplementation  - Continue ASA     Hypomagnesemia  Assessment & Plan  Remains low  1.6  - IV replacement as patient is NPO. Will start enteral mag.  - recheck daily    Reactive  "thrombocytosis  Assessment & Plan  - Continue to monitor       Sepsis due to Pseudomonas species (HCC)- (present on admission)  Assessment & Plan  Pt had septic shock requiring pressor support, with MSSA and pseudomonas in BAL and tracheal aspirate. Blood cultures negative. Sepsis has resolved.     Empyema (HCC)- (present on admission)  Assessment & Plan  - On the right, moderate-large per CXR of 10/30  - S/p diagnostic thoracentesis 10/25/22: albumin gradient is less than 1.2 g/dl which indicate exudates, his was 0.9, also inflammative effusion with very high LDH 1020 (serum in the 240s range)  - Cultures negative but pt was on antibiotics - did have chest tubes which have since been removed, completed antibiotic course.  resolved     Acute respiratory failure with hypoxia (HCC)- (present on admission)  Assessment & Plan  -Intubated for respiratory acidosis and altered mental status with need for repeat intubation twice thereafter  -Cultures from sputum and BAL grew MSSA and pseudomonas, pt completed 14d of Zosyn, and an additional 1.5d of Unasyn for empyema  -also complicated by right empyema, s/p diagnostic thoracentesis and chest tubes  - Currently off antibiotics, back on room air   - RESOLVED     Alcohol abuse with withdrawal (HCC)  Assessment & Plan  Severe withdrawal with respiratory depression requiring intubation on admit  Out of withdrawal window now     Liver mass- (present on admission)  Assessment & Plan  - Seen on US 8/2022 \"right lobe of the liver measuring 1.7 x 1.3 x 1.0 cm in size\"  - AFP negative      Atrial fibrillation (HCC)- (present on admission)  Assessment & Plan  - Not on anticoagulation prior to admission - appears he was just diagnosed by PCP in June of this year and referred to Cardiology, but I don't see where he was seen by them  - [id number] score of 1-2 - new echo with EF of 40% but prior to that was 50%, and may have some effect of sepsis on EF   - ASA for now after discussion with " pt's daughter  - Continue Metoprolol for rate control  - no telemetry needed further. Stable.    Other specified hypothyroidism- (present on admission)  Assessment & Plan  - TSH normal, continue Synthroid     Rash- (present on admission)  Assessment & Plan  - Pt with pathology positive eczema, order his home triamcinolone cream    Hyponatremia  Assessment & Plan  - Resolved with free water flushes, pt was dry       VTE prophylaxis: enoxaparin ppx    I have performed a physical exam and reviewed and updated ROS and Plan today (11/25/2022). In review of yesterday's note (11/24/2022), there are no changes except as documented above.

## 2022-11-26 LAB
ALBUMIN SERPL BCP-MCNC: 3.2 G/DL (ref 3.2–4.9)
BUN SERPL-MCNC: 11 MG/DL (ref 8–22)
CALCIUM SERPL-MCNC: 9.8 MG/DL (ref 8.4–10.2)
CHLORIDE SERPL-SCNC: 94 MMOL/L (ref 96–112)
CO2 SERPL-SCNC: 22 MMOL/L (ref 20–33)
CREAT SERPL-MCNC: 0.52 MG/DL (ref 0.5–1.4)
ERYTHROCYTE [DISTWIDTH] IN BLOOD BY AUTOMATED COUNT: 46 FL (ref 35.9–50)
GFR SERPLBLD CREATININE-BSD FMLA CKD-EPI: 118 ML/MIN/1.73 M 2
GLUCOSE BLD STRIP.AUTO-MCNC: 72 MG/DL (ref 65–99)
GLUCOSE BLD STRIP.AUTO-MCNC: 81 MG/DL (ref 65–99)
GLUCOSE BLD STRIP.AUTO-MCNC: 96 MG/DL (ref 65–99)
GLUCOSE BLD STRIP.AUTO-MCNC: 97 MG/DL (ref 65–99)
GLUCOSE SERPL-MCNC: 76 MG/DL (ref 65–99)
HCT VFR BLD AUTO: 38.5 % (ref 42–52)
HGB BLD-MCNC: 12.9 G/DL (ref 14–18)
MAGNESIUM SERPL-MCNC: 1.6 MG/DL (ref 1.5–2.5)
MCH RBC QN AUTO: 31 PG (ref 27–33)
MCHC RBC AUTO-ENTMCNC: 33.5 G/DL (ref 33.7–35.3)
MCV RBC AUTO: 92.5 FL (ref 81.4–97.8)
PHOSPHATE SERPL-MCNC: 5.6 MG/DL (ref 2.5–4.5)
PLATELET # BLD AUTO: 453 K/UL (ref 164–446)
PMV BLD AUTO: 10.5 FL (ref 9–12.9)
POTASSIUM SERPL-SCNC: 3.9 MMOL/L (ref 3.6–5.5)
RBC # BLD AUTO: 4.16 M/UL (ref 4.7–6.1)
SODIUM SERPL-SCNC: 131 MMOL/L (ref 135–145)
WBC # BLD AUTO: 7.7 K/UL (ref 4.8–10.8)

## 2022-11-26 PROCEDURE — 82962 GLUCOSE BLOOD TEST: CPT | Mod: 91

## 2022-11-26 PROCEDURE — A9270 NON-COVERED ITEM OR SERVICE: HCPCS | Performed by: HOSPITALIST

## 2022-11-26 PROCEDURE — 700111 HCHG RX REV CODE 636 W/ 250 OVERRIDE (IP): Performed by: INTERNAL MEDICINE

## 2022-11-26 PROCEDURE — A9270 NON-COVERED ITEM OR SERVICE: HCPCS | Performed by: INTERNAL MEDICINE

## 2022-11-26 PROCEDURE — 99231 SBSQ HOSP IP/OBS SF/LOW 25: CPT | Performed by: INTERNAL MEDICINE

## 2022-11-26 PROCEDURE — 80069 RENAL FUNCTION PANEL: CPT

## 2022-11-26 PROCEDURE — A9270 NON-COVERED ITEM OR SERVICE: HCPCS | Performed by: FAMILY MEDICINE

## 2022-11-26 PROCEDURE — 700102 HCHG RX REV CODE 250 W/ 637 OVERRIDE(OP): Performed by: HOSPITALIST

## 2022-11-26 PROCEDURE — 83735 ASSAY OF MAGNESIUM: CPT

## 2022-11-26 PROCEDURE — 85027 COMPLETE CBC AUTOMATED: CPT

## 2022-11-26 PROCEDURE — 36415 COLL VENOUS BLD VENIPUNCTURE: CPT

## 2022-11-26 PROCEDURE — 770006 HCHG ROOM/CARE - MED/SURG/GYN SEMI*

## 2022-11-26 PROCEDURE — 700102 HCHG RX REV CODE 250 W/ 637 OVERRIDE(OP): Performed by: FAMILY MEDICINE

## 2022-11-26 PROCEDURE — 700102 HCHG RX REV CODE 250 W/ 637 OVERRIDE(OP): Performed by: INTERNAL MEDICINE

## 2022-11-26 PROCEDURE — 94760 N-INVAS EAR/PLS OXIMETRY 1: CPT

## 2022-11-26 RX ORDER — QUETIAPINE FUMARATE 25 MG/1
25 TABLET, FILM COATED ORAL
Qty: 30 TABLET | Refills: 3 | Status: SHIPPED | OUTPATIENT
Start: 2022-11-26 | End: 2022-12-07

## 2022-11-26 RX ORDER — LANOLIN ALCOHOL/MO/W.PET/CERES
400 CREAM (GRAM) TOPICAL 2 TIMES DAILY
Qty: 60 TABLET | Refills: 0 | Status: SHIPPED | OUTPATIENT
Start: 2022-11-26 | End: 2022-12-26

## 2022-11-26 RX ORDER — METOPROLOL TARTRATE 50 MG/1
50 TABLET, FILM COATED ORAL 2 TIMES DAILY
Qty: 60 TABLET | Refills: 3 | Status: SHIPPED | OUTPATIENT
Start: 2022-11-26 | End: 2022-12-26

## 2022-11-26 RX ORDER — LANOLIN ALCOHOL/MO/W.PET/CERES
100 CREAM (GRAM) TOPICAL DAILY
Qty: 30 TABLET | Refills: 3 | Status: SHIPPED | OUTPATIENT
Start: 2022-11-26 | End: 2022-12-26

## 2022-11-26 RX ORDER — LOSARTAN POTASSIUM 25 MG/1
12.5 TABLET ORAL DAILY
Qty: 30 TABLET | Refills: 0 | Status: SHIPPED | OUTPATIENT
Start: 2022-11-26 | End: 2023-01-16 | Stop reason: SDUPTHER

## 2022-11-26 RX ORDER — MAGNESIUM SULFATE HEPTAHYDRATE 40 MG/ML
2 INJECTION, SOLUTION INTRAVENOUS ONCE
Status: COMPLETED | OUTPATIENT
Start: 2022-11-26 | End: 2022-11-26

## 2022-11-26 RX ORDER — ZINC SULFATE 50(220)MG
220 CAPSULE ORAL DAILY
Qty: 30 CAPSULE | Refills: 3 | Status: SHIPPED | OUTPATIENT
Start: 2022-11-26 | End: 2022-12-26

## 2022-11-26 RX ORDER — ASPIRIN 81 MG/1
81 TABLET, CHEWABLE ORAL DAILY
Qty: 30 TABLET | Refills: 3 | Status: SHIPPED | OUTPATIENT
Start: 2022-11-26 | End: 2022-12-26

## 2022-11-26 RX ADMIN — TRIAMCINOLONE ACETONIDE: 1 CREAM TOPICAL at 17:47

## 2022-11-26 RX ADMIN — ASPIRIN 81 MG CHEWABLE TABLET 81 MG: 81 TABLET CHEWABLE at 05:19

## 2022-11-26 RX ADMIN — Medication 1 TABLET: at 05:18

## 2022-11-26 RX ADMIN — Medication 400 MG: at 05:18

## 2022-11-26 RX ADMIN — ENOXAPARIN SODIUM 40 MG: 40 INJECTION SUBCUTANEOUS at 17:46

## 2022-11-26 RX ADMIN — THIAMINE HCL TAB 100 MG 100 MG: 100 TAB at 05:18

## 2022-11-26 RX ADMIN — MAGNESIUM SULFATE 2 G: 2 INJECTION INTRAVENOUS at 10:51

## 2022-11-26 RX ADMIN — METOPROLOL TARTRATE 50 MG: 50 TABLET, FILM COATED ORAL at 05:18

## 2022-11-26 RX ADMIN — Medication 400 MG: at 17:46

## 2022-11-26 RX ADMIN — LEVOTHYROXINE SODIUM 50 MCG: 50 TABLET ORAL at 05:19

## 2022-11-26 RX ADMIN — ZINC SULFATE 220 MG (50 MG) CAPSULE 220 MG: CAPSULE at 05:19

## 2022-11-26 RX ADMIN — NICOTINE 7 MG: 7 PATCH TRANSDERMAL at 05:19

## 2022-11-26 RX ADMIN — METOPROLOL TARTRATE 50 MG: 50 TABLET, FILM COATED ORAL at 17:46

## 2022-11-26 RX ADMIN — QUETIAPINE FUMARATE 25 MG: 25 TABLET ORAL at 21:29

## 2022-11-26 ASSESSMENT — PAIN DESCRIPTION - PAIN TYPE
TYPE: ACUTE PAIN

## 2022-11-26 ASSESSMENT — PATIENT HEALTH QUESTIONNAIRE - PHQ9
1. LITTLE INTEREST OR PLEASURE IN DOING THINGS: NOT AT ALL
2. FEELING DOWN, DEPRESSED, IRRITABLE, OR HOPELESS: NOT AT ALL
SUM OF ALL RESPONSES TO PHQ9 QUESTIONS 1 AND 2: 0
SUM OF ALL RESPONSES TO PHQ9 QUESTIONS 1 AND 2: 0
1. LITTLE INTEREST OR PLEASURE IN DOING THINGS: NOT AT ALL

## 2022-11-26 ASSESSMENT — ENCOUNTER SYMPTOMS
VOMITING: 0
COUGH: 0
FEVER: 0
SHORTNESS OF BREATH: 0
NAUSEA: 0

## 2022-11-26 NOTE — PROGRESS NOTES
PEG tube feeding and medication administration teaching performed with daughter at bedside. Daughter was able to successfully demonstrate procedure after instructions x 2.

## 2022-11-26 NOTE — CARE PLAN
The patient is Stable - Low risk of patient condition declining or worsening    Shift Goals  Clinical Goals: Pt will tolerate tube feed; Pt will ambulate at least once during the shift  Patient Goals: Pt will sleep and rest comfortably tonight  Family Goals: NA    Progress made toward(s) clinical / shift goals:  Pt tolerated PEG tube feeding and med administration; denies N/V overnight, assisted with oral care and given ice chips. Pt ambulated independently  to the BRP once during shift. Progressing in other goals.    Problem: Knowledge Deficit - Standard  Goal: Patient and family/care givers will demonstrate understanding of plan of care, disease process/condition, diagnostic tests and medications  Outcome: Progressing     Problem: Fall Risk  Goal: Patient will remain free from falls  Outcome: Progressing     Problem: Pain - Standard  Goal: Alleviation of pain or a reduction in pain to the patient’s comfort goal  Outcome: Progressing     Problem: Respiratory  Goal: Patient will achieve/maintain optimum respiratory ventilation and gas exchange  Outcome: Progressing       Patient is not progressing towards the following goals: NA

## 2022-11-26 NOTE — PROGRESS NOTES
Hospital Medicine Daily Progress Note    Date of Service  11/26/2022    Chief Complaint  Tyree Whiteside is a 57 y.o. male admitted 10/18/2022 with   Chief Complaint   Patient presents with    Medical Clearance     Patient was seen at [facility] for detox from alcohol last drink today.  Patient did attempt to check in at [facility] and was turned away because patient told the staff he was short of breath and has not been taking his medication for his atrial fibrillation.  Patient speaks in full sentences.  No increase work of breathing.  Patient does have eczema and was prescribed prednisone for it.  Patient was given valium and prednisone at [facility].  I spoke with Sharad NOLEN at [facility] and patient needs to be medical cleared         Hospital Course  Pt is a 58yo with a history of atrial fibrillation, HTN, alcohol dependence with abuse, methamphetamine abuse and liver mass who initially presented to [facility] for help with EtOH detoxification but was sent to the ER as he was having hallucinations, confusion, weakness, nausea and vomiting. He was admitted to ICU on 10/18/22 for EtOH withdrawal and respiratory depression and was placed on the ventilator and had a protracted ICU course - he required reintubation on 10/23 and again 10/26 after self extubation, diagnostic thoracentesis and has had MSSA and Pseudomonas PNA, recurrent aspiration, bradycardia, hypotension, ileus, chest tube which has since been removed and loculated pleural effusion.  He transitioned out of the ICU and is still having severe difficulty with swallowing.  Daughter discussed with family regarding moving forward with PEG tube and would like this placed, GI to consult.  (Per prior hospitalist)    11/17/2022-patient had PEG tube placement with [facility] Dr. Sebastian Mcgrath  11/7 and 11/9 - PICC lines placed while in ICU  11/2-4 -pt had intrapleural fibrinolytics in ICU  11/01 - s/p right side chest tube placement in ICU  10/25, 10/30 - bedside right sided  thoracentesis in ICU  10/26 - patient re-intubated  10/23 - patient intubated and had Bronchopscopy in ICU    ABX given:  10/22-24 & 11/08-09 -- Unasyn  10/24- 11/07 -- Zosyn for eosinophilia  11/17 - cefazolin for PEG placement    Interval Problem Update  Patient stated he was doing well today.  I have spoken with [facility] unlikely to be approved this weekend. Will need to wait until Monday. I have asked for this case to be brought to higher leadership.  Patient is eager to be able to go home. Daughter is organizing the apartment, located on first floor.    I have discussed this patient's plan of care and discharge plan at IDT rounds today with Case Management, Nursing, Nursing leadership, and other members of the IDT team.    Consultants/Specialty  critical care, GI, oncology, and palliative care  [facility]    Code Status  DNAR/DNI    Disposition  Patient is medically cleared for discharge.   Anticipate discharge to to skilled nursing facility vs home with HH.  I have placed the appropriate orders for post-discharge needs.    Review of Systems  Review of Systems   Constitutional:  Negative for fever.   Respiratory:  Negative for cough and shortness of breath.    Gastrointestinal:  Negative for nausea and vomiting.   Skin:  Positive for itching.   All other systems reviewed and are negative.     Physical Exam  Temp:  [36.1 °C (97 °F)-36.7 °C (98 °F)] 36.7 °C (98 °F)  Pulse:  [61-96] 90  Resp:  [16-18] 18  BP: ()/(39-74) 95/68  SpO2:  [90 %-98 %] 92 %    Physical Exam  Vitals and nursing note reviewed.   Constitutional:       Appearance: He is not ill-appearing.   Cardiovascular:      Rate and Rhythm: Normal rate and regular rhythm.      Pulses: Normal pulses.      Heart sounds: No murmur heard.  Pulmonary:      Effort: Pulmonary effort is normal. No respiratory distress.      Breath sounds: No rhonchi.   Abdominal:      General: Bowel sounds are normal. There is no distension.      Tenderness: There is no  abdominal tenderness.      Comments: PEG tube in place   Musculoskeletal:         General: No swelling or tenderness. Normal range of motion.   Skin:     General: Skin is warm.      Coloration: Skin is not jaundiced or pale.      Comments: Multiple areas of scratched skin healing   Neurological:      General: No focal deficit present.      Mental Status: He is alert and oriented to person, place, and time. Mental status is at baseline.      Motor: No weakness.   Psychiatric:         Mood and Affect: Mood normal.         Behavior: Behavior normal.         Thought Content: Thought content normal.         Judgment: Judgment normal.       Fluids    Intake/Output Summary (Last 24 hours) at 11/26/2022 1413  Last data filed at 11/26/2022 0557  Gross per 24 hour   Intake 670 ml   Output --   Net 670 ml       Laboratory  Recent Labs     11/24/22  0453 11/26/22  0346   WBC 8.3 7.7   RBC 3.69* 4.16*   HEMOGLOBIN 11.4* 12.9*   HEMATOCRIT 34.1* 38.5*   MCV 92.4 92.5   MCH 30.9 31.0   MCHC 33.4* 33.5*   RDW 46.1 46.0   PLATELETCT 375 453*   MPV 10.0 10.5     Recent Labs     11/26/22  0346   SODIUM 131*   POTASSIUM 3.9   CHLORIDE 94*   CO2 22   GLUCOSE 76   BUN 11   CREATININE 0.52   CALCIUM 9.8                   Imaging  DX-CHEST-PORTABLE (1 VIEW)   Final Result      1.  Worsening LEFT lung base atelectasis.   2.  New small LEFT pleural effusion.   3.  No pneumothorax.   4.  No other significant change from prior exam.         DX-ESOPHAGUS - [id number]   Final Result      DX-ESOPHAGUS - [id number]   Final Result      Please refer to dedicated speech pathology report for complete details.      DX-CHEST-PORTABLE (1 VIEW)   Final Result      1.  Bibasilar and RIGHT perihilar atelectasis which could obscure an additional process. This has decreased since the prior study.   2.  Persistent focal lingular opacity, which could be atelectasis or early pneumonia   3.  RIGHT pleural effusion      DX-CHEST-FOR LINE PLACEMENT Perform  procedure in: Patient's Room   Final Result            1. A left peripherally inserted catheter with tip projects appropriately over the expected area of the lower SVC.      DX-CHEST-FOR LINE PLACEMENT Perform procedure in: Patient's Room   Final Result            1. A right peripherally inserted catheter with tip not well seen but likely projects over the expected area of the cavoatrial junction/right atrium.      IR-PICC LINE PLACEMENT W/ GUIDANCE > AGE 5   Final Result                  Ultrasound-guided PICC placement performed by qualified nursing staff as    above.          DX-CHEST-FOR LINE PLACEMENT Perform procedure in: Patient's Room   Final Result      1.  Interval placement of a PICC line which is satisfactory in position.      2.  Otherwise stable exam.      IR-PICC LINE PLACEMENT W/ GUIDANCE > AGE 5   Final Result                  Ultrasound-guided PICC placement performed by qualified nursing staff as    above.          DX-CHEST-PORTABLE (1 VIEW)   Final Result      1.  Stable bibasilar atelectasis, right greater than left.      2.  Stable right and improved left pleural effusion.      DX-CHEST-PORTABLE (1 VIEW)   Final Result      1.  Increased right pleural effusion, now small/moderate.   2.  Increased right base airspace disease.   3.  Similar small left pleural effusion with mildly increased adjacent airspace disease.      [id number] VIEW   Final Result      Mildly dilated loops of small intestine.      DX-CHEST-PORTABLE (1 VIEW)   Final Result      1.  Interval removal of the right sided chest tube.      2.  Stable bibasilar atelectasis.      3.  Interval decrease in size of a right pleural effusion.      DX-CHEST-PORTABLE (1 VIEW)   Final Result      1.  Stable support devices.      2.  Stable bilateral pleural effusions, right greater than left.      DX-CHEST-PORTABLE (1 VIEW)   Final Result      1.  Interval placement of RIGHT chest tube   2.  Moderate RIGHT hydropneumothorax   3.  Small LEFT  pleural effusion   4.  Unchanged BILATERAL atelectasis with possible superimposed pneumonia or other airspace disease      DX-CHEST-PORTABLE (1 VIEW)   Final Result      1.  No significant change      2.  Lines and tubes appear appropriately located      3.  Right pleural effusion, probably large in size      4.  Bilateral atelectasis      EC-ECHOCARDIOGRAM LTD W/ CONT   Final Result      DX-CHEST-PORTABLE (1 VIEW)   Final Result         No significant interval change      CT-CHEST,ABDOMEN,PELVIS W/O   Final Result      1.  RIGHT larger than LEFT pleural effusions with overlying atelectasis. Superimposed pneumonia is not excluded.   2.  Healing fractures the RIGHT fifth and sixth ribs   3.  Distended loops of bowel in the abdomen as described above, likely ileus rather than early or low-grade small bowel obstruction   4.  Enlarged inguinal lymph nodes   5.  The hypoechoic nodule in the liver demonstrated on ultrasound from 8/28/2022 is not seen on this unenhanced CT. Hepatic mass protocol CT or MRI should be pursued when clinically appropriate for further assessment.   6.  RIGHT renal cyst         [id number] VIEW   Final Result         1.  Air-filled distended loops of bowel are seen with reactive mucosal pattern, appearance suggests ileus or enteritis versus evolving obstructive changes. Recommend radiographic followup to resolution to exclude progression to obstruction.   2.  Nasogastric tube is coiled within the stomach, the tip terminates overlying the expected location of the gastric fundus.   3.  Bilateral lower lobe infiltrates   4.  Moderate right and small left pleural effusion      DX-CHEST-PORTABLE (1 VIEW)   Final Result         1.  Bilateral pulmonary edema and/or infiltrates.   2.  Moderate right and small left pleural effusion, stable compared to prior study   3.  Cardiomegaly      DX-CHEST-PORTABLE (1 VIEW)   Final Result         1.  Bilateral pulmonary edema and/or infiltrates.   2.  Moderate  right and small left pleural effusion, stable compared to prior study   3.  Cardiomegaly      [id number] VIEW   Final Result         Diffuse gaseous distention of the small bowel and colon, worse in prior, could relate to ileus      DX-CHEST-PORTABLE (1 VIEW)   Final Result         1. No significant interval change.      DX-CHEST-PORTABLE (1 VIEW)   Final Result         1.  Bilateral pulmonary edema and/or infiltrates.   2.  Moderate right and small left pleural effusion, increased on the right compared to prior study   3.  Cardiomegaly      DX-CHEST-PORTABLE (1 VIEW)   Final Result         1.  Pulmonary edema and/or infiltrates are identified, which are stable since the prior exam.   2.  Moderate right pleural effusion, stable.   3.  Cardiomegaly      DX-CHEST-PORTABLE (1 VIEW)   Final Result      1.  Well-positioned ETT.   2.  Moderate right pleural effusion and associated atelectasis versus consolidation.   3.  Retrocardiac atelectasis versus consolidation. No significant left effusion.      [id number] VIEW   Final Result      NGT tip is in the stomach.   1.  Nonobstructive bowel gas pattern. Small amount of stool throughout the colon.   2.  Ill-defined right basilar atelectasis versus consolidation and small right pleural effusion.      DX-CHEST-PORTABLE (1 VIEW)   Final Result         1. Low lung volume with hypoventilatory change and bibasilar atelectasis.      CT-HEAD W/O   Final Result      1.  Cerebral atrophy.      2.  Otherwise, Head CT without contrast within normal limits. No evidence of acute cerebral infarction, hemorrhage or mass lesion.         DX-CHEST-FOR LINE PLACEMENT Perform procedure in: Patient's Room   Final Result      1.  Endotracheal tube overlies the mid trachea.      2.  NG tube courses beneath the diaphragms.      DX-CHEST-PORTABLE (1 VIEW)   Final Result      No radiographic evidence of acute cardiopulmonary process.           Assessment/Plan  * Alcohol dependence with withdrawal  complicated by delirium (HCC)- (present on admission)  Assessment & Plan  With tremors, nausea vomiting and hallucinations  Alcohol withdrawal symptoms resolved, patient did require ICU stay.      Problem related to discharge planning- (present on admission)  Assessment & Plan  Patient has been pending potential SNF placement but unfortunately we have not been able to obtain an accepting facility.  Patient states he is beginning to improve now walking with a walker.  We are teaching daughter about patient's bolus feeds with PEG tube.  They have both agreed to look for potential home health, while SNF placement is still being looked into.  They agreed if Monday there is no progress they will agree to going home with home health.  Started process with face-to-face and home health order today.    I have spoken with [facility] unlikely to be approved this weekend. Will need to wait until Monday. I have asked for this case to be brought to higher leadership.  Patient is eager to be able to go home. Daughter is organizing the apartment, located on first floor.    Hyperphosphatemia- (present on admission)  Assessment & Plan  6.3  Decreased. TF changed by dietician.  Recheck labs in the morning if remains elevated, will need to speak with dietitian to change feeds    Agitation  Assessment & Plan  - Doing well with thiamine replacement and 25mg qpm Seroquel   Daytime somnolence -doing well after stopping daytime seroquel  Patient is calm and cooperative    Peripheral eosinophilia- (present on admission)  Assessment & Plan  Ddx includes HES, Churg Greta, HIV, parasite infection (less likely as no significant travel), eosinophilic malignancy. 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  -  Jak2 and BCRABL, peripheral flow cytometry pending  - f/u with [facility] after discharge from SNF on results    Congestive heart failure (CHF) (HCC)  Assessment & Plan  - Initial echo with preserved EF, then repeat echo with EF 40% - unclear if he was in RVR or other variable that would impact an acute decrease in his EF  - Not overloaded on exam, no acute exacerbation  - On metoprolol, add Losartan now as Bps remain stable    Dysphagia  Assessment & Plan  - Pt has failed swallow evals last week and this week - has NGT in place  - Failed MBSS today  - Multifactorial with likely Wernicke's, deconditioning from PNA and being on the vent, acute illness  - Poor long term prognosis given his cerebral atrophy, chronic EtOH abuse and methampetamine abuse likely contributing to his lack of swallow coordination.  - Treat Wernicke's with high dose Thiamine, will continue working with SLP  11/17/2022-patient had PEG tube placement [facility] Dr. Sebastian Mcgrath    Repeat MBSS performed today, preliminary results showing patient continued to fail swallow test. Will need to change TF continuous to bolus in preparation for discharging. 11/23    Tolerating bolus feeds, will continue    Aspiration pneumonia (HCC)  Assessment & Plan  - Completed antibiotics, now on room air  - Has failed multiple swallow evals, currently receiving tube feeds via NGT  - Failed his MBSS - discussed PEG with pt's daughter, family wishes to move forward  - Dr Mcgrath placed PEG 11/17 - tolerated well  Repeat MBSS performed today 11/23, preliminary results showing patient continued to fail swallow test. Will need to change TF continuous to bolus in preparation for discharging.  CXR showed new small left pleural effusion.  Patient is starting to get more mobile.  Monitoring, holding ABX at this time as CBC did not show new infection and pt has remained afebrile.    Sepsis due to Pseudomonas species ([facility])- (present on admission)  Assessment & Plan  Pt  "had septic shock requiring pressor support, with MSSA and pseudomonas in BAL and tracheal aspirate. Blood cultures negative. Sepsis has resolved.     Acute respiratory failure with hypoxia (HCC)- (present on admission)  Assessment & Plan  -Intubated for respiratory acidosis and altered mental status with need for repeat intubation twice thereafter  -Cultures from sputum and BAL grew MSSA and pseudomonas, pt completed 14d of Zosyn, and an additional 1.5d of Unasyn for empyema  -also complicated by right empyema, s/p diagnostic thoracentesis and chest tubes  - Currently off antibiotics, back on room air   - RESOLVED     Alcohol abuse with withdrawal (HCC)- (present on admission)  Assessment & Plan  Severe withdrawal with respiratory depression requiring intubation on admit  Out of withdrawal window now     Liver mass- (present on admission)  Assessment & Plan  - Seen on  8/2022 \"right lobe of the liver measuring 1.7 x 1.3 x 1.0 cm in size\"  - AFP negative      Atrial fibrillation (HCC)- (present on admission)  Assessment & Plan  - Not on anticoagulation prior to admission - appears he was just diagnosed by PCP in June of this year and referred to Cardiology, but I don't see where he was seen by them  - [id number] score of 1-2 - new echo with EF of 40% but prior to that was 50%, and may have some effect of sepsis on EF   - ASA for now after discussion with pt's daughter  - Continue Metoprolol for rate control  - no telemetry needed further. Stable.    Other specified hypothyroidism- (present on admission)  Assessment & Plan  - TSH normal, continue Synthroid     Rash- (present on admission)  Assessment & Plan  - Pt with pathology positive eczema, order his home triamcinolone cream    Essential hypertension- (present on admission)  Assessment & Plan  Resume home amlodipine and metoprolol with hold parameters    Hyponatremia  Assessment & Plan  - Resolved with free water flushes, pt was dry       VTE prophylaxis: " enoxaparin ppx    I have performed a physical exam and reviewed and updated ROS and Plan today (11/26/2022). In review of yesterday's note (11/25/2022), there are no changes except as documented above.

## 2022-11-26 NOTE — PROGRESS NOTES
Nebulizer Treatment:  Pre treatment vitals: BP: 142/81 (01/16/20 1149)  Temp: 98.3 °F (36.8 °C) (01/16/20 1149)  Pulse: 108 (01/16/20 1149)  Resp: 16 (01/16/20 1149)  SpO2: 98 % (01/16/20 1149)   Administered Mask nebulizer treatment with DuoNeb - 0.5 mg Atrovent and 3 mg Albuterol    Medication Supply: Stock Medication used    Treatment vitals and times recorded in vitals section  Patient tolerated the procedure well.    Received report from day shift RN. Assumed care of pt. Pt A&O X 4, on RA, family at bedside. Pt denies any pain, or N/V at this time. No signs of respiratory distress, VSS. Pt updated with POC, fall precautions in place, call light within reach. All needs met at this time.

## 2022-11-26 NOTE — PROGRESS NOTES
Pt tolerated to the bolus feeding given with the kangaroo pump. No signs of aspiration noted. Site looks dry and intact and no signs of leakage.

## 2022-11-26 NOTE — DISCHARGE PLANNING
Received Choice form at 0845  Agency/Facility Name: [facility]  Referral sent per Choice form @ [phone]

## 2022-11-26 NOTE — DISCHARGE PLANNING
Case Management Discharge Planning    Admission Date: 10/18/2022  GMLOS: 13.2  ALOS: 39    6-Clicks ADL Score: 20  6-Clicks Mobility Score: 24      Anticipated Discharge Dispo: Discharge Disposition: D/T to SNF with Medicare cert in anticipation of skilled care (03)    DME Needed: No    Action(s) Taken: OTHER, ANGEL RN discussed pt during morning rounds, ANGEL RN informed pt is now ambulatory and can DC with . Pt has Anibal, [facility]  [facility]. Pt will need updated PT/OT notes, charge and bedside RN notified via volate.     Escalations Completed: None    Medically Clear: Yes    Next Steps: ANGEL RN to follow up with charge about PT/OT notes    Barriers to Discharge: None    Is the patient up for discharge tomorrow: No

## 2022-11-26 NOTE — PROGRESS NOTES
Received pt from NOC RN. PT is A & O x 4. VSS. Pt has no complaints of pain. Patent educated to call RN for pain medication. PEG tube in place and flushing well for the feeding. Safety precaution in place.

## 2022-11-27 LAB
GLUCOSE BLD STRIP.AUTO-MCNC: 83 MG/DL (ref 65–99)
GLUCOSE BLD STRIP.AUTO-MCNC: 90 MG/DL (ref 65–99)
GLUCOSE BLD STRIP.AUTO-MCNC: 98 MG/DL (ref 65–99)

## 2022-11-27 PROCEDURE — 770006 HCHG ROOM/CARE - MED/SURG/GYN SEMI*

## 2022-11-27 PROCEDURE — 94760 N-INVAS EAR/PLS OXIMETRY 1: CPT

## 2022-11-27 PROCEDURE — 700102 HCHG RX REV CODE 250 W/ 637 OVERRIDE(OP): Performed by: FAMILY MEDICINE

## 2022-11-27 PROCEDURE — A9270 NON-COVERED ITEM OR SERVICE: HCPCS | Performed by: HOSPITALIST

## 2022-11-27 PROCEDURE — 700102 HCHG RX REV CODE 250 W/ 637 OVERRIDE(OP): Performed by: INTERNAL MEDICINE

## 2022-11-27 PROCEDURE — 82962 GLUCOSE BLOOD TEST: CPT | Mod: 91

## 2022-11-27 PROCEDURE — A9270 NON-COVERED ITEM OR SERVICE: HCPCS | Performed by: INTERNAL MEDICINE

## 2022-11-27 PROCEDURE — 700111 HCHG RX REV CODE 636 W/ 250 OVERRIDE (IP): Performed by: INTERNAL MEDICINE

## 2022-11-27 PROCEDURE — 99231 SBSQ HOSP IP/OBS SF/LOW 25: CPT | Performed by: INTERNAL MEDICINE

## 2022-11-27 PROCEDURE — 700102 HCHG RX REV CODE 250 W/ 637 OVERRIDE(OP): Performed by: HOSPITALIST

## 2022-11-27 PROCEDURE — A9270 NON-COVERED ITEM OR SERVICE: HCPCS | Performed by: FAMILY MEDICINE

## 2022-11-27 RX ADMIN — Medication 400 MG: at 16:56

## 2022-11-27 RX ADMIN — ENOXAPARIN SODIUM 40 MG: 40 INJECTION SUBCUTANEOUS at 16:55

## 2022-11-27 RX ADMIN — LEVOTHYROXINE SODIUM 50 MCG: 50 TABLET ORAL at 05:21

## 2022-11-27 RX ADMIN — ZINC SULFATE 220 MG (50 MG) CAPSULE 220 MG: CAPSULE at 05:21

## 2022-11-27 RX ADMIN — TRIAMCINOLONE ACETONIDE: 1 CREAM TOPICAL at 17:04

## 2022-11-27 RX ADMIN — Medication 1 TABLET: at 05:21

## 2022-11-27 RX ADMIN — QUETIAPINE FUMARATE 25 MG: 25 TABLET ORAL at 21:09

## 2022-11-27 RX ADMIN — THIAMINE HCL TAB 100 MG 100 MG: 100 TAB at 05:21

## 2022-11-27 RX ADMIN — Medication 400 MG: at 05:21

## 2022-11-27 RX ADMIN — ASPIRIN 81 MG CHEWABLE TABLET 81 MG: 81 TABLET CHEWABLE at 05:21

## 2022-11-27 RX ADMIN — NICOTINE 7 MG: 7 PATCH TRANSDERMAL at 05:21

## 2022-11-27 RX ADMIN — METOPROLOL TARTRATE 50 MG: 50 TABLET, FILM COATED ORAL at 05:21

## 2022-11-27 RX ADMIN — TRIAMCINOLONE ACETONIDE 0.1 %: 1 CREAM TOPICAL at 05:33

## 2022-11-27 ASSESSMENT — PATIENT HEALTH QUESTIONNAIRE - PHQ9
2. FEELING DOWN, DEPRESSED, IRRITABLE, OR HOPELESS: NOT AT ALL
SUM OF ALL RESPONSES TO PHQ9 QUESTIONS 1 AND 2: 0
1. LITTLE INTEREST OR PLEASURE IN DOING THINGS: NOT AT ALL

## 2022-11-27 ASSESSMENT — ENCOUNTER SYMPTOMS
FEVER: 0
SHORTNESS OF BREATH: 0
COUGH: 0
VOMITING: 0
NAUSEA: 0

## 2022-11-27 ASSESSMENT — PAIN DESCRIPTION - PAIN TYPE: TYPE: ACUTE PAIN

## 2022-11-27 NOTE — PROGRESS NOTES
Hospital Medicine Daily Progress Note    Date of Service  11/27/2022    Chief Complaint  Tyree Whiteside is a 57 y.o. male admitted 10/18/2022 with   Chief Complaint   Patient presents with    Medical Clearance     Patient was seen at [facility] for detox from alcohol last drink today.  Patient did attempt to check in at [facility] and was turned away because patient told the staff he was short of breath and has not been taking his medication for his atrial fibrillation.  Patient speaks in full sentences.  No increase work of breathing.  Patient does have eczema and was prescribed prednisone for it.  Patient was given valium and prednisone at [facility].  I spoke with Sharad NOLEN at [facility] and patient needs to be medical cleared     Hospital Course  Pt is a 58yo with a history of atrial fibrillation, HTN, alcohol dependence with abuse, methamphetamine abuse and liver mass who initially presented to [facility] for help with EtOH detoxification but was sent to the ER as he was having hallucinations, confusion, weakness, nausea and vomiting. He was admitted to ICU on 10/18/22 for EtOH withdrawal and respiratory depression and was placed on the ventilator and had a protracted ICU course - he required reintubation on 10/23 and again 10/26 after self extubation, diagnostic thoracentesis and has had MSSA and Pseudomonas PNA, recurrent aspiration, bradycardia, hypotension, ileus, chest tube which has since been removed and loculated pleural effusion.  He transitioned out of the ICU and is still having severe difficulty with swallowing.  Daughter discussed with family regarding moving forward with PEG tube and would like this placed, GI to consult.  (Per prior hospitalist)    11/17/2022-patient had PEG tube placement with [facility] Dr. Sebastian Mcgrath  11/7 and 11/9 - PICC lines placed while in ICU  11/2-4 -pt had intrapleural fibrinolytics in ICU  11/01 - s/p right side chest tube placement in ICU  10/25, 10/30 - bedside right sided  thoracentesis in ICU  10/26 - patient re-intubated  10/23 - patient intubated and had Bronchopscopy in ICU    ABX given:  10/22-24 & 11/08-09 -- Unasyn  10/24- 11/07 -- Zosyn for eosinophilia  11/17 - cefazolin for PEG placement    Interval Problem Update  Patient stated he was doing well today, no acute complaints today.  Pending [employer]  tomorrow    I have discussed this patient's plan of care and discharge plan at IDT rounds today with Case Management, Nursing, Nursing leadership, and other members of the IDT team.    Consultants/Specialty  critical care, GI, oncology, and palliative care  [facility]    Code Status  DNAR/DNI    Disposition  Patient is medically cleared for discharge.   Anticipate discharge to to home with organized home healthcare and close outpatient follow-up   I have placed the appropriate orders for post-discharge needs.    Review of Systems  Review of Systems   Constitutional:  Negative for fever.   Respiratory:  Negative for cough and shortness of breath.    Gastrointestinal:  Negative for nausea and vomiting.   Skin:  Positive for itching.   All other systems reviewed and are negative.     Physical Exam  Temp:  [36.4 °C (97.5 °F)-36.8 °C (98.3 °F)] 36.8 °C (98.3 °F)  Pulse:  [75-94] 75  Resp:  [17-20] 18  BP: ()/(62-81) 98/65  SpO2:  [93 %-95 %] 94 %    Physical Exam  Vitals and nursing note reviewed.   Constitutional:       Appearance: He is not ill-appearing.   Cardiovascular:      Rate and Rhythm: Normal rate and regular rhythm.      Pulses: Normal pulses.      Heart sounds: No murmur heard.  Pulmonary:      Effort: Pulmonary effort is normal. No respiratory distress.      Breath sounds: No rhonchi.   Abdominal:      General: Bowel sounds are normal. There is no distension.      Tenderness: There is no abdominal tenderness.      Comments: PEG tube in place   Musculoskeletal:         General: No swelling or tenderness. Normal range of motion.   Skin:     General: Skin is warm.       Coloration: Skin is not jaundiced or pale.      Comments: Multiple areas of scratched skin healing   Neurological:      General: No focal deficit present.      Mental Status: He is alert and oriented to person, place, and time. Mental status is at baseline.      Motor: No weakness.   Psychiatric:         Mood and Affect: Mood normal.         Behavior: Behavior normal.         Thought Content: Thought content normal.         Judgment: Judgment normal.       Fluids    Intake/Output Summary (Last 24 hours) at 11/27/2022 1255  Last data filed at 11/27/2022 1214  Gross per 24 hour   Intake 2430 ml   Output 800 ml   Net 1630 ml         Laboratory  Recent Labs     11/26/22  0346   WBC 7.7   RBC 4.16*   HEMOGLOBIN 12.9*   HEMATOCRIT 38.5*   MCV 92.5   MCH 31.0   MCHC 33.5*   RDW 46.0   PLATELETCT 453*   MPV 10.5       Recent Labs     11/26/22  0346   SODIUM 131*   POTASSIUM 3.9   CHLORIDE 94*   CO2 22   GLUCOSE 76   BUN 11   CREATININE 0.52   CALCIUM 9.8                     Imaging  DX-CHEST-PORTABLE (1 VIEW)   Final Result      1.  Worsening LEFT lung base atelectasis.   2.  New small LEFT pleural effusion.   3.  No pneumothorax.   4.  No other significant change from prior exam.         DX-ESOPHAGUS - [id number]   Final Result      DX-ESOPHAGUS - [id number]   Final Result      Please refer to dedicated speech pathology report for complete details.      DX-CHEST-PORTABLE (1 VIEW)   Final Result      1.  Bibasilar and RIGHT perihilar atelectasis which could obscure an additional process. This has decreased since the prior study.   2.  Persistent focal lingular opacity, which could be atelectasis or early pneumonia   3.  RIGHT pleural effusion      DX-CHEST-FOR LINE PLACEMENT Perform procedure in: Patient's Room   Final Result            1. A left peripherally inserted catheter with tip projects appropriately over the expected area of the lower SVC.      DX-CHEST-FOR LINE PLACEMENT Perform procedure in: Patient's Room    Final Result            1. A right peripherally inserted catheter with tip not well seen but likely projects over the expected area of the cavoatrial junction/right atrium.      IR-PICC LINE PLACEMENT W/ GUIDANCE > AGE 5   Final Result                  Ultrasound-guided PICC placement performed by qualified nursing staff as    above.          DX-CHEST-FOR LINE PLACEMENT Perform procedure in: Patient's Room   Final Result      1.  Interval placement of a PICC line which is satisfactory in position.      2.  Otherwise stable exam.      IR-PICC LINE PLACEMENT W/ GUIDANCE > AGE 5   Final Result                  Ultrasound-guided PICC placement performed by qualified nursing staff as    above.          DX-CHEST-PORTABLE (1 VIEW)   Final Result      1.  Stable bibasilar atelectasis, right greater than left.      2.  Stable right and improved left pleural effusion.      DX-CHEST-PORTABLE (1 VIEW)   Final Result      1.  Increased right pleural effusion, now small/moderate.   2.  Increased right base airspace disease.   3.  Similar small left pleural effusion with mildly increased adjacent airspace disease.      [id number]-4 VIEW   Final Result      Mildly dilated loops of small intestine.      DX-CHEST-PORTABLE (1 VIEW)   Final Result      1.  Interval removal of the right sided chest tube.      2.  Stable bibasilar atelectasis.      3.  Interval decrease in size of a right pleural effusion.      DX-CHEST-PORTABLE (1 VIEW)   Final Result      1.  Stable support devices.      2.  Stable bilateral pleural effusions, right greater than left.      DX-CHEST-PORTABLE (1 VIEW)   Final Result      1.  Interval placement of RIGHT chest tube   2.  Moderate RIGHT hydropneumothorax   3.  Small LEFT pleural effusion   4.  Unchanged BILATERAL atelectasis with possible superimposed pneumonia or other airspace disease      DX-CHEST-PORTABLE (1 VIEW)   Final Result      1.  No significant change      2.  Lines and tubes appear  appropriately located      3.  Right pleural effusion, probably large in size      4.  Bilateral atelectasis      EC-ECHOCARDIOGRAM LTD W/ CONT   Final Result      DX-CHEST-PORTABLE (1 VIEW)   Final Result         No significant interval change      CT-CHEST,ABDOMEN,PELVIS W/O   Final Result      1.  RIGHT larger than LEFT pleural effusions with overlying atelectasis. Superimposed pneumonia is not excluded.   2.  Healing fractures the RIGHT fifth and sixth ribs   3.  Distended loops of bowel in the abdomen as described above, likely ileus rather than early or low-grade small bowel obstruction   4.  Enlarged inguinal lymph nodes   5.  The hypoechoic nodule in the liver demonstrated on ultrasound from 8/28/2022 is not seen on this unenhanced CT. Hepatic mass protocol CT or MRI should be pursued when clinically appropriate for further assessment.   6.  RIGHT renal cyst         [id number] VIEW   Final Result         1.  Air-filled distended loops of bowel are seen with reactive mucosal pattern, appearance suggests ileus or enteritis versus evolving obstructive changes. Recommend radiographic followup to resolution to exclude progression to obstruction.   2.  Nasogastric tube is coiled within the stomach, the tip terminates overlying the expected location of the gastric fundus.   3.  Bilateral lower lobe infiltrates   4.  Moderate right and small left pleural effusion      DX-CHEST-PORTABLE (1 VIEW)   Final Result         1.  Bilateral pulmonary edema and/or infiltrates.   2.  Moderate right and small left pleural effusion, stable compared to prior study   3.  Cardiomegaly      DX-CHEST-PORTABLE (1 VIEW)   Final Result         1.  Bilateral pulmonary edema and/or infiltrates.   2.  Moderate right and small left pleural effusion, stable compared to prior study   3.  Cardiomegaly      [id number] VIEW   Final Result         Diffuse gaseous distention of the small bowel and colon, worse in prior, could relate to ileus       DX-CHEST-PORTABLE (1 VIEW)   Final Result         1. No significant interval change.      DX-CHEST-PORTABLE (1 VIEW)   Final Result         1.  Bilateral pulmonary edema and/or infiltrates.   2.  Moderate right and small left pleural effusion, increased on the right compared to prior study   3.  Cardiomegaly      DX-CHEST-PORTABLE (1 VIEW)   Final Result         1.  Pulmonary edema and/or infiltrates are identified, which are stable since the prior exam.   2.  Moderate right pleural effusion, stable.   3.  Cardiomegaly      DX-CHEST-PORTABLE (1 VIEW)   Final Result      1.  Well-positioned ETT.   2.  Moderate right pleural effusion and associated atelectasis versus consolidation.   3.  Retrocardiac atelectasis versus consolidation. No significant left effusion.      [id number] VIEW   Final Result      NGT tip is in the stomach.   1.  Nonobstructive bowel gas pattern. Small amount of stool throughout the colon.   2.  Ill-defined right basilar atelectasis versus consolidation and small right pleural effusion.      DX-CHEST-PORTABLE (1 VIEW)   Final Result         1. Low lung volume with hypoventilatory change and bibasilar atelectasis.      CT-HEAD W/O   Final Result      1.  Cerebral atrophy.      2.  Otherwise, Head CT without contrast within normal limits. No evidence of acute cerebral infarction, hemorrhage or mass lesion.         DX-CHEST-FOR LINE PLACEMENT Perform procedure in: Patient's Room   Final Result      1.  Endotracheal tube overlies the mid trachea.      2.  NG tube courses beneath the diaphragms.      DX-CHEST-PORTABLE (1 VIEW)   Final Result      No radiographic evidence of acute cardiopulmonary process.             Assessment/Plan  * Alcohol dependence with withdrawal complicated by delirium (HCC)- (present on admission)  Assessment & Plan  With tremors, nausea vomiting and hallucinations  Alcohol withdrawal symptoms resolved, patient did require ICU stay.      Problem related to discharge  planning- (present on admission)  Assessment & Plan  Patient has been pending potential SNF placement but unfortunately we have not been able to obtain an accepting facility.  Patient states he is beginning to improve now walking with a walker.  We are teaching daughter about patient's bolus feeds with PEG tube.  They have both agreed to look for potential home health, while SNF placement is still being looked into.  They agreed if Monday there is no progress they will agree to going home with home health.  Started process with face-to-face and home health order today.    I have spoken with [employer] ORTIZ unlikely to be approved this weekend. Will need to wait until Monday. I have asked for this case to be brought to higher leadership.  Patient is eager to be able to go home. Daughter is organizing the apartment, located on first floor.    Hyperphosphatemia- (present on admission)  Assessment & Plan  6.3  Decreased. TF changed by dietician.  Recheck labs in the morning if remains elevated, will need to speak with dietitian to change feeds    Agitation  Assessment & Plan  - Doing well with thiamine replacement and 25mg qpm Seroquel   Daytime somnolence -doing well after stopping daytime seroquel  Patient is calm and cooperative    Peripheral eosinophilia- (present on admission)  Assessment & Plan  Ddx includes HES, Churg Greta, HIV, parasite infection (less likely as no significant travel), eosinophilic malignancy. 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  - Jak2 and BCRABL, peripheral flow cytometry pending  - f/u with [facility] after discharge from SNF on results    Congestive heart failure (CHF) (HCC)  Assessment & Plan  - Initial echo with preserved EF, then repeat echo  with EF 40% - unclear if he was in RVR or other variable that would impact an acute decrease in his EF  - Not overloaded on exam, no acute exacerbation  - On metoprolol, add Losartan now as Bps remain stable    Dysphagia  Assessment & Plan  - Pt has failed swallow evals last week and this week - has NGT in place  - Failed MBSS today  - Multifactorial with likely Wernicke's, deconditioning from PNA and being on the vent, acute illness  - Poor long term prognosis given his cerebral atrophy, chronic EtOH abuse and methampetamine abuse likely contributing to his lack of swallow coordination.  - Treat Wernicke's with high dose Thiamine, will continue working with SLP  11/17/2022-patient had PEG tube placement with [facility] Dr. Sebastian Mcgrath    Repeat MBSS performed today, preliminary results showing patient continued to fail swallow test. Will need to change TF continuous to bolus in preparation for discharging. 11/23    Tolerating bolus feeds, will continue    Aspiration pneumonia (HCC)  Assessment & Plan  - Completed antibiotics, now on room air  - Has failed multiple swallow evals, currently receiving tube feeds via NGT  - Failed his MBSS - discussed PEG with pt's daughter, family wishes to move forward  - Dr Mcgrath placed PEG 11/17 - tolerated well  Repeat MBSS performed today 11/23, preliminary results showing patient continued to fail swallow test. Will need to change TF continuous to bolus in preparation for discharging.  CXR showed new small left pleural effusion.  Patient is starting to get more mobile.  Monitoring, holding ABX at this time as CBC did not show new infection and pt has remained afebrile.    Sepsis due to Pseudomonas species ([facility])- (present on admission)  Assessment & Plan  Pt had septic shock requiring pressor support, with MSSA and pseudomonas in BAL and tracheal aspirate. Blood cultures negative. Sepsis has resolved.     Acute respiratory failure with hypoxia ([facility])- (present on  "admission)  Assessment & Plan  -Intubated for respiratory acidosis and altered mental status with need for repeat intubation twice thereafter  -Cultures from sputum and BAL grew MSSA and pseudomonas, pt completed 14d of Zosyn, and an additional 1.5d of Unasyn for empyema  -also complicated by right empyema, s/p diagnostic thoracentesis and chest tubes  - Currently off antibiotics, back on room air   - RESOLVED     Alcohol abuse with withdrawal (HCC)- (present on admission)  Assessment & Plan  Severe withdrawal with respiratory depression requiring intubation on admit  Out of withdrawal window now     Liver mass- (present on admission)  Assessment & Plan  - Seen on US 8/2022 \"right lobe of the liver measuring 1.7 x 1.3 x 1.0 cm in size\"  - AFP negative      Atrial fibrillation (HCC)- (present on admission)  Assessment & Plan  - Not on anticoagulation prior to admission - appears he was just diagnosed by PCP in June of this year and referred to Cardiology, but I don't see where he was seen by them  - [id number] score of 1-2 - new echo with EF of 40% but prior to that was 50%, and may have some effect of sepsis on EF   - ASA for now after discussion with pt's daughter  - Continue Metoprolol for rate control  - no telemetry needed further. Stable.    Other specified hypothyroidism- (present on admission)  Assessment & Plan  - TSH normal, continue Synthroid     Rash- (present on admission)  Assessment & Plan  - Pt with pathology positive eczema, order his home triamcinolone cream    Essential hypertension- (present on admission)  Assessment & Plan  Resume home amlodipine and metoprolol with hold parameters    Hyponatremia  Assessment & Plan  - Resolved with free water flushes, pt was dry         VTE prophylaxis: enoxaparin ppx    I have performed a physical exam and reviewed and updated ROS and Plan today (11/27/2022). In review of yesterday's note (11/26/2022), there are no changes except as documented above.        "

## 2022-11-27 NOTE — CARE PLAN
The patient is Stable - Low risk of patient condition declining or worsening    Shift Goals  Clinical Goals: Patient will tolerate tube feed.  Patient Goals: Safety and adequate nutrition  Family Goals: NA    Progress made toward(s) clinical / shift goals:  Patient has no signs of aspirations and no other complaints while on tube feeding at the end of the shift.   Problem: Knowledge Deficit - Standard  Goal: Patient and family/care givers will demonstrate understanding of plan of care, disease process/condition, diagnostic tests and medications  Outcome: Progressing     Problem: Risk for Aspiration  Goal: Patient's risk for aspiration will be absent or decrease  Outcome: Progressing     Problem: Skin Integrity  Goal: Skin integrity is maintained or improved  Outcome: Progressing       Patient is not progressing towards the following goals:

## 2022-11-27 NOTE — CARE PLAN
The patient is Stable - Low risk of patient condition declining or worsening    Shift Goals  Clinical Goals: Maintain patency of patient's PEG tube; pt will tolerate tube feed  Patient Goals: Pt requesting to have bedtime bolus feeding closer to midnight  Family Goals: NA    Progress made toward(s) clinical / shift goals:  Pt's PEG tube flushing well for bolus feeding and med administration;  Pt tolerated PEG tube feeding, denies N/V overnight, assisted with oral care and given ice chips. Pt ambulated independently  to the BRP twice during shift. Progressing in other goals.    Patient is not progressing towards the following goals:

## 2022-11-27 NOTE — PROGRESS NOTES
Received report from day shift RN. Pt A&O X 4, on RA. Pt denies any pain at this time. PEG tube in place, CDI at insertion site, and flushing well for feeding.  Pt updated with POC, fall precautions in place, call light within reach. All needs met at this time.

## 2022-11-27 NOTE — PALLIATIVE CARE
Palliative Care Intermittent Follow Up Note:   Palliative Care is following this patient intermittently due to placement pending. No changes in POC at this time.     PC will continue to monitor intermittently; please call [phone] with any change in condition or needs.    To locate the AD/POLST, please hover the cursor over the patient's code status to find all linked ACP documents.    Plan:   Intermittent follow up pending pt situation/condition. As appropriate, Palliative Care encourages medical team to engage in further conversation and education for patient/family at bedside regarding GOC/POC. Please call Palliative Care office for any significant changes in condition or needs.     Thank you for allowing Palliative Care to participate in this patient's care. Please feel free to call [phone] with any questions or concerns.

## 2022-11-28 LAB
ALBUMIN SERPL BCP-MCNC: 3 G/DL (ref 3.2–4.9)
BUN SERPL-MCNC: 13 MG/DL (ref 8–22)
CALCIUM SERPL-MCNC: 9 MG/DL (ref 8.4–10.2)
CHLORIDE SERPL-SCNC: 97 MMOL/L (ref 96–112)
CO2 SERPL-SCNC: 23 MMOL/L (ref 20–33)
CREAT SERPL-MCNC: 0.49 MG/DL (ref 0.5–1.4)
GFR SERPLBLD CREATININE-BSD FMLA CKD-EPI: 120 ML/MIN/1.73 M 2
GLUCOSE BLD STRIP.AUTO-MCNC: 91 MG/DL (ref 65–99)
GLUCOSE SERPL-MCNC: 84 MG/DL (ref 65–99)
MAGNESIUM SERPL-MCNC: 1.6 MG/DL (ref 1.5–2.5)
PHOSPHATE SERPL-MCNC: 4.4 MG/DL (ref 2.5–4.5)
POTASSIUM SERPL-SCNC: 3.5 MMOL/L (ref 3.6–5.5)
PREALB SERPL-MCNC: 14.1 MG/DL (ref 18–38)
SODIUM SERPL-SCNC: 132 MMOL/L (ref 135–145)

## 2022-11-28 PROCEDURE — A9270 NON-COVERED ITEM OR SERVICE: HCPCS | Performed by: INTERNAL MEDICINE

## 2022-11-28 PROCEDURE — 99231 SBSQ HOSP IP/OBS SF/LOW 25: CPT | Performed by: INTERNAL MEDICINE

## 2022-11-28 PROCEDURE — 700102 HCHG RX REV CODE 250 W/ 637 OVERRIDE(OP): Performed by: HOSPITALIST

## 2022-11-28 PROCEDURE — A9270 NON-COVERED ITEM OR SERVICE: HCPCS | Performed by: FAMILY MEDICINE

## 2022-11-28 PROCEDURE — 770006 HCHG ROOM/CARE - MED/SURG/GYN SEMI*

## 2022-11-28 PROCEDURE — 700102 HCHG RX REV CODE 250 W/ 637 OVERRIDE(OP): Performed by: INTERNAL MEDICINE

## 2022-11-28 PROCEDURE — 84134 ASSAY OF PREALBUMIN: CPT

## 2022-11-28 PROCEDURE — 36415 COLL VENOUS BLD VENIPUNCTURE: CPT

## 2022-11-28 PROCEDURE — 700111 HCHG RX REV CODE 636 W/ 250 OVERRIDE (IP): Performed by: INTERNAL MEDICINE

## 2022-11-28 PROCEDURE — 700102 HCHG RX REV CODE 250 W/ 637 OVERRIDE(OP): Performed by: FAMILY MEDICINE

## 2022-11-28 PROCEDURE — A9270 NON-COVERED ITEM OR SERVICE: HCPCS | Performed by: HOSPITALIST

## 2022-11-28 PROCEDURE — 83735 ASSAY OF MAGNESIUM: CPT

## 2022-11-28 PROCEDURE — 80069 RENAL FUNCTION PANEL: CPT

## 2022-11-28 PROCEDURE — 94760 N-INVAS EAR/PLS OXIMETRY 1: CPT

## 2022-11-28 PROCEDURE — 82962 GLUCOSE BLOOD TEST: CPT

## 2022-11-28 RX ORDER — LEVOTHYROXINE SODIUM 0.05 MG/1
50 TABLET ORAL
Qty: 30 TABLET | Refills: 6 | Status: SHIPPED | OUTPATIENT
Start: 2022-11-29 | End: 2022-12-29

## 2022-11-28 RX ORDER — NUT.TX.IMPAIRED RENAL FXN,SOY 0.09G-2/ML
250 LIQUID (ML) ORAL 4 TIMES DAILY
Qty: 237 ML | Refills: 11 | Status: SHIPPED | OUTPATIENT
Start: 2022-11-28 | End: 2023-05-10

## 2022-11-28 RX ORDER — NUT.TX.IMPAIRED RENAL FXN,SOY 0.09G-2/ML
250 LIQUID (ML) ORAL 4 TIMES DAILY
Qty: 237 ML | Refills: 11 | Status: SHIPPED | OUTPATIENT
Start: 2022-11-28 | End: 2022-11-28 | Stop reason: SDUPTHER

## 2022-11-28 RX ADMIN — QUETIAPINE FUMARATE 25 MG: 25 TABLET ORAL at 20:30

## 2022-11-28 RX ADMIN — NICOTINE 7 MG: 7 PATCH TRANSDERMAL at 04:49

## 2022-11-28 RX ADMIN — Medication 1 TABLET: at 04:36

## 2022-11-28 RX ADMIN — ZINC SULFATE 220 MG (50 MG) CAPSULE 220 MG: CAPSULE at 04:37

## 2022-11-28 RX ADMIN — Medication 400 MG: at 04:36

## 2022-11-28 RX ADMIN — METOPROLOL TARTRATE 50 MG: 50 TABLET, FILM COATED ORAL at 17:10

## 2022-11-28 RX ADMIN — Medication 400 MG: at 17:10

## 2022-11-28 RX ADMIN — SCOPOLAMINE 1 PATCH: 1.5 PATCH, EXTENDED RELEASE TRANSDERMAL at 12:02

## 2022-11-28 RX ADMIN — POLYETHYLENE GLYCOL 3350 1 PACKET: 17 POWDER, FOR SOLUTION ORAL at 20:33

## 2022-11-28 RX ADMIN — THIAMINE HCL TAB 100 MG 100 MG: 100 TAB at 04:36

## 2022-11-28 RX ADMIN — METOPROLOL TARTRATE 50 MG: 50 TABLET, FILM COATED ORAL at 04:36

## 2022-11-28 RX ADMIN — ASPIRIN 81 MG CHEWABLE TABLET 81 MG: 81 TABLET CHEWABLE at 04:36

## 2022-11-28 RX ADMIN — ENOXAPARIN SODIUM 40 MG: 40 INJECTION SUBCUTANEOUS at 17:10

## 2022-11-28 RX ADMIN — LEVOTHYROXINE SODIUM 50 MCG: 50 TABLET ORAL at 04:37

## 2022-11-28 RX ADMIN — LOSARTAN POTASSIUM 12.5 MG: 25 TABLET, FILM COATED ORAL at 04:37

## 2022-11-28 ASSESSMENT — ENCOUNTER SYMPTOMS
SHORTNESS OF BREATH: 0
NAUSEA: 0
COUGH: 0
FEVER: 0
VOMITING: 0

## 2022-11-28 NOTE — DISCHARGE PLANNING
Case Management Discharge Planning    Admission Date: 10/18/2022  GMLOS: 13.2  ALOS: 41    6-Clicks ADL Score: 20  6-Clicks Mobility Score: 24      Anticipated Discharge Dispo: Discharge Disposition: D/T to home under care of home health w/planned hosp IP readmit (86)    DME Needed: Yes             DME ordered?: No    Action(s) Taken: Updated Provider/Nurse on Discharge Plan    0915: CALLUM ORTIZ called [facility] to follow up on referral.  No answer, VM left requesting call back.    1150: Spoke with Becca from [facility] who states she does not have patients referral. RN ANGEL requested DPA resend referral. Becca to call RN ANGEL back if she finds referral.     1155: Per Becca, patient has been declined as their Medicaid census is full at this time.  RN ANGEL sent choice for [facility].     1215: RN CM reached out to MD to see if patient can have OP PT/OT scripts for discharge as  unlikely to accept d/t Medicaid insurance. Awaiting reply.     1325: Per MD, patient will need home tube feedings.  RN ANGEL requested provider place DME order.     1443: Pending DME order    1505: Choice for [facility] DME faxed to [facility]     1553: CALLUM ORTIZ called [facility] who states they do no have TF order. RN ANGEL requested DPA resend referral.     Escalations Completed: Provider    Medically Clear: Yes    Next Steps: HH acceptance & DME delivery     Barriers to Discharge: HH acceptance & DME delivery

## 2022-11-28 NOTE — DISCHARGE PLANNING
Referral sent to [facility]    1420- DPA left vm at [facility] on referral status.    Received Choice form at [phone]  Agency/Facility Name: [facility]  Referral sent per Choice form at [phone]- Spoke To: Adia  Agency[facility]Facility Name: [facility]  Plan or Request: FWW will be delivered, order for enteral feeding pump sent to the enteral/TF dept. Phone number- [phone]

## 2022-11-28 NOTE — DISCHARGE SUMMARY
Discharge Summary    CHIEF COMPLAINT ON ADMISSION  Chief Complaint   Patient presents with    Medical Clearance     Patient was seen at [facility] for detox from alcohol last drink today.  Patient did attempt to check in at [facility] and was turned away because patient told the staff he was short of breath and has not been taking his medication for his atrial fibrillation.  Patient speaks in full sentences.  No increase work of breathing.  Patient does have eczema and was prescribed prednisone for it.  Patient was given valium and prednisone at [facility].  I spoke with Sharad RN at [facility] and patient needs to be medical cleared       Reason for Admission  Irregular Heart Rate    Admission Date  10/18/2022    CODE STATUS  DNAR/DNI    HPI & HOSPITAL COURSE  This is a 58yo with a history of atrial fibrillation, HTN, alcohol dependence with abuse, methamphetamine abuse and liver mass who initially presented to [facility] for help with EtOH detoxification but was sent to the ER as he was having hallucinations, confusion, weakness, nausea and vomiting. He was admitted to ICU on 10/18/22 for EtOH withdrawal and respiratory depression and was placed on the ventilator and had a protracted ICU course - he required reintubation on 10/23 and again 10/26 after self extubation, diagnostic thoracentesis and has had MSSA and Pseudomonas PNA, recurrent aspiration, bradycardia, hypotension, ileus, chest tube which has since been removed and loculated pleural effusion.  He transitioned out of the ICU and is still having severe difficulty with swallowing.  Daughter discussed with family regarding moving forward with PEG tube and would like this placed, GI to consult.  (Per prior hospitalist)    11/17/2022-patient had PEG tube placement with RAYMOND Mcgrath  11/7 and 11/9 - PICC lines placed while in ICU  11/2-4 -pt had intrapleural fibrinolytics in ICU  11/01 - s/p right side chest tube placement in ICU  10/25, 10/30 - bedside right sided  thoracentesis in ICU  10/26 - patient re-intubated  10/23 - patient intubated and had Bronchopscopy in ICU    ABX given:  10/22-24 & 11/08-09 -- Unasyn  10/24- 11/07 -- Zosyn for eosinophilia  11/17 - cefazolin for PEG placement    Patient has had an extensive 41 day stay in hospital. At this time, he has improved, walking without walker for longer and longer distances. He has tolerated his PEG tube and bolus feeds.  Prescriptions have been ordered for Enteral Tube, Novasource Renal prescribed for 250ml QID. Instructions for free water flushes of 300ml QID explained to patient and daughter.  (Pt is also receiving 300 mL of water QID providing 1200 mL /day. Total water from TF and flushes is 1920 mL/day.)    I have sent referral for outpatient physical therapy, speech therapy, DME for PEG tube feeds.    We did attempt to obtain skilled nursing facility and home health unfortunately due to patient's insurance coverage with Medicaid, this was declined.    Therefore, he is discharged in good and stable condition to home with close outpatient follow-up.    The patient met 2-midnight criteria for an inpatient stay at the time of discharge.    Discharge Date  11/29/22    FOLLOW UP ITEMS POST DISCHARGE  With PCP    DISCHARGE DIAGNOSES  Principal Problem:    Alcohol dependence with withdrawal complicated by delirium (HCC) POA: Yes  Active Problems:    Hyponatremia POA: Clinically Undetermined    Essential hypertension POA: Yes    Rash POA: Yes    Other specified hypothyroidism POA: Yes      Overview: 11/29/18 TSH=8.19(HI)= Hypothyroid    Atrial fibrillation (HCC) POA: Yes    Liver mass POA: Yes    Alcohol abuse with withdrawal (HCC) POA: Yes    Acute respiratory failure with hypoxia (HCC) POA: Yes    Sepsis due to Pseudomonas species (HCC) POA: Yes    Aspiration pneumonia (HCC) POA: Clinically Undetermined    Dysphagia POA: Clinically Undetermined    Congestive heart failure (CHF) (HCC) POA: Clinically Undetermined     Peripheral eosinophilia POA: Yes    Agitation POA: Clinically Undetermined    Hyperphosphatemia POA: Yes    Problem related to discharge planning POA: Yes  Resolved Problems:    ESAU (acute kidney injury) (HCC) POA: Yes    On mechanically assisted ventilation (HCC) POA: No    Scabies POA: Yes    Empyema (HCC) POA: Yes      Overview: - On the right, moderate-large per CXR of 10/30      - S/p diagnostic thoracentesis 10/25/22: albumin gradient is less than 1.2       g/dl which indicate exudates, his was 0.9, also inflammative effusion with       very high LDH 1020 (serum in the 240s range)      - Cultures negative but pt was on antibiotics - did have chest tubes which       have since been removed, completed antibiotic course.     Other constipation POA: Yes    Reactive thrombocytosis POA: Clinically Undetermined    Hypomagnesemia POA: Clinically Undetermined    Wernicke encephalopathy syndrome POA: Yes      FOLLOW UP    Deb Clayton M.D.  [address] [address] [phone]    Call in 1 week(s)  Please follow up for post hospital visit.      MEDICATIONS ON DISCHARGE     Medication List        START taking these medications        Instructions   aspirin 81 MG Chew chewable tablet  Commonly known as: ASA   1 Tablet by Enteral Tube route every day for 30 days.  Dose: 81 mg     losartan 25 MG Tabs  Commonly known as: COZAAR   0.5 Tablets by Enteral Tube route every day for 60 days.  Dose: 12.5 mg     magnesium oxide 400 (240 Mg) MG Tabs   1 Tablet by Enteral Tube route 2 times a day for 30 days.  Dose: 400 mg     metoprolol tartrate 50 MG Tabs  Commonly known as: LOPRESSOR   1 Tablet by Enteral Tube route 2 times a day for 30 days.  Dose: 50 mg     Novasource Renal Liqd   Doctor's comments: Dietician recommending 250ml (1 carton) Novasource renal or equivalent formula QID. 4 cartons total per day.  250 mL by Percutaneous Endoscopic Gastrostomy route 4 times a day.  Dose: 250 mL     QUEtiapine 25 MG  Tabs  Commonly known as: Seroquel   1 Tablet by Enteral Tube route at bedtime for 30 days.  Dose: 25 mg     thiamine 100 MG tablet  Commonly known as: THIAMINE   1 Tablet by Enteral Tube route every day for 30 days.  Dose: 100 mg     zinc sulfate 220 (50 Zn) MG Caps  Commonly known as: ZINCATE   1 Capsule by Enteral Tube route every day for 30 days.  Dose: 220 mg            CONTINUE taking these medications        Instructions   albuterol 108 (90 Base) MCG/ACT Aers inhalation aerosol   Inhale 2 Puffs every 6 hours as needed for Shortness of Breath (cough).  Dose: 2 Puff     budesonide-formoterol 80-4.5 MCG/ACT Aero  Commonly known as: SYMBICORT   Inhale 2 Puffs 2 times a day.  Dose: 2 Puff     levothyroxine 50 MCG Tabs  Commonly known as: SYNTHROID   Take 1 Tablet by mouth every morning on an empty stomach.  Dose: 50 mcg     Misc. Devices Misc   Blood pressure cuff and machine     triamcinolone acetonide 0.1 % Crea  Commonly known as: KENALOG   Apply 1 Application topically 2 times a day. Mix with lotion and apply to entire body excluding face and genitalia.  Dose: 1 Application            STOP taking these medications      amLODIPine 10 MG Tabs  Commonly known as: NORVASC     metoprolol SR 25 MG Tb24  Commonly known as: TOPROL XL     nicotine polacrilex 2 MG Gum  Commonly known as: NICORETTE     predniSONE 10 MG Tabs  Commonly known as: DELTASONE              Allergies  No Known Allergies    DIET  Orders Placed This Encounter   Procedures    Diet NPO Restrict to: Ice Chips     Standing Status:   Standing     Number of Occurrences:   1     Order Specific Question:   Diet NPO Restrict to:     Answer:   Ice Chips [2]    Diet: Diet Tube Feed; Formula: Novasource Renal (Hold continuous feeds >/=2 hours. At first meal, provide 100 mL bolus of Novasource Renal. If tolerated, advance by 100 mL each meal to goal 250 mL QID (TID meals + HS)); Goal Rate (mL/Hour): 250     Standing Status:   Standing     Number of Occurrences:    1     Order Specific Question:   Diet     Answer:   Diet Tube Feed [35]     Order Specific Question:   Formula:     Answer:   Novasource Renal     Comments:   Hold continuous feeds >/=2 hours. At first meal, provide 100 mL bolus of Novasource Renal. If tolerated, advance by 100 mL each meal to goal 250 mL QID (TID meals + HS)     Order Specific Question:   Goal Rate (mL/Hour)     Answer:   250     Order Specific Question:   Route     Answer:   Gtube/PEG       ACTIVITY  As tolerated. Use walker.  Weight bearing as tolerated    CONSULTATIONS  [facility] gastroenterology  Heme-onc  Palliative care  Critical care    PROCEDURES  Listed above    LABORATORY  Lab Results   Component Value Date    SODIUM 132 (L) 11/28/2022    POTASSIUM 3.5 (L) 11/28/2022    CHLORIDE 97 11/28/2022    CO2 23 11/28/2022    GLUCOSE 84 11/28/2022    BUN 13 11/28/2022    CREATININE 0.49 (L) 11/28/2022        Lab Results   Component Value Date    WBC 7.7 11/26/2022    HEMOGLOBIN 12.9 (L) 11/26/2022    HEMATOCRIT 38.5 (L) 11/26/2022    PLATELETCT 453 (H) 11/26/2022        Total time of the discharge process exceeds 41 minutes.

## 2022-11-28 NOTE — CARE PLAN
The patient is Stable - Low risk of patient condition declining or worsening    Shift Goals  Clinical Goals: maintain peg tube patency  Patient Goals: sleep at least 8 hours  Family Goals: NA    Progress made toward(s) clinical / shift goals:  Peg tube kept patent. Pt was seen sleeping in between  rounds.Call light within reach. Fall precautions in placed. Hourly rounding in progress    Patient is not progressing towards the following goals:n/a

## 2022-11-28 NOTE — DIETARY
Nutrition support weekly update:  Day 41 of admit.  Tyree Whiteside is a 57 y.o. male with admitting DX of Alcohol withdrawal delirium .  Tube feeding initiated on 10/30. Current TF via PEG is boluses of 250 mL of Novasource Renal QID (1 carton at meal times and HS)    Assessment:  Weight: no new wt since last update  Re-estimate of nutritional needs as indicated not indicated.      Evaluation:   Plan is to D/C pt today. MD inquired about tube feed order for D/C. Per MD, pt will need to go home on low phos formula. Current tube feed of 250 mL (1 carton) of Novasource Renal or equivalent formula QID continues to be appropriate. This is providing 2000 kcals, 90 g protein, 819 mg phos, and 720 mL of free water.   Pt is also receiving 300 mL of water QID providing 1200 mL /day. Total water from TF and flushes is 1920 mL/day.   Labs: 11/28: sodium=132, potassium=3.5, Bun=13, phos=4.4 (improved). POC glucose: 83-98      Malnutrition risk: no new risk noted    Recommendations/Plan:  Tube feed order for D/C: 250 mL or 1 carton of Novasource Renal or equivalent formula QID (1 carton at meals and HS)  Continue with 300 mL of water QID or as ordered by MD  Monitor weights    RD will follow PRN

## 2022-11-28 NOTE — PROGRESS NOTES
Received report from day shift nurse. Assumed pt care at 1915. Pt is A&Ox4, resting comfortably in bed. Pt on r.a.. No signs of SOB/respiratory distress. Labs noted, VSS. Pt c/o no pain at this moment. Peg tube in placed Fall precautions in place. Bed at lowest position. Call light and personal belongings within reach. Continue to monitor

## 2022-11-29 VITALS
WEIGHT: 156.31 LBS | RESPIRATION RATE: 17 BRPM | SYSTOLIC BLOOD PRESSURE: 107 MMHG | TEMPERATURE: 97.8 F | HEIGHT: 70 IN | DIASTOLIC BLOOD PRESSURE: 75 MMHG | OXYGEN SATURATION: 98 % | HEART RATE: 60 BPM | BODY MASS INDEX: 22.38 KG/M2

## 2022-11-29 LAB
JAK2 P.V617F BLD/T QL: NOT DETECTED
SPECIMEN SOURCE: NORMAL

## 2022-11-29 PROCEDURE — 700102 HCHG RX REV CODE 250 W/ 637 OVERRIDE(OP): Performed by: FAMILY MEDICINE

## 2022-11-29 PROCEDURE — 700102 HCHG RX REV CODE 250 W/ 637 OVERRIDE(OP): Performed by: INTERNAL MEDICINE

## 2022-11-29 PROCEDURE — 700102 HCHG RX REV CODE 250 W/ 637 OVERRIDE(OP): Performed by: HOSPITALIST

## 2022-11-29 PROCEDURE — A9270 NON-COVERED ITEM OR SERVICE: HCPCS | Performed by: INTERNAL MEDICINE

## 2022-11-29 PROCEDURE — A9270 NON-COVERED ITEM OR SERVICE: HCPCS | Performed by: HOSPITALIST

## 2022-11-29 PROCEDURE — 99239 HOSP IP/OBS DSCHRG MGMT >30: CPT | Performed by: INTERNAL MEDICINE

## 2022-11-29 PROCEDURE — A9270 NON-COVERED ITEM OR SERVICE: HCPCS | Performed by: FAMILY MEDICINE

## 2022-11-29 RX ADMIN — LEVOTHYROXINE SODIUM 50 MCG: 50 TABLET ORAL at 05:52

## 2022-11-29 RX ADMIN — METOPROLOL TARTRATE 50 MG: 50 TABLET, FILM COATED ORAL at 06:21

## 2022-11-29 RX ADMIN — Medication 1 TABLET: at 06:21

## 2022-11-29 RX ADMIN — NICOTINE 7 MG: 7 PATCH TRANSDERMAL at 06:19

## 2022-11-29 RX ADMIN — ZINC SULFATE 220 MG (50 MG) CAPSULE 220 MG: CAPSULE at 06:21

## 2022-11-29 RX ADMIN — ASPIRIN 81 MG CHEWABLE TABLET 81 MG: 81 TABLET CHEWABLE at 06:21

## 2022-11-29 RX ADMIN — THIAMINE HCL TAB 100 MG 100 MG: 100 TAB at 06:21

## 2022-11-29 RX ADMIN — Medication 400 MG: at 06:22

## 2022-11-29 RX ADMIN — SENNOSIDES AND DOCUSATE SODIUM 2 TABLET: 50; 8.6 TABLET ORAL at 06:21

## 2022-11-29 RX ADMIN — LOSARTAN POTASSIUM 12.5 MG: 25 TABLET, FILM COATED ORAL at 06:21

## 2022-11-29 ASSESSMENT — PAIN DESCRIPTION - PAIN TYPE: TYPE: ACUTE PAIN

## 2022-11-29 NOTE — DISCHARGE PLANNING
Case Management Discharge Planning    Admission Date: 10/18/2022  GMLOS: 13.2  ALOS: 42    6-Clicks ADL Score: 20  6-Clicks Mobility Score: 24      Anticipated Discharge Dispo: Discharge Disposition: Discharged to home/self care (01)    DME Needed: Yes    DME Ordered: Yes    Action(s) Taken: Updated Provider/Nurse on Discharge Plan    1130: RN CM calling [employer] to see if TF are in stock.  RN CM on hold with [employer].     1240: RN CM called [facility] and spoke with Jacquelyn ([phone]) who states she will review case.  Jacquelyn states if patient can be sent home with 3 days worth of formula, he can go home today.  Jacquelyn states formulas would be shipped out to patients home tomorrow.  RN CM also called [facility] and spoke with Adia to let her know that only a standard walker was delivered and not a FWW.  Adia states she will have her  switch it out.     1406: CALLUM ORTIZ called Jacquelyn to update her on syringe tip needed for feedings.  Jacquelyn states referral is in review and will call RN CM tomorrow.      Escalations Completed: DME Company    Medically Clear: No    Next Steps: Obtain TF     Barriers to Discharge: DME

## 2022-11-29 NOTE — PROGRESS NOTES
Hospital Medicine Daily Progress Note    Date of Service  11/28/2022    Chief Complaint  Tyree Whiteside is a 57 y.o. male admitted 10/18/2022 with   Chief Complaint   Patient presents with    Medical Clearance     Patient was seen at [facility] for detox from alcohol last drink today.  Patient did attempt to check in at [facility] and was turned away because patient told the staff he was short of breath and has not been taking his medication for his atrial fibrillation.  Patient speaks in full sentences.  No increase work of breathing.  Patient does have eczema and was prescribed prednisone for it.  Patient was given valium and prednisone at [facility].  I spoke with Sharad NOLEN at [facility] and patient needs to be medical cleared     Hospital Course  Pt is a 56yo with a history of atrial fibrillation, HTN, alcohol dependence with abuse, methamphetamine abuse and liver mass who initially presented to [facility] for help with EtOH detoxification but was sent to the ER as he was having hallucinations, confusion, weakness, nausea and vomiting. He was admitted to ICU on 10/18/22 for EtOH withdrawal and respiratory depression and was placed on the ventilator and had a protracted ICU course - he required reintubation on 10/23 and again 10/26 after self extubation, diagnostic thoracentesis and has had MSSA and Pseudomonas PNA, recurrent aspiration, bradycardia, hypotension, ileus, chest tube which has since been removed and loculated pleural effusion.  He transitioned out of the ICU and is still having severe difficulty with swallowing.  Daughter discussed with family regarding moving forward with PEG tube and would like this placed, GI to consult.  (Per prior hospitalist)    11/17/2022-patient had PEG tube placement with [facility] Dr. Sebastian Mcgrath  11/7 and 11/9 - PICC lines placed while in ICU  11/2-4 -pt had intrapleural fibrinolytics in ICU  11/01 - s/p right side chest tube placement in ICU  10/25, 10/30 - bedside right sided  thoracentesis in ICU  10/26 - patient re-intubated  10/23 - patient intubated and had Bronchopscopy in ICU    ABX given:  10/22-24 & 11/08-09 -- Unasyn  10/24- 11/07 -- Zosyn for eosinophilia  11/17 - cefazolin for PEG placement    Interval Problem Update  Patient stated he was doing well today, no acute complaints today.  Denied by [employer] GEORGE  Orderd DME for PEG feeds to boluses, placed orders for Rx. Walker to be delivered bedside.      I have discussed this patient's plan of care and discharge plan at IDT rounds today with Case Management, Nursing, Nursing leadership, and other members of the IDT team.    Consultants/Specialty  critical care, GI, oncology, and palliative care  [facility]    Code Status  DNAR/DNI    Disposition  Patient is medically cleared for discharge.   Anticipate discharge to to home with organized home healthcare and close outpatient follow-up   I have placed the appropriate orders for post-discharge needs.    Review of Systems  Review of Systems   Constitutional:  Negative for fever.   Respiratory:  Negative for cough and shortness of breath.    Gastrointestinal:  Negative for nausea and vomiting.   Skin:  Negative for itching.   All other systems reviewed and are negative.     Physical Exam  Temp:  [36.2 °C (97.2 °F)-36.9 °C (98.4 °F)] 36.2 °C (97.2 °F)  Pulse:  [84-90] 90  Resp:  [17-18] 18  BP: (100-119)/(70-89) 114/79  SpO2:  [93 %-95 %] 94 %    Physical Exam  Vitals and nursing note reviewed.   Constitutional:       Appearance: He is not ill-appearing.   Cardiovascular:      Rate and Rhythm: Normal rate and regular rhythm.      Pulses: Normal pulses.      Heart sounds: No murmur heard.  Pulmonary:      Effort: Pulmonary effort is normal. No respiratory distress.      Breath sounds: No rhonchi.   Abdominal:      General: Bowel sounds are normal. There is no distension.      Tenderness: There is no abdominal tenderness.      Comments: PEG tube in place   Musculoskeletal:         General: No  swelling or tenderness. Normal range of motion.   Skin:     General: Skin is warm.      Coloration: Skin is not jaundiced or pale.      Comments: Multiple areas of scratched skin healing   Neurological:      General: No focal deficit present.      Mental Status: He is alert and oriented to person, place, and time. Mental status is at baseline.      Motor: No weakness.   Psychiatric:         Mood and Affect: Mood normal.         Behavior: Behavior normal.         Thought Content: Thought content normal.         Judgment: Judgment normal.       Fluids    Intake/Output Summary (Last 24 hours) at 11/28/2022 1612  Last data filed at 11/28/2022 0435  Gross per 24 hour   Intake 1100 ml   Output 600 ml   Net 500 ml       Laboratory  Recent Labs     11/26/22  0346   WBC 7.7   RBC 4.16*   HEMOGLOBIN 12.9*   HEMATOCRIT 38.5*   MCV 92.5   MCH 31.0   MCHC 33.5*   RDW 46.0   PLATELETCT 453*   MPV 10.5     Recent Labs     11/26/22  0346 11/28/22  0336   SODIUM 131* 132*   POTASSIUM 3.9 3.5*   CHLORIDE 94* 97   CO2 22 23   GLUCOSE 76 84   BUN 11 13   CREATININE 0.52 0.49*   CALCIUM 9.8 9.0                   Imaging  DX-CHEST-PORTABLE (1 VIEW)   Final Result      1.  Worsening LEFT lung base atelectasis.   2.  New small LEFT pleural effusion.   3.  No pneumothorax.   4.  No other significant change from prior exam.         DX-ESOPHAGUS - [id number]   Final Result      DX-ESOPHAGUS - [id number]   Final Result      Please refer to dedicated speech pathology report for complete details.      DX-CHEST-PORTABLE (1 VIEW)   Final Result      1.  Bibasilar and RIGHT perihilar atelectasis which could obscure an additional process. This has decreased since the prior study.   2.  Persistent focal lingular opacity, which could be atelectasis or early pneumonia   3.  RIGHT pleural effusion      DX-CHEST-FOR LINE PLACEMENT Perform procedure in: Patient's Room   Final Result            1. A left peripherally inserted catheter with tip  projects appropriately over the expected area of the lower SVC.      DX-CHEST-FOR LINE PLACEMENT Perform procedure in: Patient's Room   Final Result            1. A right peripherally inserted catheter with tip not well seen but likely projects over the expected area of the cavoatrial junction/right atrium.      IR-PICC LINE PLACEMENT W/ GUIDANCE > AGE 5   Final Result                  Ultrasound-guided PICC placement performed by qualified nursing staff as    above.          DX-CHEST-FOR LINE PLACEMENT Perform procedure in: Patient's Room   Final Result      1.  Interval placement of a PICC line which is satisfactory in position.      2.  Otherwise stable exam.      IR-PICC LINE PLACEMENT W/ GUIDANCE > AGE 5   Final Result                  Ultrasound-guided PICC placement performed by qualified nursing staff as    above.          DX-CHEST-PORTABLE (1 VIEW)   Final Result      1.  Stable bibasilar atelectasis, right greater than left.      2.  Stable right and improved left pleural effusion.      DX-CHEST-PORTABLE (1 VIEW)   Final Result      1.  Increased right pleural effusion, now small/moderate.   2.  Increased right base airspace disease.   3.  Similar small left pleural effusion with mildly increased adjacent airspace disease.      [id number] VIEW   Final Result      Mildly dilated loops of small intestine.      DX-CHEST-PORTABLE (1 VIEW)   Final Result      1.  Interval removal of the right sided chest tube.      2.  Stable bibasilar atelectasis.      3.  Interval decrease in size of a right pleural effusion.      DX-CHEST-PORTABLE (1 VIEW)   Final Result      1.  Stable support devices.      2.  Stable bilateral pleural effusions, right greater than left.      DX-CHEST-PORTABLE (1 VIEW)   Final Result      1.  Interval placement of RIGHT chest tube   2.  Moderate RIGHT hydropneumothorax   3.  Small LEFT pleural effusion   4.  Unchanged BILATERAL atelectasis with possible superimposed pneumonia or other  airspace disease      DX-CHEST-PORTABLE (1 VIEW)   Final Result      1.  No significant change      2.  Lines and tubes appear appropriately located      3.  Right pleural effusion, probably large in size      4.  Bilateral atelectasis      EC-ECHOCARDIOGRAM LTD W/ CONT   Final Result      DX-CHEST-PORTABLE (1 VIEW)   Final Result         No significant interval change      CT-CHEST,ABDOMEN,PELVIS W/O   Final Result      1.  RIGHT larger than LEFT pleural effusions with overlying atelectasis. Superimposed pneumonia is not excluded.   2.  Healing fractures the RIGHT fifth and sixth ribs   3.  Distended loops of bowel in the abdomen as described above, likely ileus rather than early or low-grade small bowel obstruction   4.  Enlarged inguinal lymph nodes   5.  The hypoechoic nodule in the liver demonstrated on ultrasound from 8/28/2022 is not seen on this unenhanced CT. Hepatic mass protocol CT or MRI should be pursued when clinically appropriate for further assessment.   6.  RIGHT renal cyst         [id number] VIEW   Final Result         1.  Air-filled distended loops of bowel are seen with reactive mucosal pattern, appearance suggests ileus or enteritis versus evolving obstructive changes. Recommend radiographic followup to resolution to exclude progression to obstruction.   2.  Nasogastric tube is coiled within the stomach, the tip terminates overlying the expected location of the gastric fundus.   3.  Bilateral lower lobe infiltrates   4.  Moderate right and small left pleural effusion      DX-CHEST-PORTABLE (1 VIEW)   Final Result         1.  Bilateral pulmonary edema and/or infiltrates.   2.  Moderate right and small left pleural effusion, stable compared to prior study   3.  Cardiomegaly      DX-CHEST-PORTABLE (1 VIEW)   Final Result         1.  Bilateral pulmonary edema and/or infiltrates.   2.  Moderate right and small left pleural effusion, stable compared to prior study   3.  Cardiomegaly       [id number] VIEW   Final Result         Diffuse gaseous distention of the small bowel and colon, worse in prior, could relate to ileus      DX-CHEST-PORTABLE (1 VIEW)   Final Result         1. No significant interval change.      DX-CHEST-PORTABLE (1 VIEW)   Final Result         1.  Bilateral pulmonary edema and/or infiltrates.   2.  Moderate right and small left pleural effusion, increased on the right compared to prior study   3.  Cardiomegaly      DX-CHEST-PORTABLE (1 VIEW)   Final Result         1.  Pulmonary edema and/or infiltrates are identified, which are stable since the prior exam.   2.  Moderate right pleural effusion, stable.   3.  Cardiomegaly      DX-CHEST-PORTABLE (1 VIEW)   Final Result      1.  Well-positioned ETT.   2.  Moderate right pleural effusion and associated atelectasis versus consolidation.   3.  Retrocardiac atelectasis versus consolidation. No significant left effusion.      [id number] VIEW   Final Result      NGT tip is in the stomach.   1.  Nonobstructive bowel gas pattern. Small amount of stool throughout the colon.   2.  Ill-defined right basilar atelectasis versus consolidation and small right pleural effusion.      DX-CHEST-PORTABLE (1 VIEW)   Final Result         1. Low lung volume with hypoventilatory change and bibasilar atelectasis.      CT-HEAD W/O   Final Result      1.  Cerebral atrophy.      2.  Otherwise, Head CT without contrast within normal limits. No evidence of acute cerebral infarction, hemorrhage or mass lesion.         DX-CHEST-FOR LINE PLACEMENT Perform procedure in: Patient's Room   Final Result      1.  Endotracheal tube overlies the mid trachea.      2.  NG tube courses beneath the diaphragms.      DX-CHEST-PORTABLE (1 VIEW)   Final Result      No radiographic evidence of acute cardiopulmonary process.             Assessment/Plan  * Alcohol dependence with withdrawal complicated by delirium (HCC)- (present on admission)  Assessment & Plan  With tremors,  nausea vomiting and hallucinations  Alcohol withdrawal symptoms resolved, patient did require ICU stay.      Problem related to discharge planning- (present on admission)  Assessment & Plan  Patient has been pending potential SNF placement but unfortunately we have not been able to obtain an accepting facility.  Patient states he is beginning to improve now walking with a walker.  We are teaching daughter about patient's bolus feeds with PEG tube.  They have both agreed to look for potential home health, while SNF placement is still being looked into.  They agreed if Monday there is no progress they will agree to going home with home health.  Started process with face-to-face and home health order today.    I have spoken with [employer] ORTIZ unlikely to be approved this weekend. Will need to wait until Monday. I have asked for this case to be brought to higher leadership.  Patient is eager to be able to go home. Daughter is organizing the apartment, located on first floor.    Hyperphosphatemia- (present on admission)  Assessment & Plan  6.3  Decreased. TF changed by dietician.  Recheck labs in the morning if remains elevated, will need to speak with dietitian to change feeds    Agitation  Assessment & Plan  - Doing well with thiamine replacement and 25mg qpm Seroquel   Daytime somnolence -doing well after stopping daytime seroquel  Patient is calm and cooperative    Peripheral eosinophilia- (present on admission)  Assessment & Plan  Ddx includes HES, Churg Greta, HIV, parasite infection (less likely as no significant travel), eosinophilic malignancy. 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  - Jak2 and BCRABL, peripheral flow cytometry pending  - f/u with [facility] after  discharge from SNF on results    Congestive heart failure (CHF) (HCC)  Assessment & Plan  - Initial echo with preserved EF, then repeat echo with EF 40% - unclear if he was in RVR or other variable that would impact an acute decrease in his EF  - Not overloaded on exam, no acute exacerbation  - On metoprolol, add Losartan now as Bps remain stable    Dysphagia  Assessment & Plan  - Pt has failed swallow evals last week and this week - has NGT in place  - Failed MBSS today  - Multifactorial with likely Wernicke's, deconditioning from PNA and being on the vent, acute illness  - Poor long term prognosis given his cerebral atrophy, chronic EtOH abuse and methampetamine abuse likely contributing to his lack of swallow coordination.  - Treat Wernicke's with high dose Thiamine, will continue working with SLP  11/17/2022-patient had PEG tube placement with [facility] Dr. Sebastian Mcgrath    Repeat MBSS performed today, preliminary results showing patient continued to fail swallow test. Will need to change TF continuous to bolus in preparation for discharging. 11/23    Tolerating bolus feeds, will continue  Orderd DME for PEG feeds to boluses, placed orders for Rx    Aspiration pneumonia ([facility])  Assessment & Plan  - Completed antibiotics, now on room air  - Has failed multiple swallow evals, currently receiving tube feeds via NGT  - Failed his MBSS - discussed PEG with pt's daughter, family wishes to move forward  - Dr Mcgrath placed PEG 11/17 - tolerated well  Repeat MBSS performed today 11/23, preliminary results showing patient continued to fail swallow test. Will need to change TF continuous to bolus in preparation for discharging.  CXR showed new small left pleural effusion.  Patient is starting to get more mobile.  Monitoring, holding ABX at this time as CBC did not show new infection and pt has remained afebrile.    Sepsis due to Pseudomonas species ([facility])- (present on admission)  Assessment & Plan  Pt had septic shock requiring  "pressor support, with MSSA and pseudomonas in BAL and tracheal aspirate. Blood cultures negative. Sepsis has resolved.     Acute respiratory failure with hypoxia (HCC)- (present on admission)  Assessment & Plan  -Intubated for respiratory acidosis and altered mental status with need for repeat intubation twice thereafter  -Cultures from sputum and BAL grew MSSA and pseudomonas, pt completed 14d of Zosyn, and an additional 1.5d of Unasyn for empyema  -also complicated by right empyema, s/p diagnostic thoracentesis and chest tubes  - Currently off antibiotics, back on room air   - RESOLVED     Alcohol abuse with withdrawal (HCC)- (present on admission)  Assessment & Plan  Severe withdrawal with respiratory depression requiring intubation on admit  Out of withdrawal window now     Liver mass- (present on admission)  Assessment & Plan  - Seen on US 8/2022 \"right lobe of the liver measuring 1.7 x 1.3 x 1.0 cm in size\"  - AFP negative      Atrial fibrillation (HCC)- (present on admission)  Assessment & Plan  - Not on anticoagulation prior to admission - appears he was just diagnosed by PCP in June of this year and referred to Cardiology, but I don't see where he was seen by them  - [id number] score of 1-2 - new echo with EF of 40% but prior to that was 50%, and may have some effect of sepsis on EF   - ASA for now after discussion with pt's daughter  - Continue Metoprolol for rate control  - no telemetry needed further. Stable.    Other specified hypothyroidism- (present on admission)  Assessment & Plan  - TSH normal, continue Synthroid     Rash- (present on admission)  Assessment & Plan  - Pt with pathology positive eczema, order his home triamcinolone cream    Essential hypertension- (present on admission)  Assessment & Plan  Resume home amlodipine and metoprolol with hold parameters    Hyponatremia  Assessment & Plan  - Resolved with free water flushes, pt was dry       VTE prophylaxis: enoxaparin ppx    I have " performed a physical exam and reviewed and updated ROS and Plan today (11/28/2022). In review of yesterday's note (11/27/2022), there are no changes except as documented above.

## 2022-11-29 NOTE — DISCHARGE PLANNING
Per CALLUM ORTIZ, DPA faxed DME order to [facility].    Agency/Facility Name: [facility]  Spoke To: Nae  Outcome: Per Nae, pt has been declined due to non contracted insurance.  RN CM notified    Agency/Facility Name: [facility]  Spoke To: Lisa  Outcome: Per Lisa, she had a question regarding what the pt's subscriber ID was for Medicaid.  RN CM notified

## 2022-11-29 NOTE — CARE PLAN
The patient is Stable - Low risk of patient condition declining or worsening    Shift Goals  Clinical Goals: pt will d/c today  Patient Goals: go home  Family Goals: NA    Progress made toward(s) clinical / shift goals:  Pt progressing towards goals. Pt was not able to d/c today.    Patient is not progressing towards the following goals:pt did not d/c today.

## 2022-11-29 NOTE — CARE PLAN
The patient is Stable - Low risk of patient condition declining or worsening    Shift Goals  Clinical Goals: home tomorrow with HH set up  Patient Goals: home  Family Goals: home    Progress made toward(s) clinical / shift goals:    Problem: Knowledge Deficit - Standard  Goal: Patient and family/care givers will demonstrate understanding of plan of care, disease process/condition, diagnostic tests and medications  Outcome: Progressing     Problem: Optimal Care for Alcohol Withdrawal  Goal: Optimal Care for the alcohol withdrawal patient  Outcome: Progressing       Patient is not progressing towards the following goals:

## 2022-11-30 LAB
MPL P.W515 BLD/T QL: NOT DETECTED
SPECIMEN SOURCE: NORMAL
T(9;22)(ABL1,BCR) BLD/T QL: NOT DETECTED

## 2022-12-01 DIAGNOSIS — L30.8 OTHER ECZEMA: ICD-10-CM

## 2022-12-01 LAB — GENE XXX MUT ANL BLD/T: NOT DETECTED

## 2022-12-02 RX ORDER — TRIAMCINOLONE ACETONIDE 1 MG/G
1 CREAM TOPICAL 2 TIMES DAILY
Qty: 454 G | Refills: 0 | Status: SHIPPED | OUTPATIENT
Start: 2022-12-02 | End: 2023-09-05 | Stop reason: SDUPTHER

## 2022-12-07 ENCOUNTER — OFFICE VISIT (OUTPATIENT)
Dept: MEDICAL GROUP | Facility: MEDICAL CENTER | Age: 57
End: 2022-12-07
Attending: NURSE PRACTITIONER
Payer: COMMERCIAL

## 2022-12-07 VITALS
BODY MASS INDEX: 23.85 KG/M2 | WEIGHT: 166.6 LBS | HEART RATE: 50 BPM | RESPIRATION RATE: 16 BRPM | TEMPERATURE: 97 F | SYSTOLIC BLOOD PRESSURE: 100 MMHG | DIASTOLIC BLOOD PRESSURE: 82 MMHG | OXYGEN SATURATION: 61 % | HEIGHT: 70 IN

## 2022-12-07 DIAGNOSIS — R45.1 AGITATION: ICD-10-CM

## 2022-12-07 DIAGNOSIS — Z09 HOSPITAL DISCHARGE FOLLOW-UP: ICD-10-CM

## 2022-12-07 DIAGNOSIS — R13.10 DYSPHAGIA, UNSPECIFIED TYPE: ICD-10-CM

## 2022-12-07 DIAGNOSIS — Z23 NEED FOR VACCINATION: ICD-10-CM

## 2022-12-07 LAB
MYCOBACTERIUM SPEC CULT: NORMAL
MYCOBACTERIUM SPEC CULT: NORMAL
RHODAMINE-AURAMINE STN SPEC: NORMAL
RHODAMINE-AURAMINE STN SPEC: NORMAL
SIGNIFICANT IND 70042: NORMAL
SIGNIFICANT IND 70042: NORMAL
SITE SITE: NORMAL
SITE SITE: NORMAL
SOURCE SOURCE: NORMAL
SOURCE SOURCE: NORMAL

## 2022-12-07 PROCEDURE — 99214 OFFICE O/P EST MOD 30 MIN: CPT | Mod: 25 | Performed by: NURSE PRACTITIONER

## 2022-12-07 PROCEDURE — 99999 PNEUMOCOCCAL CONJUGATE VACCINE 20-VALENT (AKA PCV20): CPT | Performed by: NURSE PRACTITIONER

## 2022-12-07 PROCEDURE — 99213 OFFICE O/P EST LOW 20 MIN: CPT | Mod: 25 | Performed by: NURSE PRACTITIONER

## 2022-12-07 PROCEDURE — 90471 IMMUNIZATION ADMIN: CPT

## 2022-12-07 RX ORDER — QUETIAPINE FUMARATE 50 MG/1
50 TABLET, FILM COATED ORAL
Qty: 30 TABLET | Refills: 0 | Status: SHIPPED | OUTPATIENT
Start: 2022-12-07 | End: 2023-01-05

## 2022-12-07 ASSESSMENT — FIBROSIS 4 INDEX: FIB4 SCORE: 0.66

## 2022-12-07 NOTE — PROGRESS NOTES
No chief complaint on file.      Subjective:     HPI:   Tyree Whiteside is a 57 y.o. male here to discuss the evaluation and management of:      Problem   Hospital Discharge Follow-Up    Patient was recently discharged from [facility].  Patient spent approximately 42 days admitted to the hospital, was in the intensive care unit and underwent intubation and PEG tube placement secondary to complicated alcohol withdrawal.  Patient has since been discharged and is here for follow-up.  Patient states overall he thinks he is doing well.  Patient has not had any alcohol since his admission.  Patient does state that he is having a hard time swallowing secretions and is not taking anything by mouth secondary to PEG tube and dysphagia.  He would like to see if he can get a portable suction device to help with secretions.  Patient states he is also having trouble sleeping.         ROS  See HPI     No Known Allergies    Current medicines (including changes today)  Current Outpatient Medications   Medication Sig Dispense Refill    QUEtiapine (SEROQUEL) 50 MG tablet 1 Tablet by Enteral Tube route at bedtime for 30 days. 30 Tablet 0    triamcinolone acetonide (KENALOG) 0.1 % Cream Apply 1 Application topically 2 times a day. Mix with lotion and apply to entire body excluding face and genitalia. 454 g 0    Nutritional Supplements (NOVASOURCE RENAL) Liquid 250 mL by Percutaneous Endoscopic Gastrostomy route 4 times a day. 237 mL 11    levothyroxine (SYNTHROID) 50 MCG Tab 1 Tablet by Enteral Tube route every morning on an empty stomach for 30 days. 30 Tablet 6    aspirin (ASA) 81 MG Chew Tab chewable tablet 1 Tablet by Enteral Tube route every day for 30 days. 30 Tablet 3    losartan (COZAAR) 25 MG Tab 0.5 Tablets by Enteral Tube route every day for 60 days. 30 Tablet 0    metoprolol tartrate (LOPRESSOR) 50 MG Tab 1 Tablet by Enteral Tube route 2 times a day for 30 days. 60 Tablet 3    magnesium oxide 400  (240 Mg) MG Tab 1 Tablet by Enteral Tube route 2 times a day for 30 days. 60 Tablet 0    thiamine (THIAMINE) 100 MG tablet 1 Tablet by Enteral Tube route every day for 30 days. 30 Tablet 3    zinc sulfate (ZINCATE) 220 (50 Zn) MG Cap 1 Capsule by Enteral Tube route every day for 30 days. 30 Capsule 3    budesonide-formoterol (SYMBICORT) 80-4.5 MCG/ACT Aerosol Inhale 2 Puffs 2 times a day. 10.2 g 3    Misc. Devices Misc Blood pressure cuff and machine 1 Each 0     No current facility-administered medications for this visit.       Social History     Tobacco Use    Smoking status: Every Day     Packs/day: 0.33     Years: 25.00     Pack years: 8.25     Types: Cigarettes    Smokeless tobacco: Never   Vaping Use    Vaping Use: Never used   Substance Use Topics    Alcohol use: Yes     Comment: 4 drinks daily    Drug use: Never     Comment: Hx of methamphetamin, marijuana       Patient Active Problem List    Diagnosis Date Noted    Hospital discharge follow-up 12/07/2022    Problem related to discharge planning 11/25/2022    Hyperphosphatemia 11/22/2022    Peripheral eosinophilia 11/11/2022    Agitation 11/11/2022    Aspiration pneumonia (HCC) 11/10/2022    Dysphagia 11/10/2022    Congestive heart failure (CHF) (HCC) 11/10/2022    Sepsis due to Pseudomonas species (HCC) 10/25/2022    Acute respiratory failure with hypoxia (HCC) 10/24/2022    Liver mass 10/18/2022    Alcohol dependence with withdrawal complicated by delirium (HCC) 10/18/2022    Alcohol abuse with withdrawal (HCC) 10/18/2022    Breast lump 07/20/2022    SOB (shortness of breath) 06/28/2022    Tobacco dependence 06/28/2022    Atrial fibrillation (HCC) 06/28/2022    History of alcohol abuse 06/14/2022    Ringing in ears, bilateral 12/12/2018    Vitamin D deficiency 12/05/2018    Other specified hypothyroidism 12/05/2018    Overweight (BMI 25.0-29.9) 10/31/2018    Essential hypertension 10/31/2018    Rash 10/31/2018    Hyponatremia 02/28/2015    Anemia  "02/28/2015       Family History   Problem Relation Age of Onset    Heart Disease Father     Stroke Father     Hypertension Brother     Cancer Brother         has 12 sibs, one brother had an unknown type of ca          Objective:     /82 (BP Location: Left arm, Patient Position: Sitting, BP Cuff Size: Adult)   Pulse (!) 50   Temp 36.1 °C (97 °F) (Temporal)   Resp 16   Ht 1.778 m (5' 10\")   Wt 75.6 kg (166 lb 9.6 oz)   SpO2 (!) 61%  Body mass index is 23.9 kg/m².    Physical Exam:  Physical Exam  Vitals reviewed.   Constitutional:       General: He is awake.      Appearance: He is well-developed. He is ill-appearing (chronic).   HENT:      Head: Normocephalic.   Eyes:      Conjunctiva/sclera: Conjunctivae normal.   Cardiovascular:      Rate and Rhythm: Normal rate.   Pulmonary:      Effort: Pulmonary effort is normal. No respiratory distress.   Musculoskeletal:      Cervical back: Neck supple.   Skin:     General: Skin is warm and dry.   Neurological:      Mental Status: He is alert and oriented to person, place, and time.   Psychiatric:         Mood and Affect: Mood normal.         Behavior: Behavior normal. Behavior is cooperative.            Assessment and Plan:     The following treatment plan was discussed:    Problem List Items Addressed This Visit       Dysphagia    Relevant Orders    DME Suction Machine    Agitation    Relevant Medications    QUEtiapine (SEROQUEL) 50 MG tablet    Hospital discharge follow-up     Ongoing-  Overall patient doing well utilizing tube feeds through his PEG tube as well as free water flushes.  Discussed with daughter that she can increase free water flushes if patient is feeling dehydrated or thirsty  Continue with current management and follow-up with specialist as needed  Patient is doing well with his medications and will follow up with his primary care physician  Order placed for portable suction unit to help with secretions  Explained to patient that he was in the " hospital for quite a prolonged amount of time and that may be affecting some of his sleep habits secondary to being constantly woken up and ICU delirium.  We will increase his Seroquel to 50 mg nightly and see if this helps with his sleep.  Encourage patient that if this does not seem to help to please discuss with his primary care provider at next visit.           Other Visit Diagnoses       Need for vaccination                Any change or worsening of signs or symptoms, patient encouraged to follow-up or report to emergency room for further evaluation. Patient verbalizes understanding and agrees.    Follow-Up: Follow-up with primary care provider in 1 month      PLEASE NOTE: This dictation was created using voice recognition software. I have made every reasonable attempt to correct obvious errors, but I expect that there are errors of grammar and possibly content that I did not discover before finalizing the note.

## 2022-12-07 NOTE — ASSESSMENT & PLAN NOTE
Ongoing-  Overall patient doing well utilizing tube feeds through his PEG tube as well as free water flushes.  Discussed with daughter that she can increase free water flushes if patient is feeling dehydrated or thirsty  Continue with current management and follow-up with specialist as needed  Patient is doing well with his medications and will follow up with his primary care physician  Order placed for portable suction unit to help with secretions  Explained to patient that he was in the hospital for quite a prolonged amount of time and that may be affecting some of his sleep habits secondary to being constantly woken up and ICU delirium.  We will increase his Seroquel to 50 mg nightly and see if this helps with his sleep.  Encourage patient that if this does not seem to help to please discuss with his primary care provider at next visit.

## 2022-12-10 LAB
MYCOBACTERIUM SPEC CULT: NORMAL
RHODAMINE-AURAMINE STN SPEC: NORMAL
SIGNIFICANT IND 70042: NORMAL
SITE SITE: NORMAL
SOURCE SOURCE: NORMAL

## 2023-01-10 ENCOUNTER — PATIENT MESSAGE (OUTPATIENT)
Dept: MEDICAL GROUP | Facility: MEDICAL CENTER | Age: 58
End: 2023-01-10

## 2023-01-10 ENCOUNTER — OFFICE VISIT (OUTPATIENT)
Dept: MEDICAL GROUP | Facility: MEDICAL CENTER | Age: 58
End: 2023-01-10
Attending: FAMILY MEDICINE
Payer: COMMERCIAL

## 2023-01-10 VITALS
WEIGHT: 179 LBS | BODY MASS INDEX: 25.62 KG/M2 | TEMPERATURE: 96.3 F | OXYGEN SATURATION: 99 % | HEIGHT: 70 IN | HEART RATE: 63 BPM | SYSTOLIC BLOOD PRESSURE: 104 MMHG | RESPIRATION RATE: 16 BRPM | DIASTOLIC BLOOD PRESSURE: 56 MMHG

## 2023-01-10 DIAGNOSIS — Z09 HOSPITAL DISCHARGE FOLLOW-UP: ICD-10-CM

## 2023-01-10 DIAGNOSIS — Z11.59 NEED FOR HEPATITIS C SCREENING TEST: ICD-10-CM

## 2023-01-10 DIAGNOSIS — I50.9 CONGESTIVE HEART FAILURE, UNSPECIFIED HF CHRONICITY, UNSPECIFIED HEART FAILURE TYPE (HCC): ICD-10-CM

## 2023-01-10 DIAGNOSIS — Z72.820 POOR SLEEP: ICD-10-CM

## 2023-01-10 DIAGNOSIS — R16.0 LIVER MASS: ICD-10-CM

## 2023-01-10 DIAGNOSIS — R13.10 DYSPHAGIA, UNSPECIFIED TYPE: ICD-10-CM

## 2023-01-10 PROBLEM — F10.139 ALCOHOL ABUSE WITH WITHDRAWAL (HCC): Status: RESOLVED | Noted: 2022-10-18 | Resolved: 2023-01-10

## 2023-01-10 PROBLEM — Z02.9 PROBLEM RELATED TO DISCHARGE PLANNING: Status: RESOLVED | Noted: 2022-11-25 | Resolved: 2023-01-10

## 2023-01-10 PROBLEM — F10.239 ALCOHOL DEPENDENCE WITH WITHDRAWAL (HCC): Status: RESOLVED | Noted: 2022-10-18 | Resolved: 2023-01-10

## 2023-01-10 PROBLEM — J96.01 ACUTE RESPIRATORY FAILURE WITH HYPOXIA (HCC): Status: RESOLVED | Noted: 2022-10-24 | Resolved: 2023-01-10

## 2023-01-10 PROBLEM — Z75.8 PROBLEM RELATED TO DISCHARGE PLANNING: Status: RESOLVED | Noted: 2022-11-25 | Resolved: 2023-01-10

## 2023-01-10 PROBLEM — A41.52 SEPSIS DUE TO PSEUDOMONAS SPECIES (HCC): Status: RESOLVED | Noted: 2022-10-25 | Resolved: 2023-01-10

## 2023-01-10 PROBLEM — J69.0 ASPIRATION PNEUMONIA (HCC): Status: RESOLVED | Noted: 2022-11-10 | Resolved: 2023-01-10

## 2023-01-10 PROCEDURE — 99213 OFFICE O/P EST LOW 20 MIN: CPT | Performed by: FAMILY MEDICINE

## 2023-01-10 PROCEDURE — 99214 OFFICE O/P EST MOD 30 MIN: CPT | Performed by: FAMILY MEDICINE

## 2023-01-10 ASSESSMENT — FIBROSIS 4 INDEX: FIB4 SCORE: 0.66

## 2023-01-10 ASSESSMENT — PATIENT HEALTH QUESTIONNAIRE - PHQ9: CLINICAL INTERPRETATION OF PHQ2 SCORE: 0

## 2023-01-10 NOTE — ASSESSMENT & PLAN NOTE
Patient reports he is taking some foods orally and some things by G tube   He reports he is still having some difficulty swallowing pills and uses the G tube for that.   He is still using formulas feeds as well

## 2023-01-10 NOTE — ASSESSMENT & PLAN NOTE
Patient has another ultrasound scheduled for later this month, however not right.  AFP done in the hospital was negative

## 2023-01-10 NOTE — ASSESSMENT & PLAN NOTE
Pt reports having difficulty with staying asleep, although he falls asleep without issue  Increase in seroquel last visit from 25> 50mg did not help at all.   He has always had difficulty with sleep  No nightmares or panic  He has stopped using alcohol and methamphetamines but has used some inhaled marijuana for sleep   No depression or anxiety  This is not a new problem.   Daughter states that he snores   Feels sleepy during the day   Does not wake up feeling refreshed   Also wakes to urinate - at least 3 times a night. Strong stream, no hesitation, no stop and go, no straining to urinate. Drinks water all day long. One caffeinated beverage in the morning.   He is also doing TF and water boluses qid

## 2023-01-10 NOTE — PROGRESS NOTES
Subjective:     CC:   Chief Complaint   Patient presents with    Hospital Follow-up         HPI:     Dysphagia  Patient reports he is taking some foods orally and some things by G tube   He reports he is still having some difficulty swallowing pills and uses the G tube for that.   He is still using formulas feeds as well     Poor sleep  Pt reports having difficulty with staying asleep, although he falls asleep without issue  Increase in seroquel last visit from 25> 50mg did not help at all.   He has always had difficulty with sleep  No nightmares or panic  He has stopped using alcohol and methamphetamines but has used some inhaled marijuana for sleep   No depression or anxiety  This is not a new problem.   Daughter states that he snores   Feels sleepy during the day   Does not wake up feeling refreshed   Also wakes to urinate - at least 3 times a night. Strong stream, no hesitation, no stop and go, no straining to urinate. Drinks water all day long. One caffeinated beverage in the morning.   He is also doing TF and water boluses qid     Liver mass  Patient has another ultrasound scheduled for later this month, however not right.  AFP done in the hospital was negative    Past Medical History:   Diagnosis Date    A-fib (HCC)     ASTHMA     Eczema     Hypertension        Social History     Tobacco Use    Smoking status: Every Day     Packs/day: 0.33     Years: 25.00     Pack years: 8.25     Types: Cigarettes    Smokeless tobacco: Never   Vaping Use    Vaping Use: Never used   Substance Use Topics    Alcohol use: Not Currently    Drug use: Never     Comment: Hx of methamphetamin, marijuana       Current Outpatient Medications Ordered in Epic   Medication Sig Dispense Refill    QUEtiapine (SEROQUEL) 50 MG tablet TAKE 1 TABLET BY MOUTH EVERY DAY 30 Tablet 0    triamcinolone acetonide (KENALOG) 0.1 % Cream Apply 1 Application topically 2 times a day. Mix with lotion and apply to entire body excluding face and genitalia.  "454 g 0    Nutritional Supplements (NOVASOURCE RENAL) Liquid 250 mL by Percutaneous Endoscopic Gastrostomy route 4 times a day. 237 mL 11    losartan (COZAAR) 25 MG Tab 0.5 Tablets by Enteral Tube route every day for 60 days. 30 Tablet 0    budesonide-formoterol (SYMBICORT) 80-4.5 MCG/ACT Aerosol Inhale 2 Puffs 2 times a day. 10.2 g 3    Misc. Devices Misc Blood pressure cuff and machine 1 Each 0     No current [facility]-ordered facility-administered medications on file.       Allergies:  Patient has no known allergies.        Objective:       Exam:  /56 (BP Location: Left arm, Patient Position: Sitting, BP Cuff Size: Adult)   Pulse 63   Temp (!) 35.7 °C (96.3 °F) (Temporal)   Resp 16   Ht 1.778 m (5' 10\")   Wt 81.2 kg (179 lb)   SpO2 99%   BMI 25.68 kg/m²  Body mass index is 25.68 kg/m².    General: Normal appearing. No distress.  Pulmonary: Clear to ausculation.  Normal effort. No rales, ronchi, or wheezing.  Cardiovascular: Regular rate and rhythm   Psych: Normal mood and affect. Alert and oriented x3. Judgment and insight is normal.      Labs:   Abnormal electrolytes done 11/20/2022    Assessment & Plan:     57 y.o. male with the following -     1. Dysphagia, unspecified type  - Referral to Speech Therapy  Patient currently has G-tube, is doing tube feeds 3 times daily along with fluid boluses.  He also does report some p.o. intake of food and water and has had a 13 pound weight gain since his last visit.  While he certainly likely has some nutritional deficiencies that he needs catching up on, I do think cutting back on his tube feeds will be wise.  May need nutritionist input in the future on adjusting his tube feeds and fluid boluses as he is able to take more p.o.  Currently he is taking some food and water by mouth without choking, coughing, spitting.  I did advise him to take caution since he has not had a follow-up swallow study, so this is ordered for him as well as evaluation for his dysphagia " to see if therapy can improve this for him enough to remove the PEG tube in the future.    2. Poor sleep  - Referral to Pulmonary and Sleep Medicine  Patient reports that much of his issue is urinating frequently in the night.  Since he is receiving a significant amount of fluid due to feeds and also taking some p.o.,  I have advised him to eliminate his last tube feed and fluid bolus at 10 PM.  He will work on moving his fluid intake to earlier part of the day.    3. Congestive heart failure, unspecified HF chronicity, unspecified heart failure type (HCC)  - REFERRAL TO CARDIOLOGY  Last EF was 40%, did not tolerate any other medications besides his losartan 12.5 mg due to blood pressure.  Reviewed his blood pressures today, and they are all within normal limits, some marginal with SBP around 100.    4. Hospital discharge follow-up  - Comp Metabolic Panel; Future  - CBC WITH DIFFERENTIAL; Future  - TSH WITH REFLEX TO FT4; Future  - VITAMIN D,25 HYDROXY (DEFICIENCY); Future  Follow-up on labs since discharge    5. Need for hepatitis C screening test  - HEP C VIRUS ANTIBODY; Future    6. Liver mass  Advised daughter to call imaging and try to schedule the abdominal MRI instead of the ultrasound as one was already completed previously.       Return in about 1 month (around 2/10/2023).    Please note that this dictation was created using voice recognition software. I have made every reasonable attempt to correct obvious errors, but I expect that there are errors of grammar and possibly content that I did not discover before finalizing the note.

## 2023-01-13 ENCOUNTER — PATIENT MESSAGE (OUTPATIENT)
Dept: MEDICAL GROUP | Facility: MEDICAL CENTER | Age: 58
End: 2023-01-13
Payer: COMMERCIAL

## 2023-01-13 DIAGNOSIS — R06.02 SOB (SHORTNESS OF BREATH): ICD-10-CM

## 2023-01-16 ENCOUNTER — OFFICE VISIT (OUTPATIENT)
Dept: CARDIOLOGY | Facility: MEDICAL CENTER | Age: 58
End: 2023-01-16
Attending: FAMILY MEDICINE
Payer: COMMERCIAL

## 2023-01-16 ENCOUNTER — TELEPHONE (OUTPATIENT)
Dept: CARDIOLOGY | Facility: MEDICAL CENTER | Age: 58
End: 2023-01-16

## 2023-01-16 VITALS
SYSTOLIC BLOOD PRESSURE: 114 MMHG | HEIGHT: 70 IN | RESPIRATION RATE: 20 BRPM | DIASTOLIC BLOOD PRESSURE: 78 MMHG | BODY MASS INDEX: 26.88 KG/M2 | HEART RATE: 83 BPM | WEIGHT: 187.8 LBS | OXYGEN SATURATION: 98 %

## 2023-01-16 DIAGNOSIS — F17.200 TOBACCO DEPENDENCE: ICD-10-CM

## 2023-01-16 DIAGNOSIS — I48.19 PERSISTENT ATRIAL FIBRILLATION (HCC): ICD-10-CM

## 2023-01-16 DIAGNOSIS — I50.31 ACUTE DIASTOLIC CONGESTIVE HEART FAILURE (HCC): ICD-10-CM

## 2023-01-16 DIAGNOSIS — R06.02 SOB (SHORTNESS OF BREATH): ICD-10-CM

## 2023-01-16 DIAGNOSIS — I10 ESSENTIAL HYPERTENSION: ICD-10-CM

## 2023-01-16 DIAGNOSIS — F10.11 HISTORY OF ALCOHOL ABUSE: ICD-10-CM

## 2023-01-16 PROCEDURE — 99204 OFFICE O/P NEW MOD 45 MIN: CPT | Mod: 25 | Performed by: INTERNAL MEDICINE

## 2023-01-16 PROCEDURE — 93000 ELECTROCARDIOGRAM COMPLETE: CPT | Performed by: INTERNAL MEDICINE

## 2023-01-16 RX ORDER — ASPIRIN 81 MG/1
81 TABLET ORAL DAILY
COMMUNITY
End: 2023-01-16

## 2023-01-16 RX ORDER — METOPROLOL TARTRATE 50 MG/1
50 TABLET, FILM COATED ORAL 2 TIMES DAILY
Qty: 180 TABLET | Refills: 3 | Status: SHIPPED | OUTPATIENT
Start: 2023-01-16 | End: 2024-01-22 | Stop reason: SDUPTHER

## 2023-01-16 RX ORDER — BUDESONIDE AND FORMOTEROL FUMARATE DIHYDRATE 80; 4.5 UG/1; UG/1
2 AEROSOL RESPIRATORY (INHALATION) 2 TIMES DAILY
Qty: 10.2 G | Refills: 3 | Status: ON HOLD | OUTPATIENT
Start: 2023-01-16 | End: 2023-02-07

## 2023-01-16 RX ORDER — LOSARTAN POTASSIUM 25 MG/1
12.5 TABLET ORAL DAILY
Qty: 50 TABLET | Refills: 3 | Status: SHIPPED | OUTPATIENT
Start: 2023-01-16 | End: 2023-03-17

## 2023-01-16 ASSESSMENT — ENCOUNTER SYMPTOMS
PND: 0
BRUISES/BLEEDS EASILY: 0
CHILLS: 0
EYES NEGATIVE: 1
ORTHOPNEA: 0
DIZZINESS: 0
FEVER: 0
CLAUDICATION: 0
CONSTITUTIONAL NEGATIVE: 1
GASTROINTESTINAL NEGATIVE: 1
NEUROLOGICAL NEGATIVE: 1
WHEEZING: 0
HEMOPTYSIS: 0
WEAKNESS: 0
SHORTNESS OF BREATH: 0
LOSS OF CONSCIOUSNESS: 0
CARDIOVASCULAR NEGATIVE: 1
RESPIRATORY NEGATIVE: 1
SORE THROAT: 0
PALPITATIONS: 0
STRIDOR: 0
SPUTUM PRODUCTION: 0
MUSCULOSKELETAL NEGATIVE: 1
COUGH: 0

## 2023-01-16 ASSESSMENT — FIBROSIS 4 INDEX: FIB4 SCORE: 0.66

## 2023-01-17 DIAGNOSIS — R06.02 SOB (SHORTNESS OF BREATH): ICD-10-CM

## 2023-01-17 DIAGNOSIS — I50.31 ACUTE DIASTOLIC CONGESTIVE HEART FAILURE (HCC): ICD-10-CM

## 2023-01-17 DIAGNOSIS — I48.19 PERSISTENT ATRIAL FIBRILLATION (HCC): ICD-10-CM

## 2023-01-17 NOTE — PROGRESS NOTES
Chief Complaint   Patient presents with    Congestive Heart Failure    Atrial Fibrillation    Hypertension       Subjective     Tyree Whiteside is a 57 y.o. male who presents today as a new consultation for atrial fibrillation and stage B heart failure.  He is a 57-year-old male who is admitted the hospital for what sounds like polysubstance abuse.  During his hospitalization he was in atrial fibrillation and started on beta-blockers.  His echocardiogram showed an EF of 40%.  He is now out of the hospital and is off of alcohol and illicit substances.  He only smokes 5 cigarettes/day.  He is having no chest pain or lower extremity edema.  He has good functional capacity and completed 340 m of his 6-minute walk test.  They check his blood pressure at home which is usually between 108 and 120 systolic.    Past Medical History:   Diagnosis Date    A-fib (HCC)     ASTHMA     Eczema     Hypertension      Past Surgical History:   Procedure Laterality Date    AZ UPPER GI ENDOSCOPY,DIAGNOSIS N/A 11/17/2022    Procedure: GASTROSCOPY;  Surgeon: Sebastian Mcgrath M.D.;  Location: SURGERY [facility];  Service: Gastroenterology    AZ PLACE PERCUT GASTROSTOMY TUBE N/A 11/17/2022    Procedure: EGD, WITH PEG TUBE INSERTION;  Surgeon: Sebastian Mcgrath M.D.;  Location: SURGERY [facility];  Service: Gastroenterology     Family History   Problem Relation Age of Onset    Heart Disease Father     Stroke Father     Hypertension Brother     Cancer Brother         has 12 sibs, one brother had an unknown type of ca     Social History     Socioeconomic History    Marital status:      Spouse name: Not on file    Number of children: Not on file    Years of education: Not on file    Highest education level: Not on file   Occupational History    Not on file   Tobacco Use    Smoking status: Every Day     Packs/day: 0.33     Years: 25.00     Pack years: 8.25     Types: Cigarettes    Smokeless tobacco: Never   Vaping Use     Vaping Use: Never used   Substance and Sexual Activity    Alcohol use: Not Currently    Drug use: Yes     Types: Marijuana, Inhaled     Comment: Hx of methamphetamin, marijuana    Sexual activity: Yes   Other Topics Concern    Not on file   Social History Narrative    Not on file     Social Determinants of Health     Financial Resource Strain: Not on file   Food Insecurity: Not on file   Transportation Needs: Not on file   Physical Activity: Not on file   Stress: Not on file   Social Connections: Not on file   Intimate Partner Violence: Not on file   Housing Stability: Not on file     No Known Allergies  Outpatient Encounter Medications as of 1/16/2023   Medication Sig Dispense Refill    aspirin 81 MG EC tablet Take 81 mg by mouth every day.      METOPROLOL TARTRATE PO Take 50 mg by mouth 2 times a day.      Thiamine HCl (VITAMIN B-1 PO) Take  by mouth every day.      LEVOTHYROXINE SODIUM PO Take  by mouth every day.      QUEtiapine (SEROQUEL) 50 MG tablet TAKE 1 TABLET BY MOUTH EVERY DAY 30 Tablet 0    triamcinolone acetonide (KENALOG) 0.1 % Cream Apply 1 Application topically 2 times a day. Mix with lotion and apply to entire body excluding face and genitalia. 454 g 0    Nutritional Supplements (NOVASOURCE RENAL) Liquid 250 mL by Percutaneous Endoscopic Gastrostomy route 4 times a day. 237 mL 11    losartan (COZAAR) 25 MG Tab 0.5 Tablets by Enteral Tube route every day for 60 days. 30 Tablet 0    [DISCONTINUED] budesonide-formoterol (SYMBICORT) 80-4.5 MCG/ACT Aerosol Inhale 2 Puffs 2 times a day. (Patient taking differently: Inhale 2 Puffs as needed.) 10.2 g 3    Misc. Devices Misc Blood pressure cuff and machine 1 Each 0     No facility-administered encounter medications on file as of 1/16/2023.     Review of Systems   Constitutional: Negative.  Negative for chills, fever and malaise/fatigue.   HENT: Negative.  Negative for sore throat.    Eyes: Negative.    Respiratory: Negative.  Negative for cough, hemoptysis,  "sputum production, shortness of breath, wheezing and stridor.    Cardiovascular: Negative.  Negative for chest pain, palpitations, orthopnea, claudication, leg swelling and PND.   Gastrointestinal: Negative.    Genitourinary: Negative.    Musculoskeletal: Negative.    Skin: Negative.    Neurological: Negative.  Negative for dizziness, loss of consciousness and weakness.   Endo/Heme/Allergies: Negative.  Does not bruise/bleed easily.   All other systems reviewed and are negative.           Objective     /78 (BP Location: Left arm, Patient Position: Sitting, BP Cuff Size: Adult)   Pulse 83   Resp 20   Ht 1.778 m (5' 10\")   Wt 85.2 kg (187 lb 12.8 oz)   SpO2 98%   BMI 26.95 kg/m²     Physical Exam  Vitals and nursing note reviewed.   Constitutional:       General: He is not in acute distress.     Appearance: He is well-developed. He is not diaphoretic.   HENT:      Head: Normocephalic and atraumatic.      Right Ear: External ear normal.      Left Ear: External ear normal.      Nose: Nose normal.      Mouth/Throat:      Pharynx: No oropharyngeal exudate.   Eyes:      General: No scleral icterus.        Right eye: No discharge.         Left eye: No discharge.      Conjunctiva/sclera: Conjunctivae normal.      Pupils: Pupils are equal, round, and reactive to light.   Neck:      Vascular: No JVD.   Cardiovascular:      Rate and Rhythm: Normal rate. Rhythm irregular.      Heart sounds: No murmur heard.    No friction rub. No gallop.   Pulmonary:      Effort: Pulmonary effort is normal. No respiratory distress.      Breath sounds: No stridor. No wheezing or rales.   Chest:      Chest wall: No tenderness.   Abdominal:      General: There is no distension.      Palpations: Abdomen is soft.      Tenderness: There is no guarding.   Musculoskeletal:         General: No tenderness or deformity. Normal range of motion.      Cervical back: Neck supple.   Skin:     General: Skin is warm and dry.      Coloration: Skin is " not pale.      Findings: No erythema or rash.   Neurological:      Mental Status: He is alert.      Cranial Nerves: No cranial nerve deficit.      Motor: No abnormal muscle tone.      Coordination: Coordination normal.      Deep Tendon Reflexes: Reflexes are normal and symmetric. Reflexes normal.   Psychiatric:         Behavior: Behavior normal.         Thought Content: Thought content normal.         Judgment: Judgment normal.              Assessment & Plan     1. Essential hypertension        2. History of alcohol abuse  EC-ECHOCARDIOGRAM COMPLETE W/O CONT    EKG    EKG      3. SOB (shortness of breath)  EC-ECHOCARDIOGRAM COMPLETE W/O CONT    EKG    EKG      4. Acute diastolic congestive heart failure (HCC)  EC-ECHOCARDIOGRAM COMPLETE W/O CONT    EKG    EKG      5. Persistent atrial fibrillation (HCC)  EC-ECHOCARDIOGRAM COMPLETE W/O CONT    EKG    EKG      6. Tobacco dependence            Medical Decision Making: Today's Assessment/Status/Plan:        57-year-old male with stage B heart failure likely from a combination polysubstance abuse as well as potentially atrial fibrillation with rapid ventricular response.  I will get an EKG in clinic today.  He will stay metoprolol losartan.    Addendum EKG today showed A. fib with controlled ventricular response.  I explained to him the risk benefits and alternatives of proceeding with a DC cardioversion with EDUARDO prior.  He agrees to proceed.  I have had him stop his aspirin and start Xarelto 20 mg a day.  We will get his cardioversion performed next week and we will see him back in the clinic in 4 weeks.

## 2023-01-17 NOTE — TELEPHONE ENCOUNTER
Called patient to schedule cardioversion. He has not started his Xarelto at this time. He will be starting it as soon as the pharmacy has it ready for him, hoping today or tomorrow. Patient has been scheduled for cardioversion w/anesthesia on 2-7-23 with Dr. Rowe. Patient has been instructed to check in at 7:00 for 9:00 case time. Message sent to kevin ELLIS to Dr. Rowe.

## 2023-01-17 NOTE — TELEPHONE ENCOUNTER
Bienvenido Luis,      Patient was informed you will be reaching out to schedule: [facility]-CARDIOVERSION [[id number]] . Ordered per , Thank you.

## 2023-01-18 ENCOUNTER — TELEPHONE (OUTPATIENT)
Dept: CARDIOLOGY | Facility: MEDICAL CENTER | Age: 58
End: 2023-01-18
Payer: COMMERCIAL

## 2023-01-18 ENCOUNTER — PHARMACY VISIT (OUTPATIENT)
Dept: PHARMACY | Facility: MEDICAL CENTER | Age: 58
End: 2023-01-18
Payer: COMMERCIAL

## 2023-01-18 LAB — EKG IMPRESSION: NORMAL

## 2023-01-18 PROCEDURE — RXMED WILLOW AMBULATORY MEDICATION CHARGE: Performed by: INTERNAL MEDICINE

## 2023-01-18 NOTE — TELEPHONE ENCOUNTER
Phone Number Called: [phone]     Call outcome:  Spoke to patient's daughter - Laura    Message: Called to return phone call regarding Xarelto.    Patient's daughter states pharmacy is waiting on authorization for Xarelto.       Xarelto sent to [facility] on [facility] [address] [facility] [address] for samples. [employer] message sent to patient to inform them of the samples of Xarelto at the [facility].

## 2023-01-18 NOTE — TELEPHONE ENCOUNTER
JAYSHREE    Caller: Laura Joãojuan carlos (daughter)    Medication Name and Dosage: rivaroxaban (XARELTO) 20 MG Tab     Medication amount left: 0    Preferred Pharmacy: [facility]- [address]    Other questions (Topic): Pts daughter stated that they were unable to  the prescription and doesn't know why.     Callback Number (Will only call for issues): [phone] (Home)    Thank you,    John LARA

## 2023-01-23 RX ORDER — RIVAROXABAN 20 MG/1
TABLET, FILM COATED ORAL
Qty: 30 TABLET | Refills: 11 | OUTPATIENT
Start: 2023-01-23

## 2023-01-24 ENCOUNTER — PRE-ADMISSION TESTING (OUTPATIENT)
Dept: ADMISSIONS | Facility: MEDICAL CENTER | Age: 58
End: 2023-01-24
Attending: INTERNAL MEDICINE
Payer: COMMERCIAL

## 2023-01-24 ASSESSMENT — FIBROSIS 4 INDEX: FIB4 SCORE: 0.66

## 2023-01-25 NOTE — TELEPHONE ENCOUNTER
Phone Number Called: [phone]    Call outcome:  Spoke to patient's daughter - Laura    Message: Called to inquire about Xarelto. Patient's daughter states they are still waiting for PA for medication. Received samples sent last week.     RN reached out to Janis JAVED for further updates.

## 2023-01-25 NOTE — TELEPHONE ENCOUNTER
JAYSHREE     Caller: Laura (patients daughter)     Medication Name and Dosage:  rivaroxaban (XARELTO) 20 MG Tab tablet [[id number]    Medication amount left: N/A     Preferred Pharmacy: [facility] on file    Other questions (Topic): Patients daughter is calling back, states that this medication is awaiting authorization at the pharmacy and is concerned about getting this taken care of. Please advise.     Callback Number (Will only call for issues): [phone] (home)     Thank you,   Josiane NICK

## 2023-01-26 ENCOUNTER — PHARMACY VISIT (OUTPATIENT)
Dept: PHARMACY | Facility: MEDICAL CENTER | Age: 58
End: 2023-01-26
Payer: MEDICARE

## 2023-01-26 PROCEDURE — RXMED WILLOW AMBULATORY MEDICATION CHARGE: Performed by: INTERNAL MEDICINE

## 2023-01-26 NOTE — TELEPHONE ENCOUNTER
Phone Number Called: [phone]    Call outcome:  Spoke with patient's daughter - Laura     Message: Called to inform patient of RO recommendations to switch to Eliquis 5 mg BID due to insurance and PA denied for Xarelto. Patient's daughter verbalized understanding.     No further questions at this time. Medication ordered per RO recommendations.

## 2023-01-27 ENCOUNTER — HOSPITAL ENCOUNTER (OUTPATIENT)
Dept: LAB | Facility: MEDICAL CENTER | Age: 58
End: 2023-01-27
Attending: FAMILY MEDICINE
Payer: COMMERCIAL

## 2023-01-27 DIAGNOSIS — Z09 HOSPITAL DISCHARGE FOLLOW-UP: ICD-10-CM

## 2023-01-27 DIAGNOSIS — Z11.59 NEED FOR HEPATITIS C SCREENING TEST: ICD-10-CM

## 2023-01-27 LAB
25(OH)D3 SERPL-MCNC: 23 NG/ML (ref 30–100)
ALBUMIN SERPL BCP-MCNC: 4.4 G/DL (ref 3.2–4.9)
ALBUMIN/GLOB SERPL: 1.2 G/DL
ALP SERPL-CCNC: 84 U/L (ref 30–99)
ALT SERPL-CCNC: 27 U/L (ref 2–50)
ANION GAP SERPL CALC-SCNC: 11 MMOL/L (ref 7–16)
AST SERPL-CCNC: 22 U/L (ref 12–45)
BASOPHILS # BLD AUTO: 0.9 % (ref 0–1.8)
BASOPHILS # BLD: 0.07 K/UL (ref 0–0.12)
BILIRUB SERPL-MCNC: 0.6 MG/DL (ref 0.1–1.5)
BUN SERPL-MCNC: 25 MG/DL (ref 8–22)
CALCIUM ALBUM COR SERPL-MCNC: 9.7 MG/DL (ref 8.5–10.5)
CALCIUM SERPL-MCNC: 10 MG/DL (ref 8.5–10.5)
CHLORIDE SERPL-SCNC: 101 MMOL/L (ref 96–112)
CO2 SERPL-SCNC: 27 MMOL/L (ref 20–33)
CREAT SERPL-MCNC: 0.77 MG/DL (ref 0.5–1.4)
EOSINOPHIL # BLD AUTO: 0.84 K/UL (ref 0–0.51)
EOSINOPHIL NFR BLD: 11.3 % (ref 0–6.9)
ERYTHROCYTE [DISTWIDTH] IN BLOOD BY AUTOMATED COUNT: 47.9 FL (ref 35.9–50)
FASTING STATUS PATIENT QL REPORTED: NORMAL
GFR SERPLBLD CREATININE-BSD FMLA CKD-EPI: 105 ML/MIN/1.73 M 2
GLOBULIN SER CALC-MCNC: 3.6 G/DL (ref 1.9–3.5)
GLUCOSE SERPL-MCNC: 84 MG/DL (ref 65–99)
HCT VFR BLD AUTO: 44.3 % (ref 42–52)
HCV AB SER QL: NORMAL
HGB BLD-MCNC: 14.4 G/DL (ref 14–18)
IMM GRANULOCYTES # BLD AUTO: 0.02 K/UL (ref 0–0.11)
IMM GRANULOCYTES NFR BLD AUTO: 0.3 % (ref 0–0.9)
LYMPHOCYTES # BLD AUTO: 1.78 K/UL (ref 1–4.8)
LYMPHOCYTES NFR BLD: 23.9 % (ref 22–41)
MCH RBC QN AUTO: 29.8 PG (ref 27–33)
MCHC RBC AUTO-ENTMCNC: 32.5 G/DL (ref 33.7–35.3)
MCV RBC AUTO: 91.7 FL (ref 81.4–97.8)
MONOCYTES # BLD AUTO: 0.76 K/UL (ref 0–0.85)
MONOCYTES NFR BLD AUTO: 10.2 % (ref 0–13.4)
NEUTROPHILS # BLD AUTO: 3.97 K/UL (ref 1.82–7.42)
NEUTROPHILS NFR BLD: 53.4 % (ref 44–72)
NRBC # BLD AUTO: 0 K/UL
NRBC BLD-RTO: 0 /100 WBC
PLATELET # BLD AUTO: 371 K/UL (ref 164–446)
PMV BLD AUTO: 10.2 FL (ref 9–12.9)
POTASSIUM SERPL-SCNC: 4.1 MMOL/L (ref 3.6–5.5)
PROT SERPL-MCNC: 8 G/DL (ref 6–8.2)
RBC # BLD AUTO: 4.83 M/UL (ref 4.7–6.1)
SODIUM SERPL-SCNC: 139 MMOL/L (ref 135–145)
TSH SERPL DL<=0.005 MIU/L-ACNC: 4.71 UIU/ML (ref 0.38–5.33)
WBC # BLD AUTO: 7.4 K/UL (ref 4.8–10.8)

## 2023-01-27 PROCEDURE — 85025 COMPLETE CBC W/AUTO DIFF WBC: CPT

## 2023-01-27 PROCEDURE — 86803 HEPATITIS C AB TEST: CPT

## 2023-01-27 PROCEDURE — 84443 ASSAY THYROID STIM HORMONE: CPT

## 2023-01-27 PROCEDURE — 80053 COMPREHEN METABOLIC PANEL: CPT

## 2023-01-27 PROCEDURE — 36415 COLL VENOUS BLD VENIPUNCTURE: CPT

## 2023-01-27 PROCEDURE — 82306 VITAMIN D 25 HYDROXY: CPT

## 2023-01-30 ENCOUNTER — SPEECH THERAPY (OUTPATIENT)
Dept: SPEECH THERAPY | Facility: REHABILITATION | Age: 58
End: 2023-01-30
Attending: FAMILY MEDICINE
Payer: COMMERCIAL

## 2023-01-30 DIAGNOSIS — R13.10 DYSPHAGIA, UNSPECIFIED TYPE: ICD-10-CM

## 2023-01-30 PROCEDURE — 92610 EVALUATE SWALLOWING FUNCTION: CPT

## 2023-01-30 NOTE — OP THERAPY EVALUATION
"  Outpatient Speech Therapy  INITIAL EVALUATION    [facility] Speech Therapy [facility]  [address]  [address]  [place] [address]  Phone:  [phone]  Fax:  [phone]    Date of Evaluation: 01/30/2023    Patient: Karson Whiteside  YOB: 1965  MRN: [id number]     Referring Provider: Deb Clayton M.D.  [address]  [address]  [place] [address]   Referring Diagnosis Dysphagia, unspecified type [R13.10]     Time Calculation    Start time: 1116  Stop time: 1156 Time Calculation (min): 40 minutes           Chief Complaint: Dysphagia    Visit Diagnoses     ICD-10-CM   1. Dysphagia, unspecified type  R13.10     Subjective:   Reason for Therapy:     Reason For Evaluation:  Dysphagia (s/p prolonged intubation)  Social Support:     Social support system: daughterLaura.    Patient Mental Status:  Alert and Responsive  Progress Factors:     Progression:  Getting Better  Therapy History:     Previous Diet:  NPO and Peg-Tube    Current Diet:  Regular Diet, Peg-Tube and Thin Liquids    Dentition:  Missing Dentition  Additional Subjective Comments:      Patient is 57 y.o. male s/p prolonged intubation and multiple extubations during medically involved detox process.  Patient was intubated for 16 days, and had PEG placed 11/17. MBS from 11/23 with the following impressions:    \"The pt presents with a moderate-severe oropharyngeal swallow, likely acute related to prolonged intubation status. Swallowing function has improved from previous diagnostics however pt is still at high risk for aspiration with PO diet. Results and recommendations discussed with pt and his daughter and they verbalized understanding. \"    Patient reported he is currently taking about 80% nutrition/hydration via PO, and supplementing with tube feeds 3X/day.  Patient is reporting things \"going down wrong pipe\" about once per day, but does not feel this is with liquids.  He reported frequently feeling food stuck in throat, and his " "daughter reported increase in \"junkie\" voice, especially in the morning and around meals.  Patient has been referred to speech therapy for a clinical swallow evaluation.  Past Medical History:   Diagnosis Date    A-fib ([facility]) 01/24/2023    medicated    ASTHMA 01/24/2023    doesn't use inhalers    Breath shortness 01/24/2023    with exertion    Dental disorder 01/24/2023    broken upper front 2 teeth, missing teeth upper and lower    Eczema     Gastrostomy tube in place ([facility]) 01/24/2023    parenteral feeding, but eats food orally    Hemorrhagic disorder ([facility]) 01/24/2023    taking Xarelto    Hypertension 01/24/2023    medicated    Pneumonia 01/24/2023    history of 10/22    Snoring 01/24/2023     Past Surgical History:   Procedure Laterality Date    KS UPPER GI ENDOSCOPY,DIAGNOSIS N/A 11/17/2022    Procedure: GASTROSCOPY;  Surgeon: Sebastian Mcgrath M.D.;  Location: SURGERY [facility];  Service: Gastroenterology    KS PLACE PERCUT GASTROSTOMY TUBE N/A 11/17/2022    Procedure: EGD, WITH PEG TUBE INSERTION;  Surgeon: Sebastian Mcgrath M.D.;  [place] [facility]  Service: Gastroenterology     Objective:   Objective Details:  The [place] Swallow Protocol, Eating Assessment Tool (EAT-10), Swallowing Ability and Function Evaluation (SAFE) were administered to the patient with the following results:    The [place] Swallow requires the patient to drink 3 oz water in sequential swallows without stopping, with assessment of interrupted drinking and coughing or choking during or immediately after completion of drinking.    [place]: Pass,  Patient was able to consecutively swallow 3 oz water with no pausing, coughing, or change in vocal quality      The Eating Assessment Tool (EAT-10) is a patient administered, self-assessment of possible difficulties that may be encountered as a result of difficulty in swallow.  Scores greater than 3 can be indicative of changes in swallow consistent with dysphagia.     EAT-10 score: 5/40 " "    The Swallowing Ability and Function Evaluation is used to identify specific problems occurring during the oral and pharyngeal phases of swallowing.    SAFE:  (Subscale, Raw Score, Stanine, Severity)  Physical Evaluation 77; 8  WNL  Oral Phase  18; 6 Mild  Pharyngeal Phase  17; 6  Mild        Speech Therapy Assessment:     Oral Motor Status:     Oral motor status comments: Oral Mechanism Exam:    -Visual Assessment: Symmetrical  -Dentition: Incomplete, missing several molars  -Labial: WFL   -Lingual: WFL   -Palatal Elevation: WFL   -Jaw:  WFL  -Laryngeal Elevation: Adequate   -Volitional Cough: adequate  -Vocal Quality: Clear   - Circumlaryngeal Palpation: No pain or edema       Oral Phase Assessment:     Oral phase comments: Patient was observed during PO trials of 3 oz water, puree, and cindy cracker.  Patient demonstrated adequate lip seal and retrieval of bolus from cup and spoon.  Prolonged oral prep and mastication, likely due to missing dentition.  Mild delay with A/P transfer. No significant oral residuals or pocketing were observed. Multiple swallows were not required to clear oral cavity. No report of nasal regurgitation.      Pharyngeal Phase Assessment:     Pharyngeal phase comments: No delay in pharyngeal swallow was appreciated via palpation with perceived mild decrease laryngeal elevation.  No coughing or change in vocal quality was observed, but Patient's daughter reported he voice gets \"junkie\" around meals. Patient did not report any food stuck throughout assessment, but does report feeling food stick during meals at home.  Patient reported he has not been swallowing pills, but is concerned about taking them.  He is currently receiving all meds via PEG.    Speech Therapy Plan :   Prognosis & Recommendations  Impression Summary:  Patient presents with oropharyngeal dysphagia characterized by prolonged oral prep, and complaints of food being stuck in throat during meals at home with occasional " "reports of food going \"down the wrong pipe\".  Recommend continuation of regular diet with thin liquids using safe swallowing strategies and monitoring need for additional PEG feedings.  Recommend Modified Barium Swallow Study to rule out aspiration and recommend most appropriate diet for PO.  Patient would benefit from skilled speech therapy to provide education re: safe swallowing strategies and food modifications, as well as oral motor exercises to strengthen oral mechanism to improve safety of swallow.  Patient verbalized understanding and agreement with current plan of care.   Prognosis:  Good  Compensatory swallow strategies:  Upright position 90 degrees during meal and 45 minutes after meal, Reduce bolus size, Multiple swallows, Alternate liquids/solids and No talking while eating  Diet Recommendation:  Normal Consistency  Liquid Recommendation:  Thin  Goals  Short Term Goals:  1.  Patient will improve safety in swallow implementing compensatory swallow strategies with minimal verbal cues during therapist directed trials and per patient report 85% accuracy.   2.  Patient will improve safety in swallow completing structured exercise addressing oral, pharyngeal phases of swallow completing to 85% accuracy given min verbal cues.    3.  Patient will participate in MBSS to rule out aspiration and determine least restrictive, safest diet textures for PO intake.      Short Term Goal Duration (Weeks):  2-4 weeks  Long Term Goals:  Patient to consume the least restrictive diet to maintain adequate nutrition/hydration 100% as evidenced by no overt signs or symptoms of aspiration.   Long Term Goal Duration (Weeks):  2-4 months  Patient Stated Goal:  \"PEG tube removed\"  Therapy Recommendations  Recommendation:  Individual Speech Therapy and Dysphagia Treatment,  Planned Therapy Interventions:  Patient/Caregiver Education, Dysphagia treatment, Home Program and Compensatory Strategy Training,   Frequency:  1x week  Duration " (in visits):  4 (4 X [id number])  Duration (in weeks):  4      Referring provider co-signature:  I have reviewed this plan of care and my co-signature certifies the need for services.    Certification Period: 01/30/2023 to  02/27/23    Physician Signature: ________________________________ Date: ______________

## 2023-02-01 ENCOUNTER — APPOINTMENT (OUTPATIENT)
Dept: SPEECH THERAPY | Facility: REHABILITATION | Age: 58
End: 2023-02-01
Attending: FAMILY MEDICINE
Payer: COMMERCIAL

## 2023-02-03 ENCOUNTER — TELEMEDICINE (OUTPATIENT)
Dept: MEDICAL GROUP | Facility: MEDICAL CENTER | Age: 58
End: 2023-02-03
Attending: INTERNAL MEDICINE
Payer: COMMERCIAL

## 2023-02-03 ENCOUNTER — PRE-ADMISSION TESTING (OUTPATIENT)
Dept: ADMISSIONS | Facility: MEDICAL CENTER | Age: 58
End: 2023-02-03
Attending: INTERNAL MEDICINE
Payer: COMMERCIAL

## 2023-02-03 DIAGNOSIS — Z01.812 PRE-OPERATIVE LABORATORY EXAMINATION: ICD-10-CM

## 2023-02-03 DIAGNOSIS — Z71.89 ADVANCED CARE PLANNING/COUNSELING DISCUSSION: ICD-10-CM

## 2023-02-03 LAB
ALBUMIN SERPL BCP-MCNC: 4.4 G/DL (ref 3.2–4.9)
ALBUMIN/GLOB SERPL: 1.4 G/DL
ALP SERPL-CCNC: 83 U/L (ref 30–99)
ALT SERPL-CCNC: 25 U/L (ref 2–50)
ANION GAP SERPL CALC-SCNC: 16 MMOL/L (ref 7–16)
AST SERPL-CCNC: 18 U/L (ref 12–45)
BILIRUB SERPL-MCNC: 0.4 MG/DL (ref 0.1–1.5)
BUN SERPL-MCNC: 32 MG/DL (ref 8–22)
CALCIUM ALBUM COR SERPL-MCNC: 9.6 MG/DL (ref 8.5–10.5)
CALCIUM SERPL-MCNC: 9.9 MG/DL (ref 8.5–10.5)
CHLORIDE SERPL-SCNC: 101 MMOL/L (ref 96–112)
CO2 SERPL-SCNC: 22 MMOL/L (ref 20–33)
CREAT SERPL-MCNC: 0.81 MG/DL (ref 0.5–1.4)
ERYTHROCYTE [DISTWIDTH] IN BLOOD BY AUTOMATED COUNT: 45.6 FL (ref 35.9–50)
GFR SERPLBLD CREATININE-BSD FMLA CKD-EPI: 103 ML/MIN/1.73 M 2
GLOBULIN SER CALC-MCNC: 3.2 G/DL (ref 1.9–3.5)
GLUCOSE SERPL-MCNC: 75 MG/DL (ref 65–99)
HCT VFR BLD AUTO: 43.5 % (ref 42–52)
HGB BLD-MCNC: 14.3 G/DL (ref 14–18)
INR PPP: 1.24 (ref 0.87–1.13)
MCH RBC QN AUTO: 29.5 PG (ref 27–33)
MCHC RBC AUTO-ENTMCNC: 32.9 G/DL (ref 33.7–35.3)
MCV RBC AUTO: 89.9 FL (ref 81.4–97.8)
PLATELET # BLD AUTO: 305 K/UL (ref 164–446)
PMV BLD AUTO: 10.4 FL (ref 9–12.9)
POTASSIUM SERPL-SCNC: 4.4 MMOL/L (ref 3.6–5.5)
PROT SERPL-MCNC: 7.6 G/DL (ref 6–8.2)
PROTHROMBIN TIME: 15.4 SEC (ref 12–14.6)
RBC # BLD AUTO: 4.84 M/UL (ref 4.7–6.1)
SODIUM SERPL-SCNC: 139 MMOL/L (ref 135–145)
WBC # BLD AUTO: 7.4 K/UL (ref 4.8–10.8)

## 2023-02-03 PROCEDURE — 99999 PR NO CHARGE: CPT | Performed by: FAMILY MEDICINE

## 2023-02-03 PROCEDURE — 36415 COLL VENOUS BLD VENIPUNCTURE: CPT

## 2023-02-03 PROCEDURE — 80053 COMPREHEN METABOLIC PANEL: CPT

## 2023-02-03 PROCEDURE — 85610 PROTHROMBIN TIME: CPT

## 2023-02-03 PROCEDURE — 85027 COMPLETE CBC AUTOMATED: CPT

## 2023-02-05 PROBLEM — Z71.89 ADVANCED CARE PLANNING/COUNSELING DISCUSSION: Status: ACTIVE | Noted: 2023-02-05

## 2023-02-05 NOTE — ASSESSMENT & PLAN NOTE
Patient had signed a polst while in the hospital so that he was a DNR. He has a cardioversion upcoming and was advised to void the prior POLST so he would like to discuss this today.

## 2023-02-05 NOTE — PROGRESS NOTES
Telemedicine: Established Patient   This evaluation was conducted via Zoom using secure and encrypted videoconferencing technology. The patient was in their home in the [facility].    The patient's identity was confirmed and verbal consent was obtained for this virtual visit.    Subjective:   CC: No chief complaint on file.      Tyree Whiteside is a 57 y.o. male presenting for evaluation and management of:    Advanced care planning/counseling discussion  Patient had signed a polst while in the hospital so that he was a DNR. He has a cardioversion upcoming and was advised to void the prior POLST so he would like to discuss this today.           No Known Allergies    Current medicines (including changes today)  Current Outpatient Medications   Medication Sig Dispense Refill    QUEtiapine (SEROQUEL) 50 MG tablet Take 1 Tablet by mouth every day. 30 Tablet 3    apixaban (ELIQUIS) 5mg Tab Take 1 Tablet by mouth 2 times a day. 60 Tablet 11    budesonide-formoterol (SYMBICORT) 80-4.5 MCG/ACT Aerosol Inhale 2 Puffs 2 times a day. (Patient not taking: Reported on 1/24/2023) 10.2 g 3    Thiamine HCl (VITAMIN B-1 PO) Take  by mouth every day.      LEVOTHYROXINE SODIUM PO Take  by mouth every day.      losartan (COZAAR) 25 MG Tab 0.5 Tablets by Enteral Tube route every day for 60 days. 50 Tablet 3    metoprolol tartrate (LOPRESSOR) 50 MG Tab Take 1 Tablet by mouth 2 times a day. 180 Tablet 3    triamcinolone acetonide (KENALOG) 0.1 % Cream Apply 1 Application topically 2 times a day. Mix with lotion and apply to entire body excluding face and genitalia. 454 g 0    Nutritional Supplements (NOVASOURCE RENAL) Liquid 250 mL by Percutaneous Endoscopic Gastrostomy route 4 times a day. 237 mL 11    Misc. Devices Misc Blood pressure cuff and machine 1 Each 0     No current facility-administered medications for this visit.       Patient Active Problem List    Diagnosis Date Noted    Advanced care planning/counseling  discussion 02/05/2023    Poor sleep 01/10/2023    Hyperphosphatemia 11/22/2022    Peripheral eosinophilia 11/11/2022    Agitation 11/11/2022    Dysphagia 11/10/2022    Congestive heart failure (CHF) ([facility]) 11/10/2022    Liver mass 10/18/2022    Breast lump 07/20/2022    SOB (shortness of breath) 06/28/2022    Tobacco dependence 06/28/2022    Atrial fibrillation (HCC) 06/28/2022    History of alcohol abuse 06/14/2022    Ringing in ears, bilateral 12/12/2018    Vitamin D deficiency 12/05/2018    Other specified hypothyroidism 12/05/2018    Overweight (BMI 25.0-29.9) 10/31/2018    Essential hypertension 10/31/2018    Rash 10/31/2018    Hyponatremia 02/28/2015    Anemia 02/28/2015       Family History   Problem Relation Age of Onset    Heart Disease Father     Stroke Father     Hypertension Brother     Cancer Brother         has 12 sibs, one brother had an unknown type of ca       He  has a past medical history of A-fib ([facility]) (01/24/2023), ASTHMA (01/24/2023), Breath shortness (01/24/2023), Dental disorder (01/24/2023), Eczema, Gastrostomy tube in place ([facility]) (01/24/2023), Hemorrhagic disorder ([facility]) (01/24/2023), Hypertension (01/24/2023), Pneumonia (01/24/2023), and Snoring (01/24/2023).  He  has a past surgical history that includes pr upper gi endoscopy,diagnosis (N/A, 11/17/2022) and pr place percut gastrostomy tube (N/A, 11/17/2022).       Objective:   There were no vitals taken for this visit.    Physical Exam  Constitutional: Alert, no distress, well-groomed.  Respiratory: Unlabored respiratory effort, no cough.  MSK: moves all extremities.  Psych: Alert and oriented x3, normal affect and mood.    Labs:  Reviewed speech therapy notes. He is doing well and can take solids/liquids without using gtube feeds.   Reviewed prior POLST      Assessment and Plan:   The following treatment plan was discussed:     1. Advanced care planning/counseling discussion  New POLST filled with pt during our visit. He still needs to  sign it, so it is left at the  for him to sign. He will receive original copy and a copy will be scanned into our system.     Follow-up: No follow-ups on file.

## 2023-02-07 ENCOUNTER — ANESTHESIA EVENT (OUTPATIENT)
Dept: CARDIOLOGY | Facility: MEDICAL CENTER | Age: 58
End: 2023-02-07
Payer: COMMERCIAL

## 2023-02-07 ENCOUNTER — HOSPITAL ENCOUNTER (OUTPATIENT)
Facility: MEDICAL CENTER | Age: 58
End: 2023-02-07
Attending: INTERNAL MEDICINE | Admitting: INTERNAL MEDICINE
Payer: COMMERCIAL

## 2023-02-07 ENCOUNTER — APPOINTMENT (OUTPATIENT)
Dept: SPEECH THERAPY | Facility: REHABILITATION | Age: 58
End: 2023-02-07
Attending: FAMILY MEDICINE
Payer: COMMERCIAL

## 2023-02-07 ENCOUNTER — APPOINTMENT (OUTPATIENT)
Dept: CARDIOLOGY | Facility: MEDICAL CENTER | Age: 58
End: 2023-02-07
Attending: INTERNAL MEDICINE
Payer: COMMERCIAL

## 2023-02-07 ENCOUNTER — ANESTHESIA (OUTPATIENT)
Dept: CARDIOLOGY | Facility: MEDICAL CENTER | Age: 58
End: 2023-02-07
Payer: COMMERCIAL

## 2023-02-07 VITALS
SYSTOLIC BLOOD PRESSURE: 118 MMHG | DIASTOLIC BLOOD PRESSURE: 72 MMHG | HEART RATE: 68 BPM | WEIGHT: 193.78 LBS | OXYGEN SATURATION: 98 % | BODY MASS INDEX: 27.74 KG/M2 | HEIGHT: 70 IN | RESPIRATION RATE: 20 BRPM | TEMPERATURE: 97 F

## 2023-02-07 DIAGNOSIS — I48.19 PERSISTENT ATRIAL FIBRILLATION (HCC): ICD-10-CM

## 2023-02-07 DIAGNOSIS — I50.31 ACUTE DIASTOLIC CONGESTIVE HEART FAILURE (HCC): ICD-10-CM

## 2023-02-07 PROCEDURE — 160002 HCHG RECOVERY MINUTES (STAT)

## 2023-02-07 PROCEDURE — 93005 ELECTROCARDIOGRAM TRACING: CPT | Performed by: INTERNAL MEDICINE

## 2023-02-07 PROCEDURE — 700105 HCHG RX REV CODE 258: Performed by: INTERNAL MEDICINE

## 2023-02-07 PROCEDURE — 700101 HCHG RX REV CODE 250: Performed by: STUDENT IN AN ORGANIZED HEALTH CARE EDUCATION/TRAINING PROGRAM

## 2023-02-07 PROCEDURE — 160046 HCHG PACU - 1ST 60 MINS PHASE II

## 2023-02-07 PROCEDURE — 92960 CARDIOVERSION ELECTRIC EXT: CPT

## 2023-02-07 PROCEDURE — 700111 HCHG RX REV CODE 636 W/ 250 OVERRIDE (IP): Performed by: STUDENT IN AN ORGANIZED HEALTH CARE EDUCATION/TRAINING PROGRAM

## 2023-02-07 PROCEDURE — 00410 ANES INTEG SYS CONV ARRHYT: CPT | Performed by: STUDENT IN AN ORGANIZED HEALTH CARE EDUCATION/TRAINING PROGRAM

## 2023-02-07 PROCEDURE — 92960 CARDIOVERSION ELECTRIC EXT: CPT | Performed by: INTERNAL MEDICINE

## 2023-02-07 PROCEDURE — 160035 HCHG PACU - 1ST 60 MINS PHASE I

## 2023-02-07 RX ORDER — MEPERIDINE HYDROCHLORIDE 25 MG/ML
12.5 INJECTION INTRAMUSCULAR; INTRAVENOUS; SUBCUTANEOUS
Status: DISCONTINUED | OUTPATIENT
Start: 2023-02-07 | End: 2023-02-07 | Stop reason: HOSPADM

## 2023-02-07 RX ORDER — LIDOCAINE HYDROCHLORIDE 20 MG/ML
INJECTION, SOLUTION EPIDURAL; INFILTRATION; INTRACAUDAL; PERINEURAL PRN
Status: DISCONTINUED | OUTPATIENT
Start: 2023-02-07 | End: 2023-02-07 | Stop reason: SURG

## 2023-02-07 RX ORDER — LEVOTHYROXINE SODIUM 0.05 MG/1
50 TABLET ORAL
Status: ON HOLD | COMMUNITY
Start: 2023-01-20 | End: 2023-02-07

## 2023-02-07 RX ORDER — SODIUM CHLORIDE, SODIUM LACTATE, POTASSIUM CHLORIDE, CALCIUM CHLORIDE 600; 310; 30; 20 MG/100ML; MG/100ML; MG/100ML; MG/100ML
INJECTION, SOLUTION INTRAVENOUS CONTINUOUS
Status: DISCONTINUED | OUTPATIENT
Start: 2023-02-07 | End: 2023-02-07 | Stop reason: HOSPADM

## 2023-02-07 RX ORDER — ONDANSETRON 2 MG/ML
4 INJECTION INTRAMUSCULAR; INTRAVENOUS
Status: DISCONTINUED | OUTPATIENT
Start: 2023-02-07 | End: 2023-02-07 | Stop reason: HOSPADM

## 2023-02-07 RX ORDER — LEVOTHYROXINE SODIUM 0.05 MG/1
50 TABLET ORAL
COMMUNITY
End: 2023-05-10 | Stop reason: SDUPTHER

## 2023-02-07 RX ADMIN — LIDOCAINE HYDROCHLORIDE 60 MG: 20 INJECTION, SOLUTION EPIDURAL; INFILTRATION; INTRACAUDAL at 09:03

## 2023-02-07 RX ADMIN — SODIUM CHLORIDE, POTASSIUM CHLORIDE, SODIUM LACTATE AND CALCIUM CHLORIDE: 600; 310; 30; 20 INJECTION, SOLUTION INTRAVENOUS at 08:18

## 2023-02-07 RX ADMIN — PROPOFOL 60 MG: 10 INJECTION, EMULSION INTRAVENOUS at 09:03

## 2023-02-07 ASSESSMENT — FIBROSIS 4 INDEX: FIB4 SCORE: 0.66

## 2023-02-07 NOTE — DISCHARGE INSTRUCTIONS
HOME CARE INSTRUCTIONS    ACTIVITY: Rest and take it easy for the first 24 hours.  A responsible adult is recommended to remain with you during that time.  It is normal to feel sleepy.  We encourage you to not do anything that requires balance, judgment or coordination.    FOR 24 HOURS DO NOT:  Drive, operate machinery or run household appliances.  Drink beer or alcoholic beverages.  Make important decisions or sign legal documents.    SPECIAL INSTRUCTIONS:   Electrical Cardioversion, Care After  This sheet gives you information about how to care for yourself after your procedure. Your health care provider may also give you more specific instructions. If you have problems or questions, contact your health care provider.  What can I expect after the procedure?  After the procedure, it is common to have:  Some redness on the skin where the shocks were given.  Follow these instructions at home:  Do not drive for 24 hours if you were given a medicine to help you relax (sedative).  Take over-the-counter and prescription medicines only as told by your health care provider.  Ask your health care provider how to check your pulse. Check it often.  Rest for 48 hours after the procedure or as told by your health care provider.  Avoid or limit your caffeine use as told by your health care provider.  Contact a health care provider if:  You feel like your heart is beating too quickly or your pulse is not regular.  You have a serious muscle cramp that does not go away.  Get help right away if:  You have discomfort in your chest.  You are dizzy or you feel faint.  You have trouble breathing or you are short of breath.  Your speech is slurred.  You have trouble moving an arm or leg on one side of your body.  Your fingers or toes turn cold or blue.  This information is not intended to replace advice given to you by your health care provider. Make sure you discuss any questions you have with your health care provider.    DIET: To  avoid nausea, slowly advance diet as tolerated, avoiding spicy or greasy foods for the first day.  Add more substantial food to your diet according to your physician's instructions. INCREASE FLUIDS AND FIBER TO AVOID CONSTIPATION.    MEDICATIONS: Resume taking daily medication.  Take prescribed pain medication with food.  If no medication is prescribed, you may take non-aspirin pain medication if needed.  PAIN MEDICATION CAN BE VERY CONSTIPATING.  Take a stool softener or laxative such as senokot, pericolace, or milk of magnesia if needed.    A follow-up appointment should be arranged with your doctor; call to schedule.    You should CALL YOUR PHYSICIAN if you develop:  Fever greater than 101 degrees F.  Pain not relieved by medication, or persistent nausea or vomiting.  Excessive bleeding (blood soaking through dressing) or unexpected drainage from the wound.  Extreme redness or swelling around the incision site, drainage of pus or foul smelling drainage.  Inability to urinate or empty your bladder within 8 hours.  Problems with breathing or chest pain.    You should call 911 if you develop problems with breathing or chest pain.  If you are unable to contact your doctor or surgical center, you should go to the nearest emergency room or urgent care center.  Physician's telephone #: [phone]    MILD FLU-LIKE SYMPTOMS ARE NORMAL.  YOU MAY EXPERIENCE GENERALIZED MUSCLE ACHES, THROAT IRRITATION, HEADACHE AND/OR SOME NAUSEA.    If any questions arise, call your doctor.  If your doctor is not available, please feel free to call the Surgical Center at [phone].  The Center is open Monday through Friday from 7AM to 7PM.      A registered nurse may call you a few days after your surgery to see how you are doing after your procedure.    You may also receive a survey in the mail within the next two weeks and we ask that you take a few moments to complete the survey and return it to us.  Our goal is to provide you  with very good care and we value your comments.     Depression / Suicide Risk    As you are discharged from this [facility] facility, it is important to learn how to keep safe from harming yourself.    Recognize the warning signs:  Abrupt changes in personality, positive or negative- including increase in energy   Giving away possessions  Change in eating patterns- significant weight changes-  positive or negative  Change in sleeping patterns- unable to sleep or sleeping all the time   Unwillingness or inability to communicate  Depression  Unusual sadness, discouragement and loneliness  Talk of wanting to die  Neglect of personal appearance   Rebelliousness- reckless behavior  Withdrawal from people/activities they love  Confusion- inability to concentrate     If you or a loved one observes any of these behaviors or has concerns about self-harm, here's what you can do:  Talk about it- your feelings and reasons for harming yourself  Remove any means that you might use to hurt yourself (examples: pills, rope, extension cords, firearm)  Get professional help from the community (Mental Health, Substance Abuse, psychological counseling)  Do not be alone:Call your Safe Contact- someone whom you trust who will be there for you.  Call your local CRISIS HOTLINE 050-5698 or 631-610-6557  Call your local Children's Mobile Crisis Response Team [place] (202) 534-7707 or www.ePartners  Call the toll free National Suicide Prevention Hotlines   National Suicide Prevention Lifeline 287-283-EZKA (8630)  National Hope Line Network 800-SUICIDE (304-8517)    I acknowledge receipt and understanding of these Home Care instructions.

## 2023-02-07 NOTE — ANESTHESIA TIME REPORT
Anesthesia Start and Stop Event Times     Date Time Event    2/7/2023 0854 Ready for Procedure     0856 Anesthesia Start     0912 Anesthesia Stop        Responsible Staff  02/07/23    Name Role Begin End    Yonis Hinojosa M.D. Anesth 0856 0912        Overtime Reason:  no overtime (within assigned shift)    Comments:

## 2023-02-07 NOTE — PROGRESS NOTES
Patient discharged home with daughter. Verbalized understanding of discharge instructions. Vital signs stable/ NAD noted. Transported via wheelchair to [facility] without incident.

## 2023-02-07 NOTE — ANESTHESIA PREPROCEDURE EVALUATION
Date/Time: 02/07/23 0900    Scheduled providers: Star Rowe M.D.; Yonis Hinojosa M.D.    Procedure: CL-CARDIOVERSION    Diagnosis:       Acute diastolic congestive heart failure (HCC) [I50.31]      Persistent atrial fibrillation (HCC) [I48.19]      Other persistent atrial fibrillation [I48.19]    Indications: See Associated Dx    Location: [facility] - [facility]OLOG[facility]      58 yo for cardioversion    No anesthesia problems    NPO    NKDA    Asthma - mild    EF 40%    Relevant Problems   PULMONARY   (positive) SOB (shortness of breath)      CARDIAC   (positive) Atrial fibrillation (HCC)   (positive) Congestive heart failure (CHF) (HCC)   (positive) Essential hypertension      ENDO   (positive) Other specified hypothyroidism       Physical Exam    Airway   Mallampati: II  TM distance: >3 FB  Neck ROM: full       Cardiovascular - normal exam  Rhythm: regular  Rate: normal     Dental - normal exam           Pulmonary - normal exam     Abdominal    Neurological - normal exam                 Anesthesia Plan    ASA 3       Plan - MAC               Induction: intravenous    Postoperative Plan: Postoperative administration of opioids is intended.    Pertinent diagnostic labs and testing reviewed    Informed Consent:    Anesthetic plan and risks discussed with patient.    Use of blood products discussed with: patient whom consented to blood products.

## 2023-02-07 NOTE — PROCEDURES
Electrical Cardioversion    Date/Time: 2/7/2023 9:06 AM  Performed by: Star Rowe M.D.  Authorized by: Star Rowe M.D.     Consent:     Consent obtained:  Written    Consent given by:  Patient    Risks discussed:  Cutaneous burn, death, induced arrhythmia and pain    Alternatives discussed:  No treatment, anti-coagulation medication and observation  Sedation:     Patient sedated: Yes      Sedation type:  Per anesthesia  Pre-procedure details:     Cardioversion basis:  Elective    Rhythm:  Atrial fibrillation    Anticoagulation status:  Apixaban  Attempt one:     Cardioversion mode:  Synchronous    Waveform:  Biphasic    Shock (Joules):  150    Shock outcome:  Conversion to normal sinus rhythm  Post-procedure details:     Patient status:  Awake    Patient tolerance of procedure:  Tolerated well, no immediate complications

## 2023-02-07 NOTE — PROGRESS NOTES
Med rec complete per pt at desk.  Per pt he is able to swallow medications, but still uses enteral tube when needed.Allergies reviewed and updated.

## 2023-02-07 NOTE — ANESTHESIA POSTPROCEDURE EVALUATION
Patient: Tyree Whiteside    Procedure Summary     Date: 02/07/23 Room / Location: [facility] - ECHOCARDIOLOGY [facility]    Anesthesia Start: 0856 Anesthesia Stop: 0912    Procedure: CL-CARDIOVERSION Diagnosis:       Acute diastolic congestive heart failure (HCC)      Persistent atrial fibrillation (HCC)      Other persistent atrial fibrillation      (See Associated Dx)    Scheduled Providers: Star Rowe M.D.; Yonis Hinojosa M.D. Responsible Provider: Yonis Hinojosa M.D.    Anesthesia Type: MAC ASA Status: 3          Final Anesthesia Type: MAC  Last vitals  BP   Blood Pressure: 118/72    Temp   36.1 °C (97 °F)    Pulse   68   Resp   20    SpO2   98 %      Anesthesia Post Evaluation    No notable events documented.     Nurse Pain Score: 0 (NPRS)

## 2023-02-08 LAB
EKG IMPRESSION: NORMAL
EKG IMPRESSION: NORMAL

## 2023-02-08 PROCEDURE — 93010 ELECTROCARDIOGRAM REPORT: CPT | Mod: 76 | Performed by: INTERNAL MEDICINE

## 2023-02-08 PROCEDURE — 93010 ELECTROCARDIOGRAM REPORT: CPT | Performed by: INTERNAL MEDICINE

## 2023-02-09 ENCOUNTER — SPEECH THERAPY (OUTPATIENT)
Dept: SPEECH THERAPY | Facility: REHABILITATION | Age: 58
End: 2023-02-09
Attending: FAMILY MEDICINE
Payer: COMMERCIAL

## 2023-02-09 DIAGNOSIS — R13.10 DYSPHAGIA, UNSPECIFIED TYPE: ICD-10-CM

## 2023-02-09 PROCEDURE — 92526 ORAL FUNCTION THERAPY: CPT

## 2023-02-09 NOTE — OP THERAPY DAILY TREATMENT
Outpatient Speech Therapy  DAILY TREATMENT     [facility] Speech [address]  [address]  [place] [address]  Phone:  [phone]  Fax:  [phone]    Date: 02/09/2023    Patient: Karson Whiteside  YOB: 1965  MRN: [id number]     Time Calculation    Start time: 1330  Stop time: 1411 Time Calculation (min): 41 minutes         Chief Complaint: Dysphagia    Visit #: 2    Subjective:   Reason for Therapy:     Reason For Evaluation:  Dysphagia (s/p prolonged intubation)  Social Support:     Social support system: daughter, Laura.    Patient Mental Status:  Alert and Responsive  Progress Factors:     Progression:  Getting Better  Therapy History:     Previous Diet:  NPO and Peg-Tube    Current Diet:  Regular Diet, Peg-Tube and Thin Liquids    Dentition:  Missing Dentition  Additional Subjective Comments:      Patient reported he is taking pills orally.  He reported he has not used tube since before evaluation on 1/30/23, and has been tolerating all PO with no s/sx aspiration or other issues.    Objective:   Treatments/Interventions Performed:  Dysphagia treatment, Patient/Caregiver education, Compensatory strategy training and Home program  Objective Details:  1.  Patient will improve safety in swallow implementing compensatory swallow strategies with minimal verbal cues during therapist directed trials and per patient report 85% accuracy.   --Patient verbalized understanding of safe swallowing strategies, with emphasis on small volumes and clearance of mouth and throat before taking next bite.    2.  Patient will improve safety in swallow completing structured exercise addressing oral, pharyngeal phases of swallow completing to 85% accuracy given min verbal cues.    --Patient was able to demonstrate lingual exercises against resistance, as well as pharyngeal exercises following initial instruction.    3.  Patient will participate in MBSS to rule out aspiration and determine  least restrictive, safest diet textures for PO intake.      --MBS scheduled for 2/28.       Speech Therapy Assessment:     Pharyngeal Phase Assessment:     Pharyngeal phase comments: Patient is reporting that he has not used PEG since 1/30, and he is currently taking all of his medications orally.  Patient has MBS scheduled for 2/28, which will further assess swallow function and rule out aspiration.  However, as patient is no longer dependent on G-tube, he is likely ready for removal.      Speech Therapy Plan :   Prognosis & Recommendations  Impression Summary:  Patient actively participated in therapy activities.  Patient presents with oropharyngeal dysphagia, with improved tolerance of PO.  Recommend continuation of regular diet with thin liquids using safe swallowing strategies.  As Patient is no longer using PEG, he may be appropriate for removal.  Patient is scheduled for MBS to further assess swallow function. Patient would benefit from skilled speech therapy to provide education re: safe swallowing strategies and food modifications, as well as oral motor exercises to strengthen oral mechanism to improve safety of swallow.  Patient verbalized understanding and agreement with current plan of care.   Prognosis:  Good  Compensatory swallow strategies:  Upright position 90 degrees during meal and 45 minutes after meal, Reduce bolus size, Multiple swallows, Alternate liquids/solids and No talking while eating  Diet Recommendation:  Normal Consistency  Liquid Recommendation:  Thin  Goals  Short Term Goals:  1.  Patient will improve safety in swallow implementing compensatory swallow strategies with minimal verbal cues during therapist directed trials and per patient report 85% accuracy.   2.  Patient will improve safety in swallow completing structured exercise addressing oral, pharyngeal phases of swallow completing to 85% accuracy given min verbal cues.    3.  Patient will participate in MBSS to rule out  "aspiration and determine least restrictive, safest diet textures for PO intake.      Short Term Goal Duration (Weeks):  2-4 weeks  Long Term Goals:  Patient to consume the least restrictive diet to maintain adequate nutrition/hydration 100% as evidenced by no overt signs or symptoms of aspiration.   Long Term Goal Duration (Weeks):  2-4 months  Patient Stated Goal:  \"PEG tube removed\"  Therapy Recommendations  Recommendation:  Individual Speech Therapy and Dysphagia Treatment,  Planned Therapy Interventions:  Patient/Caregiver Education, Dysphagia treatment, Home Program and Compensatory Strategy Training,   Frequency:  1x week               "

## 2023-02-10 ENCOUNTER — OFFICE VISIT (OUTPATIENT)
Dept: MEDICAL GROUP | Facility: MEDICAL CENTER | Age: 58
End: 2023-02-10
Attending: FAMILY MEDICINE
Payer: COMMERCIAL

## 2023-02-10 VITALS
DIASTOLIC BLOOD PRESSURE: 62 MMHG | OXYGEN SATURATION: 97 % | HEART RATE: 65 BPM | TEMPERATURE: 97 F | HEIGHT: 70 IN | SYSTOLIC BLOOD PRESSURE: 108 MMHG | BODY MASS INDEX: 28.23 KG/M2 | WEIGHT: 197.2 LBS | RESPIRATION RATE: 19 BRPM

## 2023-02-10 DIAGNOSIS — I48.19 PERSISTENT ATRIAL FIBRILLATION (HCC): ICD-10-CM

## 2023-02-10 DIAGNOSIS — Z12.11 SCREENING FOR COLON CANCER: ICD-10-CM

## 2023-02-10 DIAGNOSIS — Z93.1 GASTROINTESTINAL TUBE PRESENT (HCC): ICD-10-CM

## 2023-02-10 DIAGNOSIS — Z72.820 POOR SLEEP: ICD-10-CM

## 2023-02-10 PROCEDURE — 99213 OFFICE O/P EST LOW 20 MIN: CPT | Performed by: FAMILY MEDICINE

## 2023-02-10 PROCEDURE — 99214 OFFICE O/P EST MOD 30 MIN: CPT | Performed by: FAMILY MEDICINE

## 2023-02-10 ASSESSMENT — FIBROSIS 4 INDEX: FIB4 SCORE: 0.66

## 2023-02-10 NOTE — ASSESSMENT & PLAN NOTE
Pt states that falling asleep quickly with seroquel, however he is waking up in the middle of the night to urinate twice. He notes that this is disrupting his sleep quality  He has stopped using tube feeds and only doing PO intake  He admits still drinking a lot of water at night. Trying to keep fluid intake at earlier part of the day, but still finds himself drinking fluids at the end of the day.

## 2023-02-12 PROBLEM — Z93.1 GASTROINTESTINAL TUBE PRESENT (HCC): Status: ACTIVE | Noted: 2023-02-12

## 2023-02-12 NOTE — ASSESSMENT & PLAN NOTE
Pt is no longer taking any feeds via G tube and he is interested in having it removed. He has seen speech therapy and have okay'd his eating/drinking PO. They have ordered a barium swallow study. He eats and drinks well without coughing/choking

## 2023-02-12 NOTE — PROGRESS NOTES
Subjective:     CC: No chief complaint on file.        HPI:     Atrial fibrillation ([facility])  Successful cardioversion done   Has follow up with cardiology    Poor sleep  Pt states that falling asleep quickly with seroquel, however he is waking up in the middle of the night to urinate twice. He notes that this is disrupting his sleep quality  He has stopped using tube feeds and only doing PO intake  He admits still drinking a lot of water at night. Trying to keep fluid intake at earlier part of the day, but still finds himself drinking fluids at the end of the day.     Gastrointestinal tube present ([facility])  Pt is no longer taking any feeds via G tube and he is interested in having it removed. He has seen speech therapy and have okay'd his eating/drinking PO. They have ordered a barium swallow study. He eats and drinks well without coughing/choking    Past Medical History:   Diagnosis Date    A-fib ([facility]) 01/24/2023    medicated    ASTHMA 01/24/2023    doesn't use inhalers    Breath shortness 01/24/2023    with exertion    Dental disorder 01/24/2023    broken upper front 2 teeth, missing teeth upper and lower    Eczema     Gastrostomy tube in place ([facility]) 01/24/2023    parenteral feeding, but eats food orally    Hemorrhagic disorder ([facility]) 01/24/2023    taking Xarelto    Hypertension 01/24/2023    medicated    Pneumonia 01/24/2023    history of 10/22    Snoring 01/24/2023       Social History     Tobacco Use    Smoking status: Every Day     Packs/day: 0.33     Years: 25.00     Pack years: 8.25     Types: Cigarettes    Smokeless tobacco: Never   Vaping Use    Vaping Use: Never used   Substance Use Topics    Alcohol use: Not Currently     Comment: Quit 10/22    Drug use: Yes     Types: Marijuana, Inhaled     Comment: Hx of methamphetamin quit years ago, marijuana stopped 2 weeks ago       Current Outpatient Medications Ordered in Epic   Medication Sig Dispense Refill    levothyroxine (SYNTHROID) 50 MCG Tab 50 mcg by Enteral  "Tube route every morning on an empty stomach.      QUEtiapine (SEROQUEL) 50 MG tablet Take 1 Tablet by mouth every day. 30 Tablet 3    apixaban (ELIQUIS) 5mg Tab Take 1 Tablet by mouth 2 times a day. 60 Tablet 11    Thiamine HCl (VITAMIN B-1 PO) Take 1 Tablet by mouth every day.      losartan (COZAAR) 25 MG Tab 0.5 Tablets by Enteral Tube route every day for 60 days. 50 Tablet 3    metoprolol tartrate (LOPRESSOR) 50 MG Tab Take 1 Tablet by mouth 2 times a day. 180 Tablet 3    triamcinolone acetonide (KENALOG) 0.1 % Cream Apply 1 Application topically 2 times a day. Mix with lotion and apply to entire body excluding face and genitalia. 454 g 0    Nutritional Supplements (NOVASOURCE RENAL) Liquid 250 mL by Percutaneous Endoscopic Gastrostomy route 4 times a day. 237 mL 11    Misc. Devices Misc Blood pressure cuff and machine 1 Each 0     No current [facility]-ordered facility-administered medications on file.       Allergies:  Patient has no known allergies.        Objective:       Exam:  /62 (BP Location: Left arm, Patient Position: Sitting, BP Cuff Size: Adult)   Pulse 65   Temp 36.1 °C (97 °F) (Temporal)   Resp 19   Ht 1.778 m (5' 10\")   Wt 89.4 kg (197 lb 3.2 oz)   SpO2 97%   BMI 28.30 kg/m²  Body mass index is 28.3 kg/m².    Constitutional: Alert, no distress, well-groomed.  Respiratory: Unlabored respiratory effort, no cough.  MSK: moves all extremities.  Psych: Alert and oriented x3, normal affect and mood.          Labs:   Reviewed notes from cardioversion    Assessment & Plan:     57 y.o. male with the following -     1. Persistent atrial fibrillation (HCC)  F/u with cards scheduled    2. Poor sleep  Again encourage trying to get fluids in before dinner time to see if this might help with symptoms. He did not have other symptoms of BPA, but could consider a trial.     3. Gastrointestinal tube present (HCC)  - Referral to General Surgery  Will have swallow study at the end of the month. He is having no " issues with eating/drinking by mouth. Referral to surgery for eval for removal after his swallow study     4. Screening for colon cancer  - OCCULT BLOOD FECES IMMUNOASSAY; Future    Daughter and pt reminded to schedule for MRI of liver      Return in about 3 months (around 5/10/2023).    Please note that this dictation was created using voice recognition software. I have made every reasonable attempt to correct obvious errors, but I expect that there are errors of grammar and possibly content that I did not discover before finalizing the note.

## 2023-02-13 ENCOUNTER — APPOINTMENT (OUTPATIENT)
Dept: SPEECH THERAPY | Facility: REHABILITATION | Age: 58
End: 2023-02-13
Attending: FAMILY MEDICINE
Payer: COMMERCIAL

## 2023-02-14 ENCOUNTER — OFFICE VISIT (OUTPATIENT)
Dept: CARDIOLOGY | Facility: MEDICAL CENTER | Age: 58
End: 2023-02-14
Payer: COMMERCIAL

## 2023-02-14 VITALS
HEART RATE: 58 BPM | WEIGHT: 201 LBS | SYSTOLIC BLOOD PRESSURE: 118 MMHG | OXYGEN SATURATION: 99 % | RESPIRATION RATE: 16 BRPM | DIASTOLIC BLOOD PRESSURE: 62 MMHG | HEIGHT: 70 IN | BODY MASS INDEX: 28.77 KG/M2

## 2023-02-14 DIAGNOSIS — I50.31 ACUTE DIASTOLIC CONGESTIVE HEART FAILURE (HCC): ICD-10-CM

## 2023-02-14 DIAGNOSIS — F10.11 HISTORY OF ALCOHOL ABUSE: ICD-10-CM

## 2023-02-14 DIAGNOSIS — I48.19 PERSISTENT ATRIAL FIBRILLATION (HCC): ICD-10-CM

## 2023-02-14 DIAGNOSIS — I10 ESSENTIAL HYPERTENSION: ICD-10-CM

## 2023-02-14 DIAGNOSIS — F17.200 TOBACCO DEPENDENCE: ICD-10-CM

## 2023-02-14 DIAGNOSIS — R06.02 SOB (SHORTNESS OF BREATH): ICD-10-CM

## 2023-02-14 PROCEDURE — 99214 OFFICE O/P EST MOD 30 MIN: CPT | Performed by: INTERNAL MEDICINE

## 2023-02-14 ASSESSMENT — ENCOUNTER SYMPTOMS
RESPIRATORY NEGATIVE: 1
PND: 0
SORE THROAT: 0
MUSCULOSKELETAL NEGATIVE: 1
CHILLS: 0
EYES NEGATIVE: 1
CLAUDICATION: 0
DIZZINESS: 0
CONSTITUTIONAL NEGATIVE: 1
GASTROINTESTINAL NEGATIVE: 1
BRUISES/BLEEDS EASILY: 0
HEMOPTYSIS: 0
PALPITATIONS: 0
NEUROLOGICAL NEGATIVE: 1
WHEEZING: 0
ORTHOPNEA: 0
SPUTUM PRODUCTION: 0
CARDIOVASCULAR NEGATIVE: 1
STRIDOR: 0
FEVER: 0
COUGH: 0
LOSS OF CONSCIOUSNESS: 0
WEAKNESS: 0
SHORTNESS OF BREATH: 0

## 2023-02-14 ASSESSMENT — FIBROSIS 4 INDEX: FIB4 SCORE: 0.66

## 2023-02-14 NOTE — PROGRESS NOTES
Chief Complaint   Patient presents with    Hypertension     F/V dx: Essential hypertension       Subjective     Tyree Whiteside is a 57 y.o. male who presents today as a new consultation for atrial fibrillation and stage B heart failure.  He is a 57-year-old male who is admitted the hospital for what sounds like polysubstance abuse.  During his hospitalization he was in atrial fibrillation and started on beta-blockers.  His echocardiogram showed an EF of 40%.  He is now out of the hospital and is off of alcohol and illicit substances.  He only smokes 5 cigarettes/day.  He is having no chest pain or lower extremity edema.  He has good functional capacity and completed 340 m of his 6-minute walk test.  They check his blood pressure at home which is usually between 108 and 120 systolic.    Since he was last seen he underwent a cardioversion and remains in sinus rhythm.  Is been having no chest pain or shortness of breath.  He is essentially gone back to normal at this point.  He is scheduled for repeat echocardiogram in 2 months.  He has no lower extremity or chest pain.  His blood pressure is controlled.    Past Medical History:   Diagnosis Date    A-fib ([facility]) 01/24/2023    medicated    ASTHMA 01/24/2023    doesn't use inhalers    Breath shortness 01/24/2023    with exertion    Dental disorder 01/24/2023    broken upper front 2 teeth, missing teeth upper and lower    Eczema     Gastrostomy tube in place ([facility]) 01/24/2023    parenteral feeding, but eats food orally    Hemorrhagic disorder ([facility]) 01/24/2023    taking Xarelto    Hypertension 01/24/2023    medicated    Pneumonia 01/24/2023    history of 10/22    Snoring 01/24/2023     Past Surgical History:   Procedure Laterality Date    HI UPPER GI ENDOSCOPY,DIAGNOSIS N/A 11/17/2022    Procedure: GASTROSCOPY;  Surgeon: Sebastian Mcgrath M.D.;  Location: SURGERY [facility];  Service: Gastroenterology    HI PLACE PERCUT GASTROSTOMY TUBE N/A 11/17/2022     Procedure: EGD, WITH PEG TUBE INSERTION;  Surgeon: Sebastian Mcgrath M.D.;  Location: SURGERY [facility];  Service: Gastroenterology     Family History   Problem Relation Age of Onset    Heart Disease Father     Stroke Father     Hypertension Brother     Cancer Brother         has 12 sibs, one brother had an unknown type of ca     Social History     Socioeconomic History    Marital status:      Spouse name: Not on file    Number of children: Not on file    Years of education: Not on file    Highest education level: Not on file   Occupational History    Not on file   Tobacco Use    Smoking status: Every Day     Packs/day: 0.33     Years: 25.00     Pack years: 8.25     Types: Cigarettes    Smokeless tobacco: Never   Vaping Use    Vaping Use: Never used   Substance and Sexual Activity    Alcohol use: Not Currently     Comment: Quit 10/22    Drug use: Not Currently     Types: Marijuana, Inhaled     Comment: Hx of methamphetamin quit years ago, marijuana stopped 2 weeks ago    Sexual activity: Yes   Other Topics Concern    Not on file   Social History Narrative    Not on file     Social Determinants of Health     Financial Resource Strain: Not on file   Food Insecurity: Not on file   Transportation Needs: Not on file   Physical Activity: Not on file   Stress: Not on file   Social Connections: Not on file   Intimate Partner Violence: Not on file   Housing Stability: Not on file     No Known Allergies  Outpatient Encounter Medications as of 2/14/2023   Medication Sig Dispense Refill    levothyroxine (SYNTHROID) 50 MCG Tab 50 mcg by Enteral Tube route every morning on an empty stomach.      QUEtiapine (SEROQUEL) 50 MG tablet Take 1 Tablet by mouth every day. 30 Tablet 3    apixaban (ELIQUIS) 5mg Tab Take 1 Tablet by mouth 2 times a day. 60 Tablet 11    Thiamine HCl (VITAMIN B-1 PO) Take 1 Tablet by mouth every day.      losartan (COZAAR) 25 MG Tab 0.5 Tablets by Enteral Tube route every day for 60 days. 50 Tablet  "3    metoprolol tartrate (LOPRESSOR) 50 MG Tab Take 1 Tablet by mouth 2 times a day. 180 Tablet 3    triamcinolone acetonide (KENALOG) 0.1 % Cream Apply 1 Application topically 2 times a day. Mix with lotion and apply to entire body excluding face and genitalia. 454 g 0    Nutritional Supplements (NOVASOURCE RENAL) Liquid 250 mL by Percutaneous Endoscopic Gastrostomy route 4 times a day. 237 mL 11    Misc. Devices Misc Blood pressure cuff and machine 1 Each 0     No facility-administered encounter medications on file as of 2/14/2023.     Review of Systems   Constitutional: Negative.  Negative for chills, fever and malaise/fatigue.   HENT: Negative.  Negative for sore throat.    Eyes: Negative.    Respiratory: Negative.  Negative for cough, hemoptysis, sputum production, shortness of breath, wheezing and stridor.    Cardiovascular: Negative.  Negative for chest pain, palpitations, orthopnea, claudication, leg swelling and PND.   Gastrointestinal: Negative.    Genitourinary: Negative.    Musculoskeletal: Negative.    Skin: Negative.    Neurological: Negative.  Negative for dizziness, loss of consciousness and weakness.   Endo/Heme/Allergies: Negative.  Does not bruise/bleed easily.   All other systems reviewed and are negative.           Objective     /62 (BP Location: Left arm, Patient Position: Sitting, BP Cuff Size: Adult)   Pulse (!) 58   Resp 16   Ht 1.778 m (5' 10\")   Wt 91.2 kg (201 lb)   SpO2 99%   BMI 28.84 kg/m²     Physical Exam  Vitals and nursing note reviewed.   Constitutional:       General: He is not in acute distress.     Appearance: He is well-developed. He is not diaphoretic.   HENT:      Head: Normocephalic and atraumatic.      Right Ear: External ear normal.      Left Ear: External ear normal.      Nose: Nose normal.      Mouth/Throat:      Pharynx: No oropharyngeal exudate.   Eyes:      General: No scleral icterus.        Right eye: No discharge.         Left eye: No discharge.      " Conjunctiva/sclera: Conjunctivae normal.      Pupils: Pupils are equal, round, and reactive to light.   Neck:      Vascular: No JVD.   Cardiovascular:      Rate and Rhythm: Normal rate. Rhythm irregular.      Heart sounds: No murmur heard.    No friction rub. No gallop.   Pulmonary:      Effort: Pulmonary effort is normal. No respiratory distress.      Breath sounds: No stridor. No wheezing or rales.   Chest:      Chest wall: No tenderness.   Abdominal:      General: There is no distension.      Palpations: Abdomen is soft.      Tenderness: There is no guarding.   Musculoskeletal:         General: No tenderness or deformity. Normal range of motion.      Cervical back: Neck supple.   Skin:     General: Skin is warm and dry.      Coloration: Skin is not pale.      Findings: No erythema or rash.   Neurological:      Mental Status: He is alert.      Cranial Nerves: No cranial nerve deficit.      Motor: No abnormal muscle tone.      Coordination: Coordination normal.      Deep Tendon Reflexes: Reflexes are normal and symmetric. Reflexes normal.   Psychiatric:         Behavior: Behavior normal.         Thought Content: Thought content normal.         Judgment: Judgment normal.              Assessment & Plan     1. Essential hypertension        2. History of alcohol abuse        3. SOB (shortness of breath)        4. Tobacco dependence        5. Persistent atrial fibrillation (HCC)        6. Acute diastolic congestive heart failure (HCC)            Medical Decision Making: Today's Assessment/Status/Plan:        57-year-old male with stage B heart failure likely from a combination polysubstance abuse as well as potentially atrial fibrillation with rapid ventricular response.  I will await the repeat echocardiogram scheduled in 1 month.  Otherwise we will see him back in 3 months.  If his EF is normalized and has been in sinus rhythm we will discuss stopping his oral anticoagulant.  I recommend he stay on metoprolol losartan  in the meantime.

## 2023-02-15 ENCOUNTER — APPOINTMENT (OUTPATIENT)
Dept: SPEECH THERAPY | Facility: REHABILITATION | Age: 58
End: 2023-02-15
Attending: FAMILY MEDICINE
Payer: COMMERCIAL

## 2023-02-20 ENCOUNTER — APPOINTMENT (OUTPATIENT)
Dept: SPEECH THERAPY | Facility: REHABILITATION | Age: 58
End: 2023-02-20
Attending: FAMILY MEDICINE
Payer: COMMERCIAL

## 2023-02-20 PROCEDURE — RXMED WILLOW AMBULATORY MEDICATION CHARGE: Performed by: INTERNAL MEDICINE

## 2023-02-22 ENCOUNTER — PHARMACY VISIT (OUTPATIENT)
Dept: PHARMACY | Facility: MEDICAL CENTER | Age: 58
End: 2023-02-22
Payer: MEDICARE

## 2023-02-22 ENCOUNTER — APPOINTMENT (OUTPATIENT)
Dept: SPEECH THERAPY | Facility: REHABILITATION | Age: 58
End: 2023-02-22
Attending: FAMILY MEDICINE
Payer: COMMERCIAL

## 2023-02-28 ENCOUNTER — APPOINTMENT (OUTPATIENT)
Dept: RADIOLOGY | Facility: MEDICAL CENTER | Age: 58
End: 2023-02-28
Attending: FAMILY MEDICINE
Payer: COMMERCIAL

## 2023-03-07 ENCOUNTER — HOSPITAL ENCOUNTER (OUTPATIENT)
Dept: RADIOLOGY | Facility: MEDICAL CENTER | Age: 58
End: 2023-03-07
Attending: FAMILY MEDICINE
Payer: COMMERCIAL

## 2023-03-07 DIAGNOSIS — R13.11 ORAL PHASE DYSPHAGIA: ICD-10-CM

## 2023-03-07 DIAGNOSIS — R13.12 OROPHARYNGEAL DYSPHAGIA: ICD-10-CM

## 2023-03-07 PROCEDURE — 74230 X-RAY XM SWLNG FUNCJ C+: CPT

## 2023-03-07 PROCEDURE — 92611 MOTION FLUOROSCOPY/SWALLOW: CPT | Performed by: SPEECH-LANGUAGE PATHOLOGIST

## 2023-03-07 NOTE — OP THERAPY EVALUATION
"[facility]  Outpatient Modified Barium Swallow Study           Patient Name: Tyree Whiteside  Date of Evaluation: 03/07/2023  Referring Provider: Deb Clayton M.D.  Fax: [phone]        Patient History/Reason for Referral  This is a 58 y/o male who was referred for a Modified Barium Swallow Study (MBSS) by his OP SLP.   Pt is s/p prolonged intubation and multiple extubations during medically involved detox process. Patient was intubated for 16 days, and had PEG placed 11/17. MBSS from 11/23/22 with the following impressions:     \"The pt presents with a moderate-severe oropharyngeal swallow, likely acute related to prolonged intubation status. Swallowing function has improved from previous diagnostics however pt is still at high risk for aspiration with PO diet. Results and recommendations discussed with pt and his daughter and they verbalized understanding. \"     Patient is now completely off his PEG tube for about a month and feels things are going well at home. Per initial OP evaluation, \"patient presents with oropharyngeal dysphagia characterized by prolonged oral prep, and complaints of food being stuck in throat during meals at home with occasional reports of food going \"down the wrong pipe\".  Recommend continuation of regular diet with thin liquids using safe swallowing strategies and monitoring need for additional PEG feedings.  Recommend Modified Barium Swallow Study to rule out aspiration and recommend most appropriate diet for PO.\"      Oral Mechanism Evaluation  Facial Symmetry: Equal  Facial Sensation: Equal  Labial Observations: WFL  Lingual Observations: Midline  Dentition: Poor  Comments:      Voice  Quality: WFL  Resonance: WFL  Intensity: Appropriate  Cough: Perceptually weak  Comments:      Current Method of Nutrition   Oral diet (Regular solids/thin liquids)  Pt with a PEG tube but he reports he has not used it in over a month.       Pertinent " Information  Affect/Behavior: Appropriate, Calm  Oxygen Requirements: Room Air  Secretion Management: WNL      Subjective  Pt was alert and cooperative for evaluation. He expressed that he hoped today's study went well as he wants his PEG tube out.       Discussed with the risks, benefits, and alternatives of the MBSS procedure. Patient/family acknowledged and agreed to proceed.    Assessment  Videofluoroscopic Swallow Study was conducted in the lateral and AP projection(s) to evaluate oropharyngeal swallow function. A radiology tech was present to assist with the procedure.       Positioning: seated upright in fluoroscopy chair, standing (AP view)  Anatomic View: WNL  Bolus Administration: Patient  PO barium contrast trials: Varibar thin liquid, Varibar nectar (mildly thick) liquid, Varibar pudding, solid coated in Varibar pudding      Consistency PAS Score Timing Comments   Thin Liquid 2 During swallow Via single and consecutive sips from cup    Mildly Thick Liquid 2 During swallow Via single sips from cup   Pudding 1 N/A    Solid 1 N/A        Penetration-Aspiration Scale (PAS)  1     No contrast enters airway  2     Contrast enters the airway, remains above the vocal folds, and is ejected from the airway (not seen in the airway at the end of the swallow).  3     Contrast enters the airway, remains above the vocal folds, and is not ejected from the airway (is seen in the airway after the swallow).  4     Contrast enters the airway, contacts the vocal folds, and is ejected from the airway.  5     Contrast enters the airway, contacts the vocal folds, and is not ejected from the airway  6     Contrast enters the airway, crosses the plane of the vocal folds, and is ejected from the airway.  7     Contrast enters the airway, crosses the plane of the vocal folds, and is not ejected from the airway despite effort.  8     Contrast enters the airway, crosses the plane of the vocal folds, is not ejected from the airway and  there is no response to aspiration.        Oral phase:  Lip closure was characterized by complete labial seal.   Tongue control during bolus hold was cohesive bolus between tongue to palatal seal  Bolus preparation/mastication was slow prolonged chewing/mashing with complete re-collection.  Bolus transport/lingual motion was brisk.   Oral clearance was complete  Initiation of pharyngeal swallow was moderately delayed with bolus head at posterior laryngeal surface of epiglottis at first hyoid excursion.      Pharyngeal phase:  Soft palate elevation was complete with no bolus between soft palate (SP)/pharyngeal wall (PW).  Laryngeal elevation was partial for superior movement of thyroid cartilage/partial approximation of arytenoids to epiglottic petiole.  Anterior hyoid excursion was partial for anterior movement.  Epiglottic inversion was partial and only completed inverted to the weight of a heavier bolus.   Laryngeal vestibule closure was incomplete with narrow column air/contrast in laryngeal vestibule.  Pharyngeal stripping wave was present but diminished.    Pharyngeal contraction was characterized by unilateral bulging to the right.   Pharyngoesophageal Segment Opening was complete with complete distension and complete duration with no obstruction of flow.  Tongue base retraction was mildly decreased with trace column of contrast or air between TB and PW.  Pharyngeal residue was moderate with collection of residue within or on base of tongue, in the valleculae, posterior wall of pharynx, and in the pyriform sinuses.       Esophageal phase:  Esophageal clearance esophageal retention with retrograde flow below pharyngo- esophageal segment (PES).      Compensatory Strategies:  Double swallow was ineffective in clearing pharyngeal residue.   Left head turn was ineffective in clearing pharyngeal residue.   Right head turn was ineffective in clearing pharyngeal residue.   Chin tuck was effective in clearing majority  of pharyngeal residue.       Clinical Impressions  The pt presents with a mild oropharyngeal dysphagia and mild esophageal dysphagia. Due to his poor dentition, it was difficult for pt to chew but overall his oral phase of swallow was grossly WFL. Initiation of pharyngeal swallow was moderately decreased resulting in flash penetration with thin and mildly-thick liquids. His laryngeal elevation, anterior hyoid excursion, and tongue base retraction were all decreased for his age and resulted in moderate pharyngeal residue cleared mostly with a double swallow and chin tuck maneuver.     He demonstrated esophageal retention for the length of his esophagus with slowed movement and retrograde flow.       Recommendations  Continue regular solids and thin liquids.   2.  Swallowing Instructions & Precautions:   Supervision: Independent  Positioning: Fully upright and midline during oral intake, Remain upright for 30 minutes after oral intake  Medication: As tolerated  Strategies: Small bites/sips, Alternate bites and sips, Slow rate of intake, No straws, Multiple swallows (x 2) per bite/sip , Effortful swallow, Chin tuck  Oral Care: Q4h  3.  Skilled OP ST intervention continues to be warranted to address the above noted physiological deficits and use of compensatory strategies.   4.  Referral to GI is recommended at this time.         Thank you for the referral. For further questions, please call [phone].  Stephanie Logan MS, CCC-SLP

## 2023-03-16 ENCOUNTER — APPOINTMENT (OUTPATIENT)
Dept: RADIOLOGY | Facility: MEDICAL CENTER | Age: 58
End: 2023-03-16
Attending: FAMILY MEDICINE
Payer: COMMERCIAL

## 2023-03-20 PROCEDURE — RXMED WILLOW AMBULATORY MEDICATION CHARGE: Performed by: INTERNAL MEDICINE

## 2023-03-21 ENCOUNTER — PHARMACY VISIT (OUTPATIENT)
Dept: PHARMACY | Facility: MEDICAL CENTER | Age: 58
End: 2023-03-21
Payer: COMMERCIAL

## 2023-03-30 ENCOUNTER — SPEECH THERAPY (OUTPATIENT)
Dept: SPEECH THERAPY | Facility: REHABILITATION | Age: 58
End: 2023-03-30
Attending: FAMILY MEDICINE
Payer: COMMERCIAL

## 2023-03-30 DIAGNOSIS — R13.10 DYSPHAGIA, UNSPECIFIED TYPE: ICD-10-CM

## 2023-03-30 PROCEDURE — 92526 ORAL FUNCTION THERAPY: CPT

## 2023-03-30 NOTE — OP THERAPY DAILY TREATMENT
"  Outpatient Speech Therapy  DAILY TREATMENT     [facility] Speech Therapy [address]  [address]  [place] [address]  Phone:  [phone]  Fax:  [phone]    Date: 03/30/2023    Patient: Karson Whiteside  YOB: 1965  MRN: [id number]     Time Calculation    Start time: 0817  Stop time: 0846 Time Calculation (min): 29 minutes         Chief Complaint: Dysphagia    Visit #: 3    Subjective:   Reason for Therapy:     Reason For Evaluation:  Dysphagia (s/p prolonged intubation)  Social Support:     Social support system: daughter, Laura.    Patient Mental Status:  Alert and Responsive  Progress Factors:     Progression:  Getting Better  Therapy History:     Previous Diet:  NPO and Peg-Tube    Current Diet:  Regular Diet, Peg-Tube and Thin Liquids    Dentition:  Missing Dentition  Additional Subjective Comments:      Patient reported he is taking pills orally.  He reported he has not used tube since before evaluation on 1/30/23, and has been tolerating all PO with no s/sx aspiration or other issues.  MBS complete on 3/7 with the following impressions:    \"The pt presents with a mild oropharyngeal dysphagia and mild esophageal dysphagia. Due to his poor dentition, it was difficult for pt to chew but overall his oral phase of swallow was grossly WFL. Initiation of pharyngeal swallow was moderately decreased resulting in flash penetration with thin and mildly-thick liquids. His laryngeal elevation, anterior hyoid excursion, and tongue base retraction were all decreased for his age and resulted in moderate pharyngeal residue cleared mostly with a double swallow and chin tuck maneuver.      He demonstrated esophageal retention for the length of his esophagus with slowed movement and retrograde flow.     Objective:   Treatments/Interventions Performed:  Dysphagia treatment, Patient/Caregiver education, Compensatory strategy training and Home program  Objective Details:  1.  Patient will " improve safety in swallow implementing compensatory swallow strategies with minimal verbal cues during therapist directed trials and per patient report 85% accuracy.   --Patient verbalized understanding of safe swallowing strategies, with emphasis on small volumes and clearance of mouth and throat before taking next bite.   Reinforced use of chin tuck to clear residues from throat.  2.  Patient will improve safety in swallow completing structured exercise addressing oral, pharyngeal phases of swallow completing to 85% accuracy given min verbal cues.    --Patient reported he has not done exercises since last session.  Reviewed lingual exercises against resistance, as well as pharyngeal exercises with instructions to complete 2-3 times per day for the next 2 weeks.  3.  Patient will participate in MBSS to rule out aspiration and determine least restrictive, safest diet textures for PO intake.      --Complete.       Assessments    Speech Therapy Plan :   Prognosis & Recommendations  Impression Summary:  Patient actively participated in therapy activities.  Patient presents with oropharyngeal dysphagia, with improved tolerance of PO.  Recommend continuation of regular diet with thin liquids using safe swallowing strategies.  As Patient is no longer using PEG, he may be appropriate for removal.  Patient is scheduled for MBS to further assess swallow function. Patient would benefit from skilled speech therapy to provide education re: safe swallowing strategies and food modifications, as well as oral motor exercises to strengthen oral mechanism to improve safety of swallow.  Patient verbalized understanding and agreement with current plan of care.   Prognosis:  Good  Compensatory swallow strategies:  Upright position 90 degrees during meal and 45 minutes after meal, Reduce bolus size, Multiple swallows, Alternate liquids/solids and No talking while eating  Diet Recommendation:  Normal Consistency  Liquid Recommendation:   "Thin  Goals  Short Term Goals:  1.  Patient will improve safety in swallow implementing compensatory swallow strategies with minimal verbal cues during therapist directed trials and per patient report 85% accuracy.   2.  Patient will improve safety in swallow completing structured exercise addressing oral, pharyngeal phases of swallow completing to 85% accuracy given min verbal cues.          Short Term Goal Duration (Weeks):  2-4 weeks  Long Term Goals:  Patient to consume the least restrictive diet to maintain adequate nutrition/hydration 100% as evidenced by no overt signs or symptoms of aspiration.   Long Term Goal Duration (Weeks):  2-4 months  Patient Stated Goal:  \"PEG tube removed\"  Therapy Recommendations  Recommendation:  Individual Speech Therapy and Dysphagia Treatment,  Planned Therapy Interventions:  Patient/Caregiver Education, Dysphagia treatment, Home Program and Compensatory Strategy Training,   Frequency:  1x week               "

## 2023-04-11 ENCOUNTER — HOSPITAL ENCOUNTER (OUTPATIENT)
Dept: RADIOLOGY | Facility: MEDICAL CENTER | Age: 58
End: 2023-04-11
Attending: FAMILY MEDICINE
Payer: COMMERCIAL

## 2023-04-11 DIAGNOSIS — K76.9 LIVER DISEASE: ICD-10-CM

## 2023-04-11 PROCEDURE — 700102 HCHG RX REV CODE 250 W/ 637 OVERRIDE(OP): Performed by: RADIOLOGY

## 2023-04-11 PROCEDURE — A9270 NON-COVERED ITEM OR SERVICE: HCPCS | Performed by: RADIOLOGY

## 2023-04-11 PROCEDURE — 74183 MRI ABD W/O CNTR FLWD CNTR: CPT

## 2023-04-11 PROCEDURE — A9579 GAD-BASE MR CONTRAST NOS,1ML: HCPCS | Performed by: FAMILY MEDICINE

## 2023-04-11 PROCEDURE — 700117 HCHG RX CONTRAST REV CODE 255: Performed by: FAMILY MEDICINE

## 2023-04-11 RX ORDER — ALPRAZOLAM 1 MG/1
1 TABLET ORAL
Status: COMPLETED | OUTPATIENT
Start: 2023-04-11 | End: 2023-04-11

## 2023-04-11 RX ADMIN — GADOTERIDOL 20 ML: 279.3 INJECTION, SOLUTION INTRAVENOUS at 14:18

## 2023-04-11 RX ADMIN — ALPRAZOLAM 1 MG: 1 TABLET ORAL at 12:20

## 2023-04-11 NOTE — DISCHARGE INSTRUCTIONS
MRI ADULT DISCHARGE INSTRUCTIONS    You have been medicated today for your scan. Please follow the instructions below to ensure your safe recovery. If you have any questions or problems, feel free to call us at [phone] or [phone].     1.   Have someone stay with you to assist you as needed.    2.   Do not drive or operate any mechanical devices.    3.   Do not perform any activity that requires concentration. Make no major decisions over the next 24 hours.     4.   Be careful changing positions from laying down to sitting or standing, as you may become dizzy.     5.   Do not drink alcohol for 48 hours.    6.   There are no restrictions for eating your normal meals. Drink fluids.    7.   You may continue your usual medications for pain, tranquilizers, muscle relaxants or sedatives when awake.     8.   Tomorrow, you may continue your normal daily activities.     9.   Pressure dressing on 10 - 15 minutes. If swelling or bleeding occurs when removed, continue placing direct pressure on injection site for another 5 minutes, or until bleeding stops.     Alprazolam (XANAX) tablet  What is this medicine?  You were prescribed ALPRAZOLAM (al PRAY demetrius amos) for the procedure you had today. This medication is a benzodiazepine. It is used to treat anxiety and panic attacks.  This medicine may be used for other purposes; ask your health care provider or pharmacist if you have questions.  What side effects may I notice from receiving this medicine?  Side effects that you should report to your doctor or health care professional as soon as possible:  allergic reactions like skin rash, itching or hives, swelling of the face, lips, or tongue  confusion, forgetfulness  depression  difficulty sleeping  difficulty speaking  feeling faint or lightheaded, falls  mood changes, excitability or aggressive behavior  muscle cramps  trouble passing urine or change in the amount of urine  unusually weak or tired  Side effects that usually do not  require medical attention (report to your doctor or health care professional if they continue or are bothersome):  changes in appetite  change in sex drive or performance  This list may not describe all possible side effects. Call your doctor for medical advice about side effects. You may report side effects to FDA at 8-215-RJF-7776.        I have been informed of and understand the above discharge instructions.

## 2023-04-17 ENCOUNTER — HOSPITAL ENCOUNTER (OUTPATIENT)
Dept: CARDIOLOGY | Facility: MEDICAL CENTER | Age: 58
End: 2023-04-17
Attending: INTERNAL MEDICINE
Payer: COMMERCIAL

## 2023-04-17 DIAGNOSIS — I48.19 PERSISTENT ATRIAL FIBRILLATION (HCC): ICD-10-CM

## 2023-04-17 DIAGNOSIS — I50.31 ACUTE DIASTOLIC CONGESTIVE HEART FAILURE (HCC): ICD-10-CM

## 2023-04-17 DIAGNOSIS — F10.11 HISTORY OF ALCOHOL ABUSE: ICD-10-CM

## 2023-04-17 DIAGNOSIS — R06.02 SOB (SHORTNESS OF BREATH): ICD-10-CM

## 2023-04-17 PROCEDURE — 93306 TTE W/DOPPLER COMPLETE: CPT

## 2023-04-18 LAB
LV EJECT FRACT MOD 2C 99903: 55.52
LV EJECT FRACT MOD 4C 99902: 62.47
LV EJECT FRACT MOD BP 99901: 59.06

## 2023-04-18 PROCEDURE — 93306 TTE W/DOPPLER COMPLETE: CPT | Mod: 26 | Performed by: STUDENT IN AN ORGANIZED HEALTH CARE EDUCATION/TRAINING PROGRAM

## 2023-04-19 ENCOUNTER — SPEECH THERAPY (OUTPATIENT)
Dept: SPEECH THERAPY | Facility: REHABILITATION | Age: 58
End: 2023-04-19
Attending: FAMILY MEDICINE
Payer: COMMERCIAL

## 2023-04-19 DIAGNOSIS — R13.10 DYSPHAGIA, UNSPECIFIED TYPE: ICD-10-CM

## 2023-04-19 PROCEDURE — 92526 ORAL FUNCTION THERAPY: CPT

## 2023-04-19 PROCEDURE — RXMED WILLOW AMBULATORY MEDICATION CHARGE: Performed by: INTERNAL MEDICINE

## 2023-04-19 NOTE — OP THERAPY DAILY TREATMENT
Outpatient Speech Therapy  DAILY TREATMENT     [facility] Speech [address]  [address]  [place] [address]  Phone:  [phone]  Fax:  [phone]    Date: 04/19/2023    Patient: Karson Whiteside  YOB: 1965  MRN: [id number]     Time Calculation    Start time: 1033  Stop time: 1056 Time Calculation (min): 23 minutes         Chief Complaint: Dysphagia    Visit #: 4    Please refer to discharge summary

## 2023-04-19 NOTE — OP THERAPY DISCHARGE SUMMARY
Outpatient Speech Therapy  DISCHARGE SUMMARY NOTE      [facility] Speech Therapy [facility]  [address]  [address]  [place] [address]  Phone:  [phone]  Fax:  [phone]    Date of Visit: 04/19/2023    Patient: Karson Whiteside  YOB: 1965  MRN: [id number]     Referring Provider: Deb Clayton M.D.  [address]  [address]  [place] [address]   Referring Diagnosis: Dysphagia, unspecified [R13.10]     Visit #: 4    Time Calculation    Start time: 1033  Stop time: 1056 Time Calculation (min): 23 minutes           Chief Complaint: Dysphagia    Visit Diagnoses     ICD-10-CM   1. Dysphagia, unspecified type  R13.10       Subjective:   Reason for Therapy:     Reason For Evaluation:  Dysphagia (s/p prolonged intubation)  Social Support:     Social support system: daughter, Laura.    Patient Mental Status:  Alert and Responsive  Progress Factors:     Progression:  Getting Better  Therapy History:     Previous Diet:  NPO and Peg-Tube    Current Diet:  Regular Diet, Peg-Tube and Thin Liquids    Dentition:  Missing Dentition  Additional Subjective Comments:      Patient reported he is taking pills orally.  He reported he has not used tube since before evaluation on 1/30/23, and has been tolerating all PO with no s/sx aspiration or other issues.  He reported he has been completing dysphagia exercises daily.    Objective:   Treatments/Interventions Performed:  Dysphagia treatment, Patient/Caregiver education, Compensatory strategy training and Home program  Objective Details:  1.  Patient will improve safety in swallow implementing compensatory swallow strategies with minimal verbal cues during therapist directed trials and per patient report 85% accuracy.   --Patient verbalized understanding of safe swallowing strategies, with emphasis on small volumes and clearance of mouth and throat before taking next bite.   Reinforced use of chin tuck to clear residues from throat.  Patient reported he has been  "following strategies.  2.  Patient will improve safety in swallow completing structured exercise addressing oral, pharyngeal phases of swallow completing to 85% accuracy given min verbal cues.    --Patient was able to demonstrate all exercises without referring to instruction papers.  Patient reported he has been doing exercises daily, and has noticed improvement in ability to clear his throat.        Assessments    Speech Therapy Plan :   Prognosis & Recommendations  Impression Summary:  Patient actively participated in therapy activities.  Patient presents with mild oropharyngeal dysphagia, with improved tolerance of PO.  Recommend continuation of regular diet with thin liquids using safe swallowing strategies.  As Patient is no longer using PEG and is tolerating nutrition/.hydration 100% PO, he may be appropriate for PEG removal. Goals for therapy have been met and Patient feels ready to discharge at this time.  Patient was instructed to contact doctor if new symptoms arise for new speech therapy referral.    Prognosis:  Good  Compensatory swallow strategies:  Upright position 90 degrees during meal and 45 minutes after meal, Reduce bolus size, Multiple swallows, Alternate liquids/solids and No talking while eating  Diet Recommendation:  Normal Consistency  Liquid Recommendation:  Thin  Goals  Short Term Goals:  1.  Patient will improve safety in swallow implementing compensatory swallow strategies with minimal verbal cues during therapist directed trials and per patient report 85% accuracy.   2.  Patient will improve safety in swallow completing structured exercise addressing oral, pharyngeal phases of swallow completing to 85% accuracy given min verbal cues.          Long Term Goals:  Patient to consume the least restrictive diet to maintain adequate nutrition/hydration 100% as evidenced by no overt signs or symptoms of aspiration.   Patient Stated Goal:  \"PEG tube removed\"  Therapy " Recommendations  Recommendation:  No further speech therapy indicated at this time,  Planned Therapy Interventions:  Home Program,

## 2023-04-21 ENCOUNTER — PHARMACY VISIT (OUTPATIENT)
Dept: PHARMACY | Facility: MEDICAL CENTER | Age: 58
End: 2023-04-21
Payer: MEDICARE

## 2023-04-24 ENCOUNTER — OFFICE VISIT (OUTPATIENT)
Dept: CARDIOLOGY | Facility: MEDICAL CENTER | Age: 58
End: 2023-04-24
Payer: COMMERCIAL

## 2023-04-24 ENCOUNTER — PATIENT MESSAGE (OUTPATIENT)
Dept: CARDIOLOGY | Facility: MEDICAL CENTER | Age: 58
End: 2023-04-24

## 2023-04-24 VITALS
SYSTOLIC BLOOD PRESSURE: 122 MMHG | HEIGHT: 70 IN | WEIGHT: 225 LBS | RESPIRATION RATE: 16 BRPM | BODY MASS INDEX: 32.21 KG/M2 | HEART RATE: 55 BPM | OXYGEN SATURATION: 98 % | DIASTOLIC BLOOD PRESSURE: 80 MMHG

## 2023-04-24 DIAGNOSIS — I10 ESSENTIAL HYPERTENSION: ICD-10-CM

## 2023-04-24 DIAGNOSIS — I48.19 PERSISTENT ATRIAL FIBRILLATION (HCC): ICD-10-CM

## 2023-04-24 DIAGNOSIS — F17.200 TOBACCO DEPENDENCE: ICD-10-CM

## 2023-04-24 DIAGNOSIS — I42.9 CARDIOMYOPATHY, UNSPECIFIED TYPE (HCC): ICD-10-CM

## 2023-04-24 PROCEDURE — 99214 OFFICE O/P EST MOD 30 MIN: CPT | Performed by: INTERNAL MEDICINE

## 2023-04-24 PROCEDURE — RXMED WILLOW AMBULATORY MEDICATION CHARGE: Performed by: INTERNAL MEDICINE

## 2023-04-24 RX ORDER — LOSARTAN POTASSIUM 25 MG/1
25 TABLET ORAL DAILY
Qty: 90 TABLET | Refills: 30 | Status: SHIPPED | OUTPATIENT
Start: 2023-04-24 | End: 2023-05-10 | Stop reason: SDUPTHER

## 2023-04-24 RX ORDER — ASPIRIN 81 MG
TABLET,CHEWABLE ORAL
COMMUNITY
Start: 2023-02-21 | End: 2023-04-24

## 2023-04-24 RX ORDER — LOSARTAN POTASSIUM 25 MG/1
TABLET ORAL
COMMUNITY
End: 2023-04-24 | Stop reason: SDUPTHER

## 2023-04-24 ASSESSMENT — ENCOUNTER SYMPTOMS
DECREASED APPETITE: 0
BACK PAIN: 0
NEAR-SYNCOPE: 0
DIARRHEA: 0
CONSTIPATION: 0
DIZZINESS: 0
SHORTNESS OF BREATH: 0
IRREGULAR HEARTBEAT: 0
WEIGHT GAIN: 0
HEARTBURN: 0
CLAUDICATION: 0
COUGH: 0
BLURRED VISION: 0
DEPRESSION: 0
DYSPNEA ON EXERTION: 0
WEIGHT LOSS: 0
NAUSEA: 0
PALPITATIONS: 0
SYNCOPE: 0
FEVER: 0
ABDOMINAL PAIN: 0
PND: 0
ORTHOPNEA: 0
FLANK PAIN: 0
ALTERED MENTAL STATUS: 0
VOMITING: 0

## 2023-04-24 ASSESSMENT — FIBROSIS 4 INDEX: FIB4 SCORE: 0.66

## 2023-04-24 NOTE — PATIENT COMMUNICATION
Is the patient due for a refill? Yes    Was the patient seen the past year? Yes    Date of last office visit: 4/24/2023    Does the patient have an upcoming appointment?  Yes   If yes, When? 10/25/2023    Provider to refill:VR    Does the patients insurance require a 100 day supply?  No

## 2023-04-24 NOTE — PROGRESS NOTES
Cardiology Note    Chief Complaint   Patient presents with    Hypertension     F/V Dx: Essential hypertension       History of Present Illness: Tyree Whiteside is a 57 y.o. male who presents for follow up. Prior patient of Dr Rajput followed for HTN, atrial fibrillation s/p DCCV 2/7/23, HFrEF. Noted history of polysubstance abuse and active tobacco.     Feeling well. Denies active cardiac complaints. Compliant with medications and denies adverse effects. Remains sober; alcohol about 6 months and methamphetamines for years he says. Is active and walks without cadiovascular limitations.     Review of Systems   Constitutional: Negative for decreased appetite, fever, malaise/fatigue, weight gain and weight loss.   HENT:  Negative for congestion and nosebleeds.    Eyes:  Negative for blurred vision.   Cardiovascular:  Negative for chest pain, claudication, dyspnea on exertion, irregular heartbeat, leg swelling, near-syncope, orthopnea, palpitations, paroxysmal nocturnal dyspnea and syncope.   Respiratory:  Negative for cough and shortness of breath.    Endocrine: Negative for cold intolerance and heat intolerance.   Skin:  Negative for rash.   Musculoskeletal:  Negative for back pain.   Gastrointestinal:  Negative for abdominal pain, constipation, diarrhea, heartburn, melena, nausea and vomiting.   Genitourinary:  Negative for dysuria, flank pain and hematuria.   Neurological:  Negative for dizziness.   Psychiatric/Behavioral:  Negative for altered mental status and depression.        Past Medical History:   Diagnosis Date    A-fib ([facility]) 01/24/2023    medicated    ASTHMA 01/24/2023    doesn't use inhalers    Breath shortness 01/24/2023    with exertion    Dental disorder 01/24/2023    broken upper front 2 teeth, missing teeth upper and lower    Eczema     Gastrostomy tube in place ([facility]) 01/24/2023    parenteral feeding, but eats food orally    Hemorrhagic disorder ([facility]) 01/24/2023    taking Xarelto     Hypertension 01/24/2023    medicated    Pneumonia 01/24/2023    history of 10/22    Snoring 01/24/2023         Past Surgical History:   Procedure Laterality Date    AK UPPER GI ENDOSCOPY,DIAGNOSIS N/A 11/17/2022    Procedure: GASTROSCOPY;  Surgeon: Sebastian Mcgrath M.D.;  Location: SURGERY [facility];  Service: Gastroenterology    AK PLACE PERCUT GASTROSTOMY TUBE N/A 11/17/2022    Procedure: EGD, WITH PEG TUBE INSERTION;  Surgeon: Sebastian Mcgrath M.D.;  Location: SURGERY [facility];  Service: Gastroenterology         Current Outpatient Medications   Medication Sig Dispense Refill    losartan (COZAAR) 25 MG Tab       levothyroxine (SYNTHROID) 50 MCG Tab 50 mcg by Enteral Tube route every morning on an empty stomach.      QUEtiapine (SEROQUEL) 50 MG tablet Take 1 Tablet by mouth every day. 30 Tablet 3    apixaban (ELIQUIS) 5mg Tab Take 1 Tablet by mouth 2 times a day. 60 Tablet 11    Thiamine HCl (VITAMIN B-1 PO) Take 1 Tablet by mouth every day.      metoprolol tartrate (LOPRESSOR) 50 MG Tab Take 1 Tablet by mouth 2 times a day. 180 Tablet 3    triamcinolone acetonide (KENALOG) 0.1 % Cream Apply 1 Application topically 2 times a day. Mix with lotion and apply to entire body excluding face and genitalia. 454 g 0    Nutritional Supplements (NOVASOURCE RENAL) Liquid 250 mL by Percutaneous Endoscopic Gastrostomy route 4 times a day. 237 mL 11    Misc. Devices Misc Blood pressure cuff and machine 1 Each 0     No current facility-administered medications for this visit.         No Known Allergies      Family History   Problem Relation Age of Onset    Heart Disease Father     Stroke Father     Hypertension Brother     Cancer Brother         has 12 sibs, one brother had an unknown type of ca         Social History     Socioeconomic History    Marital status:      Spouse name: Not on file    Number of children: Not on file    Years of education: Not on file    Highest education level: Not on file  "  Occupational History    Not on file   Tobacco Use    Smoking status: Every Day     Packs/day: 0.33     Years: 25.00     Pack years: 8.25     Types: Cigarettes    Smokeless tobacco: Never    Tobacco comments:     1-2 cigarettes a day   Vaping Use    Vaping Use: Never used   Substance and Sexual Activity    Alcohol use: Not Currently     Comment: Quit 10/22    Drug use: Not Currently     Types: Marijuana, Inhaled     Comment: Hx of methamphetamin quit years ago, marijuana stopped 2 weeks ago    Sexual activity: Yes   Other Topics Concern    Not on file   Social History Narrative    Not on file     Social Determinants of Health     Financial Resource Strain: Not on file   Food Insecurity: Not on file   Transportation Needs: Not on file   Physical Activity: Not on file   Stress: Not on file   Social Connections: Not on file   Intimate Partner Violence: Not on file   Housing Stability: Not on file         Physical Exam:  Ambulatory Vitals  /80 (BP Location: Left arm, Patient Position: Sitting, BP Cuff Size: Adult)   Pulse (!) 55   Resp 16   Ht 1.778 m (5' 10\")   Wt 102 kg (225 lb)   SpO2 98%    BP Readings from Last 4 Encounters:   04/24/23 122/80   02/14/23 118/62   02/10/23 108/62   02/07/23 118/72     Weight/BMI:   Vitals:    04/24/23 0938   BP: 122/80   Weight: 102 kg (225 lb)   Height: 1.778 m (5' 10\")    Body mass index is 32.28 kg/m².  Wt Readings from Last 4 Encounters:   04/24/23 102 kg (225 lb)   02/14/23 91.2 kg (201 lb)   02/10/23 89.4 kg (197 lb 3.2 oz)   02/07/23 87.9 kg (193 lb 12.6 oz)       Physical Exam  Constitutional:       General: He is not in acute distress.  HENT:      Head: Normocephalic and atraumatic.   Eyes:      Conjunctiva/sclera: Conjunctivae normal.      Pupils: Pupils are equal, round, and reactive to light.   Neck:      Vascular: No JVD.   Cardiovascular:      Rate and Rhythm: Normal rate and regular rhythm.      Heart sounds: Normal heart sounds. No murmur heard.    No " friction rub. No gallop.   Pulmonary:      Effort: Pulmonary effort is normal. No respiratory distress.      Breath sounds: Normal breath sounds. No wheezing or rales.   Chest:      Chest wall: No tenderness.   Abdominal:      General: Bowel sounds are normal. There is no distension.      Palpations: Abdomen is soft.   Musculoskeletal:      Cervical back: Normal range of motion and neck supple.   Skin:     General: Skin is warm and dry.   Neurological:      Mental Status: He is alert and oriented to person, place, and time.   Psychiatric:         Mood and Affect: Affect normal.         Judgment: Judgment normal.       Lab Data Review:  Lab Results   Component Value Date/Time    CHOLSTRLTOT 68 (L) 10/28/2022 11:48 AM    LDL 67 06/24/2022 03:27 PM    HDL 36 (A) 06/24/2022 03:27 PM    TRIGLYCERIDE 126 10/31/2022 03:52 AM       Lab Results   Component Value Date/Time    SODIUM 139 02/03/2023 09:50 AM    POTASSIUM 4.4 02/03/2023 09:50 AM    CHLORIDE 101 02/03/2023 09:50 AM    CO2 22 02/03/2023 09:50 AM    GLUCOSE 75 02/03/2023 09:50 AM    BUN 32 (H) 02/03/2023 09:50 AM    CREATININE 0.81 02/03/2023 09:50 AM     CrCl cannot be calculated (Patient's most recent lab result is older than the maximum 7 days allowed.).  Lab Results   Component Value Date/Time    ALKPHOSPHAT 83 02/03/2023 09:50 AM    ASTSGOT 18 02/03/2023 09:50 AM    ALTSGPT 25 02/03/2023 09:50 AM    TBILIRUBIN 0.4 02/03/2023 09:50 AM      Lab Results   Component Value Date/Time    WBC 7.4 02/03/2023 09:50 AM     Lab Results   Component Value Date/Time    HBA1C 5.2 06/24/2022 03:27 PM     No components found for: TROP      Cardiac Imaging and Procedures Review:    EKG 2/7/23 sinus, late r/s transition  EKG 1/16/23 AF rate 63 bpm    TTE 10/2022  CONCLUSIONS  Compared to the images of the prior study 10/10/2022, there has been no   significant change. The right ventricular systolic pressure could not   be estimated on the prior exam.  Mildly reduced left  ventricular systolic function.  The left ventricular ejection fraction is visually estimated to be 40%.  Global hypokinesis.  Diastolic function is difficult to assess with arrhythmia.  Reduced right ventricular systolic function.  Mild mitral regurgitation.  Moderate tricuspid regurgitation.  Estimated right ventricular systolic pressure is 55 mmHg.    TTE 4/17/23  CONCLUSIONS  Normal left ventricular systolic function. The left ventricular   ejection fraction is visually estimated to be 55%.   The right ventricle is normal in size and systolic function.  No significant valvular abnormalities.   Compared to the prior study on 10/28/2022, LVEF has improved.         Medical Decision Making:  Problem List Items Addressed This Visit       Essential hypertension    Relevant Medications    losartan (COZAAR) 25 MG Tab    Tobacco dependence    Atrial fibrillation (HCC)    Relevant Medications    losartan (COZAAR) 25 MG Tab    Other Relevant Orders    Cardiac Event Monitor    Cardiomyopathy (HCC)    Relevant Medications    losartan (COZAAR) 25 MG Tab     HFrecEF likely nicm related to substance abuse vs tachyarrhythmia induced, NYHA I, stage C - continue BB and ARB. Stable.     Paroxysmal AF - chadsvasc 1. Discussed with patient and through shared decision making elect to continue doac for cva prevention. Continue rate control.    HTN - controlled. Goal 120/80. Continue regimen.     It was my pleasure to meet with Mr. Whiteside.

## 2023-04-25 ENCOUNTER — PHARMACY VISIT (OUTPATIENT)
Dept: PHARMACY | Facility: MEDICAL CENTER | Age: 58
End: 2023-04-25
Payer: MEDICARE

## 2023-05-01 ENCOUNTER — NON-PROVIDER VISIT (OUTPATIENT)
Dept: CARDIOLOGY | Facility: MEDICAL CENTER | Age: 58
End: 2023-05-01
Attending: INTERNAL MEDICINE
Payer: COMMERCIAL

## 2023-05-01 DIAGNOSIS — I48.19 PERSISTENT ATRIAL FIBRILLATION (HCC): ICD-10-CM

## 2023-05-01 DIAGNOSIS — I47.10 SVT (SUPRAVENTRICULAR TACHYCARDIA) (HCC): ICD-10-CM

## 2023-05-01 NOTE — PROGRESS NOTES
Patient enrolled in the 14 day ePatch Holter monitoring program per González Barber MD.  >Office hook-up, serial # [id number].  >Currently pending EOS.

## 2023-05-10 ENCOUNTER — OFFICE VISIT (OUTPATIENT)
Dept: MEDICAL GROUP | Facility: MEDICAL CENTER | Age: 58
End: 2023-05-10
Attending: FAMILY MEDICINE
Payer: COMMERCIAL

## 2023-05-10 VITALS
BODY MASS INDEX: 32.64 KG/M2 | RESPIRATION RATE: 15 BRPM | HEART RATE: 58 BPM | HEIGHT: 70 IN | SYSTOLIC BLOOD PRESSURE: 128 MMHG | TEMPERATURE: 97.6 F | WEIGHT: 228 LBS | OXYGEN SATURATION: 92 % | DIASTOLIC BLOOD PRESSURE: 74 MMHG

## 2023-05-10 DIAGNOSIS — M62.89 MUSCLE STIFFNESS: ICD-10-CM

## 2023-05-10 DIAGNOSIS — E03.8 OTHER SPECIFIED HYPOTHYROIDISM: ICD-10-CM

## 2023-05-10 DIAGNOSIS — E66.09 CLASS 1 OBESITY DUE TO EXCESS CALORIES WITH SERIOUS COMORBIDITY AND BODY MASS INDEX (BMI) OF 32.0 TO 32.9 IN ADULT: ICD-10-CM

## 2023-05-10 DIAGNOSIS — I10 ESSENTIAL HYPERTENSION: ICD-10-CM

## 2023-05-10 DIAGNOSIS — Z12.11 SCREENING FOR COLON CANCER: ICD-10-CM

## 2023-05-10 DIAGNOSIS — Z72.820 POOR SLEEP: ICD-10-CM

## 2023-05-10 DIAGNOSIS — I48.91 ATRIAL FIBRILLATION, UNSPECIFIED TYPE (HCC): ICD-10-CM

## 2023-05-10 DIAGNOSIS — Z93.1 GASTROINTESTINAL TUBE PRESENT (HCC): ICD-10-CM

## 2023-05-10 PROBLEM — R45.1 AGITATION: Status: RESOLVED | Noted: 2022-11-11 | Resolved: 2023-05-10

## 2023-05-10 PROBLEM — E83.39 HYPERPHOSPHATEMIA: Status: RESOLVED | Noted: 2022-11-22 | Resolved: 2023-05-10

## 2023-05-10 PROCEDURE — 99214 OFFICE O/P EST MOD 30 MIN: CPT | Performed by: FAMILY MEDICINE

## 2023-05-10 PROCEDURE — 99213 OFFICE O/P EST LOW 20 MIN: CPT | Performed by: FAMILY MEDICINE

## 2023-05-10 RX ORDER — LOSARTAN POTASSIUM 25 MG/1
25 TABLET ORAL DAILY
Qty: 90 TABLET | Refills: 3 | Status: SHIPPED | OUTPATIENT
Start: 2023-05-10

## 2023-05-10 RX ORDER — LEVOTHYROXINE SODIUM 0.05 MG/1
50 TABLET ORAL
Qty: 30 TABLET | Refills: 2 | Status: SHIPPED | OUTPATIENT
Start: 2023-05-10 | End: 2023-09-05 | Stop reason: SDUPTHER

## 2023-05-10 RX ORDER — HYDROXYZINE 50 MG/1
25-50 TABLET, FILM COATED ORAL NIGHTLY PRN
Qty: 60 TABLET | Refills: 2 | Status: SHIPPED | OUTPATIENT
Start: 2023-05-10 | End: 2023-10-30

## 2023-05-10 ASSESSMENT — FIBROSIS 4 INDEX: FIB4 SCORE: 0.66

## 2023-05-10 NOTE — ASSESSMENT & PLAN NOTE
Patient reporting some increasing muscle stiffness in the low back and legs mostly, but also the rest of the body as well. Reports that he feels it when he gets up and loosens up after a few steps.   He is very sedentary but over the last few weeks he has been working on increasing walking once a day 20-30 minutes. This has been helping somewhat.   No prior xrays or joint problems   This has been ongoing for the last 3 months   He has also gained 60 lbs since discharge in late November

## 2023-05-10 NOTE — PATIENT INSTRUCTIONS
Coming off of seroquel as it may be contributing to weight gain, and not helping that much with sleep.   DECREASE seroquel/quetiapine to half tab daily for 1 week, then discontinue.     START hydroxyzine 0.5-1 tabs nightly for sleep. Can repeat dose if you wake up at night.     START melatonin up to 10mg 30mg before bedtime

## 2023-05-10 NOTE — ASSESSMENT & PLAN NOTE
Pt reports he is still waking up after falling asleep with the help of seroquel, then has a hard time going back to sleep. Sleeps just a few hours at night and naps a lot during the daytime  Does not wake up coughing or choking  Has had about 60lb weight gain since discharge from hospital.   Hasn't tried other medications for sleep.   Not drinking coffee/caffeine   No depression/anxiety

## 2023-05-10 NOTE — ASSESSMENT & PLAN NOTE
Patient had G tube removed by surgery.   Eating and drinking well  Was seeing speech therapy but no longer needs to go.   No coughing or choking with eating.

## 2023-05-10 NOTE — PROGRESS NOTES
Subjective:     CC:   Chief Complaint   Patient presents with    Follow-Up         HPI:     Gastrointestinal tube present ([facility])  Patient had G tube removed by surgery.   Eating and drinking well  Was seeing speech therapy but no longer needs to go.   No coughing or choking with eating.    Muscle stiffness  Patient reporting some increasing muscle stiffness in the low back and legs mostly, but also the rest of the body as well. Reports that he feels it when he gets up and loosens up after a few steps.   He is very sedentary but over the last few weeks he has been working on increasing walking once a day 20-30 minutes. This has been helping somewhat.   No prior xrays or joint problems   This has been ongoing for the last 3 months   He has also gained 60 lbs since discharge in late November        Poor sleep  Pt reports he is still waking up after falling asleep with the help of seroquel, then has a hard time going back to sleep. Sleeps just a few hours at night and naps a lot during the daytime  Does not wake up coughing or choking  Has had about 60lb weight gain since discharge from hospital.   Hasn't tried other medications for sleep.   Not drinking coffee/caffeine   No depression/anxiety    Past Medical History:   Diagnosis Date    A-fib ([facility]) 01/24/2023    medicated    ASTHMA 01/24/2023    doesn't use inhalers    Breath shortness 01/24/2023    with exertion    Dental disorder 01/24/2023    broken upper front 2 teeth, missing teeth upper and lower    Eczema     Gastrostomy tube in place ([facility]) 01/24/2023    parenteral feeding, but eats food orally    Hemorrhagic disorder ([facility]) 01/24/2023    taking Xarelto    Hypertension 01/24/2023    medicated    Pneumonia 01/24/2023    history of 10/22    Snoring 01/24/2023       Social History     Tobacco Use    Smoking status: Every Day     Packs/day: 0.33     Years: 25.00     Pack years: 8.25     Types: Cigarettes    Smokeless tobacco: Never    Tobacco comments:     1-2  "cigarettes a day   Vaping Use    Vaping Use: Never used   Substance Use Topics    Alcohol use: Not Currently     Comment: Quit 10/22    Drug use: Not Currently     Types: Marijuana, Inhaled     Comment: Hx of methamphetamin quit years ago, marijuana stopped 2 weeks ago       Current Outpatient Medications Ordered in Epic   Medication Sig Dispense Refill    hydrOXYzine HCl (ATARAX) 50 MG Tab Take 0.5-1 Tablets by mouth at bedtime as needed (sleep). 60 Tablet 2    levothyroxine (SYNTHROID) 50 MCG Tab Take 1 Tablet by mouth every morning on an empty stomach. 30 Tablet 2    apixaban (ELIQUIS) 5mg Tab Take 1 Tablet by mouth 2 times a day. 60 Tablet 11    losartan (COZAAR) 25 MG Tab Take 1 Tablet by mouth every day. 90 Tablet 3    Thiamine HCl (VITAMIN B-1 PO) Take 1 Tablet by mouth every day.      metoprolol tartrate (LOPRESSOR) 50 MG Tab Take 1 Tablet by mouth 2 times a day. 180 Tablet 3    triamcinolone acetonide (KENALOG) 0.1 % Cream Apply 1 Application topically 2 times a day. Mix with lotion and apply to entire body excluding face and genitalia. 454 g 0    Misc. Devices Misc Blood pressure cuff and machine 1 Each 0     No current [facility]-ordered facility-administered medications on file.       Allergies:  Seasonal        Objective:       Exam:  /74 (BP Location: Left arm, Patient Position: Sitting, BP Cuff Size: Adult)   Pulse (!) 58   Temp 36.4 °C (97.6 °F) (Temporal)   Resp 15   Ht 1.778 m (5' 10\")   Wt 103 kg (228 lb)   SpO2 92%   BMI 32.71 kg/m²  Body mass index is 32.71 kg/m².    Constitutional: Alert, no distress, well-groomed.  Respiratory: Unlabored respiratory effort, no cough.  MSK: moves all extremities.  Psych: Alert and oriented x3, normal affect and mood.        Labs:   TSH done 1/27/23 - normal  Reviewed cards notes    Assessment & Plan:     57 y.o. male with the following -     1. Gastrointestinal tube present (HCC)  G tube removed in surgery office, this problem has been resolved    2. " Muscle stiffness  Discussed possible etiology of arthritis but he declines evaluation for now. He has also gained significant weight since d/c from hospital, so will d/c seroquel as thi sis not helping him with sleep maintenance too much and try something else. He is given PT exercises for the low back. Continue walking, work on weight loss.     3. Poor sleep  - hydrOXYzine HCl (ATARAX) 50 MG Tab; Take 0.5-1 Tablets by mouth at bedtime as needed (sleep).  Dispense: 60 Tablet; Refill: 2  Trial of hydroxyzine for sleep instead of seroquel. He was also given sleep hygiene hand out. He no longer wakes up to urinate but still wakes up frequently at nighttime. Can also trial trazodone, doxepin, elavil in the future   Recheck TSH    4. Essential hypertension  - losartan (COZAAR) 25 MG Tab; Take 1 Tablet by mouth every day.  Dispense: 90 Tablet; Refill: 3  Pt [facility]s sent to [facility]    5. Other specified hypothyroidism  - TSH WITH REFLEX TO FT4; Future  Recheck given difficulty with sleep     6. Class 1 obesity due to excess calories with serious comorbidity and body mass index (BMI) of 32.0 to 32.9 in adult  - HEMOGLOBIN A1C; Future  - Lipid Profile; Future  - levothyroxine (SYNTHROID) 50 MCG Tab; Take 1 Tablet by mouth every morning on an empty stomach.  Dispense: 30 Tablet; Refill: 2    7. Atrial fibrillation, unspecified type (HCC)  - apixaban (ELIQUIS) 5mg Tab; Take 1 Tablet by mouth 2 times a day.  Dispense: 60 Tablet; Refill: 11    8. Screening for colon cancer  - OCCULT BLOOD FECES IMMUNOASSAY; Future      Return in about 1 month (around 6/10/2023) for f/u sleep.    Please note that this dictation was created using voice recognition software. I have made every reasonable attempt to correct obvious errors, but I expect that there are errors of grammar and possibly content that I did not discover before finalizing the note.

## 2023-05-19 ENCOUNTER — TELEPHONE (OUTPATIENT)
Dept: CARDIOLOGY | Facility: MEDICAL CENTER | Age: 58
End: 2023-05-19
Payer: COMMERCIAL

## 2023-05-23 PROCEDURE — 93248 EXT ECG>7D<15D REV&INTERPJ: CPT | Performed by: INTERNAL MEDICINE

## 2023-06-28 ENCOUNTER — HOSPITAL ENCOUNTER (OUTPATIENT)
Dept: LAB | Facility: MEDICAL CENTER | Age: 58
End: 2023-06-28
Attending: FAMILY MEDICINE
Payer: COMMERCIAL

## 2023-06-28 DIAGNOSIS — E66.09 CLASS 1 OBESITY DUE TO EXCESS CALORIES WITH SERIOUS COMORBIDITY AND BODY MASS INDEX (BMI) OF 32.0 TO 32.9 IN ADULT: ICD-10-CM

## 2023-06-28 DIAGNOSIS — E03.8 OTHER SPECIFIED HYPOTHYROIDISM: ICD-10-CM

## 2023-06-28 LAB
CHOLEST SERPL-MCNC: 133 MG/DL (ref 100–199)
EST. AVERAGE GLUCOSE BLD GHB EST-MCNC: 111 MG/DL
HBA1C MFR BLD: 5.5 % (ref 4–5.6)
HDLC SERPL-MCNC: 40 MG/DL
LDLC SERPL CALC-MCNC: 77 MG/DL
TRIGL SERPL-MCNC: 79 MG/DL (ref 0–149)
TSH SERPL DL<=0.005 MIU/L-ACNC: 5.01 UIU/ML (ref 0.38–5.33)

## 2023-06-28 PROCEDURE — 80061 LIPID PANEL: CPT

## 2023-06-28 PROCEDURE — 83036 HEMOGLOBIN GLYCOSYLATED A1C: CPT

## 2023-06-28 PROCEDURE — 36415 COLL VENOUS BLD VENIPUNCTURE: CPT

## 2023-06-28 PROCEDURE — 84443 ASSAY THYROID STIM HORMONE: CPT

## 2023-06-30 ENCOUNTER — OFFICE VISIT (OUTPATIENT)
Dept: MEDICAL GROUP | Facility: MEDICAL CENTER | Age: 58
End: 2023-06-30
Attending: FAMILY MEDICINE
Payer: COMMERCIAL

## 2023-06-30 VITALS
BODY MASS INDEX: 32.74 KG/M2 | OXYGEN SATURATION: 96 % | HEIGHT: 70 IN | WEIGHT: 228.7 LBS | HEART RATE: 53 BPM | RESPIRATION RATE: 16 BRPM | TEMPERATURE: 97.3 F | DIASTOLIC BLOOD PRESSURE: 80 MMHG | SYSTOLIC BLOOD PRESSURE: 126 MMHG

## 2023-06-30 DIAGNOSIS — Z23 NEED FOR VACCINATION: ICD-10-CM

## 2023-06-30 DIAGNOSIS — M62.89 MUSCLE STIFFNESS: ICD-10-CM

## 2023-06-30 DIAGNOSIS — Z72.820 POOR SLEEP: ICD-10-CM

## 2023-06-30 PROBLEM — R13.10 DYSPHAGIA: Status: RESOLVED | Noted: 2022-11-10 | Resolved: 2023-06-30

## 2023-06-30 PROBLEM — R16.0 LIVER MASS: Status: RESOLVED | Noted: 2022-10-18 | Resolved: 2023-06-30

## 2023-06-30 PROBLEM — Z71.89 ADVANCED CARE PLANNING/COUNSELING DISCUSSION: Status: RESOLVED | Noted: 2023-02-05 | Resolved: 2023-06-30

## 2023-06-30 PROCEDURE — 3079F DIAST BP 80-89 MM HG: CPT | Performed by: FAMILY MEDICINE

## 2023-06-30 PROCEDURE — 3074F SYST BP LT 130 MM HG: CPT | Performed by: FAMILY MEDICINE

## 2023-06-30 PROCEDURE — 99213 OFFICE O/P EST LOW 20 MIN: CPT | Performed by: FAMILY MEDICINE

## 2023-06-30 PROCEDURE — 99212 OFFICE O/P EST SF 10 MIN: CPT | Performed by: FAMILY MEDICINE

## 2023-06-30 ASSESSMENT — FIBROSIS 4 INDEX: FIB4 SCORE: 0.66

## 2023-06-30 NOTE — ASSESSMENT & PLAN NOTE
Patient reports exercising more and that his swelling has improved significantly.  He does feel better and that he is improving.   Exercise tolerance is improving as well

## 2023-06-30 NOTE — PATIENT INSTRUCTIONS
Call for sleep study  [facility]  [address]  [place] [address]  Ph: [phone]

## 2023-06-30 NOTE — PROGRESS NOTES
Subjective:     CC:   Chief Complaint   Patient presents with    Lab Results         HPI:     A1c recently 5.5%  Lipids normal  TSH normal  Reviewed holter monitor    Poor sleep  Patient reports sleeping well when he does. Takes hydroxyzine PRN  He reports just having bad habits and likes to stay up watching TV.   He reports he can sleep for a long time  TSH recently was normal.       Muscle stiffness  Patient reports exercising more and that his swelling has improved significantly.  He does feel better and that he is improving.   Exercise tolerance is improving as well      Past Medical History:   Diagnosis Date    A-fib ([facility]) 01/24/2023    medicated    ASTHMA 01/24/2023    doesn't use inhalers    Breath shortness 01/24/2023    with exertion    Dental disorder 01/24/2023    broken upper front 2 teeth, missing teeth upper and lower    Eczema     Gastrostomy tube in place ([facility]) 01/24/2023    parenteral feeding, but eats food orally    Hemorrhagic disorder ([facility]) 01/24/2023    taking Xarelto    Hypertension 01/24/2023    medicated    Pneumonia 01/24/2023    history of 10/22    Snoring 01/24/2023       Social History     Tobacco Use    Smoking status: Every Day     Packs/day: 0.33     Years: 25.00     Pack years: 8.25     Types: Cigarettes    Smokeless tobacco: Never    Tobacco comments:     1-2 cigarettes a day   Vaping Use    Vaping Use: Never used   Substance Use Topics    Alcohol use: Not Currently     Comment: Quit 10/22    Drug use: Not Currently     Types: Marijuana, Inhaled     Comment: Hx of methamphetamin quit years ago, marijuana stopped 2 weeks ago       Current Outpatient Medications Ordered in Epic   Medication Sig Dispense Refill    NON SPECIFIED Shingles vaccines #1 1 Each 0    hydrOXYzine HCl (ATARAX) 50 MG Tab Take 0.5-1 Tablets by mouth at bedtime as needed (sleep). 60 Tablet 2    levothyroxine (SYNTHROID) 50 MCG Tab Take 1 Tablet by mouth every morning on an empty stomach. 30 Tablet 2    apixaban  "(ELIQUIS) 5mg Tab Take 1 Tablet by mouth 2 times a day. 60 Tablet 11    losartan (COZAAR) 25 MG Tab Take 1 Tablet by mouth every day. 90 Tablet 3    Thiamine HCl (VITAMIN B-1 PO) Take 1 Tablet by mouth every day.      metoprolol tartrate (LOPRESSOR) 50 MG Tab Take 1 Tablet by mouth 2 times a day. 180 Tablet 3    triamcinolone acetonide (KENALOG) 0.1 % Cream Apply 1 Application topically 2 times a day. Mix with lotion and apply to entire body excluding face and genitalia. 454 g 0    Misc. Devices Misc Blood pressure cuff and machine 1 Each 0     No current [facility]-ordered facility-administered medications on file.       Allergies:  Seasonal        Objective:       Exam:  /80 (BP Location: Left arm, Patient Position: Sitting, BP Cuff Size: Adult)   Pulse (!) 53   Temp 36.3 °C (97.3 °F) (Temporal)   Resp 16   Ht 1.778 m (5' 10\")   Wt 104 kg (228 lb 11.2 oz)   SpO2 96%   BMI 32.82 kg/m²  Body mass index is 32.82 kg/m².    Constitutional: Alert, no distress, well-groomed.  Respiratory: Unlabored respiratory effort, no cough.  MSK: moves all extremities.  Psych: normal affect and mood.      Labs:   Reviewed as above    Assessment & Plan:     57 y.o. male with the following -     Overall patient is doing very well considering his prolonged intubation/ICU stay over the fall/winter last year. He is improving overall with improved activity levels.     1. Poor sleep  Hydroxyzine prn. He is doing well with sleep however needs to work on improved habits on getting to bed earlier.   Emphasized importance of getting sleep study done. Phone number given to patient prior to departure    2. Muscle stiffness  Improved, continue exercise.     3. Need for vaccination  - NON SPECIFIED; Shingles vaccines #1  Dispense: 1 Each; Refill: 0      Return in about 6 months (around 12/30/2023).    Please note that this dictation was created using voice recognition software. I have made every reasonable attempt to correct obvious errors, " but I expect that there are errors of grammar and possibly content that I did not discover before finalizing the note.

## 2023-06-30 NOTE — ASSESSMENT & PLAN NOTE
Patient reports sleeping well when he does. Takes hydroxyzine PRN  He reports just having bad habits and likes to stay up watching TV.   He reports he can sleep for a long time  TSH recently was normal.

## 2023-09-05 DIAGNOSIS — L30.8 OTHER ECZEMA: ICD-10-CM

## 2023-09-06 RX ORDER — TRIAMCINOLONE ACETONIDE 1 MG/G
1 CREAM TOPICAL 2 TIMES DAILY
Qty: 454 G | Refills: 0 | Status: SHIPPED | OUTPATIENT
Start: 2023-09-06 | End: 2024-02-06

## 2023-09-06 NOTE — TELEPHONE ENCOUNTER
Received request via: Pharmacy    Was the patient seen in the last year in this department? Yes    Does the patient have an active prescription (recently filled or refills available) for medication(s) requested? No    Does the patient have prison Plus and need 100 day supply (blood pressure, diabetes and cholesterol meds only)? Patient does not have SCP    Future Appointments         Provider Department Center    10/25/2023 10:20 AM González Barber M.D. [facility] [place] [facility] - [facility]ated by [facility]  Arrive at: [facility] - Arrival

## 2023-10-25 ENCOUNTER — OFFICE VISIT (OUTPATIENT)
Dept: CARDIOLOGY | Facility: MEDICAL CENTER | Age: 58
End: 2023-10-25
Attending: INTERNAL MEDICINE
Payer: COMMERCIAL

## 2023-10-25 VITALS
HEART RATE: 56 BPM | SYSTOLIC BLOOD PRESSURE: 128 MMHG | WEIGHT: 250 LBS | BODY MASS INDEX: 35.79 KG/M2 | HEIGHT: 70 IN | RESPIRATION RATE: 16 BRPM | OXYGEN SATURATION: 97 % | DIASTOLIC BLOOD PRESSURE: 80 MMHG

## 2023-10-25 DIAGNOSIS — I10 ESSENTIAL HYPERTENSION: ICD-10-CM

## 2023-10-25 DIAGNOSIS — I48.0 PAROXYSMAL ATRIAL FIBRILLATION (HCC): ICD-10-CM

## 2023-10-25 DIAGNOSIS — I42.9 CARDIOMYOPATHY, UNSPECIFIED TYPE (HCC): ICD-10-CM

## 2023-10-25 DIAGNOSIS — F17.201 TOBACCO ABUSE, IN REMISSION: ICD-10-CM

## 2023-10-25 PROCEDURE — 99212 OFFICE O/P EST SF 10 MIN: CPT | Performed by: INTERNAL MEDICINE

## 2023-10-25 PROCEDURE — 3074F SYST BP LT 130 MM HG: CPT | Performed by: INTERNAL MEDICINE

## 2023-10-25 PROCEDURE — 3079F DIAST BP 80-89 MM HG: CPT | Performed by: INTERNAL MEDICINE

## 2023-10-25 PROCEDURE — 99214 OFFICE O/P EST MOD 30 MIN: CPT | Performed by: INTERNAL MEDICINE

## 2023-10-25 ASSESSMENT — ENCOUNTER SYMPTOMS
IRREGULAR HEARTBEAT: 0
NAUSEA: 0
PALPITATIONS: 0
DEPRESSION: 0
CLAUDICATION: 0
BLURRED VISION: 0
WEIGHT LOSS: 0
ORTHOPNEA: 0
CONSTIPATION: 0
FLANK PAIN: 0
DYSPNEA ON EXERTION: 0
ABDOMINAL PAIN: 0
SHORTNESS OF BREATH: 0
NEAR-SYNCOPE: 0
VOMITING: 0
BACK PAIN: 0
DECREASED APPETITE: 0
DIZZINESS: 0
FEVER: 0
ALTERED MENTAL STATUS: 0
COUGH: 0
WEIGHT GAIN: 0
DIARRHEA: 0
PND: 0
SYNCOPE: 0
HEARTBURN: 0

## 2023-10-25 ASSESSMENT — FIBROSIS 4 INDEX: FIB4 SCORE: 0.67

## 2023-10-25 NOTE — PROGRESS NOTES
Cardiology Note    CM    History of Present Illness: Tyree Whiteside is a 57 y.o. male who presents for follow up. Prior patient of Dr Rajput followed for HTN, atrial fibrillation s/p DCCV 2/7/23, HFrEF. Noted history of polysubstance abuse and active tobacco.     Overall well. Denies active cardiac complaints. Compliant with medications and denies adverse effects. Remains sober from alcohol and methamphetamines. Successfully quit smoking he admits.     Review of Systems   Constitutional: Negative for decreased appetite, fever, malaise/fatigue, weight gain and weight loss.   HENT:  Negative for congestion and nosebleeds.    Eyes:  Negative for blurred vision.   Cardiovascular:  Negative for chest pain, claudication, dyspnea on exertion, irregular heartbeat, leg swelling, near-syncope, orthopnea, palpitations, paroxysmal nocturnal dyspnea and syncope.   Respiratory:  Negative for cough and shortness of breath.    Endocrine: Negative for cold intolerance and heat intolerance.   Skin:  Negative for rash.   Musculoskeletal:  Negative for back pain.   Gastrointestinal:  Negative for abdominal pain, constipation, diarrhea, heartburn, melena, nausea and vomiting.   Genitourinary:  Negative for dysuria, flank pain and hematuria.   Neurological:  Negative for dizziness.   Psychiatric/Behavioral:  Negative for altered mental status and depression.          Past Medical History:   Diagnosis Date    A-fib (Formerly [facility]) 01/24/2023    medicated    ASTHMA 01/24/2023    doesn't use inhalers    Breath shortness 01/24/2023    with exertion    Dental disorder 01/24/2023    broken upper front 2 teeth, missing teeth upper and lower    Eczema     Gastrostomy tube in place (Formerly [facility]) 01/24/2023    parenteral feeding, but eats food orally    Hemorrhagic disorder (Formerly [facility]) 01/24/2023    taking Xarelto    Hypertension 01/24/2023    medicated    Pneumonia 01/24/2023    history of 10/22    Snoring 01/24/2023         Past Surgical History:   Procedure  Laterality Date    DC UPPER GI ENDOSCOPY,DIAGNOSIS N/A 11/17/2022    Procedure: GASTROSCOPY;  Surgeon: Sebastian Mcgrath M.D.;  Location: SURGERY [facility];  Service: Gastroenterology    DC PLACE PERCUT GASTROSTOMY TUBE N/A 11/17/2022    Procedure: EGD, WITH PEG TUBE INSERTION;  Surgeon: Sebastian Mcgrath M.D.;  Location: SURGERY [facility];  Service: Gastroenterology         Current Outpatient Medications   Medication Sig Dispense Refill    levothyroxine (SYNTHROID) 50 MCG Tab Take 1 Tablet by mouth every morning on an empty stomach. 90 Tablet 3    triamcinolone acetonide (KENALOG) 0.1 % Cream Apply 1 Application  topically 2 times a day. Mix with lotion and apply to entire body excluding face and genitalia. 454 g 0    NON SPECIFIED Shingles vaccines #1 1 Each 0    hydrOXYzine HCl (ATARAX) 50 MG Tab Take 0.5-1 Tablets by mouth at bedtime as needed (sleep). 60 Tablet 2    apixaban (ELIQUIS) 5mg Tab Take 1 Tablet by mouth 2 times a day. 60 Tablet 11    losartan (COZAAR) 25 MG Tab Take 1 Tablet by mouth every day. 90 Tablet 3    Thiamine HCl (VITAMIN B-1 PO) Take 1 Tablet by mouth every day.      metoprolol tartrate (LOPRESSOR) 50 MG Tab Take 1 Tablet by mouth 2 times a day. 180 Tablet 3    Misc. Devices Misc Blood pressure cuff and machine 1 Each 0     No current facility-administered medications for this visit.         Allergies   Allergen Reactions    Seasonal          Family History   Problem Relation Age of Onset    Heart Disease Father     Stroke Father     Hypertension Brother     Cancer Brother         has 12 sibs, one brother had an unknown type of ca         Social History     Socioeconomic History    Marital status:      Spouse name: Not on file    Number of children: Not on file    Years of education: Not on file    Highest education level: Not on file   Occupational History    Not on file   Tobacco Use    Smoking status: Every Day     Current packs/day: 0.33     Average packs/day: 0.3  "packs/day for 25.0 years (8.3 ttl pk-yrs)     Types: Cigarettes    Smokeless tobacco: Never    Tobacco comments:     1-2 cigarettes a day   Vaping Use    Vaping Use: Never used   Substance and Sexual Activity    Alcohol use: Not Currently     Comment: Quit 10/22    Drug use: Not Currently     Types: Marijuana, Inhaled     Comment: Hx of methamphetamin quit years ago, marijuana stopped 2 weeks ago    Sexual activity: Yes   Other Topics Concern    Not on file   Social History Narrative    Not on file     Social Determinants of Health     Financial Resource Strain: Not on file   Food Insecurity: Not on file   Transportation Needs: Not on file   Physical Activity: Not on file   Stress: Not on file   Social Connections: Not on file   Intimate Partner Violence: Not on file   Housing Stability: Not on file         Physical Exam:  Ambulatory Vitals  /80 (BP Location: Left arm, Patient Position: Sitting, BP Cuff Size: Adult)   Pulse (!) 56   Resp 16   Ht 1.778 m (5' 10\")   Wt 113 kg (250 lb)   SpO2 97%    BP Readings from Last 4 Encounters:   10/25/23 128/80   06/30/23 126/80   05/10/23 128/74   04/24/23 122/80     Weight/BMI:   Vitals:    10/25/23 1036   BP: 128/80   Weight: 113 kg (250 lb)   Height: 1.778 m (5' 10\")    Body mass index is 35.87 kg/m².  Wt Readings from Last 4 Encounters:   10/25/23 113 kg (250 lb)   06/30/23 104 kg (228 lb 11.2 oz)   05/10/23 103 kg (228 lb)   04/24/23 102 kg (225 lb)       Physical Exam  Constitutional:       General: He is not in acute distress.  HENT:      Head: Normocephalic and atraumatic.   Eyes:      Conjunctiva/sclera: Conjunctivae normal.      Pupils: Pupils are equal, round, and reactive to light.   Neck:      Vascular: No JVD.   Cardiovascular:      Rate and Rhythm: Normal rate and regular rhythm.      Heart sounds: Normal heart sounds. No murmur heard.     No friction rub. No gallop.   Pulmonary:      Effort: Pulmonary effort is normal. No respiratory distress.      " "Breath sounds: Normal breath sounds. No wheezing or rales.   Chest:      Chest wall: No tenderness.   Abdominal:      General: Bowel sounds are normal. There is no distension.      Palpations: Abdomen is soft.   Musculoskeletal:      Cervical back: Normal range of motion and neck supple.   Skin:     General: Skin is warm and dry.   Neurological:      Mental Status: He is alert and oriented to person, place, and time.   Psychiatric:         Mood and Affect: Affect normal.         Judgment: Judgment normal.         Lab Data Review:  Lab Results   Component Value Date/Time    CHOLSTRLTOT 133 06/28/2023 10:00 AM    LDL 77 06/28/2023 10:00 AM    HDL 40 06/28/2023 10:00 AM    TRIGLYCERIDE 79 06/28/2023 10:00 AM       Lab Results   Component Value Date/Time    SODIUM 139 02/03/2023 09:50 AM    POTASSIUM 4.4 02/03/2023 09:50 AM    CHLORIDE 101 02/03/2023 09:50 AM    CO2 22 02/03/2023 09:50 AM    GLUCOSE 75 02/03/2023 09:50 AM    BUN 32 (H) 02/03/2023 09:50 AM    CREATININE 0.81 02/03/2023 09:50 AM     CrCl cannot be calculated (Patient's most recent lab result is older than the maximum 7 days allowed.).  Lab Results   Component Value Date/Time    ALKPHOSPHAT 83 02/03/2023 09:50 AM    ASTSGOT 18 02/03/2023 09:50 AM    ALTSGPT 25 02/03/2023 09:50 AM    TBILIRUBIN 0.4 02/03/2023 09:50 AM      Lab Results   Component Value Date/Time    WBC 7.4 02/03/2023 09:50 AM     Lab Results   Component Value Date/Time    HBA1C 5.5 06/28/2023 10:00 AM     No components found for: \"TROP\"      Cardiac Imaging and Procedures Review:    EKG 2/7/23 sinus, late r/s transition  EKG 1/16/23 AF rate 63 bpm    TTE 10/2022  CONCLUSIONS  Compared to the images of the prior study 10/10/2022, there has been no   significant change. The right ventricular systolic pressure could not   be estimated on the prior exam.  Mildly reduced left ventricular systolic function.  The left ventricular ejection fraction is visually estimated to be 40%.  Global " hypokinesis.  Diastolic function is difficult to assess with arrhythmia.  Reduced right ventricular systolic function.  Mild mitral regurgitation.  Moderate tricuspid regurgitation.  Estimated right ventricular systolic pressure is 55 mmHg.    TTE 4/17/23  CONCLUSIONS  Normal left ventricular systolic function. The left ventricular   ejection fraction is visually estimated to be 55%.   The right ventricle is normal in size and systolic function.  No significant valvular abnormalities.   Compared to the prior study on 10/28/2022, LVEF has improved.     Procedure: biotel monitor; 10d 4h; 5/1/23   Indication: persistent AF   Quality: good   Findings:   Underlying rhythm: Predominantly sinus rhythm with average rate 59 bpm. No atrial fibrillation nor flutter detected.   Atrial events: Rare ectopy. 12 episodes supraventricular tachycardia (SVT); longest 8 beats and fastest 135 bpm.   Ventricular events: Rare ectopy.   Patient events: Asymptomatic.   Impressions:   Predominantly sinus rhythm.   Asymptomatic, short SVT.     Medical Decision Making:  Problem List Items Addressed This Visit       Essential hypertension    Tobacco abuse, in remission    Atrial fibrillation (HCC)    Cardiomyopathy (HCC)     HFrecEF likely nicm related to substance abuse vs tachyarrhythmia induced, NYHA I, stage C - continue BB and ARB. Stable, asymptomatic.     Paroxysmal AF - chadsvasc 1. Discussed with patient and through shared decision making elect to continue doac for cva prevention. Continue rate control. Consider repeat event monitor next visit. If no recurrence over a year can rediscuss doac.    HTN - controlled. Goal <130/80. Continue regimen.     It was my pleasure to meet with Mr. Whiteside.

## 2023-10-28 DIAGNOSIS — Z72.820 POOR SLEEP: ICD-10-CM

## 2023-10-30 RX ORDER — HYDROXYZINE 50 MG/1
25-50 TABLET, FILM COATED ORAL NIGHTLY PRN
Qty: 30 TABLET | Refills: 5 | Status: SHIPPED | OUTPATIENT
Start: 2023-10-30

## 2024-01-22 DIAGNOSIS — I48.19 PERSISTENT ATRIAL FIBRILLATION (HCC): ICD-10-CM

## 2024-01-22 DIAGNOSIS — I50.31 ACUTE DIASTOLIC CONGESTIVE HEART FAILURE (HCC): ICD-10-CM

## 2024-01-22 DIAGNOSIS — R06.02 SOB (SHORTNESS OF BREATH): ICD-10-CM

## 2024-01-22 DIAGNOSIS — F10.11 HISTORY OF ALCOHOL ABUSE: ICD-10-CM

## 2024-01-22 RX ORDER — METOPROLOL TARTRATE 50 MG/1
50 TABLET, FILM COATED ORAL 2 TIMES DAILY
Qty: 180 TABLET | Refills: 2 | Status: SHIPPED | OUTPATIENT
Start: 2024-01-22

## 2024-01-22 NOTE — TELEPHONE ENCOUNTER
Is the patient due for a refill? Yes    Was the patient seen the past year? Yes    Date of last office visit: 10/25/23    Does the patient have an upcoming appointment?  Yes   If yes, When? 4/10/24    Provider to refill:VR    Does the patients insurance require a 100 day supply?  No

## 2024-02-03 DIAGNOSIS — L30.8 OTHER ECZEMA: ICD-10-CM

## 2024-02-05 NOTE — TELEPHONE ENCOUNTER
Received request via: Pharmacy    Was the patient seen in the last year in this department? Yes    Does the patient have an active prescription (recently filled or refills available) for medication(s) requested? No    [facility]    Does the patient have FPC Plus and need 100 day supply (blood pressure, diabetes and cholesterol meds only)? Patient does not have SCP    Future Appointments         Provider Department Center    4/10/2024 10:20 AM González Barber M.D. [facility] B - Operated by [facility]  Arrive at: Cardiology [facility] - Arrival

## 2024-02-06 RX ORDER — TRIAMCINOLONE ACETONIDE 1 MG/G
CREAM TOPICAL
Qty: 454 G | Refills: 0 | Status: SHIPPED | OUTPATIENT
Start: 2024-02-06

## 2024-04-04 DIAGNOSIS — I10 ESSENTIAL HYPERTENSION: ICD-10-CM

## 2024-04-05 RX ORDER — LOSARTAN POTASSIUM 25 MG/1
25 TABLET ORAL DAILY
Qty: 90 TABLET | Refills: 0 | Status: SHIPPED | OUTPATIENT
Start: 2024-04-05

## 2024-04-05 NOTE — TELEPHONE ENCOUNTER
Received request via: Pharmacy    Was the patient seen in the last year in this department? Yes    Does the patient have an active prescription (recently filled or refills available) for medication(s) requested? No    [facility]    Does the patient have MCFP Plus and need 100 day supply (blood pressure, diabetes and cholesterol meds only)? Patient does not have SCP    Future Appointments         Provider Department Center    4/10/2024 10:20 AM González Barber M.D. [facility] B - Operated by [facility]  Arrive at: Cardiology [facility] - Arrival

## 2024-04-10 ENCOUNTER — APPOINTMENT (OUTPATIENT)
Dept: CARDIOLOGY | Facility: MEDICAL CENTER | Age: 59
End: 2024-04-10
Attending: INTERNAL MEDICINE
Payer: COMMERCIAL

## 2024-04-15 DIAGNOSIS — I48.91 ATRIAL FIBRILLATION, UNSPECIFIED TYPE (HCC): ICD-10-CM

## 2024-04-15 DIAGNOSIS — Z72.820 POOR SLEEP: ICD-10-CM

## 2024-04-15 NOTE — TELEPHONE ENCOUNTER
Received request via: Pharmacy    Was the patient seen in the last year in this department? Yes    Does the patient have an active prescription (recently filled or refills available) for medication(s) requested? No    [facility]    Does the patient have intermediate Plus and need 100 day supply (blood pressure, diabetes and cholesterol meds only)? Patient does not have SCP    Future Appointments         Provider Department Center    7/24/2024 3:40 PM González Barber M.D. [facility] B - Operated by [facility]  Arrive at: Cardiology [facility] - Arrival

## 2024-04-15 NOTE — TELEPHONE ENCOUNTER
Received request via: Pharmacy    Was the patient seen in the last year in this department? Yes    Does the patient have an active prescription (recently filled or refills available) for medication(s) requested? No    [facility]    Does the patient have CHCF Plus and need 100 day supply (blood pressure, diabetes and cholesterol meds only)? Patient does not have SCP    Future Appointments         Provider Department Center    7/24/2024 3:40 PM González Barber M.D. [facility] - Operated by [facility]  Arrive at: Cardiology [place] - Arrival

## 2024-04-17 RX ORDER — APIXABAN 5 MG/1
5 TABLET, FILM COATED ORAL 2 TIMES DAILY
Qty: 60 TABLET | Refills: 11 | Status: SHIPPED | OUTPATIENT
Start: 2024-04-17

## 2024-04-19 RX ORDER — HYDROXYZINE 50 MG/1
25-50 TABLET, FILM COATED ORAL NIGHTLY PRN
Qty: 30 TABLET | Refills: 5 | Status: SHIPPED | OUTPATIENT
Start: 2024-04-19

## 2024-07-11 DIAGNOSIS — I10 ESSENTIAL HYPERTENSION: ICD-10-CM

## 2024-07-11 RX ORDER — LOSARTAN POTASSIUM 25 MG/1
25 TABLET ORAL DAILY
Qty: 90 TABLET | Refills: 0 | Status: SHIPPED | OUTPATIENT
Start: 2024-07-11

## 2024-07-24 ENCOUNTER — OFFICE VISIT (OUTPATIENT)
Dept: CARDIOLOGY | Facility: MEDICAL CENTER | Age: 59
End: 2024-07-24
Attending: INTERNAL MEDICINE
Payer: COMMERCIAL

## 2024-07-24 VITALS
DIASTOLIC BLOOD PRESSURE: 90 MMHG | OXYGEN SATURATION: 95 % | BODY MASS INDEX: 36.36 KG/M2 | HEART RATE: 72 BPM | HEIGHT: 70 IN | WEIGHT: 254 LBS | RESPIRATION RATE: 18 BRPM | SYSTOLIC BLOOD PRESSURE: 152 MMHG

## 2024-07-24 DIAGNOSIS — I48.0 PAROXYSMAL ATRIAL FIBRILLATION (HCC): ICD-10-CM

## 2024-07-24 DIAGNOSIS — I10 ESSENTIAL HYPERTENSION: ICD-10-CM

## 2024-07-24 DIAGNOSIS — I42.9 CARDIOMYOPATHY, UNSPECIFIED TYPE (HCC): ICD-10-CM

## 2024-07-24 PROCEDURE — 99212 OFFICE O/P EST SF 10 MIN: CPT | Performed by: INTERNAL MEDICINE

## 2024-07-24 PROCEDURE — 99214 OFFICE O/P EST MOD 30 MIN: CPT | Performed by: INTERNAL MEDICINE

## 2024-07-24 PROCEDURE — 3077F SYST BP >= 140 MM HG: CPT | Performed by: INTERNAL MEDICINE

## 2024-07-24 PROCEDURE — G2211 COMPLEX E/M VISIT ADD ON: HCPCS | Performed by: INTERNAL MEDICINE

## 2024-07-24 PROCEDURE — 3080F DIAST BP >= 90 MM HG: CPT | Performed by: INTERNAL MEDICINE

## 2024-07-24 RX ORDER — CARVEDILOL 6.25 MG/1
6.25 TABLET ORAL 2 TIMES DAILY WITH MEALS
Qty: 180 TABLET | Refills: 4 | Status: SHIPPED | OUTPATIENT
Start: 2024-07-24

## 2024-07-24 RX ORDER — ASPIRIN 81 MG/1
81 TABLET ORAL DAILY
Qty: 100 TABLET | Refills: 4 | Status: SHIPPED | OUTPATIENT
Start: 2024-07-24

## 2024-07-24 ASSESSMENT — ENCOUNTER SYMPTOMS
CLAUDICATION: 0
BLURRED VISION: 0
WEIGHT LOSS: 0
DECREASED APPETITE: 0
FEVER: 0
NEAR-SYNCOPE: 0
FLANK PAIN: 0
DYSPNEA ON EXERTION: 0
VOMITING: 0
ALTERED MENTAL STATUS: 0
DEPRESSION: 0
SYNCOPE: 0
WEIGHT GAIN: 0
ORTHOPNEA: 0
SHORTNESS OF BREATH: 0
CONSTIPATION: 0
DIZZINESS: 0
COUGH: 0
ABDOMINAL PAIN: 0
NAUSEA: 0
DIARRHEA: 0
BACK PAIN: 0
PND: 0
HEARTBURN: 0
PALPITATIONS: 0
IRREGULAR HEARTBEAT: 0

## 2024-07-24 ASSESSMENT — FIBROSIS 4 INDEX: FIB4 SCORE: 0.67

## 2024-10-06 DIAGNOSIS — I10 ESSENTIAL HYPERTENSION: ICD-10-CM

## 2024-10-09 RX ORDER — LOSARTAN POTASSIUM 25 MG/1
25 TABLET ORAL DAILY
Qty: 90 TABLET | Refills: 0 | Status: SHIPPED | OUTPATIENT
Start: 2024-10-09

## 2024-11-13 NOTE — CARE PLAN
Problem: Ventilation  Goal: Ability to achieve and maintain unassisted ventilation or tolerate decreased levels of ventilator support  Description: Target End Date:  4 days     Document on Vent flowsheet    1.  Support and monitor invasive and noninvasive mechanical ventilation  2.  Monitor ventilator weaning response  3.  Perform ventilator associated pneumonia prevention interventions  4.  Manage ventilation therapy by monitoring diagnostic test results  Outcome: Progressing      Ventilator Daily Summary    Ventilator settings changed this shift: No changes    Weaning trials: none    Respiratory Procedures: none    Plan: Continue current ventilator settings and wean mechanical ventilation as tolerated per physician orders.      Normal

## (undated) DEVICE — SYRINGE SAFETY 3 ML 18 GA X 1 1/2 BLUNT LL (100/BX 8BX/CA)

## (undated) DEVICE — ELECTRODE DUAL RETURN W/ CORD - (50/PK)

## (undated) DEVICE — CATHETER IV SAFETY 22 GA X 1 (50EA/BX)

## (undated) DEVICE — TUBE CONNECTING SUCTION - CLEAR PLASTIC STERILE 72 IN (50EA/CA)

## (undated) DEVICE — MASK WITH FACE SHIELD (25/BX 4BX/CA)

## (undated) DEVICE — CATHETER IV SAFETY 20 GA X 1-1/4 (50/BX)

## (undated) DEVICE — KIT  I.V. START (100EA/CA)

## (undated) DEVICE — BITE BLOCK ADULT 60FR (100EA/CA)

## (undated) DEVICE — SYRINGE SAFETY 10 ML 18 GA X 1 1/2 BLUNT LL (100/BX 4BX/CA)

## (undated) DEVICE — WATER IRRIGATION STERILE 1000ML (12EA/CA)

## (undated) DEVICE — GOWN SURGEONS LARGE - (32/CA)

## (undated) DEVICE — SYRINGE SAFETY 5 ML 18 GA X 1-1/2 BLUNT LL (100/BX 4BX/CA)

## (undated) DEVICE — TUBE E-T HI-LO CUFF 8.0MM (10EA/PK)

## (undated) DEVICE — LACTATED RINGERS INJ 1000 ML - (14EA/CA 60CA/PF)

## (undated) DEVICE — SYRINGE 12 CC LUER TIP - (80/BX) OBSOLETE ITEM

## (undated) DEVICE — SYRINGE DISP. 60 CC LL - (30/BX, 12BX/CA)**WHEN THESE ARE GONE ORDER #500206**

## (undated) DEVICE — SPONGE GAUZE NON-STERILE 4X4 - (2000/CA 10PK/CA)

## (undated) DEVICE — FORCEP RADIAL JAW 4 STANDARD CAPACITY W/NEEDLE 240CM (40EA/BX)

## (undated) DEVICE — CANISTER SUCTION RIGID RED 1500CC (40EA/CA)

## (undated) DEVICE — TUBE E-T HI-LO CUFF 8.5MM (10EA/PK)

## (undated) DEVICE — TUBE SUCTION YANKAUER  1/4 X 6FT (20EA/CA)"

## (undated) DEVICE — BLOCK BITE ENDOSCOPIC 2809 - (100/BX) INTERMEDIATE

## (undated) DEVICE — TUBING CLEARLINK DUO-VENT - C-FLO (48EA/CA)

## (undated) DEVICE — TUBE E-T HI-LO CUFF 6.5MM (10EA/BX)

## (undated) DEVICE — KIT CUSTOM PROCEDURE SINGLE FOR ENDO  (15/CA)

## (undated) DEVICE — GLOVE, LITE (PAIR)

## (undated) DEVICE — TUBE E-T HI-LO CUFF 7.0MM (10EA/PK)

## (undated) DEVICE — SENSOR OXIMETER ADULT SPO2 RD SET (20EA/BX)

## (undated) DEVICE — TUBE E-T HI-LO CUFF 7.5MM (10EA/PK)

## (undated) DEVICE — TOWEL STOP TIMEOUT SAFETY FLAG (40EA/CA)